# Patient Record
Sex: MALE | Race: WHITE | Employment: OTHER | URBAN - METROPOLITAN AREA
[De-identification: names, ages, dates, MRNs, and addresses within clinical notes are randomized per-mention and may not be internally consistent; named-entity substitution may affect disease eponyms.]

---

## 2017-01-01 ENCOUNTER — HOSPITAL ENCOUNTER (OUTPATIENT)
Dept: ULTRASOUND IMAGING | Facility: CLINIC | Age: 82
Discharge: HOME | End: 2017-12-29
Attending: INTERNAL MEDICINE | Admitting: INTERNAL MEDICINE
Payer: MEDICARE

## 2017-01-01 DIAGNOSIS — R58 BLEEDING: ICD-10-CM

## 2017-01-01 DIAGNOSIS — I77.0 AV FISTULA (H): ICD-10-CM

## 2017-01-01 DIAGNOSIS — I87.8 VENOUS PRESSURE INCREASED: ICD-10-CM

## 2017-01-01 DIAGNOSIS — N18.6 ESRD (END STAGE RENAL DISEASE) (H): ICD-10-CM

## 2017-01-01 DIAGNOSIS — M79.601 PAIN OF RIGHT ARM: ICD-10-CM

## 2017-01-01 PROCEDURE — 93990 DOPPLER FLOW TESTING: CPT

## 2017-04-19 ENCOUNTER — OFFICE VISIT (OUTPATIENT)
Dept: CARDIOLOGY | Facility: CLINIC | Age: 82
End: 2017-04-19
Attending: PHYSICIAN ASSISTANT
Payer: COMMERCIAL

## 2017-04-19 VITALS
BODY MASS INDEX: 26.37 KG/M2 | DIASTOLIC BLOOD PRESSURE: 54 MMHG | SYSTOLIC BLOOD PRESSURE: 132 MMHG | HEART RATE: 56 BPM | HEIGHT: 67 IN | WEIGHT: 168 LBS

## 2017-04-19 DIAGNOSIS — I21.4 NSTEMI (NON-ST ELEVATED MYOCARDIAL INFARCTION) (H): ICD-10-CM

## 2017-04-19 PROCEDURE — 99214 OFFICE O/P EST MOD 30 MIN: CPT | Performed by: PHYSICIAN ASSISTANT

## 2017-04-19 RX ORDER — NITROGLYCERIN 0.4 MG/1
0.4 TABLET SUBLINGUAL EVERY 5 MIN PRN
Qty: 25 TABLET | Refills: 3 | Status: SHIPPED | OUTPATIENT
Start: 2017-04-19

## 2017-04-19 NOTE — MR AVS SNAPSHOT
"              After Visit Summary   4/19/2017    Maurizio Ohara    MRN: [id number]           Patient Information     Date Of Birth          10/7/1929        Visit Information        Provider Department      4/19/2017 1:50 PM Julianne Dan PA-C [facility] [place]        Today's Diagnoses     NSTEMI (non-ST elevated myocardial infarction) (H)          Care Instructions    You look stable today and are feeling well.   No medication changes today.   Follow up with Dr. Keyes in 6 months with a repeat echocardiogram (ultrasound) to follow the valves and the pump function of your heart.     If you develop chest pain or shortness of breath, or if you're retaining fluid, we want you to call us and be seen sooner.         Follow-ups after your visit        Who to contact     If you have questions or need follow up information about today's clinic visit or your schedule please contact [facility] [place] directly at [phone].  Normal or non-critical lab and imaging results will be communicated to you by orderbird AGhart, letter or phone within 4 business days after the clinic has received the results. If you do not hear from us within 7 days, please contact the clinic through orderbird AGhart or phone. If you have a critical or abnormal lab result, we will notify you by phone as soon as possible.  Submit refill requests through [email or web address] or call your pharmacy and they will forward the refill request to us. Please allow 3 business days for your refill to be completed.          Additional Information About Your Visit        orderbird AGhart Information     [email or web address] lets you send messages to your doctor, view your test results, renew your prescriptions, schedule appointments and more. To sign up, go to [email or web address] . Click on \"Log in\" on the left side of the screen, which will take you to the Welcome page. Then click on \"Sign up Now\" on the right side of the page. " "    You will be asked to enter the access code listed below, as well as some personal information. Please follow the directions to create your username and password.     Your access code is: [email or web address]  Expires: 2017  2:39 PM     Your access code will  in 90 days. If you need help or a new code, please call your [facility] clinic or [phone].        Care EveryWhere ID     This is your Care EveryWhere ID. This could be used by other organizations to access your [facility] medical records  [id number]        Your Vitals Were     Pulse Height BMI (Body Mass Index)             56 1.702 m (5' 7\") 26.31 kg/m2          Blood Pressure from Last 3 Encounters:   17 132/54   10/24/16 126/64   10/21/16 118/74    Weight from Last 3 Encounters:   17 76.2 kg (168 lb)   10/21/16 75.8 kg (167 lb)   10/02/16 70.6 kg (155 lb 9.6 oz)              Today, you had the following     No orders found for display         Where to get your medicines      These medications [facility] - [place], [place] - [address] [place] [address]  [address] [place] [address], [facility] [address]     Phone:  [phone]     nitroglycerin 0.4 MG sublingual tablet          Primary Care Provider Office Phone # Fax #    [facility] [phone]       [address] [place] [id number]        Thank you!     Thank you for choosing [facility] [place]  for your care. Our goal is always to provide you with excellent care. Hearing back from our patients is one way we can continue to improve our services. Please take a few minutes to complete the written survey that you may receive in the mail after your visit with us. Thank you!             Your Updated Medication List - Protect others around you: Learn how to safely use, store and throw away your medicines at www.disposemymeds.org.          This list is accurate as of: 17  2:39 PM.  " Always use your most recent med list.                   Brand Name Dispense Instructions for use    aspirin 81 MG tablet      Take 81 mg by mouth daily       atorvastatin 40 MG tablet    LIPITOR    30 tablet    Take 1 tablet (40 mg) by mouth daily       calcium acetate 667 MG Caps capsule    PHOSLO     Take 2,001 mg by mouth 3 times daily (with meals)       DIALYVITE PO      Take 1 tablet by mouth daily       metoprolol 50 MG 24 hr tablet    TOPROL XL    135 tablet    Take 1.5 tablets (75 mg) by mouth daily       NIFEDIAC CC 90 MG Tb24   Generic drug:  NIFEdipine ER      Take 90 mg by mouth every morning       nitroglycerin 0.4 MG sublingual tablet    NITROSTAT    25 tablet    Place 1 tablet (0.4 mg) under the tongue every 5 minutes as needed for chest pain       nortriptyline 50 MG capsule    PAMELOR     Take 100 mg by mouth At Bedtime       PROMETHAZINE VC/CODEINE 5-6.25-10 MG/5ML Syrp   Generic drug:  Phenyleph-Promethazine-Cod      Take 1-2 tsp. by mouth every 6 hours as needed       VITAMIN D (CHOLECALCIFEROL) PO      Take 2,000 Units by mouth daily

## 2017-04-19 NOTE — PROGRESS NOTES
HPI and Plan:   See dictation  [id number]    Orders this Visit:  No orders of the defined types were placed in this encounter.    Orders Placed This Encounter   Medications     nitroglycerin (NITROSTAT) 0.4 MG sublingual tablet     Sig: Place 1 tablet (0.4 mg) under the tongue every 5 minutes as needed for chest pain     Dispense:  25 tablet     Refill:  3     Medications Discontinued During This Encounter   Medication Reason     order for DME Therapy completed     nitroglycerin (NITROSTAT) 0.4 MG SL tablet Reorder         Encounter Diagnosis   Name Primary?     NSTEMI (non-ST elevated myocardial infarction) (H)        CURRENT MEDICATIONS:  Current Outpatient Prescriptions   Medication Sig Dispense Refill     nitroglycerin (NITROSTAT) 0.4 MG sublingual tablet Place 1 tablet (0.4 mg) under the tongue every 5 minutes as needed for chest pain 25 tablet 3     atorvastatin (LIPITOR) 40 MG tablet Take 1 tablet (40 mg) by mouth daily 30 tablet 0     NIFEdipine ER (NIFEDIAC CC) 90 MG TB24 Take 90 mg by mouth every morning       metoprolol (TOPROL XL) 50 MG 24 hr tablet Take 1.5 tablets (75 mg) by mouth daily 135 tablet 3     aspirin 81 MG tablet Take 81 mg by mouth daily       nortriptyline (PAMELOR) 50 MG capsule Take 100 mg by mouth At Bedtime       B Complex-C-Folic Acid (DIALYVITE PO) Take 1 tablet by mouth daily       VITAMIN D, CHOLECALCIFEROL, PO Take 2,000 Units by mouth daily       calcium acetate (PHOSLO) 667 MG CAPS Take 2,001 mg by mouth 3 times daily (with meals)       Phenyleph-Promethazine-Cod (PROMETHAZINE VC/CODEINE) 5-6.25-10 MG/5ML SYRP Take 1-2 tsp. by mouth every 6 hours as needed       [DISCONTINUED] nitroglycerin (NITROSTAT) 0.4 MG SL tablet Place 1 tablet (0.4 mg) under the tongue every 5 minutes as needed for chest pain 25 tablet 0       ALLERGIES  Allergies   Allergen Reactions     Glyburide      Lisinopril      Hyperkalemia when hospitalized 4/5/2011     Metformin      Renal failure stage 4      "Glenn Gallagher RN clarified with pt, pt does not remember rxn, it was a long time ago, and \"nothing crazy\".     Verapamil        PAST MEDICAL HISTORY:  Past Medical History:   Diagnosis Date     Anemia      Anemia      CAD (coronary artery disease) 12/24/14    PCI and BMS to mid RCA (5.0 x 20mm) with residual mod diffuse mid LAD disease     Chronic kidney disease     on Dialysis     Diabetes (H)      Hypertension      Mitral regurgitation 12/24/2014    mild-mod (1-2+) per echo     Mitral valve disorders 12/24/2014    mild/mod (1-2+) MR     Myocardial infarction (H) 12/24/2014    NSTEMI     Pulmonary hypertension (H) 12/24/2014    RVSP elevated c/w mild-mod PH per echo     Renal disease due to diabetes mellitus (H)     End stage; on Dialysis       PAST SURGICAL HISTORY:  Past Surgical History:   Procedure Laterality Date     CORONARY ANGIOGRAPHY ADULT ORDER  12/24/2014     ENT SURGERY      Tonsillectomy     HEART CATH, ANGIOPLASTY  12/24/2014    PCI and BMS (5.0x20mm)to mid RCA     THORACIC SURGERY      Herniated disc       FAMILY HISTORY:  Family History   Problem Relation Age of Onset     DIABETES Mother        SOCIAL HISTORY:  Social History     Social History     Marital status: Single     Spouse name: N/A     Number of children: N/A     Years of education: N/A     Social History Main Topics     Smoking status: Former Smoker     Smokeless tobacco: None     Alcohol use Yes      Comment: occasionally/rare     Drug use: No     Sexual activity: Not Asked     Other Topics Concern     Caffeine Concern Yes     1-2 cups daily     Sleep Concern No     Special Diet Yes     kidney diet     Exercise Yes     walking     Seat Belt Yes     Social History Narrative       Review of Systems:  Skin:  Positive for bruising   Eyes:  Positive for glasses  ENT:  Negative    Respiratory:  Negative    Cardiovascular:  Negative    Gastroenterology: Negative    Genitourinary:  Positive for    Musculoskeletal:  Negative  " "  Neurologic:  Negative    Psychiatric:  Negative    Heme/Lymph/Imm:  Positive for easy bruising  Endocrine:  Negative        Physical Exam:  Vitals: /54 (BP Location: Right arm, Patient Position: Chair, Cuff Size: Adult Regular)  Pulse 56  Ht 1.702 m (5' 7\")  Wt 76.2 kg (168 lb)  BMI 26.31 kg/m2    Constitutional:  cooperative, alert and oriented, well developed, well nourished, in no acute distress        Skin:  warm and dry to the touch   scattered ecchymosis    Head:  normocephalic        Eyes:  pupils equal and round;conjunctivae and lids unremarkable   mild scleral icterus    ENT:  no pallor or cyanosis        Neck:      mild JVD    Chest:  clear to auscultation;normal symmetry        Cardiac: regular rhythm             II/VI systolic murmur at USB, II/VI blowing systolic murmur at apex    Abdomen:  abdomen soft;BS normoactive;non-tender obese      Vascular: not assessed this visit                                      Extremities and Back:  no edema   chronic venous stasis changes in extremities; dialysis vascular access in right arm    Neurological:  affect appropriate, oriented to time, person and place;no gross motor deficits          Recent Lab Results:  LIPID RESULTS:  Lab Results   Component Value Date    CHOL 135 12/25/2014    HDL 56 12/25/2014    LDL 63 12/25/2014    TRIG 80 12/25/2014    CHOLHDLRATIO 2.4 12/25/2014       LIVER ENZYME RESULTS:  Lab Results   Component Value Date    AST 12 09/24/2016    ALT 11 09/24/2016       CBC RESULTS:  Lab Results   Component Value Date    WBC 7.5 10/01/2016    RBC 2.54 (L) 10/01/2016    HGB 8.9 (L) 10/01/2016    HCT 27.4 (L) 10/01/2016     (H) 10/01/2016    MCH 35.0 (H) 10/01/2016    MCHC 32.5 10/01/2016    RDW 16.8 (H) 10/01/2016     10/01/2016       BMP RESULTS:  Lab Results   Component Value Date     10/21/2016    POTASSIUM 4.6 10/21/2016    CHLORIDE 102 10/21/2016    CO2 25 10/21/2016    ANIONGAP 10 10/21/2016     (H) " 10/21/2016    BUN 39 (H) 10/21/2016    CR 5.00 (H) 10/21/2016    GFRESTIMATED 11 (L) 10/21/2016    GFRESTBLACK 13 (L) 10/21/2016    KIMBER 8.5 10/21/2016        A1C RESULTS:  Lab Results   Component Value Date    A1C 5.5 12/25/2014       INR RESULTS:  Lab Results   Component Value Date    INR 1.17 (H) 09/28/2016    INR 0.98 12/24/2014           CC  Julianne Dan PA-C   [facility]  [address] [place] [address]

## 2017-04-19 NOTE — LETTER
4/19/2017    [address] [address]    RE: Maurizio NORWOOD Soogael       Dear Colleague,    I had the pleasure of seeing Maurizio Ohara in the [facility].    I had the pleasure of following up with Maurizio Ohara today regarding his coronary artery disease, aortic stenosis and mitral regurgitation.  Maurizio is a patient of Dr. Keyes.      Maurizio is a very pleasant, 87-year-old gentleman with a history of coronary artery disease with a non-ST-elevation MI in 2014.  At that time, he underwent stenting of the right coronary artery.  He also had a moderate to severe lesion in his LAD and mild disease in his circumflex.  A few weeks following the intervention, he underwent a nuclear stress test to evaluate the LAD lesion.  This showed no ischemia in the LAD territory and an EF of 59%.  He also has a history of hypertension; hyperlipidemia; end-stage renal disease on dialysis Tuesday, Thursday, Saturday; chronic anemia; mitral regurgitation; aortic stenosis.      Maurizio was admitted to the hospital 09/24-10/02/2016 for shortness of breath and acute respiratory failure.  He was treated for possible pneumonia.  Echocardiogram 09/26/2016 showed an EF of 55%-60%, moderate to severe MR, increased RV systolic pressures indicating moderate to severe pulmonary hypertension, moderate aortic stenosis.  He underwent a LAN on 09/29/2016 to further evaluate the valves.  This showed mild to moderate MR, severe PH, moderate AS.      I saw Maurizio in followup on 10/21/2016.  At that time, he was doing quite well without any breathing concerns or chest pain.  He had been restarted on a statin.      He presents today for followup.  Again, he tells me that his breathing is much improved.  He denies any chest pain.  He has no lightheadedness, dizziness or syncope.  He continues to dialyze Tuesday, Thursday, Saturday.  He has scattered ecchymosis that is  chronic.  He gets occasional additional bruising very easily, but this resolves within a few days to a week.        PHYSICAL EXAMINATION:   VITAL SIGNS:  Blood pressure 132/54, pulse 56, weight 168 pounds, BMI 26.37.   GENERAL:  In no apparent distress.   LUNGS:  Clear.   CARDIOVASCULAR:  Regular S1, S2.  He has a 2/6 systolic murmur at the upper sternal border, a 2/6 systolic murmur that is blowing at the apex.   ABDOMEN:  Soft, bowel sounds positive, nontender.   EXTREMITIES:  Warm without pitting edema.  There are chronic venous stasis changes in his extremities.   NEUROLOGIC:  Alert and oriented x3.  No focal deficits.     Outpatient Encounter Prescriptions as of 4/19/2017   Medication Sig Dispense Refill     nitroglycerin (NITROSTAT) 0.4 MG sublingual tablet Place 1 tablet (0.4 mg) under the tongue every 5 minutes as needed for chest pain 25 tablet 3     atorvastatin (LIPITOR) 40 MG tablet Take 1 tablet (40 mg) by mouth daily 30 tablet 0     NIFEdipine ER (NIFEDIAC CC) 90 MG TB24 Take 90 mg by mouth every morning       [DISCONTINUED] metoprolol (TOPROL XL) 50 MG 24 hr tablet Take 1.5 tablets (75 mg) by mouth daily 135 tablet 3     aspirin 81 MG tablet Take 81 mg by mouth daily       nortriptyline (PAMELOR) 50 MG capsule Take 100 mg by mouth At Bedtime       B Complex-C-Folic Acid (DIALYVITE PO) Take 1 tablet by mouth daily       VITAMIN D, CHOLECALCIFEROL, PO Take 2,000 Units by mouth daily       calcium acetate (PHOSLO) 667 MG CAPS Take 2,001 mg by mouth 3 times daily (with meals)       Phenyleph-Promethazine-Cod (PROMETHAZINE VC/CODEINE) 5-6.25-10 MG/5ML SYRP Take 1-2 tsp. by mouth every 6 hours as needed       [DISCONTINUED] order for DME Equipment being ordered: Walker ()  Treatment Diagnosis: Difficulty Walking 1 each 0     [DISCONTINUED] nitroglycerin (NITROSTAT) 0.4 MG SL tablet Place 1 tablet (0.4 mg) under the tongue every 5 minutes as needed for chest pain 25 tablet 0     No facility-administered  encounter medications on file as of 4/19/2017.       ASSESSMENT AND PLAN:     1.  Coronary artery disease with a non-ST elevation in 2014, status post stenting of the RCA.  He had an LAD lesion and some mild circumflex disease at that time as well.  He underwent a nuclear stress test 01/09/2015 that showed no ischemia in the LAD territory and a normal EF.  He is currently stable without anginal symptoms.  He is on aspirin 81 mg daily and atorvastatin 40 mg daily.   2.  Hypertension, controlled.  He is on nifedipine 90 mg daily as well as Toprol XL 75 mg daily.   3.  Hyperlipidemia, on atorvastatin 40 mg daily.   4.  End-stage renal disease on hemodialysis Tuesday, Thursday, Saturday.   5.  Mitral regurgitation.  LAN 09/29/2016 shows mild to moderate at most mitral regurgitation.  This needs to be followed.  An echo was ordered for 10/2017.   6.  Aortic stenosis.  LAN 09/29/2016 showed moderate aortic stenosis.  This also needs to be followed by echocardiography.   7.  Chronic anemia.  Followed through Nephrology and Primary Care.      Maurizio is doing quite well today.  He has stable vitals and denies any cardiorespiratory complaints.  He will follow up with Dr. Keyes in 6 months with a repeat echocardiogram to evaluate his valves as well as his ejection fraction.      Thank you very much for allowing me to participate in the care of Maurizio Ohara.     Sincerely,    Julianne Dan PA-C     [facility]

## 2017-04-19 NOTE — PATIENT INSTRUCTIONS
You look stable today and are feeling well.   No medication changes today.   Follow up with Dr. Keyes in 6 months with a repeat echocardiogram (ultrasound) to follow the valves and the pump function of your heart.     If you develop chest pain or shortness of breath, or if you're retaining fluid, we want you to call us and be seen sooner.

## 2017-04-20 NOTE — PROGRESS NOTES
HISTORY OF PRESENT ILLNESS:  I had the pleasure of following up with Maurizio Ohara today regarding his coronary artery disease, aortic stenosis and mitral regurgitation.  Maurizio is a patient of Dr. Keyes.      Maurizio is a very pleasant, 87-year-old gentleman with a history of coronary artery disease with a non-ST-elevation MI in 2014.  At that time, he underwent stenting of the right coronary artery.  He also had a moderate to severe lesion in his LAD and mild disease in his circumflex.  A few weeks following the intervention, he underwent a nuclear stress test to evaluate the LAD lesion.  This showed no ischemia in the LAD territory and an EF of 59%.  He also has a history of hypertension; hyperlipidemia; end-stage renal disease on dialysis Tuesday, Thursday, Saturday; chronic anemia; mitral regurgitation; aortic stenosis.      Maurizio was admitted to the hospital 09/24-10/02/2016 for shortness of breath and acute respiratory failure.  He was treated for possible pneumonia.  Echocardiogram 09/26/2016 showed an EF of 55%-60%, moderate to severe MR, increased RV systolic pressures indicating moderate to severe pulmonary hypertension, moderate aortic stenosis.  He underwent a LAN on 09/29/2016 to further evaluate the valves.  This showed mild to moderate MR, severe PH, moderate AS.      I saw Maurizio in followup on 10/21/2016.  At that time, he was doing quite well without any breathing concerns or chest pain.  He had been restarted on a statin.      He presents today for followup.  Again, he tells me that his breathing is much improved.  He denies any chest pain.  He has no lightheadedness, dizziness or syncope.  He continues to dialyze Tuesday, Thursday, Saturday.  He has scattered ecchymosis that is chronic.  He gets occasional additional bruising very easily, but this resolves within a few days to a week.      PHYSICAL EXAMINATION:   VITAL SIGNS:  Blood pressure 132/54, pulse 56, weight 168 pounds, BMI 26.37.    GENERAL:  In no apparent distress.   LUNGS:  Clear.   CARDIOVASCULAR:  Regular S1, S2.  He has a 2/6 systolic murmur at the upper sternal border, a 2/6 systolic murmur that is blowing at the apex.   ABDOMEN:  Soft, bowel sounds positive, nontender.   EXTREMITIES:  Warm without pitting edema.  There are chronic venous stasis changes in his extremities.   NEUROLOGIC:  Alert and oriented x3.  No focal deficits.      ASSESSMENT AND PLAN:     1.  Coronary artery disease with a non-ST elevation in , status post stenting of the RCA.  He had an LAD lesion and some mild circumflex disease at that time as well.  He underwent a nuclear stress test 2015 that showed no ischemia in the LAD territory and a normal EF.  He is currently stable without anginal symptoms.  He is on aspirin 81 mg daily and atorvastatin 40 mg daily.   2.  Hypertension, controlled.  He is on nifedipine 90 mg daily as well as Toprol XL 75 mg daily.   3.  Hyperlipidemia, on atorvastatin 40 mg daily.   4.  End-stage renal disease on hemodialysis Tuesday, Thursday, Saturday.   5.  Mitral regurgitation.  LAN 2016 shows mild to moderate at most mitral regurgitation.  This needs to be followed.  An echo was ordered for 10/2017.   6.  Aortic stenosis.  LAN 2016 showed moderate aortic stenosis.  This also needs to be followed by echocardiography.   7.  Chronic anemia.  Followed through Nephrology and Primary Care.      Maurizio is doing quite well today.  He has stable vitals and denies any cardiorespiratory complaints.  He will follow up with Dr. Keyes in 6 months with a repeat echocardiogram to evaluate his valves as well as his ejection fraction.      Thank you very much for allowing me to participate in the care of Maurizio Quiñones.         MANDY OCHOA PA-C             D: 2017 14:44   T: 2017 12:32   MT: elisa      Name:     MAURIZIO QUIÑONES   MRN:      [id number]        Account:      [id number]   :      10/07/1929            Service Date: 04/19/2017      Document: [id number]

## 2017-05-16 DIAGNOSIS — I25.10 CAD (CORONARY ARTERY DISEASE): ICD-10-CM

## 2017-05-16 RX ORDER — METOPROLOL SUCCINATE 50 MG/1
75 TABLET, EXTENDED RELEASE ORAL DAILY
Qty: 135 TABLET | Refills: 3 | Status: ON HOLD | OUTPATIENT
Start: 2017-05-16 | End: 2017-08-15

## 2017-06-21 ENCOUNTER — RECORDS - HEALTHEAST (OUTPATIENT)
Dept: LAB | Facility: CLINIC | Age: 82
End: 2017-06-21

## 2017-06-21 LAB
CHOLEST SERPL-MCNC: 128 MG/DL
FASTING STATUS PATIENT QL REPORTED: NORMAL
HDLC SERPL-MCNC: 43 MG/DL
LDLC SERPL CALC-MCNC: 71 MG/DL
TRIGL SERPL-MCNC: 69 MG/DL

## 2017-08-09 ENCOUNTER — APPOINTMENT (OUTPATIENT)
Dept: GENERAL RADIOLOGY | Facility: CLINIC | Age: 82
DRG: 193 | End: 2017-08-09
Attending: NURSE PRACTITIONER
Payer: MEDICARE

## 2017-08-09 ENCOUNTER — HOSPITAL ENCOUNTER (INPATIENT)
Facility: CLINIC | Age: 82
LOS: 6 days | Discharge: HOME | DRG: 193 | End: 2017-08-15
Attending: EMERGENCY MEDICINE | Admitting: INTERNAL MEDICINE
Payer: MEDICARE

## 2017-08-09 DIAGNOSIS — R09.02 HYPOXIA: ICD-10-CM

## 2017-08-09 DIAGNOSIS — I25.10 CAD (CORONARY ARTERY DISEASE): ICD-10-CM

## 2017-08-09 DIAGNOSIS — M62.81 GENERALIZED MUSCLE WEAKNESS: ICD-10-CM

## 2017-08-09 DIAGNOSIS — I10 ESSENTIAL HYPERTENSION: Primary | ICD-10-CM

## 2017-08-09 DIAGNOSIS — R42 DIZZINESS: ICD-10-CM

## 2017-08-09 DIAGNOSIS — I50.31 ACUTE DIASTOLIC CONGESTIVE HEART FAILURE (H): ICD-10-CM

## 2017-08-09 LAB
ALBUMIN SERPL-MCNC: 3.6 G/DL (ref 3.4–5)
ALP SERPL-CCNC: 53 U/L (ref 40–150)
ALT SERPL W P-5'-P-CCNC: 16 U/L (ref 0–70)
ANION GAP SERPL CALCULATED.3IONS-SCNC: 12 MMOL/L (ref 3–14)
AST SERPL W P-5'-P-CCNC: 19 U/L (ref 0–45)
BILIRUB SERPL-MCNC: 0.7 MG/DL (ref 0.2–1.3)
BUN SERPL-MCNC: 59 MG/DL (ref 7–30)
CALCIUM SERPL-MCNC: 8.3 MG/DL (ref 8.5–10.1)
CHLORIDE SERPL-SCNC: 102 MMOL/L (ref 94–109)
CO2 SERPL-SCNC: 24 MMOL/L (ref 20–32)
CREAT SERPL-MCNC: 7.78 MG/DL (ref 0.66–1.25)
ERYTHROCYTE [DISTWIDTH] IN BLOOD BY AUTOMATED COUNT: 14.2 % (ref 10–15)
GFR SERPL CREATININE-BSD FRML MDRD: 7 ML/MIN/1.7M2
GLUCOSE SERPL-MCNC: 102 MG/DL (ref 70–99)
HCT VFR BLD AUTO: 28.9 % (ref 40–53)
HGB BLD-MCNC: 9.2 G/DL (ref 13.3–17.7)
MCH RBC QN AUTO: 35.7 PG (ref 26.5–33)
MCHC RBC AUTO-ENTMCNC: 31.8 G/DL (ref 31.5–36.5)
MCV RBC AUTO: 112 FL (ref 78–100)
NT-PROBNP SERPL-MCNC: ABNORMAL PG/ML (ref 0–1800)
PLATELET # BLD AUTO: 134 10E9/L (ref 150–450)
POTASSIUM SERPL-SCNC: 4.6 MMOL/L (ref 3.4–5.3)
PROT SERPL-MCNC: 7.3 G/DL (ref 6.8–8.8)
RBC # BLD AUTO: 2.58 10E12/L (ref 4.4–5.9)
SODIUM SERPL-SCNC: 138 MMOL/L (ref 133–144)
TROPONIN I SERPL-MCNC: 0.02 UG/L (ref 0–0.04)
WBC # BLD AUTO: 5.7 10E9/L (ref 4–11)

## 2017-08-09 PROCEDURE — 80053 COMPREHEN METABOLIC PANEL: CPT | Performed by: NURSE PRACTITIONER

## 2017-08-09 PROCEDURE — 99223 1ST HOSP IP/OBS HIGH 75: CPT | Mod: AI | Performed by: INTERNAL MEDICINE

## 2017-08-09 PROCEDURE — 93005 ELECTROCARDIOGRAM TRACING: CPT

## 2017-08-09 PROCEDURE — 84484 ASSAY OF TROPONIN QUANT: CPT | Performed by: NURSE PRACTITIONER

## 2017-08-09 PROCEDURE — A9270 NON-COVERED ITEM OR SERVICE: HCPCS | Mod: GY | Performed by: NURSE PRACTITIONER

## 2017-08-09 PROCEDURE — 85027 COMPLETE CBC AUTOMATED: CPT | Performed by: NURSE PRACTITIONER

## 2017-08-09 PROCEDURE — 12000007 ZZH R&B INTERMEDIATE

## 2017-08-09 PROCEDURE — 99285 EMERGENCY DEPT VISIT HI MDM: CPT | Mod: 25

## 2017-08-09 PROCEDURE — 25000131 ZZH RX MED GY IP 250 OP 636 PS 637: Mod: GY | Performed by: NURSE PRACTITIONER

## 2017-08-09 PROCEDURE — A9270 NON-COVERED ITEM OR SERVICE: HCPCS | Mod: GY | Performed by: INTERNAL MEDICINE

## 2017-08-09 PROCEDURE — 83880 ASSAY OF NATRIURETIC PEPTIDE: CPT | Performed by: NURSE PRACTITIONER

## 2017-08-09 PROCEDURE — 71020 XR CHEST 2 VW: CPT

## 2017-08-09 PROCEDURE — 25000132 ZZH RX MED GY IP 250 OP 250 PS 637: Mod: GY | Performed by: INTERNAL MEDICINE

## 2017-08-09 RX ORDER — POTASSIUM CHLORIDE 1.5 G/1.58G
20-40 POWDER, FOR SOLUTION ORAL
Status: DISCONTINUED | OUTPATIENT
Start: 2017-08-09 | End: 2017-08-15

## 2017-08-09 RX ORDER — POTASSIUM CHLORIDE 1500 MG/1
20-40 TABLET, EXTENDED RELEASE ORAL
Status: DISCONTINUED | OUTPATIENT
Start: 2017-08-09 | End: 2017-08-15

## 2017-08-09 RX ORDER — POTASSIUM CHLORIDE 7.45 MG/ML
10 INJECTION INTRAVENOUS
Status: DISCONTINUED | OUTPATIENT
Start: 2017-08-09 | End: 2017-08-15

## 2017-08-09 RX ORDER — ATORVASTATIN CALCIUM 40 MG/1
40 TABLET, FILM COATED ORAL DAILY
Status: DISCONTINUED | OUTPATIENT
Start: 2017-08-10 | End: 2017-08-10

## 2017-08-09 RX ORDER — AMOXICILLIN 250 MG
1-2 CAPSULE ORAL 2 TIMES DAILY
Status: DISCONTINUED | OUTPATIENT
Start: 2017-08-09 | End: 2017-08-15 | Stop reason: HOSPADM

## 2017-08-09 RX ORDER — MECLIZINE HYDROCHLORIDE 25 MG/1
25 TABLET ORAL ONCE
Status: COMPLETED | OUTPATIENT
Start: 2017-08-09 | End: 2017-08-09

## 2017-08-09 RX ORDER — ASPIRIN 81 MG/1
81 TABLET ORAL DAILY
Status: DISCONTINUED | OUTPATIENT
Start: 2017-08-10 | End: 2017-08-15 | Stop reason: HOSPADM

## 2017-08-09 RX ORDER — NALOXONE HYDROCHLORIDE 0.4 MG/ML
.1-.4 INJECTION, SOLUTION INTRAMUSCULAR; INTRAVENOUS; SUBCUTANEOUS
Status: DISCONTINUED | OUTPATIENT
Start: 2017-08-09 | End: 2017-08-15 | Stop reason: HOSPADM

## 2017-08-09 RX ORDER — POTASSIUM CHLORIDE 29.8 MG/ML
20 INJECTION INTRAVENOUS
Status: DISCONTINUED | OUTPATIENT
Start: 2017-08-09 | End: 2017-08-15

## 2017-08-09 RX ORDER — ACETAMINOPHEN 325 MG/1
650 TABLET ORAL EVERY 4 HOURS PRN
Status: DISCONTINUED | OUTPATIENT
Start: 2017-08-09 | End: 2017-08-15 | Stop reason: HOSPADM

## 2017-08-09 RX ORDER — NORTRIPTYLINE HYDROCHLORIDE 50 MG/1
100 CAPSULE ORAL AT BEDTIME
Status: DISCONTINUED | OUTPATIENT
Start: 2017-08-09 | End: 2017-08-09 | Stop reason: CLARIF

## 2017-08-09 RX ORDER — ASCORBIC ACID, THIAMINE, RIBOFLAVIN, NIACINAMIDE, PYRIDOXINE, FOLIC ACID, COBALAMIN, BIOTIN, PANTOTHENIC ACID 100; 1.5; 1.7; 20; 10; 1; 6; 300; 1 MG/1; MG/1; MG/1; MG/1; MG/1; MG/1; UG/1; UG/1; MG/1
TABLET, COATED ORAL DAILY
Status: DISCONTINUED | OUTPATIENT
Start: 2017-08-10 | End: 2017-08-09 | Stop reason: CLARIF

## 2017-08-09 RX ORDER — POLYETHYLENE GLYCOL 3350 17 G/17G
17 POWDER, FOR SOLUTION ORAL DAILY PRN
Status: DISCONTINUED | OUTPATIENT
Start: 2017-08-09 | End: 2017-08-15 | Stop reason: HOSPADM

## 2017-08-09 RX ORDER — POTASSIUM CL/LIDO/0.9 % NACL 10MEQ/0.1L
10 INTRAVENOUS SOLUTION, PIGGYBACK (ML) INTRAVENOUS
Status: DISCONTINUED | OUTPATIENT
Start: 2017-08-09 | End: 2017-08-15

## 2017-08-09 RX ORDER — ONDANSETRON 4 MG/1
4 TABLET, ORALLY DISINTEGRATING ORAL EVERY 6 HOURS PRN
Status: DISCONTINUED | OUTPATIENT
Start: 2017-08-09 | End: 2017-08-15 | Stop reason: HOSPADM

## 2017-08-09 RX ORDER — ONDANSETRON 2 MG/ML
4 INJECTION INTRAMUSCULAR; INTRAVENOUS EVERY 6 HOURS PRN
Status: DISCONTINUED | OUTPATIENT
Start: 2017-08-09 | End: 2017-08-15 | Stop reason: HOSPADM

## 2017-08-09 RX ORDER — NIFEDIPINE 90 MG/1
90 TABLET, EXTENDED RELEASE ORAL EVERY MORNING
Status: DISCONTINUED | OUTPATIENT
Start: 2017-08-10 | End: 2017-08-15 | Stop reason: HOSPADM

## 2017-08-09 RX ADMIN — MECLIZINE HYDROCHLORIDE 25 MG: 25 TABLET ORAL at 20:23

## 2017-08-09 RX ADMIN — ACETAMINOPHEN 650 MG: 325 TABLET, FILM COATED ORAL at 23:22

## 2017-08-09 ASSESSMENT — ENCOUNTER SYMPTOMS
WEAKNESS: 1
FEVER: 0
COUGH: 1
DIZZINESS: 1

## 2017-08-09 ASSESSMENT — ACTIVITIES OF DAILY LIVING (ADL): AMBULATION: 0-->INDEPENDENT

## 2017-08-09 NOTE — ED PROVIDER NOTES
"  History     Chief Complaint:  Generalized Weakness     HPI   Maurizio Ohara is a 87 year old male who presents to the emergency department today for evaluation of generalized weakness. The patient is complaining of generalized weakness that began yesterday. The patient's neighbor, who is here in the ED, states that he watches the patient's dogs and was at the house yesterday when the patient complained of being \"off balance\" and falling into the couch. The patient states that he was dizzy. The patient has regular dialysis scheduled weekly for Tuesday, Thursday, and Saturdays. The neighbor also states that the patient hasn't been feeling well since his dialysis yesterday. The patient also has regularly scheduled therapy for his right hip, SI joint. The patient has had a cough for the past couple of days. No fevers.    Allergies:  Glyburide   Lisinopril   Metformin   Penicillins   Verapamil      Medications:    metoprolol (TOPROL XL) 50 MG 24 hr tablet  nitroglycerin (NITROSTAT) 0.4 MG sublingual tablet  atorvastatin (LIPITOR) 40 MG tablet  NIFEdipine ER (NIFEDIAC CC) 90 MG TB24  aspirin 81 MG tablet  nortriptyline (PAMELOR) 50 MG capsule  B Complex-C-Folic Acid (DIALYVITE PO)  VITAMIN D, CHOLECALCIFEROL, PO  calcium acetate (PHOSLO) 667 MG CAPS    Past Medical History:    Anemia    CAD (coronary artery disease)   Chronic kidney disease   Diabetes  Hypertension   Mitral regurgitation   Mitral valve disorders   Myocardial infarction   Pulmonary hypertension   Renal disease due to diabetes mellitus     Past Surgical History:    Coronary Angiography - PCI, BMS  Tonsillectomy  Herniated Thoracic Disc    Family History:    Diabetes    Social History:  The patient was accompanied to the ED by his neighbor.  Smoking Status: former  Alcohol Use: yes  Marital Status:  Single [1]     Review of Systems   Constitutional: Negative for fever.   Respiratory: Positive for cough.    Neurological: Positive for dizziness and " weakness (general).   All other systems reviewed and are negative.    Physical Exam     Patient Vitals for the past 24 hrs:   BP Temp Temp src Pulse Heart Rate Resp SpO2   08/09/17 1945 139/59 - - - 65 - 95 %   08/09/17 1937 - - - - - - 93 %   08/09/17 1934 130/62 - - - - - 94 %   08/09/17 1830 - - - - 70 - 95 %   08/09/17 1800 151/72 99.2  F (37.3  C) Oral 78 78 16 90 %        Physical Exam  General: Alert, No obvious discomfort, well kept  Eyes: PERRL, conjunctivae pink no scleral icterus or conjunctival injection  ENT:   Moist mucus membranes, posterior oropharynx clear without erythema or exudates, No lymphadenopathy, Normal voice  Resp:  Lungs clear to auscultation bilaterally, no crackles/rubs/wheezes. Good air movement  CV:  Normal rate and rhythm, Grade-III Whooshing/holosystolic   GI:  Abdomen soft and non-distended.  Normoactive BS.  No tenderness, guarding or rebound, No masses  Skin:  Warm, dry.  No rashes or petechiae  Musculoskeletal: No peripheral edema or calf tenderness, Normal gross ROM, Moderate tenderness to right SI joint  Neuro: Alert and oriented to person/place/time, normal sensation  Psychiatric: Normal affect, cooperative, good eye contact    Emergency Department Course     ECG:  ECG taken at 1800, ECG read at 1801  Sinus rhythm with premature atrial complexes  Nonspecific ST abnormality  Abnormal ECG  Rate 81 bpm. AZ interval 200. QRS duration 90. QT/QTc 376/436. P-R-T axes 87,65,7.     Imaging:  Radiology findings were communicated with the patient who voiced understanding of the findings.  XR Chest 2 Views  No acute abnormality.  Reading per radiology    Laboratory:  Laboratory findings were communicated with the patient who voiced understanding of the findings.  Troponin (Collected 1810): 0.020   BNP: 63136 (H)   CBC: WBC 5.7, HGB 9.2 (L),  (L)  CMP: Creatinine 7.78 (H), Ca 8.3 (L), BUN 59 (H), GFR 7 (L), Glucose 102 (H), o/w WNL    Interventions:  2023 Antivert 25 mg PO      Emergency Department Course:  Nursing notes and vitals reviewed.  1820 entered the room.  1823 I performed an exam of the patient as documented above.   IV was inserted and blood was drawn for laboratory testing, results above.  The patient was sent for an x-ray while in the emergency department, results above.    2013 the patient was rechecked and was updated on the results of his laboratory and imaging studies.    The patient received the above intervention(s).    2035 I spoke with Dr. Ramirez of the hospitalist service regarding patient's presentation, findings, and plan of care.   I discussed the treatment plan with the patient. They expressed understanding of this plan and consented to admission. I discussed the patient with Dr. Ramirez, who will admit the patient to a monitored bed for further evaluation and treatment.     Impression & Plan      Medical Decision Making:  Maurizio Ohara is a 87 year old male who presents to the emergency department today for evaluation of generalized weakness. He was standing in his kitchen with a neighbor when his right leg gave out on him and he fell onto the couch. Since yesterday, he has not felt well. His neighbor states that he did not appear to feel well since he completed his dialysis yesterday. The patient does complain of a cough, but not of chest pain, shortness of breath, or fevers. He does state azucena the feels slightly dizzy. His evaluation today shows no focal neurologic deficits, negative troponin, non-acute ECG, and the remainder of his labs appear to be within normal limits of a dialysis patient. His BNP was significantly elevated at over 69929, I do not have a baseline to compare this to. Plan will be to admit the patient for observation and further evaluation. The patient's leg discomfort may be related to physical therapy that he has been having lately and SI joint discomfort. I spoke to Dr. Ramirez who will admit the patient.     Diagnosis:    ICD-10-CM    1. Generalized muscle weakness M62.81   2. Hypoxia R09.02   3. Dizziness R42   4. Acute diastolic congestive heart failure (H) I50.31     Disposition:   The patient was admitted to the hospital for further evaluation and care.     Scribe Disclosure:  AIYANA, Anibal Sargent, am serving as a scribe at 6:16 PM on 8/9/2017 to document services personally performed by Edin Pfeiffer APRN, based on my observations and the provider's statements to me.        Edin Pfeiffer APRN CNP  08/14/17 1428

## 2017-08-09 NOTE — IP AVS SNAPSHOT
[place] [address] [facility]    [address]    [facility] [address]    Phone:  [phone]    Fax:  [phone]                                       After Visit Summary   8/9/2017    Maurizio Ohara    MRN: [id number]           After Visit Summary Signature Page     I have received my discharge instructions, and my questions have been answered. I have discussed any challenges I see with this plan with the nurse or doctor.    ..........................................................................................................................................  Patient/Patient Representative Signature      ..........................................................................................................................................  Patient Representative Print Name and Relationship to Patient    ..................................................               ................................................  Date                                            Time    ..........................................................................................................................................  Reviewed by Signature/Title    ...................................................              ..............................................  Date                                                            Time

## 2017-08-09 NOTE — ED NOTES
Pt presents with his neighbor c/o generalized weakness that began yesterday. Pt's neighbor states that he watches his dogs and yesterday was c/o of being off balance and falling into the couch. Pt was has dialysis Tuesday, Thursday, saturdays and neighbor states that he hasn't been feeling well since after dialysis yesterday. Pt alert, oriented x3. ABCs intact

## 2017-08-10 LAB
ALBUMIN UR-MCNC: 100 MG/DL
ANION GAP SERPL CALCULATED.3IONS-SCNC: 16 MMOL/L (ref 3–14)
APPEARANCE UR: CLEAR
BASE DEFICIT BLDV-SCNC: 1.2 MMOL/L
BILIRUB UR QL STRIP: NEGATIVE
BUN SERPL-MCNC: 68 MG/DL (ref 7–30)
CALCIUM SERPL-MCNC: 8 MG/DL (ref 8.5–10.1)
CHLORIDE SERPL-SCNC: 102 MMOL/L (ref 94–109)
CO2 SERPL-SCNC: 19 MMOL/L (ref 20–32)
COLOR UR AUTO: YELLOW
CREAT SERPL-MCNC: 8.67 MG/DL (ref 0.66–1.25)
GFR SERPL CREATININE-BSD FRML MDRD: 6 ML/MIN/1.7M2
GLUCOSE SERPL-MCNC: 119 MG/DL (ref 70–99)
GLUCOSE UR STRIP-MCNC: NEGATIVE MG/DL
HCO3 BLDV-SCNC: 23 MMOL/L (ref 21–28)
HGB UR QL STRIP: NEGATIVE
INTERPRETATION ECG - MUSE: NORMAL
KETONES UR STRIP-MCNC: NEGATIVE MG/DL
LACTATE BLD-SCNC: 0.6 MMOL/L (ref 0.7–2.1)
LEUKOCYTE ESTERASE UR QL STRIP: NEGATIVE
NITRATE UR QL: NEGATIVE
O2/TOTAL GAS SETTING VFR VENT: 3 %
OXYHGB MFR BLDV: 80 %
PCO2 BLDV: 36 MM HG (ref 40–50)
PH BLDV: 7.41 PH (ref 7.32–7.43)
PH UR STRIP: 6 PH (ref 5–7)
PO2 BLDV: 47 MM HG (ref 25–47)
POTASSIUM SERPL-SCNC: 4.5 MMOL/L (ref 3.4–5.3)
PROCALCITONIN SERPL-MCNC: 1.16 NG/ML
RBC #/AREA URNS AUTO: <1 /HPF (ref 0–2)
SODIUM SERPL-SCNC: 137 MMOL/L (ref 133–144)
SP GR UR STRIP: 1.01 (ref 1–1.03)
SQUAMOUS #/AREA URNS AUTO: 1 /HPF (ref 0–1)
URN SPEC COLLECT METH UR: ABNORMAL
UROBILINOGEN UR STRIP-MCNC: 0 MG/DL (ref 0–2)
VANCOMYCIN SERPL-MCNC: 22.3 MG/L
WBC #/AREA URNS AUTO: 1 /HPF (ref 0–2)

## 2017-08-10 PROCEDURE — A9270 NON-COVERED ITEM OR SERVICE: HCPCS | Mod: GY | Performed by: INTERNAL MEDICINE

## 2017-08-10 PROCEDURE — 87086 URINE CULTURE/COLONY COUNT: CPT | Performed by: INTERNAL MEDICINE

## 2017-08-10 PROCEDURE — 36415 COLL VENOUS BLD VENIPUNCTURE: CPT | Performed by: INTERNAL MEDICINE

## 2017-08-10 PROCEDURE — 81001 URINALYSIS AUTO W/SCOPE: CPT | Performed by: INTERNAL MEDICINE

## 2017-08-10 PROCEDURE — 12000007 ZZH R&B INTERMEDIATE

## 2017-08-10 PROCEDURE — 83605 ASSAY OF LACTIC ACID: CPT | Performed by: INTERNAL MEDICINE

## 2017-08-10 PROCEDURE — 25000128 H RX IP 250 OP 636: Performed by: INTERNAL MEDICINE

## 2017-08-10 PROCEDURE — 84145 PROCALCITONIN (PCT): CPT | Performed by: INTERNAL MEDICINE

## 2017-08-10 PROCEDURE — 25000132 ZZH RX MED GY IP 250 OP 250 PS 637: Mod: GY | Performed by: INTERNAL MEDICINE

## 2017-08-10 PROCEDURE — 87040 BLOOD CULTURE FOR BACTERIA: CPT | Performed by: INTERNAL MEDICINE

## 2017-08-10 PROCEDURE — 93010 ELECTROCARDIOGRAM REPORT: CPT | Performed by: INTERNAL MEDICINE

## 2017-08-10 PROCEDURE — 80048 BASIC METABOLIC PNL TOTAL CA: CPT | Performed by: INTERNAL MEDICINE

## 2017-08-10 PROCEDURE — 80202 ASSAY OF VANCOMYCIN: CPT | Performed by: INTERNAL MEDICINE

## 2017-08-10 PROCEDURE — 40000275 ZZH STATISTIC RCP TIME EA 10 MIN

## 2017-08-10 PROCEDURE — 93005 ELECTROCARDIOGRAM TRACING: CPT

## 2017-08-10 PROCEDURE — 82565 ASSAY OF CREATININE: CPT | Performed by: INTERNAL MEDICINE

## 2017-08-10 PROCEDURE — 82805 BLOOD GASES W/O2 SATURATION: CPT | Performed by: INTERNAL MEDICINE

## 2017-08-10 PROCEDURE — 63400005 ZZH RX 634: Performed by: INTERNAL MEDICINE

## 2017-08-10 PROCEDURE — 99233 SBSQ HOSP IP/OBS HIGH 50: CPT | Performed by: INTERNAL MEDICINE

## 2017-08-10 RX ORDER — ATORVASTATIN CALCIUM 40 MG/1
40 TABLET, FILM COATED ORAL EVERY EVENING
Status: DISCONTINUED | OUTPATIENT
Start: 2017-08-10 | End: 2017-08-15 | Stop reason: HOSPADM

## 2017-08-10 RX ORDER — CEFTRIAXONE 2 G/1
2 INJECTION, POWDER, FOR SOLUTION INTRAMUSCULAR; INTRAVENOUS EVERY 24 HOURS
Status: DISCONTINUED | OUTPATIENT
Start: 2017-08-10 | End: 2017-08-13

## 2017-08-10 RX ADMIN — Medication 1500 MG: at 02:00

## 2017-08-10 RX ADMIN — EPOETIN ALFA 5000 UNITS: 3000 SOLUTION INTRAVENOUS; SUBCUTANEOUS at 09:58

## 2017-08-10 RX ADMIN — CALCIUM ACETATE 2001 MG: 667 CAPSULE ORAL at 14:25

## 2017-08-10 RX ADMIN — SENNOSIDES AND DOCUSATE SODIUM 1 TABLET: 8.6; 5 TABLET ORAL at 20:25

## 2017-08-10 RX ADMIN — CALCIUM ACETATE 2001 MG: 667 CAPSULE ORAL at 17:51

## 2017-08-10 RX ADMIN — SODIUM CHLORIDE 250 ML: 9 INJECTION, SOLUTION INTRAVENOUS at 08:00

## 2017-08-10 RX ADMIN — ACETAMINOPHEN 650 MG: 325 TABLET, FILM COATED ORAL at 08:47

## 2017-08-10 RX ADMIN — ASPIRIN 81 MG: 81 TABLET, COATED ORAL at 12:02

## 2017-08-10 RX ADMIN — VITAMIN D, TAB 1000IU (100/BT) 2000 UNITS: 25 TAB at 12:05

## 2017-08-10 RX ADMIN — NIFEDIPINE 90 MG: 90 TABLET, FILM COATED, EXTENDED RELEASE ORAL at 12:09

## 2017-08-10 RX ADMIN — VANCOMYCIN HYDROCHLORIDE 750 MG: 1 INJECTION, POWDER, LYOPHILIZED, FOR SOLUTION INTRAVENOUS at 12:01

## 2017-08-10 RX ADMIN — METOPROLOL SUCCINATE 75 MG: 25 TABLET, EXTENDED RELEASE ORAL at 12:06

## 2017-08-10 RX ADMIN — CALCIUM ACETATE 2001 MG: 667 CAPSULE ORAL at 08:49

## 2017-08-10 RX ADMIN — CEFTRIAXONE 2 G: 2 INJECTION, POWDER, FOR SOLUTION INTRAMUSCULAR; INTRAVENOUS at 01:15

## 2017-08-10 RX ADMIN — ATORVASTATIN CALCIUM 40 MG: 40 TABLET, FILM COATED ORAL at 19:35

## 2017-08-10 RX ADMIN — SENNOSIDES AND DOCUSATE SODIUM 1 TABLET: 8.6; 5 TABLET ORAL at 12:03

## 2017-08-10 NOTE — PLAN OF CARE
Problem: Goal Outcome Summary  Goal: Goal Outcome Summary  Outcome: No Change     Temp elevated, max 102.3, Tylenol given, recheck  this shift, MD notified.. O2 needs 3L NC in AM, weaned to RA. All other VSS. A/Ox4, forgetful at times.  Makes urine, but small amount. UA sent-negative.  BM yesterday. Coughing thick, yellow sputum. Dialysis today, 2L off and Fistula infiltrated during run per RN, ice packs applied to RUE. Bandages c/d/i. Tele SR with 1st degree AVB, PACs.

## 2017-08-10 NOTE — PROGRESS NOTES
Renal Medicine Inpatient Dialysis Note                                Maurizio Ohara MRN# [id number]   Age: 87 year old YOB: 1929   Date of Admission: 8/9/2017 Hospital LOS: 1          Assessment/Plan:     Admitted with generalized weakness    1.  ESRD   -[facility]   -AVF   -dry weight 74.5 kg  2.  Anemia   -SY  3.  HTN  4.  ? Febrile illness    [facility] dialysis schedule      Interval History:     Seen while on run.  2.75 hour run via right AVF at 350 ml/min.  2K bath.  2 liter UF.   to 140 systolic range.  Stable run.  Febrile during run.  BC were sent.      Pleasant.  No patient complaints.  Follows.     ROS     GENERAL: NAD, No fever,chills  E/M: NEGATIVE for ear, mouth and throat problems  R: NEGATIVE for significant cough or SOB  CV: NEGATIVE for chest pain, palpitations  GI: NEGATIVE for abdominal pain, heartburn, nausea, vomiting or change in bowel pattern  EXT: no change edema  ROS otherwise negative    Dialysis Parameters:     Vitals were reviewed  Patient Vitals for the past 8 hrs:   BP Temp Temp src Pulse Heart Rate Resp SpO2 Weight   08/10/17 1206 117/41 - - - 62 - - -   08/10/17 1115 119/56 98.5  F (36.9  C) Oral 65 - 18 - -   08/10/17 1057 128/51 - - 66 - - - -   08/10/17 1045 121/46 - - 64 - - - -   08/10/17 1030 129/55 - - 67 - - - -   08/10/17 1015 125/55 - - 67 - - - -   08/10/17 1000 131/54 - - 66 - - - -   08/10/17 0930 133/54 - - 71 - - - -   08/10/17 0915 135/60 101.1  F (38.4  C) Oral 78 - - - -   08/10/17 0900 155/61 - - 92 - - - -   08/10/17 0830 (!) 163/38 102.1  F (38.9  C) Oral 82 - - - -   08/10/17 0815 140/63 - - 81 - - - -   08/10/17 0808 142/58 - - 79 - - - -   08/10/17 0800 150/64 99.1  F (37.3  C) Oral 75 - 20 93 % 73.8 kg (162 lb 11.2 oz)   08/10/17 0741 - - - - - - 91 % -   08/10/17 0740 139/48 102.3  F (39.1  C) Oral - 75 18 (!) 85 % -          Vitals:    08/09/17 2244 08/10/17 0800   Weight: 73.8 kg (162 lb 12.8 oz) 73.8 kg (162 lb 11.2 oz)  "      Current Weight: 73.8  Dry Weight: 74.5 outpatient  Dialysis Temp: 36.5  C  Access Device: AVF  Access Site: right  Dialyzer: Revaclear  Dialysis Bath: 2  Sodium Profile: n  UF Goal: 2  Blood Flow Rate (mL/min): 400  Total Treatment Time (hrs): 2.75  Heparin: Low dose as required      EPO dose: y  Zemplar: n  IV Fe: n      Medications and Allergies:     Reviewed      Physical Exam:     Seen and examined during course of dialysis run    /41  Pulse 65  Temp 98.5  F (36.9  C) (Oral)  Resp 18  Ht 1.676 m (5' 6\")  Wt 73.8 kg (162 lb 11.2 oz)  SpO2 93%  BMI 26.26 kg/m2    GENERAL: awake, alert, follows  HEENT: NC/AT, PERRLA, EOMI, non icteric, pharynx moist without lesion  NECK: supple, no masses or adenopathy  RESP: symmetric excursion, good effort, lungs clear - no rales, rhonchi or wheezes  CV: RRR, normal S1 S2  ABDOMEN: S/NT, BS present  MS: no edema  EXT: access canulated without issue  NEURO: speech normal and cranial nerves 2-12 intact  PSYCH: affect normal/bright    Data:         Recent Labs  Lab 08/10/17  0817      POTASSIUM 4.5   CHLORIDE 102   CO2 19*   ANIONGAP 16*   *   BUN 68*   CR 8.67*   GFRESTIMATED 6*   GFRESTBLACK 7*   KIMBER 8.0*       Recent Labs  Lab 08/09/17  1810   HGB 9.2*         G Obi Morley    [facility] - Nephrology  [phone]          "

## 2017-08-10 NOTE — CONSULTS
"CLINICAL NUTRITION SERVICES  -  ASSESSMENT NOTE      Malnutrition: Does not meet criteria at this time        REASON FOR ASSESSMENT  Maurizio Ohara is a 87 year old male seen by Registered Dietitian for Admission Nutrition Risk Screen - Unintentional weight loss of 10# or more in past 2 months.      NUTRITION HISTORY  - Information obtained from patient.   - Patient with a h/o DMII (not on insulin), ESRD, on HD (since 2012), CAD.    - Patient reports he follows a dialysis diet at home (reported to this writer following a regular diet at home 9/30/2016, refer to note).  Does all own grocery shopping and prepares own meals.  Does mention his neighbors sometimes \"help out\" with meal prep.    - Consumes meals BID, states this has been his baseline \"for years\" (previously reported meals TID though currently denies any changes to appetite/portions consumed).  Often skips lunch if he consumes breakfast.    - Does not consume oral supplements, is not offered supplements at dialysis per his report.  Meets with HD RD \"Kelsy\" monthly.    - NKFA.       CURRENT NUTRITION ORDERS  Diet Order:     Dialysis    Current Intake/Tolerance:  Good appetite at breakfast per his report.        PHYSICAL FINDINGS  Observed  No overt fat or muscle wasting appreciated  Obtained from Chart/Interdisciplinary Team  No nutritionally pertinent    ANTHROPOMETRICS  Height: 5' 6\"  Weight: 73.6 kg (162#)  Body mass index is 26.28 kg/(m^2).  Weight Status:  Overweight BMI 25-29.9  IBW: 64.5 kg (142#)  % IBW: 114%  Weight History:  Wt Readings from Last 10 Encounters:   08/09/17 73.8 kg (162 lb 12.8 oz)   04/19/17 76.2 kg (168 lb)   10/21/16 75.8 kg (167 lb)   10/02/16 70.6 kg (155 lb 9.6 oz)   02/10/16 81 kg (178 lb 8 oz)   01/17/16 75.3 kg (166 lb)   02/04/15 81 kg (178 lb 9.6 oz)   01/09/15 80.7 kg (178 lb)   12/26/14 78.7 kg (173 lb 8 oz)   12/24/14 87 kg (191 lb 12.8 oz)   - Slight wt loss of 6# (3.6%) x ~4 months.  Per Nephrology note dry wt is " 74.5 kg.  Suspect wt changes 2/2 fluid shifts associated with HD given patient again denies any changes to appetite/intake when discussing weight trending.     LABS  Labs reviewed:  - No recent HgbA1C    MEDICATIONS  Medications reviewed:  - Phoslo with meals TID    Dosing Weight 73.6 kg - admit wt    ASSESSED NUTRITION NEEDS PER APPROVED PRACTICE GUIDELINES:  Estimated Energy Needs: 5474-2360 kcals (25-30 Kcal/Kg)  Justification: maintenance  Estimated Protein Needs:  grams protein (1.2-1.8 g pro/Kg)  Justification: dialysis  Estimated Fluid Needs: 3338-9627 mL (1 mL/Kcal)  Justification: per provider pending fluid status    MALNUTRITION:  % Weight Loss:  None noted  % Intake:  No decreased intake noted  Subcutaneous Fat Loss:  None observed  Muscle Loss:  None observed  Fluid Retention:  None noted    Malnutrition Diagnosis: Patient does not meet two of the above criteria necessary for diagnosing malnutrition    NUTRITION DIAGNOSIS:  Increased nutrient needs (protein) related to HD demands as evidenced by protein needs of 1.2-1.8 g/kg daily.       NUTRITION INTERVENTIONS  Recommendations / Nutrition Prescription  Continue dialysis diet.     If decline in oral intakes noted will require oral supplements to meet increased protein needs.  Patient denies need currently, see education below.     Add renal MVI.        Implementation  Nutrition education: Provided education on the importance of protein, reviewed sources.     Multivitamin/Mineral: As above.     Collaboration and Referral of Nutrition care: Discussed POC with team during rounds and with RN.       Nutrition Goals  Patient to consistently consume at least 50-75% of meals TID.  Protein source with each meal.       MONITORING AND EVALUATION:  Progress towards goals will be monitored and evaluated per protocol and Practice Guidelines      Juliane Giron RD, LD  Clinical Dietitian  3rd floor/ICU: [phone]  All other floors:  [phone]  Weekend/holiday: [phone]

## 2017-08-10 NOTE — PHARMACY-ADMISSION MEDICATION HISTORY
Admission medication history interview status for this patient is complete. See [facility] admission navigator for allergy information, prior to admission medications and immunization status.     Medication history interview source(s):Patient  Medication history resources (including written lists, pill bottles, clinic record):[facility] med list  Primary pharmacy: mail order and [facility]    Changes made to [facility] medication list:  Added: none  Deleted: Dialyvite, nortriptyline  Changed: none    Actions taken by pharmacist (provider contacted, etc):None     Additional medication history information:None    Medication reconciliation/reorder completed by provider prior to medication history? No    Do you take OTC medications (eg tylenol, ibuprofen, fish oil, eye/ear drops, etc)? No     For patients on insulin therapy: NO (Y/N)  Lantus/levemir/NPH/Mix 70/30 dose:   (Y/N) (see Med list for doses)   Sliding scale Novolog Y/N  If Yes, do you have a baseline novolog pre-meal dose:  units with meals  Patients eat three meals a day:   Y/N    How many episodes of hypoglycemia do you have per week: _______  How many missed doses do you have per week: ______  How many times do you check your blood glucose per day: _______   Any Barriers to therapy - Be specific :  cost of medications, comfortable with giving injections (if applicable), comfortable and confident with current diabetes regimen: Y/N ______________      Prior to Admission medications    Medication Sig Last Dose Taking? Auth Provider   metoprolol (TOPROL XL) 50 MG 24 hr tablet Take 1.5 tablets (75 mg) by mouth daily 8/9/2017 at am Yes Michelle Keyes DO   atorvastatin (LIPITOR) 40 MG tablet Take 1 tablet (40 mg) by mouth daily 8/8/2017 at pm Yes Primitivo Yeboah MD   NIFEdipine ER (NIFEDIAC CC) 90 MG TB24 Take 90 mg by mouth every morning 8/9/2017 at am Yes Unknown, Entered By History   aspirin 81 MG tablet Take 81 mg by mouth daily 8/9/2017 at am Yes Unknown,  Entered By History   VITAMIN D, CHOLECALCIFEROL, PO Take 2,000 Units by mouth daily 8/9/2017 at am Yes Unknown, Entered By History   calcium acetate (PHOSLO) 667 MG CAPS Take 2,001 mg by mouth 3 times daily (with meals) 8/8/2017 at pm Yes Unknown, Entered By History   Phenyleph-Promethazine-Cod (PROMETHAZINE VC/CODEINE) 5-6.25-10 MG/5ML SYRP Take 1-2 tsp. by mouth every 6 hours as needed 8/9/2017 at Unknown time Yes Unknown, Entered By History   nitroglycerin (NITROSTAT) 0.4 MG sublingual tablet Place 1 tablet (0.4 mg) under the tongue every 5 minutes as needed for chest pain   Sy, Julianne BREWSTER PA-C

## 2017-08-10 NOTE — PLAN OF CARE
Problem: Goal Outcome Summary  Goal: Goal Outcome Summary  Outcome: No Change  VSS. Temp of 101.o at 2300, PO tylenol given. Patient admitted with generalized weakness, now presumable to be found with sepsis. BCX pending. IV Vanco and Rocephin started. Renal consulted, GFR-7, Creat-7.78, but is scheduled for dialysis T/TH/SA. R fistula (+) for bruit/thrill. Denies pain or any other issue. Will continue with POC.

## 2017-08-10 NOTE — H&P
Chief complaint  One day of increasing generalized weakness, two falls, episode of dizziness and poor appetite    Primary care   [facility]  nephrologist Dr. Yung    History of present illness  Maurizio Ohara is an 87-year-old male with end-stage renal disease, on dialysis since 2012, diabetes,  stable coronary artery disease, status post inferior MI with right coronary artery stenting 12/2014,hypertension, acute hypoxic respiratory failure caused by pulmonary edema one year ago and who presents with 1 day of generalized weakness, episode of dizziness, two falls and anorexia.Following the dialysis run yesterday he felt marked generalized weakness and unwell. Last evening he was standing by the couch in the living room and was going to sit down when he felt dizzy and fell to the floor. This morning while in the restroom, he felt as if his right leg collapsed like a pretzel and he fell to the floor  He was able to get up after 5 minutes and then spent the day in bed. His neighbor checked on this evening and brought him in for evaluation. He has been drinking fluids but has had no appetite. Initial O2 sat were 89%, blood pressure 150/72 with a normal heart rate. patient denies any shortness of breath even with exertion.  He has not had any shaking chills sweats or fevers. No vomiting or diarrhea and no dysuria.    Past medical history  Type 2 diabetes diagnosed age 63  End-stage renal disease starting dialysis 2012  Hypertension  NonST MI 12/24/2014 with bare-metal stent to a severe mid RCA lesion  Moderate diffuse left anterior descending disease  Moderate severe mitral regurgitation with a LAN showing no pulmonary vein systolic reversal  Hospitalization 9/2016 for acute hypoxic respiratory failure likely due to acute diastolic heart failure/ possible pneumonia  Anemia of chronic disease    Review of systems  Patient normally watches his neighbors dogs during the day and does not get exertional shortness  of breath even walking up a flight of stairs no chest pain  No nausea vomiting or diarrhea no fevers or night sweats  Some increased cough the last 24 hours no PND or orthopnea no increased leg swelling and no dysuria no rapid or irregular heart rate otherwise negative per 10 system again    Physical exam  Conversant and upbeat and often teases  Comfortable no shortness of breath  HEENT exam is normal mucous membranes moist  /60 66 respiration 16  Initial temperature 99      At 11  !!  JVP normal carotid normal   lungs no increased work of breathing normal respiratory rate occasional cough   O2 sats 95% on 2 L  Scattered rales are present in the left mid to lower lung  Heart sounds normal at the apex and lateral chest is loud  Midsystolic murmur  Abdomen is soft non tender no masses  Active bowel sounds   voiding without difficulty he still voids 3 times a day  Extremities  Good peripheral pulses no edema  Skin he has an superficial abrasion on his right kneeand some redness on the shin from his fall  Neurologic    no focal weakness or sensory deficit  He has no dizziness now speech is coherent and clear  Psychological coping well    Social history  He lives in his own home   he does not smoke no alcohol intake  He used to be a commercial     Family history  Mother had diabetes    Medications  Aspirin 81 mg daily  lipitor 40 daily  PhosLo 2000 mg 3 times a day with meals  Toprol-XL 75 mg daily  Nifedipine 90 mg daily  Vitamin D 2000 units daily    Results  Sodium 138 potassium 4.6 bicarbonate 24 BUN 59 creatinine 7.78 BNP 8,324  White count 5.7 hemoglobin 9.2 platelets 134  Urinalysis is not being done  Chest x-ray hyperinflation no infiltrate    Assessment  Maurizio Ohara is a 87-year-old male with non-insulin-dependent diabetes, end-stage renal disease on hemodialysis Tuesday and Thursday, hypertension, stable coronary artery disease, with bare metal stent of the right coronary artery,  December 2014 and who presents with 1 day of generalized weakness with two subsequent falls, episode of dizziness and feeling unwell. Initially,  with his dizziness and transient right lower leg weakness and two falls, it was not clear whether or not the cause may have been a TIA or stroke. He then developed a fever to 101 which changes the diagnosis to acute infection and sepsis. Source has not been identified but with his hemodialysis,he is at risk for bacteremia.    Plan  1. Acute sepsis  with marked generalized weakness, episode of dizziness, two falls and decreased appetite.  He does not have pneumonia and  no symptoms of a urinary tract infection  The most likely cause is bacteremia  His symptoms started just after dialysis   I have ordered 2 sets of blood cultures, lactic acid, venous blood gas  Will start empiric vancomycin and ceftriaxone    2. Markedly elevated BNP/ questionable hypoxia  He has no symptoms of shortness of breath  Chest x-ray shows no edema  His elevated BNP is likely from his significant pulmonary hypertension from his moderately severe MR  O2 sats  Will be checked  And he likely does not need oxygen    3.. End-stage renal disease on dialysis  His next dialysis day is tomorrow  Renal will be consulted so that he can get his dialysis run    4. Stable coronary artery disease  Without any new symptoms  Will continue his cardiac meds including  Lipitor,toprol and aspirin    5. Hypertension  He does not have any low blood pressures  In addition to the Toprol for his coronary artery disease he will be continued on his nifedipine

## 2017-08-10 NOTE — PROGRESS NOTES
"SPIRITUAL HEALTH SERVICES  SPIRITUAL ASSESSMENT Progress Note  FR Med Surg 345    PRIMARY FOCUS:     Goals of care    Emotional/spiritual/Yazidi distress    Support for coping    ILLNESS CIRCUMSTANCES:   Reviewed documentation. Reflective conversation shared with Pt. \"Art\" which integrated elements of illness and family narratives.     Context of Serious Illness/Symptom(s) - Art states he came to the hospital because he fell. All of a sudden it was like his leg twisted with no pain and down he went. And he couldn't get himself up. Luckily his neighbor came over and drove him right in to the ER. Art says the medical staff are not sure what that is but that they did find an infection in his lungs and they are treating him for that. HE is feeling much better.     Resources for Support - Art names his only daughter and his neighbors as resources for support.     DISTRESS:     Emotional/Existential/Relational Distress - Art mentions the fall as being scary especially because he couldn't get up. And not getting up himself is scary because he lives alone.     Spiritual/Presybeterian Distress - none expressed.     Social/Cultural/Economic Distress - Art mentions it is scary living alone with the new fear of falling.     SPIRITUAL/Bahai COPING:     Yarsanism/Rebecca - Art is Uatsdin.    Spiritual Practice(s) - Art welcomed prayer and is looking forward [facility]  coming tomorrow.Emotional/Existential/Relational Connections - Art reminisced his days being a , raising his daughter, and vacations to [place] with his wife.     GOALS OF CARE:    Goals of Care - Art notes he will be in the hospital until Saturday at least and from there he is looking to go home. \"It is nice to rest in the hospital and get everyone to do the things for me that I normally have to do at home like make the bed and make myself something to eat.\"     Meaning/Sense-Making - Petros has a beautiful theology where he pictures a sun with rays " "extending. Each of the rays is a different Confucianism but they all lead to the center that is God. \" I don't care what Confucianism you are as long as you are going to Sikhism.\"     PLAN: No further plans at this time; however, I and other chaplains remain available per pt/family/staff request.      Karon Cardona   Intern  [phone]    "

## 2017-08-10 NOTE — PHARMACY-VANCOMYCIN DOSING SERVICE
Pharmacy Vancomycin Note  Date of Service August 10, 2017  Patient's  10/7/1929   87 year old, male    Indication: Sepsis  Goal Trough Level: 15-20 mg/L  Day of Therapy: 1  Current Vancomycin regimen:  1500 mg IV x 1    Current estimated CrCl = Estimated Creatinine Clearance: 6.3 mL/min (based on Cr of 8.67).    Creatinine for last 3 days  2017:  6:10 PM Creatinine 7.78 mg/dL  8/10/2017:  8:17 AM Creatinine 8.67 mg/dL    Recent Vancomycin Levels (past 3 days)  8/10/2017:  8:17 AM Vancomycin Level 22.3 mg/L    Vancomycin IV Administrations (past 72 hours)                   vancomycin (VANCOCIN) 1500 mg in 0.9% NaCl 250 mL PREMIX (mg) 1,500 mg New Bag 08/10/17 0200                Nephrotoxins and other renal medications (Future)    Start     Dose/Rate Route Frequency Ordered Stop    08/10/17 1200  vancomycin (VANCOCIN) 750 mg in NaCl 0.9 % 250 mL intermittent infusion      750 mg Intravenous ONCE 08/10/17 1042      08/10/17 0054  vancomycin place fisher - receiving intermittent dosing      1 each Does not apply SEE ADMIN INSTRUCTIONS 08/10/17 0054               Contrast Orders - past 72 hours     None          Interpretation of levels and current regimen:  Trough level is  Supratherapeutic    Has serum creatinine changed > 50% in last 72 hours: No    Urine output:  anuric    Renal Function: ESRD on Dialysis    Plan:  1.  Administer 750mg x1 post-HD per dialysis dosing  2.  Pharmacy will check trough levels as appropriate in prior to next dialysis session .    3. Serum creatinine levels will be ordered a minimum of twice weekly.      Lavinia Joaquin        .

## 2017-08-10 NOTE — PROGRESS NOTES
"Potassium   Date Value Ref Range Status   08/10/2017 4.5 3.4 - 5.3 mmol/L Final     Lab Results   Component Value Date    HGB 9.2 08/09/2017     Weight: 73.8 kg (162 lb 11.2 oz)  POST WT 71.8 kg    DIALYSIS PROCEDURE NOTE    Patient dialyzed for 2.45 hrs.on a 3 K bath with a net fluid removal of 2 L.  A BFR of 400 ml/min was obtained via a RUAVF using 15 gauge needles.The patient was seen by Dr. Morley during the treatment. Total heparin received during the treatment: 0 units. Sites held x 15 min then  pressure drsgs applied.    Meds Given:Epogen 5,000 units IV.   Complications: Pt had fever on arrival to dialysis temp 102.2 F, pt chilled and visibly shaking. Tylenol given for fever by RN. 5 min into treatment patient moved access arm causing venous infiltration. Ice applied, needle pulled and new needle inserted without difficulty. With an hour left of treatment venous drip bulb started to clot, BFR lowered to 250 ml/min. Dr. Morley notified.  Temp ws 98.5 F at the end of treatment. Pt states. \"feeling better.\"    Procedure and ESRD teaching done and questions answered. See flowsheet in EPIC for further details and post assessment. Machine water alarm in place and functioning.  Consent for dialysis signed 8/10/17  Pt returned via w/c alert and oriented x4  Vascular Access: Aseptic prep done for both on/off. +T/B.   Report received from: JA Cote RN  Report given to: JA Cote RN  HEPATITIS B SURFACE ANTIGEN non-reactive DATE 3/10/16  HEPATITIS B SURFACE ANTIBODY immune DATE 4/6/17  Chlorine/Chloramine water system checked every 4 hours.  Outpatient Dialysis at [facility] T/TH/SAT    Clarisse King RN  "

## 2017-08-10 NOTE — ED NOTES
"[facility]  ED Nurse Handoff Report    Maurizio Ohara is a 87 year old male   ED Chief complaint: No chief complaint on file.  . ED Diagnosis:   Final diagnoses:   None     Allergies:   Allergies   Allergen Reactions     Glyburide      Lisinopril      Hyperkalemia when hospitalized 4/5/2011     Metformin      Renal failure stage 4     Penicillins      SHADY Gallagher clarified with pt, pt does not remember rxn, it was a long time ago, and \"nothing crazy\".     Verapamil    Code Status: Full Code      Activity level - Baseline/Home:  Independent. Activity Level - Current:   Stand with Assist. Lift room needed: No. Bariatric: No   Needed: No   Isolation: No. Infection: Not Applicable.     Vital Signs:   Vitals:    08/09/17 1830 08/09/17 1934 08/09/17 1937 08/09/17 1945   BP:  130/62  139/59   Pulse:       Resp:       Temp:       TempSrc:       SpO2: 95% 94% 93% 95%       Cardiac Rhythm:  ,      Pain level: 0-10 Pain Scale: 0  Patient confused: No. Patient Falls Risk: Yes.   Elimination Status: Has voided   Patient Report - Initial Complaint: Pt presents with generalized weakness with associated coughing and fatigue. Focused Assessment: Pt presents c/o generalized weakness, fatigue, and coughing that began yesterday. Pt has dialysis every tues, thurs, and Saturday and yesterday didn't feel well after dialysis and slept most of the day away yesterday and today. Pt's neighbor states that he usually lets his dogs out and yesterday was very off balance and almost fell into the couch which is unlike him. Pt also states he has been coughing and bring up phlegm. Pt oxygen sats were 88% on RA and increased to 92% on 3 liters. Pt denies any SOB  Tests Performed: labs, chest x-ray. Abnormal Results:   Labs Ordered and Resulted from Time of ED Arrival Up to the Time of Departure from the ED   CBC WITH PLATELETS - Abnormal; Notable for the following:        Result Value    RBC Count 2.58 (*)     Hemoglobin " 9.2 (*)     Hematocrit 28.9 (*)      (*)     MCH 35.7 (*)     Platelet Count 134 (*)     All other components within normal limits   NT PROBNP INPATIENT - Abnormal; Notable for the following:     N-Terminal Pro BNP Inpatient 75258 (*)     All other components within normal limits   COMPREHENSIVE METABOLIC PANEL - Abnormal; Notable for the following:     Glucose 102 (*)     Urea Nitrogen 59 (*)     Creatinine 7.78 (*)     GFR Estimate 7 (*)     GFR Estimate If Black 8 (*)     Calcium 8.3 (*)     All other components within normal limits   TROPONIN I   MAY SALINE LOCK IV   X-ray results:  The heart is at the upper limits of normal in size without  pulmonary edema. The thoracic aorta is calcified and tortuous. The  bakari are large but not significantly changed from the previous exam.  The lungs are hyperinflated but clear. No pneumothorax or pleural  effusion. Degenerative disease in the spine  Treatments provided:   Family Comments: Neighbor at bedside  OBS brochure/video discussed/provided to patient:  N/A  ED Medications: Medications - No data to display  Drips infusing:  No  For the majority of the shift this patient was Green. Interventions performed were .     Severe Sepsis OR Septic Shock Diagnosis Present: No      ED Nurse Name/Phone Number: Karey EVELYNE Thomas,   8:01 PM    RECEIVING UNIT ED HANDOFF REVIEW    Above ED Nurse Handoff Report was reviewed: Yes  Reviewed by: HUGO ROGER on August 9, 2017 at 10:11 PM

## 2017-08-10 NOTE — PHARMACY-VANCOMYCIN DOSING SERVICE
Pharmacy Vancomycin Initial Note  Date of Service August 10, 2017  Patient's  10/7/1929  87 year old, male    Indication: Sepsis    Current estimated CrCl = Estimated Creatinine Clearance: 7 mL/min (based on Cr of 7.78).    Creatinine for last 3 days  2017:  6:10 PM Creatinine 7.78 mg/dL    Recent Vancomycin Level(s) for last 3 days  No results found for requested labs within last 72 hours.      Vancomycin IV Administrations (past 72 hours)      No vancomycin orders with administrations in past 72 hours.                Nephrotoxins and other renal medications (Future)    Start     Dose/Rate Route Frequency Ordered Stop    08/10/17 0100  vancomycin (VANCOCIN) 1500 mg in 0.9% NaCl 250 mL PREMIX      1,500 mg Intravenous ONCE 08/10/17 0053      08/10/17 0054  vancomycin place fisher - receiving intermittent dosing      1 each Does not apply SEE ADMIN INSTRUCTIONS 08/10/17 0054            Contrast Orders - past 72 hours     None                Plan:  1.  Start vancomycin  1500 mg IV once. Will follow levels _ESRD on dialysis   2.  Goal Trough Level: 15-20  3.  Pharmacy will check trough levels as appropriate in 1-3 Days.    4. Serum creatinine levels will be ordered daily for the first week of therapy and at least twice weekly for subsequent weeks.    5. [place] method utilized to dose vancomycin therapy: Method 1    Aliza Rodriguez

## 2017-08-10 NOTE — PROGRESS NOTES
"[facility]  Hospitalist Progress Note  Molina Nash MD 08/10/2017    Reason for Stay (Diagnosis): Generalized weakness, fall. Now fever after admission         Assessment and Plan:      Summary of Stay: Maurizio Ohara is a 87 year old male with end-stage renal disease, on dialysis since 2012, diabetes, stable coronary artery disease, status post inferior MI with right coronary artery stenting 12/2014, hypertension, acute hypoxic respiratory failure due to pulmonary edema a year ago, presented with generalized weakness, episode of dizziness, two falls and anorexia and admitted on 8/9/2017  Problem List:     1. Possible sepsis - Patient has spiked fever after admission, he continues to have weakness, ill feeling.   - Ordered blood culture from the fistula during HD today.  - Continue IV antibiotics, Vancomycin and Rocephin.  - Follow up cultures.  - Monitor fever curve.  - Symptoms started after dialysis.    2. ESRD- On HD, has had one today.  - No sign of fluid overload.  - HD per Nephrology.  - BMP in AM.    3. HTN - Controlled, BP soft this afternoon.    4. CAD- stable.  - Continue Lipitor, Toprol and ASA.    DVT Prophylaxis: Pneumatic Compression Devices and Ambulate every shift  Code Status: Full Code  Discharge Dispo: Home  Estimated Disch Date / # of Days until Disch: 2-3 days, if blood culture is negative and no fever.    I discussed with patient and nursing staff at length the plan of care.        Interval History (Subjective):      Patient seen and examined, assumed care today. Episode of fever, no chills. Feels better today. No Nausea or vomiting.                  Physical Exam:      Last Vital Signs:  BP (!) 98/34  Pulse 65  Temp 99.4  F (37.4  C) (Oral)  Resp 16  Ht 1.676 m (5' 6\")  Wt 73.8 kg (162 lb 11.2 oz)  SpO2 95%  BMI 26.26 kg/m2    I/O last 3 completed shifts:  In: -   Out: 2000 [Other:2000]  Wt Readings from Last 1 Encounters:   08/10/17 73.8 kg (162 lb 11.2 " oz)       Constitutional: Awake, alert, cooperative, no apparent distress   Respiratory: Clear to auscultation bilaterally, no crackles or wheezing   Cardiovascular: Regular rate and rhythm, normal S1 and S2, and no murmur noted   Abdomen: Normal bowel sounds, soft, non-distended, non-tender   Skin: No rashes, no cyanosis, dry to touch   Neuro: Alert and oriented x3, no weakness, numbness, memory loss   Extremities: No edema, normal range of motion   Other(s):        All other systems: Negative          Medications:      All current medications were reviewed with changes reflected in problem list.         Data:      All new lab and imaging data was reviewed.   Labs:    Recent Labs  Lab 08/10/17  1038 08/10/17  0034 08/10/17  0025   CULT No growth after 2 hours No growth after 10 hours No growth after 10 hours       Recent Labs  Lab 08/10/17  0817 08/09/17  1810    138   POTASSIUM 4.5 4.6   CHLORIDE 102 102   CO2 19* 24   ANIONGAP 16* 12   * 102*   BUN 68* 59*   CR 8.67* 7.78*   GFRESTIMATED 6* 7*   GFRESTBLACK 7* 8*   KIMBER 8.0* 8.3*       Recent Labs  Lab 08/09/17  1810   WBC 5.7   HGB 9.2*   HCT 28.9*   *   *       Recent Labs  Lab 08/10/17  0817 08/09/17  1810   * 102*      Imaging:   Recent Results (from the past 48 hour(s))   XR Chest 2 Views    Narrative    CHEST TWO VIEWS  8/9/2017 6:52 PM      HISTORY: Chest pain, shortness of breath.    COMPARISON: 9/25/2016.    FINDINGS: The heart is at the upper limits of normal in size without  pulmonary edema. The thoracic aorta is calcified and tortuous. The  bakari are large but not significantly changed from the previous exam.  The lungs are hyperinflated but clear. No pneumothorax or pleural  effusion. Degenerative disease in the spine.      Impression    IMPRESSION: No acute abnormality.    VINNIE DIXON MD

## 2017-08-11 LAB
BACTERIA SPEC CULT: NO GROWTH
CREAT SERPL-MCNC: 7 MG/DL (ref 0.66–1.25)
FLUAV+FLUBV RNA SPEC QL NAA+PROBE: ABNORMAL
FLUAV+FLUBV RNA SPEC QL NAA+PROBE: ABNORMAL
GFR SERPL CREATININE-BSD FRML MDRD: 7 ML/MIN/1.7M2
GRAM STN SPEC: ABNORMAL
Lab: ABNORMAL
Lab: NORMAL
MICRO REPORT STATUS: ABNORMAL
MICRO REPORT STATUS: NORMAL
POTASSIUM SERPL-SCNC: 4 MMOL/L (ref 3.4–5.3)
RSV RNA SPEC NAA+PROBE: ABNORMAL
SPECIMEN SOURCE: ABNORMAL
SPECIMEN SOURCE: ABNORMAL
SPECIMEN SOURCE: NORMAL

## 2017-08-11 PROCEDURE — 87631 RESP VIRUS 3-5 TARGETS: CPT | Performed by: HOSPITALIST

## 2017-08-11 PROCEDURE — 82565 ASSAY OF CREATININE: CPT | Performed by: INTERNAL MEDICINE

## 2017-08-11 PROCEDURE — 87205 SMEAR GRAM STAIN: CPT | Performed by: HOSPITALIST

## 2017-08-11 PROCEDURE — 99233 SBSQ HOSP IP/OBS HIGH 50: CPT | Performed by: HOSPITALIST

## 2017-08-11 PROCEDURE — A9270 NON-COVERED ITEM OR SERVICE: HCPCS | Mod: GY | Performed by: INTERNAL MEDICINE

## 2017-08-11 PROCEDURE — 25000132 ZZH RX MED GY IP 250 OP 250 PS 637: Mod: GY | Performed by: HOSPITALIST

## 2017-08-11 PROCEDURE — 84132 ASSAY OF SERUM POTASSIUM: CPT | Performed by: INTERNAL MEDICINE

## 2017-08-11 PROCEDURE — A9270 NON-COVERED ITEM OR SERVICE: HCPCS | Mod: GY | Performed by: HOSPITALIST

## 2017-08-11 PROCEDURE — 25000132 ZZH RX MED GY IP 250 OP 250 PS 637: Mod: GY | Performed by: INTERNAL MEDICINE

## 2017-08-11 PROCEDURE — 25000128 H RX IP 250 OP 636: Performed by: INTERNAL MEDICINE

## 2017-08-11 PROCEDURE — 36415 COLL VENOUS BLD VENIPUNCTURE: CPT | Performed by: INTERNAL MEDICINE

## 2017-08-11 PROCEDURE — 12000007 ZZH R&B INTERMEDIATE

## 2017-08-11 PROCEDURE — 87070 CULTURE OTHR SPECIMN AEROBIC: CPT | Performed by: HOSPITALIST

## 2017-08-11 RX ORDER — OSELTAMIVIR PHOSPHATE 30 MG/1
30 CAPSULE ORAL DAILY
Status: DISCONTINUED | OUTPATIENT
Start: 2017-08-11 | End: 2017-08-14

## 2017-08-11 RX ADMIN — CEFTRIAXONE 2 G: 2 INJECTION, POWDER, FOR SOLUTION INTRAMUSCULAR; INTRAVENOUS at 00:52

## 2017-08-11 RX ADMIN — CALCIUM ACETATE 2001 MG: 667 CAPSULE ORAL at 18:13

## 2017-08-11 RX ADMIN — ASPIRIN 81 MG: 81 TABLET, COATED ORAL at 08:34

## 2017-08-11 RX ADMIN — OSELTAMIVIR PHOSPHATE 30 MG: 30 CAPSULE ORAL at 18:13

## 2017-08-11 RX ADMIN — Medication 1 CAPSULE: at 08:36

## 2017-08-11 RX ADMIN — SENNOSIDES AND DOCUSATE SODIUM 1 TABLET: 8.6; 5 TABLET ORAL at 20:41

## 2017-08-11 RX ADMIN — CALCIUM ACETATE 2001 MG: 667 CAPSULE ORAL at 12:02

## 2017-08-11 RX ADMIN — METOPROLOL SUCCINATE 75 MG: 25 TABLET, EXTENDED RELEASE ORAL at 08:34

## 2017-08-11 RX ADMIN — ATORVASTATIN CALCIUM 40 MG: 40 TABLET, FILM COATED ORAL at 20:41

## 2017-08-11 RX ADMIN — POLYETHYLENE GLYCOL 3350 17 G: 17 POWDER, FOR SOLUTION ORAL at 12:05

## 2017-08-11 RX ADMIN — CALCIUM ACETATE 2001 MG: 667 CAPSULE ORAL at 08:34

## 2017-08-11 RX ADMIN — NIFEDIPINE 90 MG: 90 TABLET, FILM COATED, EXTENDED RELEASE ORAL at 08:34

## 2017-08-11 RX ADMIN — SENNOSIDES AND DOCUSATE SODIUM 1 TABLET: 8.6; 5 TABLET ORAL at 08:34

## 2017-08-11 RX ADMIN — VITAMIN D, TAB 1000IU (100/BT) 2000 UNITS: 25 TAB at 08:34

## 2017-08-11 NOTE — PLAN OF CARE
Problem: Sepsis (Adult)  Goal: Signs and Symptoms of Listed Potential Problems Will be Absent or Manageable (Sepsis)  Signs and symptoms of listed potential problems will be absent or manageable by discharge/transition of care (reference Sepsis (Adult) CPG).   Outcome: No Change  VSS, BP low since return from dialysis. Pt denies dizziness. A&Ox4, steady gait. Dialysis T/Th/Sat, ESRD. AV fistula located right upper arm. Did infiltrate during dialysis today, dressings CDI, ice applied intermittently this shift. Elevated BNP an creatinine, GFR low. Tele: SR with prolonged QT. EKG complete. Bowel sounds decreased, pt reports BM today, denies GI sx.LS clear, RLL crackles. Mild edema noted in bilat ankles. Cough is productive with thick, yellow sputum. IV vanco and rocephin,  Blood cultures pending.

## 2017-08-11 NOTE — PROGRESS NOTES
[facility]  Hospitalist Progress Note  Alie Reyes MD 08/11/2017    Reason for Stay (Diagnosis): Generalized weakness, fall. 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 Fevers, weakness: Patient has spiked fever after admission upto 102.3 F, he continues to have weakness, ill feeling. He did not qualify for sepsis given lack of wbc, lactic acid or tachycardia. CXR and UA unremarkable. He was started on empiric vanco+ceftriaxone with improvement in fever curve and weakness however no source of infection identified yet. Blood cx from fistula site pending (sent yesterday)  given that his symptoms started after dialysis. He has productive cough but lungs sound clear and CXR was normal on admission.   - await blood culture obtained from the fistula during HD on 8/10.  - Continue IV antibiotics, Vancomycin and Rocephin.  - check for influenza A/B pcr  - Monitor fever curve.  --send sputum cx  --PT eval today since he lives alone at home    ADDENDUM: influenza A +ve, would start on tamiflu 30 mg daily (renally adjusted dose)    2. ESRD- On HD, TThSa  - No sign of fluid overload.  - HD per Nephrology.  - BMP in AM.    3. HTN - Controlled,      4. CAD- stable.  - Continue Lipitor, Toprol and ASA.    DVT Prophylaxis: Pneumatic Compression Devices and Ambulate every shift  Code Status: Full Code  Discharge Dispo: Home  Estimated Disch Date / # of Days until Disch: 1-2 more days, if blood culture is negative and no fever.    I discussed with patient and nursing staff at  "length the plan of care.        Interval History (Subjective):      Patient seen and examined, assumed care today.  Fever curve is improving  He still has significant productive cough with yellow sputum  No SOB                  Physical Exam:      Last Vital Signs:  BP 98/44  Pulse 81  Temp 99.5  F (37.5  C) (Oral)  Resp 16  Ht 1.676 m (5' 6\")  Wt 71.9 kg (158 lb 8 oz)  SpO2 90%  BMI 25.58 kg/m2    I/O last 3 completed shifts:  In: -   Out: 2000 [Other:2000]  Wt Readings from Last 1 Encounters:   08/11/17 71.9 kg (158 lb 8 oz)       Constitutional: Awake, alert, cooperative, no apparent distress   Respiratory: Clear to auscultation bilaterally, no crackles or wheezing   Cardiovascular: Regular rate and rhythm, normal S1 and S2, and no murmur noted   Abdomen: Normal bowel sounds, soft, non-distended, non-tender   Skin: No rashes, no cyanosis, dry to touch   Neuro: Alert and oriented x3, no weakness, numbness, memory loss   Extremities: No edema, normal range of motion   Other(s):        All other systems: Negative          Medications:      All current medications were reviewed with changes reflected in problem list.         Data:      All new lab and imaging data was reviewed.   Labs:    Recent Labs  Lab 08/10/17  1515 08/10/17  1038 08/10/17  0034 08/10/17  0025   CULT Pending No growth after 16 hours No growth after 1 day No growth after 1 day       Recent Labs  Lab 08/11/17  0625 08/10/17  0817 08/09/17  1810   NA  --  137 138   POTASSIUM 4.0 4.5 4.6   CHLORIDE  --  102 102   CO2  --  19* 24   ANIONGAP  --  16* 12   GLC  --  119* 102*   BUN  --  68* 59*   CR 7.00* 8.67* 7.78*   GFRESTIMATED 7* 6* 7*   GFRESTBLACK 9* 7* 8*   KIMBER  --  8.0* 8.3*       Recent Labs  Lab 08/09/17  1810   WBC 5.7   HGB 9.2*   HCT 28.9*   *   *       Recent Labs  Lab 08/10/17  0817 08/09/17  1810   * 102*      Imaging:   Recent Results (from the past 48 hour(s))   XR Chest 2 Views    Narrative    CHEST TWO " VIEWS  8/9/2017 6:52 PM      HISTORY: Chest pain, shortness of breath.    COMPARISON: 9/25/2016.    FINDINGS: The heart is at the upper limits of normal in size without  pulmonary edema. The thoracic aorta is calcified and tortuous. The  bakari are large but not significantly changed from the previous exam.  The lungs are hyperinflated but clear. No pneumothorax or pleural  effusion. Degenerative disease in the spine.      Impression    IMPRESSION: No acute abnormality.    VINNIE DIXON MD

## 2017-08-11 NOTE — PLAN OF CARE
"Problem: Goal Outcome Summary  Goal: Goal Outcome Summary  PT: Orders received. Approached pt for session this AM. Pt reporting that he had just moved around room with nursing, and is now trying to order meal. Agreeable to have PT check back in PM.     2nd attempt: Pt declines session reporting, \"I was just walking around the room\". Educated pt to utilize nursing assist when up, pt responds, \"I know what my body can do\". Nursing updated.   "

## 2017-08-11 NOTE — PLAN OF CARE
Highest temp this shift 99.5.  Productive cough/clear.  Sample to lab Flu swab also sent.   Continue antibiotics and wait for results.  Undistressed.  Tele SR

## 2017-08-11 NOTE — PLAN OF CARE
Problem: Goal Outcome Summary  Goal: Goal Outcome Summary  Outcome: No Change  Pt. A&O. VSS, Weight down 4 pounds. Tmax 99.8, denies pain, just fatigued from dialysis run. Persistent productive cough throughout night. Transfers with assist of x1. Continues with Abx Rocephin and Vanco for possible sepsis. BC pending. Plan for d/c 2-3 days.

## 2017-08-11 NOTE — CONSULTS
Care Transition Initial Assessment - RN    Reason For Consult: care coordination/care conference, discharge planning   Met with: Patient.    DATA   Active Problems:    Acute diastolic (congestive) heart failure (H)     Positive blood cultures.    Cognitive Status: awake, alert and oriented.  Primary Care Clinic Name: [facility]  Primary Care MD Name: Dr. Moise  Contact information and PCP information verified: Yes    Lives With: alone  Living Arrangements: house ([facility])     Description of Support System: Supportive, Involved   Who is your support system?: Neighbor   Support Assessment: Adequate family and caregiver support, Adequate social supports     Insurance concerns: No Insurance issues identified    ASSESSMENT  Patient currently receives the following services:  NONE        Identified issues/concerns regarding health management: Pt is high risk for readmission due to age/fragility/lives alone.    Met with patient at bedside.  He is alert and oriented.  Very pleasant.  He states that he is Independent with all ADLs.  He does not use DME.  He still drives.  Has HD at [facility] in [place] on Tuesday/Thursday/Saturday and runs are scheduled for 2 hrs and 45 min.  He has supportive family and friends.  A neighbor checks on him regularly.  A niece does his housekeeping for him.      He expressed no needs for him at DC.  He feels things are going well for him.    PLAN  Financial costs for the patient include NO .  Patient/family is agreeable to the plan?  Yes  Patient anticipates discharging to home .     Patient anticipates needs for home equipment: No  Discharge Planner   Discharge Plans in progress: Patient is independent at baseline-does not use DME. Expressed no needs at DC. Will f/u with his PCP and Neph.    Barriers to discharge plan: Awaiting f/u blood cultures.  Plan/Disposition: Home   Appointments: Patient will schedule his own appointments.      Care  (CTS)  will continue to follow as needed.  Sri Dietrich, RN,BSN, [facility]  Care Coordination  [phone]

## 2017-08-11 NOTE — PROGRESS NOTES
Renal Medicine       Plan am dialysis  Dialysis orders entered          Recent Labs  Lab 08/11/17  0625 08/10/17  0817   NA  --  137   POTASSIUM 4.0 4.5   CHLORIDE  --  102   CO2  --  19*   ANIONGAP  --  16*   GLC  --  119*   BUN  --  68*   CR 7.00* 8.67*   GFRESTIMATED 7* 6*   GFRESTBLACK 9* 7*   KIMBER  --  8.0*           ASHLEY Morley    [facility]  [phone]

## 2017-08-12 LAB
ANION GAP SERPL CALCULATED.3IONS-SCNC: 13 MMOL/L (ref 3–14)
BUN SERPL-MCNC: 71 MG/DL (ref 7–30)
CALCIUM SERPL-MCNC: 8 MG/DL (ref 8.5–10.1)
CHLORIDE SERPL-SCNC: 100 MMOL/L (ref 94–109)
CO2 SERPL-SCNC: 24 MMOL/L (ref 20–32)
CREAT SERPL-MCNC: 8.91 MG/DL (ref 0.66–1.25)
ERYTHROCYTE [DISTWIDTH] IN BLOOD BY AUTOMATED COUNT: 13.9 % (ref 10–15)
GFR SERPL CREATININE-BSD FRML MDRD: 6 ML/MIN/1.7M2
GLUCOSE SERPL-MCNC: 115 MG/DL (ref 70–99)
HCT VFR BLD AUTO: 26.1 % (ref 40–53)
HGB BLD-MCNC: 8.5 G/DL (ref 13.3–17.7)
INTERPRETATION ECG - MUSE: NORMAL
MCH RBC QN AUTO: 35.3 PG (ref 26.5–33)
MCHC RBC AUTO-ENTMCNC: 32.6 G/DL (ref 31.5–36.5)
MCV RBC AUTO: 108 FL (ref 78–100)
PLATELET # BLD AUTO: 135 10E9/L (ref 150–450)
POTASSIUM SERPL-SCNC: 4.1 MMOL/L (ref 3.4–5.3)
RBC # BLD AUTO: 2.41 10E12/L (ref 4.4–5.9)
SODIUM SERPL-SCNC: 137 MMOL/L (ref 133–144)
VANCOMYCIN SERPL-MCNC: 18.5 MG/L
WBC # BLD AUTO: 5.2 10E9/L (ref 4–11)

## 2017-08-12 PROCEDURE — A9270 NON-COVERED ITEM OR SERVICE: HCPCS | Mod: GY | Performed by: HOSPITALIST

## 2017-08-12 PROCEDURE — A9270 NON-COVERED ITEM OR SERVICE: HCPCS | Mod: GY | Performed by: INTERNAL MEDICINE

## 2017-08-12 PROCEDURE — 5A1D60Z PERFORMANCE OF URINARY FILTRATION, MULTIPLE: ICD-10-PCS | Performed by: INTERNAL MEDICINE

## 2017-08-12 PROCEDURE — 63400005 ZZH RX 634: Performed by: INTERNAL MEDICINE

## 2017-08-12 PROCEDURE — 85027 COMPLETE CBC AUTOMATED: CPT | Performed by: HOSPITALIST

## 2017-08-12 PROCEDURE — 80048 BASIC METABOLIC PNL TOTAL CA: CPT | Performed by: HOSPITALIST

## 2017-08-12 PROCEDURE — 25000132 ZZH RX MED GY IP 250 OP 250 PS 637: Mod: GY | Performed by: HOSPITALIST

## 2017-08-12 PROCEDURE — 25000128 H RX IP 250 OP 636: Performed by: INTERNAL MEDICINE

## 2017-08-12 PROCEDURE — 25000132 ZZH RX MED GY IP 250 OP 250 PS 637: Mod: GY | Performed by: INTERNAL MEDICINE

## 2017-08-12 PROCEDURE — 90937 HEMODIALYSIS REPEATED EVAL: CPT

## 2017-08-12 PROCEDURE — 80202 ASSAY OF VANCOMYCIN: CPT | Performed by: HOSPITALIST

## 2017-08-12 PROCEDURE — 99233 SBSQ HOSP IP/OBS HIGH 50: CPT | Performed by: INTERNAL MEDICINE

## 2017-08-12 PROCEDURE — 12000007 ZZH R&B INTERMEDIATE

## 2017-08-12 RX ORDER — VANCOMYCIN HYDROCHLORIDE 1 G/200ML
1000 INJECTION, SOLUTION INTRAVENOUS ONCE
Status: COMPLETED | OUTPATIENT
Start: 2017-08-12 | End: 2017-08-12

## 2017-08-12 RX ADMIN — CALCIUM ACETATE 2001 MG: 667 CAPSULE ORAL at 18:45

## 2017-08-12 RX ADMIN — EPOETIN ALFA 5000 UNITS: 3000 SOLUTION INTRAVENOUS; SUBCUTANEOUS at 09:50

## 2017-08-12 RX ADMIN — SODIUM CHLORIDE 250 ML: 9 INJECTION, SOLUTION INTRAVENOUS at 09:34

## 2017-08-12 RX ADMIN — VITAMIN D, TAB 1000IU (100/BT) 2000 UNITS: 25 TAB at 12:20

## 2017-08-12 RX ADMIN — CALCIUM ACETATE 2001 MG: 667 CAPSULE ORAL at 12:20

## 2017-08-12 RX ADMIN — NIFEDIPINE 90 MG: 90 TABLET, FILM COATED, EXTENDED RELEASE ORAL at 12:20

## 2017-08-12 RX ADMIN — ASPIRIN 81 MG: 81 TABLET, COATED ORAL at 12:21

## 2017-08-12 RX ADMIN — SENNOSIDES AND DOCUSATE SODIUM 1 TABLET: 8.6; 5 TABLET ORAL at 12:22

## 2017-08-12 RX ADMIN — VANCOMYCIN HYDROCHLORIDE 1000 MG: 1 INJECTION, SOLUTION INTRAVENOUS at 15:52

## 2017-08-12 RX ADMIN — OSELTAMIVIR PHOSPHATE 30 MG: 30 CAPSULE ORAL at 12:21

## 2017-08-12 RX ADMIN — Medication 1 CAPSULE: at 12:20

## 2017-08-12 RX ADMIN — SENNOSIDES AND DOCUSATE SODIUM 1 TABLET: 8.6; 5 TABLET ORAL at 20:37

## 2017-08-12 RX ADMIN — METOPROLOL SUCCINATE 75 MG: 25 TABLET, EXTENDED RELEASE ORAL at 12:20

## 2017-08-12 RX ADMIN — CEFTRIAXONE 2 G: 2 INJECTION, POWDER, FOR SOLUTION INTRAMUSCULAR; INTRAVENOUS at 00:56

## 2017-08-12 RX ADMIN — ATORVASTATIN CALCIUM 40 MG: 40 TABLET, FILM COATED ORAL at 20:37

## 2017-08-12 NOTE — PROGRESS NOTES
[facility]  Hospitalist Progress Note  Jayant Ricketts MD, MD 08/12/2017  (Text Page)  Reason for Stay (Diagnosis): fall, generalized weakness with fever during hospital stay         Assessment and Plan:      Summary of Stay: Maurizio Ohara is a 87 year old male with end-stage renal disease, on dialysis since 2012, diabetes, stable coronary artery disease, status post inferior MI with right coronary artery stenting 12/2014, hypertension, acute hypoxic respiratory failure due to pulmonary edema a year ago, presented with generalized weakness, episode of dizziness, two falls and anorexia and admitted on 8/9/2017    1. Fevers, weakness: Patient has spiked fever after admission up to 102.3 F, he continues to have weakness, ill feeling. He did not qualify for sepsis given lack of wbc, lactic acid or tachycardia. CXR and UA unremarkable. He was started on empiric vanco+ceftriaxone with improvement in fever curve and weakness but no definitive source of infection identified yet. Procalcitonin is 1.13.  Blood cx from fistula site still with no growth  given that his symptoms started after dialysis. He has productive cough (much improved)  but lungs are clear and CXR was normal on admission.   - await blood culture obtained from the fistula during HD on 8/10.  - Continue IV antibiotics, Vancomycin and Rocephin. May de-escalate this in the next 24-36 hours if remained fever free and has no growth in cultures  - influenza was check and presumably positive for A. Unsure what to make out of this as this is out of season and can be false positive? Started on Tamiflu earlier   - Monitor fever curve.  --send sputum cx  --PT eval today since he lives alone at home       2. ESRD- On HD, TThSa  - No sign of fluid overload.  - HD per Nephrology.  - BMP in AM.    3. HTN - Controlled,      4. CAD- stable.  - Continue Lipitor, Toprol and ASA.     DVT Prophylaxis: Pneumatic Compression Devices and Ambulate every  "shift  Code Status: Full Code  Discharge Dispo: Home  Estimated Disch Date / # of Days until Disch: 1-2 more days, if blood culture is negative and no fever.     I discussed with patient and nursing staff at length the plan of care.          Interval History (Subjective):      Assumed care today.  Seen and examined during dialysis session.  Art has no ongoing complaints. He slept well. Tolerating oral diet. No ongoing fever spikes nor chills.  No nausea/vomiting, abdominal pain, nor diarrhea.  Very cooperative and pleasant                  Physical Exam:      Last Vital Signs:  /52  Pulse 82  Temp 98.7  F (37.1  C) (Oral)  Resp 18  Ht 1.676 m (5' 6\")  Wt 73.7 kg (162 lb 7.7 oz)  SpO2 95%  BMI 26.22 kg/m2    I/O last 3 completed shifts:  In: 1040 [P.O.:1040]  Out: -   Wt Readings from Last 1 Encounters:   08/12/17 73.7 kg (162 lb 7.7 oz)     Vitals:    08/09/17 2244 08/10/17 0800 08/11/17 0636 08/12/17 0712   Weight: 73.8 kg (162 lb 12.8 oz) 73.8 kg (162 lb 11.2 oz) 71.9 kg (158 lb 8 oz) 73.7 kg (162 lb 6.4 oz)    08/12/17 0815   Weight: 73.7 kg (162 lb 7.7 oz)       Constitutional: awake, alert, cooperative, no apparent distress   Respiratory: Clear to auscultation bilaterally, no crackles or wheezing   Cardiovascular: Regular rate and rhythm, normal S1 and S2   Abdomen: Normal bowel sounds, soft, non-distended, non-tender   Skin: No rashes, no cyanosis, dry to touch   Neuro: Alert and oriented x3, no weakness, spontaneous and coherent speech   Extremities: No edema, normal range of motion   Other(s): Euthymic mood, not agitated       All other systems: Negative          Medications:      All current medications were reviewed with changes reflected in problem list.         Data:      All new lab and imaging data was reviewed.   Labs:    Recent Labs  Lab 08/10/17  1515 08/10/17  1038 08/10/17  0034 08/10/17  0025   CULT No growth No growth after 2 days No growth after 2 days No growth after 2 days "       Recent Labs  Lab 08/12/17  0820 08/09/17  1810   WBC 5.2 5.7   HGB 8.5* 9.2*   HCT 26.1* 28.9*   * 112*   * 134*       Recent Labs  Lab 08/12/17  0820 08/11/17  0625 08/10/17  0817 08/09/17  1810     --  137 138   POTASSIUM 4.1 4.0 4.5 4.6   CHLORIDE 100  --  102 102   CO2 24  --  19* 24   ANIONGAP 13  --  16* 12   *  --  119* 102*   BUN 71*  --  68* 59*   CR 8.91* 7.00* 8.67* 7.78*   GFRESTIMATED 6* 7* 6* 7*   GFRESTBLACK 7* 9* 7* 8*   KIMBER 8.0*  --  8.0* 8.3*   PROTTOTAL  --   --   --  7.3   ALBUMIN  --   --   --  3.6   BILITOTAL  --   --   --  0.7   ALKPHOS  --   --   --  53   AST  --   --   --  19   ALT  --   --   --  16       Recent Labs  Lab 08/12/17  0820 08/10/17  0817 08/09/17  1810   * 119* 102*       Recent Labs  Lab 08/10/17  1515   COLOR Yellow   APPEARANCE Clear   URINEGLC Negative   URINEBILI Negative   URINEKETONE Negative   SG 1.015   UBLD Negative   URINEPH 6.0   PROTEIN 100*   NITRITE Negative   LEUKEST Negative   RBCU <1   WBCU 1    Procalcitonin 1.16  Imaging:   Results for orders placed or performed during the hospital encounter of 08/09/17   XR Chest 2 Views    Narrative    CHEST TWO VIEWS  8/9/2017 6:52 PM      HISTORY: Chest pain, shortness of breath.    COMPARISON: 9/25/2016.    FINDINGS: The heart is at the upper limits of normal in size without  pulmonary edema. The thoracic aorta is calcified and tortuous. The  bakari are large but not significantly changed from the previous exam.  The lungs are hyperinflated but clear. No pneumothorax or pleural  effusion. Degenerative disease in the spine.      Impression    IMPRESSION: No acute abnormality.    VINNIE DIXON MD

## 2017-08-12 NOTE — PLAN OF CARE
Problem: Goal Outcome Summary  Goal: Goal Outcome Summary  Outcome: No Change  Pt. A&O. VSS, afebrile, denies pain. Persistent productive cough throughout night. Transfers with SBA. Continues with Abx Rocephin for sepsis, Tamiflu for influenza. BC pending. Dialysis run today. Plan for d/c 1-2 days.

## 2017-08-12 NOTE — PROGRESS NOTES
Potassium   Date Value Ref Range Status   08/12/2017 4.1 3.4 - 5.3 mmol/L Final   ]  Lab Results   Component Value Date    HGB 8.5 08/12/2017     Weight: 73.7 kg (162 lb 7.7 oz)    POST WT 71.8 kg  EDW  74.5    DIALYSIS PROCEDURE NOTE    Patient dialyzed for 2.5 hrs on a 3 K bath with a net fluid removal of 1.9 L.  A BFR of 400 ml/min was obtained via RAVF.  Patient was seen by  during treatment.  Total heparin received during treatment:  0 units.  Meds given: Epogen. Complications: BFR decreased to 250 with 45 minutes remaining of run d/t increased venous pressure.  D/C run 12 minutes early d/t elevated venous pressure with decreased BFR.      Procedure teaching done, questions answered.  Consent verified yes  See flowsheet in EPIC for further details and post assessment.    Machine water alarm in place and functioning.  Chlorine and chloramines checked on water system every 4 hours.  All safety checks done, air detector on, venous and arterial parameters set. Transducer protectors checked every 15 min.    Pt returned via wheelchair.    Outpatient Dialysis in [facility].    Sommer Jerry RN  [facility]

## 2017-08-12 NOTE — PHARMACY-VANCOMYCIN DOSING SERVICE
Pharmacy Vancomycin Note  Date of Service 2017  Patient's  10/7/1929   87 year old, male    Indication: Empiric for weakness, fevers  Goal Trough Level: 15-20 mg/L  Day of Therapy: 3  Current Vancomycin regimen: Intermittent dosing per levels. Last dose 750 mg x 1 after HD on 8/10    Current estimated CrCl = Estimated Creatinine Clearance: 6.1 mL/min (based on Cr of 8.91).    Creatinine for last 3 days  2017:  6:10 PM Creatinine 7.78 mg/dL  8/10/2017:  8:17 AM Creatinine 8.67 mg/dL  2017:  6:25 AM Creatinine 7.00 mg/dL  2017:  8:20 AM Creatinine 8.91 mg/dL    Recent Vancomycin Levels (past 3 days)  8/10/2017:  8:17 AM Vancomycin Level 22.3 mg/L  2017:  8:20 AM Vancomycin Level 18.5 mg/L    Vancomycin IV Administrations (past 72 hours)           Nephrotoxins and other renal medications (Future)    Start     Dose/Rate Route Frequency Ordered Stop    17 1500  vancomycin (VANCOCIN) 1000 mg in dextrose 5% 200 mL PREMIX      1,000 mg Intravenous ONCE 17 1437      08/10/17 0054  vancomycin place fisher - receiving intermittent dosing      1 each Does not apply SEE ADMIN INSTRUCTIONS 08/10/17 0054               Contrast Orders - past 72 hours     None          Interpretation of levels and current regimen:  Trough level is  Therapeutic  Has serum creatinine changed > 50% in last 72 hours: No  Urine output:  n/a  Renal Function: ESRD on Dialysis    Plan:  1.  Re-dose one-time vanco after HD today, 1000 mg iv (ordered)  2.  Pharmacy will check trough levels as appropriate in 1-3 Days.  (in the AM of the day of next HD)  3. Serum creatinine levels will be ordered daily for the first week of therapy and at least twice weekly for subsequent weeks.      Jomar Escobar        .

## 2017-08-12 NOTE — PLAN OF CARE
Problem: Goal Outcome Summary  Goal: Goal Outcome Summary  Outcome: No Change  No c/o pain.  Afebrile. Tele SB.  Dialysis today.  1.9L removed. Influenza A presumed positive. Droplet precautions. Continue rocephin , vanco, tamilfu per orders.  Blood cultures pending. PT and nephrology following.  D/C home 1-2 more days.

## 2017-08-12 NOTE — PROGRESS NOTES
[facility]     Renal Progress Note       SHORTHAND KEY FOR MY NOTES:  c = with, s = without, p = after, a = before, x = except, asx = asymptomatic, tx = transplant or treatment, sx = symptoms or symptomatic, cx = canceled or culture, rxn = reaction, yday = yesterday, nl = normal, abx = antibiotics, fxn = function, dx = diagnosis, dz = disease, m/h = melena/hematochezia, c/d/l/ha = cramping/dizziness/lightheadedness/headache, d/c = discharge or diarrhea/constipation, f/c/n/v = fevers/chills/nausea/vomiting, cp/sob = chest pain/shortness of breath.         Assessment/Plan:     1.  ESKD.  Dialysing s probs.  A.  Next HD on Tuesday.    2.  Influenza A.  He is on Tamiflu.  Also, on empiric vanco/ceftriaxone.  Afebrile and wbc is nl.  A.  Continue Tamiflu.  B.  Can prob stop the abx soon.    3.  Anemia.  His hb has dropped bc of the acute illness.  This is why he feels so wiped out and weak.  Same thing happened when he was sick in [place] earlier in the yr.    A.  Follow hb, clinically.  B.  Will increase EPO c HD.    4.  FEN.  Electrolytes are ok.    A.  Dialysis diet.        Interval History:     Pt is tolerating HD. No issues c the run today.    He is coughing, but doesn't have a sore throat.  He denies any f/c/n/v now.  His main issue is that he is still not feeling right and is weak.  No more falls and no significant d/l.  It says it is similar to when he developed severe anemia in the past.            Medications and Allergies:       vancomycin (VANCOCIN) IV  1,000 mg Intravenous Once     oseltamivir  30 mg Oral Daily     cefTRIAXone  2 g Intravenous Q24H     vancomycin place fisher - receiving intermittent dosing  1 each Does not apply See Admin Instructions     - MEDICATION INSTRUCTIONS for Dialysis Patients -   Does not apply See Admin Instructions     atorvastatin  40 mg Oral QPM     B complex-C-folic acid  1 capsule Oral Daily     aspirin EC  81 mg Oral Daily     calcium acetate  2,001 mg Oral TID  "w/meals     metoprolol  75 mg Oral Daily     NIFEdipine ER  90 mg Oral QAM     cholecalciferol  2,000 Units Oral Daily     senna-docusate  1-2 tablet Oral BID      Allergies   Allergen Reactions     Glyburide      Lisinopril      Hyperkalemia when hospitalized 4/5/2011     Metformin      Renal failure stage 4     Glenn Gallagher RN clarified with pt, pt does not remember rxn, it was a long time ago, and \"nothing crazy\".     Verapamil           Physical Exam:     Vitals were reviewed    Heart Rate: 60, Blood pressure 139/43, pulse 82, temperature 98.2  F (36.8  C), temperature source Oral, resp. rate 18, height 1.676 m (5' 6\"), weight 73.7 kg (162 lb 7.7 oz), SpO2 94 %.  Wt Readings from Last 3 Encounters:   08/12/17 73.7 kg (162 lb 7.7 oz)   04/19/17 76.2 kg (168 lb)   10/21/16 75.8 kg (167 lb)     Intake/Output Summary (Last 24 hours) at 08/12/17 1511  Last data filed at 08/12/17 1326   Gross per 24 hour   Intake              640 ml   Output             1900 ml   Net            -1260 ml     GENERAL APPEARANCE: pleasant, NAD, alert  HEENT:  Eyes/ears/nose/neck grossly normal  RESP: lungs cta b c good efforts, no crackles, rhonchi or wheezes  CV: RRR c 2/6 m, nl S1/S2  ABDOMEN: o/s/nt/nd  EXTREMITIES/SKIN: tr/1+ ble edema - mainly at the ankles  OTHER:  + DORIS c good thrill/bruit         Data:     CBC RESULTS:     Recent Labs  Lab 08/12/17  0820 08/09/17  1810   WBC 5.2 5.7   RBC 2.41* 2.58*   HGB 8.5* 9.2*   HCT 26.1* 28.9*   * 134*     Basic Metabolic Panel:    Recent Labs  Lab 08/12/17  0820 08/11/17  0625 08/10/17  0817 08/09/17  1810     --  137 138   POTASSIUM 4.1 4.0 4.5 4.6   CHLORIDE 100  --  102 102   CO2 24  --  19* 24   BUN 71*  --  68* 59*   CR 8.91* 7.00* 8.67* 7.78*   *  --  119* 102*   KIMBER 8.0*  --  8.0* 8.3*     INRNo lab results found in last 7 days.   Attestation:   I have reviewed today's relevant vital signs, notes, medications, labs and imaging.    Rich Yung, " MD [facility]  [phone]

## 2017-08-12 NOTE — PLAN OF CARE
Problem: Goal Outcome Summary  Goal: Goal Outcome Summary  Pt positive for Influenza A, started on Tamiflu, education provided, VSS, up independently, BCX pending, Nephro following, denies pain, will continue with POC.

## 2017-08-12 NOTE — PLAN OF CARE
Problem: Goal Outcome Summary  Goal: Goal Outcome Summary  PT: Pt approached for PT evaluation. Not in room at this time. Will check back as time/schedule allows. Nursing updated.

## 2017-08-13 ENCOUNTER — APPOINTMENT (OUTPATIENT)
Dept: PHYSICAL THERAPY | Facility: CLINIC | Age: 82
DRG: 193 | End: 2017-08-13
Attending: HOSPITALIST
Payer: MEDICARE

## 2017-08-13 LAB
ANION GAP SERPL CALCULATED.3IONS-SCNC: 10 MMOL/L (ref 3–14)
BACTERIA SPEC CULT: NORMAL
BUN SERPL-MCNC: 44 MG/DL (ref 7–30)
CALCIUM SERPL-MCNC: 7.9 MG/DL (ref 8.5–10.1)
CHLORIDE SERPL-SCNC: 102 MMOL/L (ref 94–109)
CO2 SERPL-SCNC: 28 MMOL/L (ref 20–32)
CREAT SERPL-MCNC: 6.52 MG/DL (ref 0.66–1.25)
ERYTHROCYTE [DISTWIDTH] IN BLOOD BY AUTOMATED COUNT: 14 % (ref 10–15)
GFR SERPL CREATININE-BSD FRML MDRD: 8 ML/MIN/1.7M2
GLUCOSE SERPL-MCNC: 107 MG/DL (ref 70–99)
HCT VFR BLD AUTO: 26.1 % (ref 40–53)
HGB BLD-MCNC: 8.4 G/DL (ref 13.3–17.7)
MCH RBC QN AUTO: 35.3 PG (ref 26.5–33)
MCHC RBC AUTO-ENTMCNC: 32.2 G/DL (ref 31.5–36.5)
MCV RBC AUTO: 110 FL (ref 78–100)
MICRO REPORT STATUS: NORMAL
PLATELET # BLD AUTO: 128 10E9/L (ref 150–450)
POTASSIUM SERPL-SCNC: 4.1 MMOL/L (ref 3.4–5.3)
RBC # BLD AUTO: 2.38 10E12/L (ref 4.4–5.9)
SODIUM SERPL-SCNC: 140 MMOL/L (ref 133–144)
SPECIMEN SOURCE: NORMAL
WBC # BLD AUTO: 3.7 10E9/L (ref 4–11)

## 2017-08-13 PROCEDURE — 85027 COMPLETE CBC AUTOMATED: CPT | Performed by: INTERNAL MEDICINE

## 2017-08-13 PROCEDURE — 99232 SBSQ HOSP IP/OBS MODERATE 35: CPT | Performed by: INTERNAL MEDICINE

## 2017-08-13 PROCEDURE — A9270 NON-COVERED ITEM OR SERVICE: HCPCS | Mod: GY | Performed by: INTERNAL MEDICINE

## 2017-08-13 PROCEDURE — 25000132 ZZH RX MED GY IP 250 OP 250 PS 637: Mod: GY | Performed by: INTERNAL MEDICINE

## 2017-08-13 PROCEDURE — 97116 GAIT TRAINING THERAPY: CPT | Mod: GP | Performed by: PHYSICAL THERAPIST

## 2017-08-13 PROCEDURE — 97161 PT EVAL LOW COMPLEX 20 MIN: CPT | Mod: GP | Performed by: PHYSICAL THERAPIST

## 2017-08-13 PROCEDURE — 97530 THERAPEUTIC ACTIVITIES: CPT | Mod: GP | Performed by: PHYSICAL THERAPIST

## 2017-08-13 PROCEDURE — A9270 NON-COVERED ITEM OR SERVICE: HCPCS | Mod: GY | Performed by: HOSPITALIST

## 2017-08-13 PROCEDURE — 25000132 ZZH RX MED GY IP 250 OP 250 PS 637: Mod: GY | Performed by: HOSPITALIST

## 2017-08-13 PROCEDURE — 80048 BASIC METABOLIC PNL TOTAL CA: CPT | Performed by: INTERNAL MEDICINE

## 2017-08-13 PROCEDURE — 40000193 ZZH STATISTIC PT WARD VISIT: Performed by: PHYSICAL THERAPIST

## 2017-08-13 PROCEDURE — 25000128 H RX IP 250 OP 636: Performed by: INTERNAL MEDICINE

## 2017-08-13 PROCEDURE — 12000007 ZZH R&B INTERMEDIATE

## 2017-08-13 PROCEDURE — 36415 COLL VENOUS BLD VENIPUNCTURE: CPT | Performed by: INTERNAL MEDICINE

## 2017-08-13 RX ADMIN — VITAMIN D, TAB 1000IU (100/BT) 2000 UNITS: 25 TAB at 08:33

## 2017-08-13 RX ADMIN — CALCIUM ACETATE 2001 MG: 667 CAPSULE ORAL at 17:59

## 2017-08-13 RX ADMIN — Medication 1 CAPSULE: at 08:34

## 2017-08-13 RX ADMIN — ATORVASTATIN CALCIUM 40 MG: 40 TABLET, FILM COATED ORAL at 20:05

## 2017-08-13 RX ADMIN — SENNOSIDES AND DOCUSATE SODIUM 2 TABLET: 8.6; 5 TABLET ORAL at 08:33

## 2017-08-13 RX ADMIN — NIFEDIPINE 90 MG: 90 TABLET, FILM COATED, EXTENDED RELEASE ORAL at 08:34

## 2017-08-13 RX ADMIN — ASPIRIN 81 MG: 81 TABLET, COATED ORAL at 08:43

## 2017-08-13 RX ADMIN — CALCIUM ACETATE 2001 MG: 667 CAPSULE ORAL at 13:52

## 2017-08-13 RX ADMIN — OSELTAMIVIR PHOSPHATE 30 MG: 30 CAPSULE ORAL at 08:34

## 2017-08-13 RX ADMIN — SENNOSIDES AND DOCUSATE SODIUM 1 TABLET: 8.6; 5 TABLET ORAL at 20:05

## 2017-08-13 RX ADMIN — CALCIUM ACETATE 2001 MG: 667 CAPSULE ORAL at 08:33

## 2017-08-13 RX ADMIN — CEFTRIAXONE 2 G: 2 INJECTION, POWDER, FOR SOLUTION INTRAMUSCULAR; INTRAVENOUS at 01:32

## 2017-08-13 RX ADMIN — METOPROLOL SUCCINATE 75 MG: 25 TABLET, EXTENDED RELEASE ORAL at 08:34

## 2017-08-13 NOTE — PLAN OF CARE
Problem: Goal Outcome Summary  Goal: Goal Outcome Summary  Outcome: No Change  Pt. A&O. VSS, afebrile, denies pain. Potassium 4.1. Persistent productive cough throughout night. Transfers with SBA. Continues with Abx Rocephin for sepsis, Tamiflu for influenza. BC pending. Plan for d/c 1-2 days.

## 2017-08-13 NOTE — PLAN OF CARE
Problem: Goal Outcome Summary  Goal: Goal Outcome Summary  Outcome: No Change  No c/o pain.  Afebrile. Tele SB.  Dialysis Tu, Th, Sa.  . Influenza A presumed positive. Droplet precautions. Continue  tamilfu per orders. Abx and tele d/c today  Blood cultures pending. PT and nephrology following.  D/C home 1-2 more days.

## 2017-08-13 NOTE — PLAN OF CARE
Problem: Goal Outcome Summary  Goal: Goal Outcome Summary  PT: Orders received, eval complete, treatment initiated. 87 year old male presented to ED on 8/9/17 with generalized weakness, episode of dizziness and 2 falls. Pt has influenza. Pt lives alone in a split level townhouse, has FWW and SEC available at home but does not use them. Niece comes to take care of household chores and laundry, pt has no need to enter lower level of home.     Discharge Planner PT   Patient plan for discharge: Return to home  Current status: Ambulated 250' with SBA, no AD and on RA with stable SaO2 91-94%. Ascended/descended 7 steps with handrail on R, stable sats 93% on RA, alternating step pattern. SBA with all transfers, and with standing exercise. Tolerated well, decreased LOS to the L, able to regain balance without external support. Pt encouraged to sit in chair as much as possible, work on breathing, and walk at least 3x/day with nursing. Pt was encouraged to use his AD at home if feeling unstable.   Barriers to return to prior living situation: none   Recommendations for discharge: Home   Rationale for recommendations: Pt is recovering from having influenza, he is independent in transfers, gait and stairs while maintaining stable vitals. He has assistance with household chores.           Entered by: Deandre Millard 08/13/2017 10:46 AM

## 2017-08-13 NOTE — PLAN OF CARE
Problem: Goal Outcome Summary  Goal: Goal Outcome Summary  A&O, continues on Tamiflu dx Influenza A, up independently, frequent productive cough, LS crackles noted on bases, denies pain, BC pending, will continue with POC.

## 2017-08-13 NOTE — PROGRESS NOTES
[facility]  Hospitalist Progress Note  Jayant Ricketts MD, MD 08/13/2017  (Text Page)  Reason for Stay (Diagnosis): fall, generalized weakness with fever during hospital stay         Assessment and Plan:      Summary of Stay: Maurizio Ohara is a 87 year old male with end-stage renal disease, on dialysis since 2012, diabetes, stable coronary artery disease, status post inferior MI with right coronary artery stenting 12/2014, hypertension, acute hypoxic respiratory failure due to pulmonary edema a year ago, presented with generalized weakness, episode of dizziness, two falls and anorexia and admitted on 8/9/2017    1. Fevers, weakness: Patient has spiked fever after admission up to 102.3 F, he continues to have weakness, ill feeling. He did not qualify for sepsis given lack of wbc, lactic acid or tachycardia. CXR and UA unremarkable. He was started on empiric vanco+ceftriaxone with improvement in fever curve and weakness but no definitive source of infection identified yet. Procalcitonin is 1.13.  Blood cx from fistula site still with no growth  given that his symptoms started after dialysis. He has productive cough (much improved)  but lungs are clear and CXR was normal on admission.   - await blood culture obtained from the fistula during HD on 8/10. Remained no growth  - stop IV antibiotics today and monitor fever curve while off from it. Cultures remained with no growth.  - influenza was check and presumably positive for A. Unsure what to make out of this as this is out of season and can be false positive? Started on Tamiflu earlier  -PT eval        2. ESRD- On HD, TThSa  - No sign of fluid overload.  - HD per Nephrology.  - tolerated the run yesterday with no fever spikes nor hypotension    3. HTN - Controlled,      4. CAD- stable.  - Continue Lipitor, Toprol and ASA.     DVT Prophylaxis: Pneumatic Compression Devices and Ambulate every shift  Code Status: Full Code  Discharge Dispo:  "Home  Estimated Disch Date / # of Days until Disch: 1-2 more days, if blood culture is negative and no fever.     I discussed with patient and nursing staff at length the plan of care.          Interval History (Subjective):      Continuing  care today.  Seen and examined.  Slept well and has good appetite. However Art is still feeling weak and has intermittent coughing spells.   No chest pain, SOB, nausea/vomiting nor diarrhea.  Afebrile now.  Very cooperative and pleasant                  Physical Exam:      Last Vital Signs:  /46  Pulse 82  Temp 98  F (36.7  C) (Oral)  Resp 16  Ht 1.676 m (5' 6\")  Wt 73.5 kg (162 lb)  SpO2 94%  BMI 26.15 kg/m2    I/O last 3 completed shifts:  In: 640 [P.O.:640]  Out: 1900 [Other:1900]  Wt Readings from Last 1 Encounters:   08/13/17 73.5 kg (162 lb)     Vitals:    08/10/17 0800 08/11/17 0636 08/12/17 0712 08/12/17 0815   Weight: 73.8 kg (162 lb 11.2 oz) 71.9 kg (158 lb 8 oz) 73.7 kg (162 lb 6.4 oz) 73.7 kg (162 lb 7.7 oz)    08/13/17 0629   Weight: 73.5 kg (162 lb)       Constitutional: awake, alert, cooperative, no apparent distress   Respiratory: Clear to auscultation bilaterally, no crackles or wheezing   Cardiovascular: Regular rate and rhythm, normal S1 and S2   Abdomen: Normal bowel sounds, soft, non-distended, non-tender   Skin: No rashes, no cyanosis, dry to touch   Neuro: Alert and oriented x3, no weakness, spontaneous and coherent speech   Extremities: No edema, normal range of motion   Other(s): Euthymic mood, not agitated       All other systems: Negative          Medications:      All current medications were reviewed with changes reflected in problem list.         Data:      All new lab and imaging data was reviewed.   Labs:    Recent Labs  Lab 08/11/17  1015 08/10/17  1515 08/10/17  1038 08/10/17  0034 08/10/17  0025   CULT Light growth Normal will No growth No growth after 3 days No growth after 3 days No growth after 3 days       Recent Labs  Lab " 08/13/17  0655 08/12/17  0820 08/09/17  1810   WBC 3.7* 5.2 5.7   HGB 8.4* 8.5* 9.2*   HCT 26.1* 26.1* 28.9*   * 108* 112*   * 135* 134*       Recent Labs  Lab 08/13/17  0655 08/12/17  0820 08/11/17  0625 08/10/17  0817 08/09/17  1810    137  --  137 138   POTASSIUM 4.1 4.1 4.0 4.5 4.6   CHLORIDE 102 100  --  102 102   CO2 28 24  --  19* 24   ANIONGAP 10 13  --  16* 12   * 115*  --  119* 102*   BUN 44* 71*  --  68* 59*   CR 6.52* 8.91* 7.00* 8.67* 7.78*   GFRESTIMATED 8* 6* 7* 6* 7*   GFRESTBLACK 10* 7* 9* 7* 8*   KIMBER 7.9* 8.0*  --  8.0* 8.3*   PROTTOTAL  --   --   --   --  7.3   ALBUMIN  --   --   --   --  3.6   BILITOTAL  --   --   --   --  0.7   ALKPHOS  --   --   --   --  53   AST  --   --   --   --  19   ALT  --   --   --   --  16       Recent Labs  Lab 08/13/17  0655 08/12/17  0820 08/10/17  0817 08/09/17  1810   * 115* 119* 102*       Recent Labs  Lab 08/10/17  1515   COLOR Yellow   APPEARANCE Clear   URINEGLC Negative   URINEBILI Negative   URINEKETONE Negative   SG 1.015   UBLD Negative   URINEPH 6.0   PROTEIN 100*   NITRITE Negative   LEUKEST Negative   RBCU <1   WBCU 1    Procalcitonin 1.16  Imaging:   Results for orders placed or performed during the hospital encounter of 08/09/17   XR Chest 2 Views    Narrative    CHEST TWO VIEWS  8/9/2017 6:52 PM      HISTORY: Chest pain, shortness of breath.    COMPARISON: 9/25/2016.    FINDINGS: The heart is at the upper limits of normal in size without  pulmonary edema. The thoracic aorta is calcified and tortuous. The  bakari are large but not significantly changed from the previous exam.  The lungs are hyperinflated but clear. No pneumothorax or pleural  effusion. Degenerative disease in the spine.      Impression    IMPRESSION: No acute abnormality.    VINNIE DIXON MD

## 2017-08-14 LAB
CREAT SERPL-MCNC: 8.01 MG/DL (ref 0.66–1.25)
GFR SERPL CREATININE-BSD FRML MDRD: 6 ML/MIN/1.7M2
POTASSIUM SERPL-SCNC: 4.3 MMOL/L (ref 3.4–5.3)

## 2017-08-14 PROCEDURE — A9270 NON-COVERED ITEM OR SERVICE: HCPCS | Mod: GY | Performed by: INTERNAL MEDICINE

## 2017-08-14 PROCEDURE — 12000000 ZZH R&B MED SURG/OB

## 2017-08-14 PROCEDURE — 25000132 ZZH RX MED GY IP 250 OP 250 PS 637: Mod: GY | Performed by: INTERNAL MEDICINE

## 2017-08-14 PROCEDURE — 82565 ASSAY OF CREATININE: CPT | Performed by: INTERNAL MEDICINE

## 2017-08-14 PROCEDURE — 25000132 ZZH RX MED GY IP 250 OP 250 PS 637: Mod: GY | Performed by: HOSPITALIST

## 2017-08-14 PROCEDURE — 84132 ASSAY OF SERUM POTASSIUM: CPT | Performed by: INTERNAL MEDICINE

## 2017-08-14 PROCEDURE — A9270 NON-COVERED ITEM OR SERVICE: HCPCS | Mod: GY | Performed by: HOSPITALIST

## 2017-08-14 PROCEDURE — 36415 COLL VENOUS BLD VENIPUNCTURE: CPT | Performed by: INTERNAL MEDICINE

## 2017-08-14 PROCEDURE — 99232 SBSQ HOSP IP/OBS MODERATE 35: CPT | Performed by: INTERNAL MEDICINE

## 2017-08-14 RX ORDER — OSELTAMIVIR PHOSPHATE 30 MG/1
30 CAPSULE ORAL
Status: DISCONTINUED | OUTPATIENT
Start: 2017-08-15 | End: 2017-08-14

## 2017-08-14 RX ADMIN — Medication 1 CAPSULE: at 08:15

## 2017-08-14 RX ADMIN — NIFEDIPINE 90 MG: 90 TABLET, FILM COATED, EXTENDED RELEASE ORAL at 08:15

## 2017-08-14 RX ADMIN — ATORVASTATIN CALCIUM 40 MG: 40 TABLET, FILM COATED ORAL at 20:45

## 2017-08-14 RX ADMIN — SENNOSIDES AND DOCUSATE SODIUM 1 TABLET: 8.6; 5 TABLET ORAL at 20:45

## 2017-08-14 RX ADMIN — METOPROLOL SUCCINATE 75 MG: 25 TABLET, EXTENDED RELEASE ORAL at 08:14

## 2017-08-14 RX ADMIN — CALCIUM ACETATE 2001 MG: 667 CAPSULE ORAL at 18:49

## 2017-08-14 RX ADMIN — VITAMIN D, TAB 1000IU (100/BT) 2000 UNITS: 25 TAB at 08:15

## 2017-08-14 RX ADMIN — CALCIUM ACETATE 2001 MG: 667 CAPSULE ORAL at 13:05

## 2017-08-14 RX ADMIN — ASPIRIN 81 MG: 81 TABLET, COATED ORAL at 08:15

## 2017-08-14 RX ADMIN — OSELTAMIVIR PHOSPHATE 30 MG: 30 CAPSULE ORAL at 08:14

## 2017-08-14 RX ADMIN — SENNOSIDES AND DOCUSATE SODIUM 1 TABLET: 8.6; 5 TABLET ORAL at 08:14

## 2017-08-14 RX ADMIN — CALCIUM ACETATE 2001 MG: 667 CAPSULE ORAL at 08:24

## 2017-08-14 NOTE — PROGRESS NOTES
Infection Prevention:    Patient requires Droplet precautions because of Influenza. Please contact Infection Prevention with any questions/concerns at [phone].    Svetlana Molina, ICP

## 2017-08-14 NOTE — PLAN OF CARE
Problem: Goal Outcome Summary  Goal: Goal Outcome Summary  A&O, VSS, denies pain, up independently, on Tamiflu dx Influenza A, BCX pending, Nephro following, will continue with POC.

## 2017-08-14 NOTE — PLAN OF CARE
Problem: Goal Outcome Summary  Goal: Goal Outcome Summary  Outcome: Improving     VSS, afebrile. O2 stable on RA. A/Ox4, up with SBA/Ind. Denies pain/CP/Dizziness. Dialysis tomorrow. Fistula in RUE bruit present. PIV SL. On Tamiflu. Likely d/c tomorrow after dialysis

## 2017-08-14 NOTE — PROGRESS NOTES
[facility]  Hospitalist Progress Note  Jayant Ricketts MD, MD 08/14/2017  (Text Page)  Reason for Stay (Diagnosis): fall, generalized weakness with fever during hospital stay         Assessment and Plan:      Summary of Stay: Maurizio Ohara is a 87 year old male with end-stage renal disease, on dialysis since 2012, diabetes, stable coronary artery disease, status post inferior MI with right coronary artery stenting 12/2014, hypertension, acute hypoxic respiratory failure due to pulmonary edema a year ago, presented with generalized weakness, episode of dizziness, two falls and anorexia and admitted on 8/9/2017    1. Fevers, weakness: Patient has spiked fever after admission up to 102.3 F, he continues to have weakness, ill feeling. He did not qualify for sepsis given lack of wbc, lactic acid or tachycardia. CXR and UA unremarkable. He was started on empiric vanco+ceftriaxone with improvement in fever curve and weakness but no definitive source of infection identified yet. Procalcitonin is 1.13.  Blood cx from fistula site still with no growth  given that his symptoms started after dialysis. He has productive cough (much improved)  but lungs are clear and CXR was normal on admission.   - await blood culture obtained from the fistula during HD on 8/10. Remained no growth  - stop IV antibiotics 8/12 and remained afebrile. Cultures remained with no growth.  - influenza was check and presumably positive for A. Unsure what to make out of this as this is out of season and can be false positive? Started on Tamiflu earlier. This needs to be given after dialysis session. He already got 4 doses but timing was placed on a daily basis.  -PT eval        2. ESRD- On HD, TThSa  - No sign of fluid overload.  - HD per Nephrology.  - tolerated the run saturday with no fever spikes nor hypotension    3. HTN - Controlled,      4. CAD- stable.  - Continue Lipitor, Toprol and ASA.     DVT Prophylaxis: Pneumatic  "Compression Devices and Ambulate every shift  Code Status: Full Code  Discharge Dispo: Home  Estimated Disch Date / # of Days until Disch: 1 more day, if blood culture is negative and no fever.     I discussed with patient and nursing staff at length the plan of care.          Interval History (Subjective):      Continuing  care today.  Seen and examined.  Art remained afebrile. Still with intermittent productive sputum and coughing. No nausea/vomiting nor diarrhea.  Still feeling weak and not yet back to his baseline.  Very cooperative and pleasant                  Physical Exam:      Last Vital Signs:  /45 (BP Location: Left arm)  Pulse 82  Temp 98.2  F (36.8  C) (Oral)  Resp 18  Ht 1.676 m (5' 6\")  Wt 73.5 kg (162 lb)  SpO2 94%  BMI 26.15 kg/m2       Wt Readings from Last 1 Encounters:   08/14/17 73.5 kg (162 lb)     Vitals:    08/11/17 0636 08/12/17 0712 08/12/17 0815 08/13/17 0629   Weight: 71.9 kg (158 lb 8 oz) 73.7 kg (162 lb 6.4 oz) 73.7 kg (162 lb 7.7 oz) 73.5 kg (162 lb)    08/14/17 0700   Weight: 73.5 kg (162 lb)       Constitutional: awake, alert, cooperative, no apparent distress   Respiratory: Clear to auscultation bilaterally, no crackles or wheezing   Cardiovascular: Regular rate and rhythm, normal S1 and S2   Abdomen: Normal bowel sounds, soft, non-distended, non-tender   Skin: No rashes, no cyanosis, dry to touch   Neuro: Alert and oriented x3, no weakness, spontaneous and coherent speech   Extremities: No edema, normal range of motion   Other(s): Euthymic mood, not agitated       All other systems: Negative          Medications:      All current medications were reviewed with changes reflected in problem list.         Data:      All new lab and imaging data was reviewed.   Labs:    Recent Labs  Lab 08/11/17  1015 08/10/17  1515 08/10/17  1038 08/10/17  0034 08/10/17  0025   CULT Light growth Normal will No growth No growth after 4 days No growth after 4 days No growth after 4 days "       Recent Labs  Lab 08/13/17  0655 08/12/17  0820 08/09/17  1810   WBC 3.7* 5.2 5.7   HGB 8.4* 8.5* 9.2*   HCT 26.1* 26.1* 28.9*   * 108* 112*   * 135* 134*       Recent Labs  Lab 08/14/17  0725 08/13/17  0655 08/12/17  0820  08/10/17  0817 08/09/17  1810   NA  --  140 137  --  137 138   POTASSIUM 4.3 4.1 4.1  < > 4.5 4.6   CHLORIDE  --  102 100  --  102 102   CO2  --  28 24  --  19* 24   ANIONGAP  --  10 13  --  16* 12   GLC  --  107* 115*  --  119* 102*   BUN  --  44* 71*  --  68* 59*   CR 8.01* 6.52* 8.91*  < > 8.67* 7.78*   GFRESTIMATED 6* 8* 6*  < > 6* 7*   GFRESTBLACK 8* 10* 7*  < > 7* 8*   KIMBER  --  7.9* 8.0*  --  8.0* 8.3*   PROTTOTAL  --   --   --   --   --  7.3   ALBUMIN  --   --   --   --   --  3.6   BILITOTAL  --   --   --   --   --  0.7   ALKPHOS  --   --   --   --   --  53   AST  --   --   --   --   --  19   ALT  --   --   --   --   --  16   < > = values in this interval not displayed.    Recent Labs  Lab 08/13/17  0655 08/12/17  0820 08/10/17  0817 08/09/17  1810   * 115* 119* 102*       Recent Labs  Lab 08/10/17  1515   COLOR Yellow   APPEARANCE Clear   URINEGLC Negative   URINEBILI Negative   URINEKETONE Negative   SG 1.015   UBLD Negative   URINEPH 6.0   PROTEIN 100*   NITRITE Negative   LEUKEST Negative   RBCU <1   WBCU 1    Procalcitonin 1.16  Imaging:   Results for orders placed or performed during the hospital encounter of 08/09/17   XR Chest 2 Views    Narrative    CHEST TWO VIEWS  8/9/2017 6:52 PM      HISTORY: Chest pain, shortness of breath.    COMPARISON: 9/25/2016.    FINDINGS: The heart is at the upper limits of normal in size without  pulmonary edema. The thoracic aorta is calcified and tortuous. The  bakari are large but not significantly changed from the previous exam.  The lungs are hyperinflated but clear. No pneumothorax or pleural  effusion. Degenerative disease in the spine.      Impression    IMPRESSION: No acute abnormality.    VINNIE DIXON MD

## 2017-08-14 NOTE — PLAN OF CARE
Problem: Goal Outcome Summary  Goal: Goal Outcome Summary  A&O. Afebrile. Up with SBA. Lung sounds: dim, infrequent cough. Diet: Renal diet. PIV: SL. Pain: denies. Blood cx pending. Possible d/c today.

## 2017-08-15 VITALS
RESPIRATION RATE: 18 BRPM | SYSTOLIC BLOOD PRESSURE: 140 MMHG | DIASTOLIC BLOOD PRESSURE: 58 MMHG | WEIGHT: 162.48 LBS | BODY MASS INDEX: 26.11 KG/M2 | HEIGHT: 66 IN | HEART RATE: 52 BPM | OXYGEN SATURATION: 94 % | TEMPERATURE: 98.6 F

## 2017-08-15 LAB
ALBUMIN SERPL-MCNC: 2.8 G/DL (ref 3.4–5)
ANION GAP SERPL CALCULATED.3IONS-SCNC: 10 MMOL/L (ref 3–14)
BUN SERPL-MCNC: 77 MG/DL (ref 7–30)
CALCIUM SERPL-MCNC: 8.4 MG/DL (ref 8.5–10.1)
CHLORIDE SERPL-SCNC: 99 MMOL/L (ref 94–109)
CO2 SERPL-SCNC: 26 MMOL/L (ref 20–32)
CREAT SERPL-MCNC: 9.07 MG/DL (ref 0.66–1.25)
GFR SERPL CREATININE-BSD FRML MDRD: 6 ML/MIN/1.7M2
GLUCOSE SERPL-MCNC: 103 MG/DL (ref 70–99)
HGB BLD-MCNC: 8.3 G/DL (ref 13.3–17.7)
PHOSPHATE SERPL-MCNC: 4.4 MG/DL (ref 2.5–4.5)
POTASSIUM SERPL-SCNC: 4.3 MMOL/L (ref 3.4–5.3)
SODIUM SERPL-SCNC: 135 MMOL/L (ref 133–144)

## 2017-08-15 PROCEDURE — 80069 RENAL FUNCTION PANEL: CPT | Performed by: INTERNAL MEDICINE

## 2017-08-15 PROCEDURE — 36415 COLL VENOUS BLD VENIPUNCTURE: CPT | Performed by: INTERNAL MEDICINE

## 2017-08-15 PROCEDURE — 85018 HEMOGLOBIN: CPT | Performed by: INTERNAL MEDICINE

## 2017-08-15 PROCEDURE — 25000132 ZZH RX MED GY IP 250 OP 250 PS 637: Mod: GY | Performed by: INTERNAL MEDICINE

## 2017-08-15 PROCEDURE — 63400005 ZZH RX 634: Performed by: INTERNAL MEDICINE

## 2017-08-15 PROCEDURE — 90937 HEMODIALYSIS REPEATED EVAL: CPT

## 2017-08-15 PROCEDURE — 99239 HOSP IP/OBS DSCHRG MGMT >30: CPT | Performed by: INTERNAL MEDICINE

## 2017-08-15 PROCEDURE — A9270 NON-COVERED ITEM OR SERVICE: HCPCS | Mod: GY | Performed by: INTERNAL MEDICINE

## 2017-08-15 PROCEDURE — 25000128 H RX IP 250 OP 636: Performed by: INTERNAL MEDICINE

## 2017-08-15 RX ORDER — METOPROLOL SUCCINATE 50 MG/1
50 TABLET, EXTENDED RELEASE ORAL DAILY
Qty: 30 TABLET | Refills: 0 | Status: SHIPPED | OUTPATIENT
Start: 2017-08-15 | End: 2018-01-01

## 2017-08-15 RX ORDER — METOPROLOL SUCCINATE 50 MG/1
50 TABLET, EXTENDED RELEASE ORAL DAILY
Status: DISCONTINUED | OUTPATIENT
Start: 2017-08-15 | End: 2017-08-15 | Stop reason: HOSPADM

## 2017-08-15 RX ADMIN — ASPIRIN 81 MG: 81 TABLET, COATED ORAL at 12:06

## 2017-08-15 RX ADMIN — EPOETIN ALFA 5000 UNITS: 3000 SOLUTION INTRAVENOUS; SUBCUTANEOUS at 09:15

## 2017-08-15 RX ADMIN — Medication 1 CAPSULE: at 12:04

## 2017-08-15 RX ADMIN — SENNOSIDES AND DOCUSATE SODIUM 1 TABLET: 8.6; 5 TABLET ORAL at 12:06

## 2017-08-15 RX ADMIN — NIFEDIPINE 90 MG: 90 TABLET, FILM COATED, EXTENDED RELEASE ORAL at 12:04

## 2017-08-15 RX ADMIN — VITAMIN D, TAB 1000IU (100/BT) 2000 UNITS: 25 TAB at 12:04

## 2017-08-15 RX ADMIN — CALCIUM ACETATE 2001 MG: 667 CAPSULE ORAL at 12:04

## 2017-08-15 RX ADMIN — SODIUM CHLORIDE 250 ML: 9 INJECTION, SOLUTION INTRAVENOUS at 08:20

## 2017-08-15 NOTE — DISCHARGE SUMMARY
Patient discharged from unit at 1438. All discharge instructions given, including information on medication changed. Patient verbalizes understanding. Agrees to follow up with PCP within one week. Patient awaiting ride from friend.

## 2017-08-15 NOTE — DISCHARGE SUMMARY
"[facility]  Discharge Summary  Name: Maurizio Ohara    MRN: [id number]  YOB: 1929    Age: 87 year old  Date of Discharge:  8/15/2017  Date of Admission: 8/9/2017  Primary Care Provider: Justina Moise  Discharge Physician:  Jayant Ricketts MD  Discharging Service:  Hospitalist      Discharge Diagnosis:  Intermittent fever- suspect viral illness, influenza?  Ruled out bacteremia  ESRD on HD TTHS  Hypertension  CAD     Other Diagnosis:  Past Medical History:   Diagnosis Date     Anemia      Anemia      CAD (coronary artery disease) 12/24/14    PCI and BMS to mid RCA (5.0 x 20mm) with residual mod diffuse mid LAD disease     Chronic kidney disease     on Dialysis     Diabetes (H)      Hypertension      Mitral regurgitation 12/24/2014    mild-mod (1-2+) per echo     Mitral valve disorders 12/24/2014    mild/mod (1-2+) MR     Myocardial infarction (H) 12/24/2014    NSTEMI     Pulmonary hypertension (H) 12/24/2014    RVSP elevated c/w mild-mod PH per echo     Renal disease due to diabetes mellitus (H)     End stage; on Dialysis          Discharge Disposition:  Discharged to home     Allergies:  Allergies   Allergen Reactions     Glyburide      Lisinopril      Hyperkalemia when hospitalized 4/5/2011     Metformin      Renal failure stage 4     Penicillins      SHADY Gallagher clarified with pt, pt does not remember rxn, it was a long time ago, and \"nothing crazy\".     Verapamil         Discharge Medications:   Current Discharge Medication List      CONTINUE these medications which have CHANGED    Details   metoprolol (TOPROL XL) 50 MG 24 hr tablet Take 1 tablet (50 mg) by mouth daily  Qty: 30 tablet, Refills: 0    Associated Diagnoses: Essential hypertension         CONTINUE these medications which have NOT CHANGED    Details   atorvastatin (LIPITOR) 40 MG tablet Take 1 tablet (40 mg) by mouth daily  Qty: 30 tablet, Refills: 0    Associated Diagnoses: NSTEMI (non-ST elevated myocardial " "infarction) (H)      NIFEdipine ER (NIFEDIAC CC) 90 MG TB24 Take 90 mg by mouth every morning      aspirin 81 MG tablet Take 81 mg by mouth daily      VITAMIN D, CHOLECALCIFEROL, PO Take 2,000 Units by mouth daily      calcium acetate (PHOSLO) 667 MG CAPS Take 2,001 mg by mouth 3 times daily (with meals)      Phenyleph-Promethazine-Cod (PROMETHAZINE VC/CODEINE) 5-6.25-10 MG/5ML SYRP Take 1-2 tsp. by mouth every 6 hours as needed      nitroglycerin (NITROSTAT) 0.4 MG sublingual tablet Place 1 tablet (0.4 mg) under the tongue every 5 minutes as needed for chest pain  Qty: 25 tablet, Refills: 3    Associated Diagnoses: NSTEMI (non-ST elevated myocardial infarction) (H)              Condition on Discharge:  Discharge condition: Stable   Discharge vitals: Blood pressure 136/59, pulse 54, temperature 97  F (36.1  C), temperature source Oral, resp. rate 18, height 1.676 m (5' 6\"), weight 73.7 kg (162 lb 7.7 oz), SpO2 94 %.   Code status on discharge: Full Code     History of Present Illness:  See detailed admission note for full details.        Significant Physical Exam Findings Day of Discharge:  HEENT; Atraumatic, normocephalic, pinkish conjuctiva, pupils bilateral reactive   Skin: warm and moist, no rashes  Lungs: equal chest expansion, clear to auscultation, no wheezes, no stridor, no crackles,   Heart: normal rate, normal rhythm, no rubs or gallops.   Abdomen: normal bowel sounds, no tenderness, no peritoneal signs, no guarding  Extremities: no deformities, no edema   Neuro; follow commands, alert and oriented x3, spontaneous speech, coherent, moves all extremities spontaneously        Procedures other than Imaging:  HD sessions     Imaging:  Results for orders placed or performed during the hospital encounter of 08/09/17   XR Chest 2 Views    Narrative    CHEST TWO VIEWS  8/9/2017 6:52 PM      HISTORY: Chest pain, shortness of breath.    COMPARISON: 9/25/2016.    FINDINGS: The heart is at the upper limits of normal " in size without  pulmonary edema. The thoracic aorta is calcified and tortuous. The  bakari are large but not significantly changed from the previous exam.  The lungs are hyperinflated but clear. No pneumothorax or pleural  effusion. Degenerative disease in the spine.      Impression    IMPRESSION: No acute abnormality.    VINNIE DIXON MD        Consultations:  Consultation during this admission received from nephrology.     Recent Lab Results:    Recent Labs  Lab 08/15/17  0650 08/13/17  0655 08/12/17  0820 08/09/17  1810   WBC  --  3.7* 5.2 5.7   HGB 8.3* 8.4* 8.5* 9.2*   HCT  --  26.1* 26.1* 28.9*   MCV  --  110* 108* 112*   PLT  --  128* 135* 134*       Recent Labs  Lab 08/11/17  1015 08/10/17  1515 08/10/17  1038 08/10/17  0034 08/10/17  0025   CULT Light growth Normal will No growth No growth after 5 days No growth after 5 days No growth after 5 days       Recent Labs  Lab 08/15/17  0650 08/14/17  0725 08/13/17  0655 08/12/17  0820  08/09/17  1810     --  140 137  < > 138   POTASSIUM 4.3 4.3 4.1 4.1  < > 4.6   CHLORIDE 99  --  102 100  < > 102   CO2 26  --  28 24  < > 24   ANIONGAP 10  --  10 13  < > 12   *  --  107* 115*  < > 102*   BUN 77*  --  44* 71*  < > 59*   CR 9.07* 8.01* 6.52* 8.91*  < > 7.78*   GFRESTIMATED 6* 6* 8* 6*  < > 7*   GFRESTBLACK 7* 8* 10* 7*  < > 8*   KIMBER 8.4*  --  7.9* 8.0*  < > 8.3*   PHOS 4.4  --   --   --   --   --    PROTTOTAL  --   --   --   --   --  7.3   ALBUMIN 2.8*  --   --   --   --  3.6   BILITOTAL  --   --   --   --   --  0.7   ALKPHOS  --   --   --   --   --  53   AST  --   --   --   --   --  19   ALT  --   --   --   --   --  16   < > = values in this interval not displayed.    Recent Labs  Lab 08/15/17  0650 08/13/17  0655 08/12/17  0820 08/10/17  0817 08/09/17  1810   * 107* 115* 119* 102*       Recent Labs  Lab 08/10/17  0025   LACT 0.6*       Recent Labs  Lab 08/09/17  1810   TROPI 0.020       Recent Labs  Lab 08/10/17  1515   COLOR Yellow    APPEARANCE Clear   URINEGLC Negative   URINEBILI Negative   URINEKETONE Negative   SG 1.015   UBLD Negative   URINEPH 6.0   PROTEIN 100*   NITRITE Negative   LEUKEST Negative   RBCU <1   WBCU 1          Pending Results:    Unresulted Labs Ordered in the Past 30 Days of this Admission     Date and Time Order Name Status Description    8/10/2017 1134 Blood culture Preliminary     8/9/2017 2350 Blood culture Preliminary     8/9/2017 2350 Blood culture Preliminary            Discharge Instructions and Follow-Up:   Discharge diet:   Active Diet Order      Combination Diet Regular Diet Adult; Dialysis Diet      Diet   Discharge activity: Activity as tolerated   Discharge follow-up: 1-2 weeks with PCP, with hemodialysis as scheduled   Outpatient therapy: None    Other instructions: None      Hospital Course:  Art is doing great with no ongoing complaints. He continues to tolerate oral diet and has no nausea/vomiting nor diarrhea.   Tolerated hemodialysis run with no issues of fever, hypotension.   All prior blood cultures remained negative for growth. No recurrence of fever.  He had prior fever spikes and was provided with empiric Rocephin and vancomycin that was stopped > 2 days ago and still has no recurrence of any fever. Part of the investigation was ? Influenza testing and showed Influenza A. He was provided with tamiflu and felt better with earlier nasal congestion and productive coughing.  Will proceed with home discharge today with no further tamiflu nor antibiotics.         Summary of Stay: Maurizio Ohara is a 87 year old male with end-stage renal disease, on dialysis since 2012, diabetes, stable coronary artery disease, status post inferior MI with right coronary artery stenting 12/2014, hypertension, acute hypoxic respiratory failure due to pulmonary edema a year ago, presented with generalized weakness, episode of dizziness, two falls and anorexia and admitted on 8/9/2017     1. Fevers, weakness: Patient  has spiked fever after admission up to 102.3 F, he continues to have weakness, ill feeling. He did not qualify for sepsis given lack of wbc, lactic acid or tachycardia. CXR and UA unremarkable. He was started on empiric vanco+ceftriaxone with improvement in fever curve and weakness but no definitive source of infection identified yet. Procalcitonin is 1.13.  Blood cx from fistula site still with no growth  given that his symptoms started after dialysis. He has productive cough (much improved)  but lungs are clear and CXR was normal on admission.   - await blood culture obtained from the fistula during HD on 8/10. Remained no growth  - stop IV antibiotics 8/12 and remained afebrile. Cultures remained with no growth.  - influenza was check and presumably positive for A. Unsure what to make out of this as this is out of season and can be false positive? Started on Tamiflu earlier. This needs to be given after dialysis session. He already got 4 doses but timing was placed on a daily basis.  -PT eval          2. ESRD- On HD, TThSa  - No sign of fluid overload.  - HD per Nephrology.  - tolerated the run saturday with no fever spikes nor hypotension     3. HTN - Controlled,       4. CAD- stable.  - Continue Lipitor, Toprol and ASA.    Total time spent in face to face contact with the patient and coordinating discharge was:  > 30 Minutes.

## 2017-08-15 NOTE — PLAN OF CARE
Physical Therapy Discharge Summary    Reason for therapy discharge:    Discharged to home.    Progress towards therapy goal(s). See goals on Care Plan in [facility] electronic health record for goal details.  Goals partially met.  Barriers to achieving goals:   limited tolerance for therapy and discharge from facility.    Therapy recommendation(s):    No further therapy is recommended.

## 2017-08-15 NOTE — PROGRESS NOTES
Inpatient Dialysis Progress Note            Assessment and Plan:   1.  ESKD.  Stable run today.  Good access flows.  UF 2 kg well tolerated so far.     2.  Anemia. HGB 8.3. Down due to acute illness.  On EPO.      3.  HTN. Controlled.     4.  FEN. K 4.3 on K 3 bath.          Interval History:     Feels better.  Coughing less.  Hopes to go home today.    Eating ok.  Denies n/v/f/c.  No dizziness/lightheadedness/cramping.  No abd pain/cp/sob.        Dialysis Parameters:     Wt Readings from Last 4 Encounters:   08/15/17 73.7 kg (162 lb 7.7 oz)   04/19/17 76.2 kg (168 lb)   10/21/16 75.8 kg (167 lb)   10/02/16 70.6 kg (155 lb 9.6 oz)     I/O last 3 completed shifts:  In: 840 [P.O.:840]  Out: -   BP Readings from Last 3 Encounters:   08/15/17 137/59   04/19/17 132/54   10/24/16 126/64       Routine, ONE TIME, Starting today For 1 Occurrences  Weight Loss (kg): 2  Dialysis Temp: 36.5  C  Access Device: AVF  Access Site: R arm  Dialyzer: Revaclear  Dialysis Bath: K3  Blood Flow Rate (mL/min): 435  Total Treatment Time (hrs): 2.75  Heparin: none         Medications and Allergies:   Reviewed in EPIC      sodium chloride 0.9%  250-1,000 mL Intravenous Once in dialysis     metoprolol  50 mg Oral Daily     - MEDICATION INSTRUCTIONS for Dialysis Patients -   Does not apply See Admin Instructions     atorvastatin  40 mg Oral QPM     B complex-C-folic acid  1 capsule Oral Daily     aspirin EC  81 mg Oral Daily     calcium acetate  2,001 mg Oral TID w/meals     NIFEdipine ER  90 mg Oral QAM     cholecalciferol  2,000 Units Oral Daily     senna-docusate  1-2 tablet Oral BID     sodium chloride 0.9%, - MEDICATION INSTRUCTIONS -, naloxone, acetaminophen, zolpidem, polyethylene glycol, ondansetron **OR** ondansetron     Allergies   Allergen Reactions     Glyburide      Lisinopril      Hyperkalemia when hospitalized 4/5/2011     Metformin      Renal failure stage 4     Glenn Gallagher RN clarified with pt, pt does not remember  "rxn, it was a long time ago, and \"nothing crazy\".     Verapamil               Labs:     BMP  Recent Labs  Lab 08/15/17  0650 08/14/17  0725 08/13/17  0655 08/12/17  0820  08/10/17  0817     --  140 137  --  137   POTASSIUM 4.3 4.3 4.1 4.1  < > 4.5   CHLORIDE 99  --  102 100  --  102   KIMBER 8.4*  --  7.9* 8.0*  --  8.0*   CO2 26  --  28 24  --  19*   BUN 77*  --  44* 71*  --  68*   CR 9.07* 8.01* 6.52* 8.91*  < > 8.67*   *  --  107* 115*  --  119*   < > = values in this interval not displayed.  CBC  Recent Labs  Lab 08/15/17  0650 08/13/17  0655 08/12/17  0820 08/09/17  1810   WBC  --  3.7* 5.2 5.7   HGB 8.3* 8.4* 8.5* 9.2*   HCT  --  26.1* 26.1* 28.9*   MCV  --  110* 108* 112*   PLT  --  128* 135* 134*     Lab Results   Component Value Date    AST 19 08/09/2017    ALT 16 08/09/2017    ALKPHOS 53 08/09/2017    BILITOTAL 0.7 08/09/2017            Physical Exam:   Vitals were reviewed in [facility]    Wt Readings from Last 3 Encounters:   08/15/17 73.7 kg (162 lb 7.7 oz)   04/19/17 76.2 kg (168 lb)   10/21/16 75.8 kg (167 lb)       Intake/Output Summary (Last 24 hours) at 08/15/17 1044  Last data filed at 08/14/17 2047   Gross per 24 hour   Intake              600 ml   Output                0 ml   Net              600 ml       GENERAL APPEARANCE: pleasant, no distress, a & o  HEENT:  Eyes/ears/nose grossly normal, neck supple  RESP: lungs clear to auscultation with good efforts, no crackles, rhonchi or wheezes  CV: regular rate and rhythm, normal S1 S2, 2/6 systole and 1/6 diastole murmur, no click or rub   ABDOMEN: soft, nontender, bowel sounds normal  EXTREMITIES/SKIN: tr BLE edema, no rashes or lesions     Pt seen on dialysis.  Stable run.  Good BFR.      Attestation:  I have reviewed today's vital signs, notes, medications, labs and imaging.     Oumar Hill MD  [facility] - Nephrology  [phone]            "

## 2017-08-15 NOTE — PLAN OF CARE
Problem: Goal Outcome Summary  Goal: Goal Outcome Summary  Outcome: Improving  No c/o pain.  Afebrile.   Dialysis Tu, Th, Sa.   Influenza A presumed positive. Droplet precautions. Continue  tamilfu per orders.   Blood cultures pending. PT and nephrology following.  D/C home likely tomorrow after dialysis.

## 2017-08-15 NOTE — PLAN OF CARE
Problem: Goal Outcome Summary  Goal: Goal Outcome Summary  Outcome: Improving  Pt. A&O. VSS, afebrile, denies pain. Potassium 4.3. Productive cough improving. Transfers with SBA. Plan for dialysis run and d/c home today if BC negative and remains afebrile.

## 2017-08-15 NOTE — PROGRESS NOTES
Dialysis Run:  Pt dialyzed bedside in his room   Time out taken  Pt ran for 2.75 hours on a K3 with a net fluid removal of 2L  A BF of 435 was maintained via RAVF  Meds :Epo  No Heparin given during the run   Complications:None   Hemostasis of needles sites achieved after 10 minutes Dressing dry and intact  Pt was seen by Dr Hill  during run  Aseptic prep both on and off procedure.  Procedure and ESRD discussed and all questions answered  VSS post run See EPIC for more details  Water detection monitor on floor during run  Pt dialyzes at [place]  TTHSAT   Pre Weight  73.7 kg     Post Weight 71.7 kg    EDW     Potassium   Date Value Ref Range Status   08/15/2017 4.3 3.4 - 5.3 mmol/L Final   ]  Hemoglobin   Date Value Ref Range Status   08/15/2017 8.3 (L) 13.3 - 17.7 g/dL Final   ]  Creatinine   Date Value Ref Range Status   08/15/2017 9.07 (H) 0.66 - 1.25 mg/dL Final   ]      Virginia Steele RN [facility]

## 2017-08-16 LAB
BACTERIA SPEC CULT: NO GROWTH
Lab: NORMAL
SPECIMEN SOURCE: NORMAL

## 2017-10-12 ENCOUNTER — PRE VISIT (OUTPATIENT)
Dept: CARDIOLOGY | Facility: CLINIC | Age: 82
End: 2017-10-12

## 2017-10-16 ENCOUNTER — HOSPITAL ENCOUNTER (OUTPATIENT)
Dept: CARDIOLOGY | Facility: CLINIC | Age: 82
Discharge: HOME | End: 2017-10-16
Attending: PHYSICIAN ASSISTANT | Admitting: PHYSICIAN ASSISTANT
Payer: MEDICARE

## 2017-10-16 DIAGNOSIS — I34.0 MITRAL VALVE INSUFFICIENCY, UNSPECIFIED ETIOLOGY: ICD-10-CM

## 2017-10-16 DIAGNOSIS — I25.10 ASHD (ARTERIOSCLEROTIC HEART DISEASE): ICD-10-CM

## 2017-10-16 PROCEDURE — 93306 TTE W/DOPPLER COMPLETE: CPT

## 2017-10-16 PROCEDURE — 93306 TTE W/DOPPLER COMPLETE: CPT | Mod: 26 | Performed by: INTERNAL MEDICINE

## 2017-10-18 ENCOUNTER — OFFICE VISIT (OUTPATIENT)
Dept: CARDIOLOGY | Facility: CLINIC | Age: 82
End: 2017-10-18
Attending: PHYSICIAN ASSISTANT
Payer: COMMERCIAL

## 2017-10-18 VITALS
WEIGHT: 164.1 LBS | HEART RATE: 72 BPM | SYSTOLIC BLOOD PRESSURE: 138 MMHG | BODY MASS INDEX: 25.75 KG/M2 | HEIGHT: 67 IN | DIASTOLIC BLOOD PRESSURE: 80 MMHG

## 2017-10-18 DIAGNOSIS — I35.0 NONRHEUMATIC AORTIC VALVE STENOSIS: Primary | ICD-10-CM

## 2017-10-18 DIAGNOSIS — I25.10 ASHD (ARTERIOSCLEROTIC HEART DISEASE): ICD-10-CM

## 2017-10-18 DIAGNOSIS — I34.0 MITRAL VALVE INSUFFICIENCY, UNSPECIFIED ETIOLOGY: ICD-10-CM

## 2017-10-18 PROCEDURE — 99214 OFFICE O/P EST MOD 30 MIN: CPT | Performed by: INTERNAL MEDICINE

## 2017-10-18 NOTE — MR AVS SNAPSHOT
"              After Visit Summary   10/18/2017    Maurizio Ohara    MRN: [id number]           Patient Information     Date Of Birth          10/7/1929        Visit Information        Provider Department      10/18/2017 1:15 PM Michelle Keyes DO [facility] [place]        Today's Diagnoses     Nonrheumatic aortic valve stenosis    -  1    ASHD (arteriosclerotic heart disease)        Mitral valve insufficiency, unspecified etiology           Follow-ups after your visit        Additional Services     Follow-Up with Cardiac Advanced Practice Provider                 Future tests that were ordered for you today     Open Future Orders        Priority Expected Expires Ordered    Echocardiogram Routine 10/18/2018 10/19/2018 10/18/2017    Follow-Up with Cardiac Advanced Practice Provider Routine 10/18/2018 10/19/2018 10/18/2017            Who to contact     If you have questions or need follow up information about today's clinic visit or your schedule please contact [facility] [place] directly at [phone].  Normal or non-critical lab and imaging results will be communicated to you by Sift Science[employer], letter or phone within 4 business days after the clinic has received the results. If you do not hear from us within 7 days, please contact the clinic through AmVact or phone. If you have a critical or abnormal lab result, we will notify you by phone as soon as possible.  Submit refill requests through FinanzCheck or call your pharmacy and they will forward the refill request to us. Please allow 3 business days for your refill to be completed.          Additional Information About Your Visit        MyChart Information     [employer]Check lets you send messages to your doctor, view your test results, renew your prescriptions, schedule appointments and more. To sign up, go to [email or web address] . Click on \"Log in\" on the left side of the screen, which will " "take you to the Welcome page. Then click on \"Sign up Now\" on the right side of the page.     You will be asked to enter the access code listed below, as well as some personal information. Please follow the directions to create your username and password.     Your access code is: [email or web address]  Expires: 2017 10:41 AM     Your access code will  in 90 days. If you need help or a new code, please call your [place] clinic or [phone].        Care EveryWhere ID     This is your Care EveryWhere ID. This could be used by other organizations to access your [place] medical records  [id number]        Your Vitals Were     Pulse Height BMI (Body Mass Index)             72 1.702 m (5' 7\") 25.7 kg/m2          Blood Pressure from Last 3 Encounters:   10/18/17 138/80   08/15/17 140/58   17 132/54    Weight from Last 3 Encounters:   10/18/17 74.4 kg (164 lb 1.6 oz)   08/15/17 73.7 kg (162 lb 7.7 oz)   17 76.2 kg (168 lb)              We Performed the Following     Follow-Up with Cardiologist        Primary Care Provider Office Phone # Fax #    Justina Moise [phone]       [facility] [address] [place] [address]        Equal Access to Services     [facility] Center[facility] AH: Hadii tressa salinaso Solora, waaxda luqadaha, qaybta kaalmada aderic, catherine verdugo . So [facility] [phone].    ATENCIÓN: Si habla español, tiene a mast disposición servicios gratuitos de asistencia lingüística. Llame al [phone].    We comply with applicable federal civil rights laws and [place] laws. We do not discriminate on the basis of race, color, national origin, age, disability, sex, sexual orientation, or gender identity.            Thank you!     Thank you for choosing [facility] [place]  for your care. Our goal is always to provide you with excellent care. Hearing back from our patients is one way we can continue to " improve our services. Please take a few minutes to complete the written survey that you may receive in the mail after your visit with us. Thank you!             Your Updated Medication List - Protect others around you: Learn how to safely use, store and throw away your medicines at www.disposemymeds.org.          This list is accurate as of: 10/18/17  1:51 PM.  Always use your most recent med list.                   Brand Name Dispense Instructions for use Diagnosis    aspirin 81 MG tablet      Take 81 mg by mouth daily        atorvastatin 40 MG tablet    LIPITOR    30 tablet    Take 1 tablet (40 mg) by mouth daily    NSTEMI (non-ST elevated myocardial infarction) (H)       calcium acetate 667 MG Caps capsule    PHOSLO     Take 2,001 mg by mouth 3 times daily (with meals)        metoprolol 50 MG 24 hr tablet    TOPROL XL    30 tablet    Take 1 tablet (50 mg) by mouth daily    Essential hypertension       NIFEDIAC CC 90 MG Tb24   Generic drug:  NIFEdipine ER      Take 90 mg by mouth every morning        nitroGLYcerin 0.4 MG sublingual tablet    NITROSTAT    25 tablet    Place 1 tablet (0.4 mg) under the tongue every 5 minutes as needed for chest pain    NSTEMI (non-ST elevated myocardial infarction) (H)       PROMETHAZINE VC/CODEINE 5-6.25-10 MG/5ML Syrp   Generic drug:  Phenyleph-Promethazine-Cod      Take 1-2 tsp. by mouth every 6 hours as needed        VITAMIN D (CHOLECALCIFEROL) PO      Take 2,000 Units by mouth daily

## 2017-10-18 NOTE — LETTER
10/18/2017    Justina Moise  [facility]   [address]  [place] [address]      RE: Maurizio Ohara       Dear Colleague,    I had the pleasure of seeing Maurizio Ohara in the [facility].    REFERRING PROVIDER:  Dr. Justina Moise      HISTORY OF PRESENT ILLNESS:  Mr. Ohara is a very pleasant 88-year-old male with a history of coronary artery disease and valvular heart disease.  He has had stenting to his right coronary artery in 2014.  He has preserved LV systolic function but he has mild-to-moderate mitral insufficiency and moderate aortic stenosis.  He had been hospitalized last year for respiratory failure.  He did have a fever during that time.  It was felt that he had pneumonia.  His echocardiogram showed more severe mitral insufficiency at that time and he did subsequently undergo a transesophageal echo that did not correlate with a transthoracic.  It showed mild-to-moderate mitral insufficiency.  He is here today for a followup visit and we did repeat an echocardiogram to reassess his valve function.  I reviewed this with him today.  His mitral valve continues to leak about 1+ to 2+ or mild to moderate.  He has seen a slight increase in the gradients across his aortic valve but it is still in the category of moderate aortic stenosis.  His LV function continues to remain normal.  His pulmonary pressures were estimated in the moderate range.  Mr. Ohara has not noted any difficulty breathing.  He denies dyspnea on exertion or symptoms suggestive of orthopnea or PND.  He has not experienced any chest pain symptoms.  He is getting around okay and is fairly happy and healthy.      PHYSICAL EXAMINATION:     VITALS SIGNS:  His blood pressure is 138/80, pulse is 72, weight 164 with a body mass index of 25.   NECK:  Carotid upstrokes are diminished but I do not appreciate a bruit.     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     HEART:  His cardiovascular tones are regular.  There is a systolic murmur but there is also a to-and-fro murmur throughout his precordium.  His lungs are clear posteriorly and again he has no peripheral edema.     Outpatient Encounter Prescriptions as of 10/18/2017   Medication Sig Dispense Refill     metoprolol (TOPROL XL) 50 MG 24 hr tablet Take 1 tablet (50 mg) by mouth daily 30 tablet 0     nitroglycerin (NITROSTAT) 0.4 MG sublingual tablet Place 1 tablet (0.4 mg) under the tongue every 5 minutes as needed for chest pain 25 tablet 3     atorvastatin (LIPITOR) 40 MG tablet Take 1 tablet (40 mg) by mouth daily 30 tablet 0     NIFEdipine ER (NIFEDIAC CC) 90 MG TB24 Take 90 mg by mouth every morning       aspirin 81 MG tablet Take 81 mg by mouth daily       VITAMIN D, CHOLECALCIFEROL, PO Take 2,000 Units by mouth daily       calcium acetate (PHOSLO) 667 MG CAPS Take 2,001 mg by mouth 3 times daily (with meals)       Phenyleph-Promethazine-Cod (PROMETHAZINE VC/CODEINE) 5-6.25-10 MG/5ML SYRP Take 1-2 tsp. by mouth every 6 hours as needed       No facility-administered encounter medications on file as of 10/18/2017.       SUMMARY:  Mr. Ohara is a very pleasant 88-year-old male with history of coronary disease, previous PCI to his right coronary artery.  He has moderate valvular heart disease with both moderate aortic stenosis and mild-to-moderate mitral insufficiency.  He is stable and compensated on exam and by history today.  I reviewed his recent blood work from August showing that he continues to have a moderate anemia with a hemoglobin around 8.3.  Liver function tests were checked at that time.  He is on Lipitor and they were within normal range.  He did not need any medication refills today.  I will be happy to continue to follow him on an annual basis.  I do feel we should continue to monitor for valvular progression with annual echocardiogram.  Please feel free to contact me with any  questions you have in regards to his care.     Again, thank you for allowing me to participate in the care of your patient.      Sincerely,    Michelle Keyes,      [facility] [place] [facility]

## 2017-10-18 NOTE — PROGRESS NOTES
REFERRING PROVIDER:  Dr. Justina Moise      HISTORY OF PRESENT ILLNESS:  Mr. Ohara is a very pleasant 88-year-old male with a history of coronary artery disease and valvular heart disease.  He has had stenting to his right coronary artery in 2014.  He has preserved LV systolic function but he has mild-to-moderate mitral insufficiency and moderate aortic stenosis.  He had been hospitalized last year for respiratory failure.  He did have a fever during that time.  It was felt that he had pneumonia.  His echocardiogram showed more severe mitral insufficiency at that time and he did subsequently undergo a transesophageal echo that did not correlate with a transthoracic.  It showed mild-to-moderate mitral insufficiency.  He is here today for a followup visit and we did repeat an echocardiogram to reassess his valve function.  I reviewed this with him today.  His mitral valve continues to leak about 1+ to 2+ or mild to moderate.  He has seen a slight increase in the gradients across his aortic valve but it is still in the category of moderate aortic stenosis.  His LV function continues to remain normal.  His pulmonary pressures were estimated in the moderate range.  Mr. Ohara has not noted any difficulty breathing.  He denies dyspnea on exertion or symptoms suggestive of orthopnea or PND.  He has not experienced any chest pain symptoms.  He is getting around okay and is fairly happy and healthy.      PHYSICAL EXAMINATION:     VITALS SIGNS:  His blood pressure is 138/80, pulse is 72, weight 164 with a body mass index of 25.   NECK:  Carotid upstrokes are diminished but I do not appreciate a bruit.     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     HEART:  His cardiovascular tones are regular.  There is a systolic murmur but there is also a to-and-fro murmur throughout his precordium.  His lungs are clear posteriorly and again he has no peripheral edema.      SUMMARY:    Lev is a very pleasant 88-year-old male with history of coronary disease, previous PCI to his right coronary artery.  He has moderate valvular heart disease with both moderate aortic stenosis and mild-to-moderate mitral insufficiency.  He is stable and compensated on exam and by history today.  I reviewed his recent blood work from August showing that he continues to have a moderate anemia with a hemoglobin around 8.3.  Liver function tests were checked at that time.  He is on Lipitor and they were within normal range.  He did not need any medication refills today.  I will be happy to continue to follow him on an annual basis.  I do feel we should continue to monitor for valvular progression with annual echocardiogram.  Please feel free to contact me with any questions you have in regards to his care.      cc:   Justina Moise MD    [place] [address] [place] [address]         SALTY HORAN DO             D: 10/18/2017 13:50   T: 10/18/2017 14:19   MT: ASIM      Name:     YOANA QUIÑONES   MRN:      [id number]        Account:      [id number]   :      10/07/1929           Service Date: 10/18/2017      Document: [id number]

## 2017-10-18 NOTE — PROGRESS NOTES
"HPI and Plan:   See dictation    No orders of the defined types were placed in this encounter.      No orders of the defined types were placed in this encounter.      There are no discontinued medications.      Encounter Diagnoses   Name Primary?     ASHD (arteriosclerotic heart disease)      Mitral valve insufficiency, unspecified etiology        CURRENT MEDICATIONS:  Current Outpatient Prescriptions   Medication Sig Dispense Refill     metoprolol (TOPROL XL) 50 MG 24 hr tablet Take 1 tablet (50 mg) by mouth daily 30 tablet 0     nitroglycerin (NITROSTAT) 0.4 MG sublingual tablet Place 1 tablet (0.4 mg) under the tongue every 5 minutes as needed for chest pain 25 tablet 3     atorvastatin (LIPITOR) 40 MG tablet Take 1 tablet (40 mg) by mouth daily 30 tablet 0     NIFEdipine ER (NIFEDIAC CC) 90 MG TB24 Take 90 mg by mouth every morning       aspirin 81 MG tablet Take 81 mg by mouth daily       VITAMIN D, CHOLECALCIFEROL, PO Take 2,000 Units by mouth daily       calcium acetate (PHOSLO) 667 MG CAPS Take 2,001 mg by mouth 3 times daily (with meals)       Phenyleph-Promethazine-Cod (PROMETHAZINE VC/CODEINE) 5-6.25-10 MG/5ML SYRP Take 1-2 tsp. by mouth every 6 hours as needed         ALLERGIES     Allergies   Allergen Reactions     Glyburide      Lisinopril      Hyperkalemia when hospitalized 4/5/2011     Metformin      Renal failure stage 4     Glenn Gallagher RN clarified with pt, pt does not remember rxn, it was a long time ago, and \"nothing crazy\".     Verapamil        PAST MEDICAL HISTORY:  Past Medical History:   Diagnosis Date     Anemia      Anemia      CAD (coronary artery disease) 12/24/14    PCI and BMS to mid RCA (5.0 x 20mm) with residual mod diffuse mid LAD disease     Chronic kidney disease     on Dialysis     Diabetes (H)      Hypertension      Mitral regurgitation 12/24/2014    mild-mod (1-2+) per echo     Mitral valve disorders(424.0) 12/24/2014    mild/mod (1-2+) MR     Myocardial infarction " "12/24/2014    NSTEMI     Pulmonary hypertension 12/24/2014    RVSP elevated c/w mild-mod PH per echo     Renal disease due to diabetes mellitus (H)     End stage; on Dialysis       PAST SURGICAL HISTORY:  Past Surgical History:   Procedure Laterality Date     CORONARY ANGIOGRAPHY ADULT ORDER  12/24/2014     ENT SURGERY      Tonsillectomy     HEART CATH, ANGIOPLASTY  12/24/2014    PCI and BMS (5.0x20mm)to mid RCA     THORACIC SURGERY      Herniated disc       FAMILY HISTORY:  Family History   Problem Relation Age of Onset     DIABETES Mother        SOCIAL HISTORY:  Social History     Social History     Marital status: Single     Spouse name: N/A     Number of children: N/A     Years of education: N/A     Social History Main Topics     Smoking status: Former Smoker     Smokeless tobacco: Never Used     Alcohol use Yes      Comment: occasionally/rare     Drug use: No     Sexual activity: Not Currently     Partners: Female     Other Topics Concern     Caffeine Concern Yes     1-2 cups daily     Sleep Concern No     Special Diet Yes     kidney diet     Exercise Yes     walking     Seat Belt Yes     Social History Narrative       Review of Systems:  Skin:  Positive for bruising     Eyes:  Positive for   reading glasses ,  hx of cataract surgery - both eyes  ENT:  not assessed      Respiratory:  Negative       Cardiovascular:  Negative      Gastroenterology: not assessed      Genitourinary:  not assessed      Musculoskeletal:  not assessed      Neurologic:  not assessed      Psychiatric:  not assessed      Heme/Lymph/Imm:  Positive for easy bruising    Endocrine:  Negative        Physical Exam:  Vitals: /80 (BP Location: Left arm, Patient Position: Sitting, Cuff Size: Adult Regular)  Pulse 72  Ht 1.702 m (5' 7\")  Wt 74.4 kg (164 lb 1.6 oz)  BMI 25.7 kg/m2    Constitutional:  cooperative, alert and oriented, well developed, well nourished, in no acute distress        Skin:  warm and dry to the touch   scattered " ecchymosis    Head:  normocephalic        Eyes:  pupils equal and round;conjunctivae and lids unremarkable   mild scleral icterus    ENT:  no pallor or cyanosis        Neck:      mild JVD    Chest:  clear to auscultation;normal symmetry          Cardiac: regular rhythm             II/VI systolic murmur at USB, II/VI blowing systolic murmur at apex    Abdomen:  abdomen soft;BS normoactive;non-tender obese      Vascular: not assessed this visit                                        Extremities and Back:  no edema         chronic venous stasis changes in extremities; dialysis vascular access in right arm    Neurological:  affect appropriate, oriented to time, person and place;no gross motor deficits              CC  Julianne Dan PA-C   [facility]  [address] [place] [address]

## 2017-11-13 ENCOUNTER — HOSPITAL ENCOUNTER (EMERGENCY)
Facility: CLINIC | Age: 82
Discharge: HOME | End: 2017-11-13
Attending: PHYSICIAN ASSISTANT | Admitting: PHYSICIAN ASSISTANT
Payer: MEDICARE

## 2017-11-13 VITALS
DIASTOLIC BLOOD PRESSURE: 65 MMHG | SYSTOLIC BLOOD PRESSURE: 130 MMHG | TEMPERATURE: 97.6 F | RESPIRATION RATE: 18 BRPM | WEIGHT: 165 LBS | HEIGHT: 66 IN | OXYGEN SATURATION: 93 % | BODY MASS INDEX: 26.52 KG/M2

## 2017-11-13 DIAGNOSIS — R04.0 EPISTAXIS: ICD-10-CM

## 2017-11-13 PROCEDURE — 25000125 ZZHC RX 250

## 2017-11-13 PROCEDURE — 99283 EMERGENCY DEPT VISIT LOW MDM: CPT

## 2017-11-13 PROCEDURE — A9270 NON-COVERED ITEM OR SERVICE: HCPCS

## 2017-11-13 PROCEDURE — 25000125 ZZHC RX 250: Performed by: NURSE PRACTITIONER

## 2017-11-13 PROCEDURE — A9270 NON-COVERED ITEM OR SERVICE: HCPCS | Performed by: NURSE PRACTITIONER

## 2017-11-13 PROCEDURE — 27210182 ZZH KIT NASAL PACKING

## 2017-11-13 PROCEDURE — 30901 CONTROL OF NOSEBLEED: CPT | Mod: 50

## 2017-11-13 RX ORDER — LIDOCAINE HYDROCHLORIDE AND EPINEPHRINE 10; 10 MG/ML; UG/ML
INJECTION, SOLUTION INFILTRATION; PERINEURAL
Status: COMPLETED
Start: 2017-11-13 | End: 2017-11-13

## 2017-11-13 RX ORDER — OXYMETAZOLINE HYDROCHLORIDE 0.05 G/100ML
SPRAY NASAL
Status: COMPLETED
Start: 2017-11-13 | End: 2017-11-13

## 2017-11-13 RX ADMIN — LIDOCAINE HYDROCHLORIDE,EPINEPHRINE BITARTRATE: 10; .01 INJECTION, SOLUTION INFILTRATION; PERINEURAL at 11:52

## 2017-11-13 RX ADMIN — OXYMETAZOLINE HYDROCHLORIDE 2 SPRAY: 5 SPRAY NASAL at 11:51

## 2017-11-13 RX ADMIN — SILVER NITRATE APPLICATORS 1 APPLICATOR: 25; 75 STICK TOPICAL at 11:50

## 2017-11-13 ASSESSMENT — ENCOUNTER SYMPTOMS
RESPIRATORY NEGATIVE: 1
CARDIOVASCULAR NEGATIVE: 1

## 2017-11-13 NOTE — ED PROVIDER NOTES
I assisted Melissa Carnes NP with cauterizing the patient's bleeding nose. Please see her note for full history. Procedure went well and there were no complications.      Angelica Abad PA-C  11/13/17 1492

## 2017-11-13 NOTE — ED NOTES
Bed: ED24  Expected date: 11/13/17  Expected time: 10:10 AM  Means of arrival: Ambulance  Comments:  BV 3

## 2017-11-13 NOTE — ED PROVIDER NOTES
History     Chief Complaint:  Epistaxis    HPI   Maurizio Ohara is a 88 year old male who presents with 30 minutes of epistaxis.  Patient states he was in the bathroom and was scratching the inside of his left nare when it began to bleed.  Patient called EMS after 10 minutes of bright red bleeding that was uncontrolled with plugging his nose.  Here he has a nasal clamp in place.  He is spitting out small amounts of blood mixed with spit.  Denies any nausea, lightheadedness, chest pain, shortness of breath.  States he last had nosebleed 30 years ago which also required ED visit and cauterization.  He takes a baby aspirin daily, but is not on any blood thinners.  No other concerns today.    Allergies:  Glyburide  Lisinopril  Metformin  Penicillins  Verapamil      Medications:    metoprolol (TOPROL XL) 50 MG 24 hr tablet   nitroglycerin (NITROSTAT) 0.4 MG sublingual tablet   atorvastatin (LIPITOR) 40 MG tablet   NIFEdipine ER (NIFEDIAC CC) 90 MG TB24   aspirin 81 MG tablet   VITAMIN D, CHOLECALCIFEROL, PO   calcium acetate (PHOSLO) 667 MG CAPS   Phenyleph-Promethazine-Cod (PROMETHAZINE VC/CODEINE) 5-6.25-10 MG/5ML SYRP     Past Medical History:    CHF  Pneumonia  Sepsis  Diabetes  HTN  NSTEMI  End stage renal disease  CAD  Mitral regurgitation  Pulmonary HTN  Anemia  CKD  Mitral value disorder  MI    Past Surgical History:    Herniated disc surgery  Tonsillectomy  Coronary angiography      Family History:    Diabetes     Social History:  Smoking status: Former smoker  Alcohol use: Occasional   Marital Status:  Single [1]     Review of Systems   HENT: Positive for nosebleeds and postnasal drip.    Respiratory: Negative.    Cardiovascular: Negative.    All other systems reviewed and are negative.     Physical Exam     Patient Vitals for the past 24 hrs:   BP Temp Temp src Heart Rate Resp SpO2 Height Weight   11/13/17 1036 - 97.6  F (36.4  C) Temporal - - - - -   11/13/17 1023 127/60 - Oral 58 18 98 % 1.676 m (5'  "6\") 74.8 kg (165 lb)     Physical Exam  Constitutional: Alert, attentive, GCS 15.    HENT: Ongoing venous bleeding present in the left > right naris.   Active venous bleeding from Kiesselbach's plexus on the left.   No mass or lesion noted.   Oropharynx is moist, without lesions or trismus.   Small blood in the posterior pharynx with clots.    Mucous membranes are moist.   Eyes: EOM are normal. Pupils are equal, round, and reactive to light. Conjunctiva pink, no scleral icterus or conjunctival injection  CV: regular rate and rhythm; no murmurs, rubs or gallups.  Radial, dorsalis pedis and posterior tibial pulses 2+ bilaterally.  Cap refill <3 seconds.  Respiratory: Effort normal. Lungs clear to auscultation bilaterally. No crackles/rubs/wheezes.  Good air movement.  MSK: Normal range of motion. No peripheral edema or calf tenderness.  Neurological: Alert, attentive.  Skin: Skin is warm and dry.  No rashes or petechiae.  Psychiatric: Normal affect.    Emergency Department Course     Procedures:  Procedure: Epistaxis Management  Performed by: Melissa Carnes NP and EBER Peoples    Technique: Afrin nasal was sprayed in the nares bilaterally followed by packing the nares with cotton balls soaked in viscous lidocaine with epi for 30 minutes.  On examination, small area of recent bleed isolated to septum in left nare. Silver nitrate cautery was then applied to the area of bleeding. On re-examination 20 minutes later there is no residual bleeding externally at the naris or in the posterior pharynx.    Complications: none    Interventions:  Medications   oxymetazoline (AFRIN) 0.05 % spray (not administered)   lidocaine 1% with EPINEPHrine 1:100,000 1 %-1:[id number] injection (not administered)     Emergency Department Course:  VS, nursing notes and previous visits reviewed.  I examined the patient.  Afrin and lidocaine placed at this time.    MD Og in to examine patient    Cautery completed as noted above.    Patient " monitored for 20 minutes with no signs of re-bleed.  Plan to discharge to home.      Impression & Plan      Medical Decision Making:  Maurizio Ohara is a 88 year old male who presents for evaluation of epistaxis.  Silver nitrate was used to cauterize the bleeding source, see above procedure note.  The bleeding stopped and did not restart here in ED, therefore no nasal packing is indicated.  Discussed with patient to use humidity and vaseline or bacitracin in nares bid for the next week. There are no signs of coagulopathy causing the bleeding or a general medical condition (thrombocytopenia, DIC, leukemia, etc) causing the bleeding today.  Patient will follow-up with primary care and return to ED with return of uncontrolled nasal bleed, lightheadedness, shortness of breath or any other concerns.      Diagnosis:    ICD-10-CM    1. Epistaxis R04.0        Disposition:  discharged to home  11/13/2017   [facility] EMERGENCY DEPARTMENT       Melissa Carnes, ABNER CNP  11/13/17 1215

## 2017-11-13 NOTE — ED AVS SNAPSHOT
[facility] Emergency Department    [address]    [facility] [address]    Phone:  [phone]    Fax:  [phone]                                       Maurizio Ohara   MRN: [id number]    Department:  [facility] Emergency Department   Date of Visit:  11/13/2017           Patient Information     Date Of Birth          10/7/1929        Your diagnoses for this visit were:     Epistaxis        You were seen by Angelica Abad PA-C and Aisha Og MD.      Follow-up Information     Follow up with [facility] Emergency Department.    Specialty:  EMERGENCY MEDICINE    Why:  As needed    Contact information:    [address]  [facility] [address] [phone]        Discharge Instructions       Avoid fingers in your nose.  Try not to blow your nose for the next 12 hours.  Avoid hot liquids and alcohol for the next 2 days.  Use nasal clamp if bleeding returns.  If you cannot control bleeding, or if you feel lightheaded or short of breath, return to ED.           Nosebleed (Adult)    Bleeding from the nose most commonly occurs because of injury or drying and cracking of the inner lining of the nose. Most nosebleeds are because of dry air or nose-picking. They can occur during a common cold or an allergy attack. They can also occur on a very hot day, or from dry air in the winter.  If the bleeding site is found, it may be cauterized. This means it is treated to cause a blood clot to form. This may be done with a chemical, heat, or electricity. If the bleeding continues after the site is cauterized, or if the site cannot be found, packing may be put in your nose. This is to apply pressure and stop the bleeding. The packing may be made of gauze or sponge. A small balloon catheter is sometimes used. These must be removed by your doctor. Some types of packing dissolve on their own.  Home care    If packing was put in your nose,  unless told otherwise, do not pull on it or try to remove it yourself. You will be given an appointment to have it removed. You may also have been given antibiotics to prevent a sinus infection. If so, finish all of the medicine.    Do not blow your nose for 12 hours after the bleeding stops. This will allow a strong blood clot to form. Do not pick your nose. This may restart bleeding.    Avoid drinking alcohol and hot liquids for the next 2 days. Alcohol or hot liquids in your mouth can dilate blood vessels in your nose. This can cause bleeding to start again.    Do not take ibuprofen, naproxen, or medicines that contain aspirin. These thin the blood and may cause your nose to bleed. You may take acetaminophen for pain, unless another pain medicine was prescribed.    If the bleeding starts again, sit up and lean forward to prevent swallowing blood. Pinch your nose tightly on both sides, as shown above, for 10 to 15 minutes. Time yourself. Don t release the pressure on your nose until 10 minutes is up. If bleeding does not stop, continue to pinch your nose and call your healthcare provider or return to this facility.    If you have a cold, allergies, or dry nasal membranes, lubricate the nasal passages. Apply a small amount of petroleum jelly inside the nose with a cotton swab twice a day (morning and night).    Avoid overheating your home. This can dry the air and make your condition worse.    Put a humidifier in the room where you sleep. This will add moisture to the air.    Use a saline nasal spray to keep nasal passages moist.    Do not pick your nose. Keep fingernails trimmed to decrease risk of bleeds.    Do not smoke.  Follow-up care  Follow up with your healthcare provider, or as advised. Nasal packing should be rechecked or removed within 2 to 3 days.  When to seek medical advice  Call your healthcare provider right away if any of these occur.    You have another nosebleed that you cannot  control    Dizziness, weakness, or fainting    You become tired or confused    Fever of 100.4 F (38 C) or higher, or as directed by your healthcare provider    Headache    Sinus or facial pain    Shortness of breath or trouble breathing  Date Last Reviewed: 3/22/2015    6342-1599 [employer]. [address] [place] [address]. All rights reserved. This information is not intended as a substitute for professional medical care. Always follow your healthcare professional's instructions.          24 Hour Appointment Hotline       To make an appointment at any [facility], call [phone] ([phone]). If you don't have a family doctor or clinic, we will help you find one. [facility] are conveniently located to serve the needs of you and your family.             Review of your medicines      Our records show that you are taking the medicines listed below. If these are incorrect, please call your family doctor or clinic.        Dose / Directions Last dose taken    aspirin 81 MG tablet   Dose:  81 mg        Take 81 mg by mouth daily   Refills:  0        atorvastatin 40 MG tablet   Commonly known as:  LIPITOR   Dose:  40 mg   Quantity:  30 tablet        Take 1 tablet (40 mg) by mouth daily   Refills:  0        calcium acetate 667 MG Caps capsule   Commonly known as:  PHOSLO   Dose:  2001 mg        Take 2,001 mg by mouth 3 times daily (with meals)   Refills:  0        metoprolol 50 MG 24 hr tablet   Commonly known as:  TOPROL XL   Dose:  50 mg   Quantity:  30 tablet        Take 1 tablet (50 mg) by mouth daily   Refills:  0        NIFEDIAC CC 90 MG Tb24   Dose:  90 mg   Generic drug:  NIFEdipine ER        Take 90 mg by mouth every morning   Refills:  0        nitroGLYcerin 0.4 MG sublingual tablet   Commonly known as:  NITROSTAT   Dose:  0.4 mg   Quantity:  25 tablet        Place 1 tablet (0.4 mg) under the tongue every 5 minutes as needed for chest pain   Refills:  3         PROMETHAZINE VC/CODEINE 5-6.25-10 MG/5ML Syrp   Dose:  1-2 tsp.   Generic drug:  Phenyleph-Promethazine-Cod        Take 1-2 tsp. by mouth every 6 hours as needed   Refills:  0        VITAMIN D (CHOLECALCIFEROL) PO   Dose:  2000 Units        Take 2,000 Units by mouth daily   Refills:  0                Orders Needing Specimen Collection     None      Pending Results     No orders found from 11/11/2017 to 11/14/2017.            Pending Culture Results     No orders found from 11/11/2017 to 11/14/2017.            Pending Results Instructions     If you had any lab results that were not finalized at the time of your Discharge, you can call the ED Lab Result RN at [phone]. You will be contacted by this team for any positive Lab results or changes in treatment. The nurses are available 7 days a week from 10A to 6:30P.  You can leave a message 24 hours per day and they will return your call.        Test Results From Your Hospital Stay               Clinical Quality Measure: Blood Pressure Screening     Your blood pressure was checked while you were in the emergency department today. The last reading we obtained was  BP: 112/90 . Please read the guidelines below about what these numbers mean and what you should do about them.  If your systolic blood pressure (the top number) is less than 120 and your diastolic blood pressure (the bottom number) is less than 80, then your blood pressure is normal. There is nothing more that you need to do about it.  If your systolic blood pressure (the top number) is 120-139 or your diastolic blood pressure (the bottom number) is 80-89, your blood pressure may be higher than it should be. You should have your blood pressure rechecked within a year by a primary care provider.  If your systolic blood pressure (the top number) is 140 or greater or your diastolic blood pressure (the bottom number) is 90 or greater, you may have high blood pressure. High blood pressure is treatable, but if  "left untreated over time it can put you at risk for heart attack, stroke, or kidney failure. You should have your blood pressure rechecked by a primary care provider within the next 4 weeks.  If your provider in the emergency department today gave you specific instructions to follow-up with your doctor or provider even sooner than that, you should follow that instruction and not wait for up to 4 weeks for your follow-up visit.        Thank you for choosing [facility]       Thank you for choosing [facility] for your care. Our goal is always to provide you with excellent care. Hearing back from our patients is one way we can continue to improve our services. Please take a few minutes to complete the written survey that you may receive in the mail after you visit with us. Thank you!        [email or web address] Information     [employer] lets you send messages to your doctor, view your test results, renew your prescriptions, schedule appointments and more. To sign up, go to [email or web address] . Click on \"Log in\" on the left side of the screen, which will take you to the Welcome page. Then click on \"Sign up Now\" on the right side of the page.     You will be asked to enter the access code listed below, as well as some personal information. Please follow the directions to create your username and password.     Your access code is: [email or web address]  Expires: 2018 12:11 PM     Your access code will  in 90 days. If you need help or a new code, please call your [facility] clinic or [phone].        Care EveryWhere ID     This is your Care EveryWhere ID. This could be used by other organizations to access your [facility] medical records  [id number]        Equal Access to Services     [facility] LUIS : Hadkp Murphy, wafreddy edwards, qacatherine granados. So [facility] [phone].    ATENCIÓN: Si habla español, tiene a mast disposición servicios gratuitos de asistencia " dexter Villelafrida al [phone].    We comply with applicable federal civil rights laws and [place] laws. We do not discriminate on the basis of race, color, national origin, age, disability, sex, sexual orientation, or gender identity.            After Visit Summary       This is your record. Keep this with you and show to your community pharmacist(s) and doctor(s) at your next visit.

## 2017-11-13 NOTE — ED NOTES
Arrives via EMS with nosebleed from home.  Insisted on coming into ED due to needing cauterization in the past.  Is not on any blood thinners.  Bleeding started 10 minutes prior to EMS arrival to home.  Nose packed and clip in place.  Dialysis patient with stent on right arm.  Spitting up 3 large blood clots thus far.  ABCD's intact; A/O x 4.

## 2017-11-13 NOTE — ED AVS SNAPSHOT
[facility] Emergency Department    [address]    [facility] [address]    Phone:  [phone]    Fax:  [phone]                                       Maurizio Ohara   MRN: [id number]    Department:  [facility] Emergency Department   Date of Visit:  11/13/2017           After Visit Summary Signature Page     I have received my discharge instructions, and my questions have been answered. I have discussed any challenges I see with this plan with the nurse or doctor.    ..........................................................................................................................................  Patient/Patient Representative Signature      ..........................................................................................................................................  Patient Representative Print Name and Relationship to Patient    ..................................................               ................................................  Date                                            Time    ..........................................................................................................................................  Reviewed by Signature/Title    ...................................................              ..............................................  Date                                                            Time

## 2017-11-13 NOTE — DISCHARGE INSTRUCTIONS
Avoid fingers in your nose.  Try not to blow your nose for the next 12 hours.  Avoid hot liquids and alcohol for the next 2 days.  Use humidifier and vaseline to nares for added moisture.  Use nasal clamp if bleeding returns.  If you cannot control bleeding, or if you feel lightheaded or short of breath, return to ED.           Nosebleed (Adult)    Bleeding from the nose most commonly occurs because of injury or drying and cracking of the inner lining of the nose. Most nosebleeds are because of dry air or nose-picking. They can occur during a common cold or an allergy attack. They can also occur on a very hot day, or from dry air in the winter.  If the bleeding site is found, it may be cauterized. This means it is treated to cause a blood clot to form. This may be done with a chemical, heat, or electricity. If the bleeding continues after the site is cauterized, or if the site cannot be found, packing may be put in your nose. This is to apply pressure and stop the bleeding. The packing may be made of gauze or sponge. A small balloon catheter is sometimes used. These must be removed by your doctor. Some types of packing dissolve on their own.  Home care    If packing was put in your nose, unless told otherwise, do not pull on it or try to remove it yourself. You will be given an appointment to have it removed. You may also have been given antibiotics to prevent a sinus infection. If so, finish all of the medicine.    Do not blow your nose for 12 hours after the bleeding stops. This will allow a strong blood clot to form. Do not pick your nose. This may restart bleeding.    Avoid drinking alcohol and hot liquids for the next 2 days. Alcohol or hot liquids in your mouth can dilate blood vessels in your nose. This can cause bleeding to start again.    Do not take ibuprofen, naproxen, or medicines that contain aspirin. These thin the blood and may cause your nose to bleed. You may take acetaminophen for pain, unless  another pain medicine was prescribed.    If the bleeding starts again, sit up and lean forward to prevent swallowing blood. Pinch your nose tightly on both sides, as shown above, for 10 to 15 minutes. Time yourself. Don t release the pressure on your nose until 10 minutes is up. If bleeding does not stop, continue to pinch your nose and call your healthcare provider or return to this facility.    If you have a cold, allergies, or dry nasal membranes, lubricate the nasal passages. Apply a small amount of petroleum jelly inside the nose with a cotton swab twice a day (morning and night).    Avoid overheating your home. This can dry the air and make your condition worse.    Put a humidifier in the room where you sleep. This will add moisture to the air.    Use a saline nasal spray to keep nasal passages moist.    Do not pick your nose. Keep fingernails trimmed to decrease risk of bleeds.    Do not smoke.  Follow-up care  Follow up with your healthcare provider, or as advised. Nasal packing should be rechecked or removed within 2 to 3 days.  When to seek medical advice  Call your healthcare provider right away if any of these occur.    You have another nosebleed that you cannot control    Dizziness, weakness, or fainting    You become tired or confused    Fever of 100.4 F (38 C) or higher, or as directed by your healthcare provider    Headache    Sinus or facial pain    Shortness of breath or trouble breathing  Date Last Reviewed: 3/22/2015    7128-6764 [email or web address]. [address] [place] [address], [place] [address]. All rights reserved. This information is not intended as a substitute for professional medical care. Always follow your healthcare professional's instructions.

## 2017-11-19 ENCOUNTER — HOSPITAL ENCOUNTER (EMERGENCY)
Facility: CLINIC | Age: 82
Discharge: HOME | End: 2017-11-19
Attending: EMERGENCY MEDICINE | Admitting: EMERGENCY MEDICINE
Payer: MEDICARE

## 2017-11-19 VITALS
HEART RATE: 68 BPM | DIASTOLIC BLOOD PRESSURE: 60 MMHG | OXYGEN SATURATION: 90 % | SYSTOLIC BLOOD PRESSURE: 134 MMHG | TEMPERATURE: 98 F | RESPIRATION RATE: 18 BRPM

## 2017-11-19 DIAGNOSIS — R04.0 EPISTAXIS: ICD-10-CM

## 2017-11-19 PROCEDURE — 99283 EMERGENCY DEPT VISIT LOW MDM: CPT | Mod: 25

## 2017-11-19 PROCEDURE — 30901 CONTROL OF NOSEBLEED: CPT

## 2017-11-19 RX ORDER — OXYMETAZOLINE HYDROCHLORIDE 0.05 G/100ML
SPRAY NASAL
Status: DISCONTINUED
Start: 2017-11-19 | End: 2017-11-19 | Stop reason: HOSPADM

## 2017-11-19 ASSESSMENT — ENCOUNTER SYMPTOMS
VOMITING: 0
NAUSEA: 0

## 2017-11-19 NOTE — ED NOTES
ABCs intact. Pt was seen on 11/13 for epistaxis. Pt states his nose started bleeding again today. Denies blood thinners.

## 2017-11-19 NOTE — ED AVS SNAPSHOT
[facility] Emergency Department    [address]    [facility] [address]    Phone:  [phone]    Fax:  [phone]                                       Maurizio Ohara   MRN: [id number]    Department:  [facility] Emergency Department   Date of Visit:  11/19/2017           Patient Information     Date Of Birth          10/7/1929        Your diagnoses for this visit were:     Epistaxis        You were seen by Chris Meza MD.      Follow-up Information     [facility]. Schedule an appointment as soon as possible for a visit in 2 days.    Specialty:  Otolaryngology    Contact information:    [address], [place] [address]  [phone]          Follow up with [facility] Emergency Department.    Specialty:  EMERGENCY MEDICINE    Why:  If symptoms worsen    Contact information:    [address] [facility] [address] [phone]        Discharge Instructions       Follow-up:  Please follow-up with Otolaryngology (ENT) in 2 days for re-evaluation and discussion of your visit to the emergency department today.  Please see below for the telephone number for ENT.    Home treatments:  Recommended home therapies include:    1.  Avoid nose blowing for 12 hours after the bleeding stops.    2.  Avoid nose picking or putting anything into the nose    3.  If bleeding starts again, sit up, lean forward and pinch the soft part of the nose tightly for 10 minutes without letting go    4.  Avoid lifting heavy objects or doing much work right away    5.  Huse a humidifier or vaporizer at home    6.  If there is no packing in the nose, put a small amount of petroleum jelly inside the nostril 2 times a day for 4-5 days.    Return precautions:  Warning signs which should prompt you to return to the ER include recurrent bleeding which does not stop after the above measures, nasal pain, fevers, chills,  "dizziness/lightheadedness, fainting episode, shortness of breath, or any other new or troubling symptoms.  We are always happy to see you again.      Discharge Instructions  Epistaxis    Today you were seen for a nosebleed.   Nosebleeds (the medical term is \"epistaxis\") are very common. Almost every person has had at least one in their lifetime.  Although the amount of blood loss can appear dramatic, nosebleeds rarely cause serious problems.  The most common causes are dry air or nose picking, but they also are common in people who have allergies, high blood pressure or are on blood thinners, such as Coumadin, Aspirin or Plavix. If you or your child gets a nosebleed, the important thing is to know how to take care of it. With the right self-care, most nosebleeds will stop on their own.    Return to the Emergency Department if:    Your nose is bleeding a very large amount of blood.    You get very pale, faint, or tired.    You cannot get the bleeding to stop after following these instructions.    A nosebleed happens after recent nasal surgery or if you have a known nasal tumor.    You have new, serious symptoms, such as chest pain.    You get a nosebleed after an injury, such as being hit in the face, and you are concerned that you could have other injuries (like a broken bone).    Treatment:    Your doctor may tell you to use a decongestant nose spray, like Afrin  (oxymetazoline), in both nostrils in the morning and at night for the next three days. Do not use this medicine for more than three days at a time.  If you do it will cause nasal congestion.     You should apply a small amount of Vaseline  to the inside of your nostrils for moisture before bed.  Using a humidifier in your bedroom will help as well.    For the next three days, do not blow your nose or put anything in your nose. You may sniffle, or dab the outside of your nose.    Do not bend with your head below your waist for the next three days. Do not " lift anything so heavy that you have to strain.     If you received nasal packing, please do not remove the packing until seen by an Ear Nose and Throat specialist.  If antibiotics have been given with the packing please take as directed.    If your nose starts to bleed again:    Blow your nose to get rid of some of the clots that have formed inside your nostrils. This may increase the bleeding temporarily, but that's OK.    Spray decongestant nose spray (like Afrin ) into both nostrils to constrict the blood vessels.    Sit or stand while bending forward slightly at the waist. Do not lie down or tilt your head back. This may cause you to swallow blood and can lead to vomiting.     the soft part of BOTH nostrils at the bottom of your nose and squeeze your nose closed for at least 5 minutes (for children) or 10 to 15 minutes (for adults). You can use your fingers, or the clip we gave you here. Use a clock to time yourself. Do not release the pressure every so often to check whether the bleeding has stopped. Many people hurt their chances of stopping the bleeding by releasing the pressure too soon or too often.    If you follow the steps outlined above, and your nose continues to bleed, repeat all the steps once more. Apply pressure for a total of at least 30 minutes. If you continue to bleed even then, return to the Emergency Department or go to an urgent care clinic.    If you keep having nose bleeds, schedule an appointment with your regular doctor, or with an Ear, Nose, and Throat Specialist.  If you were given a prescription for medicine here today, be sure to read all of the information (including the package insert) that comes with your prescription.  This will include important information about the medicine, its side effects, and any warnings that you need to know about.  The pharmacist who fills the prescription can provide more information and answer questions you may have about the medicine.  If you  have questions or concerns that the pharmacist cannot address, please call or return to the Emergency Department.   Opioid Medication Information    Pain medications are among the most commonly prescribed medicines, so we are including this information for all our patients. If you did not receive pain medication or get a prescription for pain medicine, you can ignore it.     You may have been given a prescription for an opioid (narcotic) pain medicine and/or have received a pain medicine while here in the Emergency Department. These medicines can make you drowsy or impaired. You must not drive, operate dangerous equipment, or engage in any other dangerous activities while taking these medications. If you drive while taking these medications, you could be arrested for DUI, or driving under the influence. Do not drink any alcohol while you are taking these medications.     Opioid pain medications can cause addiction. If you have a history of chemical dependency of any type, you are at a higher risk of becoming addicted to pain medications.  Only take these prescribed medications to treat your pain when all other options have been tried. Take it for as short a time and as few doses as possible. Store your pain pills in a secure place, as they are frequently stolen and provide a dangerous opportunity for children or visitors in your house to start abusing these powerful medications. We will not replace any lost or stolen medicine.  As soon as your pain is better, you should flush all your remaining medication.     Many prescription pain medications contain Tylenol  (acetaminophen), including Vicodin , Tylenol #3 , Norco , Lortab , and Percocet .  You should not take any extra pills of Tylenol  if you are using these prescription medications or you can get very sick.  Do not ever take more than 3000 mg of acetaminophen in any 24 hour period.    All opioids tend to cause constipation. Drink plenty of water and eat foods  that have a lot of fiber, such as fruits, vegetables, prune juice, apple juice and high fiber cereal.  Take a laxative if you don t move your bowels at least every other day. Miralax , Milk of Magnesia, Colace , or Senna  can be used to keep you regular.      Remember that you can always come back to the Emergency Department if you are not able to see your regular doctor in the amount of time listed above, if you get any new symptoms, or if there is anything that worries you.          24 Hour Appointment Hotline       To make an appointment at any [facility], call [phone] ([phone]). If you don't have a family doctor or clinic, we will help you find one. [facility] clinics are conveniently located to serve the needs of you and your family.             Review of your medicines      Our records show that you are taking the medicines listed below. If these are incorrect, please call your family doctor or clinic.        Dose / Directions Last dose taken    aspirin 81 MG tablet   Dose:  81 mg        Take 81 mg by mouth daily   Refills:  0        atorvastatin 40 MG tablet   Commonly known as:  LIPITOR   Dose:  40 mg   Quantity:  30 tablet        Take 1 tablet (40 mg) by mouth daily   Refills:  0        calcium acetate 667 MG Caps capsule   Commonly known as:  PHOSLO   Dose:  2001 mg        Take 2,001 mg by mouth 3 times daily (with meals)   Refills:  0        metoprolol 50 MG 24 hr tablet   Commonly known as:  TOPROL XL   Dose:  50 mg   Quantity:  30 tablet        Take 1 tablet (50 mg) by mouth daily   Refills:  0        NIFEDIAC CC 90 MG Tb24   Dose:  90 mg   Generic drug:  NIFEdipine ER        Take 90 mg by mouth every morning   Refills:  0        nitroGLYcerin 0.4 MG sublingual tablet   Commonly known as:  NITROSTAT   Dose:  0.4 mg   Quantity:  25 tablet        Place 1 tablet (0.4 mg) under the tongue every 5 minutes as needed for chest pain   Refills:  3        PROMETHAZINE VC/CODEINE 5-6.25-10 MG/5ML  Syrp   Dose:  1-2 tsp.   Generic drug:  Phenyleph-Promethazine-Cod        Take 1-2 tsp. by mouth every 6 hours as needed   Refills:  0        VITAMIN D (CHOLECALCIFEROL) PO   Dose:  2000 Units        Take 2,000 Units by mouth daily   Refills:  0                Orders Needing Specimen Collection     None      Pending Results     No orders found from 11/17/2017 to 11/20/2017.            Pending Culture Results     No orders found from 11/17/2017 to 11/20/2017.            Pending Results Instructions     If you had any lab results that were not finalized at the time of your Discharge, you can call the ED Lab Result RN at [phone]. You will be contacted by this team for any positive Lab results or changes in treatment. The nurses are available 7 days a week from 10A to 6:30P.  You can leave a message 24 hours per day and they will return your call.        Test Results From Your Hospital Stay               Clinical Quality Measure: Blood Pressure Screening     Your blood pressure was checked while you were in the emergency department today. The last reading we obtained was  BP: 139/63 . Please read the guidelines below about what these numbers mean and what you should do about them.  If your systolic blood pressure (the top number) is less than 120 and your diastolic blood pressure (the bottom number) is less than 80, then your blood pressure is normal. There is nothing more that you need to do about it.  If your systolic blood pressure (the top number) is 120-139 or your diastolic blood pressure (the bottom number) is 80-89, your blood pressure may be higher than it should be. You should have your blood pressure rechecked within a year by a primary care provider.  If your systolic blood pressure (the top number) is 140 or greater or your diastolic blood pressure (the bottom number) is 90 or greater, you may have high blood pressure. High blood pressure is treatable, but if left untreated over time it can put you at  "risk for heart attack, stroke, or kidney failure. You should have your blood pressure rechecked by a primary care provider within the next 4 weeks.  If your provider in the emergency department today gave you specific instructions to follow-up with your doctor or provider even sooner than that, you should follow that instruction and not wait for up to 4 weeks for your follow-up visit.        Thank you for choosing [facility]       Thank you for choosing [facility] for your care. Our goal is always to provide you with excellent care. Hearing back from our patients is one way we can continue to improve our services. Please take a few minutes to complete the written survey that you may receive in the mail after you visit with us. Thank you!        [employer] Information     [employer] lets you send messages to your doctor, view your test results, renew your prescriptions, schedule appointments and more. To sign up, go to [email or web address] . Click on \"Log in\" on the left side of the screen, which will take you to the Welcome page. Then click on \"Sign up Now\" on the right side of the page.     You will be asked to enter the access code listed below, as well as some personal information. Please follow the directions to create your username and password.     Your access code is: [email or web address]  Expires: 2018 12:11 PM     Your access code will  in 90 days. If you need help or a new code, please call your [facility] clinic or [phone].        Care EveryWhere ID     This is your Care EveryWhere ID. This could be used by other organizations to access your [facility] medical records  [id number]        Equal Access to Services     ROSALINA SMITH : Hadii tressa Murphy, waaxda luqadaha, qaybta kaalcatherine miller. So [facility] [phone].    ATENCIÓN: Si habla español, tiene a mast disposición servicios gratuitos de asistencia lingüística. Llame al [phone].    We comply with " applicable federal civil rights laws and [place] laws. We do not discriminate on the basis of race, color, national origin, age, disability, sex, sexual orientation, or gender identity.            After Visit Summary       This is your record. Keep this with you and show to your community pharmacist(s) and doctor(s) at your next visit.

## 2017-11-19 NOTE — DISCHARGE INSTRUCTIONS
"Follow-up:  Please follow-up with Otolaryngology (ENT) in 2 days for re-evaluation and discussion of your visit to the emergency department today.  Please see below for the telephone number for ENT.    Home treatments:  Recommended home therapies include:    1.  Avoid nose blowing for 12 hours after the bleeding stops.    2.  Avoid nose picking or putting anything into the nose    3.  If bleeding starts again, sit up, lean forward and pinch the soft part of the nose tightly for 10 minutes without letting go    4.  Avoid lifting heavy objects or doing much work right away    5.  Huse a humidifier or vaporizer at home    6.  If there is no packing in the nose, put a small amount of petroleum jelly inside the nostril 2 times a day for 4-5 days.    Return precautions:  Warning signs which should prompt you to return to the ER include recurrent bleeding which does not stop after the above measures, nasal pain, fevers, chills, dizziness/lightheadedness, fainting episode, shortness of breath, or any other new or troubling symptoms.  We are always happy to see you again.      Discharge Instructions  Epistaxis    Today you were seen for a nosebleed.   Nosebleeds (the medical term is \"epistaxis\") are very common. Almost every person has had at least one in their lifetime.  Although the amount of blood loss can appear dramatic, nosebleeds rarely cause serious problems.  The most common causes are dry air or nose picking, but they also are common in people who have allergies, high blood pressure or are on blood thinners, such as Coumadin, Aspirin or Plavix. If you or your child gets a nosebleed, the important thing is to know how to take care of it. With the right self-care, most nosebleeds will stop on their own.    Return to the Emergency Department if:    Your nose is bleeding a very large amount of blood.    You get very pale, faint, or tired.    You cannot get the bleeding to stop after following these instructions.    A " nosebleed happens after recent nasal surgery or if you have a known nasal tumor.    You have new, serious symptoms, such as chest pain.    You get a nosebleed after an injury, such as being hit in the face, and you are concerned that you could have other injuries (like a broken bone).    Treatment:    Your doctor may tell you to use a decongestant nose spray, like Afrin  (oxymetazoline), in both nostrils in the morning and at night for the next three days. Do not use this medicine for more than three days at a time.  If you do it will cause nasal congestion.     You should apply a small amount of Vaseline  to the inside of your nostrils for moisture before bed.  Using a humidifier in your bedroom will help as well.    For the next three days, do not blow your nose or put anything in your nose. You may sniffle, or dab the outside of your nose.    Do not bend with your head below your waist for the next three days. Do not lift anything so heavy that you have to strain.     If you received nasal packing, please do not remove the packing until seen by an Ear Nose and Throat specialist.  If antibiotics have been given with the packing please take as directed.    If your nose starts to bleed again:    Blow your nose to get rid of some of the clots that have formed inside your nostrils. This may increase the bleeding temporarily, but that's OK.    Spray decongestant nose spray (like Afrin ) into both nostrils to constrict the blood vessels.    Sit or stand while bending forward slightly at the waist. Do not lie down or tilt your head back. This may cause you to swallow blood and can lead to vomiting.     the soft part of BOTH nostrils at the bottom of your nose and squeeze your nose closed for at least 5 minutes (for children) or 10 to 15 minutes (for adults). You can use your fingers, or the clip we gave you here. Use a clock to time yourself. Do not release the pressure every so often to check whether the bleeding  has stopped. Many people hurt their chances of stopping the bleeding by releasing the pressure too soon or too often.    If you follow the steps outlined above, and your nose continues to bleed, repeat all the steps once more. Apply pressure for a total of at least 30 minutes. If you continue to bleed even then, return to the Emergency Department or go to an urgent care clinic.    If you keep having nose bleeds, schedule an appointment with your regular doctor, or with an Ear, Nose, and Throat Specialist.  If you were given a prescription for medicine here today, be sure to read all of the information (including the package insert) that comes with your prescription.  This will include important information about the medicine, its side effects, and any warnings that you need to know about.  The pharmacist who fills the prescription can provide more information and answer questions you may have about the medicine.  If you have questions or concerns that the pharmacist cannot address, please call or return to the Emergency Department.   Opioid Medication Information    Pain medications are among the most commonly prescribed medicines, so we are including this information for all our patients. If you did not receive pain medication or get a prescription for pain medicine, you can ignore it.     You may have been given a prescription for an opioid (narcotic) pain medicine and/or have received a pain medicine while here in the Emergency Department. These medicines can make you drowsy or impaired. You must not drive, operate dangerous equipment, or engage in any other dangerous activities while taking these medications. If you drive while taking these medications, you could be arrested for DUI, or driving under the influence. Do not drink any alcohol while you are taking these medications.     Opioid pain medications can cause addiction. If you have a history of chemical dependency of any type, you are at a higher risk of  becoming addicted to pain medications.  Only take these prescribed medications to treat your pain when all other options have been tried. Take it for as short a time and as few doses as possible. Store your pain pills in a secure place, as they are frequently stolen and provide a dangerous opportunity for children or visitors in your house to start abusing these powerful medications. We will not replace any lost or stolen medicine.  As soon as your pain is better, you should flush all your remaining medication.     Many prescription pain medications contain Tylenol  (acetaminophen), including Vicodin , Tylenol #3 , Norco , Lortab , and Percocet .  You should not take any extra pills of Tylenol  if you are using these prescription medications or you can get very sick.  Do not ever take more than 3000 mg of acetaminophen in any 24 hour period.    All opioids tend to cause constipation. Drink plenty of water and eat foods that have a lot of fiber, such as fruits, vegetables, prune juice, apple juice and high fiber cereal.  Take a laxative if you don t move your bowels at least every other day. Miralax , Milk of Magnesia, Colace , or Senna  can be used to keep you regular.      Remember that you can always come back to the Emergency Department if you are not able to see your regular doctor in the amount of time listed above, if you get any new symptoms, or if there is anything that worries you.

## 2017-11-19 NOTE — ED AVS SNAPSHOT
[facility] Emergency Department    [address]    [facility] [address]    Phone:  [phone]    Fax:  [phone]                                       Maurizio Ohara   MRN: [id number]    Department:  [facility] Emergency Department   Date of Visit:  11/19/2017           After Visit Summary Signature Page     I have received my discharge instructions, and my questions have been answered. I have discussed any challenges I see with this plan with the nurse or doctor.    ..........................................................................................................................................  Patient/Patient Representative Signature      ..........................................................................................................................................  Patient Representative Print Name and Relationship to Patient    ..................................................               ................................................  Date                                            Time    ..........................................................................................................................................  Reviewed by Signature/Title    ...................................................              ..............................................  Date                                                            Time

## 2018-01-01 ENCOUNTER — APPOINTMENT (OUTPATIENT)
Dept: GENERAL RADIOLOGY | Facility: CLINIC | Age: 83
End: 2018-01-01
Attending: EMERGENCY MEDICINE
Payer: MEDICARE

## 2018-01-01 ENCOUNTER — APPOINTMENT (OUTPATIENT)
Dept: ULTRASOUND IMAGING | Facility: CLINIC | Age: 83
End: 2018-01-01
Attending: HOSPITALIST
Payer: MEDICARE

## 2018-01-01 ENCOUNTER — APPOINTMENT (OUTPATIENT)
Dept: CT IMAGING | Facility: CLINIC | Age: 83
End: 2018-01-01
Attending: HOSPITALIST
Payer: MEDICARE

## 2018-01-01 ENCOUNTER — HOME CARE/HOSPICE - HEALTHEAST (OUTPATIENT)
Dept: HOSPICE | Facility: HOSPICE | Age: 83
End: 2018-01-01

## 2018-01-01 ENCOUNTER — NURSING HOME VISIT (OUTPATIENT)
Dept: GERIATRICS | Facility: CLINIC | Age: 83
End: 2018-01-01
Payer: COMMERCIAL

## 2018-01-01 ENCOUNTER — TRANSFERRED RECORDS (OUTPATIENT)
Dept: HEALTH INFORMATION MANAGEMENT | Facility: CLINIC | Age: 83
End: 2018-01-01

## 2018-01-01 ENCOUNTER — OFFICE VISIT (OUTPATIENT)
Dept: SURGERY | Facility: CLINIC | Age: 83
End: 2018-01-01
Attending: SURGERY
Payer: COMMERCIAL

## 2018-01-01 ENCOUNTER — AMBULATORY - HEALTHEAST (OUTPATIENT)
Dept: ADMINISTRATIVE | Facility: CLINIC | Age: 83
End: 2018-01-01

## 2018-01-01 ENCOUNTER — OFFICE VISIT (OUTPATIENT)
Dept: CARDIOLOGY | Facility: CLINIC | Age: 83
End: 2018-01-01
Payer: COMMERCIAL

## 2018-01-01 ENCOUNTER — APPOINTMENT (OUTPATIENT)
Dept: PHYSICAL THERAPY | Facility: CLINIC | Age: 83
DRG: 602 | End: 2018-01-01
Attending: PHYSICIAN ASSISTANT
Payer: MEDICARE

## 2018-01-01 ENCOUNTER — APPOINTMENT (OUTPATIENT)
Dept: CARDIOLOGY | Facility: CLINIC | Age: 83
End: 2018-01-01
Attending: PHYSICIAN ASSISTANT
Payer: MEDICARE

## 2018-01-01 ENCOUNTER — HOSPITAL ENCOUNTER (INPATIENT)
Facility: CLINIC | Age: 83
LOS: 7 days | Discharge: NURSING, REHAB OR LONG-TERM CARE | DRG: 602 | End: 2018-09-25
Attending: EMERGENCY MEDICINE | Admitting: INTERNAL MEDICINE
Payer: MEDICARE

## 2018-01-01 ENCOUNTER — APPOINTMENT (OUTPATIENT)
Dept: GENERAL RADIOLOGY | Facility: CLINIC | Age: 83
End: 2018-01-01
Attending: NURSE PRACTITIONER
Payer: MEDICARE

## 2018-01-01 ENCOUNTER — HOSPITAL ENCOUNTER (OUTPATIENT)
Facility: CLINIC | Age: 83
Discharge: HOME | End: 2018-01-26
Attending: RADIOLOGY | Admitting: RADIOLOGY
Payer: MEDICARE

## 2018-01-01 ENCOUNTER — TELEPHONE (OUTPATIENT)
Dept: CARDIOLOGY | Facility: CLINIC | Age: 83
End: 2018-01-01

## 2018-01-01 ENCOUNTER — APPOINTMENT (OUTPATIENT)
Dept: PHYSICAL THERAPY | Facility: CLINIC | Age: 83
DRG: 602 | End: 2018-01-01
Payer: MEDICARE

## 2018-01-01 ENCOUNTER — APPOINTMENT (OUTPATIENT)
Dept: GENERAL RADIOLOGY | Facility: CLINIC | Age: 83
End: 2018-01-01
Attending: HOSPITALIST
Payer: MEDICARE

## 2018-01-01 ENCOUNTER — APPOINTMENT (OUTPATIENT)
Dept: INTERVENTIONAL RADIOLOGY/VASCULAR | Facility: CLINIC | Age: 83
End: 2018-01-01
Attending: INTERNAL MEDICINE
Payer: MEDICARE

## 2018-01-01 ENCOUNTER — HOSPITAL ENCOUNTER (OUTPATIENT)
Dept: CARDIOLOGY | Facility: CLINIC | Age: 83
Discharge: HOME | End: 2018-10-15
Attending: INTERNAL MEDICINE | Admitting: INTERNAL MEDICINE
Payer: MEDICARE

## 2018-01-01 ENCOUNTER — HOSPITAL ENCOUNTER (EMERGENCY)
Facility: CLINIC | Age: 83
Discharge: HOME | End: 2018-11-09
Attending: EMERGENCY MEDICINE | Admitting: EMERGENCY MEDICINE
Payer: MEDICARE

## 2018-01-01 ENCOUNTER — HOSPITAL ENCOUNTER (OUTPATIENT)
Dept: ULTRASOUND IMAGING | Facility: CLINIC | Age: 83
Discharge: HOME | End: 2018-08-22
Attending: SURGERY | Admitting: SURGERY
Payer: MEDICARE

## 2018-01-01 ENCOUNTER — TELEPHONE (OUTPATIENT)
Dept: GERIATRICS | Facility: CLINIC | Age: 83
End: 2018-01-01

## 2018-01-01 ENCOUNTER — HOSPITAL LABORATORY (OUTPATIENT)
Dept: OTHER | Facility: CLINIC | Age: 83
End: 2018-01-01

## 2018-01-01 ENCOUNTER — APPOINTMENT (OUTPATIENT)
Dept: CT IMAGING | Facility: CLINIC | Age: 83
End: 2018-01-01
Attending: EMERGENCY MEDICINE
Payer: MEDICARE

## 2018-01-01 ENCOUNTER — HOSPITAL ENCOUNTER (OUTPATIENT)
Dept: LAB | Facility: CLINIC | Age: 83
Discharge: HOME | End: 2018-10-19
Attending: INTERNAL MEDICINE | Admitting: INTERNAL MEDICINE
Payer: MEDICARE

## 2018-01-01 ENCOUNTER — TELEPHONE (OUTPATIENT)
Dept: OTHER | Facility: CLINIC | Age: 83
End: 2018-01-01

## 2018-01-01 ENCOUNTER — HOSPITAL ENCOUNTER (OUTPATIENT)
Dept: ULTRASOUND IMAGING | Facility: CLINIC | Age: 83
Discharge: HOME | End: 2018-05-31
Attending: INTERNAL MEDICINE | Admitting: INTERNAL MEDICINE
Payer: MEDICARE

## 2018-01-01 ENCOUNTER — APPOINTMENT (OUTPATIENT)
Dept: INTERVENTIONAL RADIOLOGY/VASCULAR | Facility: CLINIC | Age: 83
End: 2018-01-01
Attending: PHYSICIAN ASSISTANT
Payer: MEDICARE

## 2018-01-01 ENCOUNTER — HOSPITAL ENCOUNTER (OUTPATIENT)
Facility: CLINIC | Age: 83
Setting detail: OBSERVATION
Discharge: NURSING, REHAB OR LONG-TERM CARE | End: 2018-12-02
Attending: EMERGENCY MEDICINE | Admitting: INTERNAL MEDICINE
Payer: MEDICARE

## 2018-01-01 ENCOUNTER — HOSPITAL ENCOUNTER (OUTPATIENT)
Facility: CLINIC | Age: 83
Setting detail: OBSERVATION
Discharge: NURSING, REHAB OR LONG-TERM CARE | End: 2018-11-16
Attending: EMERGENCY MEDICINE | Admitting: HOSPITALIST
Payer: MEDICARE

## 2018-01-01 ENCOUNTER — HOSPITAL ENCOUNTER (OUTPATIENT)
Facility: CLINIC | Age: 83
Discharge: HOME | End: 2018-06-15
Attending: RADIOLOGY | Admitting: RADIOLOGY
Payer: MEDICARE

## 2018-01-01 ENCOUNTER — APPOINTMENT (OUTPATIENT)
Dept: GENERAL RADIOLOGY | Facility: CLINIC | Age: 83
DRG: 602 | End: 2018-01-01
Attending: EMERGENCY MEDICINE
Payer: MEDICARE

## 2018-01-01 ENCOUNTER — APPOINTMENT (OUTPATIENT)
Dept: PHYSICAL THERAPY | Facility: CLINIC | Age: 83
End: 2018-01-01
Attending: INTERNAL MEDICINE
Payer: MEDICARE

## 2018-01-01 ENCOUNTER — RECORDS - HEALTHEAST (OUTPATIENT)
Dept: ADMINISTRATIVE | Facility: OTHER | Age: 83
End: 2018-01-01

## 2018-01-01 ENCOUNTER — HOSPITAL ENCOUNTER (OUTPATIENT)
Facility: CLINIC | Age: 83
Setting detail: OBSERVATION
Discharge: NURSING, REHAB OR LONG-TERM CARE | End: 2018-12-06
Attending: EMERGENCY MEDICINE | Admitting: INTERNAL MEDICINE
Payer: MEDICARE

## 2018-01-01 ENCOUNTER — HOSPITAL ENCOUNTER (OUTPATIENT)
Facility: CLINIC | Age: 83
Setting detail: OBSERVATION
Discharge: NURSING, REHAB OR LONG-TERM CARE | End: 2018-11-08
Attending: NURSE PRACTITIONER | Admitting: INTERNAL MEDICINE
Payer: MEDICARE

## 2018-01-01 ENCOUNTER — OFFICE VISIT (OUTPATIENT)
Dept: CARDIOLOGY | Facility: CLINIC | Age: 83
End: 2018-01-01
Attending: INTERNAL MEDICINE
Payer: COMMERCIAL

## 2018-01-01 ENCOUNTER — APPOINTMENT (OUTPATIENT)
Dept: ULTRASOUND IMAGING | Facility: CLINIC | Age: 83
DRG: 602 | End: 2018-01-01
Attending: PHYSICIAN ASSISTANT
Payer: MEDICARE

## 2018-01-01 VITALS
WEIGHT: 164.7 LBS | BODY MASS INDEX: 25.85 KG/M2 | HEIGHT: 67 IN | DIASTOLIC BLOOD PRESSURE: 57 MMHG | SYSTOLIC BLOOD PRESSURE: 116 MMHG | OXYGEN SATURATION: 97 % | HEART RATE: 72 BPM

## 2018-01-01 VITALS
BODY MASS INDEX: 28.28 KG/M2 | HEIGHT: 67 IN | WEIGHT: 180.2 LBS | OXYGEN SATURATION: 96 % | RESPIRATION RATE: 17 BRPM | TEMPERATURE: 97.4 F | HEART RATE: 76 BPM | SYSTOLIC BLOOD PRESSURE: 114 MMHG | DIASTOLIC BLOOD PRESSURE: 57 MMHG

## 2018-01-01 VITALS
WEIGHT: 191.2 LBS | HEART RATE: 65 BPM | HEIGHT: 67 IN | OXYGEN SATURATION: 92 % | SYSTOLIC BLOOD PRESSURE: 112 MMHG | BODY MASS INDEX: 30.01 KG/M2 | RESPIRATION RATE: 14 BRPM | DIASTOLIC BLOOD PRESSURE: 71 MMHG | TEMPERATURE: 98.1 F

## 2018-01-01 VITALS
DIASTOLIC BLOOD PRESSURE: 57 MMHG | OXYGEN SATURATION: 98 % | HEIGHT: 67 IN | RESPIRATION RATE: 17 BRPM | TEMPERATURE: 98.4 F | WEIGHT: 192.2 LBS | BODY MASS INDEX: 30.17 KG/M2 | HEART RATE: 81 BPM | SYSTOLIC BLOOD PRESSURE: 99 MMHG

## 2018-01-01 VITALS
HEART RATE: 68 BPM | WEIGHT: 150 LBS | BODY MASS INDEX: 23.54 KG/M2 | SYSTOLIC BLOOD PRESSURE: 110 MMHG | RESPIRATION RATE: 16 BRPM | OXYGEN SATURATION: 94 % | DIASTOLIC BLOOD PRESSURE: 60 MMHG | HEIGHT: 67 IN

## 2018-01-01 VITALS
HEIGHT: 67 IN | DIASTOLIC BLOOD PRESSURE: 61 MMHG | WEIGHT: 173 LBS | RESPIRATION RATE: 14 BRPM | BODY MASS INDEX: 27.15 KG/M2 | OXYGEN SATURATION: 94 % | TEMPERATURE: 97.7 F | SYSTOLIC BLOOD PRESSURE: 100 MMHG | HEART RATE: 61 BPM

## 2018-01-01 VITALS
BODY MASS INDEX: 28.46 KG/M2 | HEART RATE: 66 BPM | TEMPERATURE: 97.3 F | HEIGHT: 67 IN | RESPIRATION RATE: 18 BRPM | SYSTOLIC BLOOD PRESSURE: 109 MMHG | OXYGEN SATURATION: 93 % | WEIGHT: 181.3 LBS | DIASTOLIC BLOOD PRESSURE: 70 MMHG

## 2018-01-01 VITALS
SYSTOLIC BLOOD PRESSURE: 100 MMHG | BODY MASS INDEX: 28.53 KG/M2 | RESPIRATION RATE: 18 BRPM | TEMPERATURE: 96.4 F | DIASTOLIC BLOOD PRESSURE: 28 MMHG | HEIGHT: 67 IN | OXYGEN SATURATION: 99 % | WEIGHT: 181.8 LBS

## 2018-01-01 VITALS
HEART RATE: 59 BPM | SYSTOLIC BLOOD PRESSURE: 105 MMHG | DIASTOLIC BLOOD PRESSURE: 61 MMHG | BODY MASS INDEX: 29.6 KG/M2 | TEMPERATURE: 98 F | RESPIRATION RATE: 18 BRPM | OXYGEN SATURATION: 94 % | HEIGHT: 67 IN | RESPIRATION RATE: 18 BRPM | SYSTOLIC BLOOD PRESSURE: 154 MMHG | OXYGEN SATURATION: 91 % | DIASTOLIC BLOOD PRESSURE: 71 MMHG | WEIGHT: 156 LBS | TEMPERATURE: 97.2 F | HEIGHT: 67 IN | WEIGHT: 188.6 LBS | BODY MASS INDEX: 24.48 KG/M2

## 2018-01-01 VITALS
SYSTOLIC BLOOD PRESSURE: 109 MMHG | HEART RATE: 69 BPM | DIASTOLIC BLOOD PRESSURE: 68 MMHG | BODY MASS INDEX: 29.54 KG/M2 | HEIGHT: 67 IN

## 2018-01-01 VITALS
OXYGEN SATURATION: 96 % | HEIGHT: 67 IN | BODY MASS INDEX: 28.41 KG/M2 | HEART RATE: 70 BPM | SYSTOLIC BLOOD PRESSURE: 111 MMHG | RESPIRATION RATE: 17 BRPM | WEIGHT: 181 LBS | DIASTOLIC BLOOD PRESSURE: 66 MMHG | TEMPERATURE: 97.5 F

## 2018-01-01 VITALS
HEART RATE: 64 BPM | OXYGEN SATURATION: 94 % | SYSTOLIC BLOOD PRESSURE: 109 MMHG | TEMPERATURE: 96.9 F | WEIGHT: 180.12 LBS | DIASTOLIC BLOOD PRESSURE: 50 MMHG | RESPIRATION RATE: 18 BRPM | BODY MASS INDEX: 28.21 KG/M2

## 2018-01-01 VITALS
RESPIRATION RATE: 20 BRPM | WEIGHT: 182.98 LBS | HEART RATE: 67 BPM | SYSTOLIC BLOOD PRESSURE: 89 MMHG | TEMPERATURE: 95.4 F | BODY MASS INDEX: 28.66 KG/M2 | OXYGEN SATURATION: 93 % | DIASTOLIC BLOOD PRESSURE: 49 MMHG

## 2018-01-01 VITALS
TEMPERATURE: 97.6 F | HEIGHT: 67 IN | HEART RATE: 64 BPM | DIASTOLIC BLOOD PRESSURE: 80 MMHG | OXYGEN SATURATION: 95 % | BODY MASS INDEX: 29.37 KG/M2 | SYSTOLIC BLOOD PRESSURE: 122 MMHG | RESPIRATION RATE: 16 BRPM | WEIGHT: 187.1 LBS

## 2018-01-01 VITALS
SYSTOLIC BLOOD PRESSURE: 85 MMHG | HEART RATE: 68 BPM | RESPIRATION RATE: 18 BRPM | OXYGEN SATURATION: 96 % | BODY MASS INDEX: 28.58 KG/M2 | TEMPERATURE: 98 F | DIASTOLIC BLOOD PRESSURE: 43 MMHG | WEIGHT: 182.5 LBS

## 2018-01-01 VITALS
OXYGEN SATURATION: 94 % | DIASTOLIC BLOOD PRESSURE: 40 MMHG | WEIGHT: 161.38 LBS | TEMPERATURE: 98 F | BODY MASS INDEX: 25.94 KG/M2 | RESPIRATION RATE: 16 BRPM | HEART RATE: 61 BPM | HEIGHT: 66 IN | SYSTOLIC BLOOD PRESSURE: 101 MMHG

## 2018-01-01 VITALS
WEIGHT: 150 LBS | SYSTOLIC BLOOD PRESSURE: 116 MMHG | DIASTOLIC BLOOD PRESSURE: 50 MMHG | HEIGHT: 67 IN | OXYGEN SATURATION: 94 % | TEMPERATURE: 97.2 F | RESPIRATION RATE: 18 BRPM | BODY MASS INDEX: 23.54 KG/M2

## 2018-01-01 VITALS
HEART RATE: 79 BPM | DIASTOLIC BLOOD PRESSURE: 56 MMHG | SYSTOLIC BLOOD PRESSURE: 97 MMHG | WEIGHT: 180 LBS | TEMPERATURE: 97.9 F | RESPIRATION RATE: 16 BRPM | BODY MASS INDEX: 28.19 KG/M2 | OXYGEN SATURATION: 96 %

## 2018-01-01 DIAGNOSIS — Z99.2 ESRD (END STAGE RENAL DISEASE) ON DIALYSIS (H): ICD-10-CM

## 2018-01-01 DIAGNOSIS — R53.81 PHYSICAL DECONDITIONING: ICD-10-CM

## 2018-01-01 DIAGNOSIS — E87.5 HYPERKALEMIA: ICD-10-CM

## 2018-01-01 DIAGNOSIS — I50.31 ACUTE DIASTOLIC (CONGESTIVE) HEART FAILURE (H): ICD-10-CM

## 2018-01-01 DIAGNOSIS — I21.4 NSTEMI (NON-ST ELEVATED MYOCARDIAL INFARCTION) (H): ICD-10-CM

## 2018-01-01 DIAGNOSIS — R40.0 SOMNOLENCE: ICD-10-CM

## 2018-01-01 DIAGNOSIS — I77.0 AVF (ARTERIOVENOUS FISTULA) (H): ICD-10-CM

## 2018-01-01 DIAGNOSIS — I50.32 CHRONIC DIASTOLIC CONGESTIVE HEART FAILURE (H): ICD-10-CM

## 2018-01-01 DIAGNOSIS — N18.6 ESRD (END STAGE RENAL DISEASE) ON DIALYSIS (H): ICD-10-CM

## 2018-01-01 DIAGNOSIS — Z86.39 HISTORY OF DIABETES MELLITUS: ICD-10-CM

## 2018-01-01 DIAGNOSIS — E11.22 TYPE 2 DIABETES MELLITUS WITH STAGE 5 CHRONIC KIDNEY DISEASE NOT ON CHRONIC DIALYSIS, WITHOUT LONG-TERM CURRENT USE OF INSULIN (H): ICD-10-CM

## 2018-01-01 DIAGNOSIS — Z53.9 ERRONEOUS ENCOUNTER--DISREGARD: Primary | ICD-10-CM

## 2018-01-01 DIAGNOSIS — I27.20 PULMONARY HYPERTENSION (H): ICD-10-CM
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
## 2018-01-01 DIAGNOSIS — I25.10 CORONARY ARTERY DISEASE INVOLVING NATIVE CORONARY ARTERY OF NATIVE HEART WITHOUT ANGINA PECTORIS: ICD-10-CM

## 2018-01-01 DIAGNOSIS — R45.89 DEPRESSED MOOD: ICD-10-CM

## 2018-01-01 DIAGNOSIS — I35.0 NONRHEUMATIC AORTIC VALVE STENOSIS: ICD-10-CM

## 2018-01-01 DIAGNOSIS — D63.1 ANEMIA OF CHRONIC RENAL FAILURE, UNSPECIFIED CKD STAGE: ICD-10-CM

## 2018-01-01 DIAGNOSIS — S51.819A SKIN TEAR OF FOREARM WITHOUT COMPLICATION, INITIAL ENCOUNTER: ICD-10-CM

## 2018-01-01 DIAGNOSIS — M79.604 PAIN IN BOTH LOWER EXTREMITIES: ICD-10-CM

## 2018-01-01 DIAGNOSIS — N18.5 TYPE 2 DIABETES MELLITUS WITH STAGE 5 CHRONIC KIDNEY DISEASE NOT ON CHRONIC DIALYSIS, WITHOUT LONG-TERM CURRENT USE OF INSULIN (H): ICD-10-CM

## 2018-01-01 DIAGNOSIS — I34.0 MITRAL VALVE INSUFFICIENCY, UNSPECIFIED ETIOLOGY: ICD-10-CM

## 2018-01-01 DIAGNOSIS — T82.898A PROBLEM WITH DIALYSIS ACCESS (H): ICD-10-CM

## 2018-01-01 DIAGNOSIS — I35.8 AORTIC VALVE MASS: Primary | ICD-10-CM

## 2018-01-01 DIAGNOSIS — I77.0 AVF (ARTERIOVENOUS FISTULA) (H): Primary | ICD-10-CM

## 2018-01-01 DIAGNOSIS — G60.9 IDIOPATHIC PERIPHERAL NEUROPATHY: ICD-10-CM

## 2018-01-01 DIAGNOSIS — R60.9 EDEMA, UNSPECIFIED TYPE: ICD-10-CM

## 2018-01-01 DIAGNOSIS — N18.6 ESRD (END STAGE RENAL DISEASE) (H): ICD-10-CM

## 2018-01-01 DIAGNOSIS — L03.115 CELLULITIS OF RIGHT LOWER EXTREMITY: ICD-10-CM

## 2018-01-01 DIAGNOSIS — I25.10 ASHD (ARTERIOSCLEROTIC HEART DISEASE): ICD-10-CM

## 2018-01-01 DIAGNOSIS — I35.8 AORTIC VALVE MASS: ICD-10-CM

## 2018-01-01 DIAGNOSIS — R41.82 ALTERED MENTAL STATUS, UNSPECIFIED ALTERED MENTAL STATUS TYPE: ICD-10-CM

## 2018-01-01 DIAGNOSIS — M79.89 LEG SWELLING: Primary | ICD-10-CM

## 2018-01-01 DIAGNOSIS — M79.604 PAIN OF RIGHT LOWER EXTREMITY: ICD-10-CM

## 2018-01-01 DIAGNOSIS — T07.XXXA MULTIPLE ABRASIONS: ICD-10-CM

## 2018-01-01 DIAGNOSIS — M79.605 PAIN IN BOTH LOWER EXTREMITIES: ICD-10-CM

## 2018-01-01 DIAGNOSIS — I35.0 AORTIC VALVE STENOSIS, ETIOLOGY OF CARDIAC VALVE DISEASE UNSPECIFIED: Primary | ICD-10-CM

## 2018-01-01 DIAGNOSIS — I89.0 LYMPHEDEMA OF BOTH LOWER EXTREMITIES: ICD-10-CM

## 2018-01-01 DIAGNOSIS — I25.10 CORONARY ARTERY DISEASE INVOLVING NATIVE HEART WITHOUT ANGINA PECTORIS, UNSPECIFIED VESSEL OR LESION TYPE: ICD-10-CM

## 2018-01-01 DIAGNOSIS — L03.114 CELLULITIS OF LEFT HAND EXCLUDING FINGERS AND THUMB: Primary | ICD-10-CM

## 2018-01-01 DIAGNOSIS — N18.9 ANEMIA OF CHRONIC RENAL FAILURE, UNSPECIFIED CKD STAGE: ICD-10-CM

## 2018-01-01 DIAGNOSIS — M79.605 PAIN OF LEFT LOWER EXTREMITY: ICD-10-CM

## 2018-01-01 DIAGNOSIS — T82.9XXA COMPLICATION OF AV DIALYSIS FISTULA, INITIAL ENCOUNTER: Primary | ICD-10-CM

## 2018-01-01 DIAGNOSIS — R79.1 PROLONGED BLEEDING TIME: ICD-10-CM

## 2018-01-01 DIAGNOSIS — T82.898S OTHER SPECIFIED COMPLICATION OF VASCULAR PROSTHETIC DEVICES, IMPLANTS AND GRAFTS, SEQUELA: ICD-10-CM

## 2018-01-01 DIAGNOSIS — I50.32 CHRONIC DIASTOLIC HEART FAILURE (H): ICD-10-CM

## 2018-01-01 DIAGNOSIS — M62.81 GENERALIZED MUSCLE WEAKNESS: ICD-10-CM

## 2018-01-01 DIAGNOSIS — I35.0 SEVERE AORTIC STENOSIS: ICD-10-CM

## 2018-01-01 DIAGNOSIS — Z51.5 HOSPICE CARE PATIENT: Primary | ICD-10-CM

## 2018-01-01 DIAGNOSIS — R53.83 FATIGUE, UNSPECIFIED TYPE: ICD-10-CM

## 2018-01-01 DIAGNOSIS — T82.9XXA COMPLICATION OF ARTERIOVENOUS DIALYSIS FISTULA, INITIAL ENCOUNTER: ICD-10-CM

## 2018-01-01 LAB
ALBUMIN SERPL-MCNC: 2.6 G/DL (ref 3.4–5)
ALBUMIN SERPL-MCNC: 2.8 G/DL (ref 3.4–5)
ALBUMIN SERPL-MCNC: 3.1 G/DL (ref 3.4–5)
ALBUMIN SERPL-MCNC: 3.4 G/DL (ref 3.4–5)
ALP SERPL-CCNC: 144 U/L (ref 40–150)
ALP SERPL-CCNC: 160 U/L (ref 40–150)
ALP SERPL-CCNC: 173 U/L (ref 40–150)
ALP SERPL-CCNC: 176 U/L (ref 40–150)
ALT SERPL W P-5'-P-CCNC: 23 U/L (ref 0–70)
ALT SERPL W P-5'-P-CCNC: 32 U/L (ref 0–70)
ALT SERPL W P-5'-P-CCNC: 33 U/L (ref 0–70)
ALT SERPL W P-5'-P-CCNC: 9 U/L (ref 0–70)
AMMONIA PLAS-SCNC: 21 UMOL/L (ref 10–50)
ANION GAP SERPL CALCULATED.3IONS-SCNC: 10 MMOL/L (ref 3–14)
ANION GAP SERPL CALCULATED.3IONS-SCNC: 10 MMOL/L (ref 3–14)
ANION GAP SERPL CALCULATED.3IONS-SCNC: 12 MMOL/L (ref 3–14)
ANION GAP SERPL CALCULATED.3IONS-SCNC: 6 MMOL/L (ref 3–14)
ANION GAP SERPL CALCULATED.3IONS-SCNC: 6 MMOL/L (ref 3–14)
ANION GAP SERPL CALCULATED.3IONS-SCNC: 7 MMOL/L (ref 3–14)
ANION GAP SERPL CALCULATED.3IONS-SCNC: 8 MMOL/L (ref 3–14)
ANION GAP SERPL CALCULATED.3IONS-SCNC: 8 MMOL/L (ref 6–17)
ANION GAP SERPL CALCULATED.3IONS-SCNC: 9 MMOL/L (ref 3–14)
ANION GAP SERPL CALCULATED.3IONS-SCNC: 9 MMOL/L (ref 3–14)
AST SERPL W P-5'-P-CCNC: 17 U/L (ref 0–45)
AST SERPL W P-5'-P-CCNC: 25 U/L (ref 0–45)
AST SERPL W P-5'-P-CCNC: 37 U/L (ref 0–45)
AST SERPL W P-5'-P-CCNC: 40 U/L (ref 0–45)
BACTERIA SPEC CULT: NO GROWTH
BASOPHILS # BLD AUTO: 0 10E9/L (ref 0–0.2)
BASOPHILS NFR BLD AUTO: 0 %
BASOPHILS NFR BLD AUTO: 0.1 %
BASOPHILS NFR BLD AUTO: 0.1 %
BASOPHILS NFR BLD AUTO: 0.2 %
BASOPHILS NFR BLD AUTO: 0.3 %
BILIRUB SERPL-MCNC: 0.8 MG/DL (ref 0.2–1.3)
BILIRUB SERPL-MCNC: 1 MG/DL (ref 0.2–1.3)
BUN SERPL-MCNC: 102 MG/DL (ref 7–30)
BUN SERPL-MCNC: 107 MG/DL (ref 7–30)
BUN SERPL-MCNC: 27 MG/DL (ref 7–30)
BUN SERPL-MCNC: 33 MG/DL (ref 7–30)
BUN SERPL-MCNC: 41 MG/DL (ref 7–30)
BUN SERPL-MCNC: 43 MG/DL (ref 7–30)
BUN SERPL-MCNC: 44 MG/DL (ref 7–30)
BUN SERPL-MCNC: 49 MG/DL (ref 7–30)
BUN SERPL-MCNC: 51 MG/DL (ref 7–30)
BUN SERPL-MCNC: 51 MG/DL (ref 7–30)
BUN SERPL-MCNC: 56 MG/DL (ref 7–30)
BUN SERPL-MCNC: 75 MG/DL (ref 7–30)
BUN SERPL-MCNC: 77 MG/DL (ref 7–30)
BUN SERPL-MCNC: 92 MG/DL (ref 7–30)
CA-I BLD-SCNC: 5.3 MG/DL (ref 4.4–5.2)
CALCIUM SERPL-MCNC: 10.2 MG/DL (ref 8.5–10.1)
CALCIUM SERPL-MCNC: 10.5 MG/DL (ref 8.5–10.1)
CALCIUM SERPL-MCNC: 8.3 MG/DL (ref 8.5–10.1)
CALCIUM SERPL-MCNC: 8.4 MG/DL (ref 8.5–10.1)
CALCIUM SERPL-MCNC: 8.9 MG/DL (ref 8.5–10.1)
CALCIUM SERPL-MCNC: 8.9 MG/DL (ref 8.5–10.1)
CALCIUM SERPL-MCNC: 9 MG/DL (ref 8.5–10.1)
CALCIUM SERPL-MCNC: 9.1 MG/DL (ref 8.5–10.1)
CALCIUM SERPL-MCNC: 9.1 MG/DL (ref 8.5–10.1)
CALCIUM SERPL-MCNC: 9.2 MG/DL (ref 8.5–10.1)
CALCIUM SERPL-MCNC: 9.4 MG/DL (ref 8.5–10.1)
CALCIUM SERPL-MCNC: 9.5 MG/DL (ref 8.5–10.1)
CALCIUM SERPL-MCNC: 9.7 MG/DL (ref 8.5–10.1)
CHLORIDE BLD-SCNC: 96 MMOL/L (ref 94–109)
CHLORIDE SERPL-SCNC: 100 MMOL/L (ref 94–109)
CHLORIDE SERPL-SCNC: 101 MMOL/L (ref 94–109)
CHLORIDE SERPL-SCNC: 102 MMOL/L (ref 94–109)
CHLORIDE SERPL-SCNC: 108 MMOL/L (ref 94–109)
CHLORIDE SERPL-SCNC: 95 MMOL/L (ref 94–109)
CHLORIDE SERPL-SCNC: 95 MMOL/L (ref 94–109)
CHLORIDE SERPL-SCNC: 96 MMOL/L (ref 94–109)
CHLORIDE SERPL-SCNC: 97 MMOL/L (ref 94–109)
CHLORIDE SERPL-SCNC: 98 MMOL/L (ref 94–109)
CHLORIDE SERPL-SCNC: 99 MMOL/L (ref 94–109)
CHLORIDE SERPL-SCNC: 99 MMOL/L (ref 94–109)
CO2 BLD-SCNC: 27 MMOL/L (ref 20–32)
CO2 SERPL-SCNC: 24 MMOL/L (ref 20–32)
CO2 SERPL-SCNC: 25 MMOL/L (ref 20–32)
CO2 SERPL-SCNC: 26 MMOL/L (ref 20–32)
CO2 SERPL-SCNC: 27 MMOL/L (ref 20–32)
CO2 SERPL-SCNC: 27 MMOL/L (ref 20–32)
CO2 SERPL-SCNC: 28 MMOL/L (ref 20–32)
CO2 SERPL-SCNC: 29 MMOL/L (ref 20–32)
CREAT BLD-MCNC: 7.5 MG/DL (ref 0.66–1.25)
CREAT SERPL-MCNC: 4.05 MG/DL (ref 0.66–1.25)
CREAT SERPL-MCNC: 4.32 MG/DL (ref 0.66–1.25)
CREAT SERPL-MCNC: 4.51 MG/DL (ref 0.66–1.25)
CREAT SERPL-MCNC: 4.6 MG/DL (ref 0.66–1.25)
CREAT SERPL-MCNC: 4.73 MG/DL (ref 0.66–1.25)
CREAT SERPL-MCNC: 4.87 MG/DL (ref 0.66–1.25)
CREAT SERPL-MCNC: 5.41 MG/DL (ref 0.66–1.25)
CREAT SERPL-MCNC: 5.42 MG/DL (ref 0.66–1.25)
CREAT SERPL-MCNC: 5.9 MG/DL (ref 0.66–1.25)
CREAT SERPL-MCNC: 5.94 MG/DL (ref 0.66–1.25)
CREAT SERPL-MCNC: 6.18 MG/DL (ref 0.66–1.25)
CREAT SERPL-MCNC: 7.19 MG/DL (ref 0.66–1.25)
CREAT SERPL-MCNC: 7.75 MG/DL (ref 0.66–1.25)
CRP SERPL-MCNC: 18.5 MG/L (ref 0–8)
DIFFERENTIAL METHOD BLD: ABNORMAL
EOSINOPHIL # BLD AUTO: 0 10E9/L (ref 0–0.7)
EOSINOPHIL # BLD AUTO: 0.1 10E9/L (ref 0–0.7)
EOSINOPHIL # BLD AUTO: 0.2 10E9/L (ref 0–0.7)
EOSINOPHIL NFR BLD AUTO: 0.1 %
EOSINOPHIL NFR BLD AUTO: 0.1 %
EOSINOPHIL NFR BLD AUTO: 0.3 %
EOSINOPHIL NFR BLD AUTO: 2.3 %
EOSINOPHIL NFR BLD AUTO: 3 %
ERYTHROCYTE [DISTWIDTH] IN BLOOD BY AUTOMATED COUNT: 16.1 % (ref 10–15)
ERYTHROCYTE [DISTWIDTH] IN BLOOD BY AUTOMATED COUNT: 16.1 % (ref 10–15)
ERYTHROCYTE [DISTWIDTH] IN BLOOD BY AUTOMATED COUNT: 16.2 % (ref 10–15)
ERYTHROCYTE [DISTWIDTH] IN BLOOD BY AUTOMATED COUNT: 17.5 % (ref 10–15)
ERYTHROCYTE [DISTWIDTH] IN BLOOD BY AUTOMATED COUNT: 17.8 % (ref 10–15)
ERYTHROCYTE [DISTWIDTH] IN BLOOD BY AUTOMATED COUNT: 17.9 % (ref 10–15)
ERYTHROCYTE [DISTWIDTH] IN BLOOD BY AUTOMATED COUNT: 17.9 % (ref 10–15)
ERYTHROCYTE [DISTWIDTH] IN BLOOD BY AUTOMATED COUNT: 18.4 % (ref 10–15)
ERYTHROCYTE [DISTWIDTH] IN BLOOD BY AUTOMATED COUNT: 19 % (ref 10–15)
ERYTHROCYTE [DISTWIDTH] IN BLOOD BY AUTOMATED COUNT: 19.1 % (ref 10–15)
ERYTHROCYTE [DISTWIDTH] IN BLOOD BY AUTOMATED COUNT: 19.6 % (ref 10–15)
GFR SERPL CREATININE-BSD FRML MDRD: 10 ML/MIN/1.7M2
GFR SERPL CREATININE-BSD FRML MDRD: 10 ML/MIN/1.7M2
GFR SERPL CREATININE-BSD FRML MDRD: 11 ML/MIN/1.7M2
GFR SERPL CREATININE-BSD FRML MDRD: 12 ML/MIN/1.7M2
GFR SERPL CREATININE-BSD FRML MDRD: 13 ML/MIN/1.7M2
GFR SERPL CREATININE-BSD FRML MDRD: 14 ML/MIN/1.7M2
GFR SERPL CREATININE-BSD FRML MDRD: 7 ML/MIN/1.7M2
GFR SERPL CREATININE-BSD FRML MDRD: 9 ML/MIN/1.7M2
GLUCOSE BLD-MCNC: 158 MG/DL (ref 70–99)
GLUCOSE BLDC GLUCOMTR-MCNC: 115 MG/DL (ref 70–99)
GLUCOSE BLDC GLUCOMTR-MCNC: 116 MG/DL (ref 70–99)
GLUCOSE BLDC GLUCOMTR-MCNC: 117 MG/DL (ref 70–99)
GLUCOSE BLDC GLUCOMTR-MCNC: 123 MG/DL (ref 70–99)
GLUCOSE BLDC GLUCOMTR-MCNC: 139 MG/DL (ref 70–99)
GLUCOSE BLDC GLUCOMTR-MCNC: 148 MG/DL (ref 70–99)
GLUCOSE BLDC GLUCOMTR-MCNC: 152 MG/DL (ref 70–99)
GLUCOSE BLDC GLUCOMTR-MCNC: 154 MG/DL (ref 70–99)
GLUCOSE BLDC GLUCOMTR-MCNC: 164 MG/DL (ref 70–99)
GLUCOSE BLDC GLUCOMTR-MCNC: 165 MG/DL (ref 70–99)
GLUCOSE BLDC GLUCOMTR-MCNC: 201 MG/DL (ref 70–99)
GLUCOSE BLDC GLUCOMTR-MCNC: 201 MG/DL (ref 70–99)
GLUCOSE BLDC GLUCOMTR-MCNC: 81 MG/DL (ref 70–99)
GLUCOSE BLDC GLUCOMTR-MCNC: 83 MG/DL (ref 70–99)
GLUCOSE BLDC GLUCOMTR-MCNC: 93 MG/DL (ref 70–99)
GLUCOSE BLDC GLUCOMTR-MCNC: 99 MG/DL (ref 70–99)
GLUCOSE SERPL-MCNC: 102 MG/DL (ref 70–99)
GLUCOSE SERPL-MCNC: 103 MG/DL (ref 70–99)
GLUCOSE SERPL-MCNC: 108 MG/DL (ref 70–99)
GLUCOSE SERPL-MCNC: 115 MG/DL (ref 70–99)
GLUCOSE SERPL-MCNC: 129 MG/DL (ref 70–99)
GLUCOSE SERPL-MCNC: 131 MG/DL (ref 70–99)
GLUCOSE SERPL-MCNC: 134 MG/DL (ref 70–99)
GLUCOSE SERPL-MCNC: 140 MG/DL (ref 70–99)
GLUCOSE SERPL-MCNC: 153 MG/DL (ref 70–99)
GLUCOSE SERPL-MCNC: 156 MG/DL (ref 70–99)
GLUCOSE SERPL-MCNC: 225 MG/DL (ref 70–99)
GLUCOSE SERPL-MCNC: 227 MG/DL (ref 70–99)
GLUCOSE SERPL-MCNC: 92 MG/DL (ref 70–99)
HBA1C MFR BLD: 5.1 % (ref 0–5.6)
HCT VFR BLD AUTO: 30 % (ref 40–53)
HCT VFR BLD AUTO: 31.3 % (ref 40–53)
HCT VFR BLD AUTO: 33.9 % (ref 40–53)
HCT VFR BLD AUTO: 34.2 % (ref 40–53)
HCT VFR BLD AUTO: 34.3 % (ref 40–53)
HCT VFR BLD AUTO: 36.5 % (ref 40–53)
HCT VFR BLD AUTO: 36.5 % (ref 40–53)
HCT VFR BLD AUTO: 36.7 % (ref 40–53)
HCT VFR BLD AUTO: 38.7 % (ref 40–53)
HCT VFR BLD AUTO: 39.5 % (ref 40–53)
HCT VFR BLD AUTO: 39.9 % (ref 40–53)
HCT VFR BLD CALC: 41 %PCV (ref 40–53)
HEP C HIM: NORMAL
HGB BLD CALC-MCNC: 13.9 G/DL (ref 13.3–17.7)
HGB BLD-MCNC: 10.5 G/DL (ref 13.3–17.7)
HGB BLD-MCNC: 10.8 G/DL (ref 13.3–17.7)
HGB BLD-MCNC: 10.9 G/DL (ref 13.3–17.7)
HGB BLD-MCNC: 11.3 G/DL (ref 13.3–17.7)
HGB BLD-MCNC: 11.4 G/DL (ref 13.3–17.7)
HGB BLD-MCNC: 11.9 G/DL (ref 13.3–17.7)
HGB BLD-MCNC: 12 G/DL (ref 13.3–17.7)
HGB BLD-MCNC: 12.1 G/DL (ref 13.3–17.7)
HGB BLD-MCNC: 12.5 G/DL (ref 13.3–17.7)
HGB BLD-MCNC: 9.4 G/DL (ref 13.3–17.7)
HGB BLD-MCNC: 9.8 G/DL (ref 13.3–17.7)
IMM GRANULOCYTES # BLD: 0 10E9/L (ref 0–0.4)
IMM GRANULOCYTES # BLD: 0 10E9/L (ref 0–0.4)
IMM GRANULOCYTES # BLD: 0.1 10E9/L (ref 0–0.4)
IMM GRANULOCYTES # BLD: 0.1 10E9/L (ref 0–0.4)
IMM GRANULOCYTES # BLD: 0.4 10E9/L (ref 0–0.4)
IMM GRANULOCYTES NFR BLD: 0.4 %
IMM GRANULOCYTES NFR BLD: 0.4 %
IMM GRANULOCYTES NFR BLD: 0.8 %
IMM GRANULOCYTES NFR BLD: 0.9 %
IMM GRANULOCYTES NFR BLD: 3.8 %
INTERPRETATION ECG - MUSE: NORMAL
LAB AP CHARGES (HE HISTORICAL CONVERSION): ABNORMAL
LACTATE BLD-SCNC: 2 MMOL/L (ref 0.7–2)
LACTATE SERPL-SCNC: 2 MMOL/L (ref 0.4–2)
LIPASE SERPL-CCNC: 50 U/L (ref 73–393)
LIPASE SERPL-CCNC: NORMAL U/L (ref 73–393)
LYMPHOCYTES # BLD AUTO: 0.8 10E9/L (ref 0.8–5.3)
LYMPHOCYTES # BLD AUTO: 0.8 10E9/L (ref 0.8–5.3)
LYMPHOCYTES # BLD AUTO: 1.1 10E9/L (ref 0.8–5.3)
LYMPHOCYTES # BLD AUTO: 1.1 10E9/L (ref 0.8–5.3)
LYMPHOCYTES # BLD AUTO: 1.2 10E9/L (ref 0.8–5.3)
LYMPHOCYTES NFR BLD AUTO: 11.7 %
LYMPHOCYTES NFR BLD AUTO: 13 %
LYMPHOCYTES NFR BLD AUTO: 17.5 %
LYMPHOCYTES NFR BLD AUTO: 19.4 %
LYMPHOCYTES NFR BLD AUTO: 7.8 %
Lab: NORMAL
MAGNESIUM SERPL-MCNC: 2.2 MG/DL (ref 1.6–2.3)
MAGNESIUM SERPL-MCNC: 2.5 MG/DL (ref 1.6–2.3)
MCH RBC QN AUTO: 35 PG (ref 26.5–33)
MCH RBC QN AUTO: 35 PG (ref 26.5–33)
MCH RBC QN AUTO: 35.2 PG (ref 26.5–33)
MCH RBC QN AUTO: 35.5 PG (ref 26.5–33)
MCH RBC QN AUTO: 35.6 PG (ref 26.5–33)
MCH RBC QN AUTO: 35.7 PG (ref 26.5–33)
MCH RBC QN AUTO: 35.8 PG (ref 26.5–33)
MCH RBC QN AUTO: 35.9 PG (ref 26.5–33)
MCHC RBC AUTO-ENTMCNC: 30.6 G/DL (ref 31.5–36.5)
MCHC RBC AUTO-ENTMCNC: 31 G/DL (ref 31.5–36.5)
MCHC RBC AUTO-ENTMCNC: 31.2 G/DL (ref 31.5–36.5)
MCHC RBC AUTO-ENTMCNC: 31.3 G/DL (ref 31.5–36.5)
MCHC RBC AUTO-ENTMCNC: 31.5 G/DL (ref 31.5–36.5)
MCHC RBC AUTO-ENTMCNC: 31.9 G/DL (ref 31.5–36.5)
MCHC RBC AUTO-ENTMCNC: 32.4 G/DL (ref 31.5–36.5)
MCV RBC AUTO: 108 FL (ref 78–100)
MCV RBC AUTO: 111 FL (ref 78–100)
MCV RBC AUTO: 111 FL (ref 78–100)
MCV RBC AUTO: 113 FL (ref 78–100)
MCV RBC AUTO: 113 FL (ref 78–100)
MCV RBC AUTO: 114 FL (ref 78–100)
MCV RBC AUTO: 115 FL (ref 78–100)
MCV RBC AUTO: 116 FL (ref 78–100)
MCV RBC AUTO: 116 FL (ref 78–100)
MONOCYTES # BLD AUTO: 0.6 10E9/L (ref 0–1.3)
MONOCYTES # BLD AUTO: 0.8 10E9/L (ref 0–1.3)
MONOCYTES # BLD AUTO: 0.8 10E9/L (ref 0–1.3)
MONOCYTES # BLD AUTO: 0.9 10E9/L (ref 0–1.3)
MONOCYTES # BLD AUTO: 1.1 10E9/L (ref 0–1.3)
MONOCYTES NFR BLD AUTO: 10.9 %
MONOCYTES NFR BLD AUTO: 10.9 %
MONOCYTES NFR BLD AUTO: 12.2 %
MONOCYTES NFR BLD AUTO: 12.8 %
MONOCYTES NFR BLD AUTO: 8.8 %
NEUTROPHILS # BLD AUTO: 3.7 10E9/L (ref 1.6–8.3)
NEUTROPHILS # BLD AUTO: 4.2 10E9/L (ref 1.6–8.3)
NEUTROPHILS # BLD AUTO: 5.3 10E9/L (ref 1.6–8.3)
NEUTROPHILS # BLD AUTO: 6.5 10E9/L (ref 1.6–8.3)
NEUTROPHILS # BLD AUTO: 8.9 10E9/L (ref 1.6–8.3)
NEUTROPHILS NFR BLD AUTO: 65.6 %
NEUTROPHILS NFR BLD AUTO: 66.8 %
NEUTROPHILS NFR BLD AUTO: 68.8 %
NEUTROPHILS NFR BLD AUTO: 73.2 %
NEUTROPHILS NFR BLD AUTO: 82.8 %
NRBC # BLD AUTO: 0 10*3/UL
NRBC # BLD AUTO: 0 10*3/UL
NRBC # BLD AUTO: 0.2 10*3/UL
NRBC # BLD AUTO: 0.2 10*3/UL
NRBC BLD AUTO-RTO: 0 /100
NRBC BLD AUTO-RTO: 0 /100
NRBC BLD AUTO-RTO: 2 /100
NRBC BLD AUTO-RTO: 3 /100
NT-PROBNP SERPL-MCNC: ABNORMAL PG/ML (ref 0–1800)
PATH REPORT.COMMENTS IMP SPEC: ABNORMAL
PATH REPORT.FINAL DX SPEC: ABNORMAL
PATH REPORT.GROSS SPEC: ABNORMAL
PATH REPORT.MICROSCOPIC SPEC OTHER STN: ABNORMAL
PATH REPORT.RELEVANT HX SPEC: ABNORMAL
PHOSPHATE SERPL-MCNC: 3.4 MG/DL (ref 2.5–4.5)
PLATELET # BLD AUTO: 104 10E9/L (ref 150–450)
PLATELET # BLD AUTO: 106 10E9/L (ref 150–450)
PLATELET # BLD AUTO: 115 10E9/L (ref 150–450)
PLATELET # BLD AUTO: 127 10E9/L (ref 150–450)
PLATELET # BLD AUTO: 128 10E9/L (ref 150–450)
PLATELET # BLD AUTO: 128 10E9/L (ref 150–450)
PLATELET # BLD AUTO: 140 10E9/L (ref 150–450)
PLATELET # BLD AUTO: 147 10E9/L (ref 150–450)
PLATELET # BLD AUTO: 148 10E9/L (ref 150–450)
PLATELET # BLD AUTO: 149 10E9/L (ref 150–450)
PLATELET # BLD AUTO: 162 10E9/L (ref 150–450)
PLATELET # BLD AUTO: 168 10E9/L (ref 150–450)
PLATELET # BLD AUTO: 192 10E9/L (ref 150–450)
POTASSIUM BLD-SCNC: 5.9 MMOL/L (ref 3.4–5.3)
POTASSIUM SERPL-SCNC: 4.2 MMOL/L (ref 3.4–5.3)
POTASSIUM SERPL-SCNC: 4.5 MMOL/L (ref 3.4–5.3)
POTASSIUM SERPL-SCNC: 4.5 MMOL/L (ref 3.4–5.3)
POTASSIUM SERPL-SCNC: 4.6 MMOL/L (ref 3.4–5.3)
POTASSIUM SERPL-SCNC: 5 MMOL/L (ref 3.4–5.3)
POTASSIUM SERPL-SCNC: 5.1 MMOL/L (ref 3.4–5.3)
POTASSIUM SERPL-SCNC: 5.2 MMOL/L (ref 3.4–5.3)
POTASSIUM SERPL-SCNC: 5.4 MMOL/L (ref 3.4–5.3)
POTASSIUM SERPL-SCNC: 5.5 MMOL/L (ref 3.4–5.3)
POTASSIUM SERPL-SCNC: 5.6 MMOL/L (ref 3.4–5.3)
POTASSIUM SERPL-SCNC: 5.6 MMOL/L (ref 3.4–5.3)
POTASSIUM SERPL-SCNC: 5.7 MMOL/L (ref 3.4–5.3)
POTASSIUM SERPL-SCNC: 5.8 MMOL/L (ref 3.4–5.3)
POTASSIUM SERPL-SCNC: 5.8 MMOL/L (ref 3.4–5.3)
POTASSIUM SERPL-SCNC: 5.9 MMOL/L (ref 3.4–5.3)
POTASSIUM SERPL-SCNC: 6.1 MMOL/L (ref 3.4–5.3)
PROT SERPL-MCNC: 6 G/DL (ref 6.8–8.8)
PROT SERPL-MCNC: 6.3 G/DL (ref 6.8–8.8)
PROT SERPL-MCNC: 7.1 G/DL (ref 6.8–8.8)
PROT SERPL-MCNC: 7.5 G/DL (ref 6.8–8.8)
RBC # BLD AUTO: 2.63 10E12/L (ref 4.4–5.9)
RBC # BLD AUTO: 2.76 10E12/L (ref 4.4–5.9)
RBC # BLD AUTO: 2.96 10E12/L (ref 4.4–5.9)
RBC # BLD AUTO: 3.07 10E12/L (ref 4.4–5.9)
RBC # BLD AUTO: 3.09 10E12/L (ref 4.4–5.9)
RBC # BLD AUTO: 3.16 10E12/L (ref 4.4–5.9)
RBC # BLD AUTO: 3.24 10E12/L (ref 4.4–5.9)
RBC # BLD AUTO: 3.37 10E12/L (ref 4.4–5.9)
RBC # BLD AUTO: 3.4 10E12/L (ref 4.4–5.9)
RBC # BLD AUTO: 3.41 10E12/L (ref 4.4–5.9)
RBC # BLD AUTO: 3.48 10E12/L (ref 4.4–5.9)
RESULT FLAG (HE HISTORICAL CONVERSION): ABNORMAL
SODIUM BLD-SCNC: 131 MMOL/L (ref 133–144)
SODIUM SERPL-SCNC: 131 MMOL/L (ref 133–144)
SODIUM SERPL-SCNC: 131 MMOL/L (ref 133–144)
SODIUM SERPL-SCNC: 132 MMOL/L (ref 133–144)
SODIUM SERPL-SCNC: 133 MMOL/L (ref 133–144)
SODIUM SERPL-SCNC: 134 MMOL/L (ref 133–144)
SODIUM SERPL-SCNC: 135 MMOL/L (ref 133–144)
SODIUM SERPL-SCNC: 136 MMOL/L (ref 133–144)
SODIUM SERPL-SCNC: 136 MMOL/L (ref 133–144)
SODIUM SERPL-SCNC: 138 MMOL/L (ref 133–144)
SODIUM SERPL-SCNC: 142 MMOL/L (ref 133–144)
SPECIMEN SOURCE: NORMAL
TROPONIN I SERPL-MCNC: <0.015 UG/L (ref 0–0.04)
WBC # BLD AUTO: 10.7 10E9/L (ref 4–11)
WBC # BLD AUTO: 5.1 10E9/L (ref 4–11)
WBC # BLD AUTO: 5.4 10E9/L (ref 4–11)
WBC # BLD AUTO: 5.4 10E9/L (ref 4–11)
WBC # BLD AUTO: 5.6 10E9/L (ref 4–11)
WBC # BLD AUTO: 6.4 10E9/L (ref 4–11)
WBC # BLD AUTO: 7 10E9/L (ref 4–11)
WBC # BLD AUTO: 7 10E9/L (ref 4–11)
WBC # BLD AUTO: 7.8 10E9/L (ref 4–11)
WBC # BLD AUTO: 8.4 10E9/L (ref 4–11)
WBC # BLD AUTO: 9.1 10E9/L (ref 4–11)

## 2018-01-01 PROCEDURE — 99232 SBSQ HOSP IP/OBS MODERATE 35: CPT | Performed by: INTERNAL MEDICINE

## 2018-01-01 PROCEDURE — 36415 COLL VENOUS BLD VENIPUNCTURE: CPT | Performed by: HOSPITALIST

## 2018-01-01 PROCEDURE — 93922 UPR/L XTREMITY ART 2 LEVELS: CPT

## 2018-01-01 PROCEDURE — 25000125 ZZHC RX 250: Performed by: RADIOLOGY

## 2018-01-01 PROCEDURE — 99207 ZZC CDG-CHARGE REQUIRED MANUAL ENTRY: CPT | Performed by: INTERNAL MEDICINE

## 2018-01-01 PROCEDURE — 25000132 ZZH RX MED GY IP 250 OP 250 PS 637: Mod: GY | Performed by: INTERNAL MEDICINE

## 2018-01-01 PROCEDURE — A9270 NON-COVERED ITEM OR SERVICE: HCPCS | Mod: GY | Performed by: EMERGENCY MEDICINE

## 2018-01-01 PROCEDURE — 80048 BASIC METABOLIC PNL TOTAL CA: CPT | Performed by: PHYSICIAN ASSISTANT

## 2018-01-01 PROCEDURE — 25000128 H RX IP 250 OP 636: Performed by: INTERNAL MEDICINE

## 2018-01-01 PROCEDURE — 83690 ASSAY OF LIPASE: CPT | Performed by: HOSPITALIST

## 2018-01-01 PROCEDURE — G0378 HOSPITAL OBSERVATION PER HR: HCPCS

## 2018-01-01 PROCEDURE — G0257 UNSCHED DIALYSIS ESRD PT HOS: HCPCS

## 2018-01-01 PROCEDURE — 25000132 ZZH RX MED GY IP 250 OP 250 PS 637: Mod: GY | Performed by: HOSPITALIST

## 2018-01-01 PROCEDURE — 90714 TD VACC NO PRESV 7 YRS+ IM: CPT | Performed by: EMERGENCY MEDICINE

## 2018-01-01 PROCEDURE — 82140 ASSAY OF AMMONIA: CPT | Performed by: HOSPITALIST

## 2018-01-01 PROCEDURE — 96372 THER/PROPH/DIAG INJ SC/IM: CPT

## 2018-01-01 PROCEDURE — 90937 HEMODIALYSIS REPEATED EVAL: CPT

## 2018-01-01 PROCEDURE — A9270 NON-COVERED ITEM OR SERVICE: HCPCS | Mod: GY | Performed by: PHYSICIAN ASSISTANT

## 2018-01-01 PROCEDURE — 25000128 H RX IP 250 OP 636: Performed by: HOSPITALIST

## 2018-01-01 PROCEDURE — 93990 DOPPLER FLOW TESTING: CPT

## 2018-01-01 PROCEDURE — 40000193 ZZH STATISTIC PT WARD VISIT

## 2018-01-01 PROCEDURE — 80048 BASIC METABOLIC PNL TOTAL CA: CPT | Performed by: INTERNAL MEDICINE

## 2018-01-01 PROCEDURE — 71046 X-RAY EXAM CHEST 2 VIEWS: CPT

## 2018-01-01 PROCEDURE — C1757 CATH, THROMBECTOMY/EMBOLECT: HCPCS

## 2018-01-01 PROCEDURE — 97161 PT EVAL LOW COMPLEX 20 MIN: CPT | Mod: GP | Performed by: PHYSICAL THERAPIST

## 2018-01-01 PROCEDURE — 25000128 H RX IP 250 OP 636: Performed by: RADIOLOGY

## 2018-01-01 PROCEDURE — 99309 SBSQ NF CARE MODERATE MDM 30: CPT | Performed by: NURSE PRACTITIONER

## 2018-01-01 PROCEDURE — 85025 COMPLETE CBC W/AUTO DIFF WBC: CPT | Performed by: EMERGENCY MEDICINE

## 2018-01-01 PROCEDURE — 99220 ZZC INITIAL OBSERVATION CARE,LEVL III: CPT | Performed by: INTERNAL MEDICINE

## 2018-01-01 PROCEDURE — 99225 ZZC SUBSEQUENT OBSERVATION CARE,LEVEL II: CPT | Performed by: PHYSICIAN ASSISTANT

## 2018-01-01 PROCEDURE — 93005 ELECTROCARDIOGRAM TRACING: CPT

## 2018-01-01 PROCEDURE — 94640 AIRWAY INHALATION TREATMENT: CPT

## 2018-01-01 PROCEDURE — 80048 BASIC METABOLIC PNL TOTAL CA: CPT | Performed by: NURSE PRACTITIONER

## 2018-01-01 PROCEDURE — 85049 AUTOMATED PLATELET COUNT: CPT | Performed by: HOSPITALIST

## 2018-01-01 PROCEDURE — A9270 NON-COVERED ITEM OR SERVICE: HCPCS | Mod: GY | Performed by: INTERNAL MEDICINE

## 2018-01-01 PROCEDURE — 99217 ZZC OBSERVATION CARE DISCHARGE: CPT | Mod: GW | Performed by: INTERNAL MEDICINE

## 2018-01-01 PROCEDURE — 99285 EMERGENCY DEPT VISIT HI MDM: CPT | Mod: 25

## 2018-01-01 PROCEDURE — 86140 C-REACTIVE PROTEIN: CPT | Performed by: INTERNAL MEDICINE

## 2018-01-01 PROCEDURE — 12000000 ZZH R&B MED SURG/OB

## 2018-01-01 PROCEDURE — 90471 IMMUNIZATION ADMIN: CPT

## 2018-01-01 PROCEDURE — C1769 GUIDE WIRE: HCPCS

## 2018-01-01 PROCEDURE — 25000132 ZZH RX MED GY IP 250 OP 250 PS 637: Mod: GY | Performed by: PHYSICIAN ASSISTANT

## 2018-01-01 PROCEDURE — 84100 ASSAY OF PHOSPHORUS: CPT | Performed by: EMERGENCY MEDICINE

## 2018-01-01 PROCEDURE — 36908 STENT PLMT CTR DIALYSIS SEG: CPT

## 2018-01-01 PROCEDURE — 99207 ZZC CDG-CHARGE REQUIRED MANUAL ENTRY: CPT | Performed by: HOSPITALIST

## 2018-01-01 PROCEDURE — 25000125 ZZHC RX 250: Performed by: INTERNAL MEDICINE

## 2018-01-01 PROCEDURE — 99207 ZZC CDG-CODE CATEGORY CHANGED: CPT | Performed by: HOSPITALIST

## 2018-01-01 PROCEDURE — 96372 THER/PROPH/DIAG INJ SC/IM: CPT | Mod: 59

## 2018-01-01 PROCEDURE — 99203 OFFICE O/P NEW LOW 30 MIN: CPT | Performed by: SURGERY

## 2018-01-01 PROCEDURE — 99223 1ST HOSP IP/OBS HIGH 75: CPT | Performed by: NURSE PRACTITIONER

## 2018-01-01 PROCEDURE — 99214 OFFICE O/P EST MOD 30 MIN: CPT | Performed by: PHYSICIAN ASSISTANT

## 2018-01-01 PROCEDURE — 99214 OFFICE O/P EST MOD 30 MIN: CPT | Performed by: INTERNAL MEDICINE

## 2018-01-01 PROCEDURE — 70450 CT HEAD/BRAIN W/O DYE: CPT | Mod: 77

## 2018-01-01 PROCEDURE — 99305 1ST NF CARE MODERATE MDM 35: CPT | Performed by: INTERNAL MEDICINE

## 2018-01-01 PROCEDURE — 25800025 ZZH RX 258: Performed by: EMERGENCY MEDICINE

## 2018-01-01 PROCEDURE — 99236 HOSP IP/OBS SAME DATE HI 85: CPT | Performed by: HOSPITALIST

## 2018-01-01 PROCEDURE — 99225 ZZC SUBSEQUENT OBSERVATION CARE,LEVEL II: CPT | Performed by: HOSPITALIST

## 2018-01-01 PROCEDURE — 36902 INTRO CATH DIALYSIS CIRCUIT: CPT

## 2018-01-01 PROCEDURE — 80048 BASIC METABOLIC PNL TOTAL CA: CPT | Performed by: EMERGENCY MEDICINE

## 2018-01-01 PROCEDURE — 99217 ZZC OBSERVATION CARE DISCHARGE: CPT | Performed by: PHYSICIAN ASSISTANT

## 2018-01-01 PROCEDURE — 63400005 ZZH RX 634: Performed by: INTERNAL MEDICINE

## 2018-01-01 PROCEDURE — A9270 NON-COVERED ITEM OR SERVICE: HCPCS | Mod: GY | Performed by: NURSE PRACTITIONER

## 2018-01-01 PROCEDURE — 25000125 ZZHC RX 250

## 2018-01-01 PROCEDURE — 87040 BLOOD CULTURE FOR BACTERIA: CPT | Mod: AY | Performed by: INTERNAL MEDICINE

## 2018-01-01 PROCEDURE — 99207 ZZC CDG-MDM COMPONENT: MEETS MODERATE - UP CODED: CPT | Performed by: PHYSICIAN ASSISTANT

## 2018-01-01 PROCEDURE — 36907 BALO ANGIOP CTR DIALYSIS SEG: CPT

## 2018-01-01 PROCEDURE — A9270 NON-COVERED ITEM OR SERVICE: HCPCS | Mod: GY | Performed by: CLINICAL NURSE SPECIALIST

## 2018-01-01 PROCEDURE — 99308 SBSQ NF CARE LOW MDM 20: CPT | Performed by: NURSE PRACTITIONER

## 2018-01-01 PROCEDURE — G0463 HOSPITAL OUTPT CLINIC VISIT: HCPCS | Performed by: NURSE PRACTITIONER

## 2018-01-01 PROCEDURE — A9270 NON-COVERED ITEM OR SERVICE: HCPCS | Mod: GY | Performed by: HOSPITALIST

## 2018-01-01 PROCEDURE — 36415 COLL VENOUS BLD VENIPUNCTURE: CPT | Performed by: EMERGENCY MEDICINE

## 2018-01-01 PROCEDURE — 87040 BLOOD CULTURE FOR BACTERIA: CPT | Performed by: EMERGENCY MEDICINE

## 2018-01-01 PROCEDURE — 97530 THERAPEUTIC ACTIVITIES: CPT | Mod: GP | Performed by: PHYSICAL THERAPIST

## 2018-01-01 PROCEDURE — 83735 ASSAY OF MAGNESIUM: CPT | Performed by: EMERGENCY MEDICINE

## 2018-01-01 PROCEDURE — 70450 CT HEAD/BRAIN W/O DYE: CPT

## 2018-01-01 PROCEDURE — 99238 HOSP IP/OBS DSCHRG MGMT 30/<: CPT | Performed by: INTERNAL MEDICINE

## 2018-01-01 PROCEDURE — 25000125 ZZHC RX 250: Performed by: EMERGENCY MEDICINE

## 2018-01-01 PROCEDURE — 25000128 H RX IP 250 OP 636: Performed by: EMERGENCY MEDICINE

## 2018-01-01 PROCEDURE — 96365 THER/PROPH/DIAG IV INF INIT: CPT

## 2018-01-01 PROCEDURE — 93306 TTE W/DOPPLER COMPLETE: CPT | Mod: 26 | Performed by: INTERNAL MEDICINE

## 2018-01-01 PROCEDURE — 97116 GAIT TRAINING THERAPY: CPT | Mod: GP | Performed by: PHYSICAL THERAPIST

## 2018-01-01 PROCEDURE — 40000193 ZZH STATISTIC PT WARD VISIT: Performed by: PHYSICAL THERAPIST

## 2018-01-01 PROCEDURE — 80053 COMPREHEN METABOLIC PANEL: CPT | Performed by: HOSPITALIST

## 2018-01-01 PROCEDURE — 83605 ASSAY OF LACTIC ACID: CPT | Performed by: EMERGENCY MEDICINE

## 2018-01-01 PROCEDURE — 99310 SBSQ NF CARE HIGH MDM 45: CPT | Performed by: NURSE PRACTITIONER

## 2018-01-01 PROCEDURE — 99207 ZZC CDG-MDM COMPONENT: MEETS MODERATE - UP CODED: CPT | Performed by: INTERNAL MEDICINE

## 2018-01-01 PROCEDURE — 25000128 H RX IP 250 OP 636

## 2018-01-01 PROCEDURE — 27210886 ZZH ACCESSORY CR5

## 2018-01-01 PROCEDURE — 73560 X-RAY EXAM OF KNEE 1 OR 2: CPT | Mod: RT

## 2018-01-01 PROCEDURE — 36415 COLL VENOUS BLD VENIPUNCTURE: CPT | Performed by: INTERNAL MEDICINE

## 2018-01-01 PROCEDURE — 85027 COMPLETE CBC AUTOMATED: CPT | Performed by: INTERNAL MEDICINE

## 2018-01-01 PROCEDURE — 00000146 ZZHCL STATISTIC GLUCOSE BY METER IP

## 2018-01-01 PROCEDURE — 96375 TX/PRO/DX INJ NEW DRUG ADDON: CPT

## 2018-01-01 PROCEDURE — C1725 CATH, TRANSLUMIN NON-LASER: HCPCS

## 2018-01-01 PROCEDURE — 85049 AUTOMATED PLATELET COUNT: CPT | Performed by: INTERNAL MEDICINE

## 2018-01-01 PROCEDURE — 25000131 ZZH RX MED GY IP 250 OP 636 PS 637: Mod: GY | Performed by: EMERGENCY MEDICINE

## 2018-01-01 PROCEDURE — 93306 TTE W/DOPPLER COMPLETE: CPT

## 2018-01-01 PROCEDURE — 5A1D70Z PERFORMANCE OF URINARY FILTRATION, INTERMITTENT, LESS THAN 6 HOURS PER DAY: ICD-10-PCS | Performed by: INTERNAL MEDICINE

## 2018-01-01 PROCEDURE — 84132 ASSAY OF SERUM POTASSIUM: CPT | Performed by: EMERGENCY MEDICINE

## 2018-01-01 PROCEDURE — 99207 ZZC CDG-CODE CATEGORY CHANGED: CPT | Performed by: INTERNAL MEDICINE

## 2018-01-01 PROCEDURE — 99207 ZZC CDG-MDM COMPONENT: MEETS LOW - DOWN CODED: CPT | Performed by: HOSPITALIST

## 2018-01-01 PROCEDURE — 80053 COMPREHEN METABOLIC PANEL: CPT | Performed by: EMERGENCY MEDICINE

## 2018-01-01 PROCEDURE — 83880 ASSAY OF NATRIURETIC PEPTIDE: CPT | Performed by: NURSE PRACTITIONER

## 2018-01-01 PROCEDURE — 83605 ASSAY OF LACTIC ACID: CPT | Performed by: NURSE PRACTITIONER

## 2018-01-01 PROCEDURE — 99152 MOD SED SAME PHYS/QHP 5/>YRS: CPT

## 2018-01-01 PROCEDURE — 25000131 ZZH RX MED GY IP 250 OP 636 PS 637: Mod: GY | Performed by: INTERNAL MEDICINE

## 2018-01-01 PROCEDURE — 25000132 ZZH RX MED GY IP 250 OP 250 PS 637: Mod: GY | Performed by: NURSE PRACTITIONER

## 2018-01-01 PROCEDURE — 97530 THERAPEUTIC ACTIVITIES: CPT | Mod: GP

## 2018-01-01 PROCEDURE — 99283 EMERGENCY DEPT VISIT LOW MDM: CPT

## 2018-01-01 PROCEDURE — 73610 X-RAY EXAM OF ANKLE: CPT | Mod: RT

## 2018-01-01 PROCEDURE — 96360 HYDRATION IV INFUSION INIT: CPT

## 2018-01-01 PROCEDURE — 83036 HEMOGLOBIN GLYCOSYLATED A1C: CPT | Performed by: EMERGENCY MEDICINE

## 2018-01-01 PROCEDURE — 25000132 ZZH RX MED GY IP 250 OP 250 PS 637: Mod: GY | Performed by: EMERGENCY MEDICINE

## 2018-01-01 PROCEDURE — 99207 ZZC APP CREDIT; MD BILLING SHARED VISIT: CPT | Performed by: PHYSICIAN ASSISTANT

## 2018-01-01 PROCEDURE — 36901 INTRO CATH DIALYSIS CIRCUIT: CPT

## 2018-01-01 PROCEDURE — 85014 HEMATOCRIT: CPT | Mod: 91

## 2018-01-01 PROCEDURE — 27210908 ZZH NEEDLE CR4

## 2018-01-01 PROCEDURE — 84132 ASSAY OF SERUM POTASSIUM: CPT | Performed by: INTERNAL MEDICINE

## 2018-01-01 PROCEDURE — A9270 NON-COVERED ITEM OR SERVICE: HCPCS | Performed by: PHYSICIAN ASSISTANT

## 2018-01-01 PROCEDURE — 25000132 ZZH RX MED GY IP 250 OP 250 PS 637: Performed by: PHYSICIAN ASSISTANT

## 2018-01-01 PROCEDURE — 99217 ZZC OBSERVATION CARE DISCHARGE: CPT | Performed by: INTERNAL MEDICINE

## 2018-01-01 PROCEDURE — 27210906 ZZH KIT CR8

## 2018-01-01 PROCEDURE — 99207 ZZC APP CREDIT; MD BILLING SHARED VISIT: CPT | Performed by: HOSPITALIST

## 2018-01-01 PROCEDURE — 99222 1ST HOSP IP/OBS MODERATE 55: CPT | Performed by: CLINICAL NURSE SPECIALIST

## 2018-01-01 PROCEDURE — 96361 HYDRATE IV INFUSION ADD-ON: CPT

## 2018-01-01 PROCEDURE — 84484 ASSAY OF TROPONIN QUANT: CPT | Performed by: EMERGENCY MEDICINE

## 2018-01-01 PROCEDURE — 93971 EXTREMITY STUDY: CPT | Mod: RT

## 2018-01-01 PROCEDURE — 99223 1ST HOSP IP/OBS HIGH 75: CPT | Mod: AI | Performed by: INTERNAL MEDICINE

## 2018-01-01 PROCEDURE — 36415 COLL VENOUS BLD VENIPUNCTURE: CPT | Performed by: PHYSICIAN ASSISTANT

## 2018-01-01 PROCEDURE — 90662 IIV NO PRSV INCREASED AG IM: CPT | Performed by: INTERNAL MEDICINE

## 2018-01-01 PROCEDURE — 40000275 ZZH STATISTIC RCP TIME EA 10 MIN

## 2018-01-01 PROCEDURE — 96374 THER/PROPH/DIAG INJ IV PUSH: CPT

## 2018-01-01 PROCEDURE — 85027 COMPLETE CBC AUTOMATED: CPT | Performed by: PHYSICIAN ASSISTANT

## 2018-01-01 PROCEDURE — 97161 PT EVAL LOW COMPLEX 20 MIN: CPT | Mod: GP

## 2018-01-01 PROCEDURE — 99207 ZZC CDG-MDM COMPONENT: MEETS LOW - DOWN CODED: CPT | Performed by: INTERNAL MEDICINE

## 2018-01-01 PROCEDURE — 92960 CARDIOVERSION ELECTRIC EXT: CPT

## 2018-01-01 PROCEDURE — 85027 COMPLETE CBC AUTOMATED: CPT | Performed by: NURSE PRACTITIONER

## 2018-01-01 PROCEDURE — 76937 US GUIDE VASCULAR ACCESS: CPT

## 2018-01-01 PROCEDURE — 40000901 ZZH STATISTIC WOC PT EDUCATION, 0-15 MIN

## 2018-01-01 PROCEDURE — 97116 GAIT TRAINING THERAPY: CPT | Mod: GP

## 2018-01-01 PROCEDURE — 80047 BASIC METABLC PNL IONIZED CA: CPT

## 2018-01-01 PROCEDURE — 76700 US EXAM ABDOM COMPLETE: CPT

## 2018-01-01 PROCEDURE — 99207 ZZC MOONLIGHTING INDICATOR: CPT | Performed by: PHYSICIAN ASSISTANT

## 2018-01-01 PROCEDURE — 25000132 ZZH RX MED GY IP 250 OP 250 PS 637: Mod: GY | Performed by: CLINICAL NURSE SPECIALIST

## 2018-01-01 RX ORDER — GABAPENTIN 100 MG/1
100 CAPSULE ORAL 3 TIMES DAILY
Status: DISCONTINUED | OUTPATIENT
Start: 2018-01-01 | End: 2018-01-01 | Stop reason: HOSPADM

## 2018-01-01 RX ORDER — GABAPENTIN 300 MG/1
300 CAPSULE ORAL DAILY
Status: DISCONTINUED | OUTPATIENT
Start: 2018-01-01 | End: 2018-01-01 | Stop reason: CLARIF

## 2018-01-01 RX ORDER — NALOXONE HYDROCHLORIDE 0.4 MG/ML
.1-.4 INJECTION, SOLUTION INTRAMUSCULAR; INTRAVENOUS; SUBCUTANEOUS
Status: DISCONTINUED | OUTPATIENT
Start: 2018-01-01 | End: 2018-01-01 | Stop reason: HOSPADM

## 2018-01-01 RX ORDER — DOXERCALCIFEROL 4 UG/2ML
4 INJECTION INTRAVENOUS
Status: CANCELLED | OUTPATIENT
Start: 2018-01-01 | End: 2018-01-01

## 2018-01-01 RX ORDER — HEPARIN SODIUM 1000 [USP'U]/ML
500 INJECTION, SOLUTION INTRAVENOUS; SUBCUTANEOUS CONTINUOUS
Status: DISCONTINUED | OUTPATIENT
Start: 2018-01-01 | End: 2018-01-01

## 2018-01-01 RX ORDER — FLUMAZENIL 0.1 MG/ML
0.2 INJECTION, SOLUTION INTRAVENOUS
Status: DISCONTINUED | OUTPATIENT
Start: 2018-01-01 | End: 2018-01-01 | Stop reason: HOSPADM

## 2018-01-01 RX ORDER — POLYETHYLENE GLYCOL 3350 17 G/17G
17 POWDER, FOR SOLUTION ORAL DAILY
Status: DISCONTINUED | OUTPATIENT
Start: 2018-01-01 | End: 2018-01-01 | Stop reason: HOSPADM

## 2018-01-01 RX ORDER — ONDANSETRON 2 MG/ML
4 INJECTION INTRAMUSCULAR; INTRAVENOUS EVERY 6 HOURS PRN
Status: DISCONTINUED | OUTPATIENT
Start: 2018-01-01 | End: 2018-01-01 | Stop reason: HOSPADM

## 2018-01-01 RX ORDER — NITROGLYCERIN 0.4 MG/1
0.4 TABLET SUBLINGUAL EVERY 5 MIN PRN
Status: DISCONTINUED | OUTPATIENT
Start: 2018-01-01 | End: 2018-01-01 | Stop reason: HOSPADM

## 2018-01-01 RX ORDER — LIDOCAINE 40 MG/G
CREAM TOPICAL
Status: DISCONTINUED | OUTPATIENT
Start: 2018-01-01 | End: 2018-01-01 | Stop reason: HOSPADM

## 2018-01-01 RX ORDER — ATORVASTATIN CALCIUM 40 MG/1
40 TABLET, FILM COATED ORAL DAILY
Status: ON HOLD | COMMUNITY
End: 2018-01-01

## 2018-01-01 RX ORDER — DEXTROSE MONOHYDRATE 25 G/50ML
25-50 INJECTION, SOLUTION INTRAVENOUS
Status: DISCONTINUED | OUTPATIENT
Start: 2018-01-01 | End: 2018-01-01 | Stop reason: HOSPADM

## 2018-01-01 RX ORDER — ALBUTEROL SULFATE 5 MG/ML
10 SOLUTION RESPIRATORY (INHALATION) ONCE
Status: COMPLETED | OUTPATIENT
Start: 2018-01-01 | End: 2018-01-01

## 2018-01-01 RX ORDER — POLYETHYLENE GLYCOL 3350 17 G/17G
17 POWDER, FOR SOLUTION ORAL DAILY
Qty: 7 PACKET | Status: ON HOLD | DISCHARGE
Start: 2018-01-01 | End: 2018-01-01

## 2018-01-01 RX ORDER — ATROPINE SULFATE 10 MG/ML
1-2 SOLUTION/ DROPS OPHTHALMIC
Qty: 1 BOTTLE | Refills: 0 | DISCHARGE
Start: 2018-01-01

## 2018-01-01 RX ORDER — HYDROMORPHONE HYDROCHLORIDE 1 MG/ML
0.2 INJECTION, SOLUTION INTRAMUSCULAR; INTRAVENOUS; SUBCUTANEOUS
Status: DISCONTINUED | OUTPATIENT
Start: 2018-01-01 | End: 2018-01-01 | Stop reason: HOSPADM

## 2018-01-01 RX ORDER — AMOXICILLIN 250 MG
1 CAPSULE ORAL 2 TIMES DAILY
Status: DISCONTINUED | OUTPATIENT
Start: 2018-01-01 | End: 2018-01-01 | Stop reason: HOSPADM

## 2018-01-01 RX ORDER — LIDOCAINE 40 MG/G
CREAM TOPICAL
Status: CANCELLED | OUTPATIENT
Start: 2018-01-01

## 2018-01-01 RX ORDER — BISACODYL 10 MG
10 SUPPOSITORY, RECTAL RECTAL DAILY PRN
Status: DISCONTINUED | OUTPATIENT
Start: 2018-01-01 | End: 2018-01-01 | Stop reason: HOSPADM

## 2018-01-01 RX ORDER — LIDOCAINE 50 MG/G
OINTMENT TOPICAL EVERY 4 HOURS PRN
COMMUNITY
End: 2018-01-01

## 2018-01-01 RX ORDER — ONDANSETRON 4 MG/1
4 TABLET, ORALLY DISINTEGRATING ORAL EVERY 6 HOURS PRN
Status: DISCONTINUED | OUTPATIENT
Start: 2018-01-01 | End: 2018-01-01 | Stop reason: HOSPADM

## 2018-01-01 RX ORDER — DEXTROSE MONOHYDRATE 25 G/50ML
25-50 INJECTION, SOLUTION INTRAVENOUS
Status: DISCONTINUED | OUTPATIENT
Start: 2018-01-01 | End: 2018-01-01

## 2018-01-01 RX ORDER — HEPARIN SODIUM 1000 [USP'U]/ML
500 INJECTION, SOLUTION INTRAVENOUS; SUBCUTANEOUS CONTINUOUS
Status: DISCONTINUED | OUTPATIENT
Start: 2018-01-01 | End: 2018-01-01 | Stop reason: HOSPADM

## 2018-01-01 RX ORDER — HEPARIN SODIUM 1000 [USP'U]/ML
500 INJECTION, SOLUTION INTRAVENOUS; SUBCUTANEOUS
Status: COMPLETED | OUTPATIENT
Start: 2018-01-01 | End: 2018-01-01

## 2018-01-01 RX ORDER — POLYETHYLENE GLYCOL 3350 17 G/17G
17 POWDER, FOR SOLUTION ORAL DAILY PRN
Status: DISCONTINUED | OUTPATIENT
Start: 2018-01-01 | End: 2018-01-01 | Stop reason: HOSPADM

## 2018-01-01 RX ORDER — DEXTROSE MONOHYDRATE 25 G/50ML
25 INJECTION, SOLUTION INTRAVENOUS ONCE
Status: COMPLETED | OUTPATIENT
Start: 2018-01-01 | End: 2018-01-01

## 2018-01-01 RX ORDER — ACETAMINOPHEN 500 MG
1000 TABLET ORAL 3 TIMES DAILY PRN
Status: DISCONTINUED | OUTPATIENT
Start: 2018-01-01 | End: 2018-01-01 | Stop reason: HOSPADM

## 2018-01-01 RX ORDER — ATORVASTATIN CALCIUM 40 MG/1
40 TABLET, FILM COATED ORAL AT BEDTIME
Status: DISCONTINUED | OUTPATIENT
Start: 2018-01-01 | End: 2018-01-01 | Stop reason: HOSPADM

## 2018-01-01 RX ORDER — SODIUM CHLORIDE 9 MG/ML
INJECTION, SOLUTION INTRAVENOUS CONTINUOUS
Status: CANCELLED | OUTPATIENT
Start: 2018-01-01

## 2018-01-01 RX ORDER — LIDOCAINE HYDROCHLORIDE 10 MG/ML
1-30 INJECTION, SOLUTION EPIDURAL; INFILTRATION; INTRACAUDAL; PERINEURAL
Status: COMPLETED | OUTPATIENT
Start: 2018-01-01 | End: 2018-01-01

## 2018-01-01 RX ORDER — TRAMADOL HYDROCHLORIDE 50 MG/1
50 TABLET ORAL EVERY 6 HOURS PRN
Status: DISCONTINUED | OUTPATIENT
Start: 2018-01-01 | End: 2018-01-01 | Stop reason: CLARIF

## 2018-01-01 RX ORDER — HEPARIN SODIUM 5000 [USP'U]/.5ML
5000 INJECTION, SOLUTION INTRAVENOUS; SUBCUTANEOUS EVERY 8 HOURS
Status: DISCONTINUED | OUTPATIENT
Start: 2018-01-01 | End: 2018-01-01 | Stop reason: HOSPADM

## 2018-01-01 RX ORDER — NICOTINE POLACRILEX 4 MG
15-30 LOZENGE BUCCAL
Status: DISCONTINUED | OUTPATIENT
Start: 2018-01-01 | End: 2018-01-01

## 2018-01-01 RX ORDER — ALBUMIN (HUMAN) 12.5 G/50ML
50 SOLUTION INTRAVENOUS
Status: DISCONTINUED | OUTPATIENT
Start: 2018-01-01 | End: 2018-01-01

## 2018-01-01 RX ORDER — LIDOCAINE HYDROCHLORIDE 10 MG/ML
INJECTION, SOLUTION EPIDURAL; INFILTRATION; INTRACAUDAL; PERINEURAL
Status: DISCONTINUED
Start: 2018-01-01 | End: 2018-01-01 | Stop reason: HOSPADM

## 2018-01-01 RX ORDER — CEFAZOLIN SODIUM 1 G/50ML
1 INJECTION, SOLUTION INTRAVENOUS EVERY 8 HOURS
Status: DISCONTINUED | OUTPATIENT
Start: 2018-01-01 | End: 2018-01-01

## 2018-01-01 RX ORDER — METOPROLOL SUCCINATE 25 MG/1
25 TABLET, EXTENDED RELEASE ORAL DAILY
COMMUNITY
End: 2018-01-01

## 2018-01-01 RX ORDER — HEPARIN SODIUM 1000 [USP'U]/ML
500 INJECTION, SOLUTION INTRAVENOUS; SUBCUTANEOUS
Status: CANCELLED | OUTPATIENT
Start: 2018-01-01 | End: 2018-01-01

## 2018-01-01 RX ORDER — AMOXICILLIN 250 MG
1 CAPSULE ORAL 2 TIMES DAILY PRN
Status: DISCONTINUED | OUTPATIENT
Start: 2018-01-01 | End: 2018-01-01 | Stop reason: HOSPADM

## 2018-01-01 RX ORDER — ASPIRIN 81 MG/1
81 TABLET ORAL DAILY
Status: DISCONTINUED | OUTPATIENT
Start: 2018-01-01 | End: 2018-01-01 | Stop reason: HOSPADM

## 2018-01-01 RX ORDER — ALBUMIN, HUMAN INJ 5% 5 %
250 SOLUTION INTRAVENOUS
Status: CANCELLED | OUTPATIENT
Start: 2018-01-01

## 2018-01-01 RX ORDER — HEPARIN SODIUM 5000 [USP'U]/.5ML
5000 INJECTION, SOLUTION INTRAVENOUS; SUBCUTANEOUS EVERY 12 HOURS
Status: DISCONTINUED | OUTPATIENT
Start: 2018-01-01 | End: 2018-01-01 | Stop reason: HOSPADM

## 2018-01-01 RX ORDER — GABAPENTIN 100 MG/1
100 CAPSULE ORAL 3 TIMES DAILY
Qty: 30 CAPSULE | Refills: 0 | Status: SHIPPED | OUTPATIENT
Start: 2018-01-01 | End: 2018-01-01

## 2018-01-01 RX ORDER — TRAMADOL HYDROCHLORIDE 50 MG/1
50 TABLET ORAL EVERY 6 HOURS PRN
COMMUNITY
End: 2018-01-01

## 2018-01-01 RX ORDER — FUROSEMIDE 10 MG/ML
40 INJECTION INTRAMUSCULAR; INTRAVENOUS ONCE
Status: COMPLETED | OUTPATIENT
Start: 2018-01-01 | End: 2018-01-01

## 2018-01-01 RX ORDER — ATORVASTATIN CALCIUM 40 MG/1
40 TABLET, FILM COATED ORAL AT BEDTIME
Status: DISCONTINUED | OUTPATIENT
Start: 2018-01-01 | End: 2018-01-01

## 2018-01-01 RX ORDER — ACETAMINOPHEN 325 MG/1
650 TABLET ORAL EVERY 4 HOURS PRN
Status: DISCONTINUED | OUTPATIENT
Start: 2018-01-01 | End: 2018-01-01 | Stop reason: HOSPADM

## 2018-01-01 RX ORDER — ATORVASTATIN CALCIUM 20 MG/1
40 TABLET, FILM COATED ORAL DAILY
Status: DISCONTINUED | OUTPATIENT
Start: 2018-01-01 | End: 2018-01-01 | Stop reason: HOSPADM

## 2018-01-01 RX ORDER — FENTANYL CITRATE 50 UG/ML
25-50 INJECTION, SOLUTION INTRAMUSCULAR; INTRAVENOUS EVERY 5 MIN PRN
Status: DISCONTINUED | OUTPATIENT
Start: 2018-01-01 | End: 2018-01-01 | Stop reason: HOSPADM

## 2018-01-01 RX ORDER — CEPHALEXIN 500 MG/1
500 CAPSULE ORAL 2 TIMES DAILY
COMMUNITY
End: 2018-01-01

## 2018-01-01 RX ORDER — SULFAMETHOXAZOLE/TRIMETHOPRIM 800-160 MG
1 TABLET ORAL 2 TIMES DAILY
Status: ON HOLD | COMMUNITY
End: 2018-01-01

## 2018-01-01 RX ORDER — ACETAMINOPHEN 325 MG/1
975 TABLET ORAL EVERY 8 HOURS
Status: DISCONTINUED | OUTPATIENT
Start: 2018-01-01 | End: 2018-01-01 | Stop reason: HOSPADM

## 2018-01-01 RX ORDER — AMOXICILLIN 250 MG
2 CAPSULE ORAL 2 TIMES DAILY
Status: DISCONTINUED | OUTPATIENT
Start: 2018-01-01 | End: 2018-01-01 | Stop reason: HOSPADM

## 2018-01-01 RX ORDER — SODIUM CHLORIDE 9 MG/ML
INJECTION, SOLUTION INTRAVENOUS CONTINUOUS
Status: DISCONTINUED | OUTPATIENT
Start: 2018-01-01 | End: 2018-01-01

## 2018-01-01 RX ORDER — SODIUM POLYSTYRENE SULFONATE 15 G/60ML
30 SUSPENSION ORAL; RECTAL ONCE
Status: COMPLETED | OUTPATIENT
Start: 2018-01-01 | End: 2018-01-01

## 2018-01-01 RX ORDER — ATORVASTATIN CALCIUM 40 MG/1
40 TABLET, FILM COATED ORAL AT BEDTIME
Status: ON HOLD | COMMUNITY
End: 2018-01-01

## 2018-01-01 RX ORDER — GABAPENTIN 100 MG/1
100 CAPSULE ORAL ONCE
Status: COMPLETED | OUTPATIENT
Start: 2018-01-01 | End: 2018-01-01

## 2018-01-01 RX ORDER — HYDROMORPHONE HYDROCHLORIDE 1 MG/ML
1-2 SOLUTION ORAL
Qty: 20 ML | Refills: 0 | Status: SHIPPED | DISCHARGE
Start: 2018-01-01

## 2018-01-01 RX ORDER — HYDROMORPHONE HYDROCHLORIDE 1 MG/ML
1-2 SOLUTION ORAL
Status: DISCONTINUED | OUTPATIENT
Start: 2018-01-01 | End: 2018-01-01 | Stop reason: HOSPADM

## 2018-01-01 RX ORDER — NICOTINE POLACRILEX 4 MG
15-30 LOZENGE BUCCAL
Status: DISCONTINUED | OUTPATIENT
Start: 2018-01-01 | End: 2018-01-01 | Stop reason: HOSPADM

## 2018-01-01 RX ORDER — HEPARIN SODIUM 1000 [USP'U]/ML
500 INJECTION, SOLUTION INTRAVENOUS; SUBCUTANEOUS CONTINUOUS
Status: CANCELLED | OUTPATIENT
Start: 2018-01-01

## 2018-01-01 RX ORDER — DOXERCALCIFEROL 4 UG/2ML
4 INJECTION INTRAVENOUS
Status: COMPLETED | OUTPATIENT
Start: 2018-01-01 | End: 2018-01-01

## 2018-01-01 RX ORDER — ACETAMINOPHEN 325 MG/1
650 TABLET ORAL EVERY 4 HOURS PRN
Status: DISCONTINUED | OUTPATIENT
Start: 2018-01-01 | End: 2018-01-01

## 2018-01-01 RX ORDER — PROMETHAZINE HYDROCHLORIDE, PHENYLEPHRINE HYDROCHLORIDE AND CODEINE PHOSPHATE 6.25; 5; 1 MG/5ML; MG/5ML; MG/5ML
5 SOLUTION ORAL
COMMUNITY
End: 2018-01-01

## 2018-01-01 RX ORDER — CEPHALEXIN 500 MG/1
500 CAPSULE ORAL DAILY
Qty: 7 CAPSULE | Refills: 0 | DISCHARGE
Start: 2018-01-01 | End: 2018-01-01

## 2018-01-01 RX ORDER — BISACODYL 10 MG
10 SUPPOSITORY, RECTAL RECTAL DAILY PRN
Qty: 30 SUPPOSITORY | Refills: 0 | DISCHARGE
Start: 2018-01-01

## 2018-01-01 RX ORDER — LIDOCAINE 40 MG/G
CREAM TOPICAL
Status: DISCONTINUED | OUTPATIENT
Start: 2018-01-01 | End: 2018-01-01

## 2018-01-01 RX ORDER — ACETAMINOPHEN 500 MG
1000 TABLET ORAL 3 TIMES DAILY PRN
Status: DISCONTINUED | OUTPATIENT
Start: 2018-01-01 | End: 2018-01-01 | Stop reason: DRUGHIGH

## 2018-01-01 RX ORDER — ATROPINE SULFATE 10 MG/ML
1-2 SOLUTION/ DROPS OPHTHALMIC
Status: DISCONTINUED | OUTPATIENT
Start: 2018-01-01 | End: 2018-01-01 | Stop reason: HOSPADM

## 2018-01-01 RX ORDER — AMOXICILLIN 250 MG
2 CAPSULE ORAL 2 TIMES DAILY PRN
Status: DISCONTINUED | OUTPATIENT
Start: 2018-01-01 | End: 2018-01-01 | Stop reason: HOSPADM

## 2018-01-01 RX ORDER — HYDROXYZINE HYDROCHLORIDE 25 MG/1
25 TABLET, FILM COATED ORAL EVERY 6 HOURS PRN
Status: DISCONTINUED | OUTPATIENT
Start: 2018-01-01 | End: 2018-01-01 | Stop reason: CLARIF

## 2018-01-01 RX ORDER — CEFAZOLIN SODIUM 1 G/50ML
1 INJECTION, SOLUTION INTRAVENOUS EVERY 12 HOURS
Status: DISCONTINUED | OUTPATIENT
Start: 2018-01-01 | End: 2018-01-01

## 2018-01-01 RX ORDER — CEFAZOLIN SODIUM 1 G/50ML
1 INJECTION, SOLUTION INTRAVENOUS EVERY 24 HOURS
Status: DISCONTINUED | OUTPATIENT
Start: 2018-01-01 | End: 2018-01-01 | Stop reason: HOSPADM

## 2018-01-01 RX ORDER — MIDODRINE HYDROCHLORIDE 5 MG/1
10 TABLET ORAL ONCE
Status: COMPLETED | OUTPATIENT
Start: 2018-01-01 | End: 2018-01-01

## 2018-01-01 RX ORDER — ACETAMINOPHEN 500 MG
1000 TABLET ORAL 3 TIMES DAILY PRN
COMMUNITY

## 2018-01-01 RX ORDER — PROMETHAZINE HYDROCHLORIDE AND CODEINE PHOSPHATE 6.25; 1 MG/5ML; MG/5ML
5-10 SYRUP ORAL EVERY 8 HOURS PRN
Status: ON HOLD | COMMUNITY
End: 2018-01-01

## 2018-01-01 RX ORDER — ONDANSETRON 4 MG/1
4 TABLET, ORALLY DISINTEGRATING ORAL EVERY 6 HOURS PRN
Qty: 30 TABLET | Refills: 0 | DISCHARGE
Start: 2018-01-01

## 2018-01-01 RX ORDER — ATORVASTATIN CALCIUM 40 MG/1
40 TABLET, FILM COATED ORAL EVERY EVENING
Status: DISCONTINUED | OUTPATIENT
Start: 2018-01-01 | End: 2018-01-01 | Stop reason: HOSPADM

## 2018-01-01 RX ORDER — MIDODRINE HYDROCHLORIDE 5 MG/1
10 TABLET ORAL
Status: DISCONTINUED | OUTPATIENT
Start: 2018-01-01 | End: 2018-01-01 | Stop reason: HOSPADM

## 2018-01-01 RX ORDER — FENTANYL CITRATE 50 UG/ML
INJECTION, SOLUTION INTRAMUSCULAR; INTRAVENOUS
Status: DISCONTINUED
Start: 2018-01-01 | End: 2018-01-01 | Stop reason: HOSPADM

## 2018-01-01 RX ORDER — PROCHLORPERAZINE MALEATE 5 MG
5 TABLET ORAL EVERY 6 HOURS PRN
Status: DISCONTINUED | OUTPATIENT
Start: 2018-01-01 | End: 2018-01-01 | Stop reason: HOSPADM

## 2018-01-01 RX ORDER — ASPIRIN 81 MG/1
81 TABLET ORAL DAILY
Status: DISCONTINUED | OUTPATIENT
Start: 2018-01-01 | End: 2018-01-01

## 2018-01-01 RX ORDER — LIDOCAINE 50 MG/G
OINTMENT TOPICAL
Status: DISCONTINUED | OUTPATIENT
Start: 2018-01-01 | End: 2018-01-01 | Stop reason: HOSPADM

## 2018-01-01 RX ORDER — ACETAMINOPHEN 650 MG/1
650 SUPPOSITORY RECTAL EVERY 4 HOURS PRN
Status: DISCONTINUED | OUTPATIENT
Start: 2018-01-01 | End: 2018-01-01 | Stop reason: HOSPADM

## 2018-01-01 RX ORDER — NALOXONE HYDROCHLORIDE 0.4 MG/ML
INJECTION, SOLUTION INTRAMUSCULAR; INTRAVENOUS; SUBCUTANEOUS
Status: DISCONTINUED
Start: 2018-01-01 | End: 2018-01-01 | Stop reason: WASHOUT

## 2018-01-01 RX ORDER — PROCHLORPERAZINE 25 MG
12.5 SUPPOSITORY, RECTAL RECTAL EVERY 12 HOURS PRN
Status: DISCONTINUED | OUTPATIENT
Start: 2018-01-01 | End: 2018-01-01 | Stop reason: HOSPADM

## 2018-01-01 RX ORDER — ALBUMIN, HUMAN INJ 5% 5 %
250 SOLUTION INTRAVENOUS
Status: DISCONTINUED | OUTPATIENT
Start: 2018-01-01 | End: 2018-01-01

## 2018-01-01 RX ORDER — TRAMADOL HYDROCHLORIDE 50 MG/1
50 TABLET ORAL EVERY 6 HOURS PRN
Status: DISCONTINUED | OUTPATIENT
Start: 2018-01-01 | End: 2018-01-01 | Stop reason: HOSPADM

## 2018-01-01 RX ORDER — ALBUMIN (HUMAN) 12.5 G/50ML
50 SOLUTION INTRAVENOUS
Status: CANCELLED | OUTPATIENT
Start: 2018-01-01

## 2018-01-01 RX ORDER — MIDODRINE HYDROCHLORIDE 5 MG/1
10 TABLET ORAL
Status: DISCONTINUED | OUTPATIENT
Start: 2018-01-01 | End: 2018-01-01

## 2018-01-01 RX ORDER — POLYETHYLENE GLYCOL 3350 17 G/17G
1 POWDER, FOR SOLUTION ORAL DAILY PRN
COMMUNITY

## 2018-01-01 RX ORDER — LIDOCAINE 50 MG/G
OINTMENT TOPICAL
COMMUNITY
End: 2018-01-01

## 2018-01-01 RX ORDER — POLYETHYLENE GLYCOL 3350 17 G/17G
17 POWDER, FOR SOLUTION ORAL DAILY PRN
Status: DISCONTINUED | OUTPATIENT
Start: 2018-01-01 | End: 2018-01-01

## 2018-01-01 RX ORDER — LIDOCAINE HYDROCHLORIDE 10 MG/ML
1-30 INJECTION, SOLUTION EPIDURAL; INFILTRATION; INTRACAUDAL; PERINEURAL
Status: DISCONTINUED | OUTPATIENT
Start: 2018-01-01 | End: 2018-01-01 | Stop reason: HOSPADM

## 2018-01-01 RX ORDER — FENTANYL CITRATE 50 UG/ML
INJECTION, SOLUTION INTRAMUSCULAR; INTRAVENOUS
Status: COMPLETED
Start: 2018-01-01 | End: 2018-01-01

## 2018-01-01 RX ORDER — ACETAMINOPHEN 325 MG/1
975 TABLET ORAL EVERY 8 HOURS
Qty: 100 TABLET | Status: ON HOLD | DISCHARGE
Start: 2018-01-01 | End: 2018-01-01

## 2018-01-01 RX ORDER — HEPARIN SODIUM 1000 [USP'U]/ML
INJECTION, SOLUTION INTRAVENOUS; SUBCUTANEOUS
Status: DISCONTINUED
Start: 2018-01-01 | End: 2018-01-01 | Stop reason: HOSPADM

## 2018-01-01 RX ORDER — SODIUM POLYSTYRENE SULFONATE 15 G/60ML
15 SUSPENSION ORAL; RECTAL ONCE
Status: DISCONTINUED | OUTPATIENT
Start: 2018-01-01 | End: 2018-01-01 | Stop reason: HOSPADM

## 2018-01-01 RX ORDER — GABAPENTIN 300 MG/1
300 CAPSULE ORAL DAILY
Status: ON HOLD | COMMUNITY
End: 2018-01-01

## 2018-01-01 RX ORDER — ACETAMINOPHEN 650 MG/1
650 SUPPOSITORY RECTAL ONCE
Status: DISCONTINUED | OUTPATIENT
Start: 2018-01-01 | End: 2018-01-01

## 2018-01-01 RX ORDER — HALOPERIDOL 5 MG/ML
.5-1 INJECTION INTRAMUSCULAR
Status: DISCONTINUED | OUTPATIENT
Start: 2018-01-01 | End: 2018-01-01 | Stop reason: HOSPADM

## 2018-01-01 RX ORDER — FLUMAZENIL 0.1 MG/ML
INJECTION, SOLUTION INTRAVENOUS
Status: DISCONTINUED
Start: 2018-01-01 | End: 2018-01-01 | Stop reason: WASHOUT

## 2018-01-01 RX ORDER — NALOXONE HYDROCHLORIDE 0.4 MG/ML
.1-.4 INJECTION, SOLUTION INTRAMUSCULAR; INTRAVENOUS; SUBCUTANEOUS
Status: DISCONTINUED | OUTPATIENT
Start: 2018-01-01 | End: 2018-01-01

## 2018-01-01 RX ORDER — ACETAMINOPHEN 325 MG/1
650 TABLET ORAL ONCE
Status: COMPLETED | OUTPATIENT
Start: 2018-01-01 | End: 2018-01-01

## 2018-01-01 RX ORDER — LIDOCAINE 50 MG/G
OINTMENT TOPICAL
Status: ON HOLD | DISCHARGE
Start: 2018-01-01 | End: 2018-01-01

## 2018-01-01 RX ORDER — DEXTROSE MONOHYDRATE 100 MG/ML
INJECTION, SOLUTION INTRAVENOUS CONTINUOUS
Status: DISCONTINUED | OUTPATIENT
Start: 2018-01-01 | End: 2018-01-01

## 2018-01-01 RX ORDER — METOPROLOL SUCCINATE 25 MG/1
25 TABLET, EXTENDED RELEASE ORAL DAILY
Status: DISCONTINUED | OUTPATIENT
Start: 2018-01-01 | End: 2018-01-01 | Stop reason: HOSPADM

## 2018-01-01 RX ORDER — HYDROXYZINE HYDROCHLORIDE 25 MG/1
25 TABLET, FILM COATED ORAL EVERY 6 HOURS PRN
Qty: 120 TABLET | Status: ON HOLD | DISCHARGE
Start: 2018-01-01 | End: 2018-01-01

## 2018-01-01 RX ORDER — GLYCOPYRROLATE 0.2 MG/ML
INJECTION INTRAMUSCULAR; INTRAVENOUS
Status: COMPLETED
Start: 2018-01-01 | End: 2018-01-01

## 2018-01-01 RX ORDER — TRAMADOL HYDROCHLORIDE 50 MG/1
50 TABLET ORAL EVERY 6 HOURS PRN
Qty: 15 TABLET | Refills: 0 | Status: ON HOLD | OUTPATIENT
Start: 2018-01-01 | End: 2018-01-01

## 2018-01-01 RX ORDER — GLYCOPYRROLATE 0.2 MG/ML
INJECTION INTRAMUSCULAR; INTRAVENOUS
Status: DISPENSED
Start: 2018-01-01 | End: 2018-01-01

## 2018-01-01 RX ORDER — HYDROXYZINE HYDROCHLORIDE 25 MG/1
25 TABLET, FILM COATED ORAL EVERY 6 HOURS PRN
Status: DISCONTINUED | OUTPATIENT
Start: 2018-01-01 | End: 2018-01-01 | Stop reason: HOSPADM

## 2018-01-01 RX ADMIN — MIDAZOLAM HYDROCHLORIDE 2 MG: 1 INJECTION, SOLUTION INTRAMUSCULAR; INTRAVENOUS at 10:45

## 2018-01-01 RX ADMIN — ACETAMINOPHEN 650 MG: 325 TABLET, FILM COATED ORAL at 04:19

## 2018-01-01 RX ADMIN — ASPIRIN 81 MG: 81 TABLET, COATED ORAL at 11:04

## 2018-01-01 RX ADMIN — CALCIUM ACETATE 2001 MG: 667 CAPSULE ORAL at 19:14

## 2018-01-01 RX ADMIN — ASPIRIN 81 MG: 81 TABLET, COATED ORAL at 10:03

## 2018-01-01 RX ADMIN — HEPARIN SODIUM 5000 UNITS: 5000 INJECTION, SOLUTION INTRAVENOUS; SUBCUTANEOUS at 21:04

## 2018-01-01 RX ADMIN — TOPICAL ANESTHETIC 1.5 ML: 200 SPRAY DENTAL; PERIODONTAL at 11:33

## 2018-01-01 RX ADMIN — CALCIUM ACETATE 2001 MG: 667 CAPSULE ORAL at 18:29

## 2018-01-01 RX ADMIN — SENNOSIDES AND DOCUSATE SODIUM 2 TABLET: 8.6; 5 TABLET ORAL at 20:50

## 2018-01-01 RX ADMIN — SENNOSIDES AND DOCUSATE SODIUM 1 TABLET: 8.6; 5 TABLET ORAL at 20:41

## 2018-01-01 RX ADMIN — FENTANYL CITRATE 50 MCG: 50 INJECTION INTRAMUSCULAR; INTRAVENOUS at 09:52

## 2018-01-01 RX ADMIN — OXYCODONE HYDROCHLORIDE 2.5 MG: 5 TABLET ORAL at 22:40

## 2018-01-01 RX ADMIN — ALBUTEROL SULFATE 10 MG: 2.5 SOLUTION RESPIRATORY (INHALATION) at 20:26

## 2018-01-01 RX ADMIN — HEPARIN SODIUM 5000 UNITS: 5000 INJECTION, SOLUTION INTRAVENOUS; SUBCUTANEOUS at 12:39

## 2018-01-01 RX ADMIN — Medication: at 08:01

## 2018-01-01 RX ADMIN — HEPARIN SODIUM 500 UNITS: 1000 INJECTION, SOLUTION INTRAVENOUS; SUBCUTANEOUS at 08:38

## 2018-01-01 RX ADMIN — EPOETIN ALFA 2000 UNITS: 2000 SOLUTION INTRAVENOUS; SUBCUTANEOUS at 10:09

## 2018-01-01 RX ADMIN — HEPARIN SODIUM 5000 UNITS: 5000 INJECTION, SOLUTION INTRAVENOUS; SUBCUTANEOUS at 17:22

## 2018-01-01 RX ADMIN — MIDAZOLAM HYDROCHLORIDE 1 MG: 1 INJECTION, SOLUTION INTRAMUSCULAR; INTRAVENOUS at 12:02

## 2018-01-01 RX ADMIN — HYDROMORPHONE HYDROCHLORIDE 1 MG: 2 TABLET ORAL at 21:07

## 2018-01-01 RX ADMIN — CEFAZOLIN SODIUM 1 G: 1 INJECTION, SOLUTION INTRAVENOUS at 00:34

## 2018-01-01 RX ADMIN — CALCIUM ACETATE 2001 MG: 667 CAPSULE ORAL at 08:23

## 2018-01-01 RX ADMIN — POLYETHYLENE GLYCOL 3350 17 G: 17 POWDER, FOR SOLUTION ORAL at 14:09

## 2018-01-01 RX ADMIN — HEPARIN SODIUM 5000 UNITS: 5000 INJECTION, SOLUTION INTRAVENOUS; SUBCUTANEOUS at 11:04

## 2018-01-01 RX ADMIN — ASPIRIN 81 MG: 81 TABLET, COATED ORAL at 08:27

## 2018-01-01 RX ADMIN — ASPIRIN 81 MG: 81 TABLET, COATED ORAL at 08:14

## 2018-01-01 RX ADMIN — CEFAZOLIN SODIUM 1 G: 1 INJECTION, SOLUTION INTRAVENOUS at 12:54

## 2018-01-01 RX ADMIN — GABAPENTIN 100 MG: 100 CAPSULE ORAL at 13:16

## 2018-01-01 RX ADMIN — SODIUM CHLORIDE 250 ML: 9 INJECTION, SOLUTION INTRAVENOUS at 09:40

## 2018-01-01 RX ADMIN — SODIUM CHLORIDE 300 ML: 9 INJECTION, SOLUTION INTRAVENOUS at 08:20

## 2018-01-01 RX ADMIN — SODIUM CHLORIDE 300 ML: 9 INJECTION, SOLUTION INTRAVENOUS at 09:40

## 2018-01-01 RX ADMIN — HEPARIN SODIUM 500 UNITS: 1000 INJECTION, SOLUTION INTRAVENOUS; SUBCUTANEOUS at 08:46

## 2018-01-01 RX ADMIN — METOPROLOL SUCCINATE 25 MG: 25 TABLET, EXTENDED RELEASE ORAL at 08:14

## 2018-01-01 RX ADMIN — HEPARIN SODIUM 5000 UNITS: 5000 INJECTION, SOLUTION INTRAVENOUS; SUBCUTANEOUS at 09:13

## 2018-01-01 RX ADMIN — POLYETHYLENE GLYCOL 3350 17 G: 17 POWDER, FOR SOLUTION ORAL at 08:26

## 2018-01-01 RX ADMIN — SENNOSIDES AND DOCUSATE SODIUM 2 TABLET: 8.6; 5 TABLET ORAL at 21:07

## 2018-01-01 RX ADMIN — ACETAMINOPHEN 975 MG: 325 TABLET, FILM COATED ORAL at 13:11

## 2018-01-01 RX ADMIN — POLYETHYLENE GLYCOL 3350 17 G: 17 POWDER, FOR SOLUTION ORAL at 13:16

## 2018-01-01 RX ADMIN — SODIUM CHLORIDE 250 ML: 9 INJECTION, SOLUTION INTRAVENOUS at 08:37

## 2018-01-01 RX ADMIN — DEXTROSE MONOHYDRATE 25 G: 500 INJECTION PARENTERAL at 20:55

## 2018-01-01 RX ADMIN — GABAPENTIN 100 MG: 100 CAPSULE ORAL at 04:19

## 2018-01-01 RX ADMIN — HEPARIN SODIUM 5000 UNITS: 5000 INJECTION, SOLUTION INTRAVENOUS; SUBCUTANEOUS at 13:15

## 2018-01-01 RX ADMIN — SENNOSIDES AND DOCUSATE SODIUM 2 TABLET: 8.6; 5 TABLET ORAL at 10:05

## 2018-01-01 RX ADMIN — CEFAZOLIN SODIUM 1 G: 1 INJECTION, SOLUTION INTRAVENOUS at 12:48

## 2018-01-01 RX ADMIN — CALCIUM ACETATE 2001 MG: 667 CAPSULE ORAL at 18:14

## 2018-01-01 RX ADMIN — EPOETIN ALFA 8000 UNITS: 4000 SOLUTION INTRAVENOUS; SUBCUTANEOUS at 08:39

## 2018-01-01 RX ADMIN — SENNOSIDES AND DOCUSATE SODIUM 2 TABLET: 8.6; 5 TABLET ORAL at 08:14

## 2018-01-01 RX ADMIN — CALCIUM ACETATE 2001 MG: 667 CAPSULE ORAL at 08:14

## 2018-01-01 RX ADMIN — SENNOSIDES AND DOCUSATE SODIUM 2 TABLET: 8.6; 5 TABLET ORAL at 08:23

## 2018-01-01 RX ADMIN — MIDAZOLAM 1 MG: 1 INJECTION INTRAMUSCULAR; INTRAVENOUS at 09:52

## 2018-01-01 RX ADMIN — HEPARIN SODIUM 5000 UNITS: 5000 INJECTION, SOLUTION INTRAVENOUS; SUBCUTANEOUS at 00:54

## 2018-01-01 RX ADMIN — OXYCODONE HYDROCHLORIDE 2.5 MG: 5 TABLET ORAL at 17:06

## 2018-01-01 RX ADMIN — CALCIUM ACETATE 2001 MG: 667 CAPSULE ORAL at 07:51

## 2018-01-01 RX ADMIN — HEPARIN SODIUM 500 UNITS: 1000 INJECTION INTRAVENOUS; SUBCUTANEOUS at 09:11

## 2018-01-01 RX ADMIN — ACETAMINOPHEN 975 MG: 325 TABLET, FILM COATED ORAL at 18:06

## 2018-01-01 RX ADMIN — HEPARIN SODIUM 5000 UNITS: 5000 INJECTION, SOLUTION INTRAVENOUS; SUBCUTANEOUS at 21:07

## 2018-01-01 RX ADMIN — DOXERCALCIFEROL 4 MCG: 4 INJECTION, SOLUTION INTRAVENOUS at 10:34

## 2018-01-01 RX ADMIN — SENNOSIDES AND DOCUSATE SODIUM 2 TABLET: 8.6; 5 TABLET ORAL at 12:39

## 2018-01-01 RX ADMIN — SODIUM POLYSTYRENE SULFONATE 30 G: 15 SUSPENSION ORAL; RECTAL at 01:52

## 2018-01-01 RX ADMIN — ACETAMINOPHEN 975 MG: 325 TABLET, FILM COATED ORAL at 04:59

## 2018-01-01 RX ADMIN — FENTANYL CITRATE 50 MCG: 50 INJECTION, SOLUTION INTRAMUSCULAR; INTRAVENOUS at 10:56

## 2018-01-01 RX ADMIN — CALCIUM ACETATE 2001 MG: 667 CAPSULE ORAL at 17:05

## 2018-01-01 RX ADMIN — ACETAMINOPHEN 975 MG: 325 TABLET, FILM COATED ORAL at 19:36

## 2018-01-01 RX ADMIN — ASPIRIN 81 MG: 81 TABLET, COATED ORAL at 08:26

## 2018-01-01 RX ADMIN — ASPIRIN 81 MG: 81 TABLET, COATED ORAL at 17:18

## 2018-01-01 RX ADMIN — DEXTRAN 70 AND HYPROMELLOSE 2910 2 DROP: 1; 3 SOLUTION/ DROPS OPHTHALMIC at 13:51

## 2018-01-01 RX ADMIN — ATORVASTATIN CALCIUM 40 MG: 40 TABLET, FILM COATED ORAL at 21:36

## 2018-01-01 RX ADMIN — LIDOCAINE HYDROCHLORIDE 15 ML: 20 SOLUTION ORAL; TOPICAL at 11:25

## 2018-01-01 RX ADMIN — CLOSTRIDIUM TETANI TOXOID ANTIGEN (FORMALDEHYDE INACTIVATED) AND CORYNEBACTERIUM DIPHTHERIAE TOXOID ANTIGEN (FORMALDEHYDE INACTIVATED) 0.5 ML: 5; 2 INJECTION, SUSPENSION INTRAMUSCULAR at 13:47

## 2018-01-01 RX ADMIN — MIDODRINE HYDROCHLORIDE 10 MG: 5 TABLET ORAL at 11:22

## 2018-01-01 RX ADMIN — CEFAZOLIN SODIUM 1 G: 1 INJECTION, SOLUTION INTRAVENOUS at 14:01

## 2018-01-01 RX ADMIN — CALCIUM ACETATE 2001 MG: 667 CAPSULE ORAL at 12:08

## 2018-01-01 RX ADMIN — HEPARIN SODIUM 5000 UNITS: 5000 INJECTION, SOLUTION INTRAVENOUS; SUBCUTANEOUS at 08:29

## 2018-01-01 RX ADMIN — ASPIRIN 81 MG: 81 TABLET, COATED ORAL at 08:23

## 2018-01-01 RX ADMIN — CALCIUM ACETATE 2001 MG: 667 CAPSULE ORAL at 11:25

## 2018-01-01 RX ADMIN — GABAPENTIN 100 MG: 100 CAPSULE ORAL at 13:06

## 2018-01-01 RX ADMIN — HEPARIN SODIUM 5000 UNITS: 5000 INJECTION, SOLUTION INTRAVENOUS; SUBCUTANEOUS at 13:02

## 2018-01-01 RX ADMIN — HEPARIN SODIUM 500 UNITS/HR: 1000 INJECTION, SOLUTION INTRAVENOUS; SUBCUTANEOUS at 09:43

## 2018-01-01 RX ADMIN — HEPARIN SODIUM 5000 UNITS: 5000 INJECTION, SOLUTION INTRAVENOUS; SUBCUTANEOUS at 10:02

## 2018-01-01 RX ADMIN — CALCIUM ACETATE 2001 MG: 667 CAPSULE ORAL at 12:16

## 2018-01-01 RX ADMIN — ACETAMINOPHEN 975 MG: 325 TABLET, FILM COATED ORAL at 02:55

## 2018-01-01 RX ADMIN — CALCIUM ACETATE 2001 MG: 667 CAPSULE ORAL at 10:27

## 2018-01-01 RX ADMIN — LIDOCAINE HYDROCHLORIDE 15 ML: 10 INJECTION, SOLUTION EPIDURAL; INFILTRATION; INTRACAUDAL; PERINEURAL at 09:59

## 2018-01-01 RX ADMIN — SENNOSIDES AND DOCUSATE SODIUM 2 TABLET: 8.6; 5 TABLET ORAL at 11:04

## 2018-01-01 RX ADMIN — DOXERCALCIFEROL 4 MCG: 4 INJECTION, SOLUTION INTRAVENOUS at 10:09

## 2018-01-01 RX ADMIN — SODIUM CHLORIDE 300 ML: 9 INJECTION, SOLUTION INTRAVENOUS at 09:11

## 2018-01-01 RX ADMIN — MIDODRINE HYDROCHLORIDE 10 MG: 5 TABLET ORAL at 13:17

## 2018-01-01 RX ADMIN — GABAPENTIN 100 MG: 100 CAPSULE ORAL at 18:47

## 2018-01-01 RX ADMIN — HEPARIN SODIUM 5000 UNITS: 5000 INJECTION, SOLUTION INTRAVENOUS; SUBCUTANEOUS at 04:59

## 2018-01-01 RX ADMIN — ASPIRIN 81 MG: 81 TABLET, COATED ORAL at 08:06

## 2018-01-01 RX ADMIN — CALCIUM ACETATE 2001 MG: 667 CAPSULE ORAL at 08:06

## 2018-01-01 RX ADMIN — ACETAMINOPHEN 975 MG: 325 TABLET, FILM COATED ORAL at 12:52

## 2018-01-01 RX ADMIN — ASPIRIN 81 MG: 81 TABLET, COATED ORAL at 12:39

## 2018-01-01 RX ADMIN — POLYETHYLENE GLYCOL 3350 17 G: 17 POWDER, FOR SOLUTION ORAL at 12:16

## 2018-01-01 RX ADMIN — EPOETIN ALFA 4000 UNITS: 4000 SOLUTION INTRAVENOUS; SUBCUTANEOUS at 10:34

## 2018-01-01 RX ADMIN — ATORVASTATIN CALCIUM 40 MG: 20 TABLET, FILM COATED ORAL at 12:39

## 2018-01-01 RX ADMIN — CALCIUM ACETATE 2001 MG: 667 CAPSULE ORAL at 08:26

## 2018-01-01 RX ADMIN — SODIUM CHLORIDE 8 UNITS: 9 INJECTION, SOLUTION INTRAVENOUS at 21:03

## 2018-01-01 RX ADMIN — HEPARIN SODIUM 500 UNITS: 1000 INJECTION, SOLUTION INTRAVENOUS; SUBCUTANEOUS at 10:36

## 2018-01-01 RX ADMIN — CALCIUM ACETATE 2001 MG: 667 CAPSULE ORAL at 11:04

## 2018-01-01 RX ADMIN — ATORVASTATIN CALCIUM 40 MG: 40 TABLET, FILM COATED ORAL at 21:45

## 2018-01-01 RX ADMIN — HEPARIN SODIUM 5000 UNITS: 5000 INJECTION, SOLUTION INTRAVENOUS; SUBCUTANEOUS at 05:04

## 2018-01-01 RX ADMIN — SODIUM CHLORIDE: 9 INJECTION, SOLUTION INTRAVENOUS at 00:38

## 2018-01-01 RX ADMIN — METOPROLOL SUCCINATE 25 MG: 25 TABLET, EXTENDED RELEASE ORAL at 11:04

## 2018-01-01 RX ADMIN — ATORVASTATIN CALCIUM 40 MG: 20 TABLET, FILM COATED ORAL at 08:23

## 2018-01-01 RX ADMIN — CALCIUM ACETATE 2001 MG: 667 CAPSULE ORAL at 12:38

## 2018-01-01 RX ADMIN — ATORVASTATIN CALCIUM 40 MG: 20 TABLET, FILM COATED ORAL at 08:14

## 2018-01-01 RX ADMIN — SODIUM CHLORIDE 250 ML: 9 INJECTION, SOLUTION INTRAVENOUS at 08:46

## 2018-01-01 RX ADMIN — CEFAZOLIN SODIUM 1 G: 1 INJECTION, SOLUTION INTRAVENOUS at 13:48

## 2018-01-01 RX ADMIN — ACETAMINOPHEN 975 MG: 325 TABLET, FILM COATED ORAL at 05:04

## 2018-01-01 RX ADMIN — HEPARIN SODIUM 5000 UNITS: 5000 INJECTION, SOLUTION INTRAVENOUS; SUBCUTANEOUS at 22:11

## 2018-01-01 RX ADMIN — CALCIUM ACETATE 2001 MG: 667 CAPSULE ORAL at 10:03

## 2018-01-01 RX ADMIN — ATORVASTATIN CALCIUM 40 MG: 20 TABLET, FILM COATED ORAL at 20:41

## 2018-01-01 RX ADMIN — CEFAZOLIN SODIUM 1 G: 1 INJECTION, SOLUTION INTRAVENOUS at 14:11

## 2018-01-01 RX ADMIN — ATORVASTATIN CALCIUM 40 MG: 20 TABLET, FILM COATED ORAL at 22:11

## 2018-01-01 RX ADMIN — SENNOSIDES AND DOCUSATE SODIUM 2 TABLET: 8.6; 5 TABLET ORAL at 14:06

## 2018-01-01 RX ADMIN — CALCIUM ACETATE 2001 MG: 667 CAPSULE ORAL at 17:22

## 2018-01-01 RX ADMIN — CALCIUM ACETATE 2001 MG: 667 CAPSULE ORAL at 17:29

## 2018-01-01 RX ADMIN — ASPIRIN 81 MG: 81 TABLET, COATED ORAL at 12:57

## 2018-01-01 RX ADMIN — CALCIUM ACETATE 2001 MG: 667 CAPSULE ORAL at 20:34

## 2018-01-01 RX ADMIN — CALCIUM ACETATE 2001 MG: 667 CAPSULE ORAL at 08:27

## 2018-01-01 RX ADMIN — ACETAMINOPHEN 975 MG: 325 TABLET, FILM COATED ORAL at 00:10

## 2018-01-01 RX ADMIN — HEPARIN SODIUM 5000 UNITS: 5000 INJECTION, SOLUTION INTRAVENOUS; SUBCUTANEOUS at 12:57

## 2018-01-01 RX ADMIN — SENNOSIDES AND DOCUSATE SODIUM 2 TABLET: 8.6; 5 TABLET ORAL at 20:17

## 2018-01-01 RX ADMIN — EPOETIN ALFA 2000 UNITS: 2000 SOLUTION INTRAVENOUS; SUBCUTANEOUS at 10:11

## 2018-01-01 RX ADMIN — ACETAMINOPHEN 650 MG: 325 TABLET, FILM COATED ORAL at 08:23

## 2018-01-01 RX ADMIN — ATORVASTATIN CALCIUM 40 MG: 40 TABLET, FILM COATED ORAL at 21:08

## 2018-01-01 RX ADMIN — CALCIUM ACETATE 2001 MG: 667 CAPSULE ORAL at 18:53

## 2018-01-01 RX ADMIN — CALCIUM ACETATE 2001 MG: 667 CAPSULE ORAL at 17:46

## 2018-01-01 RX ADMIN — HEPARIN SODIUM 500 UNITS/HR: 1000 INJECTION, SOLUTION INTRAVENOUS; SUBCUTANEOUS at 08:38

## 2018-01-01 RX ADMIN — SENNOSIDES AND DOCUSATE SODIUM 1 TABLET: 8.6; 5 TABLET ORAL at 13:02

## 2018-01-01 RX ADMIN — CALCIUM ACETATE 2001 MG: 667 CAPSULE ORAL at 17:51

## 2018-01-01 RX ADMIN — ACETAMINOPHEN 975 MG: 325 TABLET, FILM COATED ORAL at 20:27

## 2018-01-01 RX ADMIN — ATORVASTATIN CALCIUM 40 MG: 20 TABLET, FILM COATED ORAL at 17:18

## 2018-01-01 RX ADMIN — SODIUM CHLORIDE 250 ML: 9 INJECTION, SOLUTION INTRAVENOUS at 12:25

## 2018-01-01 RX ADMIN — FUROSEMIDE 40 MG: 10 INJECTION, SOLUTION INTRAMUSCULAR; INTRAVENOUS at 21:15

## 2018-01-01 RX ADMIN — CALCIUM ACETATE 2001 MG: 667 CAPSULE ORAL at 10:49

## 2018-01-01 RX ADMIN — CALCIUM ACETATE 2001 MG: 667 CAPSULE ORAL at 14:01

## 2018-01-01 RX ADMIN — HEPARIN SODIUM 5000 UNITS: 5000 INJECTION, SOLUTION INTRAVENOUS; SUBCUTANEOUS at 23:46

## 2018-01-01 RX ADMIN — EPOETIN ALFA 8000 UNITS: 4000 SOLUTION INTRAVENOUS; SUBCUTANEOUS at 09:55

## 2018-01-01 RX ADMIN — HEPARIN SODIUM 5000 UNITS: 5000 INJECTION, SOLUTION INTRAVENOUS; SUBCUTANEOUS at 18:30

## 2018-01-01 RX ADMIN — ACETAMINOPHEN 975 MG: 325 TABLET, FILM COATED ORAL at 10:45

## 2018-01-01 RX ADMIN — OXYCODONE HYDROCHLORIDE 2.5 MG: 5 TABLET ORAL at 09:13

## 2018-01-01 RX ADMIN — GABAPENTIN 100 MG: 100 CAPSULE ORAL at 08:41

## 2018-01-01 RX ADMIN — HEPARIN SODIUM 5000 UNITS: 5000 INJECTION, SOLUTION INTRAVENOUS; SUBCUTANEOUS at 21:23

## 2018-01-01 RX ADMIN — POLYETHYLENE GLYCOL 3350 17 G: 17 POWDER, FOR SOLUTION ORAL at 07:55

## 2018-01-01 RX ADMIN — HYDROMORPHONE HYDROCHLORIDE 1 MG: 2 TABLET ORAL at 18:47

## 2018-01-01 RX ADMIN — HEPARIN SODIUM 5000 UNITS: 5000 INJECTION, SOLUTION INTRAVENOUS; SUBCUTANEOUS at 22:01

## 2018-01-01 RX ADMIN — CALCIUM ACETATE 2001 MG: 667 CAPSULE ORAL at 15:56

## 2018-01-01 RX ADMIN — DICLOFENAC 2 G: 10 GEL TOPICAL at 14:24

## 2018-01-01 RX ADMIN — CEFAZOLIN SODIUM 1 G: 1 INJECTION, SOLUTION INTRAVENOUS at 13:12

## 2018-01-01 RX ADMIN — HEPARIN SODIUM 5000 UNITS: 5000 INJECTION, SOLUTION INTRAVENOUS; SUBCUTANEOUS at 20:50

## 2018-01-01 RX ADMIN — ASPIRIN 81 MG: 81 TABLET, COATED ORAL at 13:16

## 2018-01-01 RX ADMIN — HEPARIN SODIUM 500 UNITS/HR: 1000 INJECTION, SOLUTION INTRAVENOUS; SUBCUTANEOUS at 08:47

## 2018-01-01 RX ADMIN — HEPARIN SODIUM 5000 UNITS: 5000 INJECTION, SOLUTION INTRAVENOUS; SUBCUTANEOUS at 14:45

## 2018-01-01 RX ADMIN — TRAMADOL HYDROCHLORIDE 50 MG: 50 TABLET, COATED ORAL at 14:11

## 2018-01-01 RX ADMIN — METOPROLOL SUCCINATE 25 MG: 25 TABLET, EXTENDED RELEASE ORAL at 12:39

## 2018-01-01 RX ADMIN — OXYCODONE HYDROCHLORIDE 2.5 MG: 5 TABLET ORAL at 13:44

## 2018-01-01 RX ADMIN — HEPARIN SODIUM 5000 UNITS: 5000 INJECTION, SOLUTION INTRAVENOUS; SUBCUTANEOUS at 21:08

## 2018-01-01 RX ADMIN — SENNOSIDES AND DOCUSATE SODIUM 2 TABLET: 8.6; 5 TABLET ORAL at 22:11

## 2018-01-01 RX ADMIN — ASPIRIN 81 MG: 81 TABLET, COATED ORAL at 14:21

## 2018-01-01 RX ADMIN — HEPARIN SODIUM 5000 UNITS: 5000 INJECTION, SOLUTION INTRAVENOUS; SUBCUTANEOUS at 20:27

## 2018-01-01 RX ADMIN — CALCIUM ACETATE 2001 MG: 667 CAPSULE ORAL at 14:21

## 2018-01-01 RX ADMIN — CALCIUM ACETATE 2001 MG: 667 CAPSULE ORAL at 13:02

## 2018-01-01 RX ADMIN — METOPROLOL SUCCINATE 12.5 MG: 25 TABLET, EXTENDED RELEASE ORAL at 08:06

## 2018-01-01 RX ADMIN — HEPARIN SODIUM 5000 UNITS: 5000 INJECTION, SOLUTION INTRAVENOUS; SUBCUTANEOUS at 10:53

## 2018-01-01 RX ADMIN — OXYCODONE HYDROCHLORIDE 2.5 MG: 5 TABLET ORAL at 16:36

## 2018-01-01 RX ADMIN — DOXERCALCIFEROL 4 MCG: 4 INJECTION, SOLUTION INTRAVENOUS at 10:10

## 2018-01-01 RX ADMIN — SODIUM CHLORIDE 300 ML: 9 INJECTION, SOLUTION INTRAVENOUS at 10:34

## 2018-01-01 RX ADMIN — CALCIUM ACETATE 2001 MG: 667 CAPSULE ORAL at 13:50

## 2018-01-01 RX ADMIN — ACETAMINOPHEN 975 MG: 325 TABLET, FILM COATED ORAL at 10:29

## 2018-01-01 RX ADMIN — CALCIUM ACETATE 2001 MG: 667 CAPSULE ORAL at 18:09

## 2018-01-01 RX ADMIN — ATORVASTATIN CALCIUM 40 MG: 20 TABLET, FILM COATED ORAL at 21:07

## 2018-01-01 RX ADMIN — HEPARIN SODIUM 10000 UNITS: 10000 INJECTION, SOLUTION INTRAVENOUS; SUBCUTANEOUS at 09:46

## 2018-01-01 RX ADMIN — ATORVASTATIN CALCIUM 40 MG: 40 TABLET, FILM COATED ORAL at 21:17

## 2018-01-01 RX ADMIN — SENNOSIDES AND DOCUSATE SODIUM 1 TABLET: 8.6; 5 TABLET ORAL at 21:04

## 2018-01-01 RX ADMIN — CALCIUM ACETATE 2001 MG: 667 CAPSULE ORAL at 13:16

## 2018-01-01 RX ADMIN — INSULIN ASPART 1 UNITS: 100 INJECTION, SOLUTION INTRAVENOUS; SUBCUTANEOUS at 06:44

## 2018-01-01 RX ADMIN — ASPIRIN 81 MG: 81 TABLET, COATED ORAL at 07:52

## 2018-01-01 RX ADMIN — HYDROMORPHONE HYDROCHLORIDE 1 MG: 2 TABLET ORAL at 08:42

## 2018-01-01 RX ADMIN — SODIUM CHLORIDE 250 ML: 9 INJECTION, SOLUTION INTRAVENOUS at 10:34

## 2018-01-01 RX ADMIN — GABAPENTIN 100 MG: 100 CAPSULE ORAL at 08:26

## 2018-01-01 RX ADMIN — ACETAMINOPHEN 975 MG: 325 TABLET, FILM COATED ORAL at 21:08

## 2018-01-01 RX ADMIN — CALCIUM ACETATE 2001 MG: 667 CAPSULE ORAL at 13:11

## 2018-01-01 RX ADMIN — HEPARIN SODIUM 500 UNITS: 1000 INJECTION, SOLUTION INTRAVENOUS; SUBCUTANEOUS at 09:43

## 2018-01-01 RX ADMIN — HEPARIN SODIUM 500 UNITS/HR: 1000 INJECTION INTRAVENOUS; SUBCUTANEOUS at 09:12

## 2018-01-01 RX ADMIN — GABAPENTIN 100 MG: 100 CAPSULE ORAL at 17:30

## 2018-01-01 RX ADMIN — SODIUM CHLORIDE 300 ML: 9 INJECTION, SOLUTION INTRAVENOUS at 08:37

## 2018-01-01 RX ADMIN — METOPROLOL SUCCINATE 25 MG: 25 TABLET, EXTENDED RELEASE ORAL at 17:18

## 2018-01-01 RX ADMIN — HYDROMORPHONE HYDROCHLORIDE 1 MG: 2 TABLET ORAL at 12:02

## 2018-01-01 RX ADMIN — DICLOFENAC 2 G: 10 GEL TOPICAL at 13:04

## 2018-01-01 RX ADMIN — CALCIUM ACETATE 2001 MG: 667 CAPSULE ORAL at 12:52

## 2018-01-01 RX ADMIN — METOPROLOL SUCCINATE 12.5 MG: 25 TABLET, EXTENDED RELEASE ORAL at 08:26

## 2018-01-01 RX ADMIN — SODIUM CHLORIDE 300 ML: 9 INJECTION, SOLUTION INTRAVENOUS at 08:46

## 2018-01-01 RX ADMIN — INFLUENZA A VIRUS A/MICHIGAN/45/2015 X-275 (H1N1) ANTIGEN (FORMALDEHYDE INACTIVATED), INFLUENZA A VIRUS A/SINGAPORE/INFIMH-16-0019/2016 IVR-186 (H3N2) ANTIGEN (FORMALDEHYDE INACTIVATED), AND INFLUENZA B VIRUS B/MARYLAND/15/2016 BX-69A (A B/COLORADO/6/2017-LIKE VIRUS) ANTIGEN (FORMALDEHYDE INACTIVATED) 0.5 ML: 60; 60; 60 INJECTION, SUSPENSION INTRAMUSCULAR at 09:13

## 2018-01-01 RX ADMIN — ACETAMINOPHEN 975 MG: 325 TABLET, FILM COATED ORAL at 04:54

## 2018-01-01 RX ADMIN — SODIUM CHLORIDE 250 ML: 9 INJECTION, SOLUTION INTRAVENOUS at 09:11

## 2018-01-01 RX ADMIN — CEFAZOLIN SODIUM 1 G: 1 INJECTION, SOLUTION INTRAVENOUS at 13:53

## 2018-01-01 RX ADMIN — MIDAZOLAM HYDROCHLORIDE 1 MG: 1 INJECTION, SOLUTION INTRAMUSCULAR; INTRAVENOUS at 11:53

## 2018-01-01 RX ADMIN — FENTANYL CITRATE 50 MCG: 50 INJECTION INTRAMUSCULAR; INTRAVENOUS at 11:53

## 2018-01-01 RX ADMIN — ASPIRIN 81 MG: 81 TABLET, COATED ORAL at 10:27

## 2018-01-01 RX ADMIN — ASPIRIN 81 MG: 81 TABLET, COATED ORAL at 13:02

## 2018-01-01 RX ADMIN — GLYCOPYRROLATE 0.4 MG: 0.2 INJECTION, SOLUTION INTRAMUSCULAR; INTRAVENOUS at 11:32

## 2018-01-01 RX ADMIN — ACETAMINOPHEN 975 MG: 325 TABLET, FILM COATED ORAL at 18:29

## 2018-01-01 RX ADMIN — ACETAMINOPHEN 975 MG: 325 TABLET, FILM COATED ORAL at 01:57

## 2018-01-01 ASSESSMENT — ACTIVITIES OF DAILY LIVING (ADL)
ADLS_ACUITY_SCORE: 11
ADLS_ACUITY_SCORE: 10
ADLS_ACUITY_SCORE: 11
ADLS_ACUITY_SCORE: 12
ADLS_ACUITY_SCORE: 11
ADLS_ACUITY_SCORE: 12
ADLS_ACUITY_SCORE: 11
ADLS_ACUITY_SCORE: 13
ADLS_ACUITY_SCORE: 11
ADLS_ACUITY_SCORE: 12
ADLS_ACUITY_SCORE: 10
ADLS_ACUITY_SCORE: 11
ADLS_ACUITY_SCORE: 10
ADLS_ACUITY_SCORE: 11
ADLS_ACUITY_SCORE: 12
ADLS_ACUITY_SCORE: 12
ADLS_ACUITY_SCORE: 10
ADLS_ACUITY_SCORE: 11
ADLS_ACUITY_SCORE: 13
ADLS_ACUITY_SCORE: 11
ADLS_ACUITY_SCORE: 11

## 2018-01-01 ASSESSMENT — ENCOUNTER SYMPTOMS
SHORTNESS OF BREATH: 0
CONSTITUTIONAL NEGATIVE: 1
WEAKNESS: 1
BRUISES/BLEEDS EASILY: 1
GASTROINTESTINAL NEGATIVE: 1
MYALGIAS: 1
CHILLS: 0
SPEECH DIFFICULTY: 1
CHILLS: 0
FEVER: 0
VOMITING: 0
BLOOD IN STOOL: 0
FEVER: 0
COLOR CHANGE: 1
DIARRHEA: 0
FATIGUE: 1
WOUND: 1
ARTHRALGIAS: 1
COUGH: 0
NEUROLOGICAL NEGATIVE: 1
WEAKNESS: 0
NUMBNESS: 1
NAUSEA: 0
WEAKNESS: 0
SHORTNESS OF BREATH: 0
WOUND: 1
EYES NEGATIVE: 1
NUMBNESS: 0
FEVER: 0
CHILLS: 0
RESPIRATORY NEGATIVE: 1
FEVER: 0
CARDIOVASCULAR NEGATIVE: 1
MUSCULOSKELETAL NEGATIVE: 1
PSYCHIATRIC NEGATIVE: 1
ABDOMINAL PAIN: 0
MYALGIAS: 1

## 2018-01-01 ASSESSMENT — PAIN DESCRIPTION - DESCRIPTORS
DESCRIPTORS: ACHING
DESCRIPTORS: ACHING
DESCRIPTORS: SORE
DESCRIPTORS: ACHING;CONSTANT
DESCRIPTORS: ACHING

## 2018-01-26 NOTE — PROGRESS NOTES
RADIOLOGY PROCEDURE NOTE  Patient name: Maurizio Ohara  MRN: [id number]  : 10/7/1929    Pre-procedure diagnosis: venous stenosis  Post-procedure diagnosis: Same    Procedure Date/Time: 2018  10:59 AM  Procedure: fistulogram with PTA  Estimated blood loss: None  Specimen(s) collected with description: none  The patient tolerated the procedure well with no immediate complications.  Significant findings:central cephalic vein stenosis    See imaging dictation for procedural details.    Provider name: Tip Cameron  Assistant(s):None

## 2018-01-26 NOTE — DISCHARGE INSTRUCTIONS
After a Fistulagram -    If you go home with sheaths in place, keep the arm straight. Don t bend or use your arm until after dialysis.     For the first 24 hours, keep the arm raised on pillows as much as you can. This reduces swelling.     If you have bleeding or swelling at the puncture site, use your fingertip to put gentle pressure on the area. Do this until the bleeding stops or the swelling goes down.     If you lose the pulse in your fistula, or you no longer fill a thrill (buzzing feeling), call your doctor or dialysis clinic

## 2018-01-26 NOTE — IP AVS SNAPSHOT
MRN:[id number]                      After Visit Summary   1/26/2018    Maurizio Ohara    MRN: [id number]           Visit Information        Department      1/26/2018  8:39 AM [facility] Cath Lab          Review of your medicines      UNREVIEWED medicines. Ask your doctor about these medicines        Dose / Directions    aspirin 81 MG tablet        Dose:  81 mg   Take 81 mg by mouth daily   Refills:  0       atorvastatin 40 MG tablet   Commonly known as:  LIPITOR   Used for:  NSTEMI (non-ST elevated myocardial infarction) (H)        Dose:  40 mg   Take 1 tablet (40 mg) by mouth daily   Quantity:  30 tablet   Refills:  0       calcium acetate 667 MG Caps capsule   Commonly known as:  PHOSLO        Dose:  2001 mg   Take 2,001 mg by mouth 3 times daily (with meals)   Refills:  0       metoprolol succinate 50 MG 24 hr tablet   Commonly known as:  TOPROL XL   Used for:  Essential hypertension        Dose:  50 mg   Take 1 tablet (50 mg) by mouth daily   Quantity:  30 tablet   Refills:  0       NIFEDIAC CC 90 MG Tb24   Generic drug:  NIFEdipine ER        Dose:  90 mg   Take 90 mg by mouth every morning   Refills:  0       nitroGLYcerin 0.4 MG sublingual tablet   Commonly known as:  NITROSTAT   Used for:  NSTEMI (non-ST elevated myocardial infarction) (H)        Dose:  0.4 mg   Place 1 tablet (0.4 mg) under the tongue every 5 minutes as needed for chest pain   Quantity:  25 tablet   Refills:  3       PROMETHAZINE VC/CODEINE 5-6.25-10 MG/5ML Syrp   Generic drug:  Phenyleph-Promethazine-Cod        Dose:  1-2 tsp.   Take 1-2 tsp. by mouth every 6 hours as needed   Refills:  0       VITAMIN D (CHOLECALCIFEROL) PO        Dose:  2000 Units   Take 2,000 Units by mouth daily   Refills:  0                Protect others around you: Learn how to safely use, store and throw away your medicines at www.disposemymeds.org.         Follow-ups after your visit         Care Instructions        Further instructions  "from your care team       After a Fistulagram -    If you go home with sheaths in place, keep the arm straight. Don t bend or use your arm until after dialysis.     For the first 24 hours, keep the arm raised on pillows as much as you can. This reduces swelling.     If you have bleeding or swelling at the puncture site, use your fingertip to put gentle pressure on the area. Do this until the bleeding stops or the swelling goes down.     If you lose the pulse in your fistula, or you no longer fill a thrill (buzzing feeling), call your doctor or dialysis clinic       Additional Information About Your Visit        [facility] Information     [facility] lets you send messages to your doctor, view your test results, renew your prescriptions, schedule appointments and more. To sign up, go to [email or web address] . Click on \"Log in\" on the left side of the screen, which will take you to the Welcome page. Then click on \"Sign up Now\" on the right side of the page.     You will be asked to enter the access code listed below, as well as some personal information. Please follow the directions to create your username and password.     Your access code is: [email or web address]  Expires: 2018 12:11 PM     Your access code will  in 90 days. If you need help or a new code, please call your [facility] clinic or [phone].        Care EveryWhere ID     This is your Care EveryWhere ID. This could be used by other organizations to access your [facility] medical records  [id number]        Your Vitals Were     Blood Pressure Temperature Respirations Height Weight Pulse Oximetry    143/63 98  F (36.7  C) (Temporal) 18 1.702 m (5' 7\") 70.8 kg (156 lb) 94%    BMI (Body Mass Index)                   24.43 kg/m2            Primary Care Provider Office Phone # Fax #    Justina Annekasie [phone]      Equal Access to Services     ROSALINA SMITH AH: Gwen Murphy, denice edwards, catherine edge " clyde karlieyuliet robinsonsang lajosevaldez ah. So [facility] [phone].    ATENCIÓN: Si annikala savannah, tiene a mast disposición servicios gratuitos de asistencia lingüística. Warren urena [phone].    We comply with applicable federal civil rights laws and [place] laws. We do not discriminate on the basis of race, color, national origin, age, disability, sex, sexual orientation, or gender identity.            Thank you!     Thank you for choosing [facility] for your care. Our goal is always to provide you with excellent care. Hearing back from our patients is one way we can continue to improve our services. Please take a few minutes to complete the written survey that you may receive in the mail after you visit. If you would like to speak to someone directly about your visit please contact Patient Relations at [phone]. Thank you!               Medication List: This is a list of all your medications and when to take them. Check marks below indicate your daily home schedule. Keep this list as a reference.      Medications           Morning Afternoon Evening Bedtime As Needed    aspirin 81 MG tablet   Take 81 mg by mouth daily                                atorvastatin 40 MG tablet   Commonly known as:  LIPITOR   Take 1 tablet (40 mg) by mouth daily                                calcium acetate 667 MG Caps capsule   Commonly known as:  PHOSLO   Take 2,001 mg by mouth 3 times daily (with meals)                                metoprolol succinate 50 MG 24 hr tablet   Commonly known as:  TOPROL XL   Take 1 tablet (50 mg) by mouth daily                                NIFEDIAC CC 90 MG Tb24   Take 90 mg by mouth every morning   Generic drug:  NIFEdipine ER                                nitroGLYcerin 0.4 MG sublingual tablet   Commonly known as:  NITROSTAT   Place 1 tablet (0.4 mg) under the tongue every 5 minutes as needed for chest pain                                PROMETHAZINE VC/CODEINE 5-6.25-10 MG/5ML Syrp   Take  1-2 tsp. by mouth every 6 hours as needed   Generic drug:  Phenyleph-Promethazine-Cod                                VITAMIN D (CHOLECALCIFEROL) PO   Take 2,000 Units by mouth daily

## 2018-01-26 NOTE — IP AVS SNAPSHOT
[facility]    [address]    [facility] [address]    Phone:  [phone]                                       After Visit Summary   1/26/2018    Maurizio Ohara    MRN: [id number]           After Visit Summary Signature Page     I have received my discharge instructions, and my questions have been answered. I have discussed any challenges I see with this plan with the nurse or doctor.    ..........................................................................................................................................  Patient/Patient Representative Signature      ..........................................................................................................................................  Patient Representative Print Name and Relationship to Patient    ..................................................               ................................................  Date                                            Time    ..........................................................................................................................................  Reviewed by Signature/Title    ...................................................              ..............................................  Date                                                            Time

## 2018-01-26 NOTE — PROGRESS NOTES
Pt tolerated procedure.  Alert and oriented, tolerates po juice and crackers, vitals stable.  DC instructions reviewed with pt and family.  All questions answered. Iv dc'd with cath intact.

## 2018-06-15 NOTE — PROGRESS NOTES
Pt A&O, VSS. Tolerated po intake. Able to ambulate. RUE fistula site cd&i. AVS reviewed. IV dc'd.

## 2018-06-15 NOTE — IP AVS SNAPSHOT
[facility]    [address]    [facility] [address]    Phone:  [phone]                                       After Visit Summary   6/15/2018    Maurizio Ohara    MRN: [id number]           After Visit Summary Signature Page     I have received my discharge instructions, and my questions have been answered. I have discussed any challenges I see with this plan with the nurse or doctor.    ..........................................................................................................................................  Patient/Patient Representative Signature      ..........................................................................................................................................  Patient Representative Print Name and Relationship to Patient    ..................................................               ................................................  Date                                            Time    ..........................................................................................................................................  Reviewed by Signature/Title    ...................................................              ..............................................  Date                                                            Time

## 2018-06-15 NOTE — PROCEDURES
RADIOLOGY POST PROCEDURE NOTE w/ SEDATION  Patient name: Maurizio Ohara  MRN: [id number]  : 10/7/1929    Pre-procedure diagnosis: fistula stenosis  Post-procedure diagnosis: Same    Procedure Date/Time: July 15, 2018  10:03 AM  Procedure: f-gram with PTA  Estimated blood loss: None  Specimen(s) collected with description: none    I determined this patient to be an appropriate candidate for the planned sedation and procedure and reassessed the patient IMMEDIATELY PRIOR to sedation and procedure.     The patient tolerated the procedure well with no immediate complications.  Significant findings:cephalic arch stenosis    See imaging dictation for procedural details.    Provider name: Tip Cameron  Assistant(s):None

## 2018-06-15 NOTE — IP AVS SNAPSHOT
MRN:[id number]                      After Visit Summary   6/15/2018    Maurizio Ohara    MRN: [id number]           Visit Information        Department      6/15/2018  8:33 AM [facility] Cath Lab          Review of your medicines      UNREVIEWED medicines. Ask your doctor about these medicines        Dose / Directions    aspirin 81 MG tablet        Dose:  81 mg   Take 81 mg by mouth daily   Refills:  0       atorvastatin 40 MG tablet   Commonly known as:  LIPITOR   Used for:  NSTEMI (non-ST elevated myocardial infarction) (H)        Dose:  40 mg   Take 1 tablet (40 mg) by mouth daily   Quantity:  30 tablet   Refills:  0       calcium acetate 667 MG Caps capsule   Commonly known as:  PHOSLO        Dose:  2001 mg   Take 2,001 mg by mouth 3 times daily (with meals)   Refills:  0       metoprolol succinate 50 MG 24 hr tablet   Commonly known as:  TOPROL XL   Used for:  Essential hypertension        Dose:  50 mg   Take 1 tablet (50 mg) by mouth daily   Quantity:  30 tablet   Refills:  0       NIFEDIAC CC 90 MG Tb24   Generic drug:  NIFEdipine ER        Dose:  90 mg   Take 90 mg by mouth every morning   Refills:  0       nitroGLYcerin 0.4 MG sublingual tablet   Commonly known as:  NITROSTAT   Used for:  NSTEMI (non-ST elevated myocardial infarction) (H)        Dose:  0.4 mg   Place 1 tablet (0.4 mg) under the tongue every 5 minutes as needed for chest pain   Quantity:  25 tablet   Refills:  3       PROMETHAZINE VC/CODEINE 5-6.25-10 MG/5ML Syrp   Generic drug:  Phenyleph-Promethazine-Cod        Dose:  1-2 tsp.   Take 1-2 tsp. by mouth every 6 hours as needed   Refills:  0                Protect others around you: Learn how to safely use, store and throw away your medicines at www.disposemymeds.org.         Follow-ups after your visit         Care Instructions        Further instructions from your care team         Invasive Radiology Procedures Discharge Instructions          _____________________________________________________________________    Patient Name:  Maurizio Ohara  Today's Date:  July 15, 2018    The doctor who did your procedure today was Dr. Cameron.    Procedure:  Fistulagram:  After a Fistulagram -    If you go home with sheaths in place, keep the arm straight. Don t bend or use your arm until after dialysis.     For the first 24 hours, keep the arm raised on pillows as much as you can. This reduces swelling.     If you have bleeding or swelling at the puncture site, use your fingertip to put gentle pressure on the area. Do this until the bleeding stops or the swelling goes down.     If you lose the pulse in your fistula, or you no longer fill a thrill (buzzing feeling), call your doctor or dialysis clinic          If you have not received your results after 5 days, please call the doctor who ordered your test.  Diet and medicines    Go back to your normal diet.    You may start taking your normal medicines again (including Coumadin, or warfarin), as shown on your medicine sheet.    If you take aspirin, Plavix or other anti-platelet drugs: Start taking it tomorrow.    If take Coumadin (warfarin): Ask your doctor when to have your INR checked.    For minor pain, you may take Tylenol (acetaminophen) or Advil (ibuprofen).    Activity and puncture site     You may go back to normal activity in 24 hours. Wait 48 hours before lifting,   straining, exercise or other strenuous activity.    For the next day or two, check your puncture site often while you are awake.    Change the Band-Aid or bandage tomorrow.    You may shower tomorrow morning. No bathing or swimming until your   puncture site has fully healed.    If you received IV medicine to sedate you:  You were given: Versed  and Fentanyl  You may feel drowsy, forgetful or unsteady. For the next 24 hours, do not drive, drink alcohol or make any important decisions.    Know when to call for help  Call your doctor if you  "have:    A fever greater over 101 F (38.3 C), taken under the tongue.    A lot of bleeding or swelling at your puncture site.    Pain that is getting worse.     Shortness of breath.  Call 911 or go the emergency room if you have:    Severe chest pain or trouble breathing.    A tube that falls out.     Increased blood in your sputum (phlegm).    Bleeding that you cannot control.   Important phone numbers:  [facility] at [phone]     Additional Information About Your Visit        Care EveryWhere ID     This is your Care EveryWhere ID. This could be used by other organizations to access your [facility] medical records  [id number]        Your Vitals Were     Blood Pressure Temperature Respirations Height Weight Pulse Oximetry    117/50 (BP Location: Left arm) 97.2  F (36.2  C) (Temporal) 18 1.702 m (5' 7\") 68 kg (150 lb) 91%    BMI (Body Mass Index)                   23.49 kg/m2            Primary Care Provider Office Phone # Fax #    Justina Moise [phone]      Equal Access to Services     [facility] : Hadii tressa rivas hadasho Soomaali, waaxda luqadaha, qaybta kaalmada adeegyada, waxadonay verdugo . So [facility] [phone].    ATENCIÓN: Si habla español, tiene a mast disposición servicios gratuitos de asistencia lingüística. Llame al [phone].    We comply with applicable federal civil rights laws and [place] laws. We do not discriminate on the basis of race, color, national origin, age, disability, sex, sexual orientation, or gender identity.            Thank you!     Thank you for choosing [facility] for your care. Our goal is always to provide you with excellent care. Hearing back from our patients is one way we can continue to improve our services. Please take a few minutes to complete the written survey that you may receive in the mail after you visit. If you would like to speak to someone directly about your visit please contact Patient " Relations at [phone]. Thank you!               Medication List: This is a list of all your medications and when to take them. Check marks below indicate your daily home schedule. Keep this list as a reference.      Medications           Morning Afternoon Evening Bedtime As Needed    aspirin 81 MG tablet   Take 81 mg by mouth daily                                atorvastatin 40 MG tablet   Commonly known as:  LIPITOR   Take 1 tablet (40 mg) by mouth daily                                calcium acetate 667 MG Caps capsule   Commonly known as:  PHOSLO   Take 2,001 mg by mouth 3 times daily (with meals)                                metoprolol succinate 50 MG 24 hr tablet   Commonly known as:  TOPROL XL   Take 1 tablet (50 mg) by mouth daily                                NIFEDIAC CC 90 MG Tb24   Take 90 mg by mouth every morning   Generic drug:  NIFEdipine ER                                nitroGLYcerin 0.4 MG sublingual tablet   Commonly known as:  NITROSTAT   Place 1 tablet (0.4 mg) under the tongue every 5 minutes as needed for chest pain                                PROMETHAZINE VC/CODEINE 5-6.25-10 MG/5ML Syrp   Take 1-2 tsp. by mouth every 6 hours as needed   Generic drug:  Phenyleph-Promethazine-Cod

## 2018-06-15 NOTE — DISCHARGE INSTRUCTIONS
Invasive Radiology Procedures Discharge Instructions         _____________________________________________________________________    Patient Name:  Maurizio Ohara  Today's Date:  July 15, 2018    The doctor who did your procedure today was Dr. Cameron.    Procedure:  Fistulagram:  After a Fistulagram -    If you go home with sheaths in place, keep the arm straight. Don t bend or use your arm until after dialysis.     For the first 24 hours, keep the arm raised on pillows as much as you can. This reduces swelling.     If you have bleeding or swelling at the puncture site, use your fingertip to put gentle pressure on the area. Do this until the bleeding stops or the swelling goes down.     If you lose the pulse in your fistula, or you no longer fill a thrill (buzzing feeling), call your doctor or dialysis clinic          If you have not received your results after 5 days, please call the doctor who ordered your test.  Diet and medicines    Go back to your normal diet.    You may start taking your normal medicines again (including Coumadin, or warfarin), as shown on your medicine sheet.    If you take aspirin, Plavix or other anti-platelet drugs: Start taking it tomorrow.    If take Coumadin (warfarin): Ask your doctor when to have your INR checked.    For minor pain, you may take Tylenol (acetaminophen) or Advil (ibuprofen).    Activity and puncture site     You may go back to normal activity in 24 hours. Wait 48 hours before lifting,   straining, exercise or other strenuous activity.    For the next day or two, check your puncture site often while you are awake.    Change the Band-Aid or bandage tomorrow.    You may shower tomorrow morning. No bathing or swimming until your   puncture site has fully healed.    If you received IV medicine to sedate you:  You were given: Versed  and Fentanyl  You may feel drowsy, forgetful or unsteady. For the next 24 hours, do not drive, drink alcohol or make any important  decisions.    Know when to call for help  Call your doctor if you have:    A fever greater over 101 F (38.3 C), taken under the tongue.    A lot of bleeding or swelling at your puncture site.    Pain that is getting worse.     Shortness of breath.  Call 911 or go the emergency room if you have:    Severe chest pain or trouble breathing.    A tube that falls out.     Increased blood in your sputum (phlegm).    Bleeding that you cannot control.   Important phone numbers:  [facility] at [phone]

## 2018-06-18 NOTE — PROGRESS NOTES
LATE ENTRY:  Post Procedure Summary:  Prior to the start of the procedure and with procedural staff participation, I verbally confirmed the patient s identity using two indicators, relevant allergies, that the procedure was appropriate and matched the consent or emergent situation, and that the correct equipment/implants were available. Immediately prior to starting the procedure I conducted the Time Out with the procedural staff and re-confirmed the patient s name, procedure, and site/side. (The Joint Commission universal protocol was followed.)  Yes       Sedatives: Fentanyl and Midazolam (Versed)    Vital signs, airway and pulse oximetry were monitored and remained stable throughout the procedure and sedation was maintained until the procedure was complete.  The patient was monitored by staff until sedation discharge criteria were met.    Patient tolerance: Patient tolerated the procedure well with no immediate complications.    Time of sedation in minutes: 30 Minutes minutes from beginning to end of physician one to one monitoring.

## 2018-06-18 NOTE — ADDENDUM NOTE
Encounter addended by: Kathe Medina RN on: 6/18/2018  3:57 PM<BR>     Actions taken: Sign clinical note

## 2018-07-10 NOTE — TELEPHONE ENCOUNTER
Yolis from [facility] called in regards to Maurizio.  He has had multiple fistulograms (01/26/18 and 06/15/18).  He still has some post treatment bleeding and she would like to have him see a surgeon first instead of just sending the patient for another fistulogram.  I told Yolis that I would route this to the nurses to discuss if patient should have just an office visit since fistulogram was just done 06/15/18 or if patient should have an ultrasound and office visit.  Yolis did state that the post treatment bleeding is better than it was prior to fistulogram but that it has not resolved. I will route to nurses to discuss with one of the surgeons.  Patient would prefer [place] location.  He does dialysis on Tuesdays, Thursdays and Saturdays. Kathy Roy,

## 2018-07-11 NOTE — TELEPHONE ENCOUNTER
Reviewed with Dr. Vegas.  Pt needs AVF US and OV.  Order entered.  Routing to scheduling to coordinate appointments.  ABDELRAHMAN OsbornN, RN

## 2018-07-11 NOTE — TELEPHONE ENCOUNTER
Left message on home number for patient to call back to schedule AVF US and appointment with Dr. Vegas.

## 2018-07-17 NOTE — TELEPHONE ENCOUNTER
Left message on home number for patient to call back to schedule AVF US and follow up appointment with Dr. Vegas.  \

## 2018-08-22 NOTE — PROGRESS NOTES
HPI :  Maurizio Ohara is a pleasant 88-year-old gentleman who is status post placement of a right upper [place] AV [place]  I have no records from that procedure.  He required revision of the central portion of the fistula in 2016.  There was an area of ulcerated skin which was resected along with a portion of the fistula.  A primary end-to-end anastomosis was performed.  That procedure was performed by Dr. Ton Daly.    The patient reports that for the most part the fistula is working well.  His dialysis runs average 2 hours and 45 minutes.  He notes occasional bleeding with removal of the needles.  As far as he can tell, the fistula seems to be working reasonably well.        Review of Systems   Constitutional: Negative.    HENT: Negative.    Eyes: Negative.    Respiratory: Negative.    Cardiovascular: Negative.    Gastrointestinal: Negative.    Genitourinary: Negative.    Musculoskeletal: Negative.    Skin: Negative.    Neurological: Negative.    Endo/Heme/Allergies: Bruises/bleeds easily.   Psychiatric/Behavioral: Negative.          Physical Exam   Constitutional: He appears well-developed and well-nourished.   Eyes: EOM are normal. Pupils are equal, round, and reactive to light.   Neck: No JVD present.   Cardiovascular:   Pulses:       Radial pulses are 1+ on the right side, and 2+ on the left side.   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  The overlying skin is a bit thinned.  No open wounds.  Palpable thrill.   Neurological: He is alert and oriented to person, place, and time.   Skin: Skin is warm and dry.   Psychiatric/Behavioral: mood and affect normal, normal behavior, normal thought content and normal judgment     Imaging:  ULTRASOUND EXTREMITY ARTERIOVENOUS DIALYSIS ACCESS GRAFT 8/22/2018  1:22 PM     HISTORY:  88-year-old patient with prolonged bleeding after  hemodialysis, request made for  evaluation.     COMPARISON: May 31, 2018. Patient had intervention on July 15, 2018.     TECHNIQUE: Color Doppler and spectral waveform analysis obtained  throughout the inflow brachial artery as well as outflow cephalic vein  in the right upper arm.     FINDINGS: Total blood flow volume ranges from 520 to 623 mL/min.  Inflow brachial artery is patent ranging from 5.7 to 6.7 mm. AV  anastomosis is patent. Diameters range from 7.4 to 22 mm throughout  majority of the fistula. The central most cephalic vein is 5.6 mm with  velocity of 272/29 cm/sec, previously velocity of 4.3 mm with velocity  of 566/264 cm/sec.         IMPRESSION:  1. Patent AV fistula in the right upper extremity.  2. Minimal stenosis in the central most cephalic vein, site of  previous stenoses, though improved since previous exam.  2. Diminished blood flow volume on today's exam relative to previous,  averaging approximately 600 mL/min, previously 1043 mL/min.         ASSESSMENT:  1.  Right brachiocephalic AV fistula placed at an outside institution in 2012 and status post revision in 2016.  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  No clinically significant stenosis in the cephalic vein.  Diminished flow volume on today's exam which could be secondary to the increased volume of the aneurysmal fistula.    RECOMMENDATION:  I had a nice discussion with Maurizio reviewing all the above.  I offered him attempted revision of the right arm fistula likely involving resection of the aneurysmal segment with replacement utilizing a ProCol interposition graft.  He would require placement of a temporary tunneled dialysis catheter.  He believes that the fistula is still working well.  For now, he prefers to avoid the above-noted fistula revision.  He understands that he may contact me at any time should he change his mind or should the dialysis center have increased difficulty using the fistula.    Total  face-to-face time was 30 minutes, greater than 50% spent providing counseling and education.    Rasta Vegas MD

## 2018-08-22 NOTE — LETTER
[facility] [address]  [address]  [place] [address]  Phone: [phone]  Fax: [phone]      2018    RE: Maurizio Ohara, : 10/07/1929      HPI :  Maurizio Ohara is a pleasant 88-year-old gentleman who is status post placement of a right upper arm AV fistula [place]  I have no records from that procedure.  He required revision of the central portion of the fistula in 2016.  There was an area of ulcerated skin which was resected along with a portion of the fistula.  A primary end-to-end anastomosis was performed.  That procedure was performed by Dr. Ton Daly.     The patient reports that for the most part the fistula is working well.  His dialysis runs average 2 hours and 45 minutes.  He notes occasional bleeding with removal of the needles.  As far as he can tell, the fistula seems to be working reasonably well.      Review of Systems   Constitutional: Negative.    HENT: Negative.    Eyes: Negative.    Respiratory: Negative.    Cardiovascular: Negative.    Gastrointestinal: Negative.    Genitourinary: Negative.    Musculoskeletal: Negative.    Skin: Negative.    Neurological: Negative.    Endo/Heme/Allergies: Bruises/bleeds easily.   Psychiatric/Behavioral: Negative.        Physical Exam   Constitutional: He appears well-developed and well-nourished.   Eyes: EOM are normal. Pupils are equal, round, and reactive to light.   Neck: No JVD present.   Cardiovascular:   Pulses:       Radial pulses are 1+ on the right side, and 2+ on the left side.   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  The overlying skin is a bit thinned.  No open wounds.  Palpable thrill.   Neurological: He is alert and oriented to person, place, and time.   Skin: Skin is warm and dry.   Psychiatric/Behavioral: mood and affect normal, normal behavior, normal thought content and normal  judgment      Imaging:  ULTRASOUND EXTREMITY ARTERIOVENOUS DIALYSIS ACCESS GRAFT 8/22/2018  1:22 PM      HISTORY:  88-year-old patient with prolonged bleeding after  hemodialysis, request made for evaluation.      COMPARISON: May 31, 2018. Patient had intervention on July 15, 2018.      TECHNIQUE: Color Doppler and spectral waveform analysis obtained  throughout the inflow brachial artery as well as outflow cephalic vein  in the right upper arm.      FINDINGS: Total blood flow volume ranges from 520 to 623 mL/min.  Inflow brachial artery is patent ranging from 5.7 to 6.7 mm. AV  anastomosis is patent. Diameters range from 7.4 to 22 mm throughout  majority of the fistula. The central most cephalic vein is 5.6 mm with  velocity of 272/29 cm/sec, previously velocity of 4.3 mm with velocity  of 566/264 cm/sec.          IMPRESSION:  1. Patent AV fistula in the right upper extremity.  2. Minimal stenosis in the central most cephalic vein, site of  previous stenoses, though improved since previous exam.  2. Diminished blood flow volume on today's exam relative to previous,  averaging approximately 600 mL/min, previously 1043 mL/min.         ASSESSMENT:  1.  Right brachiocephalic AV fistula placed at an outside institution in 2012 and status post revision in 2016.  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  No clinically significant stenosis in the cephalic vein.  Diminished flow volume on today's exam which could be secondary to the increased volume of the aneurysmal fistula.     RECOMMENDATION:  I had a nice discussion with Maurizio reviewing all the above.  I offered him attempted revision of the right arm fistula likely involving resection of the aneurysmal segment with replacement utilizing a ProCol interposition graft.  He would require placement of a temporary tunneled dialysis catheter.  He believes that the fistula is still working well.  For now, he prefers to  avoid the above-noted fistula revision.  He understands that he may contact me at any time should he change his mind or should the dialysis center have increased difficulty using the fistula.       Sincerely,      Rasta Vegas MD

## 2018-08-22 NOTE — MR AVS SNAPSHOT
After Visit Summary   8/22/2018    Maurizio Ohara    MRN: [id number]           Patient Information     Date Of Birth          10/7/1929        Visit Information        Provider Department      8/22/2018 2:15 PM Magdiel Vegas MD [facility]      Today's Diagnoses     Complication of AV dialysis fistula, initial encounter    -  1       Follow-ups after your visit        Follow-up notes from your care team     Return if symptoms worsen or fail to improve, for Revision of right upper arm AV fistula.      Your next 10 appointments already scheduled     Oct 15, 2018  1:30 PM CDT   Ech Complete with [id number] ([facility])    [address]  [facility] [address] [phone]           1.  Please bring or wear a comfortable two-piece outfit. 2.  You may eat, drink and take your normal medicines. 3.  For any questions that cannot be answered, please contact the ordering physician 4.  Please do not wear perfumes or scented lotions on the day of your exam. ***Please check-in at the [facility] located in [address] in the [facility] building. When you are finished registering, please go to [address] and have a seat. The technician will call your name for the test.            Oct 19, 2018  1:10 PM CDT   Return Visit with Julianne Dan PA-C   [facility] ([facility])    [address]  [facility] [place] [id number] [phone]              Who to contact     If you have questions or need follow up information about today's clinic visit or your schedule please contact [facility] directly at [phone].  Normal or non-critical lab and imaging results will be communicated to you by MyChart, letter or phone within 4 business days after the clinic has received  "the results. If you do not hear from us within 7 days, please contact the clinic through [employer] or phone. If you have a critical or abnormal lab result, we will notify you by phone as soon as possible.  Submit refill requests through [employer] or call your pharmacy and they will forward the refill request to us. Please allow 3 business days for your refill to be completed.          Additional Information About Your Visit        Care EveryWhere ID     This is your Care EveryWhere ID. This could be used by other organizations to access your [facility] medical records  [id number]        Your Vitals Were     Pulse Respirations Height Pulse Oximetry BMI (Body Mass Index)       68 16 5' 7\" (1.702 m) 94% 23.49 kg/m2        Blood Pressure from Last 3 Encounters:   08/22/18 110/60   06/15/18 116/50   01/26/18 154/71    Weight from Last 3 Encounters:   08/22/18 150 lb (68 kg)   06/15/18 150 lb (68 kg)   01/26/18 156 lb (70.8 kg)              Today, you had the following     No orders found for display       Primary Care Provider Office Phone # Fax #    Justina Moise [phone]       [facility] [address] [place] [address]        Equal Access to Services     ROSALINA SMITH : Hadii tressa salinaso Sokumarali, waaxda luqadaha, qaybta kaalmada adeegyada, waxay rosangela benites. So [facility] [phone].    ATENCIÓN: Si habla español, tiene a mast disposición servicios gratuitos de asistencia lingüística. Llfrida al [phone].    We comply with applicable federal civil rights laws and [place] laws. We do not discriminate on the basis of race, color, national origin, age, disability, sex, sexual orientation, or gender identity.            Thank you!     Thank you for choosing [facility]  for your care. Our goal is always to provide you with excellent care. Hearing back from our patients is one way we can continue to improve our services. Please take a few " minutes to complete the written survey that you may receive in the mail after your visit with us. Thank you!             Your Updated Medication List - Protect others around you: Learn how to safely use, store and throw away your medicines at www.disposemymeds.org.          This list is accurate as of 8/22/18  2:42 PM.  Always use your most recent med list.                   Brand Name Dispense Instructions for use Diagnosis    aspirin 81 MG tablet      Take 81 mg by mouth daily        atorvastatin 40 MG tablet    LIPITOR    30 tablet    Take 1 tablet (40 mg) by mouth daily    NSTEMI (non-ST elevated myocardial infarction) (H)       calcium acetate 667 MG Caps capsule    PHOSLO     Take 2,001 mg by mouth 3 times daily (with meals)        metoprolol succinate 50 MG 24 hr tablet    TOPROL XL    30 tablet    Take 1 tablet (50 mg) by mouth daily    Essential hypertension       NIFEDIAC CC 90 MG Tb24   Generic drug:  NIFEdipine ER      Take 90 mg by mouth every morning        nitroGLYcerin 0.4 MG sublingual tablet    NITROSTAT    25 tablet    Place 1 tablet (0.4 mg) under the tongue every 5 minutes as needed for chest pain    NSTEMI (non-ST elevated myocardial infarction) (H)       PROMETHAZINE VC/CODEINE 5-6.25-10 MG/5ML Syrp   Generic drug:  Phenyleph-Promethazine-Cod      Take 1-2 tsp. by mouth every 6 hours as needed

## 2018-09-18 PROBLEM — L03.90 CELLULITIS: Status: ACTIVE | Noted: 2018-01-01

## 2018-09-18 NOTE — IP AVS SNAPSHOT
"    [place] [address] MEDICAL SURGICAL: [phone]                                              INTERAGENCY TRANSFER FORM - LAB / IMAGING / EKG / EMG RESULTS   2018                    Hospital Admission Date: 2018  YOANA QUIÑONES   : 10/7/1929  Sex: Male        Attending Provider: Serg Anderson DO     Allergies:  Glyburide, Lisinopril, Metformin, Penicillins, Verapamil    Infection:  None   Service:  GENERAL MEDI    Ht:  1.676 m (5' 6\")   Wt:  73.2 kg (161 lb 6 oz)   Admission Wt:  84.4 kg (186 lb)    BMI:  26.05 kg/m 2   BSA:  1.85 m 2            Patient PCP Information     Provider PCP Type    Justina Moise General         Lab Results - 3 Days      Platelet count [[id number]]  Resulted: 18 0753, Result status: Final result    Ordering provider: Serg Anderson DO  18 0000 Resulting lab: [facility]    Specimen Information    Type Source Collected On   Blood  18 0727          Components       Value Reference Range Flag Lab   Platelet Count 192 150 - 450 10e9/L  [facility]            Blood culture [[id number]]  Resulted: 18 0259, Result status: Final result    Ordering provider: Obi Doll MD  18 1229 Resulting lab: [facility]    Specimen Information    Type Source Collected On   Blood  18 1241   Comment:  Left Hand          Components       Value Reference Range Flag Lab   Specimen Description Blood Left Hand      Special Requests Received in aerobic bottle only   75   Culture Micro No growth   226            Blood culture [[id number]]  Resulted: 18 0259, Result status: Final result    Ordering provider: Obi Doll MD  18 1148 Resulting lab: [facility]    Specimen Information    Type Source Collected On   Blood  18 1232   Comment:  Left Arm          Components       Value Reference Range Flag Lab   Specimen Description Blood Left Arm      Special Requests Received in aerobic bottle only   75 "   Culture Micro No growth   226            Basic metabolic panel [[id number]] (Abnormal)  Resulted: 09/22/18 0704, Result status: Final result    Ordering provider: Bola Garcia, DO  09/22/18 0000 Resulting lab: [facility]    Specimen Information    Type Source Collected On   Blood  09/22/18 0632          Components       Value Reference Range Flag Lab   Sodium 135 133 - 144 mmol/L  [facility]   Potassium 4.6 3.4 - 5.3 mmol/L  [facility]   Chloride 99 94 - 109 mmol/L  [facility]   Carbon Dioxide 28 20 - 32 mmol/L  [facility]   Anion Gap 8 3 - 14 mmol/L  [facility]   Glucose 102 70 - 99 mg/dL H Fr[facility]   Urea Nitrogen 43 7 - 30 mg/dL H Fr[facility]   Creatinine 6.18 0.66 - 1.25 mg/dL H Fr[facility]   GFR Estimate 9 >60 mL/min/1.7m2 L [facility]   Comment:  Non  GFR Calc   GFR Estimate If Black 10 >60 mL/min/1.7m2 L Fr[facility]   Comment:  African American GFR Calc   Calcium 8.3 8.5 - 10.1 mg/dL L Fr[facility]dHs            CRP inflammation [[id number]] (Abnormal)  Resulted: 09/22/18 0704, Result status: Final result    Ordering provider: Bola Garcia, DO  09/22/18 0000 Resulting lab: [facility]    Specimen Information    Type Source Collected On   Blood  09/22/18 0632          Components       Value Reference Range Flag Lab   CRP Inflammation 18.5 0.0 - 8.0 mg/L H FrRdHs            CBC with platelets [[id number]] (Abnormal)  Resulted: 09/22/18 0654, Result status: Final result    Ordering provider: Bola Garcia, DO  09/22/18 0000 Resulting lab: [facility]    Specimen Information    Type Source Collected On   Blood  09/22/18 0632          Components       Value Reference Range Flag Lab   WBC 5.1 4.0 - 11.0 10e9/L  [facility]   RBC Count 2.76 4.4 - 5.9 10e12/L L Fr[facility]   Hemoglobin 9.8 13.3 - 17.7 g/dL L Fr[facility]   Hematocrit 31.3 40.0 - 53.0 % L FrRdHs    78 - 100 fl H FrRdHs   MCH 35.5 26.5 - 33.0 pg H FrRdHs   MCHC 31.3 31.5 - 36.5 g/dL L FrRdHs   RDW 16.1 10.0 - 15.0 % H FrRdHs   Platelet  Count 168 150 - 450 10e9/L  [facility]            Testing Performed By     Lab - Abbreviation Name Director Address Valid Date Range    [facility] [address]  [facility] [address] 05/08/15 1057 - Present    75 - Unknown [facility] Unknown [address] [facility] [address] 01/15/15 1019 - Present    226 - Unknown [facility] Unknown [address] [facility] [address] 12/19/14 0955 - Present            Unresulted Labs (24h ago through future)    Start       Ordered    09/22/18 0600  Platelet count  (Pharmacological Prophylaxis- heparin (If CrCl less than 30 mL/min))  EVERY THREE DAYS,   Routine     Comments:  Repeat every 3 days while on VTE prophylaxis. If no result is listed, this lab has not been done the past 365 days. LATEST LAB RESULT: Platelet Count (10e9/L)       Date                     Value                 09/19/2018               104 (L)          ----------      09/19/18 0938      Encounter-Level Documents:     There are no encounter-level documents.      Order-Level Documents:     There are no order-level documents.

## 2018-09-18 NOTE — IP AVS SNAPSHOT
` [place] [address] [facility]: [phone]            Medication Administration Report for Maurizio Ohara as of 09/25/18 1450   Legend:    Given Hold Not Given Due Canceled Entry Other Actions    Time Time (Time) Time  Time-Action       Inactive    Active    Linked        Medications 09/19/18 09/20/18 09/21/18 09/22/18 09/23/18 09/24/18 09/25/18    - MEDICATION INSTRUCTIONS for Dialysis Patients -  Freq: SEE ADMIN INSTRUCTIONS Route: XX  Start: 09/19/18 1348   Admin Instructions: Do not give any medication that may affect blood pressure or volume before dialysis: METOPROLOL   The following medications may be removed by dialysis and should be given after dialysis: Cefazolin, asa, tylenol, metoprolol, melatonin, oxycodone, tramadol.    Dispense Loc: Non Rx dispense               acetaminophen (TYLENOL) tablet 975 mg  Dose: 975 mg  Freq: EVERY 8 HOURS Route: PO  Start: 09/24/18 1700   Admin Instructions: Maximum acetaminophen dose from all sources = 75 mg/kg/day not to exceed 4 grams/day.    Admin. Amount: 3 tablet (3 × 325 mg tablet)  Last Admin: 09/25/18 0010  Dispense Loc: [facility] ADS MS5E          1806 (975 mg)-Given        0010 (975 mg)-Given       (1415)-Not Given [C]       [ ] 1700           aspirin EC tablet 81 mg  Dose: 81 mg  Freq: DAILY Route: PO  Start: 09/18/18 1630   Admin. Amount: 1 tablet (1 × 81 mg tablet)  Last Admin: 09/25/18 1421  Dispense Loc: [facility] ADS MS5E     0814 (81 mg)-Given        1239 (81 mg)-Given        0823 (81 mg)-Given        1302 (81 mg)-Given        1003 (81 mg)-Given        1104 (81 mg)-Given        1421 (81 mg)-Given [C]           atorvastatin (LIPITOR) tablet 40 mg  Dose: 40 mg  Freq: DAILY Route: PO  Start: 09/18/18 1630   Admin. Amount: 2 tablet (2 × 20 mg tablet)  Last Admin: 09/24/18 2107  Dispense Loc: [facility] ADS MS5E     0814 (40 mg)-Given        1239 (40 mg)-Given        0823 (40 mg)-Given        (1301)-Not Given       2041 (40 mg)-Given        2211 (40  mg)-Given        2107 (40 mg)-Given        [ ] 2100           bisacodyl (DULCOLAX) Suppository 10 mg  Dose: 10 mg  Freq: DAILY PRN Route: RE  PRN Reason: constipation  Start: 09/25/18 1152   Admin. Amount: 1 suppository (1 × 10 mg suppository)  Dispense Loc:  ADS MS5E               calcium acetate (PHOSLO) capsule 2,001 mg  Dose: 2,001 mg  Freq: 3 TIMES DAILY WITH MEALS Route: PO  Start: 09/18/18 1800   Admin Instructions: Best if given with meals. Take with meals.    Admin. Amount: 3 capsule (3 × 667 mg capsule)  Last Admin: 09/25/18 1125  Dispense Loc:  ADS MS5E     0814 (2,001 mg)-Given       (1320)-Not Given [C]       1751 (2,001 mg)-Given        1049 (2,001 mg)-Given [C]       (1527)-Not Given [C]       1705 (2,001 mg)-Given        0823 (2,001 mg)-Given       1401 (2,001 mg)-Given       1853 (2,001 mg)-Given        (1300)-Not Given       1302 (2,001 mg)-Given       1556 (2,001 mg)-Given       1914 (2,001 mg)-Given        1003 (2,001 mg)-Given       1350 (2,001 mg)-Given       1914 (2,001 mg)-Given        1104 (2,001 mg)-Given       1421 (2,001 mg)-Given       1809 (2,001 mg)-Given        1125 (2,001 mg)-Given       (1415)-Not Given [C]       [ ] 1800           ceFAZolin (ANCEF) intermittent infusion 1 g  Dose: 1 g  Freq: EVERY 24 HOURS Route: IV  Indications of Use: SKIN AND SOFT TISSUE INFECTION  Last Dose: 1 g (09/23/18 1353)  Start: 09/20/18 1400   Admin Instructions: GIVE AFTER DIALYSIS  Pharmacy to please adjust for renal function/dialysis status    Admin. Amount: 1 g = 50 mL Conc: 1 g/50 mL  Last Admin: 09/24/18 1411  Dispense Loc:  Main Pharmacy  Infused Over: 30 Minutes  Volume: 50 mL   Current Line: Peripheral IV 09/18/18 Left Upper forearm     1348 (1 g)-New Bag        1401 (1 g)-New Bag        1312 (1 g)-New Bag        1353 (1 g)-New Bag        1411 (1 g)-New Bag        [ ] 1400           diclofenac (VOLTAREN) 1 % topical gel 2 g  Dose: 2 g  Freq: 4 TIMES DAILY Route: Top  Start: 09/25/18 1300    Admin Instructions: Apply to [facility].  Send dosing card with product.    Admin. Amount: 2 g  Dispense Loc:  Main Pharmacy           [ ] 1300       [ ] 1800       [ ] 2200           heparin 10,000 units/10 mL infusion (DIALYSIS USE)  Rate: 0.5 mL/hr Dose: 500 Units/hr  Freq: CONTINUOUS Route: HM Machine  Start: 09/25/18 0830   Admin Instructions: DURING DIALYSIS TREATMENT    Dispense Loc:  Main Pharmacy  Volume: 10 mL  POC: Dialysis           [ ] 0830           heparin sodium PF injection 5,000 Units  Dose: 5,000 Units  Freq: EVERY 12 HOURS Route: SC  Start: 09/19/18 0945   Admin Instructions: HOLD heparin IF platelet count falls below 50% baseline or less than 100,000 /  L and notify provider. Use this product If CrCl less than 30 mL/min.  High concentration heparin. Not for line flush or cath care.    Admin. Amount: 5,000 Units = 0.5 mL Conc: 5,000 Units/0.5 mL  Last Admin: 09/24/18 2107  Dispense Loc:  ADS MS5E  Volume: 0.5 mL     1053 (5,000 Units)-Given       2050 (5,000 Units)-Given        1239 (5,000 Units)-Given       2123 (5,000 Units)-Given        0913 (5,000 Units)-Given       2104 (5,000 Units)-Given        1302 (5,000 Units)-Given       2201 (5,000 Units)-Given        1002 (5,000 Units)-Given       2211 (5,000 Units)-Given        1104 (5,000 Units)-Given       2107 (5,000 Units)-Given        [ ] 0945       [ ] 2145           HYDROmorphone (PF) (DILAUDID) injection 0.2 mg  Dose: 0.2 mg  Freq: EVERY 2 HOURS PRN Route: IV  PRN Reason: other  PRN Comment: pain control or improvement in physical function. Hold dose for analgesic side effects.  Start: 09/18/18 1617   Admin Instructions: Notify provider to assess for uncontrolled pain or analgesic side effects. Hold while on PCA or with regular IV opioid dosing  For ordered IV doses 0.1-4 mg give IV Push undiluted. Administer each 2mg over 2-5 minutes.    Admin. Amount: 0.2 mg  Dispense Loc:  ADS MS5E               hydrOXYzine (ATARAX) tablet 25  "mg  Dose: 25 mg  Freq: EVERY 6 HOURS PRN Route: PO  PRN Reason: itching  PRN Comment: pain  Start: 09/24/18 1115   Admin. Amount: 1 tablet (1 × 25 mg tablet)  Dispense Loc:  CESAR MS5E               hypromellose-dextran (ARTIFICAL TEARS) 0.1-0.3 % ophthalmic solution 2-3 drop  Dose: 2-3 drop  Freq: EVERY 1 HOUR PRN Route: OP  PRN Reason: dry eyes  Start: 09/23/18 0924   Admin Instructions: To affected eye(s).  Start with lower dose.  If symptoms unresolved after 1 dose at lower dose, increase to higher dose for subsequent administrations.    Admin. Amount: 2-3 drop  Last Admin: 09/23/18 1351  Dispense Loc:  Main Pharmacy  Volume: 15 mL         1351 (2 drop)-Given             lidocaine (LMX4) cream  Freq: EVERY 1 HOUR PRN Route: Top  PRN Reason: pain  PRN Comment: with VAD insertion or accessing implanted port.  Start: 09/22/18 1304   Admin Instructions: Do NOT give if patient has a history of allergy to any local anesthetic or any \"damien\" product.  Apply 30 minutes prior to VAD insertion or port access. MAX Dose: 2.5 g (  of 5 g tube).    Dispense Loc:  ADS MS5E           (1400)-Not Given           lidocaine (XYLOCAINE) 5 % ointment  Freq: EVERY 4 HOURS WHILE AWAKE Route: Top  Start: 09/25/18 1115   Admin Instructions: Apply to RLE. No heat to area while lidocaine is in use.    Dispense Loc:  Main Pharmacy           [ ] 1400       (1412)-Not Given [C]       [ ] 1800       [ ] 2200           lidocaine 1 % 1 mL  Dose: 1 mL  Freq: EVERY 1 HOUR PRN Route: OTHER  PRN Comment: mild pain with VAD insertion or accessing implanted port  Start: 09/22/18 1304   Admin Instructions: Do NOT give if patient has a history of allergy to any local anesthetic or any \"damien\" product. MAX dose 1 mL subcutaneous OR intradermal in divided doses.    Admin. Amount: 1 mL  Dispense Loc: [facility] Floor Stock  Volume: 2 mL               melatonin tablet 1 mg  Dose: 1 mg  Freq: AT BEDTIME PRN Route: PO  PRN Reason: sleep  Start: 09/18/18 1617 "   Admin Instructions: Do not give unless at least 6 hours of uninterrupted sleep is expected.    Admin. Amount: 1 tablet (1 × 1 mg tablet)  Dispense Loc: RH ADS MS5E               metoprolol succinate (TOPROL-XL) 24 hr tablet 25 mg  Dose: 25 mg  Freq: DAILY Route: PO  Start: 09/18/18 1630   Admin Instructions: DO NOT CRUSH. Tablet may be split in half along score line.  Hold if SBP < 105 or HR < 55.    Admin. Amount: 1 tablet (1 × 25 mg tablet)  Last Admin: 09/24/18 1104  Dispense Loc: RH ADS MS5E     0814 (25 mg)-Given        1239 (25 mg)-Given        (0826)-Not Given        (0842)-Not Given        (0959)-Not Given        1104 (25 mg)-Given        (1407)-Not Given [C]           naloxone (NARCAN) injection 0.1-0.4 mg  Dose: 0.1-0.4 mg  Freq: EVERY 2 MIN PRN Route: IV  PRN Reason: opioid reversal  Start: 09/18/18 1617   Admin Instructions: For respiratory rate LESS than or EQUAL to 8.  Partial reversal dose:  0.1 mg titrated q 2 minutes for Analgesia Side Effects Monitoring Sedation Level of 3 (frequently drowsy, arousable, drifts to sleep during conversation).Full reversal dose:  0.4 mg bolus for Analgesia Side Effects Monitoring Sedation Level of 4 (somnolent, minimal or no response to stimulation).  For ordered IV doses 0.1-2mg give IVP. Give each 0.4mg over 15 seconds in emergency situations. For non-emergent situations further dilute in 9mL of NS to facilitate titration of response.    Admin. Amount: 0.1-0.4 mg = 0.25-1 mL Conc: 0.4 mg/mL  Dispense Loc: RH ADS MS5E  Volume: 1 mL               ondansetron (ZOFRAN-ODT) ODT tab 4 mg  Dose: 4 mg  Freq: EVERY 6 HOURS PRN Route: PO  PRN Reasons: nausea,vomiting  Start: 09/18/18 1617   Admin Instructions: This is Step 1 of nausea and vomiting management.  If nausea not resolved in 15 minutes, go to Step 2 prochlorperazine (COMPAZINE). Do not push through foil backing. Peel back foil and gently remove. Place on tongue immediately. Administration with liquid  unnecessary  With dry hands, peel back foil backing and gently remove tablet; do not push oral disintegrating tablet through foil backing; administer immediately on tongue and oral disintegrating tablet dissolves in seconds; then swallow with saliva; liquid not required.    Admin. Amount: 1 tablet (1 × 4 mg tablet)  Dispense Loc: RH ADS MS5E              Or  ondansetron (ZOFRAN) injection 4 mg  Dose: 4 mg  Freq: EVERY 6 HOURS PRN Route: IV  PRN Reasons: nausea,vomiting  Start: 09/18/18 1617   Admin Instructions: This is Step 1 of nausea and vomiting management.  If nausea not resolved in 15 minutes, go to Step 2 prochlorperazine (COMPAZINE).  Irritant. For ordered IV doses 0.1-4 mg, give IV Push undiluted over 2-5 minutes.    Admin. Amount: 4 mg = 2 mL Conc: 4 mg/2 mL  Dispense Loc: RH ADS MS5E  Infused Over: 2-5 Minutes  Volume: 2 mL               polyethylene glycol (MIRALAX/GLYCOLAX) Packet 17 g  Dose: 17 g  Freq: DAILY Route: PO  Start: 09/25/18 1200   Admin Instructions: 1 Packet = 17 grams. Mixed prescribed dose in 8 ounces of water. Follow with 8 oz. of water.    Admin. Amount: 17 g  Last Admin: 09/25/18 1409  Dispense Loc: RH ADS MS5E           1409 (17 g)-Given [C]           prochlorperazine (COMPAZINE) injection 5 mg  Dose: 5 mg  Freq: EVERY 6 HOURS PRN Route: IV  PRN Reasons: nausea,vomiting  Start: 09/18/18 1617   Admin Instructions: This is Step 2 of nausea and vomiting management. Give if nausea not resolved 15 minutes after giving ondansetron (ZOFRAN). If nausea not resolved in 15 minutes, go to Step 3 metoclopramide (REGLAN), if ordered.  For ordered IV doses 0.1-10 mg, give IV Push undiluted. Each 5mg over 1 minute.    Admin. Amount: 5 mg = 1 mL Conc: 5 mg/mL  Dispense Loc: RH ADS MS5E  Infused Over: 1-2 Minutes  Volume: 1 mL              Or  prochlorperazine (COMPAZINE) tablet 5 mg  Dose: 5 mg  Freq: EVERY 6 HOURS PRN Route: PO  PRN Reason: vomiting  Start: 09/18/18 1617   Admin Instructions: This  is Step 2 of nausea and vomiting management. Give if nausea not resolved 15 minutes after giving ondansetron (ZOFRAN). If nausea not resolved in 15 minutes, go to Step 3 metoclopramide (REGLAN), if ordered.    Admin. Amount: 1 tablet (1 × 5 mg tablet)  Dispense Loc: RH ADS MS5E              Or  prochlorperazine (COMPAZINE) Suppository 12.5 mg  Dose: 12.5 mg  Freq: EVERY 12 HOURS PRN Route: RE  PRN Reasons: nausea,vomiting  Start: 09/18/18 1617   Admin Instructions: This is Step 2 of nausea and vomiting management. Give if nausea not resolved 15 minutes after giving ondansetron (ZOFRAN). If nausea not resolved in 15 minutes, go to Step 3 metoclopramide (REGLAN), if ordered.    Admin. Amount: 0.5 suppository (0.5 × 25 mg suppository)  Dispense Loc: RH ADS MS5E               senna-docusate (SENOKOT-S;PERICOLACE) 8.6-50 MG per tablet 1 tablet  Dose: 1 tablet  Freq: 2 TIMES DAILY Route: PO  Start: 09/18/18 2100   Admin Instructions: If no bowel movement in 24 hours, increase to 2 tablets PO.  Hold for loose stools.    Admin. Amount: 1 tablet  Last Admin: 09/22/18 2041  Dispense Loc: RH ADS MS5E                           (2124)-Not Given               2104 (1 tablet)-Given        1302 (1 tablet)-Given       2041 (1 tablet)-Given                                             [ ] 2100          Or  senna-docusate (SENOKOT-S;PERICOLACE) 8.6-50 MG per tablet 2 tablet  Dose: 2 tablet  Freq: 2 TIMES DAILY Route: PO  Start: 09/18/18 2100   Admin Instructions: Hold for loose stools.    Admin. Amount: 2 tablet  Last Admin: 09/25/18 1406  Dispense Loc: RH ADS MS5E     0814 (2 tablet)-Given       2050 (2 tablet)-Given        1239 (2 tablet)-Given               0823 (2 tablet)-Given                              1005 (2 tablet)-Given       2211 (2 tablet)-Given        1104 (2 tablet)-Given       2107 (2 tablet)-Given        1406 (2 tablet)-Given [C]       [ ] 2100           sodium chloride (PF) 0.9% PF flush 3 mL  Dose: 3 mL  Freq: EVERY  8 HOURS Route: IK  Start: 18 1315   Admin Instructions: And Q1H PRN, to lock peripheral IV dormant line.    Admin. Amount: 3 mL  Last Admin: 18 1409  Dispense Loc: [facility] Stock  Volume: 4 mL   Current Line: Peripheral IV 18 Left Upper forearm       1313 (3 mL)-Given       2043 (3 mL)-Given        0629 (3 mL)-Given       1353 (3 mL)-Given       2211 (3 mL)-Given        0221 (3 mL)-Given       1105 (3 mL)-Given       1806 (3 mL)-Given        0020 (3 mL)-Given       1409 (3 mL)-Given [C]       [ ] 1700           sodium chloride (PF) 0.9% PF flush 3 mL  Dose: 3 mL  Freq: EVERY 1 HOUR PRN Route: IK  PRN Reason: line flush  Start: 18 1304   Admin Instructions: for peripheral IV flush post IV meds    Admin. Amount: 3 mL  Dispense Loc: [facility] hospital Floor Stock  Volume: 4 mL               traMADol (ULTRAM) tablet 50 mg  Dose: 50 mg  Freq: EVERY 6 HOURS PRN Route: PO  PRN Reason: moderate pain  Start: 18 1114   Admin. Amount: 1 tablet (1 × 50 mg tablet)  Last Admin: 18 141  Dispense Loc:  ADS MS5E          1411 (50 mg)-Given           Completed Medications  Medications 18         Dose: 300 mL  Freq: ONCE Route: HM Machine  Start: 18   End: 18 103   Admin Instructions: For Dialyzer Prime. (In Dialyzer)    Admin. Amount: 300 mL  Last Admin: 18 103  Dispense Loc: [facility] hospital Floor Stock  Administrations Remainin  Volume: 300 mL  POC: Dialysis           1034 (300 mL)-New Bag             Dose: 250 mL  Freq: ONCE IN DIALYSIS Route: IV  Start: 18   End: 18 103   Admin Instructions: For patient prime during dialysis    Admin. Amount: 250 mL  Last Admin: 18 103  Dispense Loc: Critical access hospital Floor Stock  Administrations Remainin  Volume: 250 mL  POC: Dialysis           1034 (250 mL)-New Bag             Dose: 4 mcg  Freq: ONCE IN DIALYSIS Route: IV  Start: 18   End: 18 1034   Admin  Instructions: GIVE DURING DIALYSIS    Admin. Amount: 4 mcg = 2 mL Conc: 2 mcg/mL  Last Admin: 18  Dispense Loc:  Main Pharmacy  Administrations Remainin  Volume: 2 mL  POC: Dialysis           1034 (4 mcg)-Given             Dose: 4,000 Units  Freq: ONCE IN DIALYSIS Route: IV  Start: 18   End: 18   Admin Instructions: Give during dialysis.  If giving IV, For ordered IV doses 1-500 units/kg, give IV Push undiluted over 1 minute.    Admin. Amount: 4,000 Units = 1 mL Conc: 4,000 Units/mL  Last Admin: 18  Dispense Loc:  Main Pharmacy  Administrations Remainin  Volume: 1 mL  POC: Dialysis           1034 (4,000 Units)-Given             Dose: 500 Units  Freq: ONCE IN DIALYSIS Route: HM Machine  Start: 18   End: 18   Admin Instructions: LOADING DOSE  Administer loading dose prior to heparin infusion.   PRE DIALYSIS RUN   Dialysis    Admin. Amount: 500 Units = 0.5 mL Conc: 1,000 Units/mL  Last Admin: 18  Dispense Loc:  Main Pharmacy  Administrations Remainin  Volume: 0.5 mL  POC: Dialysis           1036 (500 Units)-Given          Discontinued Medications  Medications 18         Dose: 100-150 mL  Freq: EVERY 15 MIN PRN Route: IV  PRN Comment: Until hypotensive symptoms resolve  Start: 18   End: 18   Admin Instructions: Up to 1000 mL maximum total dose.  Give if needed to manage Systolic Blood Pressure as indicated in Hemodialysis Single Treatment set up.  Notify provider if patient blood pressure is not responsive with the bolus.    Admin. Amount: 100-150 mL  Dispense Loc: [facility] Floor Stock  Administrations Remaining: 10  Volume: 150 mL  POC: Dialysis           1345-Med Discontinued         Dose: 650 mg  Freq: EVERY 4 HOURS PRN Route: PO  PRN Reason: mild pain  Start: 18 1617   End: 18 1652   Admin Instructions: Alternate ibuprofen (if ordered)  with acetaminophen.  Maximum acetaminophen dose from all sources = 75 mg/kg/day not to exceed 4 grams/day.    Admin. Amount: 2 tablet (2 × 325 mg tablet)  Dispense Loc:  ADS MS5E          1652-Med Discontinued          Dose: 50 mL  Freq: EVERY 1 HOUR PRN Route: IV  PRN Reason: other  PRN Comment: see administration instructions  Start: 18   End: 18   Admin Instructions: FOR Systolic Blood Pressure less than 90 mmHg or for volume replacement DURING DIALYSIS RUN    Admin. Amount: 50 mL  Dispense Loc:  Main Pharmacy  Volume: 50 mL  POC: Dialysis           1345-Med Discontinued         Dose: 0.5 mL  Freq: ONCE PRN Route: ID  PRN Comment: For local anesthesia at ARTERIAL fistula/graft site.  Start: 18   End: 18   Admin Instructions: Give once, if needed, at the dialysis treatment.    Admin. Amount: 0.5 mL  Dispense Loc: [facility] Floor Stock  Administrations Remainin  Volume: 2 mL  POC: Dialysis           1345-Med Discontinued         Dose: 0.5 mL  Freq: ONCE PRN Route: ID  PRN Comment: WITHIN 24 HOURS For local anesthesia at fistula/graft site  Start: 18   End: 18   Admin Instructions: For local anesthesia at VENOUS fistula/graft site. Give once, if needed, at the dialysis treatment.    Admin. Amount: 0.5 mL  Dispense Loc: [facility] Floor Stock  Administrations Remainin  Volume: 2 mL  POC: Dialysis           1345-Med Discontinued         Dose: 2.5 mg  Freq: EVERY 4 HOURS PRN Route: PO  PRN Reason: other  PRN Comment: pain control or improvement in physical function. Hold dose for analgesic side effects.  Start: 18   End: 18 1114   Admin Instructions: Start with the lowest dose.  May adjust dose by 5 mg every 3 hours as needed. Notify provider to assess for uncontrolled pain or analgesic side effects. Hold while on PCA or with regular IV opioid dosing.    Admin. Amount: 1 half-tab (1 × 2.5 mg half-tab)  Last Admin: 18 4730  Dispense  Loc: RH ADS MS5E     1344 (2.5 mg)-Given         0913 (2.5 mg)-Given       1706 (2.5 mg)-Given        1636 (2.5 mg)-Given       2240 (2.5 mg)-Given         1114-Med Discontinued          Freq: CONTINUOUS PRN Route: XX  Start: 09/25/18 0822   End: 09/25/18 1345   Admin Instructions: Stop Heparin 60 minutes before end of treatment    Dispense Loc: Non Rx dispense  POC: Dialysis           1345-Med Discontinued    Medications 09/19/18 09/20/18 09/21/18 09/22/18 09/23/18 09/24/18 09/25/18

## 2018-09-18 NOTE — IP AVS SNAPSHOT
[place] [address] [facility]    [address]    [facility] [address]    Phone:  [phone]    Fax:  [phone]                                       After Visit Summary   9/18/2018    Maurizio Ohara    MRN: [id number]           After Visit Summary Signature Page     I have received my discharge instructions, and my questions have been answered. I have discussed any challenges I see with this plan with the nurse or doctor.    ..........................................................................................................................................  Patient/Patient Representative Signature      ..........................................................................................................................................  Patient Representative Print Name and Relationship to Patient    ..................................................               ................................................  Date                                   Time    ..........................................................................................................................................  Reviewed by Signature/Title    ...................................................              ..............................................  Date                                               Time          22EPIC Rev 08/18

## 2018-09-18 NOTE — ED NOTES
Bed: ED38  Expected date: 9/18/18  Expected time: 11:33 AM  Means of arrival: Ambulance  Comments:  BV4  89yo M knee pain

## 2018-09-18 NOTE — PHARMACY-ADMISSION MEDICATION HISTORY
Admission medication history interview status for this patient is complete. See [facility] admission navigator for allergy information, prior to admission medications and immunization status.     Medication history interview source(s):Patient  Medication history resources (including written lists, pill bottles, clinic record):called [facility]  Primary pharmacy:[facility] order [facility]    Changes made to PTA medication list:  Added:   Deleted:   Changed: lipitor,metoprolol    Actions taken by pharmacist (provider contacted, etc):None     Additional medication history information:None    Medication reconciliation/reorder completed by provider prior to medication history? No    Do you take OTC medications (eg tylenol, ibuprofen, fish oil, eye/ear drops, etc)? Y(Y/N)    For patients on insulin therapy: NA (Y/N)      Prior to Admission medications    Medication Sig Last Dose Taking? Auth Provider   aspirin 81 MG tablet Take 81 mg by mouth daily 9/17/2018 at Unknown time Yes Unknown, Entered By History   calcium acetate (PHOSLO) 667 MG CAPS Take 2,001 mg by mouth 3 times daily (with meals) 9/18/2018 at Unknown time Yes Unknown, Entered By History   metoprolol succinate (TOPROL-XL) 25 MG 24 hr tablet Take 25 mg by mouth daily 9/17/2018 at Unknown time Yes Unknown, Entered By History   NIFEdipine ER (NIFEDIAC CC) 90 MG TB24 Take 90 mg by mouth every morning 9/17/2018 at Unknown time Yes Unknown, Entered By History   Phenyleph-Promethazine-Cod (PROMETHAZINE VC/CODEINE) 5-6.25-10 MG/5ML SYRP Take 1-2 tsp. by mouth every 6 hours as needed Past Month at Unknown time Yes Unknown, Entered By History   atorvastatin (LIPITOR) 40 MG tablet Take 40 mg by mouth daily as needed More than a month at Unknown time  Unknown, Entered By History   nitroglycerin (NITROSTAT) 0.4 MG sublingual tablet Place 1 tablet (0.4 mg) under the tongue every 5 minutes as needed for chest pain   Julianne Dan PA-C

## 2018-09-18 NOTE — ED PROVIDER NOTES
History     Chief Complaint:  Right Leg Wound    The history is provided by the patient and the EMS personnel.      Maurizio Ohara is a 88 year old male with a history of CKD, CAD and hypertension who presents to the emergency department via EMS for evaluation of a right leg wound. Of note, the patient is on Tuesday, Thursday, Saturday dialysis and was there today, completing his treatment. While at dialysis the nurse noted increased swelling and redness on his right leg secondary to a fall where the patient tripped and hit his leg on his kitchen table opening up a laceration to his right knee. Since the fall, the patient reports intermittent numbness and minor pain with palpation to the knee, but those symptoms were increased today at dialysis and the nurse called EMS with concern for a spreading infection. En route, EMS reports that the patient had stable vitals and note that he complained of pain with palpation to the right knee. Here, the patient complains of pain in his right knee, consistent with his laceration as well as spreading redness of his knee, but denies any known fevers recently. Additionally, he reports some mild numbness in his knee and lower leg. Of note, the patient's last tetanus update was 12/18/12.     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   Skin: Positive for color  change, rash and wound.   Neurological: Positive for numbness (Right Leg).   All other systems reviewed and are negative.      Physical Exam     Patient Vitals for the past 24 hrs:   BP Temp Heart Rate Resp SpO2 Weight   09/18/18 1230 108/59 - 60 12 99 % -   09/18/18 1141 - 97.8  F (36.6  C) - - - 84.4 kg (186 lb)       Physical Exam  General: Lying in bed. Arrived via EMS, appears comfortable.   HEENT:   The scalp and head appear normal    The pupils are equal, round, and reactive to light    Extraocular muscles are intact.    The nose is normal.    The oropharynx is normal.      Uvula is in the midline.    Neck:  Normal range of motion.    Lungs:  Clear.      No rales, no wheezing.      There is no tachypnea.  Non-labored.  Cardiac: Regular rate.      Normal S1 and S2.      No pathological murmur/rub    Abdomen: Soft. No distension, no localized tenderness or rebound.  MS:  Normal tone. Normal movement of all extremities.   Neurologic:     Normal mentation.  No cranial nerve deficits.  No focal motor or sensory                            changes.      Speech normal.  Psych:  Awake.     Alert.      Normal affect.      Appropriate interactions.  Skin:  Over the right patella there is an abrasion with some yellowish pus.  There is soft tissue swelling noted around the abrasion. Swollen, very erythematous and tender to touch and the erythema spreads  inferiorly down just above the right ankle, mainly above the extensor surface of the right lower leg. Ecchymosis on the lateral aspect of the right ankle and in the second and third toes with mild swelling and tenderness. Mild skin changes  associated with venous stasis bilaterally. Strong, symmetrical DP pulses left and right.        Emergency Department Course     Imaging:  XR Right Knee 1/2 views:   Mild joint effusion. Otherwise unremarkable. As per radiology.     XR Right Ankle G/E 3 views:   No fracture identified. As per radiology.     Laboratory:  CBC: WBC: 6.4,  HGB: 10.5 (L), PLT: 106 (L)  CMP: Glucose 103 (H), Creatinine: 4.05 (H), GFR: 14 (L), o/w WNL  Lactic Acid: 2.0  Blood Culture x2: Pending    Interventions:  1248 Ancef 1 g, IV  1347 Tdap, 0.5 mL, IM    Emergency Department Course:  1141 Nursing notes and vitals reviewed. I performed an exam of the patient as documented above.     IV inserted. Medicine administered as documented above. Blood drawn. This was sent to the lab for further testing, results above.    The patient was sent for a right knee xray and a right ankle xray while in the emergency department, findings above.     1345 I consulted with EBER Moody, of the hospitalist services. They are in agreement to accept the patient for admission.    Findings and plan explained to the patient who consents to admission. Discussed the patient with EBER Moody, accepting for Dr. Mena, who will admit the patient to an inpatient bed for further monitoring, evaluation, and treatment.      Impression & Plan      Medical Decision Making:  Maurizio Ohara is a 88 year old end stage renal disease patient who was sent over after his dialysis run today that he completed successfully, with swelling of the right leg and pain. The patient tyler shave acute cellulitis due to an injury and tear to the skin last week. There is no obvious abscess, but definite cellulitis. The patient will need IV antibiotics and blood cultures have been sent. He is not systemically ill at this time and his white count is normal. He was started on Ancef. I spoke with EBER Moody, accepting for Dr. Mena, who will admit the patient to an inpatient bed for further evaluation and treatment.     Diagnosis:    ICD-10-CM    1. Cellulitis of right lower extremity L03.115 CBC with platelets differential     Comprehensive metabolic panel     Lactic acid whole blood     Blood culture     Blood culture       Disposition:  Admitted to EBER Moody, to an inpatient bed    Scribe  Disclosure:  I, Pawel Gilmore, am serving as a scribe on 9/18/2018 at 11:41 AM to personally document services performed by Obi Doll MD based on my observations and the provider's statements to me.     Pawel Gilmore  9/18/2018   [facility] EMERGENCY DEPARTMENT       Obi Doll MD  09/19/18 0918       Obi Doll MD  09/19/18 0923

## 2018-09-18 NOTE — IP AVS SNAPSHOT
"` `           [place] [address] MEDICAL SURGICAL: [phone]                                              INTERAGENCY TRANSFER FORM - NURSING   2018                    Hospital Admission Date: 2018  YOANA QUIÑONES   : 10/7/1929  Sex: Male        Attending Provider: Serg Anderson DO     Allergies:  Glyburide, Lisinopril, Metformin, Penicillins, Verapamil    Infection:  None   Service:  GENERAL MEDI    Ht:  1.676 m (5' 6\")   Wt:  73.2 kg (161 lb 6 oz)   Admission Wt:  84.4 kg (186 lb)    BMI:  26.05 kg/m 2   BSA:  1.85 m 2            Patient PCP Information     Provider PCP Type    Justina Moise [facility]      Current Code Status     Date Active Code Status Order ID Comments User Context       2018  4:17 PM DNI [id number]  Aisha Franco PA-C Inpatient       Code Status History     Date Active Date Inactive Code Status Order ID Comments User Context    2018  2:58 PM 2018  4:17 PM DNI [id number]  Aisha Franco PA-C ED    8/15/2017  8:27 AM 2018  2:58 PM Full Code [id number]  Jayant Ricketts MD Outpatient    2017 10:43 PM 8/15/2017  8:27 AM Full Code [id number]  Marilee Ramirez MD Inpatient    10/2/2016  2:30 PM 2017 10:43 PM Full Code [id number]  Primitivo Yeboah MD Outpatient    2016  2:49 PM 10/2/2016  2:30 PM Full Code [id number]  Bola Garcia,  Inpatient    2016 10:07 AM 2016  2:49 PM Full Code [id number]  Bola Garcia,  Outpatient    2016  3:10 PM 2016 10:07 AM Full Code [id number]  Diaz Hale,  Inpatient    2014 10:57 AM 2016  3:10 PM Full Code [id number]  Edin Elkins MD Outpatient    2014  3:28 PM 2014 11:14 PM Full Code [id number]  Phyllis Barrios MD Inpatient    2014 11:20 AM 2014  3:28 PM Full Code [id number]  Cholo Nogueira MD Outpatient    2014  5:42 AM 2014 11:20 AM Full Code [id number]  Tip Langston MD Inpatient "      Advance Directives        Scanned docmt in ACP Activity?           No scanned doc        Hospital Problems as of 9/25/2018              Priority Class Noted POA    Cellulitis Medium  9/18/2018 Yes      Non-Hospital Problems as of 9/25/2018              Priority Class Noted    NSTEMI (non-ST elevated myocardial infarction) (H) Medium  12/24/2014    ESRD (end stage renal disease) on dialysis (H) Medium  12/24/2014    CAD (coronary artery disease) Medium  12/24/2014    Mitral regurgitation Medium  12/24/2014    Pulmonary hypertension Medium  12/24/2014    Diabetes (H) Medium  Unknown    Hypertension Medium  Unknown    Sepsis (H) Medium  1/12/2016    Pneumonia Medium  9/24/2016    Acute diastolic (congestive) heart failure Medium  8/9/2017      Immunizations     Name Date      Influenza (High Dose) 3 valent vaccine 09/21/18     Influenza (High Dose) 3 valent vaccine 10/02/16     TD (ADULT, 7+) 09/18/18          END      ASSESSMENT     Discharge Profile Flowsheet     EXPECTED DISCHARGE     FINAL RESOURCES      Expected Discharge Date  09/25/18 ( alone TCU?) 09/24/18 1120   Resources List  Skilled Nursing Facility 09/25/18 1008    DISCHARGE NEEDS ASSESSMENT     Skilled Nursing Facility  [facility] [phone], Fax: [phone] 09/25/18 1418    Concerns To Be Addressed  no discharge needs identified 08/11/17 1132   SKIN      Equipment Currently Used at Home  none 09/19/18 1347   Inspection of bony prominences  Full 09/25/18 0921    # of Referrals Placed by CTS  Communication hand-offs to next level of Care Providers 10/03/16 1426   Inspection under devices  Full 09/25/18 0921    Primary Care Clinic Name  [facility] 08/11/17 1132   Skin WDL  ex 09/25/18 0921    Primary Care MD Name  Dr. Moise 08/11/17 1132   Skin Color/Characteristics  bruised (ecchymotic);kary 09/25/18 0921    Equipment Used at Home  bath bench;grab bar;walker, rolling 01/13/16 1532   Skin Temperature  warm  "09/25/18 0921    GASTROINTESTINAL (ADULT,PEDIATRIC,OB)     Skin Moisture  dry;flaky 09/25/18 0921    GI WDL  WDL 09/25/18 1033   Skin Elasticity  quick return to original state 09/25/18 0921    All Quadrants Bowel Sounds  audible and active in all quadrants 09/25/18 1033   Skin Integrity  bruise(s);drain/device(s);scar(s);scab(s);abrasion(s);skin tear(s) 09/25/18 0921    Last Bowel Movement  09/22/18 09/25/18 0921   SAFETY      Passing flatus  yes 09/25/18 0921   Safety WDL  WDL 09/25/18 0918    COMMUNICATION ASSESSMENT     All Alarms  alarm(s) activated and audible 09/25/18 0918    Patient's communication style  spoken language (English or Bilingual) 08/09/17 2256                      Assessment WDL (Within Defined Limits) Definitions           Safety WDL     Effective: 09/28/15    Row Information: <b>WDL Definition:</b> Bed in low position, wheels locked; call light in reach; upper side rails up x 2; ID band on<br> <font color=\"gray\"><i>Item=AS safety wdl>>List=AS safety wdl>>Version=F14</i></font>      Skin WDL     Effective: 09/28/15    Row Information: <b>WDL Definition:</b> Warm; dry; intact; elastic; without discoloration; pressure points without redness<br> <font color=\"gray\"><i>Item=AS skin wdl>>List=AS skin wdl>>Version=F14</i></font>      Vitals     Vital Signs Flowsheet     VITAL SIGNS     ISABELLE COMA SCALE      Temp  98  F (36.7  C) 09/25/18 1316   Best Eye Response  4-->(E4) spontaneous 09/25/18 0116    Temp src  Oral 09/25/18 1316   Best Motor Response  6-->(M6) obeys commands 09/25/18 0116    Resp  16 09/25/18 1316   Best Verbal Response  5-->(V5) oriented 09/25/18 0116    Pulse  61 09/23/18 0123   [place] Coma Scale Score  15 09/25/18 0116    Heart Rate  56 09/25/18 1315   HEIGHT AND WEIGHT      Pulse/Heart Rate Source  Monitor 09/25/18 0728   Height  1.676 m (5' 6\") 09/18/18 1614    BP  101/40 09/25/18 1411   Height Method  Stated 09/18/18 1614    BP Location  Left arm 09/25/18 0728   Weight  73.2 " kg (161 lb 6 oz) 09/22/18 0858    OXYGEN THERAPY     Weight Method  Bed scale 09/18/18 1614    SpO2  94 % 09/25/18 1316   Bed Scale  Fitted sheet;Cover/flat sheet (add comment for number);[place] (add comment for number);Pillow (add comment for number) (2 pillows, 1 blanket ) 09/18/18 1614    O2 Device  None (Room air) 09/25/18 1316   BSA (Calculated - sq m)  1.87 09/18/18 1614    PAIN/COMFORT     BMI (Calculated)  26.83 09/18/18 1614    Patient Currently in Pain  yes 09/25/18 1051   POSITIONING      Preferred Pain Scale  number (Numeric Rating Pain Scale) 09/25/18 0904   Body Position  independently positioning 09/25/18 1033    Patient's Stated Pain Goal  No pain 09/25/18 0904   Head of Bed (HOB)  HOB at 15 degrees 09/25/18 0921    0-10 Pain Scale  4 09/25/18 1051   Positioning/Transfer Devices  pillows 09/25/18 0921    Pain Location  Leg 09/25/18 1051   Chair  Recline and up in chair 09/24/18 1349    Pain Orientation  Right 09/25/18 1051   DAILY CARE      Pain Descriptors  Aching 09/25/18 1051   Activity Management  ambulated to bathroom 09/25/18 1033    Pain Intervention(s)  Declines 09/25/18 0904   Activity Assistance Provided  assistance, 1 person 09/25/18 1033    Response to Interventions  No change in pain 09/24/18 1539   Assistive Device Utilized  walker;gait belt 09/25/18 1033    ANALGESIA SIDE EFFECTS MONITORING     Additional Documentation  Activity Device Assistance (Row) 09/20/18 1555    Side Effects Monitoring: Respiratory Quality  R 09/24/18 1539   ECG      Side Effects Monitoring: Respiratory Depth  N 09/24/18 1539   ECG Rhythm  Atrial fibrillation 09/25/18 1316    Side Effects Monitoring: Sedation Level  1 09/24/18 1539                 Patient Lines/Drains/Airways Status    Active LINES/DRAINS/AIRWAYS     Name: Placement date: Placement time: Site: Days: Last dressing change:    Hemodialysis Vascular Access Subclavian vein catheter Right Subclavian       Subclavian        Hemodialysis Vascular  Access Arteriovenous fistula Right Arm       Arm        Peripheral IV 09/18/18 Left Upper forearm 09/18/18      Upper forearm   7     Wound 09/19/18 Right Knee Abrasion(s) 09/19/18   0730   Knee   6     Wound 09/19/18 Right Arm Skin tear moist wound bed near elbow 09/19/18   1500   Arm   5             Patient Lines/Drains/Airways Status    Active PICC/CVC     None            Intake/Output Detail Report     Date Intake       Output Net    Shift P.O. I.V. IV Piggyback Colloid Total Other Total       Noc 09/23/18 2300 - 09/24/18 0659 -- -- -- -- -- -- -- 0    Day 09/24/18 0700 - 09/24/18 1459 350 50 -- -- 400 -- -- 400    Berenice 09/24/18 1500 - 09/24/18 2259 660 -- -- -- 660 -- -- 660    Noc 09/24/18 2300 - 09/25/18 0659 -- -- -- -- -- -- -- 0    Day 09/25/18 0700 - 09/25/18 1459 -- -- -- -- -- 2000 2000 -2000      Last Void/BM       Most Recent Value    Urine Occurrence 1 at 09/25/2018 1000    Stool Occurrence 1 at 09/21/2018 0714      Case Management/Discharge Planning     Case Management/Discharge Planning Flowsheet     REFERRAL INFORMATION     Who is your support system?  Children;Sibling(s) 09/19/18 1944    Did the Initial Social Work Assessment result in a Social Work Case?  Yes 09/19/18 1941   Description of Support System  Supportive;Involved 09/19/18 1944    Admission Type  inpatient 09/19/18 1941   Support Assessment  Adequate family and caregiver support;Adequate social supports 09/19/18 1944    Arrived From  home or self-care 09/19/18 1941   COPING/STRESS      Referral Source  physician 09/19/18 1941   Major Change/Loss/Stressor  denies 09/18/18 1621    # of Referrals Placed by CTS  Communication hand-offs to next level of Care Providers 10/03/16 1426   EXPECTED DISCHARGE      Reason For Consult  discharge planning 09/19/18 1941   Expected Discharge Date  09/25/18 ( alone TCU?) 09/24/18 1120    Record Reviewed  history and physical;medical record 09/19/18 1941   ASSESSMENT/CONCERNS TO BE ADDRESSED      CTS  Assigned to Ryan Gallagher RN CTS 09/25/18 1322   Concerns To Be Addressed  no discharge needs identified 08/11/17 1132    [facility] 08/11/17 1132   DISCHARGE PLANNING      Primary Care MD Name  Dr. Moise 08/11/17 1132   Equipment Used at Home  bath bench;grab bar;walker, rolling 01/13/16 1532    LIVING ENVIRONMENT     FINAL RESOURCES      Lives With  alone 09/19/18 1347   Equipment Currently Used at Home  none 09/19/18 1347    Living Arrangements  other (see comments) ([facility]) 09/19/18 1347   Resources List  Skilled Nursing Facility 09/25/18 1008    Provides Primary Care For  no one 09/19/18 1944   Skilled Nursing [facility] [phone], Fax: [phone] 09/25/18 1418    Quality Of Family Relationships  supportive 09/19/18 1944   ABUSE RISK SCREEN      Able to Return to Prior Living Arrangements  yes 09/19/18 1944   QUESTION TO PATIENT:  Has a member of your family or a partner(now or in the past) intimidated, hurt, manipulated, or controlled you in any way?  no 09/18/18 1621    HOME SAFETY     QUESTION TO PATIENT: Do you feel safe going back to the place where you are living?  yes 09/18/18 1621    Patient Feels Safe Living in Home?  yes 09/19/18 1944   OBSERVATION: Is there reason to believe there has been maltreatment of a vulnerable adult (ie. Physical/Sexual/Emotional abuse, self neglect, lack of adequate food, shelter, medical care, or financial exploitation)?  no 09/18/18 1621    ASSESSMENT OF FAMILY/SOCIAL SUPPORT     OTHER      Marital Status  Single 09/19/18 1944   Are you depressed or being treated for depression?  No 09/18/18 1621

## 2018-09-18 NOTE — IP AVS SNAPSHOT
` ` Patient Information     Patient Name Sex     Maurizio Ohara ([id number]) Male 10/7/1929       Room Bed    [facility] 0504-00      Patient Demographics     Address Phone    [address] [facility] [address] [phone] (Home)  NONE (Work)  [phone] (Mobile) *Preferred*      Patient Ethnicity & Race     Ethnic Group Patient Race    American White      Emergency Contact(s)     Name Relation Home Work Mobile    Karyn Orozco [phone] none none      Documents on File        Status Date Received Description       Documents for the Patient    Privacy Notice - [place] Received 13     Insurance Card Received 17 Medicare/HP    External Medication Information Consent Accepted 01/08/15     Patient ID Received 13     Consent for Services - Hospital/Clinic Received () 13     Consent for EHR Access Received 13     [facility] Specified Other       Consent for Services - Hospital/Clinic Received () 14     HIM MECCA Authorization  14     Insurance Card Received 17 HP -    HIM MECCA Authorization  01/12/15     Consent for Services - Hospital/Clinic Received () 16     HIM MECCA Authorization  16 [facility]    Consent for Services/Privacy Notice - Hospital/Clinic Received () 02/10/16     HIM MECCA Authorization  10/03/16 [facility]    Patient ID Received 17 EXP 10/7/2018    HIM MECCA Authorization  10/27/16 [facility]    Consent for Services/Privacy Notice - Hospital/Clinic Received () 17     HIM MECCA Authorization  17     Care Everywhere Prospective Auth Received 10/18/17     HIM MECCA Authorization  18     Consent for Services - Hospital and Clinic Received 18     HIE Auth Received 18     HIM MECCA Authorization  18     Insurance Card Received 06/15/18 [employer]     Consent to Communicate Received 18 Auth to Share PHI    Consent for Services - Hospital/Clinic  (Deleted)          Documents for the Encounter    CMS IM for Patient Signature Received 09/18/18     CMS IM for Patient Signature Received 09/24/18 2nd IMM      Admission Information     Attending Provider Admitting Provider Admission Type Admission Date/Time    Serg Anderson, Serg Garcia,  Emergency 09/18/18  1138    Discharge Date Hospital Service Auth/Cert Status Service Area     General Medicine [facility] [facility]    Unit Room/Bed Admission Status        5 MEDICAL SURGICAL 0504/0504-01 Admission (Confirmed)       Admission     Complaint    Cellulitis      Hospital Account     Name Acct ID Class Status Primary Coverage    Maurizio Ohara [id number] Inpatient Open MEDICARE - MEDICARE FOR HB SUPPLEMENT            Guarantor Account (for Hospital Account #[id number])     Name Relation to Pt Service Area Active? Acct Type    Maurizio Ohara Self FCS Yes Personal/Family    Address Phone          [address] [place] [address] [phone](H)  NONE(O)              Coverage Information (for Hospital Account #[id number])     1. MEDICARE/MEDICARE FOR HB SUPPLEMENT     F/O Payor/Plan Precert #    MEDICARE/MEDICARE FOR HB SUPPLEMENT     Subscriber Subscriber #    Maurizio Ohara [id number]    Address Phone    ATT[employer] CLAIMS  [address]  [facility] [place] [address] [phone]          2. HEALTHPART[employer]ERS/HEALTH[employer]ERS FREEDOM PLAN     F/O Payor/Plan Precert #    HE[employer]ERS/HEALTH[employer]ERS FREEDOM PLAN     Subscriber Subscriber #    Maurizio Ohara [id number]    Address Phone    [address]  [place] [address] [phone]

## 2018-09-18 NOTE — ED NOTES
Pt was at Dialysis when dialysis staff noticed pt's right lower leg swollen and red.  Pt sent to ED.  Pt's run completed.

## 2018-09-18 NOTE — ED NOTES
"[facility]  ED Nurse Handoff Report    Maurizio Ohara is a 88 year old male   ED Chief complaint: Leg Swelling  . ED Diagnosis:   Final diagnoses:   Cellulitis of right lower extremity     Allergies:   Allergies   Allergen Reactions     Glyburide      Lisinopril      Hyperkalemia when hospitalized 4/5/2011     Metformin      Renal failure stage 4     Penicillins      SHADY Gallagher clarified with pt, pt does not remember rxn, it was a long time ago, and \"nothing crazy\".     Verapamil        Code Status: Full Code  Activity level - Baseline/Home:  Independent. Activity Level - Current:   Stand with Assist. Lift room needed: No. Bariatric: No   Needed: No   Isolation: No. Infection: Not Applicable.     Vital Signs:   Vitals:    09/18/18 1141 09/18/18 1230   BP:  108/59   Resp:  12   Temp: 97.8  F (36.6  C)    SpO2:  99%   Weight: 84.4 kg (186 lb)        Cardiac Rhythm:  ,      Pain level:    Patient confused: No. Patient Falls Risk: Yes.   Elimination Status: Unable to void   Patient Report - Initial Complaint: Leg iscomfort. Focused Assessment: A&O c/o right lower leg discomfort. Pt has swelling, redness right lower leg. Also, has wound ant patella.Distal motion and sensation intact.  Tests Performed: Labs and imaging. Abnormal Results:  Ankle XR, G/E 3 views, right   Final Result   IMPRESSION: No fracture identified.      YOLANDE WAYNE MD      XR Knee Right 1/2 Views   Final Result   IMPRESSION: Mild joint effusion. Otherwise unremarkable.      YOLANDE WAYNE MD        Labs Ordered and Resulted from Time of ED Arrival Up to the Time of Departure from the ED   CBC WITH PLATELETS DIFFERENTIAL - Abnormal; Notable for the following:        Result Value    RBC Count 2.96 (*)     Hemoglobin 10.5 (*)     Hematocrit 33.9 (*)      (*)     MCH 35.5 (*)     MCHC 31.0 (*)     RDW 16.1 (*)     Platelet Count 106 (*)     All other components within normal limits   COMPREHENSIVE METABOLIC PANEL - " Abnormal; Notable for the following:     Glucose 103 (*)     Creatinine 4.05 (*)     GFR Estimate 14 (*)     GFR Estimate If Black 17 (*)     All other components within normal limits   LACTIC ACID WHOLE BLOOD   PULSE OXIMETRY NURSING   PERIPHERAL IV CATHETER   PREP FOR PROCEDURE   DRESSING   PATIENT CARE ORDER   BLOOD CULTURE   BLOOD CULTURE      Treatments provided: Antibiotics  Family Comments: not present  OBS brochure/video discussed/provided to patient:  Yes  ED Medications:   Medications   lidocaine 1 % 1 mL (not administered)   lidocaine (LMX4) cream (not administered)   sodium chloride (PF) 0.9% PF flush 3 mL (not administered)   sodium chloride (PF) 0.9% PF flush 3 mL (0 mLs Intracatheter Hold 9/18/18 1347)   ceFAZolin (ANCEF) intermittent infusion 1 g (0 g Intravenous Stopped 9/18/18 1330)   Td (tetanus & diphtheria toxoids) -  adult formulation - for ages 7 years and older (0.5 mLs Intramuscular Given 9/18/18 1347)     Drips infusing:  No  For the majority of the shift, the patient's behavior Green. Interventions performed were N/A.     Severe Sepsis OR Septic Shock Diagnosis Present: No      ED Nurse Name/Phone Number: Gerber Mary,   2:15 PM      RECEIVING UNIT ED HANDOFF REVIEW    Above ED Nurse Handoff Report was reviewed: Yes  Reviewed by: Brittany Love on September 18, 2018 at 3:36 PM

## 2018-09-18 NOTE — ED NOTES
Pt resting Comfortably on bed.  Alert & Oriented  Pt C/O right lower leg swelling and wound.  See follow assessment.

## 2018-09-18 NOTE — IP AVS SNAPSHOT
` `           [place] [address] MEDICAL SURGICAL: [phone]                 INTERAGENCY TRANSFER FORM - NOTES (H&P, Discharge Summary, Consults, Procedures, Therapies)   2018                    Hospital Admission Date: 2018  MAURIZIO OHARA   : 10/7/1929  Sex: Male        Patient PCP Information     Provider PCP Type    Justina Moise [facility]         History & Physicals      H&P by Aisha Franco PA-C at 2018  3:38 PM     Author:  Aisha Franco PA-C Service:  Hospitalist Author Type:  Physician Assistant    Filed:  2018  3:39 PM Date of Service:  2018  3:38 PM Creation Time:  2018  3:19 PM    Status:  Attested :  Aisha Franco PA-C (Physician Assistant)    Cosigner:  Serg Anderson DO at 2018  8:40 PM        Attestation signed by Serg Anderson DO at 2018  8:40 PM        Physician Attestation   ISerg, saw and evaluated Maurizio Ohara as part of a shared visit.  I have reviewed and discussed with the advanced practice provider their history, physical and plan.    I personally reviewed the vital signs, medications and labs.    My key history or physical exam findings:  Mr. Ohara has had progressive RLE erythema.  The leg has become more painful.  Exam demonstrated erythema from just below knee to ankle region on the right.  Of concern the knee has a modest effusion and is quite tender on the joint line though the knee itself is not erythemic.    Key management decisions made by me:   1.  Ancef IV  2.  Consult orthopedics given effusion with joint pain, nearby infection  3.  Elevation RLE  4.  Neph consult for continued dialysis, next run Thursday      Serg Anderson DO, [facility]  Date of Service (when I saw the patient): 18                               History and Physical     Maurizio Ohara MRN# [id number]   YOB: 1929 Age: 88 year old      Date of Admission:  2018    Primary care  provider: Justina Moise          Assessment and Plan:   Maurizio Ohara is a 88 year old male with a PMH significant for end-stage renal disease on hemodialysis Tuesday Thursday Saturday, coronary disease, hypertension, chronic anemia, diabetes (not on any medication) who presents with right worsening lower extremity cellulitis after a fall 1 week ago.  He denies any constitutional symptoms such as fevers or chills but has had progressive erythema warmth and swelling of the right leg and now has become tender to touch and with limited mobility.  He will be admitted to the hospital under inpatient status for treatment of right lower extremity cellulitis.     1.  Right leg cellulitis-he has an abrasion around the right knee that is likely the entry point for infection.  His leg is beefy red and hot to touch and is moderately swollen.  Appearance is consistent with a Streptococcus-like infection.  We will continue IV Ancef and he will need to elevate his right leg to help decrease swelling.  Given his multiple medical comorbidities and underlying end-stage renal disease and diabetes, he will need at least 2 days of IV antibiotics to ensure improvement.  We will also check right lower extremity Doppler ultrasound to  to rule out DVT as he has been less active since his fall 1 week ago.    2. End-stage renal disease-he was able to finish his run of dialysis today.  Continue dialysis diet.   We will request nephrology assistant with management of his dialysis.    3.  Chronic anemia-hemoglobin seems to be stable.   4.  Diabetes mellitus-patient states that he is no longer consider a diabetic, he is not on any diabetic medication.  Will check hemoglobin A1c as he has not had one since 2016  5.  Coronary disease: Stable no complaints of recent anginal symptoms.  Continue Toprol XL, aspirin daily  6.  Disposition-patient lives independently in his own home and still drives.  I suspect his mobility is somewhat limited  due to his new right lower extremity cellulitis.  I will have PT evaluate him as well as social work to assess for discharge needs.    Admit to inpatient status  CODE STATUS-DNR I, this was discussed with the patient  Disposition-hopefully home, potentially with home services.              Chief Complaint:   Right leg cellulitis         History of Present Illness:   Maurizio Ohara is a 88 year old male who presents with complaints of worsening right leg cellulitis over the last week.  Art is a very pleasant spry 88-year-old who has a complex medical history listed below.  He has end-stage renal disease maintained on hemodialysis Tuesdays Thursdays and Saturday.  He lives independently in his own home and is still driving.  A week ago he states that he was getting up from his self but fell hitting his right leg on a coffee table.  He sustained an abrasion to the right knee and since then has been putting antibiotic ointment on it.  However over the last several days he has had increasing erythema swelling and warmth over that area.  This was seen last by Dr. Yung during his hemodialysis session on Saturday,  and was told to keep an eye on it.  However when he went to dialysis today the nurse had noticed increasing warmth, erythema extension as well as possibly yellow discharge from the abrasion.  He was instructed to come into the emergency room.    Patient denies any fevers or chills but states that it is somewhat difficult and painful to bear weight on the right leg he is complaining of increasing swelling there he denies any previous history of cellulitis or MRSA in the past.              Past Medical History:     Past Medical History:   Diagnosis Date     Anemia      Anemia      CAD (coronary artery disease) 12/24/14    PCI and BMS to mid RCA (5.0 x 20mm) with residual mod diffuse mid LAD disease     Chronic kidney disease     on Dialysis     Diabetes (H)      Hypertension      Mitral regurgitation  "12/24/2014    mild-mod (1-2+) per echo     Mitral valve disorders(424.0) 12/24/2014    mild/mod (1-2+) MR     Myocardial infarction 12/24/2014    NSTEMI     Pulmonary hypertension 12/24/2014    RVSP elevated c/w mild-mod PH per echo     Renal disease due to diabetes mellitus (H)     End stage; on Dialysis           Past Surgical History:     Past Surgical History:   Procedure Laterality Date     CORONARY ANGIOGRAPHY ADULT ORDER  12/24/2014     ENT SURGERY      Tonsillectomy     HEART CATH, ANGIOPLASTY  12/24/2014    PCI and BMS (5.0x20mm)to mid RCA     THORACIC SURGERY      Herniated disc           Social History:     Social History     Social History     Marital status: Single     Spouse name: N/A     Number of children: N/A     Years of education: N/A     Occupational History     Not on file.     Social History Main Topics     Smoking status: Former Smoker     Smokeless tobacco: Never Used     Alcohol use Yes      Comment: \"about as much as you can pour in a thimble in a year\"     Drug use: No     Sexual activity: Not Currently     Partners: Female     Other Topics Concern     Caffeine Concern Yes     1-2 cups daily     Sleep Concern No     Special Diet Yes     kidney diet     Exercise Yes     walking     Seat Belt Yes     Social History Narrative           Family History:     Family History   Problem Relation Age of Onset     Diabetes Mother           Allergies:      Allergies   Allergen Reactions     Glyburide      Lisinopril      Hyperkalemia when hospitalized 4/5/2011     Metformin      Renal failure stage 4     Glenn Gallagher RN clarified with pt, pt does not remember rxn, it was a long time ago, and \"nothing crazy\".     Verapamil            Medications:     Prior to Admission medications    Medication Sig Last Dose Taking? Auth Provider   aspirin 81 MG tablet Take 81 mg by mouth daily 9/17/2018 at Unknown time Yes Unknown, Entered By History   calcium acetate (PHOSLO) 667 MG CAPS Take 2,001 mg by " mouth 3 times daily (with meals) 9/18/2018 at Unknown time Yes Unknown, Entered By History   metoprolol succinate (TOPROL-XL) 25 MG 24 hr tablet Take 25 mg by mouth daily 9/17/2018 at Unknown time Yes Unknown, Entered By History   NIFEdipine ER (NIFEDIAC CC) 90 MG TB24 Take 90 mg by mouth every morning 9/17/2018 at Unknown time Yes Unknown, Entered By History   Phenyleph-Promethazine-Cod (PROMETHAZINE VC/CODEINE) 5-6.25-10 MG/5ML SYRP Take 1-2 tsp. by mouth every 6 hours as needed Past Month at Unknown time Yes Unknown, Entered By History   atorvastatin (LIPITOR) 40 MG tablet Take 40 mg by mouth daily as needed More than a month at Unknown time  Unknown, Entered By History   nitroglycerin (NITROSTAT) 0.4 MG sublingual tablet Place 1 tablet (0.4 mg) under the tongue every 5 minutes as needed for chest pain   Julianne Dan PA-C            Review of Systems:   A Comprehensive greater than 10 system review of systems was carried out.  Pertinent positives and negatives are noted above.  Otherwise negative for contributory information.          Physical Exam:   Blood pressure 108/59, temperature 97.8  F (36.6  C), resp. rate 12, weight 84.4 kg (186 lb), SpO2 99 %.  Exam:  GENERAL:  Comfortable. Non toxic  PSYCH: pleasant, oriented, No acute distress.  HEENT:  PERRLA. Normal conjunctiva, normal hearing, nasal mucosa and Oropharynx are normal.  NECK:  Supple, no neck vein distention, adenopathy or bruits, normal thyroid.  HEART:  Normal S1, S2 with 2/6 ANI, no pericardial rub, gallops or S3 or S4.  LUNGS:  Clear to auscultation, normal Respiratory effort. No wheezing, rales or ronchi.  ABDOMEN:  Soft, no hepatosplenomegaly, normal bowel sounds. Non-tender, non distended.   EXTREMITIES:  Right knee with a 2 in abrasion over the lower patella, no area of fluctuance or crepitus, surrounding leg with bright beefy red erythema, +swelling and warmth and tender to touch.   SKIN:  Dry to touch, scattered areas of ecchymosis or  various healing stages.   NEUROLOGIC:  CN 2-12 intact, BL 5/5 symmetric upper and lower extremity strength, sensation is intact with no focal deficits.           Data:[CH1.1]       Recent Labs  Lab 09/18/18  1228   WBC 6.4   HGB 10.5*   HCT 33.9*   *   *       Recent Labs  Lab 09/18/18  1228      POTASSIUM 4.5   CHLORIDE 101   CO2 26   ANIONGAP 8   *   BUN 27   CR 4.05*   GFRESTIMATED 14*   GFRESTBLACK 17*   KIMBER 9.0       Recent Labs  Lab 09/18/18  1241 09/18/18  1232   CULT No growth after 2 hours No growth after 2 hours       Recent Labs  Lab 09/18/18  1228   *       Recent Labs  Lab 09/18/18  1228   AST 17   ALT 9   ALKPHOS 144   BILITOTAL 1.0       Recent Labs  Lab 09/18/18  1228   LACT 2.0       Results for orders placed or performed during the hospital encounter of 09/18/18   XR Knee Right 1/2 Views    Narrative    XR KNEE RT 1 /2 VW   9/18/2018 1:21 PM     HISTORY:  trauma;       Impression    IMPRESSION: Mild joint effusion. Otherwise unremarkable.    YOLANDE WAYNE MD   Ankle XR, G/E 3 views, right    Narrative    RIGHT ANKLE THREE OR MORE VIEWS   9/18/2018 1:22 PM     HISTORY:  Trauma.     FINDINGS: Soft tissue swelling. Calcaneal spurring.      Impression    IMPRESSION: No fracture identified.    YOLANDE WAYNE MD   US Lower Extremity Venous Duplex Right    Narrative    ULTRASOUND RIGHT LOWER EXTREMITY VENOUS DUPLEX September 18, 2018 3:13  PM    HISTORY: Right lower extremity swelling.    TECHNIQUE: Venous Doppler US.?Color flow and spectral Doppler with  waveform analysis performed.    FINDINGS: Soft tissue edema. No evidence of lower extremity deep  venous thrombosis.       Impression    IMPRESSION: Negative for deep vein thrombosis.[CH1.2]         Aisha Franco PA-C[CH1.1]       Revision History        User Key Date/Time User Provider Type Action    > CH1.2 9/18/2018  3:39 PM Aisha Franco PA-C Physician Assistant Sign     CH1.1 9/18/2018  3:19 PM Aisha Franco  ZEFERINO Staley Physician Assistant                   Discharge Summaries     No notes of this type exist for this encounter.         Consult Notes      Consults by Susu Silva APRN CNS at 9/25/2018  9:08 AM     Author:  Susu Silva APRN CNS Service:  Pain Service Author Type:  Clinical Nurse Specialist    Filed:  9/25/2018 11:55 AM Date of Service:  9/25/2018  9:08 AM Creation Time:  9/25/2018  8:26 AM    Status:  Addendum :  Susu Silva APRN CNS (Clinical Nurse Specialist)         [facility]  Pain Service Consultation   Text Page    Date of Admission:  9/18/2018    Assessment & Plan   Maurizio Ohara is a 88 year old male who was admitted on 9/18/2018. I was asked by Dr. Bola Garcia to see the patient for R leg pain.    1)[JW1.1] Acute RLE pain attributed to RLE cellulitis.[JW1.2]    2)  Patient without chronic pain, NOT on chronic opioid.  Baseline[JW1.1] 0[JW1.2] mg Daily Morphine Equivalent[JW1.1] as dispensed and as reported daily use[JW1.2].  Patient has[JW1.1] a possible opioid tolerance due to promethazine/vc/codeine 5-6.25-10 mg syrup he takes q 6 hours prn as pta.[JW1.2]    Patient's opioid use in past 24 hours: 50 mg tramadol = 5 mg Daily Morphine Equivalent    3)  Risk factors for opioid related harms[JW1.1]  -Renal insufficiency  - Age > 65 years old  -Opioid has recently been changed[JW1.2]    4)  Opioid induced side-effects:[JW1.1]  -Constipation, denies  -Nausea/Vomit, denies  -Sedation, no  -Urinary Retention, no    5[JW1.2])  Other/Related:      - ESRD, on hemodialysis  - CAD, on ASA, statin, betablocker  - HTN  - Anemia    PLAN:[JW1.1]   1[JW1.2])  Antibiotics for RLL cellulitis as per Hospitalist.[JW1.1]  2[JW1.2])Non-opioid multimodal medication therapy  -Topical:[JW1.1]Voltaren 1% Topical Gel 4 times per day and  Lidocaine Ointment 5% q 4 hours WA to RLE - if pt feels benefit, please order for pt at TCU.[JW1.2]  -N-SAIDS:[JW1.1]Avoid due to  Renal failure.[JW1.2]  -Muscle Relaxants:[JW1.1]None indicated[JW1.2]  -Adjuvants:[JW1.1]Acetaminophen 975 mg PO q 8 hours, continue until pain iimproves then change to PRN.[JW1.2]  -Antidepresants/anxiolytics:[JW1.1]none  3[JW1.2])  Non-medication interventions[JW1.1]  Positioning with RLE elevated when not ambulating, ICE, Distraction with TV or other activities he enjoys, Physical therapy  4[JW1.2])  Opioids:[JW1.1] Tramadol 50 mg PO q 6 hours prn severe pain, max 200 mg per day per renal dosing.[JW1.2]   Opioids Treatment Goal:[JW1.1] -Improvement in function  -Participate in PT  -Management of acute pain during hospitalization, expected 3-7 days needed at DC  6[JW1.2])  Constipation Prophylaxis[JW1.1]  Senna colace 1-2 tabs po BI[JW1.2]D. Miralax daily prn. Dulcolax suppository daily prn.[JW1.3]  7)  Pain Education  -Opioid safe use, storage and disposal information included in DC AVS  8)  DC Planning   Discussed goal of Opioid therapy as above with[JW1.1] bedside nurse, pt  Length of therapy is less than 10 days, opioids may be stopped without taper. and Recommend short supply of opioids only (3 days) per CDC guidelines for acute pain management.[JW1.2]  Continued outpatient management of pain per[JW1.1] TCU, PCP.[JW1.2]  Disposition:[JW1.1]TCU[JW1.2]  Support systems:   Outpatient Referrals:[JW1.1] none.[JW1.2]  The following risk factors have been identified for unintentional overdose:[JW1.1] patient is > 65 years old and patient hascompromised kidney or liver fuction[JW1.2] .[JW1.1] Will not discharge with nasal naloxone as pt expected to have limited, short term use and low dose.[JW1.2]    Time Spent on this Encounter   I spent[JW1.1] 50[JW1.2] minutes[JW1.1] <50%[JW1.2]  in assessment of the patient, counseling and discussion with the patient as documented in sections below.[JW1.1] Rest of time[JW1.2] in review of chart, documentation, coordination of care and discussion with the health care team.    Susu  "KIAH LEVINE, CNS  Pain Management and Palliative Care  [facility]  Pgr: [phone]      Reason for Consult   Reason for consult: I was asked by Dr. Bola Garcia to evaluate this patient for R leg pain.    Primary Care Physician   Primary Care Physician:Justina Moise  Pain Specialist:[JW1.1] No[JW1.2]    Chief Complaint   R leg cellulitis    History is obtained from the patient and electronic health record    History of Present Illness   Per H and P by Aisha Franco PA-C Maurizio Ohara is a 88 year old male who presents with increasing erythema, swelling and warmth of his R leg. A week ago he was getting up, but fell and hit his right leg on a coffee table, sustaining an abrasion to his right knee. He had been putting antibiotic ointment on it. The abrasion was assessed by Dr. Yung during his Saturday dialysis, and pt was told to monitor it and continue with topical antibiotic. When he went to dialysis on Tuesday, the dialysis nurse assessed the wound and noted increasing warmth, erythema extension and yellow discharge. Pt was instructed to be assessed in the ED. Pt reports increased pain with weight bearing on his R leg due to swelling.[JW1.1]    When I see pt, he is alert, oriented, in dialysis and expects to discharge to TCU today. He will continue with antibiotics and therapies with expectation that he will return home to independent living.[JW1.2]  CURRENT PAIN:  His pain is located in the[JW1.1] R lower leg, from ankle to knee[JW1.2]  It is described as[JW1.1] Sharp and Tender[JW1.2]  He rates it as ranging between[JW1.1] 3[JW1.2]/10 and[JW1.1] 4[JW1.2]/10  The average is[JW1.1] 3[JW1.2]/10 on a scale of 0-10  Currently it is rated as[JW1.1] 4[JW1.2]/10  It improves by[JW1.1] \"not much\", \" I think this will take awhile to get better\" acknowledges that the redness and warmth are improving..[JW1.2]  It worsens by[JW1.1] touch, walking on his leg.[JW1.2]  He[JW1.1] has[JW1.2] been " compliant with the recommendations while in the hospital.      PAIN HISTORY:[JW1.1]  None.[JW1.2]    PAST PAIN TREATMENT:   Medications:[JW1.1] oxycodone, tramadol, tylenol, antibiotic therapy[JW1.2]  Non-phamacologic modalities:[JW1.1]elevating RLE, ice prn.[JW1.2]  Previous interventions/surgeries:[JW1.1] None.[JW1.2]    [place]:   2 scripts for promethazine codeine syrup, last 9/8/2018.  No other scripts for controlled substances in the past 12 months.      Past Medical History   I have reviewed this patient's medical history and updated it with pertinent information if needed.   Past Medical History:   Diagnosis Date     Anemia      Anemia      CAD (coronary artery disease) 12/24/14    PCI and BMS to mid RCA (5.0 x 20mm) with residual mod diffuse mid LAD disease     Chronic kidney disease     on Dialysis     Diabetes (H)      Hypertension      Mitral regurgitation 12/24/2014    mild-mod (1-2+) per echo     Mitral valve disorders(424.0) 12/24/2014    mild/mod (1-2+) MR     Myocardial infarction 12/24/2014    NSTEMI     Pulmonary hypertension 12/24/2014    RVSP elevated c/w mild-mod PH per echo     Renal disease due to diabetes mellitus (H)     End stage; on Dialysis       Past Surgical History   I have reviewed this patient's surgical history and updated it with pertinent information if needed.[JW1.1]  Past Surgical History:   Procedure Laterality Date     CORONARY ANGIOGRAPHY ADULT ORDER  12/24/2014     ENT SURGERY      Tonsillectomy     HEART CATH, ANGIOPLASTY  12/24/2014    PCI and BMS (5.0x20mm)to mid RCA     THORACIC SURGERY      Herniated disc[JW1.4]         Prior to Admission Medications   Prior to Admission Medications   Prescriptions Last Dose Informant Patient Reported? Taking?   NIFEdipine ER (NIFEDIAC CC) 90 MG TB24 9/17/2018 at Unknown time  Yes Yes   Sig: Take 90 mg by mouth every morning   Phenyleph-Promethazine-Cod (PROMETHAZINE VC/CODEINE) 5-6.25-10 MG/5ML SYRP Past Month at  "Unknown time Self Yes Yes   Sig: Take 1-2 tsp. by mouth every 6 hours as needed   aspirin 81 MG tablet 9/17/2018 at Unknown time  Yes Yes   Sig: Take 81 mg by mouth daily   atorvastatin (LIPITOR) 40 MG tablet More than a month at Unknown time  Yes No   Sig: Take 40 mg by mouth daily as needed   calcium acetate (PHOSLO) 667 MG CAPS 9/18/2018 at Unknown time Self Yes Yes   Sig: Take 2,001 mg by mouth 3 times daily (with meals)   metoprolol succinate (TOPROL-XL) 25 MG 24 hr tablet 9/17/2018 at Unknown time  Yes Yes   Sig: Take 25 mg by mouth daily   nitroglycerin (NITROSTAT) 0.4 MG sublingual tablet   No No   Sig: Place 1 tablet (0.4 mg) under the tongue every 5 minutes as needed for chest pain      Facility-Administered Medications: None     Allergies   Allergies   Allergen Reactions     Glyburide      Lisinopril      Hyperkalemia when hospitalized 4/5/2011     Metformin      Renal failure stage 4     Glenn Gallagher RN clarified with pt, pt does not remember rxn, it was a long time ago, and \"nothing crazy\".     Verapamil        Social History   I have reviewed this patient's social history and updated it with pertinent information if needed. Maurizio Ohara[JW1.1]  reports that he has quit smoking. He has never used smokeless tobacco. He reports that he drinks alcohol. He reports that he does not use illicit drugs.[JW1.4]  \"If you could pour whisky in a thimble, that's how much I drink in a year\". Denies other substances.[JW1.2]    Family History   I have reviewed this patient's family history and updated it with pertinent information if needed.[JW1.1]   Family History   Problem Relation Age of Onset     Diabetes Mother[JW1.4]      Family history of addiction[JW1.1] -0 not assessed.[JW1.2]    Review of Systems[JW1.1]   CONSTITUTIONAL: NEGATIVE for fever, chills, change in weight  ENT/MOUTH: NEGATIVE for ear, mouth and throat problems  RESP: NEGATIVE for significant cough or SOB  CV: NEGATIVE for chest " pain, palpitations or peripheral edema   D[JW1.2]enies Bowel or bladder dysfunction    Physical Exam   Temp:  [97.6  F (36.4  C)-98.1  F (36.7  C)] 97.7  F (36.5  C)  Heart Rate:  [62-71] 62  Resp:  [18-24] 18  BP: ()/(33-61) 92/49  SpO2:  [93 %-98 %] 95 %  161 lbs 6.03 oz  GEN:  Alert, oriented x 3, appears comfortable, No apparent distress.  HEENT:  Normocephalic/atraumatic, no scleral icterus, no nasal discharge, mouth moist.  CV:  RRR, S1, S2; no murmurs or other irregularities noted.  +3 DP/PT pulses bilaterally;[JW1.1] +2[JW1.2] edema bilateral lower extrem[JW1.1]i[JW1.2]ties.  RESP:  Clear to auscultation bilaterally without rales/rhonchi/wheezing/retractions.  Symmetric chest rise on inhalation noted.  Normal respiratory effort.  ABD:  Rounded, soft, non-tender/non-distended.  +BS[JW1.1]. No BM since admission.[JW1.3]  EXT:  Edema & pulses as noted above.  Color, moisture and sensation intact x 4.     M/S:[JW1.1]   Hypersensitive to touch RLE. Discolored, bruised  2 and 3rd toes but warm to touch[JW1.2].    SKIN:  Dry to touch, no exanthems noted in the visualized areas.    NEURO: Symmetric strength +5/5.  Sensation to touch intact all extremities.    PAIN BEHAVIOR: Cooperative  Psych:  Normal affect.  Calm, cooperative, conversant appropriately.       Data[JW1.1]   Results for orders placed or performed during the hospital encounter of 09/18/18 (from the past 24 hour(s))   Platelet count   Result Value Ref Range    Platelet Count 192 150 - 450 10e9/L[JW1.5]              Revision History        User Key Date/Time User Provider Type Action    > JW1.3 9/25/2018 11:55 AM Susu Silva APRN CNS Clinical Nurse Specialist Addend     JW1.2 9/25/2018 11:44 AM Susu Silva APRN CNS Clinical Nurse Specialist Sign     JW1.4 9/25/2018  8:59 AM Susu Silva APRN CNS Clinical Nurse Specialist      JW1.5 9/25/2018  8:27 AM Susu Silva APRN CNS Clinical Nurse Specialist      JW1.1  9/25/2018  8:26 AM Susu Silva APRN CNS Clinical Nurse Specialist             Consults by White, Corinne C, LSW at 9/19/2018  7:53 PM     Author:  White, Corinne C, LSW Service:  (none) Author Type:      Filed:  9/19/2018  7:53 PM Date of Service:  9/19/2018  7:53 PM Creation Time:  9/19/2018  7:45 PM    Status:  Signed :  White, Corinne C, LSW ()     Consult Orders:    1. Social Work IP Consult [[id number]] ordered by Aisha Franco PA-C at 09/18/18 1510                Care Transition Initial Assessment -   Reason For Consult: discharge planning  Met with: Patient    Active Problems:    Cellulitis       DATA  Lives With: alone  Living Arrangements: other (see comments) ([facility])  Description of Support System: Supportive, Involved  Who is your support system?: Children, Sibling(s)  Support Assessment: Adequate family and caregiver support, Adequate social supports. Pt has a niece who does his cleaning and laundry  Identified issues/concerns regarding health management: pt on Dialysis tue/th/sat in [facility]  Pt being treated for cellulitis       Quality Of Family Relationships: supportive     ASSESSMENT  Cognitive Status:  awake, alert and oriented  Concerns to be addressed: Met with pt..  PT is very independent at home. He still drives, is a  and likes to fly [place]  Pt has has home care in the past but unsure what agency. Back in the 90's was in TCU in [facility] where is sister lives. Per family report family would like him to move back.. Pt is not planning to move at this time  When sw spoke with him about TCU options pt minimized the need to do this.  He plans to discharge home and unsure if he will accept home care support  Pt has access to two different kinds of walker at his use but has not  sw suggested life line. Pt declined.  PT plans to try and walk with staff during the day  Pt was not wiling to consider TCU as this time but  open to sw stopping in tomorrow to assess how pt is doing  .     PLAN  SW will need to follow up with pt about DCplanning support[CW1.1]      Revision History        User Key Date/Time User Provider Type Action    > CW1.1 9/19/2018  7:53 PM White, Corinne C, LSW  Sign            Consults by Harvinder Anthony MD at 9/19/2018 12:51 PM     Author:  Harvinder Anthony MD Service:  Nephrology Author Type:  Physician    Filed:  9/19/2018  1:14 PM Date of Service:  9/19/2018 12:51 PM Creation Time:  9/19/2018 12:51 PM    Status:  Signed :  Harvinder Anthony MD (Physician)     Consult Orders:    1. Nephrology IP Consult: Patient to be seen: Routine - within 24 hours; will need HD Tu/Thurs/Sat; Consultant may enter orders: Yes [[id number]] ordered by Aisha Franco PA-C at 09/18/18 1510                RENAL CONSULTATION NOTE    REFERRING MD:  Aisha Franco PA-C    REASON FOR CONSULTATION:  ESRD    HPI:[OP1.1]  88 y.o pleasant gentleman with ESRD, CAD, DM and hypertension, who was admitted on 9/18 for RLE cellulitis. Patient sustained a cut below the right knee when he bumped into the coffee stable. He developed redness that spread down down his leg. Dr. Yung advised him to monitor it for infection when he saw him on Saturday. He came to dialysis on Tuesday, and RN was concerned that it was infected. He was advised to come in for further evaluation. Currently, he is being treated for RLE cellulitis with Ancef. Today, he says he feel well. He denies fever. No cardiopulmonary complaints. The remaining 12-points ROS are unremarkable.[OP1.2]    ROS:  A complete review of systems was performed and is negative except as noted above.    PMH:    Past Medical History:   Diagnosis Date     Anemia      Anemia      CAD (coronary artery disease) 12/24/14    PCI and BMS to mid RCA (5.0 x 20mm) with residual mod diffuse mid LAD disease     Chronic kidney disease     on Dialysis     Diabetes (H)      Hypertension   "    Mitral regurgitation 12/24/2014    mild-mod (1-2+) per echo     Mitral valve disorders(424.0) 12/24/2014    mild/mod (1-2+) MR     Myocardial infarction 12/24/2014    NSTEMI     Pulmonary hypertension 12/24/2014    RVSP elevated c/w mild-mod PH per echo     Renal disease due to diabetes mellitus (H)     End stage; on Dialysis       PSH:    Past Surgical History:   Procedure Laterality Date     CORONARY ANGIOGRAPHY ADULT ORDER  12/24/2014     ENT SURGERY      Tonsillectomy     HEART CATH, ANGIOPLASTY  12/24/2014    PCI and BMS (5.0x20mm)to mid RCA     THORACIC SURGERY      Herniated disc       MEDICATIONS:      aspirin  81 mg Oral Daily     atorvastatin  40 mg Oral Daily     calcium acetate  2,001 mg Oral TID w/meals     ceFAZolin  1 g Intravenous Q12H     heparin  5,000 Units Subcutaneous Q12H     metoprolol succinate  25 mg Oral Daily     senna-docusate  1 tablet Oral BID    Or     senna-docusate  2 tablet Oral BID       ALLERGIES:    Allergies as of 09/18/2018 - Review Complete 09/18/2018   Allergen Reaction Noted     Glyburide  12/24/2014     Lisinopril  01/08/2015     Metformin  01/08/2015     Penicillins  12/24/2014     Verapamil  12/24/2014       FH:    Family History   Problem Relation Age of Onset     Diabetes Mother        SH:    Social History     Social History     Marital status: Single     Spouse name: N/A     Number of children: N/A     Years of education: N/A     Occupational History     Not on file.     Social History Main Topics     Smoking status: Former Smoker     Smokeless tobacco: Never Used     Alcohol use Yes      Comment: \"about as much as you can pour in a thimble in a year\"     Drug use: No     Sexual activity: Not Currently     Partners: Female     Other Topics Concern     Caffeine Concern Yes     1-2 cups daily     Sleep Concern No     Special Diet Yes     kidney diet     Exercise Yes     walking     Seat Belt Yes     Social History Narrative       PHYSICAL EXAM:    /51 (BP " "Location: Left arm)  Pulse 63  Temp 97.8  F (36.6  C) (Oral)  Resp 18  Ht 1.676 m (5' 6\")  Wt 75.3 kg (165 lb 14.4 oz)  SpO2 94%  BMI 26.78 kg/m2  GENERAL: pleasant, alert, NAD  HEENT:  Normocephalic. No gross abnormalities.  Pupils equal.  MMM.    CV: RRR,[OP1.1] +[OP1.2]murmur, no clicks, gallops, or rubs,[OP1.1] trace[OP1.2] edema, no carotid bruits  RESP: Clear bilaterally with good efforts[OP1.1]. No wheezes or crackles.[OP1.2]  GI: Abdomen o[OP1.1]bese, soft, NT.[OP1.2]  MUSCULOSKELETAL:[OP1.1] RLE is pretty red but seems better. Trace edema RLE>LLE. RAVF + pulse.[OP1.2]  SKIN:[OP1.1] RLE from knee down is red and warm with some edema.[OP1.2]   NEURO:[OP1.1]  A/O x 3, nonfocal.   Psych: appropriate and  Pleasant.[OP1.2]   LYMPH: No palpable ant/post cervical     LABS:      CBC RESULTS:     Recent Labs  Lab 09/19/18  0650 09/18/18  1228   WBC 5.4 6.4   RBC 2.63* 2.96*   HGB 9.4* 10.5*   HCT 30.0* 33.9*   * 106*       BMP RESULTS:    Recent Labs  Lab 09/19/18  0650 09/18/18  1228    135   POTASSIUM 4.2 4.5   CHLORIDE 102 101   CO2 26 26   BUN 33* 27   CR 5.42* 4.05*   GLC 92 103*   KIMBER 8.4* 9.0       INRNo lab results found in last 7 days.     DIAGNOSTICS:  Reviewed    A/P:[OP1.1]  88 y.o gentleman with ESRD, admitted for RLE cellulitis.     # ESRD   -2.45 hrs, eDW 73.5 kg, RAVF, 1.5K, 8000 units Epogen   -TTS scheduled with Dr. Yung at the [place] unit.    -Been on HD since 2012    # RLE cellulitis. Improving with Ancef.     # Anemia. 8000 units of Epogen with HD.    # CKD-MBD. PHoslo with meals     # CAD. He is on ASA/statin/BB    # HTN. Controlled with metoprolol.[OP1.2]     Harvinder Anthony MD  [facility] - Nephrology  Office Phone: [phone]  Pager: [phone][OP1.1]     Revision History        User Key Date/Time User Provider Type Action    > OP1.2 9/19/2018  1:14 PM Harvinder Anthony MD Physician Sign     OP1.1 9/19/2018 12:51 PM Harvinder Anthony MD Physician        "      Consults by Brittany Kahn PA-C at 9/19/2018  8:37 AM     Author:  Brittany Kahn PA-C Service:  Orthopedics Author Type:  Physician Assistant - YAIR    Filed:  9/19/2018  9:22 AM Date of Service:  9/19/2018  8:37 AM Creation Time:  9/19/2018  8:37 AM    Status:  Signed :  Brittany Kahn PA-C (Physician Assistant - C)     Consult Orders:    1. Orthopedic Surgery IP Consult: Patient to be seen: Routine - within 24 hours; RLE cellutlitis below knee, right knee with effusion and pain; Consultant may enter orders: Yes [[id number]] ordered by Serg Anderson DO at 09/18/18 2041                [facility] Orthopedic Consultation    Maurizio Ohara MRN# [id number]   Age: 88 year old YOB: 1929     Date of Admission:  9/18/2018    Reason for consult: RLE cellulitis       Requesting physician: Dr. Anderson       Level of consult: One-time consult to assist in determining a diagnosis and to recommend an appropriate treatment plan           Assessment and Plan:   Assessment:[AB1.1]   R prepatellar bursitis and surrounding cellulitis[AB1.2]      Plan:[AB1.1]   Continue IV antibiotics, monitor erythema, swelling and pain.  No surgical indication at this time, as long as erythema and tenderness improve on abx  Leg elevation for swelling control  Medical management of comorbidities  Likely discharge home once IV abx complete, with ?PO abx at home[AB1.2]             Chief Complaint:[AB1.1]   RLE swelling, pain and prepatellar abrasion s/p hitting knee on table while standing up.[AB1.2]         History of Present Illness:   This patient is a 88 year old male who presents with the following condition requiring a hospital admission:[AB1.1]    Patient is a pleasant 89yo who has a complex medical history, including ESRD and CAD.  He lives independently at home.  He presented to the ED yesterday after worsening erythema and swelling of his RLE following an abrasion to his knee about 1 week ago.  He  "states he was getting up from the coffee table and hit his knee on the able, resulting in an abrasion.  He has been attending his regularly scheduled dialysis, who then instructed him to be seen in the ED due to worsening of his leg appearance.  He denies fever, chills, or any pain without touching his right lower leg.  He denies any history of cellulitis or MRSA, or treatment for a similar condition.[AB1.2]           Past Medical History:[AB1.1]     Past Medical History:   Diagnosis Date     Anemia      Anemia      CAD (coronary artery disease) 12/24/14    PCI and BMS to mid RCA (5.0 x 20mm) with residual mod diffuse mid LAD disease     Chronic kidney disease     on Dialysis     Diabetes (H)      Hypertension      Mitral regurgitation 12/24/2014    mild-mod (1-2+) per echo     Mitral valve disorders(424.0) 12/24/2014    mild/mod (1-2+) MR     Myocardial infarction 12/24/2014    NSTEMI     Pulmonary hypertension 12/24/2014    RVSP elevated c/w mild-mod PH per echo     Renal disease due to diabetes mellitus (H)     End stage; on Dialysis[AB1.3]             Past Surgical History:[AB1.1]     Past Surgical History:   Procedure Laterality Date     CORONARY ANGIOGRAPHY ADULT ORDER  12/24/2014     ENT SURGERY      Tonsillectomy     HEART CATH, ANGIOPLASTY  12/24/2014    PCI and BMS (5.0x20mm)to mid RCA     THORACIC SURGERY      Herniated disc[AB1.3]             Social History:[AB1.1]     Social History   Substance Use Topics     Smoking status: Former Smoker     Smokeless tobacco: Never Used     Alcohol use Yes      Comment: \"about as much as you can pour in a thimble in a year\"[AB1.3]             Family History:[AB1.1]     Family History   Problem Relation Age of Onset     Diabetes Mother[AB1.3]              Immunizations:     VACCINE/DOSE   Diptheria   DPT   DTAP   HBIG   Hepatitis A   Hepatitis B   HIB   Influenza   Measles   Meningococcal   MMR   Mumps   Pneumococcal   Polio   Rubella   Small Pox   TDAP   Varicella " "  Zoster             Allergies:[AB1.1]     Allergies   Allergen Reactions     Glyburide      Lisinopril      Hyperkalemia when hospitalized 4/5/2011     Metformin      Renal failure stage 4     Penicillins      SHADY Gallagher clarified with pt, pt does not remember rxn, it was a long time ago, and \"nothing crazy\".     Verapamil[AB1.3]              Medications:[AB1.1]     Current Facility-Administered Medications   Medication     acetaminophen (TYLENOL) tablet 650 mg     aspirin EC tablet 81 mg     atorvastatin (LIPITOR) tablet 40 mg     calcium acetate (PHOSLO) capsule 2,001 mg     ceFAZolin (ANCEF) intermittent infusion 1 g     HYDROmorphone (PF) (DILAUDID) injection 0.2 mg     melatonin tablet 1 mg     metoprolol succinate (TOPROL-XL) 24 hr tablet 25 mg     naloxone (NARCAN) injection 0.1-0.4 mg     ondansetron (ZOFRAN-ODT) ODT tab 4 mg    Or     ondansetron (ZOFRAN) injection 4 mg     oxyCODONE IR (ROXICODONE) half-tab 2.5-5 mg     prochlorperazine (COMPAZINE) injection 5 mg    Or     prochlorperazine (COMPAZINE) tablet 5 mg    Or     prochlorperazine (COMPAZINE) Suppository 12.5 mg     senna-docusate (SENOKOT-S;PERICOLACE) 8.6-50 MG per tablet 1 tablet    Or     senna-docusate (SENOKOT-S;PERICOLACE) 8.6-50 MG per tablet 2 tablet[AB1.3]             Review of Systems:   CV: NEGATIVE for chest pain, palpitations or peripheral edema  C: NEGATIVE for fever, chills, change in weight  E/M: NEGATIVE for ear, mouth and throat problems  R: NEGATIVE for significant cough or SOB          Physical Exam:   All vitals have been reviewed[AB1.1]  Patient Vitals for the past 24 hrs:   BP Temp Temp src Pulse Heart Rate Resp SpO2 Height Weight   09/19/18 0807 108/51 97.8  F (36.6  C) Oral - 62 18 94 % - -   09/19/18 0036 101/49 98.9  F (37.2  C) Oral - 78 18 92 % - -   09/18/18 1718 115/48 - - 63 - - - - -   09/18/18 1612 110/48 97.7  F (36.5  C) Oral - 64 20 91 % 1.676 m (5' 6\") 75.3 kg (165 lb 14.4 oz)   09/18/18 1545 102/48 - - - - - " - - -   09/18/18 1530 103/51 - - - - - - - -   09/18/18 1515 102/80 - - - - - - - -   09/18/18 1509 103/57 - - - - - 95 % - -   09/18/18 1430 98/45 - - - - - 93 % - -   09/18/18 1415 104/46 - - - - - 98 % - -   09/18/18 1400 106/64 - - - - - 95 % - -   09/18/18 1345 107/56 - - - - - 98 % - -   09/18/18 1330 (!) 78/50 - - - - - - - -   09/18/18 1300 (!) 114/93 - - - - - 97 % - -   09/18/18 1230 108/59 - - - 60 12 99 % - -   09/18/18 1141 - 97.8  F (36.6  C) - - - - - - 84.4 kg (186 lb)[AB1.3]       Intake/Output Summary (Last 24 hours) at 09/19/18 0838  Last data filed at 09/18/18 1820   Gross per 24 hour   Intake              240 ml   Output                0 ml   Net              240 ml     Musculoskeletal:[AB1.1]   Knee joint itself is not erythematous or swollen.  Prepatellar bursa is swollen with an overlying abrasion.  This is non-fluctuant, and erythema seems to be improving from yesterday's demarcated lines. There is no redness, warmth, or swelling of the ankle.  Full range of motion  Of ankle with somewhat limited knee flexion noted.  Motor strength is 5 out of 5 all extremities bilaterally.  Tone is normal.  no lower extremity pitting edema present  General: comfortable and non-toxic appearing  Skin: dry to touch, ecchymosis of lateral lower leg, presumably due to injury[AB1.2]             Data:   All laboratory data reviewed[AB1.1]  Results for orders placed or performed during the hospital encounter of 09/18/18   XR Knee Right 1/2 Views    Narrative    XR KNEE RT 1 /2 VW   9/18/2018 1:21 PM     HISTORY:  trauma;       Impression    IMPRESSION: Mild joint effusion. Otherwise unremarkable.    YOLANDE WAYNE MD   Ankle XR, G/E 3 views, right    Narrative    RIGHT ANKLE THREE OR MORE VIEWS   9/18/2018 1:22 PM     HISTORY:  Trauma.     FINDINGS: Soft tissue swelling. Calcaneal spurring.      Impression    IMPRESSION: No fracture identified.    YOLANDE WAYNE MD   US Lower Extremity Venous Duplex Right    Narrative     ULTRASOUND RIGHT LOWER EXTREMITY VENOUS DUPLEX September 18, 2018 3:13  PM    HISTORY: Right lower extremity swelling.    TECHNIQUE: Venous Doppler US.?Color flow and spectral Doppler with  waveform analysis performed.    FINDINGS: Soft tissue edema. No evidence of lower extremity deep  venous thrombosis.       Impression    IMPRESSION: Negative for deep vein thrombosis.    YOLANDE WAYNE MD   CBC with platelets differential   Result Value Ref Range    WBC 6.4 4.0 - 11.0 10e9/L    RBC Count 2.96 (L) 4.4 - 5.9 10e12/L    Hemoglobin 10.5 (L) 13.3 - 17.7 g/dL    Hematocrit 33.9 (L) 40.0 - 53.0 %     (H) 78 - 100 fl    MCH 35.5 (H) 26.5 - 33.0 pg    MCHC 31.0 (L) 31.5 - 36.5 g/dL    RDW 16.1 (H) 10.0 - 15.0 %    Platelet Count 106 (L) 150 - 450 10e9/L    Diff Method Automated Method     % Neutrophils 65.6 %    % Lymphocytes 17.5 %    % Monocytes 12.8 %    % Eosinophils 3.0 %    % Basophils 0.3 %    % Immature Granulocytes 0.8 %    Nucleated RBCs 0 0 /100    Absolute Neutrophil 4.2 1.6 - 8.3 10e9/L    Absolute Lymphocytes 1.1 0.8 - 5.3 10e9/L    Absolute Monocytes 0.8 0.0 - 1.3 10e9/L    Absolute Eosinophils 0.2 0.0 - 0.7 10e9/L    Absolute Basophils 0.0 0.0 - 0.2 10e9/L    Abs Immature Granulocytes 0.1 0 - 0.4 10e9/L    Absolute Nucleated RBC 0.0    Comprehensive metabolic panel   Result Value Ref Range    Sodium 135 133 - 144 mmol/L    Potassium 4.5 3.4 - 5.3 mmol/L    Chloride 101 94 - 109 mmol/L    Carbon Dioxide 26 20 - 32 mmol/L    Anion Gap 8 3 - 14 mmol/L    Glucose 103 (H) 70 - 99 mg/dL    Urea Nitrogen 27 7 - 30 mg/dL    Creatinine 4.05 (H) 0.66 - 1.25 mg/dL    GFR Estimate 14 (L) >60 mL/min/1.7m2    GFR Estimate If Black 17 (L) >60 mL/min/1.7m2    Calcium 9.0 8.5 - 10.1 mg/dL    Bilirubin Total 1.0 0.2 - 1.3 mg/dL    Albumin 3.4 3.4 - 5.0 g/dL    Protein Total 7.5 6.8 - 8.8 g/dL    Alkaline Phosphatase 144 40 - 150 U/L    ALT 9 0 - 70 U/L    AST 17 0 - 45 U/L   Lactic acid whole blood   Result Value Ref  Range    Lactic Acid 2.0 0.7 - 2.0 mmol/L   Hemoglobin A1c   Result Value Ref Range    Hemoglobin A1C 5.1 0 - 5.6 %   Basic metabolic panel   Result Value Ref Range    Sodium 136 133 - 144 mmol/L    Potassium 4.2 3.4 - 5.3 mmol/L    Chloride 102 94 - 109 mmol/L    Carbon Dioxide 26 20 - 32 mmol/L    Anion Gap 8 3 - 14 mmol/L    Glucose 92 70 - 99 mg/dL    Urea Nitrogen 33 (H) 7 - 30 mg/dL    Creatinine 5.42 (H) 0.66 - 1.25 mg/dL    GFR Estimate 10 (L) >60 mL/min/1.7m2    GFR Estimate If Black 12 (L) >60 mL/min/1.7m2    Calcium 8.4 (L) 8.5 - 10.1 mg/dL   CBC with platelets   Result Value Ref Range    WBC 5.4 4.0 - 11.0 10e9/L    RBC Count 2.63 (L) 4.4 - 5.9 10e12/L    Hemoglobin 9.4 (L) 13.3 - 17.7 g/dL    Hematocrit 30.0 (L) 40.0 - 53.0 %     (H) 78 - 100 fl    MCH 35.7 (H) 26.5 - 33.0 pg    MCHC 31.3 (L) 31.5 - 36.5 g/dL    RDW 16.2 (H) 10.0 - 15.0 %    Platelet Count 104 (L) 150 - 450 10e9/L   Blood culture   Result Value Ref Range    Specimen Description Blood Left Arm     Special Requests Received in aerobic bottle only     Culture Micro No growth after 14 hours    Blood culture   Result Value Ref Range    Specimen Description Blood Left Hand     Special Requests Received in aerobic bottle only     Culture Micro No growth after 14 hours[AB1.3]           Attestation:  I have reviewed today's vital signs, notes, medications, labs and imaging with Dr. Stevens.  Amount of time performed on this consult:[AB1.1] 20[AB1.2] minutes.[AB1.1]    Brittany Kahn PA-C[AB1.3]          Revision History        User Key Date/Time User Provider Type Action    > AB1.2 9/19/2018  9:22 AM Brittany Kahn PA-C Physician Assistant - C Sign     AB1.3 9/19/2018  8:38 AM Brittany Kahn PA-C Physician Assistant - C      AB1.1 9/19/2018  8:37 AM Brittany Kahn PA-C Physician Assistant - C                      Progress Notes - Physician (Notes from 09/22/18 through 09/25/18)      Progress Notes by White, Corinne C, LSW at 9/25/2018  10:14 AM     Author:  White, Corinne C, LSW Service:  (none) Author Type:      Filed:  9/25/2018  2:18 PM Date of Service:  9/25/2018 10:14 AM Creation Time:  9/25/2018 10:14 AM    Status:  Addendum :  White, Corinne C, LSW ()         CM: PT has been offered TCU be for today in [facility]. Called [facility] [phone] to request location transfer while in TCU. AT this time they will schedule pt through 10/20/18.. IF pt is not needing to be there this long pt can call [place] to update       Reference number [id number] Barbie Coordinator [phone] ext [phone]  Will call [place] to confirm transfer and dates and time for family and TCU[CW1.1]      09/25/18 1000   [facility]) [phone], Fax: [phone][CW1.2]   Called N[CW1.1]e[CW1.3]manisha Lee @ [phone] and left Vm message to call[CW1.1]       CM: Family prefer location in [place] for TCU. Will call [facility] and change locations to [place] for pt.[CW1.3]   Nephew here and able to transport to TCU[CW1.4]      Revision History        User Key Date/Time User Provider Type Action    > CW1.4 9/25/2018  2:18 PM White, Corinne C, LSW  Addend     CW1.3 9/25/2018  1:51 PM White, Corinne C, LSW  Addend     CW1.2 9/25/2018 10:32 AM White, Corinne C, LSW  Sign     CW1.1 9/25/2018 10:14 AM White, Corinne C, LSW              Progress Notes by Darin Maher MD at 9/25/2018  1:57 PM     Author:  Darin Maher MD Service:  Hospitalist Author Type:  Physician    Filed:  9/25/2018  1:58 PM Date of Service:  9/25/2018  1:57 PM Creation Time:  9/25/2018  1:57 PM    Status:  Signed :  Darin Maher MD (Physician)         Pt seen and examined.  Feels well; still having some pain of his RLE but is improving.  Redness decreasing from outlines.  Looks medically stable  for discharge to TCU[SL1.1]         Revision History        User Key Date/Time User Provider Type Action    > SL1.1 9/25/2018  1:58 PM Darin Maher MD Physician Sign            Progress Notes by Tootie Mishra RN at 9/25/2018  1:20 PM     Author:  Tootie Mishra RN Service:  (none) Author Type:      Filed:  9/25/2018  1:32 PM Date of Service:  9/25/2018  1:20 PM Creation Time:  9/25/2018  1:20 PM    Status:  Addendum :  Tootie Mishra RN ()         Rosa Harley pt's nephew called and said he prefers [facility] because it's only 12 min away from HD unit.  I called admissions and left a vm to check on bed availability.     Lizy in admissions [phone] at [facility] would like an update on HD plan, will call her back after we here from [facility].[RB1.1]      1331: [facility] has a bed for pt when he is medically stable.  I paged MD to see what day he will be ready to discharge.[RB1.2]      Revision History        User Key Date/Time User Provider Type Action    > RB1.2 9/25/2018  1:32 PM Tootie Mishra RN Case Manager Addend     RB1.1 9/25/2018  1:22 PM Tootie Mishra RN Case Manager Sign            Progress Notes by Dyllan Kiran RN at 9/25/2018  1:25 PM     Author:  Dyllan Kiran RN Service:  Hemodialysis Nurse Author Type:  Registered Nurse    Filed:  9/25/2018  1:26 PM Date of Service:  9/25/2018  1:25 PM Creation Time:  9/25/2018 12:29 PM    Status:  Signed :  Dyllan Kiran RN (Registered Nurse)         Potassium   Date Value Ref Range Status   09/22/2018 4.6 3.4 - 5.3 mmol/L Final     Lab Results   Component Value Date    HGB 9.8 09/22/2018     Weight: 73.2 kg (161 lb 6 oz)  POST WT  DIALYSIS PROCEDURE NOTE  Hepatitis status of previous patient on machine log was checked and verified ok to use with this patients hepatitis status.  Patient dialyzed for 3 hrs. on a 2 K bath with a net fluid removal of  2L.  A  BFR of 400 ml/min was obtained via a LUE AVF using 15gauge needles.    The patient was seen by Dr. Bertrand during the treatment.  Total heparin received during the treatment: 1000 units. Sites held x 10 min then  pressure drsgs applied.  Meds  Given: Hectorol, Epogen Complications: None.  Procedure and ESRD teaching done and questions answered. See flowsheet in EPIC for further details and post assessment.  Machine water alarm in place and functioning.  Pt returned via WC  Vascular Access: Aseptic prep done for both on/off.  Report received from: MICHAELA Arredondo RN  Report given to: MICHAELA Arredondo RN  HEPATITIS B SURFACE ANTIGEN Non-Reactive DATE 3/10/16   HEPATITIS B SURFACE ANTIBODY Immune DATE 4/5/18  Chlorine/Chloramine water system checked every 4 hours.  Outpatient Dialysis at [facility][DA1.1]     Revision History        User Key Date/Time User Provider Type Action    > DA1.1 9/25/2018  1:26 PM Dyllan Kiran RN Registered Nurse Sign            Progress Notes by Gennaro Bertrand MD at 9/25/2018 11:57 AM     Author:  Gennaro Bertrand MD Service:  Nephrology Author Type:  Physician    Filed:  9/25/2018 12:03 PM Date of Service:  9/25/2018 11:57 AM Creation Time:  9/25/2018 11:57 AM    Status:  Signed :  Gennaro Bertrand MD (Physician)                  Assessment and Plan:   ESRD: Patient typically runs at the [place] unit Monday Wednesday and Friday.  Today he will run for 3 hours with 400 blood flow rate from the right arm fistula.  We will aim for 2 L ultrafiltration.  He will be on a 2K and 33 bicarb bath.  He will get E Po and Hectorol on the run.  He is on PhosLo with meals.            Interval History:   Right lower extremity cellulitis  ASCVD: On metoprolol  Hypertension  Anemia                   Review of Systems:   Denies cramps or nausea on dialysis.  Tolerating the procedure well.  No chest or abdominal pain.  Taking p.o. well without nausea, vomiting or anorexia.   Complains of tenderness and pain in the right lower extremity.  Review of systems is otherwise negative.          Medications:[JT1.1]       - MEDICATION INSTRUCTIONS for Dialysis Patients -   Does not apply See Admin Instructions     acetaminophen  975 mg Oral Q8H     aspirin  81 mg Oral Daily     atorvastatin  40 mg Oral Daily     calcium acetate  2,001 mg Oral TID w/meals     ceFAZolin  1 g Intravenous Q24H     diclofenac  2 g Topical 4x Daily     heparin  5,000 Units Subcutaneous Q12H     lidocaine   Topical Q4H While awake     metoprolol succinate  25 mg Oral Daily     polyethylene glycol  17 g Oral Daily     senna-docusate  1 tablet Oral BID    Or     senna-docusate  2 tablet Oral BID     sodium chloride (PF)  3 mL Intracatheter Q8H       heparin (porcine)       - MEDICATION INSTRUCTIONS -[JT1.2]       Current active medications and PTA medications reviewed, see medication list for details.            Physical Exam:   Vitals were reviewed  Patient Vitals for the past 24 hrs:   BP Temp Temp src Heart Rate Resp SpO2   18 1145 108/47 - - 64 - -   18 1130 99/46 - - 70 - -   18 1115 103/44 - - 69 - -   18 1100 107/45 - - 66 - -   18 1045 109/51 - - 61 - -   18 1030 102/47 - - 61 - -   18 1015 107/49 97.9  F (36.6  C) Oral 64 18 95 %   18 0728 92/49 97.7  F (36.5  C) Oral 62 18 95 %   18 0020 104/51 - - - - -   18 2329 (!) 102/33 97.8  F (36.6  C) Oral 65 18 93 %   18 1539 104/49 97.6  F (36.4  C) Axillary 65 - 98 %       Temp:  [97.6  F (36.4  C)-97.9  F (36.6  C)] 97.9  F (36.6  C)  Heart Rate:  [61-70] 64  Resp:  [18] 18  BP: ()/(33-51) 108/47  SpO2:  [93 %-98 %] 95 %    Temperatures:  Current - Temp: 97.9  F (36.6  C); Max - Temp  Av.8  F (36.6  C)  Min: 97.6  F (36.4  C)  Max: 97.9  F (36.6  C)  Respiration range: Resp  Av  Min: 18  Max: 18  Pulse range: No Data Recorded  Blood pressure range: Systolic (24hrs), Av , Min:92 ,  Max:109   ; Diastolic (24hrs), Av, Min:33, Max:51    Pulse oximetry range: SpO2  Av.3 %  Min: 93 %  Max: 98 %    I/O last 3 completed shifts:  In: 1060 [P.O.:1010; I.V.:50]  Out: -       Intake/Output Summary (Last 24 hours) at 18 1157  Last data filed at 18 2114   Gross per 24 hour   Intake             1060 ml   Output                0 ml   Net             1060 ml       Alert and responsive  Right arm fistula with needles in place in good bruit  Cardiac exam regular rhythm normal S1-S2, / holosystolic murmur  Lungs with clear breath sounds  Right lower extremity with erythema and tenderness below the knee, left lower extremity normal without edema     Wt Readings from Last 4 Encounters:   18 73.2 kg (161 lb 6 oz)   18 68 kg (150 lb)   06/15/18 68 kg (150 lb)   18 70.8 kg (156 lb)          Data:          Lab Results   Component Value Date     2018     2018     2018    Lab Results   Component Value Date    CHLORIDE 99 2018    CHLORIDE 102 2018    CHLORIDE 101 2018    Lab Results   Component Value Date    BUN 43 2018    BUN 33 2018    BUN 27 2018      Lab Results   Component Value Date    POTASSIUM 4.6 2018    POTASSIUM 4.2 2018    POTASSIUM 4.5 2018    Lab Results   Component Value Date    CO2 28 2018    CO2 26 2018    CO2 26 2018    Lab Results   Component Value Date    CR 6.18 2018    CR 5.42 2018    CR 4.05 2018        Recent Labs   Lab Test  18   0727  18   0632  18   0650  18   1228   WBC   --   5.1  5.4  6.4   HGB   --   9.8*  9.4*  10.5*   HCT   --   31.3*  30.0*  33.9*   MCV   --   113*  114*  115*   PLT  192  168  104*  106*     Recent Labs   Lab Test  18   1228  17   1810  16   1100   AST  17  19  12   ALT  9  16  11   ALKPHOS  144  53  54   BILITOTAL  1.0  0.7  0.8       Recent Labs   Lab Test   12/24/14   0820   MAG  2.2     Recent Labs   Lab Test  08/15/17   0650  09/27/16   0630  09/26/16   0623   PHOS  4.4  3.3  3.8     Recent Labs   Lab Test  09/22/18   0632  09/19/18   0650  09/18/18   1228   KIMBER  8.3*  8.4*  9.0       Lab Results   Component Value Date    KIMBER 8.3 (L) 09/22/2018     Lab Results   Component Value Date    WBC 5.1 09/22/2018    HGB 9.8 (L) 09/22/2018    HCT 31.3 (L) 09/22/2018     (H) 09/22/2018     09/25/2018     Lab Results   Component Value Date     09/22/2018    POTASSIUM 4.6 09/22/2018    CHLORIDE 99 09/22/2018    CO2 28 09/22/2018     (H) 09/22/2018     Lab Results   Component Value Date    BUN 43 (H) 09/22/2018    CR 6.18 (H) 09/22/2018     Lab Results   Component Value Date    MAG 2.2 12/24/2014     Lab Results   Component Value Date    PHOS 4.4 08/15/2017       Creatinine   Date Value Ref Range Status   09/22/2018 6.18 (H) 0.66 - 1.25 mg/dL Final   09/19/2018 5.42 (H) 0.66 - 1.25 mg/dL Final   09/18/2018 4.05 (H) 0.66 - 1.25 mg/dL Final   08/15/2017 9.07 (H) 0.66 - 1.25 mg/dL Final   08/14/2017 8.01 (H) 0.66 - 1.25 mg/dL Final   08/13/2017 6.52 (H) 0.66 - 1.25 mg/dL Final       Attestation:  I have reviewed today's vital signs, notes, medications, labs and imaging.  Seen on dialysis.     Gennaro Bertrand MD[JT1.1]               Revision History        User Key Date/Time User Provider Type Action    > JT1.2 9/25/2018 12:03 PM Gennaro Bertrand MD Physician Sign     JT1.1 9/25/2018 11:57 AM Gennaro Bertrand MD Physician             Progress Notes by Phyllis Everett PA-C at 9/25/2018  9:31 AM     Author:  Phyllis Everett PA-C Service:  Orthopedics Author Type:  Physician Assistant - C    Filed:  9/25/2018  9:31 AM Date of Service:  9/25/2018  9:31 AM Creation Time:  9/25/2018  9:31 AM    Status:  Signed :  Phyllis Everett PA-C (Physician Assistant - C)         Patient's cellulitis slowly clearing.  No signs of  pre-patellar bursitis or anything requiring operative intervention.    Please re-consult with any new concerns - ortho signing off  Phyllis Everett PAC[EC1.1]     Revision History        User Key Date/Time User Provider Type Action    > EC1.1 9/25/2018  9:31 AM Phyllis Everett PA-C Physician Assistant - YAIR Sign            Progress Notes by Bola Garcia DO at 9/24/2018  4:50 PM     Author:  Bola Garcia DO Service:  Hospitalist Author Type:  Physician    Filed:  9/24/2018  4:52 PM Date of Service:  9/24/2018  4:50 PM Creation Time:  9/24/2018  4:50 PM    Status:  Signed :  Bola Garcia DO (Physician)         [facility]  Hospitalist Progress Note  Bola Garcia DO 09/24/2018    Reason for Stay (Diagnosis): Right leg cellulitis.         Assessment and Plan:      Summary of Stay: Maurizio Ohara is a 88 year old male admitted on 9/18/2018 with right lower extremity cellulitis.  Problem List:   1. Right lower extremity cellulitis.Continues to slowly improve.  Continue IV cefazolin.  Still having quite a bit of pain in the right leg.  Will attempt adjusting pain medications.  Change oxycodone to tramadol 50 mg every 6 hours as needed.  Add hydroxyzine 25 mg every 6 hours as needed.  Schedule Tylenol every 8 hours 975 mg.  2. End-stage kidney disease.  Nephrology following.  Dialysis per nephrology.  Continue calcium acetate.  3. Coronary artery disease.  Continue aspirin, atorvastatin, and metoprolol.  4. Hypertension.  Continue metoprolol.  5. Hyperlipidemia.  Continue atorvastatin.  6. Deconditioning.  Continue to work with therapy.  Plan for TCU at discharge.  7. Anemia.  Likely due to chronic disease.  Has been stable.  DVT Prophylaxis: Pneumatic Compression Devices  Code Status: DNI  Discharge Dispo: Likely TCU.  Estimated Disch Date / # of Days until Disch: 1-2        Interval History (Subjective):      Having some right leg pain.  Denies chest pain,  "shortness of breath, fevers, chills, nausea, vomiting, or diarrhea.                  Physical Exam:      Last Vital Signs:  /49 (BP Location: Left arm)  Pulse 61  Temp 97.6  F (36.4  C) (Axillary)  Resp 24  Ht 1.676 m (5' 6\")  Wt 73.2 kg (161 lb 6 oz)  SpO2 98%  BMI 26.05 kg/m2    Gen:  NAD, A&Ox3.  Eyes:  PERRL, sclera anicteric.  OP:  MMM, no lesions.  Neck:  Supple.  CV:  Regular, no murmurs.  Lung:  CTA b/l, normal effort.  Ab:  +BS, soft.  Skin:  Warm, dry to touch.  Right leg erythema improving.  Ext:  No pitting edema LE b/l.           Medications:      All current medications were reviewed with changes reflected in problem list.         Data:      All new lab and imaging data was reviewed.   Labs:[KR1.1]    Recent Labs  Lab 09/22/18  0632      POTASSIUM 4.6   CHLORIDE 99   CO2 28   ANIONGAP 8   *   BUN 43*   CR 6.18*   GFRESTIMATED 9*   GFRESTBLACK 10*   KIMBER 8.3*       Recent Labs  Lab 09/22/18  0632   WBC 5.1   HGB 9.8*   HCT 31.3*   *   [KR1.2]      Imaging:[KR1.1]   No results found for this or any previous visit (from the past 24 hour(s)).[KR1.2]       Revision History        User Key Date/Time User Provider Type Action    > KR1.2 9/24/2018  4:52 PM Bola Garcia, DO Physician Sign     KR1.1 9/24/2018  4:50 PM Bola Garcia, DO Physician             Progress Notes by White, Corinne C, LSW at 9/24/2018  2:08 PM     Author:  White, Corinne C, LSW Service:  (none) Author Type:      Filed:  9/24/2018  3:08 PM Date of Service:  9/24/2018  2:08 PM Creation Time:  9/24/2018  2:08 PM    Status:  Addendum :  White, Corinne C, LSW ()         Discharge Planner   Discharge Plans in progress: Met with pt and updated that [facility] will not have bed till Th  Barriers to discharge plan: obtained additional choices for discharge planning   Follow up plan possible discharge tomorrow???   Referrals sent to [facility] " [facility].        Entered by: Corinne C. White 2018 2:08 PM[CW1.1]     Cm: PT has been offered TCU bed for tomorrow at [facility]  Pt is open to this option,. Stated he nephew would help with transport to and from Dialysis.  Pt has is with [facility] [place] in the past[CW1.2]      Revision History        User Key Date/Time User Provider Type Action    > CW1.2 2018  3:08 PM White, Corinne C, LSW  Addend     CW1.1 2018  2:10 PM White, Corinne C, LSW  Sign            Progress Notes by White, Corinne C, LSW at 2018 10:26 AM     Author:  White, Corinne C, LSW Service:  (none) Author Type:      Filed:  2018 10:26 AM Date of Service:  2018 10:26 AM Creation Time:  2018 10:26 AM    Status:  Signed :  White, Corinne C, LSW ()         CM: Called [facility] to see what they would have for TCU beds for tomorrow.   I/P: Left VM message.[CW1.1]      Revision History        User Key Date/Time User Provider Type Action    > CW1.1 2018 10:26 AM White, Corinne C, LSW  Sign            Progress Notes by Phyllis Everett PA-C at 2018  8:08 AM     Author:  Phyllis Everett PA-C Service:  Orthopedics Author Type:  Physician Assistant - C    Filed:  2018  9:07 AM Date of Service:  2018  8:08 AM Creation Time:  2018  8:08 AM    Status:  Signed :  Phyllis Everett PA-C (Physician Assistant - C)         Orthopedic Surgery  Maurizio Ohara  2018  Admit Date:  2018[EC1.1]  Right LE cellulitis    Patient resting comfortably in bed.    Pain controlled. Right LE still very sensitive.  Tolerating oral intake.    Denies nausea or vomiting  Denies chest pain or shortness of breath  No events overnight.      Alert and orient to person, place, and time.[EC1.2]  Vital Sign Ranges  Temperature Temp  Av.2  F (36.8  C)  Min: 97.2  F (36.2  C)  Max:  99.2  F (37.3  C)   Blood pressure Systolic (24hrs), Av , Min:91 , Max:94        Diastolic (24hrs), Av, Min:39, Max:41      Pulse No Data Recorded   Respirations Resp  Av  Min: 16  Max: 16   Pulse oximetry SpO2  Av.5 %  Min: 95 %  Max: 96 %[EC1.1]       Erythema receeding within drawn boarders  Very sensitive to touch  No fluctuant area palpated  Moderate area of scabbing over patellar tendon   Bilateral calves are soft, non-tender.  Bilateral lower extremity is NVI.  Sensation intact bilateral lower extremities  Active dorsi and plantar flexion bilaterally  +Dp pulse[EC1.2]    Labs:  Recent Labs   Lab Test  18   0632  18   0650  18   1228   POTASSIUM  4.6  4.2  4.5     Recent Labs   Lab Test  18   0632  18   0650  18   1228   HGB  9.8*  9.4*  10.5*     Recent Labs   Lab Test  16   0843  14   0030   INR  1.17*  0.98     Recent Labs   Lab Test  18   0632  18   0650  18   1228   PLT  168  104*  106*[EC1.1]       A/P  1. Plan                        Non-operative treatment                        Continue IV abx per hospitalist.                          Mobilize with PT/OT                         WBAT                        Continue current pain regiment.[EC1.2]    2. Disposition   Anticipate[EC1.1] discharge TCU based on medical clearance.[EC1.2]    Phyllis Everett PA-C[EC1.1]     Revision History        User Key Date/Time User Provider Type Action    > EC1.2 2018  9:07 AM Phyllis Everett PA-C Physician Assistant - YAIR Sign     EC1.1 2018  8:08 AM Phyllis Everett PA-C Physician Assistant - YAIR             Progress Notes by Bola Garcia DO at 2018 11:34 AM     Author:  Bola Garcia DO Service:  Hospitalist Author Type:  Physician    Filed:  2018 11:36 AM Date of Service:  2018 11:34 AM Creation Time:  2018 11:34 AM    Status:  Signed :  Bola Garcia DO (Physician)      "    [facility]  Hospitalist Progress Note  Bola Garcia, DO 09/23/2018    Reason for Stay (Diagnosis): Right leg cellulitis         Assessment and Plan:      Summary of Stay: Maurizio Ohara is a 88 year old male admitted on 9/18/2018 with right lower extremity cellulitis.  Problem List:   1. Right lower extremity cellulitis.Continues to slowly improve.  Continue IV cefazolin.    2. End-stage kidney disease.  Nephrology following.  Dialysis per nephrology.  Continue calcium acetate.  3. Coronary artery disease.  Continue aspirin, atorvastatin, and metoprolol.  4. Hypertension.  Continue metoprolol.  5. Hyperlipidemia.  Continue atorvastatin.  6. Deconditioning.  Continue to work with therapy.  Plan for TCU at discharge.  7. Anemia.  Likely due to chronic disease.  Has been stable.  DVT Prophylaxis: Pneumatic Compression Devices  Code Status: DNI  Discharge Dispo: Likely TCU.  Estimated Disch Date / # of Days until Disch: 1-2        Interval History (Subjective):      Occasional right leg pain.  Improved from previous.  Denies chest pain, shortness of breath, fevers, chills, nausea, vomiting, or diarrhea.                  Physical Exam:      Last Vital Signs:  /42 (BP Location: Left arm)  Pulse 61  Temp 98.2  F (36.8  C) (Oral)  Resp 16  Ht 1.676 m (5' 6\")  Wt 73.2 kg (161 lb 6 oz)  SpO2 92%  BMI 26.05 kg/m2    Gen:  NAD, A&Ox3.  Eyes:  PERRL, sclera anicteric.  OP:  MMM, no lesions.  Neck:  Supple.  CV:  Regular, no murmurs.  Lung:  CTA b/l, normal effort.  Ab:  +BS, soft.  Skin:  Warm, dry to touch.  Erythema right lower leg.  Ext:  No pitting edema LE b/l.           Medications:      All current medications were reviewed with changes reflected in problem list.         Data:      All new lab and imaging data was reviewed.   Labs:[KR1.1]    Recent Labs  Lab 09/22/18  0632      POTASSIUM 4.6   CHLORIDE 99   CO2 28   ANIONGAP 8   *   BUN 43*   CR 6.18*   GFRESTIMATED " "9*   GFRESTBLACK 10*   KIMBER 8.3*       Recent Labs  Lab 09/22/18  0632   WBC 5.1   HGB 9.8*   HCT 31.3*   *   [KR1.2]      Imaging:[KR1.1]   No results found for this or any previous visit (from the past 24 hour(s)).[KR1.2]       Revision History        User Key Date/Time User Provider Type Action    > KR1.2 9/23/2018 11:36 AM Bola Garcia, DO Physician Sign     KR1.1 9/23/2018 11:34 AM Bola Garcia, DO Physician             Progress Notes by Jake Ortiz MD at 9/22/2018  5:40 PM     Author:  Jake Ortiz MD Service:  Orthopedics Author Type:  Physician    Filed:  9/22/2018  5:41 PM Date of Service:  9/22/2018  5:40 PM Creation Time:  9/22/2018  5:40 PM    Status:  Signed :  Jake Ortiz MD (Physician)         Orthopedic Surgery  Maurizio J Keiryyingael  9/22/2018  Admit Date:  9/18/2018  Right LE cellulitis    Patient resting comfortably in bed.    Pain controlled. Right LE still very sensitive but states it is slowly improving  Tolerating oral intake.    Denies nausea or vomiting  Denies chest pain or shortness of breath  No events overnight.     Alert and orient to person, place, and time.  Vital Sign Ranges    BP 91/42 (BP Location: Left arm)  Pulse 62  Temp 98.5  F (36.9  C) (Oral)  Resp 16  Ht 1.676 m (5' 6\")  Wt 73.2 kg (161 lb 6 oz)  SpO2 94%  BMI 26.05 kg/m2    Erythema receeding within drawn boarders  Very sensitive to touch but improving  No fluctuant area palpated  Moderate area of scabbing over patellar tendon   Bilateral calves are soft, non-tender.  Bilateral lower extremity is NVI.  Sensation intact bilateral lower extremities  Active dorsi and plantar flexion bilaterally  +Dp pulse    Labs:  Recent Labs   Lab Test  09/19/18   0650  09/18/18   1228  08/15/17   0650   POTASSIUM  4.2  4.5  4.3     Recent Labs   Lab Test  09/19/18   0650  09/18/18   1228  08/15/17   0650   HGB  9.4*  10.5*  8.3*     Recent Labs   Lab Test  09/28/16   0843  12/24/14   0030   INR  " 1.17*  0.98     Recent Labs   Lab Test  09/19/18   0650  09/18/18   1228  08/13/17   0655   PLT  104*  106*  128*       A/P  1. Plan   Non-operative treatment   Continue IV abx per hospitalist.     Mobilize with PT/OT    WBAT   Continue current pain regiment.[JM1.1]         Revision History        User Key Date/Time User Provider Type Action    > JM1.1 9/22/2018  5:41 PM Jake Ortiz MD Physician Sign            Progress Notes by EDGARDO Morley MD at 9/22/2018  1:33 PM     Author:  EDGARDO Morley MD Service:  Nephrology Author Type:  Physician    Filed:  9/22/2018  1:38 PM Date of Service:  9/22/2018  1:33 PM Creation Time:  9/22/2018  1:33 PM    Status:  Signed :  EDGARDO Morley MD (Physician)         Renal Medicine Progress Note                                Maurizio Ohara MRN# [id number]   Age: 88 year old YOB: 1929   Date of Admission: 9/18/2018 Hospital LOS:[GO1.1] 4[GO1.2]                  Assessment/Plan:     Admitted with RLE cellulitis    Seen for ESRD management      1.  ESRD   -TTHS [facility]   -eDW 73.5  2.  Anemia   -SY  3.  Mineral Bone Disease   -PO4 binder  4.  HTN      Continue TTHS schedule      Interval History:     Dialysis earlier today without issue.  Seen post run. Comfortable  2 liter UF    No bleeding post run    ROS:     GENERAL: NAD, No fever,chills  R: NEGATIVE for significant cough or SOB  CV: NEGATIVE for chest pain, palpitations  EXT: no change edema  ROS otherwise negative    Medications and Allergies:     Reviewed    Physical Exam:     Vitals were reviewed[GO1.1]  Patient Vitals for the past 8 hrs:   BP Temp Temp src Heart Rate Resp SpO2 Weight   09/22/18 1300 96/40 97.5  F (36.4  C) Oral 59 16 96 % -   09/22/18 1130 113/49 - - 67 - - -   09/22/18 1115 104/54 98.4  F (36.9  C) - 65 16 - -   09/22/18 1100 101/49 - - 59 - - -   09/22/18 1045 95/49 - - 57 - - -   09/22/18 1030 98/51 - - 63 - - -   09/22/18 1015 103/55 - - 66 - - -    09/22/18 1000 99/49 - - 63 - - -   09/22/18 0945 112/49 - - 59 - - -   09/22/18 0930 103/41 - - 56 - - -   09/22/18 0915 104/50 - - 64 - - -   09/22/18 0900 101/45 - - 53 - - -   09/22/18 0835 104/52 - - 58 - - -   09/22/18 0815 121/55 - - 64 22 96 % 73.2 kg (161 lb 6 oz)   09/22/18 0747 101/56 97.9  F (36.6  C) Oral 55 16 94 % -[GO1.3]          Vitals:    09/18/18 1141 09/18/18 1612 09/22/18 0815   Weight: 84.4 kg (186 lb) 75.3 kg (165 lb 14.4 oz) 73.2 kg (161 lb 6 oz)[GO1.2]         GENERAL: awake, alert, follows  HEENT: NC/AT, PERRLA, EOMI, non icteric, pharynx moist without lesion  RESP:  clear anteriorly  CV: RRR, normal S1 S2  ABDOMEN: soft, nontender, no HSM or masses and bowel sounds normal  MS: no clubbing, cyanosis   SKIN: clear without significant rashes or lesions  NEURO: speech normal and cranial nerves 2-12 intact  PSYCH: affect normal/bright    Data:[GO1.1]       Recent Labs  Lab 09/22/18  0632 09/19/18  0650 09/18/18  1228    136 135   POTASSIUM 4.6 4.2 4.5   CHLORIDE 99 102 101   CO2 28 26 26   ANIONGAP 8 8 8   * 92 103*   BUN 43* 33* 27   CR 6.18* 5.42* 4.05*   GFRESTIMATED 9* 10* 14*   GFRESTBLACK 10* 12* 17*   KIMBER 8.3* 8.4* 9.0[GO1.2]           Recent Labs  Lab 09/22/18  0632 09/19/18  0650 09/18/18  1228   HGB 9.8* 9.4* 10.5*[GO1.3]         EDGARDO Morley    [facility] - Nephrology  [phone][GO1.1]     Revision History        User Key Date/Time User Provider Type Action    > GO1.3 9/22/2018  1:38 PM EDGARDO Morley MD Physician Sign     GO1.2 9/22/2018  1:34 PM EDGARDO Morley MD Physician      GO1.1 9/22/2018  1:33 PM EDGARDO Morley MD Physician             Progress Notes by Bola Garcia DO at 9/22/2018 12:56 PM     Author:  Bola Garcia DO Service:  Hospitalist Author Type:  Physician    Filed:  9/22/2018 12:57 PM Date of Service:  9/22/2018 12:56 PM Creation Time:  9/22/2018 12:56 PM    Status:  Signed :  Bola Garcia DO  "(Physician)         [facility]  Hospitalist Progress Note  Bola Garcia, DO 09/22/2018    Reason for Stay (Diagnosis): Right leg cellulitis.         Assessment and Plan:      Summary of Stay: Maurizio Ohara is a 88 year old male admitted on 9/18/2018 with right lower extremity cellulitis.  Problem List:   1. Right lower extremity cellulitis.  Slowly improving.  Continue IV cefazolin.    2. End-stage kidney disease.  Nephrology following.  Dialysis per nephrology.  Continue calcium acetate.  3. Coronary artery disease.  Continue aspirin, atorvastatin, and metoprolol.  4. Hypertension.  Continue metoprolol.  5. Hyperlipidemia.  Continue atorvastatin.  6. Deconditioning.  Continue to work with therapy.  Will likely need TCU at discharge.  7. Anemia.  Likely due to chronic disease.  DVT Prophylaxis: Pneumatic Compression Devices  Code Status: DNI  Discharge Dispo: Likely TCU.  Estimated Disch Date / # of Days until Disch: 1-3        Interval History (Subjective):      Occasional right leg pain.  Denies chest pain, shortness of breath, fevers, chills, nausea, vomiting, or diarrhea.                  Physical Exam:      Last Vital Signs:  /49  Pulse 64  Temp 98.4  F (36.9  C)  Resp 16  Ht 1.676 m (5' 6\")  Wt 73.2 kg (161 lb 6 oz)  SpO2 96%  BMI 26.05 kg/m2    Gen:  NAD, A&Ox3.  Eyes:  PERRL, sclera anicteric.  OP:  MMM, no lesions.  Neck:  Supple.  CV:  Regular, +1/6 murmur.  Lung:  CTA b/l, normal effort.  Ab:  +BS, soft.  Skin:  Warm, dry to touch.  Right leg erythema mildly improved from previous.  Ext:  No pitting edema LE b/l.           Medications:      All current medications were reviewed with changes reflected in problem list.         Data:      All new lab and imaging data was reviewed.   Labs:[KR1.1]    Recent Labs  Lab 09/22/18  0632      POTASSIUM 4.6   CHLORIDE 99   CO2 28   ANIONGAP 8   *   BUN 43*   CR 6.18*   GFRESTIMATED 9*   GFRESTBLACK 10*   KIMBER 8.3* "       Recent Labs  Lab 09/22/18  0632   WBC 5.1   HGB 9.8*   HCT 31.3*   *   [KR1.2]      Imaging:[KR1.1]   No results found for this or any previous visit (from the past 24 hour(s)).[KR1.2]       Revision History        User Key Date/Time User Provider Type Action    > KR1.2 9/22/2018 12:57 PM Bola Garcia, DO Physician Sign     KR1.1 9/22/2018 12:56 PM Bola Garcia, DO Physician             Progress Notes by Carla Ashraf, RN, RN at 9/22/2018  9:13 AM     Author:  Carla Ashraf, RN, RN Service:  (none) Author Type:  Registered Nurse    Filed:  9/22/2018 11:20 AM Date of Service:  9/22/2018  9:13 AM Creation Time:  9/22/2018  9:13 AM    Status:  Signed :  Carla Ashraf, RN, RN (Registered Nurse)         Potassium   Date Value Ref Range Status   09/22/2018 4.6 3.4 - 5.3 mmol/L Final[DM1.1]   ]  Lab Results   Component Value Date    HGB 9.8 09/22/2018     Weight: 73.2 kg (161 lb 6 oz)    DIALYSIS PROCEDURE NOTE    Patient dialyzed for 2.45 hrs on a 2 K bath with a  net fluid removal of 2L.  A BFR of 450ml/min was obtained via RUAF and all connections secured.   Patient was seen by  during treatment.  Total heparin received during treatment:: 1500units.   Meds given:EPO, Hectoral. Complications:[DM1.2]none[DM1.3]   Procedure and ESRD teaching done and questions answered.  See flowsheet in EPIC for further details.  Hepatitis status confirmed on previous patient.    RO log completed  Prime given: NS  Saline double clamped and Arterial/Venous parameters set.   Patient transported via cart to the dialysis unit   Aseptic prep done for both on/off.  Transducer connectors checked q15 minutes with vital sign check  :  Report received from: SERA Lee RN  Report given to: SERA Lee RN  Outpatient Dialysis at  [place]    Hepatitis Antigen neg  Hepatitis Antibody immune 4/5/18[DM1.2]     Revision History        User Key Date/Time User Provider Type Action    >  DM1.3 9/22/2018 11:20 AM Carla Ashraf, RN, RN Registered Nurse Sign     DM1.1 9/22/2018  9:14 AM Carla Ashraf, RN, RN Registered Nurse      DM1.2 9/22/2018  9:13 AM Carla Ashraf, RN, RN Registered Nurse                   Procedure Notes     No notes of this type exist for this encounter.      Progress Notes - Therapies (Notes from 09/22/18 through 09/25/18)     No notes of this type exist for this encounter.

## 2018-09-18 NOTE — IP AVS SNAPSHOT
MRN:[id number]                      After Visit Summary   9/18/2018    Maurizio Ohara    MRN: [id number]           Thank you!     Thank you for choosing [facility] for your care. Our goal is always to provide you with excellent care. Hearing back from our patients is one way we can continue to improve our services. Please take a few minutes to complete the written survey that you may receive in the mail after you visit. If you would like to speak to someone directly about your visit please contact Patient Relations at [phone]. Thank you!          Patient Information     Date Of Birth          10/7/1929        Designated Caregiver       Most Recent Value    Caregiver    Will someone help with your care after discharge? yes    Name of designated caregiver Ruben    Phone number of caregiver [phone]    Caregiver address see chart      About your hospital stay     You were admitted on:  September 18, 2018 You last received care in the:  [place] [address] [facility]    You were discharged on:  September 25, 2018       Who to Call     For medical emergencies, please call 911.  For non-urgent questions about your medical care, please call your primary care provider or clinic, [phone]          Attending Provider     Provider Specialty    Obi Doll MD Emergency Medicine    Serg Anderson DO Internal Medicine       Primary Care Provider Office Phone # Fax #    Justina Moise [phone]      After Care Instructions     Activity - Up with nursing assistance           Advance Diet as Tolerated       Follow this diet upon discharge: dialysis diet            General info for SNF       Length of Stay Estimate: Short Term Care: Estimated # of Days <30  Condition at Discharge: Improving  Level of care:skilled   Rehabilitation Potential: Good  Admission H&P remains valid and up-to-date: Yes  Recent Chemotherapy: N/A  Use Nursing Home Standing Orders: Yes             Mantoux instructions       Give two-step Mantoux (PPD) Per Facility Policy Yes                  Follow-up Appointments     Follow Up and recommended labs and tests       Resume usual dialysis schedule on discharge.  Last dialysis performed 9/25/18 (on day of discharge from hospital)                  Your next 10 appointments already scheduled     Oct 15, 2018  1:30 PM CDT   Ech Complete with [id number] ([facility])    [address]  [facility] [id number] [phone]           1.  Please bring or wear a comfortable two-piece outfit. 2.  You may eat, drink and take your normal medicines. 3.  For any questions that cannot be answered, please contact the ordering physician 4.  Please do not wear perfumes or scented lotions on the day of your exam. ***Please check-in at the [place] Registration Office located in [address] in the [facility] building. When you are finished registering, please go to [address] and have a seat. The technician will call your name for the test.            Oct 19, 2018  1:10 PM CDT   Return Visit with Julianne Dan PA-C   [facility] ([facility])    [address]  [facility] [id number] [phone]              Additional Services     Occupational Therapy Adult Consult       Evaluate and treat as clinically indicated.    Reason:  weakness            Physical Therapy Adult Consult       Evaluate and treat as clinically indicated.    Reason:  weakness                  Pending Results     No orders found from 9/16/2018 to 9/19/2018.            Statement of Approval     Ordered          09/25/18 1412  I have reviewed and agree with all the recommendations and orders detailed in this document.  EFFECTIVE NOW     Approved and electronically signed by:  Darin Maher MD             Admission Information     Date & Time  "Provider Department Dept. Phone    [address]erg Anderson,  [place] [address] [facility] [phone]      Your Vitals Were     Blood Pressure Pulse Temperature Respirations Height Weight    101/40 61 98  F (36.7  C) (Oral) 16 1.676 m (5' 6\") 73.2 kg (161 lb 6 oz)    Pulse Oximetry BMI (Body Mass Index)                94% 26.05 kg/m2          [employer] Information     [employer] lets you send messages to your doctor, view your test results, renew your prescriptions, schedule appointments and more. To sign up, go to [email or web address] . Click on \"Log in\" on the left side of the screen, which will take you to the Welcome page. Then click on \"Sign up Now\" on the right side of the page.     You will be asked to enter the access code listed below, as well as some personal information. Please follow the directions to create your username and password.     Your access code is: [email or web address]  Expires: 2018  2:21 PM     Your access code will  in 90 days. If you need help or a new code, please call your [place] clinic or [phone].        Care EveryWhere ID     This is your Care EveryWhere ID. This could be used by other organizations to access your [place] medical records  [id number]        Equal Access to Services     ROSALINA SMITH AH: Hadkp Murphy, waaxda lumagyadaha, qaybta kaalcatherine miller. So [facility] [place] [phone].    ATENCIÓN: Si habla español, tiene a mast disposición servicios gratuitos de asistencia lingüística. Warren al [phone].    We comply with applicable federal civil rights laws and [place] laws. We do not discriminate on the basis of race, color, national origin, age, disability, sex, sexual orientation, or gender identity.               Review of your medicines      START taking        Dose / Directions    acetaminophen 325 MG tablet   Commonly known as:  TYLENOL   Used for:  Cellulitis of right lower extremity        Dose:  " 975 mg   Take 3 tablets (975 mg) by mouth every 8 hours   Quantity:  100 tablet   Refills:  0       cephALEXin 500 MG capsule   Commonly known as:  KEFLEX   Used for:  Cellulitis of right lower extremity        Dose:  500 mg   Take 1 capsule (500 mg) by mouth daily for 7 days When administered on dialysis days, give after dialysis is completed   Quantity:  7 capsule   Refills:  0       diclofenac 1 % Gel topical gel   Commonly known as:  VOLTAREN   Used for:  Cellulitis of right lower extremity        Dose:  2 g   Apply 2 g topically 4 times daily Apply to RLE   Refills:  0       hydrOXYzine 25 MG tablet   Commonly known as:  ATARAX   Used for:  Cellulitis of right lower extremity        Dose:  25 mg   Take 1 tablet (25 mg) by mouth every 6 hours as needed for itching (pain)   Quantity:  120 tablet   Refills:  0       lidocaine 5 % ointment   Commonly known as:  XYLOCAINE   Used for:  Cellulitis of right lower extremity        Apply topically every 4 hours (while awake) Do not put onto any open skin areas   Refills:  0       polyethylene glycol Packet   Commonly known as:  MIRALAX/GLYCOLAX   Used for:  Cellulitis of right lower extremity        Dose:  17 g   Take 17 g by mouth daily   Quantity:  7 packet   Refills:  0       traMADol 50 MG tablet   Commonly known as:  ULTRAM   Used for:  Cellulitis of right lower extremity        Dose:  50 mg   Take 1 tablet (50 mg) by mouth every 6 hours as needed for moderate pain   Quantity:  15 tablet   Refills:  0         CONTINUE these medicines which have NOT CHANGED        Dose / Directions    aspirin 81 MG tablet        Dose:  81 mg   Take 81 mg by mouth daily   Refills:  0       atorvastatin 40 MG tablet   Commonly known as:  LIPITOR        Dose:  40 mg   Take 40 mg by mouth daily as needed   Refills:  0       calcium acetate 667 MG Caps capsule   Commonly known as:  PHOSLO        Dose:  2001 mg   Take 2,001 mg by mouth 3 times daily (with meals)   Refills:  0        metoprolol succinate 25 MG 24 hr tablet   Commonly known as:  TOPROL-XL        Dose:  25 mg   Take 25 mg by mouth daily   Refills:  0       nitroGLYcerin 0.4 MG sublingual tablet   Commonly known as:  NITROSTAT   Used for:  NSTEMI (non-ST elevated myocardial infarction) (H)        Dose:  0.4 mg   Place 1 tablet (0.4 mg) under the tongue every 5 minutes as needed for chest pain   Quantity:  25 tablet   Refills:  3       PROMETHAZINE VC/CODEINE 5-6.25-10 MG/5ML Syrp   Generic drug:  Phenyleph-Promethazine-Cod        Dose:  1-2 tsp.   Take 1-2 tsp. by mouth every 6 hours as needed   Refills:  0         STOP taking     NIFEDIAC CC 90 MG Tb24   Generic drug:  NIFEdipine ER                Where to get your medicines      Some of these will need a paper prescription and others can be bought over the counter. Ask your nurse if you have questions.     Bring a paper prescription for each of these medications     traMADol 50 MG tablet       You don't need a prescription for these medications     acetaminophen 325 MG tablet    cephALEXin 500 MG capsule    diclofenac 1 % Gel topical gel    hydrOXYzine 25 MG tablet    lidocaine 5 % ointment    polyethylene glycol Packet                Protect others around you: Learn how to safely use, store and throw away your medicines at www.disposemymeds.org.        ANTIBIOTIC INSTRUCTION     You've Been Prescribed an Antibiotic - Now What?  Your healthcare team thinks that you or your loved one might have an infection. Some infections can be treated with antibiotics, which are powerful, life-saving drugs. Like all medications, antibiotics have side effects and should only be used when necessary. There are some important things you should know about your antibiotic treatment.      Your healthcare team may run tests before you start taking an antibiotic.    Your team may take samples (e.g., from your blood, urine or other areas) to run tests to look for bacteria. These test can be important to  determine if you need an antibiotic at all and, if you do, which antibiotic will work best.      Within a few days, your healthcare team might change or even stop your antibiotic.    Your team may start you on an antibiotic while they are working to find out what is making you sick.    Your team might change your antibiotic because test results show that a different antibiotic would be better to treat your infection.    In some cases, once your team has more information, they learn that you do not need an antibiotic at all. They may find out that you don't have an infection, or that the antibiotic you're taking won't work against your infection. For example, an infection caused by a virus can't be treated with antibiotics. Staying on an antibiotic when you don't need it is more likely to be harmful than helpful.      You may experience side effects from your antibiotic.    Like all medications, antibiotics have side effects. Some of these can be serious.    Let you healthcare team know if you have any known allergies when you are admitted to the hospital.    One significant side effect of nearly all antibiotics is the risk of severe and sometimes deadly diarrhea caused by Clostridium difficile (C. Difficile). This occurs when a person takes antibiotics because some good germs are destroyed. Antibiotic use allows C. diificile to take over, putting patients at high risk for this serious infection.    As a patient or caregiver, it is important to understand your or your loved one's antibiotic treatment. It is especially important for caregivers to speak up when patients can't speak for themselves. Here are some important questions to ask your healthcare team.    What infection is this antibiotic treating and how do you know I have that infection?    What side effects might occur from this antibiotic?    How long will I need to take this antibiotic?    Is it safe to take this antibiotic with other medications or  supplements (e.g., vitamins) that I am taking?     Are there any special directions I need to know about taking this antibiotic? For example, should I take it with food?    How will I be monitored to know whether my infection is responding to the antibiotic?    What tests may help to make sure the right antibiotic is prescribed for me?      Information provided by:  www.cdc.gov/getsmart  U.S. Department of Health and Human Services  Centers for disease Control and Prevention  National Center for Emerging and Zoonotic Infectious Diseases  Division of Healthcare Quality Promotion        Information about OPIOIDS     PRESCRIPTION OPIOIDS: WHAT YOU NEED TO KNOW   We gave you an opioid (narcotic) pain medicine. It is important to manage your pain, but opioids are not always the best choice. You should first try all the other options your care team gave you. Take this medicine for as short a time (and as few doses) as possible.    Some activities can increase your pain, such as bandage changes or therapy sessions. It may help to take your pain medicine 30 to 60 minutes before these activities. Reduce your stress by getting enough sleep, working on hobbies you enjoy and practicing relaxation or meditation. Talk to your care team about ways to manage your pain beyond prescription opioids.    These medicines have risks:    DO NOT drive when on new or higher doses of pain medicine. These medicines can affect your alertness and reaction times, and you could be arrested for driving under the influence (DUI). If you need to use opioids long-term, talk to your care team about driving.    DO NOT operate heavy machinery    DO NOT do any other dangerous activities while taking these medicines.    DO NOT drink any alcohol while taking these medicines.     If the opioid prescribed includes acetaminophen, DO NOT take with any other medicines that contain acetaminophen. Read all labels carefully. Look for the word  acetaminophen  or   Tylenol.  Ask your pharmacist if you have questions or are unsure.    You can get addicted to pain medicines, especially if you have a history of addiction (chemical, alcohol or substance dependence). Talk to your care team about ways to reduce this risk.    All opioids tend to cause constipation. Drink plenty of water and eat foods that have a lot of fiber, such as fruits, vegetables, prune juice, apple juice and high-fiber cereal. Take a laxative (Miralax, milk of magnesia, Colace, Senna) if you don t move your bowels at least every other day. Other side effects include upset stomach, sleepiness, dizziness, throwing up, tolerance (needing more of the medicine to have the same effect), physical dependence and slowed breathing.    Store your pills in a secure place, locked if possible. We will not replace any lost or stolen medicine. If you don t finish your medicine, please throw away (dispose) as directed by your pharmacist. The [place] Pollution Control Agency has more information about safe disposal: https://www.pca.[facility].mn.us/living-green/managing-unwanted-medications             Medication List: This is a list of all your medications and when to take them. Check marks below indicate your daily home schedule. Keep this list as a reference.      Medications           Morning Afternoon Evening Bedtime As Needed    acetaminophen 325 MG tablet   Commonly known as:  TYLENOL   Take 3 tablets (975 mg) by mouth every 8 hours   Last time this was given:  975 mg on 9/25/2018 12:10 AM   Next Dose Due:  This afternoon 9/25                                         aspirin 81 MG tablet   Take 81 mg by mouth daily   Last time this was given:  Today at 2 pm   Next Dose Due:  Tomorrow morning 9/26                                   atorvastatin 40 MG tablet   Commonly known as:  LIPITOR   Take 40 mg by mouth daily as needed   Last time this was given:  40 mg on 9/24/2018  9:07 PM                                   calcium  acetate 667 MG Caps capsule   Commonly known as:  PHOSLO   Take 2,001 mg by mouth 3 times daily (with meals)   Last time this was given:  2,001 mg on 9/25/2018 11:25 AM   Next Dose Due:  This evening with supper                                           cephALEXin 500 MG capsule   Commonly known as:  KEFLEX   Take 1 capsule (500 mg) by mouth daily for 7 days When administered on dialysis days, give after dialysis is completed   Next Dose Due:  This afternoon                                      diclofenac 1 % Gel topical gel   Commonly known as:  VOLTAREN   Apply 2 g topically 4 times daily Apply to RLE   Next Dose Due:  This afternoon                                            hydrOXYzine 25 MG tablet   Commonly known as:  ATARAX   Take 1 tablet (25 mg) by mouth every 6 hours as needed for itching (pain)   Last time this was given:  Not given this hospital stay                                   lidocaine 5 % ointment   Commonly known as:  XYLOCAINE   Apply topically every 4 hours (while awake) Do not put onto any open skin areas   Last time this was given:  Not given this hospital stay                                            metoprolol succinate 25 MG 24 hr tablet   Commonly known as:  TOPROL-XL   Take 25 mg by mouth daily   Last time this was given:  25 mg on 9/24/2018 11:04 AM   Next Dose Due:  Tomorrow morning 9/26                                   nitroGLYcerin 0.4 MG sublingual tablet   Commonly known as:  NITROSTAT   Place 1 tablet (0.4 mg) under the tongue every 5 minutes as needed for chest pain   Last time this was given:  Not given this hospital stay                                   polyethylene glycol Packet   Commonly known as:  MIRALAX/GLYCOLAX   Take 17 g by mouth daily   Last time this was given:  17 g on 9/25/2018  2:09 PM   Next Dose Due:  Tomorrow morning 9/26                                   PROMETHAZINE VC/CODEINE 5-6.25-10 MG/5ML Syrp   Take 1-2 tsp. by mouth every 6 hours as needed    Generic drug:  Phenyleph-Promethazine-Cod   Last time this was given:  Not given this hospital stay                                   traMADol 50 MG tablet   Commonly known as:  ULTRAM   Take 1 tablet (50 mg) by mouth every 6 hours as needed for moderate pain   Last time this was given:  50 mg on 9/24/2018  2:11 PM   Next Dose Due:  Anytime                                             More Information        Discharge Instructions for Cellulitis  You have been diagnosed with cellulitis. This is an infection in the deepest layer of the skin. In some cases, the infection also affects the muscle. Cellulitis is caused by bacteria. The bacteria can enter the body through broken skin. This can happen with a cut, scratch, animal bite, or an insect bite that has been scratched. You may have been treated in the hospital with antibiotics and fluids. You will likely be given a prescription for antibiotics to take at home. This sheet will help you take care of yourself at home.  Home care  When you are home:    Take the prescribed antibiotic medicine you are given as directed until it is gone. Take it even if you feel better. It treats the infection and stops it from returning. Not taking all the medicine can make future infections hard to treat.    Keep the infected area clean.    When possible, raise the infected area above the level of your heart. This helps keep swelling down.    Talk with your healthcare provider if you are in pain. Ask what kind of over-the-counter medicine you can take for pain.    Apply clean bandages as advised.    Take your temperature once a day for a week.    Wash your hands often to prevent spreading the infection.  In the future, wash your hands before and after you touch cuts, scratches, or bandages. This will help prevent infection.   When to call your healthcare provider  Call your healthcare provider immediately if you have any of the following:    Difficulty or pain when moving the joints  above or below the infected area    Discharge or pus draining from the area    Fever of 100.4 F (38 C) or higher, or as directed by your healthcare provider    Pain that gets worse in or around the infected     Redness that gets worse in or around the infected area, particularly if the area of redness expands to a wider area    Shaking chills    Swelling of the infected area    Vomiting   Date Last Reviewed: 8/1/2016 2000-2017 [employer]. [address] [place] [address], [place] [address]. All rights reserved. This information is not intended as a substitute for professional medical care. Always follow your healthcare professional's instructions.                Cellulitis  Cellulitis is an infection of the deep layers of skin. A break in the skin, such as a cut or scratch, can let bacteria under the skin. If the bacteria get to deep layers of the skin, it can be serious. If not treated, cellulitis can get into the bloodstream and lymph nodes. The infection can then spread throughout the body. This causes serious illness.  Cellulitis causes the affected skin to become red, swollen, warm, and sore. The reddened areas have a visible border. An open sore may leak fluid (pus). You may have a fever, chills, and pain.  Cellulitis is treated with antibiotics taken for 7 to 10 days. An open sore may be cleaned and covered with cool wet gauze. Symptoms should get better 1 to 2 days after treatment is started. Make sure to take all the antibiotics for the full number of days until they are gone. Keep taking the medicine even if your symptoms go away.  Home care  Follow these tips:    Limit the use of the part of your body with cellulitis.     If the infection is on your leg, keep your leg raised while sitting. This will help to reduce swelling.    Take all of the antibiotic medicine exactly as directed until it is gone. Do not miss any doses, especially during the first 7 days. Don t stop taking the medicine when your  symptoms get better.    Keep the affected area clean and dry.    Wash your hands with soap and warm water before and after touching your skin. Anyone else who touches your skin should also wash his or her hands. Don't share towels.  Follow-up care  Follow up with your healthcare provider, or as advised. If your infection does not go away on the first antibiotic, your healthcare provider will prescribe a different one.  When to seek medical advice  Call your healthcare provider right away if any of these occur:    Red areas that spread    Swelling or pain that gets worse    Fluid leaking from the skin (pus)    Fever higher of 100.4  F (38.0  C) or higher after 2 days on antibiotics  Date Last Reviewed: 9/1/2016 2000-2017 [employer]. [address] [place] [address], [place] [address]. All rights reserved. This information is not intended as a substitute for professional medical care. Always follow your healthcare professional's instructions.

## 2018-09-18 NOTE — IP AVS SNAPSHOT
"    [place] [address] MEDICAL SURGICAL: [phone]                                              INTERAGENCY TRANSFER FORM - PHYSICIAN ORDERS   2018                    Hospital Admission Date: 2018  YOANA QUIÑONES   : 10/7/1929  Sex: Male        Attending Provider: Serg Anderson DO     Allergies:  Glyburide, Lisinopril, Metformin, Penicillins, Verapamil    Infection:  None   Service:  GENERAL MEDI    Ht:  1.676 m (5' 6\")   Wt:  73.2 kg (161 lb 6 oz)   Admission Wt:  84.4 kg (186 lb)    BMI:  26.05 kg/m 2   BSA:  1.85 m 2            Patient PCP Information     Provider PCP Type    [facility]      ED Clinical Impression     Diagnosis Description Comment Added By Time Added    Cellulitis of right lower extremity [L03.115] Cellulitis of right lower extremity [L03.115]  Obi Doll MD 2018 11:50 AM      Hospital Problems as of 2018              Priority Class Noted POA    Cellulitis Medium  2018 Yes      Non-Hospital Problems as of 2018              Priority Class Noted    NSTEMI (non-ST elevated myocardial infarction) (H) Medium  2014    ESRD (end stage renal disease) on dialysis (H) Medium  2014    CAD (coronary artery disease) Medium  2014    Mitral regurgitation Medium  2014    Pulmonary hypertension Medium  2014    Diabetes (H) Medium  Unknown    Hypertension Medium  Unknown    Sepsis (H) Medium  2016    Pneumonia Medium  2016    Acute diastolic (congestive) heart failure Medium  2017      Code Status History     Date Active Date Inactive Code Status Order ID Comments User Context    2018  2:58 PM 2018  4:17 PM DNI [id number]  Aisha Franco PA-C ED    8/15/2017  8:27 AM 2018  2:58 PM Full Code [id number]  Jayant Ricketts MD Outpatient    2017 10:43 PM 8/15/2017  8:27 AM Full Code [id number]  Marilee Ramirez MD Inpatient    10/2/2016  2:30 PM 2017 10:43 PM Full Code [id number]  " Primitivo Yeboah MD Outpatient    9/24/2016  2:49 PM 10/2/2016  2:30 PM Full Code [id number]  Bola Garcia, DO Inpatient    1/17/2016 10:07 AM 9/24/2016  2:49 PM Full Code [id number]  Bola Garcia, DO Outpatient    1/12/2016  3:10 PM 1/17/2016 10:07 AM Full Code [id number]  Geoffrey Diaz Yovany, DO Inpatient    12/26/2014 10:57 AM 1/12/2016  3:10 PM Full Code [id number]  Edin Elkins MD Outpatient    12/24/2014  3:28 PM 12/24/2014 11:14 PM Full Code [id number]  Phyllis Barrios MD Inpatient    12/24/2014 11:20 AM 12/24/2014  3:28 PM Full Code [id number]  Cholo Nogueira MD Outpatient    12/24/2014  5:42 AM 12/24/2014 11:20 AM Full Code [id number]  Tip Langston MD Inpatient         Medication Review      START taking        Dose / Directions Comments    acetaminophen 325 MG tablet   Commonly known as:  TYLENOL   Used for:  Cellulitis of right lower extremity        Dose:  975 mg   Take 3 tablets (975 mg) by mouth every 8 hours   Quantity:  100 tablet   Refills:  0        cephALEXin 500 MG capsule   Commonly known as:  KEFLEX   Used for:  Cellulitis of right lower extremity        Dose:  500 mg   Take 1 capsule (500 mg) by mouth daily for 7 days When administered on dialysis days, give after dialysis is completed   Quantity:  7 capsule   Refills:  0        diclofenac 1 % Gel topical gel   Commonly known as:  VOLTAREN   Used for:  Cellulitis of right lower extremity        Dose:  2 g   Apply 2 g topically 4 times daily Apply to RLE   Refills:  0        hydrOXYzine 25 MG tablet   Commonly known as:  ATARAX   Used for:  Cellulitis of right lower extremity        Dose:  25 mg   Take 1 tablet (25 mg) by mouth every 6 hours as needed for itching (pain)   Quantity:  120 tablet   Refills:  0        lidocaine 5 % ointment   Commonly known as:  XYLOCAINE   Used for:  Cellulitis of right lower extremity        Apply topically every 4 hours (while awake) Do not put onto any open skin areas    Refills:  0        polyethylene glycol Packet   Commonly known as:  MIRALAX/GLYCOLAX   Used for:  Cellulitis of right lower extremity        Dose:  17 g   Take 17 g by mouth daily   Quantity:  7 packet   Refills:  0        traMADol 50 MG tablet   Commonly known as:  ULTRAM   Used for:  Cellulitis of right lower extremity        Dose:  50 mg   Take 1 tablet (50 mg) by mouth every 6 hours as needed for moderate pain   Quantity:  15 tablet   Refills:  0          CONTINUE these medications which have NOT CHANGED        Dose / Directions Comments    aspirin 81 MG tablet        Dose:  81 mg   Take 81 mg by mouth daily   Refills:  0        atorvastatin 40 MG tablet   Commonly known as:  LIPITOR        Dose:  40 mg   Take 40 mg by mouth daily as needed   Refills:  0        calcium acetate 667 MG Caps capsule   Commonly known as:  PHOSLO        Dose:  2001 mg   Take 2,001 mg by mouth 3 times daily (with meals)   Refills:  0        metoprolol succinate 25 MG 24 hr tablet   Commonly known as:  TOPROL-XL        Dose:  25 mg   Take 25 mg by mouth daily   Refills:  0        nitroGLYcerin 0.4 MG sublingual tablet   Commonly known as:  NITROSTAT   Used for:  NSTEMI (non-ST elevated myocardial infarction) (H)        Dose:  0.4 mg   Place 1 tablet (0.4 mg) under the tongue every 5 minutes as needed for chest pain   Quantity:  25 tablet   Refills:  3        PROMETHAZINE VC/CODEINE 5-6.25-10 MG/5ML Syrp   Generic drug:  Phenyleph-Promethazine-Cod        Dose:  1-2 tsp.   Take 1-2 tsp. by mouth every 6 hours as needed   Refills:  0          STOP taking     NIFEDIAC CC 90 MG Tb24   Generic drug:  NIFEdipine ER                   After Care     Activity - Up with nursing assistance           Advance Diet as Tolerated       Follow this diet upon discharge: dialysis diet       General info for SNF       Length of Stay Estimate: Short Term Care: Estimated # of Days <30  Condition at Discharge: Improving  Level of care:skilled    Rehabilitation Potential: Good  Admission H&P remains valid and up-to-date: Yes  Recent Chemotherapy: N/A  Use Nursing Home Standing Orders: Yes       Mantoux instructions       Give two-step Mantoux (PPD) Per Facility Policy Yes             Referrals     Occupational Therapy Adult Consult       Evaluate and treat as clinically indicated.    Reason:  weakness       Physical Therapy Adult Consult       Evaluate and treat as clinically indicated.    Reason:  weakness             Your next 10 appointments already scheduled     Oct 15, 2018  1:30 PM CDT   Ech Complete with [id number] ([facility])    [address]  [facility] [id number] [phone]           1.  Please bring or wear a comfortable two-piece outfit. 2.  You may eat, drink and take your normal medicines. 3.  For any questions that cannot be answered, please contact the ordering physician 4.  Please do not wear perfumes or scented lotions on the day of your exam. ***Please check-in at the [place] Registration Office located in [address] in the [facility] building. When you are finished registering, please go to [address] and have a seat. The technician will call your name for the test.            Oct 19, 2018  1:10 PM CDT   Return Visit with Julianne Dan PA-C   [facility] ([facility])    [address]  [facility] [phone]              Follow-Up Appointment Instructions     Future Labs/Procedures    Follow Up and recommended labs and tests     Comments:    Resume usual dialysis schedule on discharge.  Last dialysis performed 9/25/18 (on day of discharge from hospital)      Follow-Up Appointment Instructions     Follow Up and recommended labs and tests       Resume usual dialysis schedule on discharge.  Last dialysis performed 9/25/18 (on day of discharge from hospital)              Statement of Approval     Ordered          09/25/18 1412  I have reviewed and agree with all the recommendations and orders detailed in this document.  EFFECTIVE NOW     Approved and electronically signed by:  Darin Maher MD

## 2018-09-18 NOTE — H&P
History and Physical     Maurizio Ohara MRN# [id number]   YOB: 1929 Age: 88 year old      Date of Admission:  9/18/2018    Primary care provider: Justina Moise          Assessment and Plan:   Maurizio Ohara is a 88 year old male with a PMH significant for end-stage renal disease on hemodialysis Tuesday Thursday Saturday, coronary disease, hypertension, chronic anemia, diabetes (not on any medication) who presents with right worsening lower extremity cellulitis after a fall 1 week ago.  He denies any constitutional symptoms such as fevers or chills but has had progressive erythema warmth and swelling of the right leg and now has become tender to touch and with limited mobility.  He will be admitted to the hospital under inpatient status for treatment of right lower extremity cellulitis.     1.  Right leg cellulitis-he has an abrasion around the right knee that is likely the entry point for infection.  His leg is beefy red and hot to touch and is moderately swollen.  Appearance is consistent with a Streptococcus-like infection.  We will continue IV Ancef and he will need to elevate his right leg to help decrease swelling.  Given his multiple medical comorbidities and underlying end-stage renal disease and diabetes, he will need at least 2 days of IV antibiotics to ensure improvement.  We will also check right lower extremity Doppler ultrasound to  to rule out DVT as he has been less active since his fall 1 week ago.    2. End-stage renal disease-he was able to finish his run of dialysis today.  Continue dialysis diet.   We will request nephrology assistant with management of his dialysis.    3.  Chronic anemia-hemoglobin seems to be stable.   4.  Diabetes mellitus-patient states that he is no longer consider a diabetic, he is not on any diabetic medication.  Will check hemoglobin A1c as he has not had one since 2016  5.  Coronary disease: Stable no complaints of recent anginal symptoms.   Continue Toprol XL, aspirin daily  6.  Disposition-patient lives independently in his own home and still drives.  I suspect his mobility is somewhat limited due to his new right lower extremity cellulitis.  I will have PT evaluate him as well as social work to assess for discharge needs.    Admit to inpatient status  CODE STATUS-DNR I, this was discussed with the patient  Disposition-hopefully home, potentially with home services.              Chief Complaint:   Right leg cellulitis         History of Present Illness:   Maurizio Ohara is a 88 year old male who presents with complaints of worsening right leg cellulitis over the last week.  Petros is a very pleasant spry 88-year-old who has a complex medical history listed below.  He has end-stage renal disease maintained on hemodialysis Tuesdays Thursdays and Saturday.  He lives independently in his own home and is still driving.  A week ago he states that he was getting up from his self but fell hitting his right leg on a coffee table.  He sustained an abrasion to the right knee and since then has been putting antibiotic ointment on it.  However over the last several days he has had increasing erythema swelling and warmth over that area.  This was seen last by Dr. Yung during his hemodialysis session on Saturday,  and was told to keep an eye on it.  However when he went to dialysis today the nurse had noticed increasing warmth, erythema extension as well as possibly yellow discharge from the abrasion.  He was instructed to come into the emergency room.    Patient denies any fevers or chills but states that it is somewhat difficult and painful to bear weight on the right leg he is complaining of increasing swelling there he denies any previous history of cellulitis or MRSA in the past.              Past Medical History:     Past Medical History:   Diagnosis Date     Anemia      Anemia      CAD (coronary artery disease) 12/24/14    PCI and BMS to mid RCA (5.0 x  "20mm) with residual mod diffuse mid LAD disease     Chronic kidney disease     on Dialysis     Diabetes (H)      Hypertension      Mitral regurgitation 12/24/2014    mild-mod (1-2+) per echo     Mitral valve disorders(424.0) 12/24/2014    mild/mod (1-2+) MR     Myocardial infarction 12/24/2014    NSTEMI     Pulmonary hypertension 12/24/2014    RVSP elevated c/w mild-mod PH per echo     Renal disease due to diabetes mellitus (H)     End stage; on Dialysis           Past Surgical History:     Past Surgical History:   Procedure Laterality Date     CORONARY ANGIOGRAPHY ADULT ORDER  12/24/2014     ENT SURGERY      Tonsillectomy     HEART CATH, ANGIOPLASTY  12/24/2014    PCI and BMS (5.0x20mm)to mid RCA     THORACIC SURGERY      Herniated disc           Social History:     Social History     Social History     Marital status: Single     Spouse name: N/A     Number of children: N/A     Years of education: N/A     Occupational History     Not on file.     Social History Main Topics     Smoking status: Former Smoker     Smokeless tobacco: Never Used     Alcohol use Yes      Comment: \"about as much as you can pour in a thimble in a year\"     Drug use: No     Sexual activity: Not Currently     Partners: Female     Other Topics Concern     Caffeine Concern Yes     1-2 cups daily     Sleep Concern No     Special Diet Yes     kidney diet     Exercise Yes     walking     Seat Belt Yes     Social History Narrative           Family History:     Family History   Problem Relation Age of Onset     Diabetes Mother           Allergies:      Allergies   Allergen Reactions     Glyburide      Lisinopril      Hyperkalemia when hospitalized 4/5/2011     Metformin      Renal failure stage 4     Glenn Gallagher RN clarified with pt, pt does not remember rxn, it was a long time ago, and \"nothing crazy\".     Verapamil            Medications:     Prior to Admission medications    Medication Sig Last Dose Taking? Auth Provider   aspirin 81 " MG tablet Take 81 mg by mouth daily 9/17/2018 at Unknown time Yes Unknown, Entered By History   calcium acetate (PHOSLO) 667 MG CAPS Take 2,001 mg by mouth 3 times daily (with meals) 9/18/2018 at Unknown time Yes Unknown, Entered By History   metoprolol succinate (TOPROL-XL) 25 MG 24 hr tablet Take 25 mg by mouth daily 9/17/2018 at Unknown time Yes Unknown, Entered By History   NIFEdipine ER (NIFEDIAC CC) 90 MG TB24 Take 90 mg by mouth every morning 9/17/2018 at Unknown time Yes Unknown, Entered By History   Phenyleph-Promethazine-Cod (PROMETHAZINE VC/CODEINE) 5-6.25-10 MG/5ML SYRP Take 1-2 tsp. by mouth every 6 hours as needed Past Month at Unknown time Yes Unknown, Entered By History   atorvastatin (LIPITOR) 40 MG tablet Take 40 mg by mouth daily as needed More than a month at Unknown time  Unknown, Entered By History   nitroglycerin (NITROSTAT) 0.4 MG sublingual tablet Place 1 tablet (0.4 mg) under the tongue every 5 minutes as needed for chest pain   Julianne Dan PA-C            Review of Systems:   A Comprehensive greater than 10 system review of systems was carried out.  Pertinent positives and negatives are noted above.  Otherwise negative for contributory information.          Physical Exam:   Blood pressure 108/59, temperature 97.8  F (36.6  C), resp. rate 12, weight 84.4 kg (186 lb), SpO2 99 %.  Exam:  GENERAL:  Comfortable. Non toxic  PSYCH: pleasant, oriented, No acute distress.  HEENT:  PERRLA. Normal conjunctiva, normal hearing, nasal mucosa and Oropharynx are normal.  NECK:  Supple, no neck vein distention, adenopathy or bruits, normal thyroid.  HEART:  Normal S1, S2 with 2/6 ANI, no pericardial rub, gallops or S3 or S4.  LUNGS:  Clear to auscultation, normal Respiratory effort. No wheezing, rales or ronchi.  ABDOMEN:  Soft, no hepatosplenomegaly, normal bowel sounds. Non-tender, non distended.   EXTREMITIES:  Right knee with a 2 in abrasion over the lower patella, no area of fluctuance or  crepitus, surrounding leg with bright beefy red erythema, +swelling and warmth and tender to touch.   SKIN:  Dry to touch, scattered areas of ecchymosis or various healing stages.   NEUROLOGIC:  CN 2-12 intact, BL 5/5 symmetric upper and lower extremity strength, sensation is intact with no focal deficits.           Data:       Recent Labs  Lab 09/18/18  1228   WBC 6.4   HGB 10.5*   HCT 33.9*   *   *       Recent Labs  Lab 09/18/18  1228      POTASSIUM 4.5   CHLORIDE 101   CO2 26   ANIONGAP 8   *   BUN 27   CR 4.05*   GFRESTIMATED 14*   GFRESTBLACK 17*   KIMBER 9.0       Recent Labs  Lab 09/18/18  1241 09/18/18  1232   CULT No growth after 2 hours No growth after 2 hours       Recent Labs  Lab 09/18/18  1228   *       Recent Labs  Lab 09/18/18  1228   AST 17   ALT 9   ALKPHOS 144   BILITOTAL 1.0       Recent Labs  Lab 09/18/18  1228   LACT 2.0       Results for orders placed or performed during the hospital encounter of 09/18/18   XR Knee Right 1/2 Views    Narrative    XR KNEE RT 1 /2 VW   9/18/2018 1:21 PM     HISTORY:  trauma;       Impression    IMPRESSION: Mild joint effusion. Otherwise unremarkable.    YOLANDE WAYNE MD   Ankle XR, G/E 3 views, right    Narrative    RIGHT ANKLE THREE OR MORE VIEWS   9/18/2018 1:22 PM     HISTORY:  Trauma.     FINDINGS: Soft tissue swelling. Calcaneal spurring.      Impression    IMPRESSION: No fracture identified.    YOLANDE WAYNE MD   US Lower Extremity Venous Duplex Right    Narrative    ULTRASOUND RIGHT LOWER EXTREMITY VENOUS DUPLEX September 18, 2018 3:13  PM    HISTORY: Right lower extremity swelling.    TECHNIQUE: Venous Doppler US.?Color flow and spectral Doppler with  waveform analysis performed.    FINDINGS: Soft tissue edema. No evidence of lower extremity deep  venous thrombosis.       Impression    IMPRESSION: Negative for deep vein thrombosis.         Aisha Franco PA-C

## 2018-09-19 NOTE — CONSULTS
RENAL CONSULTATION NOTE    REFERRING MD:  Aisha Franco PA-C    REASON FOR CONSULTATION:  ESRD    HPI:  88 y.o pleasant gentleman with ESRD, CAD, DM and hypertension, who was admitted on 9/18 for RLE cellulitis. Patient sustained a cut below the right knee when he bumped into the coffee stable. He developed redness that spread down down his leg. Dr. Yung advised him to monitor it for infection when he saw him on Saturday. He came to dialysis on Tuesday, and RN was concerned that it was infected. He was advised to come in for further evaluation. Currently, he is being treated for RLE cellulitis with Ancef. Today, he says he feel well. He denies fever. No cardiopulmonary complaints. The remaining 12-points ROS are unremarkable.    ROS:  A complete review of systems was performed and is negative except as noted above.    PMH:    Past Medical History:   Diagnosis Date     Anemia      Anemia      CAD (coronary artery disease) 12/24/14    PCI and BMS to mid RCA (5.0 x 20mm) with residual mod diffuse mid LAD disease     Chronic kidney disease     on Dialysis     Diabetes (H)      Hypertension      Mitral regurgitation 12/24/2014    mild-mod (1-2+) per echo     Mitral valve disorders(424.0) 12/24/2014    mild/mod (1-2+) MR     Myocardial infarction 12/24/2014    NSTEMI     Pulmonary hypertension 12/24/2014    RVSP elevated c/w mild-mod PH per echo     Renal disease due to diabetes mellitus (H)     End stage; on Dialysis       PSH:    Past Surgical History:   Procedure Laterality Date     CORONARY ANGIOGRAPHY ADULT ORDER  12/24/2014     ENT SURGERY      Tonsillectomy     HEART CATH, ANGIOPLASTY  12/24/2014    PCI and BMS (5.0x20mm)to mid RCA     THORACIC SURGERY      Herniated disc       MEDICATIONS:      aspirin  81 mg Oral Daily     atorvastatin  40 mg Oral Daily     calcium acetate  2,001 mg Oral TID w/meals     ceFAZolin  1 g Intravenous Q12H     heparin  5,000 Units Subcutaneous Q12H     metoprolol succinate  25  "mg Oral Daily     senna-docusate  1 tablet Oral BID    Or     senna-docusate  2 tablet Oral BID       ALLERGIES:    Allergies as of 09/18/2018 - Review Complete 09/18/2018   Allergen Reaction Noted     Glyburide  12/24/2014     Lisinopril  01/08/2015     Metformin  01/08/2015     Penicillins  12/24/2014     Verapamil  12/24/2014       FH:    Family History   Problem Relation Age of Onset     Diabetes Mother        SH:    Social History     Social History     Marital status: Single     Spouse name: N/A     Number of children: N/A     Years of education: N/A     Occupational History     Not on file.     Social History Main Topics     Smoking status: Former Smoker     Smokeless tobacco: Never Used     Alcohol use Yes      Comment: \"about as much as you can pour in a thimble in a year\"     Drug use: No     Sexual activity: Not Currently     Partners: Female     Other Topics Concern     Caffeine Concern Yes     1-2 cups daily     Sleep Concern No     Special Diet Yes     kidney diet     Exercise Yes     walking     Seat Belt Yes     Social History Narrative       PHYSICAL EXAM:    /51 (BP Location: Left arm)  Pulse 63  Temp 97.8  F (36.6  C) (Oral)  Resp 18  Ht 1.676 m (5' 6\")  Wt 75.3 kg (165 lb 14.4 oz)  SpO2 94%  BMI 26.78 kg/m2  GENERAL: pleasant, alert, NAD  HEENT:  Normocephalic. No gross abnormalities.  Pupils equal.  MMM.    CV: RRR, +murmur, no clicks, gallops, or rubs, trace edema, no carotid bruits  RESP: Clear bilaterally with good efforts. No wheezes or crackles.  GI: Abdomen obese, soft, NT.  MUSCULOSKELETAL: RLE is pretty red but seems better. Trace edema RLE>LLE. RAVF + pulse.  SKIN: RLE from knee down is red and warm with some edema.   NEURO:  A/O x 3, nonfocal.   Psych: appropriate and  Pleasant.   LYMPH: No palpable ant/post cervical     LABS:      CBC RESULTS:     Recent Labs  Lab 09/19/18  0650 09/18/18  1228   WBC 5.4 6.4   RBC 2.63* 2.96*   HGB 9.4* 10.5*   HCT 30.0* 33.9*   * " 106*       BMP RESULTS:    Recent Labs  Lab 09/19/18  0650 09/18/18  1228    135   POTASSIUM 4.2 4.5   CHLORIDE 102 101   CO2 26 26   BUN 33* 27   CR 5.42* 4.05*   GLC 92 103*   KIMBER 8.4* 9.0       INRNo lab results found in last 7 days.     DIAGNOSTICS:  Reviewed    A/P:  88 y.o gentleman with ESRD, admitted for RLE cellulitis.     # ESRD   -2.45 hrs, eDW 73.5 kg, RAVF, 1.5K, 8000 units Epogen   -TTS scheduled with Dr. Yung at the [place] unit.    -Been on HD since 2012    # RLE cellulitis. Improving with Ancef.     # Anemia. 8000 units of Epogen with HD.    # CKD-MBD. PHoslo with meals     # CAD. He is on ASA/statin/BB    # HTN. Controlled with metoprolol.     Harvinder Anthony MD  [facility] - Nephrology  Office Phone: [phone]  Pager: [phone]

## 2018-09-19 NOTE — PROGRESS NOTES
SPIRITUAL HEALTH SERVICES Progress Note  [facility] Med Surg 5     Pt alert, in good spirits and sitting in bedside chair. Visited by niece and nephew. Shared about events leading to current admission and about hopes for plan of care. Pt  expressed interested in [facility] as possible placement if rehab is indicated after discharge due to proximity to family home. Introduced to [facility].    [facility] remains available for any further needs or requests.    Merlin Doan MA  Staff   Pager: [phone]

## 2018-09-19 NOTE — CONSULTS
[facility] Orthopedic Consultation    Maurizio Ohara MRN# [id number]   Age: 88 year old YOB: 1929     Date of Admission:  9/18/2018    Reason for consult: RLE cellulitis       Requesting physician: Dr. Anderson       Level of consult: One-time consult to assist in determining a diagnosis and to recommend an appropriate treatment plan           Assessment and Plan:   Assessment:   R prepatellar bursitis and surrounding cellulitis      Plan:   Continue IV antibiotics, monitor erythema, swelling and pain.  No surgical indication at this time, as long as erythema and tenderness improve on abx  Leg elevation for swelling control  Medical management of comorbidities  Likely discharge home once IV abx complete, with ?PO abx at home             Chief Complaint:   RLE swelling, pain and prepatellar abrasion s/p hitting knee on table while standing up.         History of Present Illness:   This patient is a 88 year old male who presents with the following condition requiring a hospital admission:    Patient is a pleasant 89yo who has a complex medical history, including ESRD and CAD.  He lives independently at home.  He presented to the ED yesterday after worsening erythema and swelling of his RLE following an abrasion to his knee about 1 week ago.  He states he was getting up from the coffee table and hit his knee on the able, resulting in an abrasion.  He has been attending his regularly scheduled dialysis, who then instructed him to be seen in the ED due to worsening of his leg appearance.  He denies fever, chills, or any pain without touching his right lower leg.  He denies any history of cellulitis or MRSA, or treatment for a similar condition.           Past Medical History:     Past Medical History:   Diagnosis Date     Anemia      Anemia      CAD (coronary artery disease) 12/24/14    PCI and BMS to mid RCA (5.0 x 20mm) with residual mod diffuse mid LAD disease     Chronic kidney disease      "on Dialysis     Diabetes (H)      Hypertension      Mitral regurgitation 12/24/2014    mild-mod (1-2+) per echo     Mitral valve disorders(424.0) 12/24/2014    mild/mod (1-2+) MR     Myocardial infarction 12/24/2014    NSTEMI     Pulmonary hypertension 12/24/2014    RVSP elevated c/w mild-mod PH per echo     Renal disease due to diabetes mellitus (H)     End stage; on Dialysis             Past Surgical History:     Past Surgical History:   Procedure Laterality Date     CORONARY ANGIOGRAPHY ADULT ORDER  12/24/2014     ENT SURGERY      Tonsillectomy     HEART CATH, ANGIOPLASTY  12/24/2014    PCI and BMS (5.0x20mm)to mid RCA     THORACIC SURGERY      Herniated disc             Social History:     Social History   Substance Use Topics     Smoking status: Former Smoker     Smokeless tobacco: Never Used     Alcohol use Yes      Comment: \"about as much as you can pour in a thimble in a year\"             Family History:     Family History   Problem Relation Age of Onset     Diabetes Mother              Immunizations:     VACCINE/DOSE   Diptheria   DPT   DTAP   HBIG   Hepatitis A   Hepatitis B   HIB   Influenza   Measles   Meningococcal   MMR   Mumps   Pneumococcal   Polio   Rubella   Small Pox   TDAP   Varicella   Zoster             Allergies:     Allergies   Allergen Reactions     Glyburide      Lisinopril      Hyperkalemia when hospitalized 4/5/2011     Metformin      Renal failure stage 4     Penicillins      SHADY Gallagher clarified with pt, pt does not remember rxn, it was a long time ago, and \"nothing crazy\".     Verapamil              Medications:     Current Facility-Administered Medications   Medication     acetaminophen (TYLENOL) tablet 650 mg     aspirin EC tablet 81 mg     atorvastatin (LIPITOR) tablet 40 mg     calcium acetate (PHOSLO) capsule 2,001 mg     ceFAZolin (ANCEF) intermittent infusion 1 g     HYDROmorphone (PF) (DILAUDID) injection 0.2 mg     melatonin tablet 1 mg     metoprolol succinate (TOPROL-XL) 24 " "hr tablet 25 mg     naloxone (NARCAN) injection 0.1-0.4 mg     ondansetron (ZOFRAN-ODT) ODT tab 4 mg    Or     ondansetron (ZOFRAN) injection 4 mg     oxyCODONE IR (ROXICODONE) half-tab 2.5-5 mg     prochlorperazine (COMPAZINE) injection 5 mg    Or     prochlorperazine (COMPAZINE) tablet 5 mg    Or     prochlorperazine (COMPAZINE) Suppository 12.5 mg     senna-docusate (SENOKOT-S;PERICOLACE) 8.6-50 MG per tablet 1 tablet    Or     senna-docusate (SENOKOT-S;PERICOLACE) 8.6-50 MG per tablet 2 tablet             Review of Systems:   CV: NEGATIVE for chest pain, palpitations or peripheral edema  C: NEGATIVE for fever, chills, change in weight  E/M: NEGATIVE for ear, mouth and throat problems  R: NEGATIVE for significant cough or SOB          Physical Exam:   All vitals have been reviewed  Patient Vitals for the past 24 hrs:   BP Temp Temp src Pulse Heart Rate Resp SpO2 Height Weight   09/19/18 0807 108/51 97.8  F (36.6  C) Oral - 62 18 94 % - -   09/19/18 0036 101/49 98.9  F (37.2  C) Oral - 78 18 92 % - -   09/18/18 1718 115/48 - - 63 - - - - -   09/18/18 1612 110/48 97.7  F (36.5  C) Oral - 64 20 91 % 1.676 m (5' 6\") 75.3 kg (165 lb 14.4 oz)   09/18/18 1545 102/48 - - - - - - - -   09/18/18 1530 103/51 - - - - - - - -   09/18/18 1515 102/80 - - - - - - - -   09/18/18 1509 103/57 - - - - - 95 % - -   09/18/18 1430 98/45 - - - - - 93 % - -   09/18/18 1415 104/46 - - - - - 98 % - -   09/18/18 1400 106/64 - - - - - 95 % - -   09/18/18 1345 107/56 - - - - - 98 % - -   09/18/18 1330 (!) 78/50 - - - - - - - -   09/18/18 1300 (!) 114/93 - - - - - 97 % - -   09/18/18 1230 108/59 - - - 60 12 99 % - -   09/18/18 1141 - 97.8  F (36.6  C) - - - - - - 84.4 kg (186 lb)       Intake/Output Summary (Last 24 hours) at 09/19/18 0838  Last data filed at 09/18/18 1820   Gross per 24 hour   Intake              240 ml   Output                0 ml   Net              240 ml     Musculoskeletal:   Knee joint itself is not erythematous or " swollen.  Prepatellar bursa is swollen with an overlying abrasion.  This is non-fluctuant, and erythema seems to be improving from yesterday's demarcated lines. There is no redness, warmth, or swelling of the ankle.  Full range of motion  Of ankle with somewhat limited knee flexion noted.  Motor strength is 5 out of 5 all extremities bilaterally.  Tone is normal.  no lower extremity pitting edema present  General: comfortable and non-toxic appearing  Skin: dry to touch, ecchymosis of lateral lower leg, presumably due to injury             Data:   All laboratory data reviewed  Results for orders placed or performed during the hospital encounter of 09/18/18   XR Knee Right 1/2 Views    Narrative    XR KNEE RT 1 /2 VW   9/18/2018 1:21 PM     HISTORY:  trauma;       Impression    IMPRESSION: Mild joint effusion. Otherwise unremarkable.    YOLANDE WAYNE MD   Ankle XR, G/E 3 views, right    Narrative    RIGHT ANKLE THREE OR MORE VIEWS   9/18/2018 1:22 PM     HISTORY:  Trauma.     FINDINGS: Soft tissue swelling. Calcaneal spurring.      Impression    IMPRESSION: No fracture identified.    YOLANDE WAYNE MD   US Lower Extremity Venous Duplex Right    Narrative    ULTRASOUND RIGHT LOWER EXTREMITY VENOUS DUPLEX September 18, 2018 3:13  PM    HISTORY: Right lower extremity swelling.    TECHNIQUE: Venous Doppler US.?Color flow and spectral Doppler with  waveform analysis performed.    FINDINGS: Soft tissue edema. No evidence of lower extremity deep  venous thrombosis.       Impression    IMPRESSION: Negative for deep vein thrombosis.    YOLANDE WAYNE MD   CBC with platelets differential   Result Value Ref Range    WBC 6.4 4.0 - 11.0 10e9/L    RBC Count 2.96 (L) 4.4 - 5.9 10e12/L    Hemoglobin 10.5 (L) 13.3 - 17.7 g/dL    Hematocrit 33.9 (L) 40.0 - 53.0 %     (H) 78 - 100 fl    MCH 35.5 (H) 26.5 - 33.0 pg    MCHC 31.0 (L) 31.5 - 36.5 g/dL    RDW 16.1 (H) 10.0 - 15.0 %    Platelet Count 106 (L) 150 - 450 10e9/L    Diff Method  Automated Method     % Neutrophils 65.6 %    % Lymphocytes 17.5 %    % Monocytes 12.8 %    % Eosinophils 3.0 %    % Basophils 0.3 %    % Immature Granulocytes 0.8 %    Nucleated RBCs 0 0 /100    Absolute Neutrophil 4.2 1.6 - 8.3 10e9/L    Absolute Lymphocytes 1.1 0.8 - 5.3 10e9/L    Absolute Monocytes 0.8 0.0 - 1.3 10e9/L    Absolute Eosinophils 0.2 0.0 - 0.7 10e9/L    Absolute Basophils 0.0 0.0 - 0.2 10e9/L    Abs Immature Granulocytes 0.1 0 - 0.4 10e9/L    Absolute Nucleated RBC 0.0    Comprehensive metabolic panel   Result Value Ref Range    Sodium 135 133 - 144 mmol/L    Potassium 4.5 3.4 - 5.3 mmol/L    Chloride 101 94 - 109 mmol/L    Carbon Dioxide 26 20 - 32 mmol/L    Anion Gap 8 3 - 14 mmol/L    Glucose 103 (H) 70 - 99 mg/dL    Urea Nitrogen 27 7 - 30 mg/dL    Creatinine 4.05 (H) 0.66 - 1.25 mg/dL    GFR Estimate 14 (L) >60 mL/min/1.7m2    GFR Estimate If Black 17 (L) >60 mL/min/1.7m2    Calcium 9.0 8.5 - 10.1 mg/dL    Bilirubin Total 1.0 0.2 - 1.3 mg/dL    Albumin 3.4 3.4 - 5.0 g/dL    Protein Total 7.5 6.8 - 8.8 g/dL    Alkaline Phosphatase 144 40 - 150 U/L    ALT 9 0 - 70 U/L    AST 17 0 - 45 U/L   Lactic acid whole blood   Result Value Ref Range    Lactic Acid 2.0 0.7 - 2.0 mmol/L   Hemoglobin A1c   Result Value Ref Range    Hemoglobin A1C 5.1 0 - 5.6 %   Basic metabolic panel   Result Value Ref Range    Sodium 136 133 - 144 mmol/L    Potassium 4.2 3.4 - 5.3 mmol/L    Chloride 102 94 - 109 mmol/L    Carbon Dioxide 26 20 - 32 mmol/L    Anion Gap 8 3 - 14 mmol/L    Glucose 92 70 - 99 mg/dL    Urea Nitrogen 33 (H) 7 - 30 mg/dL    Creatinine 5.42 (H) 0.66 - 1.25 mg/dL    GFR Estimate 10 (L) >60 mL/min/1.7m2    GFR Estimate If Black 12 (L) >60 mL/min/1.7m2    Calcium 8.4 (L) 8.5 - 10.1 mg/dL   CBC with platelets   Result Value Ref Range    WBC 5.4 4.0 - 11.0 10e9/L    RBC Count 2.63 (L) 4.4 - 5.9 10e12/L    Hemoglobin 9.4 (L) 13.3 - 17.7 g/dL    Hematocrit 30.0 (L) 40.0 - 53.0 %     (H) 78 - 100 fl     MCH 35.7 (H) 26.5 - 33.0 pg    MCHC 31.3 (L) 31.5 - 36.5 g/dL    RDW 16.2 (H) 10.0 - 15.0 %    Platelet Count 104 (L) 150 - 450 10e9/L   Blood culture   Result Value Ref Range    Specimen Description Blood Left Arm     Special Requests Received in aerobic bottle only     Culture Micro No growth after 14 hours    Blood culture   Result Value Ref Range    Specimen Description Blood Left Hand     Special Requests Received in aerobic bottle only     Culture Micro No growth after 14 hours           Attestation:  I have reviewed today's vital signs, notes, medications, labs and imaging with Dr. Stevens.  Amount of time performed on this consult: 20 minutes.    Brittany Kahn PA-C

## 2018-09-19 NOTE — PROGRESS NOTES
[facility]  Hospitalist Progress Note  Name: Maurizio Ohara    MRN: [id number]  Physician:  Serg Anderson DO, FHM (Text Page)    Assessment & Plan   Summary of Stay: Maurizio Ohara is a 88 year old male with a PMH significant for end-stage renal disease on hemodialysis Tuesday Thursday Saturday, coronary disease, hypertension, chronic anemia, diabetes (not on any medication) who presents with right worsening lower extremity cellulitis after a fall 1 week ago.  He denies any constitutional symptoms such as fevers or chills but has had progressive erythema warmth and swelling of the right leg and now has become tender to touch and with limited mobility.  He will be admitted to the hospital under inpatient status for treatment of right lower extremity cellulitis.      Right leg cellulitis\prepatellar bursistis:  -  Had tetanus vaccine, venous US negative for DVT  -  Appreciate orthopedics consult  -  Continue IV ancef, RLE looks modestly improved today   -  Elevate RLE  -  Oxycodone 2.5 mg PRN for pain    End-stage renal disease on HD:  -  Dialysis due tomorrow. Neph consult.     Chronic anemia-hemoglobin seems to be stable.     Diabetes mellitus-patient states that he is no longer consider a diabetic, he is not on any diabetic medication.  A1C was 5.1.  No indication for sliding scale insulin currently.    Coronary disease: Stable no complaints of recent anginal symptoms.  Continue Toprol XL, aspirin daily.  Patient also has nifedipine listed on home meds but BP low normal range so will hold for now.    Disposition-patient lives independently in his own home and still drives.  I suspect his mobility is somewhat limited due to his new right lower extremity cellulitis.  I will have PT evaluate him as well as social work to assess for discharge needs.     DVT Prophylaxis: Heparin SQ  Code Status: DNI    Disposition Plan   Expected discharge in 2-3 days to prior living arrangement once cellulitis  further improved.     Entered: Serg Anderson 09/19/2018, 9:30 AM       Interval History   Mr. Ohara continues having pain RLE.  No new complaints.  He thinks erythema is a little better.    -Data reviewed today: I reviewed all new labs and imaging reports over the last 24 hours. I personally reviewed no images or EKG's today.    Physical Exam   Temp: 97.8  F (36.6  C) Temp src: Oral BP: 108/51 Pulse: 63 Heart Rate: 62 Resp: 18 SpO2: 94 % O2 Device: None (Room air)    Vitals:    09/18/18 1141 09/18/18 1612   Weight: 84.4 kg (186 lb) 75.3 kg (165 lb 14.4 oz)     Vital Signs with Ranges  Temp:  [97.7  F (36.5  C)-98.9  F (37.2  C)] 97.8  F (36.6  C)  Pulse:  [63] 63  Heart Rate:  [60-78] 62  Resp:  [12-20] 18  BP: ()/(45-93) 108/51  SpO2:  [91 %-99 %] 94 %  I/O last 3 completed shifts:  In: 240 [P.O.:240]  Out: -     GEN:  Alert, oriented x 3, appears comfortable unless RLE erythema area palpated, NAD.  HEENT:  Normocephalic/atraumatic, no scleral icterus, no nasal discharge, mouth moist.  CV:  Regular rate and rhythm, soft murmur.  No rub.  LUNGS:  Clear to auscultation bilaterally without rales/rhonchi/wheezing/retractions.  Symmetric chest rise on inhalation noted.  ABD:  Active bowel sounds, soft, non-tender/non-distended.  No rebound/guarding/rigidity.  EXT:  Trace LE edema more on the right.  Right knee abrasion with erythema knee to right ankle.  Still in lines from yesterday and less red/warm.  Knee pre-patellar region and erythema pre-tibial region very tender to the touch.  SKIN:  Dry to touch, no other exanthems noted in the visualized areas.    Medications       aspirin  81 mg Oral Daily     atorvastatin  40 mg Oral Daily     calcium acetate  2,001 mg Oral TID w/meals     ceFAZolin  1 g Intravenous Q12H     metoprolol succinate  25 mg Oral Daily     senna-docusate  1 tablet Oral BID    Or     senna-docusate  2 tablet Oral BID     Data       Recent Labs  Lab 09/19/18  0650 09/18/18  1228   WBC  5.4 6.4   HGB 9.4* 10.5*   HCT 30.0* 33.9*   * 115*   * 106*       Recent Labs  Lab 09/18/18  1241 09/18/18  1232   CULT No growth after 14 hours No growth after 14 hours       Recent Labs  Lab 09/19/18  0650 09/18/18  1228    135   POTASSIUM 4.2 4.5   CHLORIDE 102 101   CO2 26 26   ANIONGAP 8 8   GLC 92 103*   BUN 33* 27   CR 5.42* 4.05*   GFRESTIMATED 10* 14*   GFRESTBLACK 12* 17*   KIMBER 8.4* 9.0   PROTTOTAL  --  7.5   ALBUMIN  --  3.4   BILITOTAL  --  1.0   ALKPHOS  --  144   AST  --  17   ALT  --  9       Recent Results (from the past 24 hour(s))   XR Knee Right 1/2 Views    Narrative    XR KNEE RT 1 /2 VW   9/18/2018 1:21 PM     HISTORY:  trauma;       Impression    IMPRESSION: Mild joint effusion. Otherwise unremarkable.    YOLANDE WAYNE MD   Ankle XR, G/E 3 views, right    Narrative    RIGHT ANKLE THREE OR MORE VIEWS   9/18/2018 1:22 PM     HISTORY:  Trauma.     FINDINGS: Soft tissue swelling. Calcaneal spurring.      Impression    IMPRESSION: No fracture identified.    YOLANDE WAYNE MD   US Lower Extremity Venous Duplex Right    Narrative    ULTRASOUND RIGHT LOWER EXTREMITY VENOUS DUPLEX September 18, 2018 3:13  PM    HISTORY: Right lower extremity swelling.    TECHNIQUE: Venous Doppler US.?Color flow and spectral Doppler with  waveform analysis performed.    FINDINGS: Soft tissue edema. No evidence of lower extremity deep  venous thrombosis.       Impression    IMPRESSION: Negative for deep vein thrombosis.    YOLANDE WAYNE MD

## 2018-09-19 NOTE — PROGRESS NOTES
09/19/18 1345   Quick Adds   Type of Visit Initial PT Evaluation   Living Environment   Lives With alone   Living Arrangements other (see comments)  ([facility])   Home Accessibility bed and bath on same level   Number of Stairs to Enter Home 6   Number of Stairs Within Home 8   Stair Railings at Home inside, present on right side   Living Environment Comment House is a split level with 8 steps upstairs and 6 down stairs. He reports he does not have to go onto lower level at all, since nothing is down there but laundry. Niece does all the cleaning and laundry for pt.    Self-Care   Dominant Hand right   Usual Activity Tolerance good   Current Activity Tolerance fair   Equipment Currently Used at Home none   Activity/Exercise/Self-Care Comment Indep PLOF with mobility and ADLs.  Does own SEC, FWW, standard walker, and 4WW, but does not regularly use them.   Functional Level Prior   Ambulation 0-->independent   Transferring 0-->independent   Toileting 0-->independent   Bathing 0-->independent   Dressing 0-->independent   Eating 0-->independent   Communication 0-->understands/communicates without difficulty   Swallowing 0-->swallows foods/liquids without difficulty   Cognition 0 - no cognition issues reported   Fall history within last six months yes   Number of times patient has fallen within last six months 1   Which of the above functional risks had a recent onset or change? ambulation;transferring;fall history   Prior Functional Level Comment Family reporting pt has some difficulty with mobility and stairs at baseline, considering putting in a stair lift.   General Information   Onset of Illness/Injury or Date of Surgery - Date 09/18/18   Referring Physician Franco, Aisha Staley PA-C   Patient/Family Goals Statement pt wants to return home, but is open to TCU if needed at time of dc   Pertinent History of Current Problem (include personal factors and/or comorbidities that impact the POC) Maurizio Ohara is a 88  year old male with a PMH significant for end-stage renal disease on hemodialysis Tuesday Thursday Saturday, coronary disease, hypertension, chronic anemia, diabetes (not on any medication) who presents with right worsening lower extremity cellulitis after a fall 1 week ago.    Precautions/Limitations fall precautions   Weight-Bearing Status - RLE weight-bearing as tolerated   General Observations pt resting in chair, family at bedside   Cognitive Status Examination   Orientation orientation to person, place and time   Pain Assessment   Patient Currently in Pain Yes, see Vital Sign flowsheet  (RLE: minimal pain at rest, increases w/ mvmt and WB)   Integumentary/Edema   Integumentary/Edema Comments redness/swelling R lower leg, very TTP   Posture    Posture Forward head position;Protracted shoulders   Range of Motion (ROM)   ROM Comment WFL LLE and BUEs, R knee limited to <90 deg flexion due to pain, limited R ankle DF/PF due to pain/swelling   Strength   Strength Comments WFL LLE at least 4 to 4+/5 throughout, 5/5 B ankle DF, limited strength R knee and hip secondary to pain/swelling, functional strength available for standing skills. WFL BUEs.   Transfer Skills   Transfer Comments CGA sit <> stand transfers, needing UE support for stability once standing given limited tolerance for WB RLE due to pain.   Gait   Gait Comments Amb with slow gait speed, needing UE support due to RLE pain with WB, limited amb tolerance to short distances <30ft currently.   Balance   Balance Comments Impaired gait stability due to pain with RLE WB, needing UE support and Ax1 for safety.   Sensory Examination   Sensory Perception no deficits were identified   Coordination   Coordination no deficits were identified   Muscle Tone   Muscle Tone no deficits were identified   General Therapy Interventions   Planned Therapy Interventions balance training;bed mobility training;gait training;neuromuscular  "re-education;ROM;strengthening;stretching;transfer training;risk factor education;home program guidelines;progressive activity/exercise   Clinical Impression   Criteria for Skilled Therapeutic Intervention yes, treatment indicated   PT Diagnosis Impaired functional mobility and gait tolerance.   Influenced by the following impairments RLE pain, swelling, deconditioning/weakness, decreased RLE ROM tolerance   Functional limitations due to impairments Impaired tolerance with bed mobility skills, functional transfers, impaired gait, stairs, fall risk   Clinical Presentation Stable/Uncomplicated   Clinical Presentation Rationale stable medical status, indep PLOF, pain, PMH   Clinical Decision Making (Complexity) Low complexity   Therapy Frequency` daily   Predicted Duration of Therapy Intervention (days/wks) 3-5 days   Anticipated Equipment Needs at Discharge (owns FWW and 4WW)   Anticipated Discharge Disposition Transitional Care Facility  (consider return home if improving status and extended LOS)   Risk & Benefits of therapy have been explained Yes   Patient, Family & other staff in agreement with plan of care Yes   [facility] TM \"6 Clicks\"   2016, Trustees of [facility], under license to [employer].  All rights reserved.   6 Clicks Short Forms Basic Mobility Inpatient Short Form   [facility]  \"6 Clicks\" V.2 Basic Mobility Inpatient Short Form   1. Turning from your back to your side while in a flat bed without using bedrails? 3 - A Little   2. Moving from lying on your back to sitting on the side of a flat bed without using bedrails? 3 - A Little   3. Moving to and from a bed to a chair (including a wheelchair)? 3 - A Little   4. Standing up from a chair using your arms (e.g., wheelchair, or bedside chair)? 3 - A Little   5. To walk in hospital room? 3 - A Little   6. Climbing 3-5 steps with a railing? 2 - A Lot   Basic Mobility Raw Score (Score out of 24.Lower scores equate to " lower levels of function) 17   Total Evaluation Time   Total Evaluation Time (Minutes) 15

## 2018-09-20 NOTE — CONSULTS
Care Transition Initial Assessment - SW  Reason For Consult: discharge planning  Met with: Patient    Active Problems:    Cellulitis       DATA  Lives With: alone  Living Arrangements: other (see comments) ([facility])  Description of Support System: Supportive, Involved  Who is your support system?: Children, Sibling(s)  Support Assessment: Adequate family and caregiver support, Adequate social supports. Pt has a niece who does his cleaning and laundry  Identified issues/concerns regarding health management: pt on Dialysis tue/th/sat in [facility]  Pt being treated for cellulitis       Quality Of Family Relationships: supportive     ASSESSMENT  Cognitive Status:  awake, alert and oriented  Concerns to be addressed: Met with pt..  PT is very independent at home. He still drives, is a  and likes to fly [place]  Pt has has home care in the past but unsure what agency. Back in the 90's was in TCU in [facility] where is sister lives. Per family report family would like him to move back.. Pt is not planning to move at this time  When sw spoke with him about TCU options pt minimized the need to do this.  He plans to discharge home and unsure if he will accept home care support  Pt has access to two different kinds of walker at his use but has not  sw suggested life line. Pt declined.  PT plans to try and walk with staff during the day  Pt was not wiling to consider TCU as this time but open to sw stopping in tomorrow to assess how pt is doing  .     PLAN  SW will need to follow up with pt about DCplanning support

## 2018-09-20 NOTE — PROGRESS NOTES
"[facility]  Hospitalist Progress Note  Bola Garcia, DO 09/20/2018    Reason for Stay (Diagnosis): Right lower extremity cellulitis.         Assessment and Plan:      Summary of Stay: Maurizio Ohara is a 88 year old male admitted on 9/18/2018 with right lower extremity cellulitis.  Problem List:   1. Right lower extremity cellulitis.  Continue IV cefazolin.    2. End-stage kidney disease.  Nephrology following.  Dialysis per nephrology.  Continue calcium acetate.  3. Coronary artery disease.  Continue aspirin, atorvastatin, and metoprolol.  4. Hypertension.  Continue metoprolol.  5. Hyperlipidemia.  Continue atorvastatin.  6. Deconditioning.  Continue to work with therapy.  Will likely need TCU at discharge.  7. Anemia.  Likely due to chronic disease.  DVT Prophylaxis: Pneumatic Compression Devices  Code Status: DNI  Discharge Dispo: Likely TCU.  Estimated Disch Date / # of Days until Disch: 2-4        Interval History (Subjective):      Has some right leg pain.  Denies chest pain, shortness of breath, fevers, chills, nausea, vomiting, or diarrhea.                  Physical Exam:      Last Vital Signs:  BP 98/45 (BP Location: Left arm)  Pulse 60  Temp 98.5  F (36.9  C) (Oral)  Resp 18  Ht 1.676 m (5' 6\")  Wt 75.3 kg (165 lb 14.4 oz)  SpO2 95%  BMI 26.78 kg/m2    Gen:  NAD, A&Ox3.  Eyes:  PERRL, sclera anicteric.  OP:  MMM, no lesions.  Neck:  Supple.  CV:  Regular, +1/6 murmur.  Lung:  CTA b/l, normal effort.  Ab:  +BS, soft.  Skin:  Warm, dry to touch.  No rash.  Ext:  No pitting edema LE b/l.           Medications:      All current medications were reviewed with changes reflected in problem list.         Data:      All new lab and imaging data was reviewed.   Labs:    Recent Labs  Lab 09/19/18  0650      POTASSIUM 4.2   CHLORIDE 102   CO2 26   ANIONGAP 8   GLC 92   BUN 33*   CR 5.42*   GFRESTIMATED 10*   GFRESTBLACK 12*   KIMBER 8.4*       Recent Labs  Lab 09/19/18  0650   WBC " 5.4   HGB 9.4*   HCT 30.0*   *   *      Imaging:   No results found for this or any previous visit (from the past 24 hour(s)).

## 2018-09-20 NOTE — PROGRESS NOTES
Potassium   Date Value Ref Range Status   09/19/2018 4.2 3.4 - 5.3 mmol/L Final   ]  Lab Results   Component Value Date    HGB 9.4 09/19/2018     Weight: 75.3 kg (165 lb 14.4 oz)  POST WT  DIALYSIS PROCEDURE NOTE2  Hepatitis status of previous patient on machine log was checked and ok to use with this patient hepatitis status.  Patient dialyzed for 2.75 hrs on a 3 K bath with a  net fluid removal of 2.5L.  A BFR of 450ml/min was obtained via RUAVF.  Patient was seen by Dr. Anthony during treatment.  Total heparin received during treatment:: 1300units. Meds given: Epogen 8000u & Phoslo 667mg caps - 3 caps. Complications:NA   Procedure and ESRD teaching done and questions answered.  See flowsheet in EPIC for further details and post assessment.  Machine water alarm in place and functioning.  HEPATITIS B SURFACE ANTIGEN Negative Date 3/10/16 HEPATITIS B SURFACE ANTIBODY Immune DATE 4/5/18  CHLORINE AND CHLORAMINES CHECK on WATER SYSTEM EVERY 4 hours.   PT returned via   Catheter Access: Aseptic prep done for both on/off.  Report received from: MALIK Schmitz RN  Report given to: MALIK Schmitz RN  Outpatient Dialysis at

## 2018-09-20 NOTE — PROGRESS NOTES
Inpatient Dialysis Progress Note        Assessment and Plan:     88 y.o gentleman with ESRD, admitted for RLE cellulitis.      # ESRD                         -2.45 hrs, eDW 73.5 kg, RAVF, 1.5K, 8000 units Epogen                         -TTS scheduled with Dr. Yung at the [facility] unit.                          -Been on HD since 2012     # RLE cellulitis. Improving with Ancef.      # Anemia. 8000 units of Epogen with HD.     # CKD-MBD. PHoslo with meals      # CAD. He is on ASA/statin/BB     # HTN. Controlled with metoprolol.     Plan.   # UF ~ 2 liters net with HD  # Epogen 8000 units         Interval History:     He feels better. No cardiopulmonary complaints. AVSS.          Dialysis Parameters:     Vitals:    09/18/18 1141 09/18/18 1612   Weight: 84.4 kg (186 lb) 75.3 kg (165 lb 14.4 oz)     I/O last 3 completed shifts:  In: 250 [P.O.:250]  Out: -   Temp:  [97.6  F (36.4  C)-98.2  F (36.8  C)] 97.6  F (36.4  C)  Pulse:  [60] 60  Heart Rate:  [56-68] 64  Resp:  [16-17] 17  BP: ()/(32-57) 103/53  SpO2:  [94 %-100 %] 99 %    Current Weight: 75.3 kg  Dry Weight: 73.5 kg  Dialysis Temp: 36.5  C  Access Device: AVF  Access Site: R arm  Dialyzer: revaclear  Dialysis Bath: 3K  Sodium Profile: none  UF Goal: 2 liters net  Blood Flow Rate (mL/min): 450  Total Treatment Time (hrs): 2.45  Heparin: low dose    Review of Systems:        Medications:     EPO dose: 8000 units       Physical Exam:     Vitals were reviewed  Patient Vitals for the past 24 hrs:   BP Temp Temp src Pulse Heart Rate Resp SpO2   09/20/18 0930 103/53 - - - 64 - 99 %   09/20/18 0915 97/46 - - - 57 - 99 %   09/20/18 0900 108/49 - - - 59 - 100 %   09/20/18 0845 113/55 - - - 60 17 99 %   09/20/18 0828 114/48 - - - 68 16 99 %   09/20/18 0700 118/57 97.6  F (36.4  C) Oral 60 60 16 97 %   09/19/18 2358 107/50 97.6  F (36.4  C) Oral - 56 16 94 %   09/19/18 2057 (!) 104/32 - - - - - -   09/19/18 1737 (!) 90/34 - - - - - -   09/19/18 1725 (!) 85/48 98.2  F  (36.8  C) Oral - 56 16 95 %       Temperatures:  Current - Temp: 97.6  F (36.4  C); Max - Temp  Av.8  F (36.6  C)  Min: 97.6  F (36.4  C)  Max: 98.2  F (36.8  C)  Respiration range: Resp  Av.2  Min: 16  Max: 17  Pulse range: Pulse  Av  Min: 60  Max: 60  Blood pressure range: Systolic (24hrs), Av , Min:85 , Max:118   ; Diastolic (24hrs), Av, Min:32, Max:57    Pulse oximetry range: SpO2  Av.8 %  Min: 94 %  Max: 100 %  I/O last 3 completed shifts:  In: 250 [P.O.:250]  Out: -     Intake/Output Summary (Last 24 hours) at 18 0945  Last data filed at 18 0546   Gross per 24 hour   Intake              250 ml   Output                0 ml   Net              250 ml       Gen: NAD  CV: RRR, + murmur  Pulm: Ctab. No wheezes or crackles  Abd: soft, NT  Ext: RLE edema. Redness is improving  Neuro: a/o x3        Data:     Recent Labs   Lab Test  18   0650  18   1228   NA  136  135   POTASSIUM  4.2  4.5   CHLORIDE  102  101   CO2  26  26   ANIONGAP  8  8   GLC  92  103*   BUN  33*  27   CR  5.42*  4.05*   KIMBER  8.4*  9.0       Hemoglobin   Date Value Ref Range Status   2018 9.4 (L) 13.3 - 17.7 g/dL Final              Harvinder Anthony MD

## 2018-09-20 NOTE — PROGRESS NOTES
Patient at dialysis at time of visit  Will return as schedule allows    Continue current cares  Please call with any ortho concerns    Phyllis Everett PAC  [phone]

## 2018-09-21 NOTE — PROGRESS NOTES
CM: Met with pt again today to discuss discharge planning support. Pt is still a having a lot of pain in his leg.  Having difficult time with immobility and lives alone    Discharge Planner   Discharge Plans in progress: Have talked twice with pt about TCU   Barriers to discharge plan: pt continues to believe he will be able to return to home. Left TCU packet with pt   Follow up plan: will need weekend follow up        Entered by: Corinne C. White 09/21/2018 9:32 AM

## 2018-09-21 NOTE — PROGRESS NOTES
" Renal Medicine Progress Note            Assessment/Plan:     88 y.o gentleman with ESRD, admitted for RLE cellulitis.       # ESRD                         -2.45 hrs, eDW 73.5 kg, RAVF, 1.5K, 8000 units Epogen                         -TTS scheduled with Dr. Yung at the [place] unit.                          -Been on HD since 2012  # RLE cellulitis. Improving with Ancef.   # Anemia. 8000 units of Epogen with HD.  # CKD-MBD. PHoslo with meals   # CAD. He is on ASA/statin/BB  # HTN. Controlled with metoprolol.     Plan.  # Hemodialysis tomorrow.        Interval History:     Patient complaints of some pain in the right foot when he puts pressure on it. No SOB, N/V. AVSS.          Medications and Allergies:       - MEDICATION INSTRUCTIONS for Dialysis Patients -   Does not apply See Admin Instructions     aspirin  81 mg Oral Daily     atorvastatin  40 mg Oral Daily     calcium acetate  2,001 mg Oral TID w/meals     ceFAZolin  1 g Intravenous Q24H     heparin  5,000 Units Subcutaneous Q12H     metoprolol succinate  25 mg Oral Daily     senna-docusate  1 tablet Oral BID    Or     senna-docusate  2 tablet Oral BID        Allergies   Allergen Reactions     Glyburide      Lisinopril      Hyperkalemia when hospitalized 4/5/2011     Metformin      Renal failure stage 4     [place]      SHADY Gallagher clarified with pt, pt does not remember rxn, it was a long time ago, and \"nothing crazy\".     Verapamil             Physical Exam:   Vitals were reviewed  Heart Rate: 53, Blood pressure 112/45, pulse 64, temperature 98.4  F (36.9  C), temperature source Oral, resp. rate 16, height 1.676 m (5' 6\"), weight 75.3 kg (165 lb 14.4 oz), SpO2 96 %.    Wt Readings from Last 3 Encounters:   09/18/18 75.3 kg (165 lb 14.4 oz)   08/22/18 68 kg (150 lb)   06/15/18 68 kg (150 lb)       Intake/Output Summary (Last 24 hours) at 09/21/18 2988  Last data filed at 09/21/18 0603   Gross per 24 hour   Intake              300 ml   Output                " 0 ml   Net              300 ml        Gen: NAD  CV: RRR, + murmur  Pulm: Ctab. No wheezes or crackles  Abd: soft, NT  Ext: RLE edema. Redness is improving  Neuro: a/o x3         Data:     CBC RESULTS:     Recent Labs  Lab 09/19/18  0650 09/18/18  1228   WBC 5.4 6.4   RBC 2.63* 2.96*   HGB 9.4* 10.5*   HCT 30.0* 33.9*   * 106*       Basic Metabolic Panel:    Recent Labs  Lab 09/19/18  0650 09/18/18  1228    135   POTASSIUM 4.2 4.5   CHLORIDE 102 101   CO2 26 26   BUN 33* 27   CR 5.42* 4.05*   GLC 92 103*   KIMBER 8.4* 9.0       INRNo lab results found in last 7 days.   Attestation:   I have reviewed today's relevant vital signs, notes, medications, labs and imaging.    Harvinder Anthony MD  [facility] - Nephrology  Office phone :[phone]  Pager: [phone]

## 2018-09-21 NOTE — PROGRESS NOTES
"[facility]  Hospitalist Progress Note  Bola Garcia, DO 09/21/2018    Reason for Stay (Diagnosis): Right leg cellulitis         Assessment and Plan:      Summary of Stay: Maurizio Ohara is a 88 year old male admitted on 9/18/2018 with right lower extremity cellulitis.  Problem List:   1. Right lower extremity cellulitis.  Continue IV cefazolin.    2. End-stage kidney disease.  Nephrology following.  Dialysis per nephrology.  Continue calcium acetate.  3. Coronary artery disease.  Continue aspirin, atorvastatin, and metoprolol.  4. Hypertension.  Continue metoprolol.  5. Hyperlipidemia.  Continue atorvastatin.  6. Deconditioning.  Continue to work with therapy.  Will likely need TCU at discharge.  7. Anemia.  Likely due to chronic disease.  DVT Prophylaxis: Pneumatic Compression Devices  Code Status: DNI  Discharge Dispo: Likely TCU.  Estimated Disch Date / # of Days until Disch: 2-3        Interval History (Subjective):      Some right leg pain.  Denies chest pain, shortness of breath, fevers, chills, nausea, vomiting, or diarrhea.                  Physical Exam:      Last Vital Signs:  BP 98/46 (BP Location: Left arm)  Pulse 64  Temp 98  F (36.7  C) (Oral)  Resp 16  Ht 1.676 m (5' 6\")  Wt 75.3 kg (165 lb 14.4 oz)  SpO2 94%  BMI 26.78 kg/m2    Gen:  NAD, A&Ox3.  Eyes:  PERRL, sclera anicteric.  OP:  MMM, no lesions.  Neck:  Supple.  CV:  Regular, +1/6 murmur.  Lung:  CTA b/l, normal effort.  Ab:  +BS, soft.  Skin:  Warm, dry to touch.  Right lower leg erythema.  Ext:  No pitting edema LE b/l.           Medications:      All current medications were reviewed with changes reflected in problem list.         Data:      All new lab and imaging data was reviewed.   Labs:    Recent Labs  Lab 09/19/18  0650      POTASSIUM 4.2   CHLORIDE 102   CO2 26   ANIONGAP 8   GLC 92   BUN 33*   CR 5.42*   GFRESTIMATED 10*   GFRESTBLACK 12*   KIMBER 8.4*       Recent Labs  Lab 09/19/18  0650   WBC 5.4 "   HGB 9.4*   HCT 30.0*   *   *      Imaging:   No results found for this or any previous visit (from the past 24 hour(s)).

## 2018-09-21 NOTE — PROGRESS NOTES
Pt is agreeable to [facility], which is near his family.  Referral sent.  Updated SW.  Aileen CARUSO CTS [id number]

## 2018-09-22 NOTE — PROGRESS NOTES
"[facility]  Hospitalist Progress Note  Bola Garcia, DO 09/22/2018    Reason for Stay (Diagnosis): Right leg cellulitis.         Assessment and Plan:      Summary of Stay: Maurizio Ohara is a 88 year old male admitted on 9/18/2018 with right lower extremity cellulitis.  Problem List:   1. Right lower extremity cellulitis.  Slowly improving.  Continue IV cefazolin.    2. End-stage kidney disease.  Nephrology following.  Dialysis per nephrology.  Continue calcium acetate.  3. Coronary artery disease.  Continue aspirin, atorvastatin, and metoprolol.  4. Hypertension.  Continue metoprolol.  5. Hyperlipidemia.  Continue atorvastatin.  6. Deconditioning.  Continue to work with therapy.  Will likely need TCU at discharge.  7. Anemia.  Likely due to chronic disease.  DVT Prophylaxis: Pneumatic Compression Devices  Code Status: DNI  Discharge Dispo: Likely TCU.  Estimated Disch Date / # of Days until Disch: 1-3        Interval History (Subjective):      Occasional right leg pain.  Denies chest pain, shortness of breath, fevers, chills, nausea, vomiting, or diarrhea.                  Physical Exam:      Last Vital Signs:  /49  Pulse 64  Temp 98.4  F (36.9  C)  Resp 16  Ht 1.676 m (5' 6\")  Wt 73.2 kg (161 lb 6 oz)  SpO2 96%  BMI 26.05 kg/m2    Gen:  NAD, A&Ox3.  Eyes:  PERRL, sclera anicteric.  OP:  MMM, no lesions.  Neck:  Supple.  CV:  Regular, +1/6 murmur.  Lung:  CTA b/l, normal effort.  Ab:  +BS, soft.  Skin:  Warm, dry to touch.  Right leg erythema mildly improved from previous.  Ext:  No pitting edema LE b/l.           Medications:      All current medications were reviewed with changes reflected in problem list.         Data:      All new lab and imaging data was reviewed.   Labs:    Recent Labs  Lab 09/22/18  0632      POTASSIUM 4.6   CHLORIDE 99   CO2 28   ANIONGAP 8   *   BUN 43*   CR 6.18*   GFRESTIMATED 9*   GFRESTBLACK 10*   KIMBER 8.3*       Recent Labs  Lab " 09/22/18  0632   WBC 5.1   HGB 9.8*   HCT 31.3*   *         Imaging:   No results found for this or any previous visit (from the past 24 hour(s)).

## 2018-09-22 NOTE — PROGRESS NOTES
Renal Medicine Progress Note                                Maurizio Ohara MRN# [id number]   Age: 88 year old YOB: 1929   Date of Admission: 9/18/2018 Hospital LOS: 4                  Assessment/Plan:     Admitted with RLE cellulitis    Seen for ESRD management      [facility]  [facility].5  2.  Anemia   -SY  3.  Mineral Bone Disease   -PO4 binder  4.  HTN      Continue [facility] schedule      Interval History:     Dialysis earlier today without issue.  Seen post run. Comfortable  2 liter UF    No bleeding post run    ROS:     GENERAL: NAD, No fever,chills  R: NEGATIVE for significant cough or SOB  CV: NEGATIVE for chest pain, palpitations  EXT: no change edema  ROS otherwise negative    Medications and Allergies:     Reviewed    Physical Exam:     Vitals were reviewed  Patient Vitals for the past 8 hrs:   BP Temp Temp src Heart Rate Resp SpO2 Weight   09/22/18 1300 96/40 97.5  F (36.4  C) Oral 59 16 96 % -   09/22/18 1130 113/49 - - 67 - - -   09/22/18 1115 104/54 98.4  F (36.9  C) - 65 16 - -   09/22/18 1100 101/49 - - 59 - - -   09/22/18 1045 95/49 - - 57 - - -   09/22/18 1030 98/51 - - 63 - - -   09/22/18 1015 103/55 - - 66 - - -   09/22/18 1000 99/49 - - 63 - - -   09/22/18 0945 112/49 - - 59 - - -   09/22/18 0930 103/41 - - 56 - - -   09/22/18 0915 104/50 - - 64 - - -   09/22/18 0900 101/45 - - 53 - - -   09/22/18 0835 104/52 - - 58 - - -   09/22/18 0815 121/55 - - 64 22 96 % 73.2 kg (161 lb 6 oz)   09/22/18 0747 101/56 97.9  F (36.6  C) Oral 55 16 94 % -          Vitals:    09/18/18 1141 09/18/18 1612 09/22/18 0815   Weight: 84.4 kg (186 lb) 75.3 kg (165 lb 14.4 oz) 73.2 kg (161 lb 6 oz)         GENERAL: awake, alert, follows  HEENT: NC/AT, PERRLA, EOMI, non icteric, pharynx moist without lesion  RESP:  clear anteriorly  CV: RRR, normal S1 S2  ABDOMEN: soft, nontender, no HSM or masses and bowel sounds normal  MS: no clubbing, cyanosis   SKIN: clear without significant  rashes or lesions  NEURO: speech normal and cranial nerves 2-12 intact  PSYCH: affect normal/bright    Data:       Recent Labs  Lab 09/22/18  0632 09/19/18  0650 09/18/18  1228    136 135   POTASSIUM 4.6 4.2 4.5   CHLORIDE 99 102 101   CO2 28 26 26   ANIONGAP 8 8 8   * 92 103*   BUN 43* 33* 27   CR 6.18* 5.42* 4.05*   GFRESTIMATED 9* 10* 14*   GFRESTBLACK 10* 12* 17*   KIMBER 8.3* 8.4* 9.0           Recent Labs  Lab 09/22/18  0632 09/19/18  0650 09/18/18  1228   HGB 9.8* 9.4* 10.5*         G Obi Morley    [facility]ph[facility]  [phone]

## 2018-09-22 NOTE — PROGRESS NOTES
Potassium   Date Value Ref Range Status   09/22/2018 4.6 3.4 - 5.3 mmol/L Final   ]  Lab Results   Component Value Date    HGB 9.8 09/22/2018     Weight: 73.2 kg (161 lb 6 oz)    DIALYSIS PROCEDURE NOTE    Patient dialyzed for 2.45 hrs on a 2 K bath with a  net fluid removal of 2L.  A BFR of 450ml/min was obtained via RUAF and all connections secured.   Patient was seen by  during treatment.  Total heparin received during treatment:: 1500units.   Meds given:EPO, Hectoral. Complications:none   Procedure and ESRD teaching done and questions answered.  See flowsheet in EPIC for further details.  Hepatitis status confirmed on previous patient.    RO log completed  Prime given: NS  Saline double clamped and Arterial/Venous parameters set.   Patient transported via cart to the dialysis unit   Aseptic prep done for both on/off.  Transducer connectors checked q15 minutes with vital sign check  :  Report received from: SERA Lee RN  Report given to: SERA Lee RN  Outpatient Dialysis at  [place]    Hepatitis Antigen neg  Hepatitis Antibody immune 4/5/18

## 2018-09-22 NOTE — PROGRESS NOTES
"Orthopedic Surgery  Maurizio Ohara  9/22/2018  Admit Date:  9/18/2018  Right LE cellulitis    Patient resting comfortably in bed.    Pain controlled. Right LE still very sensitive but states it is slowly improving  Tolerating oral intake.    Denies nausea or vomiting  Denies chest pain or shortness of breath  No events overnight.     Alert and orient to person, place, and time.  Vital Sign Ranges    BP 91/42 (BP Location: Left arm)  Pulse 62  Temp 98.5  F (36.9  C) (Oral)  Resp 16  Ht 1.676 m (5' 6\")  Wt 73.2 kg (161 lb 6 oz)  SpO2 94%  BMI 26.05 kg/m2    Erythema receeding within drawn boarders  Very sensitive to touch but improving  No fluctuant area palpated  Moderate area of scabbing over patellar tendon   Bilateral calves are soft, non-tender.  Bilateral lower extremity is NVI.  Sensation intact bilateral lower extremities  Active dorsi and plantar flexion bilaterally  +Dp pulse    Labs:  Recent Labs   Lab Test  09/19/18   0650  09/18/18   1228  08/15/17   0650   POTASSIUM  4.2  4.5  4.3     Recent Labs   Lab Test  09/19/18   0650  09/18/18   1228  08/15/17   0650   HGB  9.4*  10.5*  8.3*     Recent Labs   Lab Test  09/28/16   0843  12/24/14   0030   INR  1.17*  0.98     Recent Labs   Lab Test  09/19/18   0650  09/18/18   1228  08/13/17   0655   PLT  104*  106*  128*       A/P  1. Plan   Non-operative treatment   Continue IV abx per hospitalist.     Mobilize with PT/OT    WBAT   Continue current pain regiment.      "

## 2018-09-23 NOTE — PROGRESS NOTES
"[facility]  Hospitalist Progress Note  Bola Garcia, DO 09/23/2018    Reason for Stay (Diagnosis): Right leg cellulitis         Assessment and Plan:      Summary of Stay: Maurizio Ohara is a 88 year old male admitted on 9/18/2018 with right lower extremity cellulitis.  Problem List:   1. Right lower extremity cellulitis.Continues to slowly improve.  Continue IV cefazolin.    2. End-stage kidney disease.  Nephrology following.  Dialysis per nephrology.  Continue calcium acetate.  3. Coronary artery disease.  Continue aspirin, atorvastatin, and metoprolol.  4. Hypertension.  Continue metoprolol.  5. Hyperlipidemia.  Continue atorvastatin.  6. Deconditioning.  Continue to work with therapy.  Plan for TCU at discharge.  7. Anemia.  Likely due to chronic disease.  Has been stable.  DVT Prophylaxis: Pneumatic Compression Devices  Code Status: DNI  Discharge Dispo: Likely TCU.  Estimated Disch Date / # of Days until Disch: 1-2        Interval History (Subjective):      Occasional right leg pain.  Improved from previous.  Denies chest pain, shortness of breath, fevers, chills, nausea, vomiting, or diarrhea.                  Physical Exam:      Last Vital Signs:  /42 (BP Location: Left arm)  Pulse 61  Temp 98.2  F (36.8  C) (Oral)  Resp 16  Ht 1.676 m (5' 6\")  Wt 73.2 kg (161 lb 6 oz)  SpO2 92%  BMI 26.05 kg/m2    Gen:  NAD, A&Ox3.  Eyes:  PERRL, sclera anicteric.  OP:  MMM, no lesions.  Neck:  Supple.  CV:  Regular, no murmurs.  Lung:  CTA b/l, normal effort.  Ab:  +BS, soft.  Skin:  Warm, dry to touch.  Erythema right lower leg.  Ext:  No pitting edema LE b/l.           Medications:      All current medications were reviewed with changes reflected in problem list.         Data:      All new lab and imaging data was reviewed.   Labs:    Recent Labs  Lab 09/22/18  0632      POTASSIUM 4.6   CHLORIDE 99   CO2 28   ANIONGAP 8   *   BUN 43*   CR 6.18*   GFRESTIMATED 9* "   GFRESTBLACK 10*   KIMBER 8.3*       Recent Labs  Lab 09/22/18  0632   WBC 5.1   HGB 9.8*   HCT 31.3*   *         Imaging:   No results found for this or any previous visit (from the past 24 hour(s)).

## 2018-09-24 NOTE — PROGRESS NOTES
CM: Called [facility] to see what they would have for TCU beds for tomorrow.   I/P: Left VM message.

## 2018-09-24 NOTE — PROGRESS NOTES
"[facility]  Hospitalist Progress Note  Bola Garcia, DO 09/24/2018    Reason for Stay (Diagnosis): Right leg cellulitis.         Assessment and Plan:      Summary of Stay: Maurizio Ohara is a 88 year old male admitted on 9/18/2018 with right lower extremity cellulitis.  Problem List:   1. Right lower extremity cellulitis.Continues to slowly improve.  Continue IV cefazolin.  Still having quite a bit of pain in the right leg.  Will attempt adjusting pain medications.  Change oxycodone to tramadol 50 mg every 6 hours as needed.  Add hydroxyzine 25 mg every 6 hours as needed.  Schedule Tylenol every 8 hours 975 mg.  2. End-stage kidney disease.  Nephrology following.  Dialysis per nephrology.  Continue calcium acetate.  3. Coronary artery disease.  Continue aspirin, atorvastatin, and metoprolol.  4. Hypertension.  Continue metoprolol.  5. Hyperlipidemia.  Continue atorvastatin.  6. Deconditioning.  Continue to work with therapy.  Plan for TCU at discharge.  7. Anemia.  Likely due to chronic disease.  Has been stable.  DVT Prophylaxis: Pneumatic Compression Devices  Code Status: DNI  Discharge Dispo: Likely TCU.  Estimated Disch Date / # of Days until Disch: 1-2        Interval History (Subjective):      Having some right leg pain.  Denies chest pain, shortness of breath, fevers, chills, nausea, vomiting, or diarrhea.                  Physical Exam:      Last Vital Signs:  /49 (BP Location: Left arm)  Pulse 61  Temp 97.6  F (36.4  C) (Axillary)  Resp 24  Ht 1.676 m (5' 6\")  Wt 73.2 kg (161 lb 6 oz)  SpO2 98%  BMI 26.05 kg/m2    Gen:  NAD, A&Ox3.  Eyes:  PERRL, sclera anicteric.  OP:  MMM, no lesions.  Neck:  Supple.  CV:  Regular, no murmurs.  Lung:  CTA b/l, normal effort.  Ab:  +BS, soft.  Skin:  Warm, dry to touch.  Right leg erythema improving.  Ext:  No pitting edema LE b/l.           Medications:      All current medications were reviewed with changes reflected in problem " list.         Data:      All new lab and imaging data was reviewed.   Labs:    Recent Labs  Lab 09/22/18  0632      POTASSIUM 4.6   CHLORIDE 99   CO2 28   ANIONGAP 8   *   BUN 43*   CR 6.18*   GFRESTIMATED 9*   GFRESTBLACK 10*   KIMBER 8.3*       Recent Labs  Lab 09/22/18  0632   WBC 5.1   HGB 9.8*   HCT 31.3*   *         Imaging:   No results found for this or any previous visit (from the past 24 hour(s)).

## 2018-09-24 NOTE — PROGRESS NOTES
Discharge Planner   Discharge Plans in progress: Met with pt and updated that [facility] will not have bed till Thur  Barriers to discharge plan: obtained additional choices for discharge planning   Follow up plan possible discharge tomorrow???   [facility].        Entered by: Corinne C. White 09/24/2018 2:08 PM     Cm: PT has been offered TCU bed for tomorrow at [facility]  Pt is open to this option,. Stated he nephew would help with transport to and from Dialysis.  Pt has is with [facility] at [place] in the past

## 2018-09-24 NOTE — PROGRESS NOTES
Orthopedic Surgery  Maurizio Ohara  2018  Admit Date:  2018  Right LE cellulitis    Patient resting comfortably in bed.    Pain controlled. Right LE still very sensitive.  Tolerating oral intake.    Denies nausea or vomiting  Denies chest pain or shortness of breath  No events overnight.      Alert and orient to person, place, and time.  Vital Sign Ranges  Temperature Temp  Av.2  F (36.8  C)  Min: 97.2  F (36.2  C)  Max: 99.2  F (37.3  C)   Blood pressure Systolic (24hrs), Av , Min:91 , Max:94        Diastolic (24hrs), Av, Min:39, Max:41      Pulse No Data Recorded   Respirations Resp  Av  Min: 16  Max: 16   Pulse oximetry SpO2  Av.5 %  Min: 95 %  Max: 96 %       Erythema receeding within drawn boarders  Very sensitive to touch  No fluctuant area palpated  Moderate area of scabbing over patellar tendon   Bilateral calves are soft, non-tender.  Bilateral lower extremity is NVI.  Sensation intact bilateral lower extremities  Active dorsi and plantar flexion bilaterally  +Dp pulse    Labs:  Recent Labs   Lab Test  18   0632  18   0650  18   1228   POTASSIUM  4.6  4.2  4.5     Recent Labs   Lab Test  18   0632  18   0650  18   1228   HGB  9.8*  9.4*  10.5*     Recent Labs   Lab Test  16   0843  14   0030   INR  1.17*  0.98     Recent Labs   Lab Test  18   0632  18   0650  18   1228   PLT  168  104*  106*       A/P  1. Plan                        Non-operative treatment                        Continue IV abx per hospitalist.                          Mobilize with PT/OT                         WBAT                        Continue current pain regiment.    2. Disposition   Anticipate discharge TCU based on medical clearance.    Phyllis Everett PA-C

## 2018-09-25 NOTE — PROGRESS NOTES
Potassium   Date Value Ref Range Status   09/22/2018 4.6 3.4 - 5.3 mmol/L Final     Lab Results   Component Value Date    HGB 9.8 09/22/2018     Weight: 73.2 kg (161 lb 6 oz)  POST WT  DIALYSIS PROCEDURE NOTE  Hepatitis status of previous patient on machine log was checked and verified ok to use with this patients hepatitis status.  Patient dialyzed for 3 hrs. on a 2 K bath with a net fluid removal of  2L.  A BFR of 400 ml/min was obtained via a LUE AVF using 15gauge needles.    The patient was seen by Dr. Bertrand during the treatment.  Total heparin received during the treatment: 1000 units. Sites held x 10 min then  pressure drsgs applied.  Meds  Given: Hectorol, Epogen Complications: None.  Procedure and ESRD teaching done and questions answered. See flowsheet in EPIC for further details and post assessment.  Machine water alarm in place and functioning.  Pt returned via WC  Vascular Access: Aseptic prep done for both on/off.  Report received from: MICHAELA Arredondo RN  Report given to: MICHAELA Arredondo RN  HEPATITIS B SURFACE ANTIGEN Non-Reactive DATE 3/10/16   HEPATITIS B SURFACE ANTIBODY Immune DATE 4/5/18  Chlorine/Chloramine water system checked every 4 hours.  Outpatient Dialysis at [facility]

## 2018-09-25 NOTE — PROGRESS NOTES
Pt seen and examined.  Feels well; still having some pain of his RLE but is improving.  Redness decreasing from outlines.  Looks medically stable for discharge to TCU

## 2018-09-25 NOTE — PROGRESS NOTES
Assessment and Plan:   ESRD: Patient typically runs at the [place] unit Monday Wednesday and Friday.  Today he will run for 3 hours with 400 blood flow rate from the right arm fistula.  We will aim for 2 L ultrafiltration.  He will be on a 2K and 33 bicarb bath.  He will get E Po and Hectorol on the run.  He is on PhosLo with meals.            Interval History:   Right lower extremity cellulitis  ASCVD: On metoprolol  Hypertension  Anemia                   Review of Systems:   Denies cramps or nausea on dialysis.  Tolerating the procedure well.  No chest or abdominal pain.  Taking p.o. well without nausea, vomiting or anorexia.  Complains of tenderness and pain in the right lower extremity.  Review of systems is otherwise negative.          Medications:       - MEDICATION INSTRUCTIONS for Dialysis Patients -   Does not apply See Admin Instructions     acetaminophen  975 mg Oral Q8H     aspirin  81 mg Oral Daily     atorvastatin  40 mg Oral Daily     calcium acetate  2,001 mg Oral TID w/meals     ceFAZolin  1 g Intravenous Q24H     diclofenac  2 g Topical 4x Daily     heparin  5,000 Units Subcutaneous Q12H     lidocaine   Topical Q4H While awake     metoprolol succinate  25 mg Oral Daily     polyethylene glycol  17 g Oral Daily     senna-docusate  1 tablet Oral BID    Or     senna-docusate  2 tablet Oral BID     sodium chloride (PF)  3 mL Intracatheter Q8H       heparin (porcine)       - MEDICATION INSTRUCTIONS -       Current active medications and PTA medications reviewed, see medication list for details.            Physical Exam:   Vitals were reviewed  Patient Vitals for the past 24 hrs:   BP Temp Temp src Heart Rate Resp SpO2   09/25/18 1145 108/47 - - 64 - -   09/25/18 1130 99/46 - - 70 - -   09/25/18 1115 103/44 - - 69 - -   09/25/18 1100 107/45 - - 66 - -   09/25/18 1045 109/51 - - 61 - -   09/25/18 1030 102/47 - - 61 - -   09/25/18 1015 107/49 97.9  F (36.6  C) Oral 64 18 95 %   09/25/18 0728  92/49 97.7  F (36.5  C) Oral 62 18 95 %   18 0020 104/51 - - - - -   18 2329 (!) 102/33 97.8  F (36.6  C) Oral 65 18 93 %   18 1539 104/49 97.6  F (36.4  C) Axillary 65 - 98 %       Temp:  [97.6  F (36.4  C)-97.9  F (36.6  C)] 97.9  F (36.6  C)  Heart Rate:  [61-70] 64  Resp:  [18] 18  BP: ()/(33-51) 108/47  SpO2:  [93 %-98 %] 95 %    Temperatures:  Current - Temp: 97.9  F (36.6  C); Max - Temp  Av.8  F (36.6  C)  Min: 97.6  F (36.4  C)  Max: 97.9  F (36.6  C)  Respiration range: Resp  Av  Min: 18  Max: 18  Pulse range: No Data Recorded  Blood pressure range: Systolic (24hrs), Av , Min:92 , Max:109   ; Diastolic (24hrs), Av, Min:33, Max:51    Pulse oximetry range: SpO2  Av.3 %  Min: 93 %  Max: 98 %    I/O last 3 completed shifts:  In: 1060 [P.O.:1010; I.V.:50]  Out: -       Intake/Output Summary (Last 24 hours) at 18 1157  Last data filed at 18 2114   Gross per 24 hour   Intake             1060 ml   Output                0 ml   Net             1060 ml       Alert and responsive  Right arm fistula with needles in place in good bruit  Cardiac exam regular rhythm normal S1-S2, 1/6 holosystolic murmur  Lungs with clear breath sounds  Right lower extremity with erythema and tenderness below the knee, left lower extremity normal without edema     Wt Readings from Last 4 Encounters:   18 73.2 kg (161 lb 6 oz)   18 68 kg (150 lb)   06/15/18 68 kg (150 lb)   18 70.8 kg (156 lb)          Data:          Lab Results   Component Value Date     2018     2018     2018    Lab Results   Component Value Date    CHLORIDE 99 2018    CHLORIDE 102 2018    CHLORIDE 101 2018    Lab Results   Component Value Date    BUN 43 2018    BUN 33 2018    BUN 27 2018      Lab Results   Component Value Date    POTASSIUM 4.6 2018    POTASSIUM 4.2 2018    POTASSIUM 4.5 2018    Lab Results    Component Value Date    CO2 28 09/22/2018    CO2 26 09/19/2018    CO2 26 09/18/2018    Lab Results   Component Value Date    CR 6.18 09/22/2018    CR 5.42 09/19/2018    CR 4.05 09/18/2018        Recent Labs   Lab Test  09/25/18   0727  09/22/18   0632  09/19/18   0650  09/18/18   1228   WBC   --   5.1  5.4  6.4   HGB   --   9.8*  9.4*  10.5*   HCT   --   31.3*  30.0*  33.9*   MCV   --   113*  114*  115*   PLT  192  168  104*  106*     Recent Labs   Lab Test  09/18/18   1228  08/09/17   1810  09/24/16   1100   AST  17  19  12   ALT  9  16  11   ALKPHOS  144  53  54   BILITOTAL  1.0  0.7  0.8       Recent Labs   Lab Test  12/24/14   0820   MAG  2.2     Recent Labs   Lab Test  08/15/17   0650  09/27/16   0630  09/26/16   0623   PHOS  4.4  3.3  3.8     Recent Labs   Lab Test  09/22/18   0632  09/19/18   0650  09/18/18   1228   KIMBER  8.3*  8.4*  9.0       Lab Results   Component Value Date    KIMBER 8.3 (L) 09/22/2018     Lab Results   Component Value Date    WBC 5.1 09/22/2018    HGB 9.8 (L) 09/22/2018    HCT 31.3 (L) 09/22/2018     (H) 09/22/2018     09/25/2018     Lab Results   Component Value Date     09/22/2018    POTASSIUM 4.6 09/22/2018    CHLORIDE 99 09/22/2018    CO2 28 09/22/2018     (H) 09/22/2018     Lab Results   Component Value Date    BUN 43 (H) 09/22/2018    CR 6.18 (H) 09/22/2018     Lab Results   Component Value Date    MAG 2.2 12/24/2014     Lab Results   Component Value Date    PHOS 4.4 08/15/2017       Creatinine   Date Value Ref Range Status   09/22/2018 6.18 (H) 0.66 - 1.25 mg/dL Final   09/19/2018 5.42 (H) 0.66 - 1.25 mg/dL Final   09/18/2018 4.05 (H) 0.66 - 1.25 mg/dL Final   08/15/2017 9.07 (H) 0.66 - 1.25 mg/dL Final   08/14/2017 8.01 (H) 0.66 - 1.25 mg/dL Final   08/13/2017 6.52 (H) 0.66 - 1.25 mg/dL Final       Attestation:  I have reviewed today's vital signs, notes, medications, labs and imaging.  Seen on dialysis.     Gennaro Bertrand MD

## 2018-09-25 NOTE — CONSULTS
[facility]  Pain Service Consultation   Text Page    Date of Admission:  9/18/2018    Assessment & Plan   Maurizio Ohara is a 88 year old male who was admitted on 9/18/2018. I was asked by Dr. Bola Garcia to see the patient for R leg pain.    1) Acute RLE pain attributed to RLE cellulitis.    2)  Patient without chronic pain, NOT on chronic opioid.  Baseline 0 mg Daily Morphine Equivalent as dispensed and as reported daily use.  Patient has a possible opioid tolerance due to promethazine/vc/codeine 5-6.25-10 mg syrup he takes q 6 hours prn as pta.    Patient's opioid use in past 24 hours: 50 mg tramadol = 5 mg Daily Morphine Equivalent    3)  Risk factors for opioid related harms  -Renal insufficiency  - Age > 65 years old  -Opioid has recently been changed    4)  Opioid induced side-effects:  -Constipation, denies  -Nausea/Vomit, denies  -Sedation, no  -Urinary Retention, no    5)  Other/Related:      - ESRD, on hemodialysis  - CAD, on ASA, statin, betablocker  - HTN  - Anemia    PLAN:   1)  Antibiotics for RLL cellulitis as per Hospitalist.  2)Non-opioid multimodal medication therapy  -Topical:Voltaren 1% Topical Gel 4 times per day and  Lidocaine Ointment 5% q 4 hours WA to RLE - if pt feels benefit, please order for pt at TCU.  -N-SAIDS:Avoid due to Renal failure.  -Muscle Relaxants:None indicated  -Adjuvants:Acetaminophen 975 mg PO q 8 hours, continue until pain iimproves then change to PRN.  -Antidepresants/anxiolytics:none  3)  Non-medication interventions  Positioning with RLE elevated when not ambulating, ICE, Distraction with TV or other activities he enjoys, Physical therapy  4)  Opioids: Tramadol 50 mg PO q 6 hours prn severe pain, max 200 mg per day per renal dosing.   Opioids Treatment Goal: -Improvement in function  -Participate in PT  -Management of acute pain during hospitalization, expected 3-7 days needed at [facility]  6)  Constipation Prophylaxis  Senna colace 1-2 tabs po BID.  Miralax daily prn. Dulcolax suppository daily prn.  7)  Pain Education  -Opioid safe use, storage and disposal information included in DC AVS  8)  DC Planning   Discussed goal of Opioid therapy as above with bedside nurse, pt  Length of therapy is less than 10 days, opioids may be stopped without taper. and Recommend short supply of opioids only (3 days) per CDC guidelines for acute pain management.  Continued outpatient management of pain per TCU, PCP.  Disposition:TCU  Support systems:   Outpatient Referrals: none.  The following risk factors have been identified for unintentional overdose: patient is > 65 years old and patient hascompromised kidney or liver fuction . Will not discharge with nasal naloxone as pt expected to have limited, short term use and low dose.    Time Spent on this Encounter   I spent 50 minutes <50%  in assessment of the patient, counseling and discussion with the patient as documented in sections below. Rest of time in review of chart, documentation, coordination of care and discussion with the health care team.    Susu LEVINE, CNS  Pain Management and Palliative Care  [facility]  Pgr: [phone]      Reason for Consult   Reason for consult: I was asked by Dr. Bola Garcia to evaluate this patient for R leg pain.    Primary Care Physician   Primary Care Physician:Justina Moise  Pain Specialist: No    Chief Complaint   R leg cellulitis    History is obtained from the patient and electronic health record    History of Present Illness   Per H and P by Aisha Franco PA-C Maurizio Ohara is a 88 year old male who presents with increasing erythema, swelling and warmth of his R leg. A week ago he was getting up, but fell and hit his right leg on a coffee table, sustaining an abrasion to his right knee. He had been putting antibiotic ointment on it. The abrasion was assessed by Dr. Yung during his Saturday dialysis, and pt was told to monitor it and continue  "with topical antibiotic. When he went to dialysis on Tuesday, the dialysis nurse assessed the wound and noted increasing warmth, erythema extension and yellow discharge. Pt was instructed to be assessed in the ED. Pt reports increased pain with weight bearing on his R leg due to swelling.    When I see pt, he is alert, oriented, in dialysis and expects to discharge to TCU today. He will continue with antibiotics and therapies with expectation that he will return home to independent living.  CURRENT PAIN:  His pain is located in the R lower leg, from ankle to knee  It is described as Sharp and Tender  He rates it as ranging between 3/10 and 4/10  The average is 3/10 on a scale of 0-10  Currently it is rated as 4/10  It improves by \"not much\", \" I think this will take awhile to get better\" acknowledges that the redness and warmth are improving..  It worsens by touch, walking on his leg.  He has been compliant with the recommendations while in the hospital.      PAIN HISTORY:  None.    PAST PAIN TREATMENT:   Medications: oxycodone, tramadol, tylenol, antibiotic therapy  Non-phamacologic modalities:elevating RLE, ice prn.  Previous interventions/surgeries: None.    [facility] database review:   2 scripts for promethazine codeine syrup, last 9/8/2018.  No other scripts for controlled substances in the past 12 months.      Past Medical History   I have reviewed this patient's medical history and updated it with pertinent information if needed.   Past Medical History:   Diagnosis Date     Anemia      Anemia      CAD (coronary artery disease) 12/24/14    PCI and BMS to mid RCA (5.0 x 20mm) with residual mod diffuse mid LAD disease     Chronic kidney disease     on Dialysis     Diabetes (H)      Hypertension      Mitral regurgitation 12/24/2014    mild-mod (1-2+) per echo     Mitral valve disorders(424.0) 12/24/2014    mild/mod (1-2+) MR     Myocardial infarction 12/24/2014    NSTEMI     Pulmonary hypertension 12/24/2014    RVSP " "elevated c/w mild-mod PH per echo     Renal disease due to diabetes mellitus (H)     End stage; on Dialysis       Past Surgical History   I have reviewed this patient's surgical history and updated it with pertinent information if needed.  Past Surgical History:   Procedure Laterality Date     CORONARY ANGIOGRAPHY ADULT ORDER  12/24/2014     ENT SURGERY      Tonsillectomy     HEART CATH, ANGIOPLASTY  12/24/2014    PCI and BMS (5.0x20mm)to mid RCA     THORACIC SURGERY      Herniated disc         Prior to Admission Medications   Prior to Admission Medications   Prescriptions Last Dose Informant Patient Reported? Taking?   NIFEdipine ER (NIFEDIAC CC) 90 MG TB24 9/17/2018 at Unknown time  Yes Yes   Sig: Take 90 mg by mouth every morning   Phenyleph-Promethazine-Cod (PROMETHAZINE VC/CODEINE) 5-6.25-10 MG/5ML SYRP Past Month at Unknown time Self Yes Yes   Sig: Take 1-2 tsp. by mouth every 6 hours as needed   aspirin 81 MG tablet 9/17/2018 at Unknown time  Yes Yes   Sig: Take 81 mg by mouth daily   atorvastatin (LIPITOR) 40 MG tablet More than a month at Unknown time  Yes No   Sig: Take 40 mg by mouth daily as needed   calcium acetate (PHOSLO) 667 MG CAPS 9/18/2018 at Unknown time Self Yes Yes   Sig: Take 2,001 mg by mouth 3 times daily (with meals)   metoprolol succinate (TOPROL-XL) 25 MG 24 hr tablet 9/17/2018 at Unknown time  Yes Yes   Sig: Take 25 mg by mouth daily   nitroglycerin (NITROSTAT) 0.4 MG sublingual tablet   No No   Sig: Place 1 tablet (0.4 mg) under the tongue every 5 minutes as needed for chest pain      Facility-Administered Medications: None     Allergies   Allergies   Allergen Reactions     Glyburide      Lisinopril      Hyperkalemia when hospitalized 4/5/2011     Metformin      Renal failure stage 4     Glenn Gallagher RN clarified with pt, pt does not remember rxn, it was a long time ago, and \"nothing crazy\".     Verapamil        Social History   I have reviewed this patient's social history " "and updated it with pertinent information if needed. Maurizio Ohara  reports that he has quit smoking. He has never used smokeless tobacco. He reports that he drinks alcohol. He reports that he does not use illicit drugs.  \"If you could pour whisky in a thimble, that's how much I drink in a year\". Denies other substances.    Family History   I have reviewed this patient's family history and updated it with pertinent information if needed.   Family History   Problem Relation Age of Onset     Diabetes Mother      Family history of addiction -0 not assessed.    Review of Systems   CONSTITUTIONAL: NEGATIVE for fever, chills, change in weight  ENT/MOUTH: NEGATIVE for ear, mouth and throat problems  RESP: NEGATIVE for significant cough or SOB  CV: NEGATIVE for chest pain, palpitations or peripheral edema   Denies Bowel or bladder dysfunction    Physical Exam   Temp:  [97.6  F (36.4  C)-98.1  F (36.7  C)] 97.7  F (36.5  C)  Heart Rate:  [62-71] 62  Resp:  [18-24] 18  BP: ()/(33-61) 92/49  SpO2:  [93 %-98 %] 95 %  161 lbs 6.03 oz  GEN:  Alert, oriented x 3, appears comfortable, No apparent distress.  HEENT:  Normocephalic/atraumatic, no scleral icterus, no nasal discharge, mouth moist.  CV:  RRR, S1, S2; no murmurs or other irregularities noted.  +3 DP/PT pulses bilaterally; +2 edema bilateral lower extremities.  RESP:  Clear to auscultation bilaterally without rales/rhonchi/wheezing/retractions.  Symmetric chest rise on inhalation noted.  Normal respiratory effort.  ABD:  Rounded, soft, non-tender/non-distended.  +BS. No BM since admission.  EXT:  Edema & pulses as noted above.  Color, moisture and sensation intact x 4.     M/S:   Hypersensitive to touch RLE. Discolored, bruised  2 and 3rd toes but warm to touch.    SKIN:  Dry to touch, no exanthems noted in the visualized areas.    NEURO: Symmetric strength +5/5.  Sensation to touch intact all extremities.    PAIN BEHAVIOR: Cooperative  Psych:  Normal affect.  " Calm, cooperative, conversant appropriately.       Data   Results for orders placed or performed during the hospital encounter of 09/18/18 (from the past 24 hour(s))   Platelet count   Result Value Ref Range    Platelet Count 192 150 - 450 10e9/L

## 2018-09-25 NOTE — DISCHARGE SUMMARY
Admit Date:     09/18/2018   Discharge Date:           PRINCIPAL FINAL DIAGNOSES:     1.  Right lower extremity cellulitis, treated with IV Ancef this admission with improvement.   2.  Right lower extremity pain secondary to above.   3.  End-stage renal disease on chronic dialysis.  Nephrology assisted with dialysis this admission.   4.  Generalized weakness and deconditioning with plans for discharge to TCU today.      PAST MEDICAL HISTORY:   1.  History of end-stage renal disease on dialysis.   2.  Hypertension.   3.  Hyperlipidemia.   4.  History of anemia related to chronic kidney disease.   5.  Coronary artery disease history.      PRINCIPAL PROCEDURES THIS ADMISSION:   1.  IV antibiotics with Ancef.   2.  Nephrology consultation to assist with dialysis while hospitalized.   3.  Blood cultures that were without growth.   4.  Lower extremity ultrasound showing no evidence of DVT.   5.  Social work consult to assist with disposition.      REASON FOR ADMISSION:  Please see dictated history and physical.  In brief, Mr. Ohara is an 88-year-old male who presented to the hospital with right leg redness and pain.  The patient was diagnosed with cellulitis on presentation.      HOSPITAL COURSE:  Right lower extremity cellulitis:  The patient's course was one of slow but gradual improvement while hospitalized.  He was treated with IV Ancef.  Blood cultures remained without growth.  He was seen by Orthopedic Surgery and they felt that no operative intervention was needed here.  Apparently, he did have knee effusion on presentation but Orthopedics did not feel that he had a joint infection or infected effusion.  By day of discharge, symptoms had improved.  He is still having some right leg pain, but it is better.  He is being discharged from the hospital to TCU today.      DISCHARGE MEDICATIONS:   1.  Acetaminophen 975 mg 3 times a day.   2.  Keflex 500 mg by mouth daily for 7 days.  When administered at dialysis  days, given after dialysis is completed.   3.  Voltaren gel, apply 4 times a day to right lower extremity.   4.  Hydroxyzine 25 mg every 6 hours as needed for pain.   5.  Lidocaine ointment, apply every 4 hours while awake for pain.   6.  MiraLax 17 grams daily.   7.  Tramadol 50 mg every 6 hours as needed for pain.   8.  Aspirin 81 mg daily.   9.  Lipitor 40 mg daily.   10.  Calcium acetate 2001 mg 3 times a day with meals.   11.  Metoprolol XL 25 mg daily.   12.  Nitroglycerin as needed.   13.  Promethazine as needed.      MEDICATIONS STOPPED THIS ADMISSION:  Include nifedipine given some soft blood pressures off of this medication.  This medication was held during his hospital stay and blood pressures remained in the low normal range.      FOLLOWUP INSTRUCTIONS:  Resume usual dialysis schedule on discharge.  Last dialysis was performed on day of discharge, 2018.      I saw and examined the patient on day of discharge.         CHIDI ROGERS MD             D: 2018   T: 2018   MT: ANDREINA      Name:     YOANA QUIÑONES   MRN:      [id number]        Account:        [id number]   :      10/07/1929           Admit Date:     2018                                  Discharge Date:       Document: [id number]       cc: Justina Moise MD

## 2018-09-25 NOTE — PROGRESS NOTES
Rosa Harley pt's nephew called and said he prefers [facility] because it's only 12 min away from HD unit.  I called admissions and left a vm to check on bed availability.     Lizy in admissions [phone] at [facility] would like an update on HD plan, will call her back after we here from [facility].      1331: [facility] has a bed for pt when he is medically stable.  I paged MD to see what day he will be ready to discharge.

## 2018-09-25 NOTE — PROGRESS NOTES
CM: PT has been offered TCU be for today in [facility]. Called [facility] [phone] to request location transfer while in TCU. AT this time they will schedule pt through 10/20/18.. IF pt is not needing to be there this long pt can call [facility] to update       Reference number [id number] Barbie Coordinator [phone] ext [phone]  Will call [facility] to confirm transfer and dates and time for family and TCU      09/25/18 1000   [facility] [phone], Fax: [phone]   Called Adele Lee @ [phone] and left Vm message to call       CM: Family prefer location in [place] for TCU. Will call [facility] and change locations to [place] for pt.   [facility] here and able to transport to TCU

## 2018-09-25 NOTE — PROGRESS NOTES
Your information has been submitted on September 25th, 2018 at 02:57:26 PM CDT. The confirmation number is [id number]

## 2018-09-25 NOTE — PROGRESS NOTES
Patient's cellulitis slowly clearing.  No signs of pre-patellar bursitis or anything requiring operative intervention.    Please re-consult with any new concerns - ortho signing off  Phyllis Everett PAC

## 2018-10-15 NOTE — TELEPHONE ENCOUNTER
Echocardiogram results noted, please see interpretation summary below. Pt is scheduled to follow up in clinic with Cynthia on 10/19/18. Reviewed results with Dr. Kerns, who stated to order blood cultures x 2 sets. Orders entered. Called pt, no answer. LVM requesting call back to Team 1 to review results.        Interpretation Summary     Left ventricular systolic function is normal.  The visual ejection fraction is estimated at 60-65%.  There is moderate (2+) mitral regurgitation.  The aortic valve is not well visualized.  It is severely calcified. There is an echogenic mobile mass attached to aortic  aspect of the aortic valve.  There is mild (1+) aortic regurgitation.  Severe valvular aortic stenosis.  There is low flow state with stroke volume 33ml/m2.  There is moderately severe (3+) tricuspid regurgitation.  Right ventricular systolic pressure is elevated, consistent with moderate to  severe pulmonary hypertension.  Compared to 10/17, AS is now severe. TR is worse. There is now an echogenic  mobile mass attached to the aortic aspect of the aortic valve. Differential  includes vegetation, mobile calcification. Findings discussed with Dr kerns  at 2.50pm today.     The study was technically difficult.

## 2018-10-18 NOTE — TELEPHONE ENCOUNTER
Pt still has not returned call. Attempted to reach pt again, no answer, LVM. Called to pt's sister Karyn, who it's pt's emergency contact and on consent to communicate. Per Karyn, pt is at [facility].  Called to [facility] and was transferred to nurse line. Left voicemail requesting call back to Team 1.

## 2018-10-19 NOTE — PROGRESS NOTES
CARDIOLOGY CLINIC PROGRESS NOTE    DOS: 10/19/2018      Maurizio Ohara  : 10/7/1929, 89 year old  MRN: [id number]      History:   I had the pleasure of following up with Maurizio Ohara today.  Maurizio is a patient of Dr. Keyes.       Maurizio is a very pleasant, 89-year-old gentleman with a history of coronary artery disease with a non-ST-elevation MI in .  At that time, he underwent stenting of the right coronary artery.  He also had a moderate to severe lesion in his LAD and mild disease in his circumflex.  A few weeks following the intervention, he underwent a nuclear stress test to evaluate the LAD lesion.  This showed no ischemia in the LAD territory and an EF of 59%.  He also has a history of hypertension, hyperlipidemia, end-stage renal disease on dialysis, chronic anemia, mitral regurgitation, aortic stenosis.       Maurizio was admitted to the hospital -10/02/2016 for shortness of breath and acute respiratory failure.  He was treated for possible pneumonia.  Echocardiogram 2016 showed an EF of 55%-60%, moderate to severe MR, increased RV systolic pressures indicating moderate to severe pulmonary hypertension, moderate aortic stenosis.  He underwent a LAN on 2016 to further evaluate the valves.  This showed mild to moderate MR, severe PH, moderate AS.     He was last seen by Dr. Keyes 10/18/17.  At that time, he was doing well.  Plan was to follow up in 1 year with a repeat echocardiogram.     Maurizio was recently admitted -18 for RLE cellulitis. He was on IV antibiotics. Blood cultures were negative. Lactate was WNL.  Lower extremity ultrasound showing no evidence of DVT.   Due to some lower BPs, nifedipine was discontinued.      Scheduled echo 10/15/18 showed severe aortic stenosis and worsened TR, now moderately severe.  There was an echogenic mobile mass attached to the aortic aspect of the aortic valve.       Interval History:   He presents today for his annual  follow up.   The echo results were reviewed with Dr. Keyes, and she recommends blood cultures and a LAN.   He denies chest pain, SOB, syncope, fever, CVA sxs.  Occasionally after dialysis he has some difficulty with his speech but it resolves quickly.   He makes minimal urine.   He tells me that he is volume up and they are trying to pull more fluid - he is on a fluid restriction the past 2 days.   He is at a TCU now.     ROS:  Skin:  Positive for bruising   Eyes:  not assessed    ENT:  not assessed    Respiratory:  Negative    Cardiovascular:    edema;Positive for;dizziness  Gastroenterology: not assessed    Genitourinary:  not assessed    Musculoskeletal:  not assessed    Neurologic:  not assessed    Psychiatric:  not assessed    Heme/Lymph/Imm:  not assessed    Endocrine:  not assessed      PAST MEDICAL HISTORY:  Past Medical History:   Diagnosis Date     Anemia      Anemia      CAD (coronary artery disease) 12/24/14    PCI and BMS to mid RCA (5.0 x 20mm) with residual mod diffuse mid LAD disease     Chronic kidney disease     on Dialysis     Diabetes (H)      Hypertension      Mitral regurgitation 12/24/2014    mild-mod (1-2+) per echo     Mitral valve disorders(424.0) 12/24/2014    mild/mod (1-2+) MR     Myocardial infarction (H) 12/24/2014    NSTEMI     Pulmonary hypertension (H) 12/24/2014    RVSP elevated c/w mild-mod PH per echo     Renal disease due to diabetes mellitus (H)     End stage; on Dialysis       PAST SURGICAL HISTORY:  Past Surgical History:   Procedure Laterality Date     CORONARY ANGIOGRAPHY ADULT ORDER  12/24/2014     ENT SURGERY      Tonsillectomy     HEART CATH, ANGIOPLASTY  12/24/2014    PCI and BMS (5.0x20mm)to mid RCA     THORACIC SURGERY      Herniated disc       SOCIAL HISTORY:  Social History     Social History     Marital status: Single     Spouse name: N/A     Number of children: N/A     Years of education: N/A     Social History Main Topics     Smoking status: Former Smoker      "Smokeless tobacco: Never Used     Alcohol use Yes      Comment: \"about as much as you can pour in a thimble in a year\"     Drug use: No     Sexual activity: Not Currently     Partners: Female     Other Topics Concern     Caffeine Concern Yes     1-2 cups daily     Sleep Concern No     Special Diet Yes     kidney diet     Exercise Yes     walking     Seat Belt Yes     Social History Narrative       FAMILY HISTORY:  Family History   Problem Relation Age of Onset     Diabetes Mother        MEDS:   Current Outpatient Prescriptions on File Prior to Visit:  acetaminophen (TYLENOL) 325 MG tablet Take 3 tablets (975 mg) by mouth every 8 hours   aspirin 81 MG tablet Take 81 mg by mouth daily   atorvastatin (LIPITOR) 40 MG tablet Take 40 mg by mouth daily as needed   calcium acetate (PHOSLO) 667 MG CAPS Take 2,001 mg by mouth 3 times daily (with meals)   diclofenac (VOLTAREN) 1 % GEL topical gel Apply 2 g topically 4 times daily Apply to RLE   lidocaine (XYLOCAINE) 5 % ointment Apply topically every 4 hours (while awake) Do not put onto any open skin areas   metoprolol succinate (TOPROL-XL) 25 MG 24 hr tablet Take 12.5 mg by mouth daily    nitroglycerin (NITROSTAT) 0.4 MG sublingual tablet Place 1 tablet (0.4 mg) under the tongue every 5 minutes as needed for chest pain   Phenyleph-Promethazine-Cod (PROMETHAZINE VC/CODEINE) 5-6.25-10 MG/5ML SYRP Take 1-2 tsp. by mouth every 6 hours as needed   polyethylene glycol (MIRALAX/GLYCOLAX) Packet Take 17 g by mouth daily   hydrOXYzine (ATARAX) 25 MG tablet Take 1 tablet (25 mg) by mouth every 6 hours as needed for itching (pain) (Patient not taking: Reported on 10/19/2018)   traMADol (ULTRAM) 50 MG tablet Take 1 tablet (50 mg) by mouth every 6 hours as needed for moderate pain (Patient not taking: Reported on 10/19/2018)     No current facility-administered medications on file prior to visit.     ALLERGIES:   Allergies   Allergen Reactions     Glyburide      Lisinopril      " "Hyperkalemia when hospitalized 4/5/2011     Metformin      Renal failure stage 4     Glenn Gallagher RN clarified with pt, pt does not remember rxn, it was a long time ago, and \"nothing crazy\".     Verapamil        PHYSICAL EXAM:  Vitals: /57 (BP Location: Left arm, Patient Position: Sitting, Cuff Size: Adult Regular)  Pulse 72  Ht 1.702 m (5' 7\")  Wt 74.7 kg (164 lb 11.2 oz)  SpO2 97%  BMI 25.8 kg/m2  Constitutional:  cooperative, alert and oriented, well developed, well nourished, in no acute distress        Skin:  warm and dry to the touch   scattered ecchymosis    Head:  normocephalic        Eyes:  pupils equal and round;conjunctivae and lids unremarkable   mild scleral icterus    ENT:  no pallor or cyanosis        Neck:    JVP elevated;JVP >12      Respiratory:  clear to auscultation;normal symmetry        Cardiac: regular rhythm             II/VI systolic murmur at USB, II/VI blowing systolic murmur at apex, I-II/IV soft diastolic murmur    GI:  abdomen soft;BS normoactive;non-tender obese      Vascular: not assessed this visit                                      Extremities and Musculoskeletal:      dialysis vascular access in right arm, RLE: 1-2+ pitting edema, mild erythema, not hot.  LLE trace edema.  BLEs with chronic venous stasis changes, fingernails are unremarkable as are the fingertips     Neurological:  affect appropriate   in wheel chair      LABS/DATA:  I reviewed the following:  Echo 10/15/18:  Interpretation Summary     Left ventricular systolic function is normal.  The visual ejection fraction is estimated at 60-65%.  There is moderate (2+) mitral regurgitation.  The aortic valve is not well visualized.  It is severely calcified. There is an echogenic mobile mass attached to aortic  aspect of the aortic valve.  There is mild (1+) aortic regurgitation.  Severe valvular aortic stenosis.  There is low flow state with stroke volume 33ml/m2.  There is moderately severe (3+) " tricuspid regurgitation.  Right ventricular systolic pressure is elevated, consistent with moderate to  severe pulmonary hypertension.  Compared to 10/17, AS is now severe. TR is worse. There is now an echogenic  mobile mass attached to the aortic aspect of the aortic valve. Differential  includes vegetation, mobile calcification. Findings discussed with Dr kerns  at 2.50pm today.     The study was technically difficult.        Echo 10/16/17:  Interpretation Summary     Moderate to severe valvular aortic stenosis.  There is mild to moderate (1-2+) mitral regurgitation.  Left ventricular systolic function is normal.  The visual ejection fraction is estimated at 60-65%.  The left ventricle is normal in size.  There is moderate concentric left ventricular hypertrophy.  The right ventricle is normal in structure, function and size.  There is mild biatrial enlargement.  Right ventricular systolic pressure is elevated, consistent with moderate  pulmonary hypertension.  Dilated inferior vena cava     The mean systolic gradient across the aortic valve = 28 mmHg with a calculated  MARYANN by continuity equation = .95cm2. In the setting of normal LV systolic  perfromance mean systolic gradients in this range are usually associated with  moderate AS. There has been a slight increase in mean systolic gradient since  the last study 9/29/2016 ( was 24 mmhg, now 28 mmHg). There has been no change  in LV/RV systolic performance/cahmber size and estimated PA systolic  Pressures.        ASSESSMENT/PLAN:  1.  Coronary artery disease with a non-ST elevation in 2014, status post stenting of the RCA.  He had an LAD lesion and some mild circumflex disease at that time as well.  He underwent a nuclear stress test 01/09/2015 that showed no ischemia in the LAD territory and a normal EF.  He is currently stable without anginal symptoms.  He is on aspirin 81 mg daily and atorvastatin 40 mg daily.     2.  Hypertension, controlled.  He is on Toprol  XL 12.5 mg daily.     3.  Hyperlipidemia, on atorvastatin 40 mg daily.     4.  End-stage renal disease on hemodialysis Tuesday, Thursday, Saturday.   Appears volume up.  Needs more volume removed.     5.  Mitral regurgitation.   Echo 10/15/18: moderate MR.  Follow.     6.  Aortic stenosis.    LAN 09/29/2016 showed moderate aortic stenosis.    Echo 10/15/18: severe aortic stenosis, low flow state, mild AI, echogenic mobile mass attached to aortic aspect of the aortic valve.  This may be vegetation, mobile calcification.     He has no sxs of severe aortic stenosis, specifically no chest pain, no SOB, no sycnope.   He has no sxs concerning for endocarditis, specifically no nail or finger concerns, no stroke sxs, no fevers.     With his recent admission for cellulitis (negative blood cultures then) needing IV abx, will repeat blood cultures and get LAN.     Could be mobile calcification given his known significant aortic calcification seen on LAN 9/29/16.     He will follow up with myself and/or Dr. Keyes pending timing of LAN.      7.  Chronic anemia.  Followed through Nephrology and Primary Care.         Julianne Dan PA-C

## 2018-10-19 NOTE — LETTER
10/19/2018    [facility] [address]  [place] [address]    RE: Maurizio Ohara       Dear Colleague,    I had the pleasure of seeing Maurizio Ohara in the [facility].    CARDIOLOGY CLINIC PROGRESS NOTE    DOS: 10/19/2018      Maurizio Ohara  : 10/7/1929, 89 year old  MRN: [id number]      History:   I had the pleasure of following up with Maurizio Ohara today.  Maurizio is a patient of Dr. Keyes.       Maurizio is a very pleasant, 89-year-old gentleman with a history of coronary artery disease with a non-ST-elevation MI in .  At that time, he underwent stenting of the right coronary artery.  He also had a moderate to severe lesion in his LAD and mild disease in his circumflex.  A few weeks following the intervention, he underwent a nuclear stress test to evaluate the LAD lesion.  This showed no ischemia in the LAD territory and an EF of 59%.  He also has a history of hypertension, hyperlipidemia, end-stage renal disease on dialysis, chronic anemia, mitral regurgitation, aortic stenosis.       Maurizio was admitted to the hospital -10/02/2016 for shortness of breath and acute respiratory failure.  He was treated for possible pneumonia.  Echocardiogram 2016 showed an EF of 55%-60%, moderate to severe MR, increased RV systolic pressures indicating moderate to severe pulmonary hypertension, moderate aortic stenosis.  He underwent a LAN on 2016 to further evaluate the valves.  This showed mild to moderate MR, severe PH, moderate AS.     He was last seen by Dr. Keyes 10/18/17.  At that time, he was doing well.  Plan was to follow up in 1 year with a repeat echocardiogram.     Maurizio was recently admitted -18 for RLE cellulitis. He was on IV antibiotics. Blood cultures were negative. Lactate was WNL.  Lower extremity ultrasound showing no evidence of DVT.   Due to some lower BPs, nifedipine was  discontinued.      Scheduled echo 10/15/18 showed severe aortic stenosis and worsened TR, now moderately severe.  There was an echogenic mobile mass attached to the aortic aspect of the aortic valve.       Interval History:   He presents today for his annual follow up.   The echo results were reviewed with Dr. Keyes, and she recommends blood cultures and a LAN.   He denies chest pain, SOB, syncope, fever, CVA sxs.  Occasionally after dialysis he has some difficulty with his speech but it resolves quickly.   He makes minimal urine.   He tells me that he is volume up and they are trying to pull more fluid - he is on a fluid restriction the past 2 days.   He is at a TCU now.     ROS:  Skin:  Positive for bruising   Eyes:  not assessed    ENT:  not assessed    Respiratory:  Negative    Cardiovascular:    edema;Positive for;dizziness  Gastroenterology: not assessed    Genitourinary:  not assessed    Musculoskeletal:  not assessed    Neurologic:  not assessed    Psychiatric:  not assessed    Heme/Lymph/Imm:  not assessed    Endocrine:  not assessed      PAST MEDICAL HISTORY:  Past Medical History:   Diagnosis Date     Anemia      Anemia      CAD (coronary artery disease) 12/24/14    PCI and BMS to mid RCA (5.0 x 20mm) with residual mod diffuse mid LAD disease     Chronic kidney disease     on Dialysis     Diabetes (H)      Hypertension      Mitral regurgitation 12/24/2014    mild-mod (1-2+) per echo     Mitral valve disorders(424.0) 12/24/2014    mild/mod (1-2+) MR     Myocardial infarction (H) 12/24/2014    NSTEMI     Pulmonary hypertension (H) 12/24/2014    RVSP elevated c/w mild-mod PH per echo     Renal disease due to diabetes mellitus (H)     End stage; on Dialysis       PAST SURGICAL HISTORY:  Past Surgical History:   Procedure Laterality Date     CORONARY ANGIOGRAPHY ADULT ORDER  12/24/2014     ENT SURGERY      Tonsillectomy     HEART CATH, ANGIOPLASTY  12/24/2014    PCI and BMS (5.0x20mm)to mid RCA     THORACIC  "SURGERY      Herniated disc       SOCIAL HISTORY:  Social History     Social History     Marital status: Single     Spouse name: N/A     Number of children: N/A     Years of education: N/A     Social History Main Topics     Smoking status: Former Smoker     Smokeless tobacco: Never Used     Alcohol use Yes      Comment: \"about as much as you can pour in a thimble in a year\"     Drug use: No     Sexual activity: Not Currently     Partners: Female     Other Topics Concern     Caffeine Concern Yes     1-2 cups daily     Sleep Concern No     Special Diet Yes     kidney diet     Exercise Yes     walking     Seat Belt Yes     Social History Narrative       FAMILY HISTORY:  Family History   Problem Relation Age of Onset     Diabetes Mother        MEDS:   Current Outpatient Prescriptions on File Prior to Visit:  acetaminophen (TYLENOL) 325 MG tablet Take 3 tablets (975 mg) by mouth every 8 hours   aspirin 81 MG tablet Take 81 mg by mouth daily   atorvastatin (LIPITOR) 40 MG tablet Take 40 mg by mouth daily as needed   calcium acetate (PHOSLO) 667 MG CAPS Take 2,001 mg by mouth 3 times daily (with meals)   diclofenac (VOLTAREN) 1 % GEL topical gel Apply 2 g topically 4 times daily Apply to RLE   lidocaine (XYLOCAINE) 5 % ointment Apply topically every 4 hours (while awake) Do not put onto any open skin areas   metoprolol succinate (TOPROL-XL) 25 MG 24 hr tablet Take 12.5 mg by mouth daily    nitroglycerin (NITROSTAT) 0.4 MG sublingual tablet Place 1 tablet (0.4 mg) under the tongue every 5 minutes as needed for chest pain   Phenyleph-Promethazine-Cod (PROMETHAZINE VC/CODEINE) 5-6.25-10 MG/5ML SYRP Take 1-2 tsp. by mouth every 6 hours as needed   polyethylene glycol (MIRALAX/GLYCOLAX) Packet Take 17 g by mouth daily   hydrOXYzine (ATARAX) 25 MG tablet Take 1 tablet (25 mg) by mouth every 6 hours as needed for itching (pain) (Patient not taking: Reported on 10/19/2018)   traMADol (ULTRAM) 50 MG tablet Take 1 tablet (50 mg) by " "mouth every 6 hours as needed for moderate pain (Patient not taking: Reported on 10/19/2018)     No current facility-administered medications on file prior to visit.     ALLERGIES:   Allergies   Allergen Reactions     Glyburide      Lisinopril      Hyperkalemia when hospitalized 4/5/2011     Metformin      Renal failure stage 4     Penicillins      SHADY Gallagher clarified with pt, pt does not remember rxn, it was a long time ago, and \"nothing crazy\".     Verapamil        PHYSICAL EXAM:  Vitals: /57 (BP Location: Left arm, Patient Position: Sitting, Cuff Size: Adult Regular)  Pulse 72  Ht 1.702 m (5' 7\")  Wt 74.7 kg (164 lb 11.2 oz)  SpO2 97%  BMI 25.8 kg/m2  Constitutional:  cooperative, alert and oriented, well developed, well nourished, in no acute distress        Skin:  warm and dry to the touch   scattered ecchymosis    Head:  normocephalic        Eyes:  pupils equal and round;conjunctivae and lids unremarkable   mild scleral icterus    ENT:  no pallor or cyanosis        Neck:    JVP elevated;JVP >12      Respiratory:  clear to auscultation;normal symmetry        Cardiac: regular rhythm             II/VI systolic murmur at USB, II/VI blowing systolic murmur at apex, I-II/IV soft diastolic murmur    GI:  abdomen soft;BS normoactive;non-tender obese      Vascular: not assessed this visit                                      Extremities and Musculoskeletal:      dialysis vascular access in right arm, RLE: 1-2+ pitting edema, mild erythema, not hot.  LLE trace edema.  BLEs with chronic venous stasis changes, fingernails are unremarkable as are the fingertips     Neurological:  affect appropriate   in wheel chair      LABS/DATA:  I reviewed the following:  Echo 10/15/18:  Interpretation Summary     Left ventricular systolic function is normal.  The visual ejection fraction is estimated at 60-65%.  There is moderate (2+) mitral regurgitation.  The aortic valve is not well visualized.  It is severely calcified. " There is an echogenic mobile mass attached to aortic  aspect of the aortic valve.  There is mild (1+) aortic regurgitation.  Severe valvular aortic stenosis.  There is low flow state with stroke volume 33ml/m2.  There is moderately severe (3+) tricuspid regurgitation.  Right ventricular systolic pressure is elevated, consistent with moderate to  severe pulmonary hypertension.  Compared to 10/17, AS is now severe. TR is worse. There is now an echogenic  mobile mass attached to the aortic aspect of the aortic valve. Differential  includes vegetation, mobile calcification. Findings discussed with Dr kerns  at 2.50pm today.     The study was technically difficult.        Echo 10/16/17:  Interpretation Summary     Moderate to severe valvular aortic stenosis.  There is mild to moderate (1-2+) mitral regurgitation.  Left ventricular systolic function is normal.  The visual ejection fraction is estimated at 60-65%.  The left ventricle is normal in size.  There is moderate concentric left ventricular hypertrophy.  The right ventricle is normal in structure, function and size.  There is mild biatrial enlargement.  Right ventricular systolic pressure is elevated, consistent with moderate  pulmonary hypertension.  Dilated inferior vena cava     The mean systolic gradient across the aortic valve = 28 mmHg with a calculated  MARYANN by continuity equation = .95cm2. In the setting of normal LV systolic  perfromance mean systolic gradients in this range are usually associated with  moderate AS. There has been a slight increase in mean systolic gradient since  the last study 9/29/2016 ( was 24 mmhg, now 28 mmHg). There has been no change  in LV/RV systolic performance/cahmber size and estimated PA systolic  Pressures.        ASSESSMENT/PLAN:  1.  Coronary artery disease with a non-ST elevation in 2014, status post stenting of the RCA.  He had an LAD lesion and some mild circumflex disease at that time as well.  He underwent a nuclear  stress test 01/09/2015 that showed no ischemia in the LAD territory and a normal EF.  He is currently stable without anginal symptoms.  He is on aspirin 81 mg daily and atorvastatin 40 mg daily.     2.  Hypertension, controlled.  He is on Toprol XL 12.5 mg daily.     3.  Hyperlipidemia, on atorvastatin 40 mg daily.     4.  End-stage renal disease on hemodialysis Tuesday, Thursday, Saturday.   Appears volume up.  Needs more volume removed.     5.  Mitral regurgitation.   Echo 10/15/18: moderate MR.  Follow.     6.  Aortic stenosis.    LAN 09/29/2016 showed moderate aortic stenosis.    Echo 10/15/18: severe aortic stenosis, low flow state, mild AI, echogenic mobile mass attached to aortic aspect of the aortic valve.  This may be vegetation, mobile calcification.     He has no sxs of severe aortic stenosis, specifically no chest pain, no SOB, no sycnope.   He has no sxs concerning for endocarditis, specifically no nail or finger concerns, no stroke sxs, no fevers.     With his recent admission for cellulitis (negative blood cultures then) needing IV abx, will repeat blood cultures and get LAN.     Could be mobile calcification given his known significant aortic calcification seen on LAN 9/29/16.     He will follow up with myself and/or Dr. Keyes pending timing of LAN.      7.  Chronic  anemia.  Followed through Nephrology and Primary Care.         Julianne Dan PA-C    Thank you for allowing me to participate in the care of your patient.      Sincerely,     Julianne Dan PA-C     [facility]    cc:   Michelle Keyes,   [address] [place] [id number]

## 2018-10-19 NOTE — PATIENT INSTRUCTIONS
The ultrasound of your heart - echocardiogram - shows the aortic valve is tighter now, and there is a mass attached to the aortic valve.     With your recent infection, Dr. Keyes wants you to get more blood cultures drawn.  These should be done today at the Hospital lab.    She also wants you to get a special ultrasound of your heart called a LAN or transesophageal echocardiogram.  This is what we talked about today - where they put the camera down your esophagus and take pictures of your heart.     I will see you in 2 weeks to follow up on the results.     I also am getting an appointment scheduled with Dr. Keyes in about 1 month.  We will cancel this if it is not needed.

## 2018-10-19 NOTE — MR AVS SNAPSHOT
After Visit Summary   10/19/2018    Maurizio Ohara    MRN: [id number]           Patient Information     Date Of Birth          10/7/1929        Visit Information        Provider Department      10/19/2018 1:10 PM Julianne Dan PA-C [facility]        Today's Diagnoses     Aortic valve stenosis, etiology of cardiac valve disease unspecified    -  1    ASHD (arteriosclerotic heart disease)        Mitral valve insufficiency, unspecified etiology        Nonrheumatic aortic valve stenosis          Care Instructions    The ultrasound of your heart - echocardiogram - shows the aortic valve is tighter now, and there is a mass attached to the aortic valve.     With your recent infection, Dr. Keyes wants you to get more blood cultures drawn.  These should be done today at the Hospital lab.    She also wants you to get a special ultrasound of your heart called a PIPO or transesophageal echocardiogram.  This is what we talked about today - where they put the camera down your esophagus and take pictures of your heart.     I will see you in 2 weeks to follow up on the results.     I also am getting an appointment scheduled with Dr. Keyes in about 1 month.  We will cancel this if it is not needed.           Follow-ups after your visit        Additional Services     Follow-Up with Cardiac Advanced Practice Provider           Follow-Up with Cardiologist                 Your next 10 appointments already scheduled     Nov 07, 2018 11:30 AM CST   Ech Pipo with RH PROCEDURE ROOM   [facility] ([facility])    [address] [place] [address]   [phone]           1.  Please bring or wear a comfortable two-piece outfit. 2.  Arrival time: -   [facility]:  arrive 75 minutes prior to examination time. -   [facility]:  arrive 90 minutes prior to examination time. -   [facility]:   arrive 15 minutes prior to examination  time. 3.  Plan to have a responsible adult take you home after the test. After the exam you may not drive, take a bus or taxi by yourself. -   Someone should stay with you for 6 hours after your test. 4.  No food or drink: -   6 hours before the test 5.  If you take antacids or water pills (diuretics): Do not take them until after your test. You may take blood pressure medicine with a few sips of water. 6.  If you have diabetes: -   Morning slots preferred -   If you take insulin, call your diabetes care team. Ask if you should take a   dose the morning of your test. -   If you take diabetes medicine by mouth, don't take it on the morning of your test. Bring it with you to take after the test. (If you have questions, call your diabetes care team.) 7.  Bring a list of any medicines you are taking. 8.  Do not drive for 24 hours after the test. 9.  For any questions that cannot be answered, please contact the ordering physician 10. Please do not wear perfumes or scented lotions on the day of your exam.            Nov 16, 2018  1:15 PM CST   Return Visit with Michelle Keyes DO   [facility] ([facility])    [address] [address] [address] [phone]              Future tests that were ordered for you today     Open Future Orders        Priority Expected Expires Ordered    Follow-Up with Cardiologist Routine 11/16/2018 10/19/2019 10/19/2018    Follow-Up with Cardiac Advanced Practice Provider Routine 11/2/2018 10/19/2019 10/19/2018    Transesophageal Echocardiogram Routine 10/22/2018 10/19/2019 10/19/2018            Who to contact     If you have questions or need follow up information about today's clinic visit or your schedule please contact [facility] directly at [phone].  Normal or non-critical lab and imaging results will be communicated to you by MyChart, letter or phone within 4  "business days after the clinic has received the results. If you do not hear from us within 7 days, please contact the clinic through [facility] or phone. If you have a critical or abnormal lab result, we will notify you by phone as soon as possible.  Submit refill requests through [facility] or call your pharmacy and they will forward the refill request to us. Please allow 3 business days for your refill to be completed.          Additional Information About Your Visit        [facility] Information     [facility] lets you send messages to your doctor, view your test results, renew your prescriptions, schedule appointments and more. To sign up, go to [email or web address] . Click on \"Log in\" on the left side of the screen, which will take you to the Welcome page. Then click on \"Sign up Now\" on the right side of the page.     You will be asked to enter the access code listed below, as well as some personal information. Please follow the directions to create your username and password.     Your access code is: [email or web address]  Expires: 2018  2:21 PM     Your access code will  in 90 days. If you need help or a new code, please call your [place] clinic or [phone].        Care EveryWhere ID     This is your Care EveryWhere ID. This could be used by other organizations to access your [place] medical records  [id number]        Your Vitals Were     Pulse Height Pulse Oximetry BMI (Body Mass Index)          72 1.702 m (5' 7\") 97% 25.8 kg/m2         Blood Pressure from Last 3 Encounters:   10/19/18 116/57   18 101/40   18 110/60    Weight from Last 3 Encounters:   10/19/18 74.7 kg (164 lb 11.2 oz)   18 73.2 kg (161 lb 6 oz)   18 68 kg (150 lb)              We Performed the Following     Follow-Up with Cardiac Advanced Practice Provider        Primary Care Provider Office Phone # Fax #    Justina Suma [phone]       [facility] [address] [place] " [address]        Equal Access to Services     [facility]: Hadii tressa rivas bradhiram Lindaali, waamaliada luqadaha, qaybta reynaldonancyramesh fink, catherine benites. So [facility] [phone].    ATENCIÓN: Si habla español, tiene a mast disposición servicios gratuitos de asistencia lingüística. Tikaame al [phone].    We comply with applicable federal civil rights laws and [place] laws. We do not discriminate on the basis of race, color, national origin, age, disability, sex, sexual orientation, or gender identity.            Thank you!     Thank you for choosing [facility]  for your care. Our goal is always to provide you with excellent care. Hearing back from our patients is one way we can continue to improve our services. Please take a few minutes to complete the written survey that you may receive in the mail after your visit with us. Thank you!             Your Updated Medication List - Protect others around you: Learn how to safely use, store and throw away your medicines at www.disposemymeds.org.          This list is accurate as of 10/19/18  2:12 PM.  Always use your most recent med list.                   Brand Name Dispense Instructions for use Diagnosis    acetaminophen 325 MG tablet    TYLENOL    100 tablet    Take 3 tablets (975 mg) by mouth every 8 hours    Cellulitis of right lower extremity       aspirin 81 MG tablet      Take 81 mg by mouth daily        atorvastatin 40 MG tablet    LIPITOR     Take 40 mg by mouth daily as needed        calcium acetate 667 MG Caps capsule    PHOSLO     Take 2,001 mg by mouth 3 times daily (with meals)        diclofenac 1 % Gel topical gel    VOLTAREN     Apply 2 g topically 4 times daily Apply to RLE    Cellulitis of right lower extremity       gabapentin 300 MG capsule    NEURONTIN     Take 300 mg by mouth daily        hydrOXYzine 25 MG tablet    ATARAX    120 tablet    Take 1 tablet (25 mg) by mouth every 6 hours as  needed for itching (pain)    Cellulitis of right lower extremity       lidocaine 5 % ointment    XYLOCAINE     Apply topically every 4 hours (while awake) Do not put onto any open skin areas    Cellulitis of right lower extremity       metoprolol succinate 25 MG 24 hr tablet    TOPROL-XL     Take 12.5 mg by mouth daily        nitroGLYcerin 0.4 MG sublingual tablet    NITROSTAT    25 tablet    Place 1 tablet (0.4 mg) under the tongue every 5 minutes as needed for chest pain    NSTEMI (non-ST elevated myocardial infarction) (H)       polyethylene glycol Packet    MIRALAX/GLYCOLAX    7 packet    Take 17 g by mouth daily    Cellulitis of right lower extremity       PROMETHAZINE VC/CODEINE 5-6.25-10 MG/5ML Syrp   Generic drug:  Phenyleph-Promethazine-Cod      Take 1-2 tsp. by mouth every 6 hours as needed        sulfamethoxazole-trimethoprim 800-160 MG per tablet    BACTRIM DS/SEPTRA DS     Take 1 tablet by mouth 2 times daily        traMADol 50 MG tablet    ULTRAM    15 tablet    Take 1 tablet (50 mg) by mouth every 6 hours as needed for moderate pain    Cellulitis of right lower extremity

## 2018-10-19 NOTE — LETTER
10/19/2018    Justina Moise MD  [facility] [address]  [address] [facility] [address]    RE: Maurizio Ohara       Dear Colleague,    I had the pleasure of seeing Maurizio Ohara in the [facility].    CARDIOLOGY CLINIC PROGRESS NOTE    DOS: 10/19/2018      Maurizio Ohara  : 10/7/1929, 89 year old  MRN: [id number]      History:   I had the pleasure of following up with Maurizio Ohara today.  Maurizio is a patient of Dr. Keyes.       Maurizio is a very pleasant, 89-year-old gentleman with a history of coronary artery disease with a non-ST-elevation MI in .  At that time, he underwent stenting of the right coronary artery.  He also had a moderate to severe lesion in his LAD and mild disease in his circumflex.  A few weeks following the intervention, he underwent a nuclear stress test to evaluate the LAD lesion.  This showed no ischemia in the LAD territory and an EF of 59%.  He also has a history of hypertension, hyperlipidemia, end-stage renal disease on dialysis, chronic anemia, mitral regurgitation, aortic stenosis.       Maurizio was admitted to the hospital -10/02/2016 for shortness of breath and acute respiratory failure.  He was treated for possible pneumonia.  Echocardiogram 2016 showed an EF of 55%-60%, moderate to severe MR, increased RV systolic pressures indicating moderate to severe pulmonary hypertension, moderate aortic stenosis.  He underwent a LAN on 2016 to further evaluate the valves.  This showed mild to moderate MR, severe PH, moderate AS.     He was last seen by Dr. Keyes 10/18/17.  At that time, he was doing well.  Plan was to follow up in 1 year with a repeat echocardiogram.     Maurizio was recently admitted -18 for RLE cellulitis. He was on IV antibiotics. Blood cultures were negative. Lactate was WNL.  Lower extremity ultrasound showing no evidence of DVT.   Due to some lower BPs, nifedipine was discontinued.       Scheduled echo 10/15/18 showed severe aortic stenosis and worsened TR, now moderately severe.  There was an echogenic mobile mass attached to the aortic aspect of the aortic valve.       Interval History:   He presents today for his annual follow up.   The echo results were reviewed with Dr. Keyes, and she recommends blood cultures and a LAN.   He denies chest pain, SOB, syncope, fever, CVA sxs.  Occasionally after dialysis he has some difficulty with his speech but it resolves quickly.   He makes minimal urine.   He tells me that he is volume up and they are trying to pull more fluid - he is on a fluid restriction the past 2 days.   He is at a TCU now.     ROS:  Skin:  Positive for bruising   Eyes:  not assessed    ENT:  not assessed    Respiratory:  Negative    Cardiovascular:    edema;Positive for;dizziness  Gastroenterology: not assessed    Genitourinary:  not assessed    Musculoskeletal:  not assessed    Neurologic:  not assessed    Psychiatric:  not assessed    Heme/Lymph/Imm:  not assessed    Endocrine:  not assessed      PAST MEDICAL HISTORY:  Past Medical History:   Diagnosis Date     Anemia      Anemia      CAD (coronary artery disease) 12/24/14    PCI and BMS to mid RCA (5.0 x 20mm) with residual mod diffuse mid LAD disease     Chronic kidney disease     on Dialysis     Diabetes (H)      Hypertension      Mitral regurgitation 12/24/2014    mild-mod (1-2+) per echo     Mitral valve disorders(424.0) 12/24/2014    mild/mod (1-2+) MR     Myocardial infarction (H) 12/24/2014    NSTEMI     Pulmonary hypertension (H) 12/24/2014    RVSP elevated c/w mild-mod PH per echo     Renal disease due to diabetes mellitus (H)     End stage; on Dialysis       PAST SURGICAL HISTORY:  Past Surgical History:   Procedure Laterality Date     CORONARY ANGIOGRAPHY ADULT ORDER  12/24/2014     ENT SURGERY      Tonsillectomy     HEART CATH, ANGIOPLASTY  12/24/2014    PCI and BMS (5.0x20mm)to mid RCA     THORACIC SURGERY       "Herniated disc       SOCIAL HISTORY:  Social History     Social History     Marital status: Single     Spouse name: N/A     Number of children: N/A     Years of education: N/A     Social History Main Topics     Smoking status: Former Smoker     Smokeless tobacco: Never Used     Alcohol use Yes      Comment: \"about as much as you can pour in a thimble in a year\"     Drug use: No     Sexual activity: Not Currently     Partners: Female     Other Topics Concern     Caffeine Concern Yes     1-2 cups daily     Sleep Concern No     Special Diet Yes     kidney diet     Exercise Yes     walking     Seat Belt Yes     Social History Narrative       FAMILY HISTORY:  Family History   Problem Relation Age of Onset     Diabetes Mother        MEDS:   Current Outpatient Prescriptions on File Prior to Visit:  acetaminophen (TYLENOL) 325 MG tablet Take 3 tablets (975 mg) by mouth every 8 hours   aspirin 81 MG tablet Take 81 mg by mouth daily   atorvastatin (LIPITOR) 40 MG tablet Take 40 mg by mouth daily as needed   calcium acetate (PHOSLO) 667 MG CAPS Take 2,001 mg by mouth 3 times daily (with meals)   diclofenac (VOLTAREN) 1 % GEL topical gel Apply 2 g topically 4 times daily Apply to RLE   lidocaine (XYLOCAINE) 5 % ointment Apply topically every 4 hours (while awake) Do not put onto any open skin areas   metoprolol succinate (TOPROL-XL) 25 MG 24 hr tablet Take 12.5 mg by mouth daily    nitroglycerin (NITROSTAT) 0.4 MG sublingual tablet Place 1 tablet (0.4 mg) under the tongue every 5 minutes as needed for chest pain   Phenyleph-Promethazine-Cod (PROMETHAZINE VC/CODEINE) 5-6.25-10 MG/5ML SYRP Take 1-2 tsp. by mouth every 6 hours as needed   polyethylene glycol (MIRALAX/GLYCOLAX) Packet Take 17 g by mouth daily   hydrOXYzine (ATARAX) 25 MG tablet Take 1 tablet (25 mg) by mouth every 6 hours as needed for itching (pain) (Patient not taking: Reported on 10/19/2018)   traMADol (ULTRAM) 50 MG tablet Take 1 tablet (50 mg) by mouth every 6 " "hours as needed for moderate pain (Patient not taking: Reported on 10/19/2018)     No current facility-administered medications on file prior to visit.     ALLERGIES:   Allergies   Allergen Reactions     Glyburide      Lisinopril      Hyperkalemia when hospitalized 4/5/2011     Metformin      Renal failure stage 4     Penicillins      SHADY Gallagher clarified with pt, pt does not remember rxn, it was a long time ago, and \"nothing crazy\".     Verapamil        PHYSICAL EXAM:  Vitals: /57 (BP Location: Left arm, Patient Position: Sitting, Cuff Size: Adult Regular)  Pulse 72  Ht 1.702 m (5' 7\")  Wt 74.7 kg (164 lb 11.2 oz)  SpO2 97%  BMI 25.8 kg/m2  Constitutional:  cooperative, alert and oriented, well developed, well nourished, in no acute distress        Skin:  warm and dry to the touch   scattered ecchymosis    Head:  normocephalic        Eyes:  pupils equal and round;conjunctivae and lids unremarkable   mild scleral icterus    ENT:  no pallor or cyanosis        Neck:    JVP elevated;JVP >12      Respiratory:  clear to auscultation;normal symmetry        Cardiac: regular rhythm             II/VI systolic murmur at USB, II/VI blowing systolic murmur at apex, I-II/IV soft diastolic murmur    GI:  abdomen soft;BS normoactive;non-tender obese      Vascular: not assessed this visit                                      Extremities and Musculoskeletal:      dialysis vascular access in right arm, RLE: 1-2+ pitting edema, mild erythema, not hot.  LLE trace edema.  BLEs with chronic venous stasis changes, fingernails are unremarkable as are the fingertips     Neurological:  affect appropriate   in wheel chair      LABS/DATA:  I reviewed the following:  Echo 10/15/18:  Interpretation Summary     Left ventricular systolic function is normal.  The visual ejection fraction is estimated at 60-65%.  There is moderate (2+) mitral regurgitation.  The aortic valve is not well visualized.  It is severely calcified. There is an " echogenic mobile mass attached to aortic  aspect of the aortic valve.  There is mild (1+) aortic regurgitation.  Severe valvular aortic stenosis.  There is low flow state with stroke volume 33ml/m2.  There is moderately severe (3+) tricuspid regurgitation.  Right ventricular systolic pressure is elevated, consistent with moderate to  severe pulmonary hypertension.  Compared to 10/17, AS is now severe. TR is worse. There is now an echogenic  mobile mass attached to the aortic aspect of the aortic valve. Differential  includes vegetation, mobile calcification. Findings discussed with Dr kerns  at 2.50pm today.     The study was technically difficult.        Echo 10/16/17:  Interpretation Summary     Moderate to severe valvular aortic stenosis.  There is mild to moderate (1-2+) mitral regurgitation.  Left ventricular systolic function is normal.  The visual ejection fraction is estimated at 60-65%.  The left ventricle is normal in size.  There is moderate concentric left ventricular hypertrophy.  The right ventricle is normal in structure, function and size.  There is mild biatrial enlargement.  Right ventricular systolic pressure is elevated, consistent with moderate  pulmonary hypertension.  Dilated inferior vena cava     The mean systolic gradient across the aortic valve = 28 mmHg with a calculated  MARYANN by continuity equation = .95cm2. In the setting of normal LV systolic  perfromance mean systolic gradients in this range are usually associated with  moderate AS. There has been a slight increase in mean systolic gradient since  the last study 9/29/2016 ( was 24 mmhg, now 28 mmHg). There has been no change  in LV/RV systolic performance/cahmber size and estimated PA systolic  Pressures.        ASSESSMENT/PLAN:  1.  Coronary artery disease with a non-ST elevation in 2014, status post stenting of the RCA.  He had an LAD lesion and some mild circumflex disease at that time as well.  He underwent a nuclear stress test  01/09/2015 that showed no ischemia in the LAD territory and a normal EF.  He is currently stable without anginal symptoms.  He is on aspirin 81 mg daily and atorvastatin 40 mg daily.     2.  Hypertension, controlled.  He is on Toprol XL 12.5 mg daily.     3.  Hyperlipidemia, on atorvastatin 40 mg daily.     4.  End-stage renal disease on hemodialysis Tuesday, Thursday, Saturday.   Appears volume up.  Needs more volume removed.     5.  Mitral regurgitation.   Echo 10/15/18: moderate MR.  Follow.     6.  Aortic stenosis.    LAN 09/29/2016 showed moderate aortic stenosis.    Echo 10/15/18: severe aortic stenosis, low flow state, mild AI, echogenic mobile mass attached to aortic aspect of the aortic valve.  This may be vegetation, mobile calcification.     He has no sxs of severe aortic stenosis, specifically no chest pain, no SOB, no sycnope.   He has no sxs concerning for endocarditis, specifically no nail or finger concerns, no stroke sxs, no fevers.     With his recent admission for cellulitis (negative blood cultures then) needing IV abx, will repeat blood cultures and get LAN.     Could be mobile calcification given his known significant aortic calcification seen on LAN 9/29/16.     He will follow up with myself and/or Dr. Keyes pending timing of LAN.      7.  Chronic  anemia.  Followed through Nephrology and Primary Care.         Thank you for allowing me to participate in the care of your patient.    Sincerely,     Julianne Dan PA-C     [facility]

## 2018-10-23 NOTE — TELEPHONE ENCOUNTER
Blood culture results noted, no growth after 4 days. Pt was seen in clinic by Julianne on 10/19/18 and is scheduled for a LAN on 11/7/18 per Dr. Keyes due to echogenic mobile mass attached to the aortic aspect of the aortic valve, severe aortic stenosis on echo. Called pt, no answer. LVM requesting call back to Team 1 to review results. Will route to Dr. Keyes as update.     Component      Latest Ref Rng & Units 10/19/2018           2:48 PM   Specimen Description       Blood Left Hand   Special Requests       Aerobic and anaerobic bottles received   Culture Micro       No growth after 4 days     Component      Latest Ref Rng & Units 10/19/2018           2:56 PM   Specimen Description       Blood   Special Requests       Aerobic and anaerobic bottles received   Culture Micro       No growth after 4 days

## 2018-10-26 NOTE — TELEPHONE ENCOUNTER
Dr. Keyes notified of negative blood cultures, no further recommendations at this time. Attempted to call pt again, no answer, LVM. Called to pt's sister Karyn (consent to communicate on file). Per Karyn, the best number to reach pt currently is [phone], which is his direct line into his room at his TCU. Called and spoke with pt, communicated that blood cultures were negative. Reviewed upcoming appointment for LAN and advised will call prior to review instructions. Pt verbalized understanding, no further questions at this time.

## 2018-11-03 NOTE — IP AVS SNAPSHOT
Maurizio Ohara #[id number] (CSN:[id number])  (89 year old M)  (Adm: 18)     [id number]               [facility] OBSERVATION DEPARTMENT: [phone]            Patient Demographics     Patient Name Sex          Age SSN Address Phone    Maurizoi Ohara Male 10/7/1929 (89 year old) [id number] [facility] [address] [phone] (Home)  NONE (Work)  [phone] (Mobile) *Preferred*      Emergency Contact(s)     Name Relation Home Work Mobile    Karyn Orozco Sister [phone] none none    Rosa Relative [phone]        Admission Information     Attending Provider Admitting Provider Admission Type Admission Date/Time    Dionte Mena MD Galindez, Al Gilbert, MD Emergency 18  2259    Discharge Date Hospital Service Auth/Cert Status Service Area     Observation Incomplete [facility]    Unit Room/Bed Admission Status        OBSERVATION DEPT  Admission (Confirmed)       Admission     Complaint    Leg swelling      Hospital Account     Name Acct ID Class Status Primary Coverage    Maurizio Ohara [id number] Observation Open MEDICARE - MEDICARE FOR HB SUPPLEMENT            Guarantor Account (for Hospital Account #[id number])     Name Relation to Pt Service Area Active? Acct Type    Maurizio Ohara Self FCS Yes Personal/Family    Address Phone          [address] [place] [address] [phone](H)  NONE(O)              Coverage Information (for Hospital Account #[id number])     1. MEDICARE/MEDICARE FOR HB SUPPLEMENT     F/O Payor/Plan Precert #    MEDICARE/MEDICARE FOR HB SUPPLEMENT     Subscriber Subscriber #    Maurizio Ohara [id number]    Address Phone    ATT[employer] CLAIMS  [address]  [place] [address] [phone]          2. HE[employer]ERS/HE[employer] FREEDOM PLAN     F/O Payor/Plan Precert #    HE[employer] FREEDOM PLAN     Subscriber Subscriber #     "Maurizio Ohara [id number]    Address Phone    [address]  [place] [address] [phone]                                                      INTERAGENCY TRANSFER FORM - PHYSICIAN ORDERS   11/3/2018                       [facility] OBSERVATION DEPARTMENT: [phone]            Attending Provider: Dionte Mena MD     Allergies:  Glyburide, Lisinopril, Metformin, Penicillins, Verapamil    Infection:  None   Service:  Observation    Ht:  1.702 m (5' 7\")   Wt:  81.7 kg (180 lb 1.9 oz)   Admission Wt:  78 kg (172 lb)    BMI:  28.21 kg/m 2   BSA:  1.97 m 2            ED Clinical Impression     Diagnosis Description Comment Added By Time Added    Edema, unspecified type [R60.9] Edema, unspecified type [R60.9]  Edin Pfeiffer APRN CNP 11/4/2018 12:32 AM    Pain in both lower extremities [M79.604, M79.605] Pain in both lower extremities [M79.604, M79.605]  Edin Pfeiffer APRN CNP 11/4/2018  1:18 AM      Hospital Problems as of 11/8/2018              Priority Class Noted POA    Leg swelling Medium  11/4/2018 Yes      Non-Hospital Problems as of 11/8/2018              Priority Class Noted    NSTEMI (non-ST elevated myocardial infarction) (H) Medium  12/24/2014    ESRD (end stage renal disease) on dialysis (H) Medium  12/24/2014    CAD (coronary artery disease) Medium  12/24/2014    Mitral regurgitation Medium  12/24/2014    Pulmonary hypertension (H) Medium  12/24/2014    Diabetes (H) Medium  Unknown    Hypertension Medium  Unknown    Sepsis (H) Medium  1/12/2016    Pneumonia Medium  9/24/2016    Acute diastolic (congestive) heart failure (H) Medium  8/9/2017    Cellulitis Medium  9/18/2018      Code Status History     Date Active Date Inactive Code Status Order ID Comments User Context    9/18/2018  4:17 PM 9/25/2018  6:18 PM DNI [id number]  Aisha Franco PA-C Inpatient    9/18/2018  2:58 PM 9/18/2018  4:17 PM IDANIAI [id number]  Aisha Franco PA-C ED    8/15/2017  8:27 AM " 9/18/2018  2:58 PM Full Code [id number]  Jayant Ricketts MD Outpatient    8/9/2017 10:43 PM 8/15/2017  8:27 AM Full Code [id number]  Marilee Ramirez MD Inpatient    10/2/2016  2:30 PM 8/9/2017 10:43 PM Full Code [id number]  Primitivo Yeboah MD Outpatient    9/24/2016  2:49 PM 10/2/2016  2:30 PM Full Code [id number]  Bola Garcia, DO Inpatient    1/17/2016 10:07 AM 9/24/2016  2:49 PM Full Code [id number]  Bola Garcia, DO Outpatient    1/12/2016  3:10 PM 1/17/2016 10:07 AM Full Code [id number]  Diaz Hale, DO Inpatient    12/26/2014 10:57 AM 1/12/2016  3:10 PM Full Code [id number]  Edin Elkins MD Outpatient    12/24/2014  3:28 PM 12/24/2014 11:14 PM Full Code [id number]  Phyllis Barrios MD Inpatient    12/24/2014 11:20 AM 12/24/2014  3:28 PM Full Code [id number]  Cholo Nogueira MD Outpatient    12/24/2014  5:42 AM 12/24/2014 11:20 AM Full Code [id number]  Tip Langston MD Inpatient      Current Code Status     Date Active Code Status Order ID Comments User Context       11/4/2018  2:40 AM Full Code [id number]  Jayant Ricketts MD Inpatient       Questions for Current Code Status     Question Answer Comment    Code status determined by: Discussion with patient/legal decision maker          Medication Review      UNREVIEWED medications. Ask your doctor about these medications        Dose / Directions Comments    acetaminophen 500 MG tablet   Commonly known as:  TYLENOL        Dose:  1000 mg   Take 1,000 mg by mouth 3 times daily as needed for mild pain   Refills:  0        aspirin 81 MG tablet        Dose:  81 mg   Take 81 mg by mouth daily   Refills:  0        atorvastatin 40 MG tablet   Commonly known as:  LIPITOR        Dose:  40 mg   Take 40 mg by mouth At Bedtime   Refills:  0        calcium acetate 667 MG Caps capsule   Commonly known as:  PHOSLO        Dose:  2001 mg   Take 2,001 mg by mouth 3 times daily (with meals)   Refills:  0        diclofenac 1 %  Gel topical gel   Commonly known as:  VOLTAREN   Used for:  Cellulitis of right lower extremity        Dose:  2 g   Apply 2 g topically 4 times daily Apply to RLE   Refills:  0        lidocaine 5 % ointment   Commonly known as:  XYLOCAINE        Apply topically every 4 hours as needed for moderate pain   Refills:  0        metoprolol succinate 25 MG 24 hr tablet   Commonly known as:  TOPROL-XL        Dose:  12.5 mg   Take 12.5 mg by mouth daily   Refills:  0        nitroGLYcerin 0.4 MG sublingual tablet   Commonly known as:  NITROSTAT   Used for:  NSTEMI (non-ST elevated myocardial infarction) (H)        Dose:  0.4 mg   Place 1 tablet (0.4 mg) under the tongue every 5 minutes as needed for chest pain   Quantity:  25 tablet   Refills:  3        polyethylene glycol Packet   Commonly known as:  MIRALAX/GLYCOLAX   Used for:  Cellulitis of right lower extremity        Dose:  17 g   Take 17 g by mouth daily   Quantity:  7 packet   Refills:  0        PROMETHAZINE VC/CODEINE 5-6.25-10 MG/5ML Syrp   Generic drug:  Phenyleph-Promethazine-Cod        Dose:  1-2 tsp.   Take 1-2 tsp. by mouth every 6 hours as needed   Refills:  0                Follow-Up Appointment Instructions     Follow-up and recommended labs and tests        Follow up with primary care provider, Justina Moise, within 7 days for hospital follow- up.  The following labs/tests are recommended: Discuss with your Primary Care Provider about a referral to a Vascular Surgeon in regards to your leg swelling/redness/pain.             Your next 10 appointments already scheduled     Nov 16, 2018  1:15 PM CST   Return Visit with Michelle Keyes DO   [facility] ([facility])    [address] [address] [id number] [phone]                                                  INTERAGENCY TRANSFER FORM - NURSING   11/3/2018                       [facility] OBSERVATION  "DEPARTMENT: [phone]            Attending Provider: Dionte Mena MD     Allergies:  Glyburide, Lisinopril, Metformin, Penicillins, Verapamil    Infection:  None   Service:  Observation    Ht:  1.702 m (5' 7\")   Wt:  81.7 kg (180 lb 1.9 oz)   Admission Wt:  78 kg (172 lb)    BMI:  28.21 kg/m 2   BSA:  1.97 m 2            Advance Directives        Scanned docmt in ACP Activity?           No scanned doc        Immunizations     Name Date      Influenza (High Dose) 3 valent vaccine 09/21/18     Influenza (High Dose) 3 valent vaccine 10/02/16     TD (ADULT, 7+) 09/18/18       ASSESSMENT     Discharge Profile Flowsheet     EXPECTED DISCHARGE     PAS Number  [id number] 09/25/18 1457    Expected Discharge Date  11/07/18 (Home, single) 11/07/18 0845   SKIN      DISCHARGE NEEDS ASSESSMENT     Inspection of bony prominences  Full 11/07/18 2145    Concerns To Be Addressed  no discharge needs identified 08/11/17 1132   Full except areas not inspected   Buttock, left;Buttock, right;Sacrum;Coccyx 11/07/18 2145    Equipment Currently Used at Home  none 11/04/18 1751   Skin WDL  ex 11/08/18 0948    # of Referrals Placed by CTS  Communication hand-offs to next level of Care Providers 10/03/16 1426   Skin Color/Characteristics  bruised (ecchymotic);redness blanchable;kary 11/08/18 0948    [facility] 08/11/17 1132   Skin Temperature  warm 11/08/18 0948    Primary Care MD Name  Dr. Moise 08/11/17 1132   Skin Moisture  dry 11/08/18 0948    Equipment Used at Home  bath bench;grab bar;walker, rolling 01/13/16 1532   Skin Elasticity  slow return to original state 11/07/18 2145    GASTROINTESTINAL (ADULT,PEDIATRIC,OB)     Skin Integrity  abrasion(s);bruise(s);scab(s);scar(s);drain/device(s) 11/08/18 0948    GI WDL  WDL 11/08/18 0948   Inspection under devices  Full except (identify device(s) not inspected) 11/07/18 2145    Last Bowel Movement  11/06/18 11/07/18 6511   Not Inspected " "under devices  Other (Right arm fistula, dressing C/D/I) 11/07/18 2145    Passing flatus  yes 11/07/18 2145   SAFETY      COMMUNICATION ASSESSMENT     Safety WDL  WDL 11/08/18 0948    Patient's communication style  spoken language (English or Bilingual) 08/09/17 2256   All Alarms  alarm(s) activated and audible 11/08/18 0948    FINAL RESOURCES     Safety Factors  bed in low position;patient up in chair;wheels locked;call light in reach;upper side rails raised x 2;ID band on 11/08/18 0948    Resources List  Skilled Nursing Facility 11/05/18 1348                      Assessment WDL (Within Defined Limits) Definitions           Safety WDL     Effective: 09/28/15    Row Information: <b>WDL Definition:</b> Bed in low position, wheels locked; call light in reach; upper side rails up x 2; ID band on<br> <font color=\"gray\"><i>Item=AS safety wdl>>List=AS safety wdl>>Version=F14</i></font>      Skin WDL     Effective: 09/28/15    Row Information: <b>WDL Definition:</b> Warm; dry; intact; elastic; without discoloration; pressure points without redness<br> <font color=\"gray\"><i>Item=AS skin wdl>>List=AS skin wdl>>Version=F14</i></font>      Vitals     Vital Signs Flowsheet     VITAL SIGNS     Side Effects Monitoring: Respiratory Depth  N 11/08/18 0013    Temp  97.7  F (36.5  C) 11/08/18 0853   Side Effects Monitoring: Sedation Level  1 11/08/18 0013    Temp src  Oral 11/08/18 0853   ISABELLE COMA SCALE      Resp  17 11/08/18 1105   Best Eye Response  3-->(E3) to speech 11/07/18 1310    Pulse  63 11/08/18 1105   Best Motor Response  6-->(M6) obeys commands 11/07/18 1310    Heart Rate  62 11/08/18 0745   Best Verbal Response  5-->(V5) oriented 11/07/18 1310    Pulse/Heart Rate Source  Monitor 11/08/18 0745   Isabelle Coma Scale Score  14 11/07/18 1310    BP  91/44 11/08/18 1105   HEIGHT AND WEIGHT      BP Location  Left arm 11/08/18 0745   Weight  81.7 kg (180 lb 1.9 oz) 11/08/18 0814    OXYGEN THERAPY     POSITIONING      SpO2  97 " % 11/08/18 1105   Body Position  supine, head elevated;turned;log-rolled;supine, legs elevated 11/08/18 0013    O2 Device  None (Room air) 11/08/18 0853   Head of Bed (HOB)  HOB at 20 degrees 11/08/18 0013    PAIN/COMFORT     Chair  Upright in chair 11/08/18 0948    Patient Currently in Pain  denies 11/08/18 0503   Positioning/Transfer Devices  pillows;in use 11/08/18 0013    Preferred Pain Scale  number (Numeric Rating Pain Scale) 11/08/18 0013   DAILY CARE      Patient's Stated Pain Goal  No pain 11/08/18 0013   Activity Management  up in chair 11/08/18 0948    0-10 Pain Scale  0 11/08/18 0013   Activity Assistance Provided  independent 11/08/18 0948    Pain Location  Abdomen 11/04/18 0555   Assistive Device Utilized  gait belt;walker 11/07/18 2145    Pain Intervention(s)  Repositioned 11/08/18 0013   Additional Documentation  Activity Device Assistance (Row) 11/06/18 1729    ANALGESIA SIDE EFFECTS MONITORING     RESPIRATORY MONITORING      Side Effects Monitoring: Respiratory Quality  R 11/08/18 0013   Respiratory Monitoring (EtCO2)  33 mmHg 11/07/18 1310            Patient Lines/Drains/Airways Status    Active LINES/DRAINS/AIRWAYS     Name: Placement date: Placement time: Site: Days: Last dressing change:    Hemodialysis Vascular Access Subclavian vein catheter Right Subclavian       Subclavian        Hemodialysis Vascular Access Arteriovenous fistula Right Arm       Arm        Peripheral IV 09/18/18 Left Upper forearm 09/18/18      Upper forearm   51     Peripheral IV 11/03/18 Left 11/03/18   2342      4     Wound 09/19/18 Right Knee Abrasion(s) 09/19/18   0730   Knee   50     Wound 09/19/18 Right Arm Skin tear moist wound bed near elbow 09/19/18   1500   Arm   49             Patient Lines/Drains/Airways Status    Active PICC/CVC     None            Intake/Output Detail Report     None      Last Void/BM       Most Recent Value    Urine Occurrence 1 at 11/06/2018 1901    Stool Occurrence 1 at 11/06/2018 1901       Case Management/Discharge Planning     Case Management/Discharge Planning Flowsheet     REFERRAL INFORMATION     Support Assessment  Adequate family and caregiver support;Adequate social supports 11/05/18 1348    Did the Initial Social Work Assessment result in a Social Work Case?  Yes 11/05/18 1348   COPING/STRESS      Admission Type  observation 11/05/18 1348   Major Change/Loss/Stressor  denies 09/18/18 1621    Arrived From  home or self-care 11/05/18 1348   EXPECTED DISCHARGE      Referral Source  physician 11/05/18 1348   Expected Discharge Date  11/07/18 (Home, single) 11/07/18 0845    # of Referrals Placed by CTS  Communication hand-offs to next level of Care Providers 10/03/16 1426   ASSESSMENT/CONCERNS TO BE ADDRESSED      Reason For Consult  discharge planning 11/05/18 1348   Concerns To Be Addressed  no discharge needs identified 08/11/17 1132    Record Reviewed  clinical discipline documentation;history and physical;medical record;plan of care 11/05/18 1348   DISCHARGE PLANNING      CTS Assigned to Case  Richa 11/05/18 1103   Equipment Used at Home  bath bench;grab bar;walker, rolling 01/13/16 1532    Primary Care Clinic Name  [facility] 08/11/17 1132   FINAL RESOURCES      Primary Care MD Name  Dr. Moise 08/11/17 1132   Equipment Currently Used at Home  none 11/04/18 1751    LIVING ENVIRONMENT     Resources List  Skilled Nursing Facility 11/05/18 1348    Lives With  alone 11/05/18 1348   PAS Number  [id number] 09/25/18 1457    Living Arrangements  other (see comments) ([facility]) 11/04/18 1751   ABUSE RISK SCREEN      Provides Primary Care For  no one, unable/limited ability to care for self 11/05/18 1348   QUESTION TO PATIENT:  Has a member of your family or a partner(now or in the past) intimidated, hurt, manipulated, or controlled you in any way?  no 11/04/18 0459    Quality Of Family Relationships  supportive;involved 11/05/18 1348   QUESTION TO PATIENT: Do you feel safe  going back to the place where you are living?  yes 11/04/18 0459    Able to Return to Prior Living Arrangements  yes 11/05/18 1348   OBSERVATION: Is there reason to believe there has been maltreatment of a vulnerable adult (ie. Physical/Sexual/Emotional abuse, self neglect, lack of adequate food, shelter, medical care, or financial exploitation)?  no 11/04/18 0459    ASSESSMENT OF FAMILY/SOCIAL SUPPORT     OTHER      Who is your support system?  Other (specify) (Nephew) 11/05/18 1348   Are you depressed or being treated for depression?  No 11/04/18 0459    Description of Support System  Supportive;Involved 11/05/18 1348                       [facility] OBSERVATION DEPARTMENT: [phone]            Medication Administration Report for Maurizio Ohara as of 11/08/18 1115   Legend:    Given Hold Not Given Due Canceled Entry Other Actions    Time Time (Time) Time  Time-Action       Inactive    Active    Linked        Medications 11/02/18 11/03/18 11/04/18 11/05/18 11/06/18 11/07/18 11/08/18    - MEDICATION INSTRUCTIONS for Dialysis Patients -  Freq: SEE ADMIN INSTRUCTIONS Route: XX  Start: 11/05/18 1317   Admin Instructions: Do not give any medication that may affect blood pressure or volume before dialysis.   The following medications may be removed by dialysis and should be given after dialysis: Tylenol, ASA, Melatonin, Metoprolol    Dispense Loc: Non Rx dispense               0.9% sodium chloride BOLUS  Dose: 100-150 mL  Freq: EVERY 15 MIN PRN Route: IV  PRN Comment: Until hypotensive symptoms resolve  Start: 11/08/18 0710   Admin Instructions: Up to 1000 mL maximum total dose.  Give if needed to manage Systolic Blood Pressure as indicated in Hemodialysis Single Treatment set up.  Notify provider if patient blood pressure is not responsive with the bolus.    Admin. Amount: 100-150 mL  Dispense Loc: [facility] Floor Stock  Administrations Remaining: 10  Volume: 150 mL  POC: Dialysis                acetaminophen (TYLENOL) tablet 1,000 mg  Dose: 1,000 mg  Freq: 3 TIMES DAILY PRN Route: PO  PRN Reason: mild pain  Start: 11/04/18 1637   Admin Instructions: Maximum acetaminophen dose from all sources = 75 mg/kg/day not to exceed 4 gram    Admin. Amount: 2 tablet (2 × 500 mg tablet)  Dispense Loc:  ADS MS2B OBS               acetaminophen (TYLENOL) tablet 975 mg  Dose: 975 mg  Freq: EVERY 8 HOURS Route: PO  Start: 11/04/18 0241   Admin Instructions: Maximum acetaminophen dose from all sources = 75 mg/kg/day not to exceed 4 grams/day.    Admin. Amount: 3 tablet (3 × 325 mg tablet)  Last Admin: 11/08/18 0504  Dispense Loc:  ADS MS2B OBS       0454 (975 mg)-Given       1029 (975 mg)-Given       1829 (975 mg)-Given        0157 (975 mg)-Given       1045 (975 mg)-Given       1936 (975 mg)-Given        0255 (975 mg)-Given       1311 (975 mg)-Given       2108 (975 mg)-Given        0459 (975 mg)-Given       (1445)-Not Given       2027 (975 mg)-Given        0504 (975 mg)-Given       [ ] 1300       [ ] 2100           albumin human 25 % injection 50 mL  Dose: 50 mL  Freq: EVERY 1 HOUR PRN Route: IV  PRN Reason: other  PRN Comment: see administration instructions  Start: 11/08/18 0710   Admin Instructions: FOR Systolic Blood Pressure less than 90 mmHg or for volume replacement DURING DIALYSIS RUN    Admin. Amount: 50 mL  Dispense Loc:  Main Pharmacy  Volume: 50 mL  POC: Dialysis               aspirin EC tablet 81 mg  Dose: 81 mg  Freq: DAILY Route: PO  Start: 11/04/18 0946   Admin. Amount: 1 tablet (1 × 81 mg tablet)  Last Admin: 11/07/18 0806  Dispense Loc:  ADS MS2B OBS       1027 (81 mg)-Given        0827 (81 mg)-Given        0752 (81 mg)-Given        0806 (81 mg)-Given        [ ] 0800           atorvastatin (LIPITOR) tablet 40 mg  Dose: 40 mg  Freq: AT BEDTIME Route: PO  Start: 11/04/18 2200   Admin. Amount: 1 tablet (1 × 40 mg tablet)  Last Admin: 11/07/18 2117  Dispense Loc:  ADS MS2B OBS       2145 (40  mg)-Given        2136 (40 mg)-Given        2108 (40 mg)-Given        2117 (40 mg)-Given        [ ] 2200           calcium acetate (PHOSLO) capsule 2,001 mg  Dose: 2,001 mg  Freq: 3 TIMES DAILY WITH MEALS Route: PO  Start: 11/04/18 0946   Admin Instructions: Best if given with meals. Take with meals.    Admin. Amount: 3 capsule (3 × 667 mg capsule)  Last Admin: 11/07/18 2034  Dispense Loc:  ADS MS2B OBS       1027 (2,001 mg)-Given       1216 (2,001 mg)-Given       1829 (2,001 mg)-Given        0827 (2,001 mg)-Given       1208 (2,001 mg)-Given       1722 (2,001 mg)-Given        0751 (2,001 mg)-Given       1311 (2,001 mg)-Given       1814 (2,001 mg)-Given        0806 (2,001 mg)-Given       (1405)-Not Given [C]       2034 (2,001 mg)-Given        [ ] 0800       [ ] 1200       [ ] 1800           Give   of usual dose of LONG ACTING insulin AM of procedure IF diabetic  Freq: CONTINUOUS PRN Route: XX  Start: 11/07/18 1542   Admin Instructions: IF diabetic, Give   of usual dose of LONG ACTING insulin AM of LAN procedure.      Dispense Loc: Non Rx dispense  POC: Cardiac Pre-procedure               glucose gel 15-30 g  Dose: 15-30 g  Freq: EVERY 15 MIN PRN Route: PO  PRN Reason: low blood sugar  Start: 11/04/18 0240   Admin Instructions: Give 15 g for BG 51 to 69 mg/dL IF patient is conscious and able to swallow. Give 30 g for BG less than or equal to 50 mg/dL IF patient is conscious and able to swallow. Do NOT give glucose gel via enteral tube.  IF patient has enteral tube: give apple juice 120 mL (4 oz or 15 g of CHO) via enteral tube for BG 51 to 69 mg/dL.  Give apple juice 240 mL (8 oz or 30 g of CHO) via enteral tube for BG less than or equal to 50 mg/dL.    ~Oral gel is preferable for conscious and able to swallow patient.   ~IF gel unavailable or patient refuses may provide apple juice 120 mL (4 oz or 15 g of CHO). Document juice on I and O flowsheet.    Admin. Amount: 15-30 g  Dispense Loc:  ADS MS2B OBS  Volume:  93.75 mL              Or  dextrose 50 % injection 25-50 mL  Dose: 25-50 mL  Freq: EVERY 15 MIN PRN Route: IV  PRN Reason: low blood sugar  Start: 18   Admin Instructions: Use if have IV access, BG less than 70 mg/dL and meet dose criteria below:  Dose if conscious and alert (or disorientated) and NPO = 25 mL  Dose if unconscious / not alert = 50 mL  Vesicant. For ordered doses up to 25 g, give IV Push undiluted. Give each 5g over 1 minute.    Admin. Amount: 25-50 mL  Dispense Loc:  ADS MS2B OBS  Infused Over: 1-5 Minutes  Volume: 50 mL              Or  glucagon injection 1 mg  Dose: 1 mg  Freq: EVERY 15 MIN PRN Route: SC  PRN Reason: low blood sugar  PRN Comment: May repeat x 1 only  Start: 18   Admin Instructions: May give SQ or IM. ONLY use glucagon IF patient has NO IV access AND is UNABLE to swallow AND blood glucose is LESS than or EQUAL to 50 mg/dL.  If ordered IV, give IV Push over 1 minute. Reconstitute with 1mL sterile water.    Admin. Amount: 1 mg  Dispense Loc:  Main Pharmacy                 Start: 18   End: 18   Admin Instructions: Ashley El : cabinet override    Administrations Remainin          [ ] [id number] Discontinued        heparin 10,000 units/10 mL infusion (DIALYSIS USE)  Rate: 0.5 mL/hr Dose: 500 Units/hr  Freq: CONTINUOUS Route: HM Machine  Last Dose: 500 Units/hr (18)  Start: 18   Admin Instructions: DURING DIALYSIS TREATMENT    Last Admin: 18  Dispense Loc:  Main Pharmacy  Volume: 10 mL  POC: Dialysis           943 (500 Units/hr)-New Bag           heparin sodium PF injection 5,000 Units  Dose: 5,000 Units  Freq: EVERY 8 HOURS Route: SC  Start: 18 1640   Admin Instructions: High concentration HEParin. Not for line flush or cath care.  High concentration heparin. Not for line flush or cath care.    Admin. Amount: 5,000 Units = 0.5 mL Conc: 5,000 Units/0.5 mL  Last Admin: 18  0504  Dispense Loc:  ADS MS2B OBS  Volume: 0.5 mL       1830 (5,000 Units)-Given        0054 (5,000 Units)-Given       0829 (5,000 Units)-Given       1722 (5,000 Units)-Given       2346 (5,000 Units)-Given        1315 (5,000 Units)-Given       2108 (5,000 Units)-Given        0459 (5,000 Units)-Given       1445 (5,000 Units)-Given       2027 (5,000 Units)-Given        0504 (5,000 Units)-Given       [ ] 1300       [ ] 2100           HOLD: Insulin - RAPID/SHORT acting AM of procedure IF diabetic  Freq: HOLD Route: XX  Start: 11/07/18 1542   Admin Instructions: IF diabetic, Hold prior to LAN procedure.    Order specific questions:  Medication(s) to hold: RAPID/SHORT acting Insulin  Parameter for hold (doses,days,conditions) : Hold  before Procedure or Test  Hours or days to hold med before/after procedure/surgery AM of procedure     Dispense Loc:  Main Pharmacy  POC: Cardiac Pre-procedure               HOLD: Insulin - REGULAR AM of procedure IF diabetic  Freq: HOLD Route: XX  Start: 11/07/18 1542   Admin Instructions: IF diabetic, Hold prior to LAN procedure.    Order specific questions:  Medication(s) to hold: REGULAR Insulin  Parameter for hold (doses,days,conditions) : Hold  before Procedure or Test  Hours or days to hold med before/after procedure/surgery AM of procedure     Dispense Loc:  Main Pharmacy  POC: Cardiac Pre-procedure               HOLD: Oral hypoglycemics AM of procedure IF diabetic  Freq: HOLD Route: XX  Start: 11/07/18 1542   Admin Instructions: IF diabetic, Hold prior to LAN procedure.    Order specific questions:  Medication(s) to hold: Oral hypoglycemics  Parameter for hold (doses,days,conditions) : Hold  before Procedure or Test  Hours or days to hold med before/after procedure/surgery AM of procedure     Dispense Loc:  Main Pharmacy  POC: Cardiac Pre-procedure               lidocaine 1 % 0.5 mL  Dose: 0.5 mL  Freq: ONCE PRN Route: ID  PRN Comment: For local anesthesia at ARTERIAL  fistula/graft site.  Start: 18   Admin Instructions: Give once, if needed, at the dialysis treatment.    Admin. Amount: 0.5 mL  Dispense Loc: [facility] Floor Stock  Administrations Remainin  Volume: 2 mL  POC: Dialysis               lidocaine 1 % 0.5 mL  Dose: 0.5 mL  Freq: ONCE PRN Route: ID  PRN Comment: WITHIN 24 HOURS For local anesthesia at fistula/graft site  Start: 18   Admin Instructions: For local anesthesia at VENOUS fistula/graft site. Give once, if needed, at the dialysis treatment.    Admin. Amount: 0.5 mL  Dispense Loc: [facility] Floor Stock  Administrations Remainin  Volume: 2 mL  POC: Dialysis               May take oral meds with a sip of water, the morning of LAN procedure.  Freq: CONTINUOUS PRN Route: XX  PRN Comment: May take oral meds with a sip of water, the morning of LAN procedure.  Start: 18   Dispense Loc: Non Rx dispense  POC: Cardiac Pre-procedure               melatonin tablet 1 mg  Dose: 1 mg  Freq: AT BEDTIME PRN Route: PO  PRN Reason: sleep  Start: 18   Admin Instructions: Do not give unless at least 6 hours of uninterrupted sleep is expected.    Admin. Amount: 1 tablet (1 × 1 mg tablet)  Dispense Loc: [facility] O[facility]               metoprolol succinate (TOPROL-XL) half-tab 12.5 mg  Dose: 12.5 mg  Freq: DAILY Route: PO  Start: 18 0800   Admin Instructions: DO NOT CRUSH. Tablet may be split in half along score line.  Hold for SBP <110    Admin. Amount: 1 half-tab (1 × 12.5 mg half-tab)  Last Admin: 18  Dispense Loc: [facility] OBS       1043-Hold        0826 (12.5 mg)-Given        (0750)-Not Given [C]        0806 (12.5 mg)-Given        [ ] 0800           naloxone (NARCAN) injection 0.1-0.4 mg  Dose: 0.1-0.4 mg  Freq: EVERY 2 MIN PRN Route: IV  PRN Reason: opioid reversal  Start: 18   Admin Instructions: For respiratory rate LESS than or EQUAL to 8.  Partial reversal dose:  0.1 mg titrated q 2 minutes for Analgesia Side  Effects Monitoring Sedation Level of 3 (frequently drowsy, arousable, drifts to sleep during conversation).Full reversal dose:  0.4 mg bolus for Analgesia Side Effects Monitoring Sedation Level of 4 (somnolent, minimal or no response to stimulation).  For ordered IV doses 0.1-2mg give IVP. Give each 0.4mg over 15 seconds in emergency situations. For non-emergent situations further dilute in 9mL of NS to facilitate titration of response.    Admin. Amount: 0.1-0.4 mg = 0.25-1 mL Conc: 0.4 mg/mL  Dispense Loc: [facility] O[facility]  Volume: 1 mL               nitroGLYcerin (NITROSTAT) sublingual tablet 0.4 mg  Dose: 0.4 mg  Freq: EVERY 5 MIN PRN Route: SL  PRN Reason: chest pain  Start: 11/04/18 0240   Admin. Amount: 1 tablet (1 × 0.4 mg tablet)  Dispense Loc: [facility] OBS               ondansetron (ZOFRAN-ODT) ODT tab 4 mg  Dose: 4 mg  Freq: EVERY 6 HOURS PRN Route: PO  PRN Reasons: nausea,vomiting  Start: 11/04/18 0240   Admin Instructions: This is Step 1 of nausea and vomiting management.  If nausea not resolved in 15 minutes, go to Step 2 prochlorperazine (COMPAZINE). Do not push through foil backing. Peel back foil and gently remove. Place on tongue immediately. Administration with liquid unnecessary  With dry hands, peel back foil backing and gently remove tablet; do not push oral disintegrating tablet through foil backing; administer immediately on tongue and oral disintegrating tablet dissolves in seconds; then swallow with saliva; liquid not required.    Admin. Amount: 1 tablet (1 × 4 mg tablet)  Dispense Loc: [facility] O[facility]              Or  ondansetron (ZOFRAN) injection 4 mg  Dose: 4 mg  Freq: EVERY 6 HOURS PRN Route: IV  PRN Reasons: nausea,vomiting  Start: 11/04/18 0240   Admin Instructions: This is Step 1 of nausea and vomiting management.  If nausea not resolved in 15 minutes, go to Step 2 prochlorperazine (COMPAZINE).  Irritant. For ordered IV doses 0.1-4 mg, give IV Push undiluted over 2-5 minutes.     Admin. Amount: 4 mg = 2 mL Conc: 4 mg/2 mL  Dispense Loc: [facility] O[facility]  Infused Over: 2-5 Minutes  Volume: 2 mL               polyethylene glycol (MIRALAX/GLYCOLAX) Packet 17 g  Dose: 17 g  Freq: DAILY Route: PO  Start: 11/04/18 0946   Admin Instructions: 1 Packet = 17 grams. Mixed prescribed dose in 8 ounces of water. Follow with 8 oz. of water.    Admin. Amount: 17 g  Last Admin: 11/06/18 0755  Dispense Loc: [facility] OBS       1216 (17 g)-Given        0826 (17 g)-Given        0755 (17 g)-Given        (1406)-Not Given [C]        [ ] 0800           senna-docusate (SENOKOT-S;PERICOLACE) 8.6-50 MG per tablet 1 tablet  Dose: 1 tablet  Freq: 2 TIMES DAILY PRN Route: PO  PRN Reason: constipation  Start: 11/04/18 0240   Admin Instructions: If no bowel movement in 24 hours, increase to 2 tablets PO.  Hold for loose stools.  This is the first step of a three step constipation treatment.    Admin. Amount: 1 tablet  Dispense Loc: [facility] OBS              Or  senna-docusate (SENOKOT-S;PERICOLACE) 8.6-50 MG per tablet 2 tablet  Dose: 2 tablet  Freq: 2 TIMES DAILY PRN Route: PO  PRN Reason: constipation  Start: 11/04/18 0240   Admin Instructions: Hold for loose stools.  This is the first step of a three step constipation treatment.    Admin. Amount: 2 tablet  Dispense Loc: [facility] OBS               sodium chloride (PF) 0.9% PF flush 3 mL  Dose: 3 mL  Freq: EVERY 8 HOURS Route: IV  Start: 11/07/18 1543   Admin Instructions: And Q1H PRN, to lock peripheral IV dormant line.    Admin. Amount: 3 mL  Last Admin: 11/08/18 0503  Dispense Loc: [facility] Floor Stock  Volume: 4 mL  POC: Cardiac Pre-procedure   Current Line: Peripheral IV 11/03/18 Left         2027 (3 mL)-Given        0503 (3 mL)-Given       [ ] 1230       [ ] 2030           sodium chloride (PF) 0.9% PF flush 3 mL  Dose: 3 mL  Freq: EVERY 1 HOUR PRN Route: IV  PRN Reason: line flush  PRN Comment: for peripheral IV flush post IV meds  Start: 11/07/18 1542   Admin.  Amount: 3 mL  Dispense Loc: [facility] Floor Stock  Volume: 4 mL  POC: Cardiac Pre-procedure               Stop Heparin 60 minutes before end of treatment  Freq: CONTINUOUS PRN Route: XX  Start: 18   Admin Instructions: Stop Heparin 60 minutes before end of treatment    Dispense Loc: Non Rx dispense  POC: Dialysis              Completed Medications  Medications 18         Dose: 300 mL  Freq: ONCE Route: HM Machine  Start: 18   End: 18   Admin Instructions: For Dialyzer Prime. (In Dialyzer)    Admin. Amount: 300 mL  Last Admin: 18  Dispense Loc: [facility] Floor Stock  Administrations Remainin  Volume: 300 mL  POC: Dialysis           0940 (300 mL)-New Bag             Dose: 250 mL  Freq: ONCE IN DIALYSIS Route: IV  Start: 18   End: 18   Admin Instructions: For patient prime during dialysis    Admin. Amount: 250 mL  Last Admin: 18  Dispense Loc: [facility] Floor Stock  Administrations Remainin  Volume: 250 mL  POC: Dialysis           0940 (250 mL)-New Bag             Dose: 300 mL  Freq: ONCE Route: HM Machine  Start: 18   End: 18   Admin Instructions: For Dialyzer Prime. (In Dialyzer)    Admin. Amount: 300 mL  Last Admin: 18  Dispense Loc: [facility] Floor Stock  Administrations Remainin  Volume: 300 mL  POC: Dialysis         0911 (300 mL)-New Bag               Dose: 250 mL  Freq: ONCE IN DIALYSIS Route: IV  Start: 18 0700   End: 18   Admin Instructions: For patient prime during dialysis    Admin. Amount: 250 mL  Last Admin: 18  Dispense Loc: [facility] Floor Stock  Administrations Remainin  Volume: 250 mL  POC: Dialysis         0911 (250 mL)-New Bag               Start: 18 1058   End: 18 1133   Admin Instructions: Ashley El : cabinet override    Last Admin: 18 1133  Administrations Remainin          1133 (1.5  mL)-Given              Dose: 4 mcg  Freq: ONCE IN DIALYSIS Route: IV  Start: 18   End: 18 100   Admin Instructions: GIVE DURING DIALYSIS    Admin. Amount: 4 mcg = 2 mL Conc: 2 mcg/mL  Last Admin: 18  Dispense Loc:  Main Pharmacy  Administrations Remainin  Volume: 2 mL  POC: Dialysis           1009 (4 mcg)-Given             Dose: 4 mcg  Freq: ONCE IN DIALYSIS Route: IV  Start: 18 07   End: 18 1010   Admin Instructions: GIVE DURING DIALYSIS    Admin. Amount: 4 mcg = 2 mL Conc: 2 mcg/mL  Last Admin: 18 101  Dispense Loc:  Main Pharmacy  Administrations Remainin  Volume: 2 mL  POC: Dialysis         1010 (4 mcg)-Given               Dose: 2,000 Units  Freq: ONCE IN DIALYSIS Route: IV  Start: 18   End: 18   Admin Instructions: Give during dialysis.  If giving IV, For ordered IV doses 1-500 units/kg, give IV Push undiluted over 1 minute.    Admin. Amount: 2,000 Units = 1 mL Conc: 2,000 Units/mL  Last Admin: 18  Dispense Loc:  Main Pharmacy  Administrations Remainin  Volume: 1 mL  POC: Dialysis           1009 (2,000 Units)-Given             Dose: 2,000 Units  Freq: ONCE IN DIALYSIS Route: IV  Start: 18 07   End: 18 101   Admin Instructions: Give during dialysis.  If giving IV, For ordered IV doses 1-500 units/kg, give IV Push undiluted over 1 minute.    Admin. Amount: 2,000 Units = 1 mL Conc: 2,000 Units/mL  Last Admin: 18 101  Dispense Loc:  Main Pharmacy  Administrations Remainin  Volume: 1 mL  POC: Dialysis         1011 (2,000 Units)-Given               Start: 18 105   End: 18 1153   Admin Instructions: Ashley El : cabinet override  For ordered IV doses 1-100 mcg give IV Push undiluted over a minimum of 3-5 minutes.    Last Admin: 18 1153  Administrations Remainin          1153 (50 mcg)-Given              Start: 18 1058   End: 18 1132   Admin  Instructions: Ashley El : cabinet override    Last Admin: 18 1132  Administrations Remainin          1132 (0.4 mg)-Given              Dose: 500 Units  Freq: ONCE IN DIALYSIS Route: HM Machine  Start: 18   End: 18   Admin Instructions: LOADING DOSE  Administer loading dose prior to heparin infusion.   PRE DIALYSIS RUN   Dialysis    Admin. Amount: 500 Units = 0.5 mL Conc: 1,000 Units/mL  Last Admin: 18  Dispense Loc:  Main Pharmacy  Administrations Remainin  Volume: 0.5 mL  POC: Dialysis           0943 (500 Units)-Given             Dose: 500 Units  Freq: ONCE IN DIALYSIS Route: HM Machine  Start: 18 07   End: 18   Admin Instructions: LOADING DOSE  Administer loading dose prior to heparin infusion.   PRE DIALYSIS RUN   Dialysis    Admin. Amount: 500 Units = 0.5 mL Conc: 1,000 Units/mL  Last Admin: 18  Dispense Loc: Contact Rx for dose  Administrations Remainin  Volume: 0.5 mL  POC: Dialysis         0911 (500 Units)-Given               Start: 18 1057   End: 18 1125   Admin Instructions: Ashley El : cabinet override    Last Admin: 18 112  Administrations Remainin          1125 (15 mL)-Given              Start: 18 1058   End: 18 1202   Admin Instructions: Ashley El : cabinet override  For ordered IV doses 0.1-2.5 mg give IV Push slowly titrated over a minimum of 2 minutes. Dilute each 1mg in 4mL of NS.    Last Admin: 18 120  Administrations Remainin          1153 (1 mg)-Given       1202 (1 mg)-Given           Discontinued Medications  Medications 18         Dose: 100-150 mL  Freq: EVERY 15 MIN PRN Route: IV  PRN Comment: Until hypotensive symptoms resolve  Start: 1859   End: 18 1244   Admin Instructions: Up to 1000 mL maximum total dose.  Give if needed to manage Systolic Blood Pressure as indicated  in Hemodialysis Single Treatment set up.  Notify provider if patient blood pressure is not responsive with the bolus.    Admin. Amount: 100-150 mL  Dispense Loc: [facility] Floor Stock  Administrations Remaining: 10  Volume: 150 mL  POC: Dialysis         1244-Med Discontinued           Dose: 50 mL  Freq: EVERY 1 HOUR PRN Route: IV  PRN Reason: other  PRN Comment: see administration instructions  Start: 18 0659   End: 18   Admin Instructions: FOR Systolic Blood Pressure less than 90 mmHg or for volume replacement DURING DIALYSIS RUN    Admin. Amount: 50 mL  Dispense Loc:  Main Pharmacy  Volume: 50 mL  POC: Dialysis         1244-Med Discontinued           Start: 18 1058   End: 18   Admin Instructions: Ashley El : cabinet override  Irritant. For ordered IV doses 0.1-1 mg, give IV Push undiluted. Administer each 0.2mg over 15 seconds.    Administrations Remainin                 1224-Med Discontinued          Rate: 0.5 mL/hr Dose: 500 Units/hr  Freq: CONTINUOUS Route: HM Machine  Last Dose: 500 Units/hr (18)  Start: 18 0700   End: 18   Admin Instructions: DURING DIALYSIS TREATMENT    Last Admin: 18  Dispense Loc: Contact Rx for dose  Volume: 10 mL  POC: Dialysis         912 (500 Units/hr)-New Bag       1244-Med Discontinued           Dose: 1-3 Units  Freq: AT BEDTIME Route: SC  Start: 18 0241   End: 18 1512   Admin Instructions: LOW INSULIN RESISTANCE DOSING    Do Not give Bedtime Correction Insulin if BG less than 200.   For  - 299 give 1 unit.   For  - 399 give 2 units   For BG greater than or equal 400 give 3 units.   Notify provider if glucose greater than or equal to 350 mg/dL after administration of correction dose.  If given at mealtime, administer within 30 minutes of start of meal    Admin. Amount: 1-3 Units  Dispense Loc: Contact Rx for dose  Volume: 3 mL       (0250)-Not Given [C]       2148-Hold         1512-Med Discontinued            Dose: 1-3 Units  Freq: 3 TIMES DAILY BEFORE MEALS Route: SC  Start: 18   End: 18   Admin Instructions: Correction Scale - LOW INSULIN RESISTANCE DOSING     Do Not give Correction Insulin if Pre-Meal BG less than 140.   For Pre-Meal  - 239 give 1 unit.   For Pre-Meal  - 339 give 2 units.   For Pre-Meal BG greater than or equal to 340 give 3 units.   To be given with prandial insulin, and based on pre-meal blood glucose.   Notify provider if glucose greater than or equal to 350 mg/dL after administration of correction dose.  If given at mealtime, administer within 30 minutes of start of meal    Admin. Amount: 1-3 Units  Dispense Loc: Contact Rx for dose  Volume: 3 mL       (911)-Not Given [C]       (144)-Not Given       (170)-Not Given        (907)-Not Given [C]       (123)-Not Given [C]       1512-Med Discontinued            Dose: 0.5 mL  Freq: ONCE PRN Route: ID  PRN Comment: For local anesthesia at ARTERIAL fistula/graft site.  Start: 18   End: 18   Admin Instructions: Give once, if needed, at the dialysis treatment.    Admin. Amount: 0.5 mL  Dispense Loc: [facility] Floor Stock  Administrations Remainin  Volume: 2 mL  POC: Dialysis         1244-Med Discontinued           Dose: 0.5 mL  Freq: ONCE PRN Route: ID  PRN Comment: WITHIN 24 HOURS For local anesthesia at fistula/graft site  Start: 18   End: 18   Admin Instructions: For local anesthesia at VENOUS fistula/graft site. Give once, if needed, at the dialysis treatment.    Admin. Amount: 0.5 mL  Dispense Loc: [facility] Floor Stock  Administrations Remainin  Volume: 2 mL  POC: Dialysis         1244-Med Discontinued           Start: 18   End: 18 1224   Admin Instructions: Ashley El : cabinet override  For ordered IV doses 0.1-2mg give IVP. Give each 0.4mg over 15 seconds in emergency situations. For non-emergent situations further  dilute in 9mL of NS to facilitate titration of response.    Administrations Remainin                 1224-Med Discontinued          Freq: CONTINUOUS PRN Route: XX  Start: 18 0659   End: 18 1244   Admin Instructions: Stop Heparin 60 minutes before end of treatment    Dispense Loc: Non Rx dispense  POC: Dialysis         1244-Med Discontinued      Medications 18               INTERAGENCY TRANSFER FORM - NOTES (H&P, Discharge Summary, Consults, Procedures, Therapies)   11/3/2018                       [facility] OBSERVATION DEPARTMENT: [phone]               History & Physicals      H&P by Jayant Ricketts MD at 2018  1:54 AM     Author:  Jayant Ricketts MD Service:  Hospitalist Author Type:  Physician    Filed:  2018  1:54 AM Date of Service:  2018  1:54 AM Creation Time:  2018  1:45 AM    Status:  Signed :  Jayant Ricketts MD (Physician)         [facility]  Hospitalist Admission Note  Name: Maurizio Ohara    MRN: [id number]  YOB: 1929    Age: 89 year old  Date of admission: 11/3/2018  Primary care provider: Justina Moise            Assessment and Plan:   Maurizio Ohara is a 89 year old male with complicated medical history such as chronic anemia, coronary artery disease with prior PCI, end-stage renal disease being maintained on hemodialysis every Tuesday, Thursday, Saturday with last session yesterday and completed with no issues, hypertension, severe pulmonary hypertension, diastolic congestive heart failure, patient of cardiology service and recently seen with echocardiogram suspicion for mobile mass but has negative workup for infectious process and has pending LAN in the near future based on their most recent notes, was recently discharged from a TCU setting of which he stayed for close to a month and currently living at home by himself  but has been having issues with leg swelling, difficulty in ambulation, near fall events, and generalized weakness.  This triggered for him to call his neighbor and asked for assistance and was subsequently brought into the hospital for further management care.     1.  Bilateral leg swelling, chronic, generalized weakness  2.  End-stage renal disease on hemodialysis  3.  Severe pulmonary hypertension  4.  Chart stated history of diabetes mellitus  5.  History of CHF does not appear in acute exacerbation    [place] under observation.  Patient has some concerns regarding going home tonight from the emergency room given his above complaints of weakness, near fall event and has no one to be with at home.  He also has no ride going home from the emergency room.  He stated he is concerned also that he likely was prematurely discharged to TCU as he still appears deconditioned.  He is requesting if we can ask  to take a look at this situation as he does not appear to be ready for home discharge given this generalized weakness and fear of falling event.  We will ask for nephrology service to continue his ESRD management here.  He finished his Saturday session for hemodialysis with no issues arose.  Continue with his home regimen of medications once reconciled.      Code status: Full code as expressed by the patient  Prophylaxis: Ambulate, heparin subcu if staying more than a day  Disposition: to Be determined.          Chief Complaint:   Presented here with his neighbor as he stated that he has been having issues ambulating at home due to generalized weakness and leg swelling       Source of Information:   Patient with fair reliability  Discussion with ED physician  Review of E chart records         History of Present Illness:   Maurizio Ohara is a 89 year old male with complicated medical history such as chronic anemia, coronary artery disease with prior PCI, end-stage renal disease being maintained on  hemodialysis every Tuesday, Thursday, Saturday with last session yesterday and completed with no issues, hypertension, severe pulmonary hypertension, diastolic congestive heart failure, patient of cardiology service and recently seen with echocardiogram suspicion for mobile mass but has negative workup for infectious process and has pending LAN in the near future based on their most recent notes, was recently discharged from a TCU setting of which he stayed for close to a month and currently living at home by himself but has been having issues with leg swelling, difficulty in ambulation, near fall events, and generalized weakness.  This triggered for him to call his neighbor and asked for assistance and was subsequently brought into the hospital for further management care.  He denies any complaints of worsening shortness of breath, chest pain, nausea, vomiting, blurred vision, headaches, focal motor weakness, slurred speech, mental status changes, fever, chills, diarrhea or any bleeding tendencies.    Upon presentation the emergency room he has stable vital signs, no hypoxia, abnormal labs consistent with his renal disease and elevated proBNP, no obvious pulmonary edema in the imaging and exam not consistent with an infectious process.    He was being prepared to be discharged in the emergency room but patient stated that he has no one to be with at home, and has some concerns regarding his generalized weakness, near fall event, in the setting of this leg swelling hence this referral to us for further management care.    During the time of my exam he denies any new complaints and remained cooperative, conversant and pleasant.           Past Medical History:[AG1.1]     Past Medical History:   Diagnosis Date     Anemia      Anemia      CAD (coronary artery disease) 12/24/14    PCI and BMS to mid RCA (5.0 x 20mm) with residual mod diffuse mid LAD disease     Chronic kidney disease     on Dialysis     Diabetes (H)       "Hypertension      Mitral regurgitation 12/24/2014    mild-mod (1-2+) per echo     Mitral valve disorders(424.0) 12/24/2014    mild/mod (1-2+) MR     Myocardial infarction (H) 12/24/2014    NSTEMI     Pulmonary hypertension (H) 12/24/2014    RVSP elevated c/w mild-mod PH per echo     Renal disease due to diabetes mellitus (H)     End stage; on Dialysis[AG1.2]             Past Surgical History:[AG1.1]     Past Surgical History:   Procedure Laterality Date     CORONARY ANGIOGRAPHY ADULT ORDER  12/24/2014     ENT SURGERY      Tonsillectomy     HEART CATH, ANGIOPLASTY  12/24/2014    PCI and BMS (5.0x20mm)to mid RCA     THORACIC SURGERY      Herniated disc[AG1.2]             Social History:[AG1.1]     Social History   Substance Use Topics     Smoking status: Former Smoker     Smokeless tobacco: Never Used     Alcohol use Yes      Comment: \"about as much as you can pour in a thimble in a year\"[AG1.2]             Family History:   Family history was fully reviewed and non-contributory in this case.         Allergies:[AG1.1]     Allergies   Allergen Reactions     Glyburide      Lisinopril      Hyperkalemia when hospitalized 4/5/2011     Metformin      Renal failure stage 4     Penicillins      SHADY Gallagher clarified with pt, pt does not remember rxn, it was a long time ago, and \"nothing crazy\".     Verapamil[AG1.2]              Medications:     Prior to Admission medications    Medication Sig Last Dose Taking? Auth Provider   acetaminophen (TYLENOL) 325 MG tablet Take 3 tablets (975 mg) by mouth every 8 hours   Darin Maher MD   aspirin 81 MG tablet Take 81 mg by mouth daily   Unknown, Entered By History   atorvastatin (LIPITOR) 40 MG tablet Take 40 mg by mouth daily as needed   Unknown, Entered By History   calcium acetate (PHOSLO) 667 MG CAPS Take 2,001 mg by mouth 3 times daily (with meals)   Unknown, Entered By History   diclofenac (VOLTAREN) 1 % GEL topical gel Apply 2 g topically 4 times daily Apply to RLE   " Darin Maher MD   gabapentin (NEURONTIN) 300 MG capsule Take 300 mg by mouth daily   Reported, Patient   hydrOXYzine (ATARAX) 25 MG tablet Take 1 tablet (25 mg) by mouth every 6 hours as needed for itching (pain)  Patient not taking: Reported on 10/19/2018   Darin Maher MD   lidocaine (XYLOCAINE) 5 % ointment Apply topically every 4 hours (while awake) Do not put onto any open skin areas   Darin Maher MD   metoprolol succinate (TOPROL-XL) 25 MG 24 hr tablet Take 12.5 mg by mouth daily    Unknown, Entered By History   nitroglycerin (NITROSTAT) 0.4 MG sublingual tablet Place 1 tablet (0.4 mg) under the tongue every 5 minutes as needed for chest pain   Julianne Dan PA-C   Phenyleph-Promethazine-Cod (PROMETHAZINE VC/CODEINE) 5-6.25-10 MG/5ML SYRP Take 1-2 tsp. by mouth every 6 hours as needed   Unknown, Entered By History   polyethylene glycol (MIRALAX/GLYCOLAX) Packet Take 17 g by mouth daily   Darin Maher MD   sulfamethoxazole-trimethoprim (BACTRIM DS/SEPTRA DS) 800-160 MG per tablet Take 1 tablet by mouth 2 times daily   Reported, Patient   traMADol (ULTRAM) 50 MG tablet Take 1 tablet (50 mg) by mouth every 6 hours as needed for moderate pain  Patient not taking: Reported on 10/19/2018   Darin Maher MD             Review of Systems:   A Comprehensive greater than 10 system review of systems was carried out.  Pertinent positives and negatives are noted above.  Otherwise negative for contributory information.           Physical Exam:[AG1.1]   Blood pressure 125/72, pulse 73, temperature 98.1  F (36.7  C), temperature source Oral, resp. rate 18, SpO2 95 %.  Wt Readings from Last 1 Encounters:   10/19/18 74.7 kg (164 lb 11.2 oz)[AG1.2]     Exam:  GENERAL: No apparent distress. Awake, alert, and fully oriented.  HEENT: Normocephalic, atraumatic. Extraocular movements intact.  CARDIOVASCULAR: Regular rate and rhythm without murmurs or rubs.   PULMONARY: Fair air entry,  no obvious wheezes crackles  ABDOMINAL: Soft, non-tender, non-distended. Bowel sounds normoactive. No hepatosplenomegaly.  EXTREMITIES: No cyanosis or clubbing.  Ecchymosis, multiple bruises on upper bilateral extremities, chronic venous stasis skin changes, pitting edema on both lower extremities  NEUROLOGICAL: CN 2-12 grossly intact, awake and alert x3, spontaneous and coherent speech. no focal neurological deficits.  DERMATOLOGICAL: No rash, ulcer, ecchymoses, jaundice.  Psych: not agitation, not combative, pleasant mood           Data:   EKG: No new EKG seen    Imaging:[AG1.1]  Results for orders placed or performed during the hospital encounter of 11/03/18   XR Chest 2 Views    Narrative    CHEST TWO VIEWS 11/4/2018 1:07 AM     HISTORY: Question congestive heart failure.     COMPARISON: 8/9/2017.    FINDINGS: The heart is mildly enlarged and there is pulmonary vascular  congestion. No pulmonary edema or pleural effusion. No pneumothorax.  No airspace consolidation. The lungs are hyperexpanded.       Impression    IMPRESSION: Cardiomegaly and vascular congestion without pulmonary  edema.     JOEL BRAGA MD[AG1.2]       Labs:[AG1.1]  No results for input(s): CULT in the last 168 hours.    Recent Labs  Lab 11/03/18  2341      POTASSIUM 5.2   CHLORIDE 108   CO2 27   ANIONGAP 7   *   BUN 44*   CR 4.73*   GFRESTIMATED 12*   GFRESTBLACK 14*   KIMBER 8.9       Recent Labs  Lab 11/03/18  2341      POTASSIUM 5.2   CHLORIDE 108   CO2 27   ANIONGAP 7   *   BUN 44*   CR 4.73*   GFRESTIMATED 12*   GFRESTBLACK 14*   KIMBER 8.9       Recent Labs  Lab 11/03/18  2341   *     No results for input(s): INR in the last 168 hours.  No results for input(s): TROPONIN, TROPI, TROPR in the last 168 hours.    Invalid input(s): TROP, TROPONINIES  No results for input(s): COLOR, APPEARANCE, URINEGLC, URINEBILI, URINEKETONE, SG, UBLD, URINEPH, PROTEIN, UROBILINOGEN, NITRITE, LEUKEST, RBCU, WBCU in the last 168  hours.[AG1.3]       Revision History        User Key Date/Time User Provider Type Action    > AG1.3 11/4/2018  1:54 AM Jayant Ricketts MD Physician Sign     AG1.2 11/4/2018  1:46 AM Jayant Ricketts MD Physician      AG1.1 11/4/2018  1:45 AM Jayant Ricketts MD Physician                   Discharge Summaries     No notes of this type exist for this encounter.         Consult Notes      Consults by Melissa Pappas LSW at 11/4/2018  3:50 PM     Author:  Melissa Pappas LSW Service:  (none) Author Type:      Filed:  11/4/2018  3:50 PM Date of Service:  11/4/2018  3:50 PM Creation Time:  11/4/2018  3:43 PM    Status:  Signed :  Melissa Pappas LSW ()         KIP met with patient. He reports he was home one day before he ended up back here because he couldn't walk. He said he was at [facility] and hated it. He is open to going to a TCU but just not that one. He asked that I call Rosa his nephew to help coordinate.     KIP called Rosa ([phone]) to discuss discharge placement. He said his Uncle is on a waiting list for [facility] in [place]. He would like a referral to be made to to [facility] and [place]. KIP faxed over referrals.     MARCELLE Gonsales  [phone][LP1.1]       Revision History        User Key Date/Time User Provider Type Action    > LP1.1 11/4/2018  3:50 PM Melissa Pappas LSW  Sign                     Progress Notes - Physician (Notes for yesterday and today)      Progress Notes by Gennaro Bertrand MD at 11/8/2018  9:27 AM     Author:  Gennaro Bertrand MD Service:  Nephrology Author Type:  Physician    Filed:  11/8/2018  9:34 AM Date of Service:  11/8/2018  9:27 AM Creation Time:  11/8/2018  9:27 AM    Status:  Signed :  Gennaro Bertrand MD (Physician)                  Assessment and Plan:   End-stage renal disease: He is seen during dialysis.  He is getting Hectorol and EPO on the run.  We are set for  3 L of ultrafiltration as tolerated by low blood pressure.  We are using a right upper arm fistula, blood flow rate of 400, 3-hour run.  We will use a 2K and 33 bicarb bath.            Interval History:   Lower extremity edema: His legs are now wrapped, we are removing fluid with dialysis as tolerated by low blood pressure.   Aortic stenosis:   He apparently underwent a LAN E yesterday.  This showed an ejection fraction of 50%, there was a dilated right ventricle with decreased RV function, moderate to severe mitral regurgitation, moderate tricuspid regurgitation, severe valvular aortic stenosis.  Management will be per cardiology.             Review of Systems:   He has no symptoms on dialysis.  He is tolerating ultrafiltration well.  No chest pain or abdominal pain.  No shortness of breath.  He is eating well and denies nausea and vomiting.  He is ambulating only in the room with assistance.          Medications:[JT1.1]       - MEDICATION INSTRUCTIONS for Dialysis Patients -   Does not apply See Admin Instructions     sodium chloride 0.9%  250 mL Intravenous Once in dialysis     sodium chloride 0.9%  300 mL Hemodialysis Machine Once     acetaminophen  975 mg Oral Q8H     aspirin  81 mg Oral Daily     atorvastatin  40 mg Oral At Bedtime     calcium acetate  2,001 mg Oral TID w/meals     doxercalciferol  4 mcg Intravenous Once in dialysis     epoetin curly (EPOGEN,PROCRIT) inj ESRD  2,000 Units Intravenous Once in dialysis     heparin (porcine)  500 Units Hemodialysis Machine Once in dialysis     heparin  5,000 Units Subcutaneous Q8H     metoprolol succinate  12.5 mg Oral Daily     polyethylene glycol  17 g Oral Daily     sodium chloride (PF)  3 mL Intravenous Q8H       - MEDICATION INSTRUCTIONS -       heparin (porcine)       - MEDICATION INSTRUCTIONS -       - MEDICATION INSTRUCTIONS -[JT1.2]       Current active medications and PTA medications reviewed, see medication list for details.            Physical Exam:    Vitals were reviewed  Patient Vitals for the past 24 hrs:   BP Temp Temp src Pulse Heart Rate Resp SpO2 Weight   18 0915 (!) 106/39 - - 72 - 17 100 % -   18 0900 108/60 - - 65 - 17 99 % -   18 0852 111/57 97.7  F (36.5  C) Oral 61 - 18 98 % -   18 0800 - - - - - - - 81.7 kg (180 lb 1.9 oz)   18 0744 106/54 95.2  F (35.1  C) Oral - 62 16 95 % -   18 0500 102/56 96.4  F (35.8  C) Oral - 64 18 93 % -   18 0012 110/61 96.7  F (35.9  C) Oral 70 70 18 95 % -   18 2021 109/59 95.8  F (35.4  C) Oral 69 70 18 95 % -   18 1621 106/56 96.4  F (35.8  C) Temporal - 52 16 96 % -   18 1304 105/65 - - - 68 16 100 % -   18 1303 102/64 - - - 65 16 100 % -   18 1258 104/74 - - - 75 17 100 % -   18 1256 95/59 - - - 65 11 100 % -   18 1253 97/59 - - - 69 13 100 % -   18 1252 100/57 - - - 68 12 99 % -   18 1247 100/68 - - - 66 12 99 % -   18 1242 103/60 - - - 68 15 100 % -   18 1240 100/62 - - - 69 12 100 % -   18 1237 95/64 - - - 66 13 100 % -   18 1235 92/61 - - - 65 13 100 % -   18 1232 101/64 - - - 67 13 100 % -   18 1225 107/67 - - - 69 13 100 % -   18 1224 122/72 - - - 71 16 100 % -   18 1221 108/71 - - - - 16 100 % -   18 1216 100/73 - - - - 16 100 % -   18 1213 105/70 - - - - 16 100 % -   18 1211 98/68 - - - - 22 100 % -   18 1208 111/64 - - - - 20 100 % -   18 1204 117/74 - - - - 16 100 % -   18 1202 110/68 - - - - 16 100 % -   18 1152 118/73 - - - - 16 100 % -       Temp:  [95.2  F (35.1  C)-97.7  F (36.5  C)] 97.7  F (36.5  C)  Pulse:  [61-72] 72  Heart Rate:  [52-75] 62  Resp:  [11-22] 17  BP: ()/(39-76) 106/39  SpO2:  [93 %-100 %] 100 %    Temperatures:  Current - Temp: 97.7  F (36.5  C); Max - Temp  Av.4  F (35.8  C)  Min: 95.2  F (35.1  C)  Max: 97.7  F (36.5  C)  Respiration range: Resp  Avg: 15.6  Min: 11  Max: 22  Pulse  range: Pulse  Av.4  Min: 61  Max: 72  Blood pressure range: Systolic (24hrs), Av , Min:92 , Max:122   ; Diastolic (24hrs), Av, Min:39, Max:76    Pulse oximetry range: SpO2  Av.9 %  Min: 93 %  Max: 100 %         No intake or output data in the 24 hours ending 18 0927    Alert and responsive  Right arm fistula with needles in place  Skin with multiple ecchymoses and ulcerations  Lungs with clear anterior breath sounds  Cardiac exam regular rhythm normal S1-S2, 2/6 systolic murmur from the apex to the aortic region  Lower extremities wrapped to the knees with edema above the wrapping       Wt Readings from Last 4 Encounters:   18 81.7 kg (180 lb 1.9 oz)   10/19/18 74.7 kg (164 lb 11.2 oz)   18 73.2 kg (161 lb 6 oz)   18 68 kg (150 lb)          Data:          Lab Results   Component Value Date     2018     2018     2018    Lab Results   Component Value Date    CHLORIDE 108 2018    CHLORIDE 99 2018    CHLORIDE 102 2018    Lab Results   Component Value Date    BUN 44 2018    BUN 43 2018    BUN 33 2018      Lab Results   Component Value Date    POTASSIUM 5.2 2018    POTASSIUM 4.6 2018    POTASSIUM 4.2 2018    Lab Results   Component Value Date    CO2 27 2018    CO2 28 2018    CO2 26 2018    Lab Results   Component Value Date    CR 4.73 2018    CR 6.18 2018    CR 5.42 2018        Recent Labs   Lab Test  18   0608  18   2341  18   0727  18   0632  18   0650   WBC   --   7.0   --   5.1  5.4   HGB   --   11.3*   --   9.8*  9.4*   HCT   --   36.5*   --   31.3*  30.0*   MCV   --   116*   --   113*  114*   PLT  149*  128*  192  168  104*     Recent Labs   Lab Test  18   1228  17   1810  16   1100   AST  17  19  12   ALT  9  16  11   ALKPHOS  144  53  54   BILITOTAL  1.0  0.7  0.8       Recent Labs   Lab Test   12/24/14   0820   MAG  2.2     Recent Labs   Lab Test  08/15/17   0650  09/27/16   0630  09/26/16   0623   PHOS  4.4  3.3  3.8     Recent Labs   Lab Test  11/03/18   2341  09/22/18   0632  09/19/18   0650   KIMBER  8.9  8.3*  8.4*       Lab Results   Component Value Date    KIMBER 8.9 11/03/2018     Lab Results   Component Value Date    WBC 7.0 11/03/2018    HGB 11.3 (L) 11/03/2018    HCT 36.5 (L) 11/03/2018     (H) 11/03/2018     (L) 11/07/2018     Lab Results   Component Value Date     11/03/2018    POTASSIUM 5.2 11/03/2018    CHLORIDE 108 11/03/2018    CO2 27 11/03/2018     (H) 11/03/2018     Lab Results   Component Value Date    BUN 44 (H) 11/03/2018    CR 4.73 (H) 11/03/2018     Lab Results   Component Value Date    MAG 2.2 12/24/2014     Lab Results   Component Value Date    PHOS 4.4 08/15/2017       Creatinine   Date Value Ref Range Status   11/03/2018 4.73 (H) 0.66 - 1.25 mg/dL Final   09/22/2018 6.18 (H) 0.66 - 1.25 mg/dL Final   09/19/2018 5.42 (H) 0.66 - 1.25 mg/dL Final   09/18/2018 4.05 (H) 0.66 - 1.25 mg/dL Final   08/15/2017 9.07 (H) 0.66 - 1.25 mg/dL Final   08/14/2017 8.01 (H) 0.66 - 1.25 mg/dL Final       Attestation:  I have reviewed today's vital signs, notes, medications, labs and imaging.  Seen on dialysis.     Gennaro Bertrand MD[JT1.1]               Revision History        User Key Date/Time User Provider Type Action    > JT1.2 11/8/2018  9:34 AM Gennaro Bertrand MD Physician Sign     JT1.1 11/8/2018  9:27 AM Gennaro Bertrand MD Physician             Progress Notes by Carine Calderon PA-C at 11/6/2018  3:11 PM     Author:  Carine Calderon PA-C Service:  Hospitalist Author Type:  Physician Assistant - C    Filed:  11/6/2018  5:29 PM Date of Service:  11/6/2018  3:11 PM Creation Time:  11/6/2018  3:11 PM    Status:  Attested :  Carine Calderon PA-C (Physician Assistant - C)    Cosigner:  Lavinia Cage MD at 11/7/2018 12:26 PM         Attestation signed by Lavinia Cage MD at 11/7/2018 12:26 PM        Physician Attestation   I, Lavinia Cage, have reviewed and discussed with the advanced practice provider their history, physical and plan for Maurizio Ohara. I did not participate in a shared visit by interviewing or examining the patient and this should be billed as an advanced practice provider only visit.    Lavinia Cage  Date of Service (when I saw the patient): I did not personally see this patient today.                               [facility]  Hospitalist Progress Note  Carine Calderon PA-C 11/06/2018    Reason for Stay (Diagnosis):[AK1.1] generalized weakness[AK1.2]         Assessment and Plan:      Summary of Stay:[AK1.1] Maurizio Ohara is a 89 year old male with ESRD (HD Tu, Th, Sat), CAD (STEMI in 2014, stent in the RCA with mod LAD disease), recent R LE cellulitis (admitted from 9/18-9/25) who went to TCU and went home x 1 day now admitted on 11/3/2018 with continued weakness and wanting to go back to TCU.[AK1.2]    Problem List:[AK1.1]   Weakness/need for continue rehab    - apparently patient was recommended to continue stay at TCU versus consider long term care when he was at TCU. Patient refused and went home x 1 day and then came back to the ED    - patient states he disliked that TCU and would not go back there    - seen by PT    - will need new TCU-  continuing to lood for a bed     Lower extrem swelling    - was thought to be cellulitis in Sept, but did not respond to antibiotics    - he does not have a fever or elevated on WBC    - likely has underlying PVD    - US was negative for DVT    - ABIs attempted here. They were inconclusive due to his vessels being noncompressible    - legs are wrapped with ace wraps, keep elevated    - referral to Vascular Surgery as outpt     ESRD    - on dialysis Tu/Th/Sat    - Nephrology consulted, completed dialysis run today     CAD    -  STEMI with RCA stent    - currently stable    - cont meds     HLP    - cont meds[AK1.2]    Severe Aortic stenosis  - LAN 09/29/2016 showed moderate aortic stenosis.    Echo 10/15/18: severe aortic stenosis, low flow state, mild AI, echogenic mobile mass attached to aortic aspect of the aortic valve.  This may be vegetation, mobile calcification. Given recent hx of cellulitis, cardiology recommended LAN which is scheduled tomorrow (11/7), cardiology would like to get this completed before pt returns to TCU. Anticipate discharging pt in AM to his LAN appt and then transfer to TCU after this is complete. This is dependent on whether a TCU bed is confirmed prior to the appt.[AK1.3]     DVT Prophylaxis:[AK1.1] Heparin SQ[AK1.2]  Code Status:[AK1.1] Full Code[AK1.2]  Discharge Dispo:[AK1.1] TCU, likely tomorrow[AK1.2]  Estimated Disch Date / # of Days until Disch:[AK1.1] 1 day[AK1.2]        Interval History (Subjective):[AK1.1]      He does not have any complaints.[AK1.2] Dialysis completed, no acute issues[AK1.3]                   Physical Exam:      Last Vital Signs:  /60 (BP Location: Left arm)  Pulse 70  Temp 97  F (36.1  C) (Oral)  Resp 18  Wt 78 kg (172 lb)  SpO2 99%  BMI 26.94 kg/m2[AK1.1]      Intake/Output Summary (Last 24 hours) at 11/06/18 1728  Last data filed at 11/06/18 1145   Gross per 24 hour   Intake              480 ml   Output             3000 ml   Net            -2520 ml[AK1.4]       Constitutional: Awake, alert, cooperative, no apparent distress   Respiratory: Clear to auscultation bilaterally, no crackles or wheezing   Cardiovascular:[AK1.1] irr[AK1.3]egular rate and rhythm, normal S1 and S2, and no murmur noted   Abdomen: Normal bowel sounds, soft, non-distended, non-tender   Skin:    Neuro: Alert and oriented x3,[AK1.1] no[AK1.3] numbness, memory loss   Extremities: normal range of motion   Other(s):        All other systems: Negative          Medications:      All current medications  were reviewed with changes reflected in problem list.         Data:      All new lab and imaging data was reviewed.   Labs:[AK1.1]    Recent Labs  Lab 11/03/18  2341   NTBNPI [id number]*       Recent Labs  Lab 11/03/18  2341   WBC 7.0   HGB 11.3*   HCT 36.5*   *   *       Recent Labs  Lab 11/03/18  2341      POTASSIUM 5.2   CHLORIDE 108   CO2 27   ANIONGAP 7   *   BUN 44*   CR 4.73*   GFRESTIMATED 12*   GFRESTBLACK 14*   KIMBER 8.9[AK1.5]     Lactic aci[AK1.2]d[AK1.3] - 2.0[AK1.2]    Recent Labs  Lab 11/05/18  1200 11/05/18  0839 11/05/18  0155 11/04/18  2146 11/04/18  1210  11/03/18  2341   GLC  --   --   --   --   --   --  115*   * 115* 117* 152* 165*  < >  --    < > = values in this interval not displayed.[AK1.5]   Imaging:[AK1.1]   Recent Results (from the past 48 hour(s))   US JUSTINO Doppler No Exercise    Narrative    ULTRASOUND JUSTINO DOPPLER NO EXERCISE, 1-2 LEVELS, BILAT November 5, 2018  1:54 AM     HISTORY: Right leg erythema/swelling.     COMPARISON: None.    FINDINGS:  Right JUSTINO: Right JUSTINO could not be calculated secondary to  noncompressible vessels.  Left JUSTINO: Left JUSTINO could not be calculated secondary to  noncompressible vessels.    Waveforms: Triphasic bilaterally.      Impression    IMPRESSION: Bilateral ABIs could not be calculated secondary to  noncompressible vessels. Waveforms are triphasic bilaterally.    JUSTINO CRITERIA:  >0.95 Normal  0.90 - 0.94 Mild  0.5 - 0.89 Moderate  0.2 - 0.49 Severe  <0.2 Critical    INOCENCIO LITTLEJOHN DO   US Lower Extremity Venous Duplex Right    Narrative    VENOUS ULTRASOUND RIGHT LEG November 5, 2018 1:54 AM     HISTORY: Rule out deep vein thrombosis.     COMPARISON: 9/18/2018.    FINDINGS: Examination of the deep veins with graded compression and  color flow Doppler with spectral wave form analysis shows no evidence  of thrombus right common femoral vein, femoral vein, popliteal vein or  calf veins. The left common femoral vein was evaluated  for comparison  and is normal.      Impression    IMPRESSION: No deep vein thrombosis in the right lower extremity.    INOCENCIO LITTLEJOHN DO[AK1.5]       Carine Calderon PA-C[AK1.1]     Revision History        User Key Date/Time User Provider Type Action    > AK1.4 11/6/2018  5:29 PM Carine Calderon PA-C Physician Assistant - C Sign     AK1.3 11/6/2018  5:24 PM Carine Calderon PA-C Physician Assistant - C      AK1.5 11/6/2018  5:10 PM Carine Calderon PA-C Physician Assistant - C      AK1.2 11/6/2018  5:09 PM Carine Calderon PA-C Physician Assistant - C      AK1.1 11/6/2018  3:11 PM Carine Calderon PA-C Physician Assistant - C             Progress Notes by Gennaro Bertrand MD at 11/7/2018 10:51 AM     Author:  Gennaro Bertrand MD Service:  Nephrology Author Type:  Physician    Filed:  11/7/2018 10:52 AM Date of Service:  11/7/2018 10:51 AM Creation Time:  11/7/2018 10:51 AM    Status:  Signed :  Gennaro Bertrand MD (Physician)         Patient had dialysis yesterday.  He ran for 3 hours on a 2K bath with net removal of 3 L of fluid.  His right arm fistula worked well.  He was given EPO and Hectorol during the run.  If he is discharged he should return to dialysis at his usual outpatient unit, however if he is in patient tomorrow we will dialyze him here.[JT1.1]     Revision History        User Key Date/Time User Provider Type Action    > JT1.1 11/7/2018 10:52 AM Gennaro Bertrand MD Physician Sign                     Procedure Notes      Procedures by Karthikeyan Littlejohn MD at 11/7/2018 12:33 PM     Author:  Karthikeyan Littlejohn MD Service:  Cardiology Author Type:  Physician    Filed:  11/7/2018 12:33 PM Date of Service:  11/7/2018 12:33 PM Creation Time:  11/7/2018 12:30 PM    Status:  Signed :  Karthikeyan Littlejohn MD (Physician)     Procedure Orders:    1. Transesophageal Echocardiogram [[id number]] ordered by Carine Calderon  ZEFERINO Castillo at 11/07/18 1121            Pre-procedure Diagnoses:    1. Nonrheumatic aortic valve stenosis [I35.0]           Post-procedure Diagnoses:    1. Nonrheumatic aortic valve stenosis [I35.0]           Procedures:    1. TRANSESOPHAGEAL ECHOCARDIOGRAM INTRAOPERATIVE [[id number] (Custom)]               LAN Note    Indication: aortic stenosis    Informed consent was signed by the patient.  A timeout was taken.    Sedation:  Versed 2mg  Fentanyl 50mcg    Findings:  LV: EF 55%  RV: normal  LA: no BERNICE thrombus  Mitral valve: Thickened, some restricted motion of the posterior leaflet, moderate to at most moderately severe regurgitation with eccentric, posterior jet  Aortic valve: Severe trileaflet aortic stenosis.  Trace aortic insufficiency.  On the aortic side of the left coronary cusp, there is a mobile piece of calcification,  this does not appear to be an infective endocarditis or vegetation  Tricuspid valve: Mild to moderate tricuspid regurgitation  Atrial septum: Intact, negative bubble study  Aorta: Normal size, moderate atheroma    No complications.    F/u routinely with cardiology to discuss aortic stenosis.  He potentially could be a TAVR candidate.    Karthikeyan Lokcwood MD  Cardiology - [facility]  Pager: [phone]  Text Page  November 7, 2018[BW1.1]         Revision History        User Key Date/Time User Provider Type Action    > BW1.1 11/7/2018 12:33 PM Karthikeyan Lockwood MD Physician Sign                  Progress Notes - Therapies (Notes from 11/05/18 through 11/08/18)     No notes of this type exist for this encounter.                                          INTERAGENCY TRANSFER FORM - LAB / IMAGING / EKG / EMG RESULTS   11/3/2018                       [facility] OBSERVATION DEPARTMENT: [phone]            Unresulted Labs (24h ago through future)    Start       Ordered    11/09/18 0600  Platelet count  AM DRAW,   Routine      11/08/18 0855    11/07/18 0600  Platelet count   EVERY 72 HOURS,   Timed     Comments:  If no result is listed, this lab has not been done the past 365 days. LATEST LAB RESULT: Platelet Count (10e9/L)       Date                     Value                 11/03/2018               128 (L)          ----------    11/04/18 1656         Lab Results - 3 Days      Glucose by meter [[id number]]  Resulted: 11/07/18 2037, Result status: Final result    Ordering provider: Jayant Ricketts MD  11/07/18 2016 Resulting lab: POINT OF CARE TEST, GLUCOSE    Specimen Information    Type Source Collected On     11/07/18 2016          Components       Value Reference Range Flag Lab   Glucose 81 70 - 99 mg/dL  170            Glucose by meter [[id number]]  Resulted: 11/07/18 1844, Result status: Final result    Ordering provider: Jayant Ricketts MD  11/07/18 1833 Resulting lab: POINT OF CARE TEST, GLUCOSE    Specimen Information    Type Source Collected On     11/07/18 1833          Components       Value Reference Range Flag Lab   Glucose 83 70 - 99 mg/dL  170            Platelet count [[id number]] (Abnormal)  Resulted: 11/07/18 0622, Result status: Final result    Ordering provider: iDonte Mena MD  11/07/18 0000 Resulting lab: [facility]    Specimen Information    Type Source Collected On   Blood  11/07/18 0608          Components       Value Reference Range Flag Lab   Platelet Count 149 150 - 450 10e9/L L FrRdHs            Glucose by meter [[id number]] (Abnormal)  Resulted: 11/05/18 1212, Result status: Final result    Ordering provider: Edin Pfeiffer APRN CNP  11/05/18 1200 Resulting lab: POINT OF CARE TEST, GLUCOSE    Specimen Information    Type Source Collected On     11/05/18 1200          Components       Value Reference Range Flag Lab   Glucose 123 70 - 99 mg/dL H 170            Glucose by meter [[id number]] (Abnormal)  Resulted: 11/05/18 0854, Result status: Final result    Ordering provider: Edin Pfeiffer APRN CNP  11/05/18 0839 Resulting  lab: POINT OF CARE TEST, GLUCOSE    Specimen Information    Type Source Collected On     11/05/18 0839          Components       Value Reference Range Flag Lab   Glucose 115 70 - 99 mg/dL H 170            Glucose by meter [[id number]] (Abnormal)  Resulted: 11/05/18 0209, Result status: Final result    Ordering provider: Edin Pfeiffer APRN CNP  11/05/18 0155 Resulting lab: POINT OF CARE TEST, GLUCOSE    Specimen Information    Type Source Collected On     11/05/18 0155          Components       Value Reference Range Flag Lab   Glucose 117 70 - 99 mg/dL H 170            Testing Performed By     Lab - Abbreviation Name Director Address Valid Date Range    12 - [facility] Unknown [address] [facility] [address] 05/08/15 1057 - Present    170 - Unknown POINT OF CARE TEST, GLUCOSE Unknown Unknown 10/31/11 1114 - Present               Imaging Results - 3 Days      US Lower Extremity Venous Duplex Right [[id number]]  Resulted: 11/05/18 0812, Result status: Final result    Ordering provider: Dionte Mena MD  11/04/18 164 Resulted by: Inocencio Littlejohn DO    Performed: 11/05/18 0113 - 11/05/18 0154 Resulting lab: RADIOLOGY RESULTS    Narrative:       VENOUS ULTRASOUND RIGHT LEG November 5, 2018 1:54 AM     HISTORY: Rule out deep vein thrombosis.     COMPARISON: 9/18/2018.    FINDINGS: Examination of the deep veins with graded compression and  color flow Doppler with spectral wave form analysis shows no evidence  of thrombus right common femoral vein, femoral vein, popliteal vein or  calf veins. The left common femoral vein was evaluated for comparison  and is normal.      Impression:       IMPRESSION: No deep vein thrombosis in the right lower extremity.    INOCENCIO LITTLEJOHN DO      US JUSTINO Doppler No Exercise [[id number]]  Resulted: 11/05/18 0812, Result status: Final result    Ordering provider: Dionte Mena MD  11/04/18 1092 Resulted by: Inocencio Littlejohn DO     Performed: 18 0113 - 18 0154 Resulting lab: RADIOLOGY RESULTS    Narrative:       ULTRASOUND JUSTINO DOPPLER NO EXERCISE, 1-2 LEVELS, BILAT 2018  1:54 AM     HISTORY: Right leg erythema/swelling.     COMPARISON: None.    FINDINGS:  Right JUSTINO: Right JUSTINO could not be calculated secondary to  noncompressible vessels.  Left JUTSINO: Left JUSTINO could not be calculated secondary to  noncompressible vessels.    Waveforms: Triphasic bilaterally.      Impression:       IMPRESSION: Bilateral ABIs could not be calculated secondary to  noncompressible vessels. Waveforms are triphasic bilaterally.    JUSTINO CRITERIA:  >0.95 Normal  0.90 - 0.94 Mild  0.5 - 0.89 Moderate  0.2 - 0.49 Severe  <0.2 Critical    INOCENCIO LITTLEJOHN DO      Testing Performed By     Lab - Abbreviation Name Director Address Valid Date Range    104 - Rad Rslts RADIOLOGY RESULTS Unknown Unknown 05 1553 - Present               ECG/EMG Results      Transesophageal Echocardiogram [[id number]]  Resulted: 18 112, Result status: Edited Result - FINAL    Ordering provider: Kimberlyn Mota PA-C  18 112 Resulted by: Karthikeyan Littlejohn MD    Performed: 18 1128 - 18 1128 Resulting lab: RADIOLOGY RESULTS    Narrative:       [id number]  [id number]  [id number]  [id number]^SVETLANA^KIMBERLYN^ROSSY           [facility]  Echocardiography Laboratory  [address] [place] [address]        Name: YOANA QUIÑONES  MRN: [id number]  : 10/07/1929  Study Date: 2018 11:22 AM  Age: 89 yrs  Gender: Male  Patient Location: [facility]  Reason For Study: Aortic Valve Disorder  Ordering Physician: KIMBERLYN MOTA  Referring Physician: Kimberlyn Mota  Performed By: Ruben Love RDCS     BSA: 1.8 m2  Height: 67 in  Weight: 160 lb  HR: 75  BP: 117/60 mmHg  _____________________________________________________________________________  __        Procedure  Complete LAN Adult. LAN Probe serial #[id number] (R) was used  during the  procedure.  _____________________________________________________________________________  __        Interpretation Summary     1. The left ventricle is normal in size. The visual ejection fraction is  estimated at 50%.  2. The right ventricle is mild to moderately dilated. The right ventricular  systolic function is mild to moderately reduced.  3. There is moderate to mod-severe (2-3+) mitral regurgitation. Thickened  mitral leaflets, some restriction of posterior leaflet motion. MR jet is  posterior directed.  4. There is moderate (2+) tricuspid regurgitation.  5. Severe valvular aortic stenosis. AV is trileaflet with moderate-severe  calcification. Gradients unable to be obtained, but visually appears severely  stenotic.  6. There is a calcified appearing mobile density on the aortic side of the AV,  appears to attach at the junction of the R/L cusps and aortic wall, 7mm long,  5mm in width. Suspect this is mobile calcification, less likely infection-  related vegetation. Trace AI at this site.     Echo from 10-15-18 showed EF 60%, mild RV dilation with normal function, 2+  MR, 3+ TR, RVSP 42mmHg, severe AS with mobile echodensity on the aortic  surface of the valve.  _____________________________________________________________________________  __        LAN  I determined this patient to be an appropriate candidate for the planned  sedation and procedure and have reassessed the patient immediately prior to  sedation and procedure. Total sedation time: 30 minutes of continuous bedside  1:1 monitoring. Versed (2mg) was given intravenously. Fentanyl (50mcg) was  given intravenously. Consent to the procedure was obtained prior to sedation.  The transesophageal probe was passed without difficulty. There were no  complications associated with this procedure.     Left Ventricle  The left ventricle is normal in size. The visual ejection fraction is  estimated at 50%. Normal left ventricular wall motion.      Right Ventricle  The right ventricle is mild to moderately dilated. The right ventricular  systolic function is mild to moderately reduced.     Atria  There is severe biatrial enlargement. There is no color Doppler evidence of an  atrial shunt. No thrombus is detected in the left atrial appendage.        Mitral Valve  The mitral valve leaflets appear thickened, but open well. There is moderate  to mod-severe (2-3+) mitral regurgitation. Thickened mitral leaflets, some  restriction of posterior leaflet motion. MR jet is posterior directed.     Tricuspid Valve  There is moderate (2+) tricuspid regurgitation. The right ventricular systolic  pressure is approximated at 26mmHg plus the right atrial pressure.     Aortic Valve  There is a calcified appearing mobile density on the aortic side of the AV,  appears to attach at the junction of the R/L cusps and aortic wall, 7mm long,  5mm in width. Suspect this is mobile calcification, less likely infection-  related vegetation. Trace AI at this site. There is trace aortic  regurgitation. Severe valvular aortic stenosis. AV is trileaflet with  moderate-severe calcification. Gradients unable to be obtained, but visually  appears severely stenotic.     Pulmonic Valve  The pulmonic valve is normal in structure and function.     Vessels  Normal ascending, transverse (arch), and descending aorta. Normal pulmonary  vein velocity.        Pericardial/Pleural  There is no pericardial effusion.     Rhythm  Sinus rhythm was noted.  _____________________________________________________________________________  __                                Report approved by: Cordell Turner 11/07/2018 04:07 PM                    _____________________________________________________________________________  __       1    Type Source Collected On     11/07/18 1122          View Image (below)              Encounter-Level Documents:     There are no encounter-level documents.      Order-Level  Documents:     There are no order-level documents.

## 2018-11-03 NOTE — IP AVS SNAPSHOT
MRN:[id number]                      After Visit Summary   11/3/2018    Maurizio Ohara    MRN: [id number]           Thank you!     Thank you for choosing [facility] for your care. Our goal is always to provide you with excellent care. Hearing back from our patients is one way we can continue to improve our services. Please take a few minutes to complete the written survey that you may receive in the mail after you visit. If you would like to speak to someone directly about your visit please contact Patient Relations at [phone]. Thank you!          Patient Information     Date Of Birth          10/7/1929        About your hospital stay     You were admitted on:  November 4, 2018 You last received care in the:  [facility] Observation Department    You were discharged on:  November 8, 2018        Reason for your hospital stay       You were admitted for generalized weakness after leaving recent rehab facility. You are now going to another rehab facility for strength improvement.   You have underlying peripheral vascular disease that's causing your swelling. You will need to be referred to a Vascular surgeon upon discharge by your PCP.   In addition we performed your Echocardiogram while you were here and there is no evidence of infection/vegetation on the valves but you do have significant aortic stenosis that you can see a Cardiologist to discuss possible valve replacement. Make appt with PCP in 1-2 weeks.                  Who to Call     For medical emergencies, please call 911.  For non-urgent questions about your medical care, please call your primary care provider or clinic, [phone]          Attending Provider     Provider Specialty    Edin Pfeiffer APRN [facility]    Jayant Ricketts MD Internal Medicine    Rajesh, Dionte Melendez MD Internal Medicine       Primary Care Provider Office Phone # Fax #    Justina Moise [phone]  [phone]      After Care Instructions     Activity - Up with assistive device           Advance Diet as Tolerated       Follow this diet upon discharge: Orders Placed This Encounter      Combination Diet Dialysis Diet            Daily weights       Call Provider for weight gain of more than 2 pounds per day or 5 pounds per week.            Fall precautions           General info for SNF       Length of Stay Estimate:TCU Skilled nursing   Condition at Discharge: Stable  Level of care:skilled   Rehabilitation Potential: Fair  Admission H&P remains valid and up-to-date: Yes  Recent Chemotherapy: N/A  Use Nursing Home Standing Orders: Yes            Mantoux instructions       Give two-step Mantoux (PPD) Per Facility Policy Yes                  Follow-up Appointments     Follow Up and recommended labs and tests       Follow up with jail physician.  The following labs/tests are recommended: routine labs at Dialysis.  Resume previous Dialysis.            Follow-up and recommended labs and tests        Follow up with primary care provider, Justina Moise, within 7 days for hospital follow- up.  The following labs/tests are recommended: Discuss with your Primary Care Provider about a referral to a Vascular Surgeon in regards to your leg swelling/redness/pain.    Follow up with Cardiology clinic in regards to aortic stenosis and consideration for valve replacement at the [facility] in 2-3 weeks  Address: [facility] [address], [place] [address]  Phone: [phone]                  Your next 10 appointments already scheduled     Nov 16, 2018  1:15 PM CST   Return Visit with Michelle Keyes DO   [facility])    [address] [address] [address] [phone]              Additional Services     Occupational Therapy Adult Consult       Evaluate and treat as clinically indicated.    Reason:  Assess adls          "   Physical Therapy Adult Consult       Evaluate and treat as clinically indicated.    Reason:  Weakness and falls                             Further instructions from your care team       We spoke w/ Vladimir Cohen and they would now like you to arrive at 0800 on Tuesday, Thursday and Saturday for your dialysis, this is a change from what you had been doing.     Pending Results     No orders found from 2018 to 2018.            Statement of Approval     Ordered          18 1220  I have reviewed and agree with all the recommendations and orders detailed in this document.  EFFECTIVE NOW     Approved and electronically signed by:  Aisha Franco PA-C             Admission Information     Date & Time Provider Department Dept. Phone    11/3/2018 Dionte Mena MD [facility] Observation Department [phone]      Your Vitals Were     Blood Pressure Pulse Temperature Respirations Weight Pulse Oximetry    109/50 (BP Location: Left arm) 64 96.9  F (36.1  C) (Oral) 18 81.7 kg (180 lb 1.9 oz) 94%    BMI (Body Mass Index)                   28.21 kg/m2           [facility] Information     [facility] lets you send messages to your doctor, view your test results, renew your prescriptions, schedule appointments and more. To sign up, go to [email or web address] . Click on \"Log in\" on the left side of the screen, which will take you to the Welcome page. Then click on \"Sign up Now\" on the right side of the page.     You will be asked to enter the access code listed below, as well as some personal information. Please follow the directions to create your username and password.     Your access code is: [email or web address]  Expires: 2018  1:21 PM     Your access code will  in 90 days. If you need help or a new code, please call your [place] clinic or [phone].        Care EveryWhere ID     This is your Care EveryWhere ID. This could be used by other organizations to access your [place] " medical records  [id number]        Equal Access to Services     ROSALINA SMITH : Hadii tressa rivas bradhiram Sokumarali, waaxda luqadaha, qaybta kanancyramesh fink, catherine bowersalizadevika benites. So [facility] [phone].    ATENCIÓN: Si habla español, tiene a mast disposición servicios gratuitos de asistencia lingüística. Llame al [phone].    We comply with applicable federal civil rights laws and [place] laws. We do not discriminate on the basis of race, color, national origin, age, disability, sex, sexual orientation, or gender identity.               Review of your medicines      CONTINUE these medicines which have NOT CHANGED        Dose / Directions    acetaminophen 500 MG tablet   Commonly known as:  TYLENOL        Dose:  1000 mg   Take 1,000 mg by mouth 3 times daily as needed for mild pain   Refills:  0       aspirin 81 MG tablet        Dose:  81 mg   Take 81 mg by mouth daily   Refills:  0       atorvastatin 40 MG tablet   Commonly known as:  LIPITOR        Dose:  40 mg   Take 40 mg by mouth At Bedtime   Refills:  0       calcium acetate 667 MG Caps capsule   Commonly known as:  PHOSLO        Dose:  2001 mg   Take 2,001 mg by mouth 3 times daily (with meals)   Refills:  0       diclofenac 1 % Gel topical gel   Commonly known as:  VOLTAREN   Used for:  Cellulitis of right lower extremity        Dose:  2 g   Apply 2 g topically 4 times daily Apply to RLE   Refills:  0       lidocaine 5 % ointment   Commonly known as:  XYLOCAINE        Apply topically every 4 hours as needed for moderate pain   Refills:  0       metoprolol succinate 25 MG 24 hr tablet   Commonly known as:  TOPROL-XL        Dose:  12.5 mg   Take 12.5 mg by mouth daily   Refills:  0       nitroGLYcerin 0.4 MG sublingual tablet   Commonly known as:  NITROSTAT   Used for:  NSTEMI (non-ST elevated myocardial infarction) (H)        Dose:  0.4 mg   Place 1 tablet (0.4 mg) under the tongue every 5 minutes as needed for chest pain   Quantity:  25  tablet   Refills:  3       polyethylene glycol Packet   Commonly known as:  MIRALAX/GLYCOLAX   Used for:  Cellulitis of right lower extremity        Dose:  17 g   Take 17 g by mouth daily   Quantity:  7 packet   Refills:  0       PROMETHAZINE VC/CODEINE 5-6.25-10 MG/5ML Syrp   Generic drug:  Phenyleph-Promethazine-Cod        Dose:  1-2 tsp.   Take 1-2 tsp. by mouth every 6 hours as needed   Refills:  0                Protect others around you: Learn how to safely use, store and throw away your medicines at www.disposemymeds.org.             Medication List: This is a list of all your medications and when to take them. Check marks below indicate your daily home schedule. Keep this list as a reference.      Medications           Morning Afternoon Evening Bedtime As Needed    acetaminophen 500 MG tablet   Commonly known as:  TYLENOL   Take 1,000 mg by mouth 3 times daily as needed for mild pain   Last time this was given:  975 mg on 11/8/2018 12:52 PM                                aspirin 81 MG tablet   Take 81 mg by mouth daily                                atorvastatin 40 MG tablet   Commonly known as:  LIPITOR   Take 40 mg by mouth At Bedtime   Last time this was given:  40 mg on 11/7/2018  9:17 PM                                calcium acetate 667 MG Caps capsule   Commonly known as:  PHOSLO   Take 2,001 mg by mouth 3 times daily (with meals)   Last time this was given:  2,001 mg on 11/8/2018 12:52 PM                                diclofenac 1 % Gel topical gel   Commonly known as:  VOLTAREN   Apply 2 g topically 4 times daily Apply to RLE                                lidocaine 5 % ointment   Commonly known as:  XYLOCAINE   Apply topically every 4 hours as needed for moderate pain                                metoprolol succinate 25 MG 24 hr tablet   Commonly known as:  TOPROL-XL   Take 12.5 mg by mouth daily   Last time this was given:  12.5 mg on 11/7/2018  8:06 AM                                 nitroGLYcerin 0.4 MG sublingual tablet   Commonly known as:  NITROSTAT   Place 1 tablet (0.4 mg) under the tongue every 5 minutes as needed for chest pain                                polyethylene glycol Packet   Commonly known as:  MIRALAX/GLYCOLAX   Take 17 g by mouth daily   Last time this was given:  17 g on 11/6/2018  7:55 AM                                PROMETHAZINE VC/CODEINE 5-6.25-10 MG/5ML Syrp   Take 1-2 tsp. by mouth every 6 hours as needed   Generic drug:  Phenyleph-Promethazine-Cod

## 2018-11-03 NOTE — LETTER
Warm Hand-Off to Next Level of Care    Name: Maurizio Ohara  MRN #: [id number]  :  10/7/1929  Reason for Hospitalization:  Pain in both lower extremities [M79.604, M79.605]  Edema, unspecified type [R60.9]  Admit Date/Time: 11/3/2018 10:59 PM  Discharge Date:  18  PCP: Justina Moise    Patient will receive the following: TCU  [facility]   Key Recommendations: Pt admitted for weakness as BLEE 1 day after being discharged from TCU. Pt needing ongoing rehab and was sent to [facility] on  w/ plan to continue his dialysis at [facility] T,Th,Sat.       Angela Jj RN CTS

## 2018-11-03 NOTE — IP AVS SNAPSHOT
[facility] Observation Department    [address]    [facility] [address]    Phone:  [phone]                                       After Visit Summary   11/3/2018    Maurizio Ohara    MRN: [id number]           After Visit Summary Signature Page     I have received my discharge instructions, and my questions have been answered. I have discussed any challenges I see with this plan with the nurse or doctor.    ..........................................................................................................................................  Patient/Patient Representative Signature      ..........................................................................................................................................  Patient Representative Print Name and Relationship to Patient    ..................................................               ................................................  Date                                   Time    ..........................................................................................................................................  Reviewed by Signature/Title    ...................................................              ..............................................  Date                                               Time          22EPIC Rev 08/18

## 2018-11-04 PROBLEM — M79.89 LEG SWELLING: Status: ACTIVE | Noted: 2018-01-01

## 2018-11-04 NOTE — PROGRESS NOTES
ROOM # 225    Living Situation (if not independent, order SW consult): alone in [facility]  Facility name:  :   [facility] [phone]         Activity level at baseline: Ind  Activity level on admit: A2 with gait belt      Patient registered to observation; given Patient Bill of Rights; given the opportunity to ask questions about observation status and their plan of care.  Patient has been oriented to the observation room, bathroom and call light is in place.    Discussed discharge goals and expectations with patient/family.

## 2018-11-04 NOTE — ED PROVIDER NOTES
History     Chief Complaint:  Cellulitis    HPI   Maurizio Ohara is a diabetic 89 year old male on T/Th/Sat dialysis who presents to the emergency department today for evaluation of cellulitis. The patient was just at dialysis earlier today. He was here September 18, 2018 after falling outside and hitting his right knee, later developing cellulitis to his lower right leg. He was admitted for a week prior to going to the TCU, reportedly returning home within the past few days. However, his redness hasn't seemed to resolve whatsoever, and actually has reportedly gotten slightly worse. Further, his legs have started swelling bilaterally; they are swollen from his ankles up to above his knees. He has gained 12 pounds in the last week. He has no shortness of breath, normally has very little swelling, and has no known history of CHF or clots. He does have a cardiac stent, but has had no known associated issues.     Addendum:  The patient was diagnosed with CHF in August of 2017 with a BNP of 37094.    ULTRASOUND RIGHT LOWER EXTREMITY VENOUS DUPLEX September 18, 2018  Negative for deep vein thrombosis.    RIGHT ANKLE THREE OR MORE VIEWS   9/18/2018  No fracture identified.    XR KNEE RT 1 /2 VW   9/18/2018  Mild joint effusion. Otherwise unremarkable.    ULTRASOUND EXTREMITY ARTERIOVENOUS DIALYSIS ACCESS GRAFT 8/22/2018  1. Patent AV fistula in the right upper extremity.  2. Minimal stenosis in the central most cephalic vein, site of  previous stenoses, though improved since previous exam.  2. Diminished blood flow volume on today's exam relative to previous,  averaging approximately 600 mL/min, previously 1043 mL/min.    Allergies:  Glyburide  Lisinopril  Metformin  Penicillins  Verapamil    Medications:    acetaminophen (TYLENOL) 325 MG tablet  aspirin 81 MG tablet  atorvastatin (LIPITOR) 40 MG tablet  calcium acetate (PHOSLO) 667 MG CAPS  diclofenac (VOLTAREN) 1 % GEL topical gel  gabapentin (NEURONTIN) 300 MG  capsule  hydrOXYzine (ATARAX) 25 MG tablet  lidocaine (XYLOCAINE) 5 % ointment  metoprolol succinate (TOPROL-XL) 25 MG 24 hr tablet  nitroglycerin (NITROSTAT) 0.4 MG sublingual tablet  Phenyleph-Promethazine-Cod (PROMETHAZINE VC/CODEINE) 5-6.25-10 MG/5ML SYRP  polyethylene glycol (MIRALAX/GLYCOLAX) Packet  sulfamethoxazole-trimethoprim (BACTRIM DS/SEPTRA DS) 800-160 MG per tablet  traMADol (ULTRAM) 50 MG tablet    Past Medical History:    Anemia   CAD  Chronic kidney disease   Diabetes   Hypertension    Myocardial infarction    Pulmonary hypertension   Renal disease due to diabetes mellitus     Past Surgical History:    History reviewed. No pertinent surgical history.    Family History:    History reviewed. No pertinent family history.    Social History:  The patient was accompanied to the ED by family.  Smoking Status: Former Smoker  Smokeless Tobacco: Never Used  Alcohol Use: Rare  Marital Status:  Single      Review of Systems   Constitutional: Negative for chills and fever.   HENT: Negative.    Respiratory: Negative for cough and shortness of breath.    Cardiovascular: Positive for leg swelling. Negative for chest pain.   All other systems reviewed and are negative.        Physical Exam     Patient Vitals for the past 24 hrs:   BP Temp Temp src Pulse Heart Rate Resp SpO2   11/04/18 0015 107/66 - - - - - 96 %   11/04/18 0000 108/71 - - - - - 95 %   11/03/18 2345 112/65 - - - - - -   11/03/18 2311 139/90 98.1  F (36.7  C) Oral 73 73 18 99 %      Physical Exam  General: Alert, No obvious discomfort, well kept  Eyes: PERRL, conjunctivae pink no scleral icterus or conjunctival injection  ENT:   Moist mucus membranes, posterior oropharynx clear without erythema or exudates, No lymphadenopathy, Normal voice  Resp:  Lungs clear to auscultation bilaterally, no crackles/rubs/wheezes. Good air movement  CV:  Normal rate and rhythm, no murmurs/rubs/gallops, bilateral lower extremity edema edema does go up side of thigh to  hip.  GI:  Abdomen soft and non-distended.  Normoactive BS.  No tenderness, guarding or rebound, No masses  Skin:  Warm, dry.  No rashes or petechiae mild erythema what appears to be chronic discoloration of right lower extremity.  No significant tenderness, no proximal streaking, no open wound  Musculoskeletal: No calf tenderness, Normal gross ROM   Neuro: Alert and oriented to person/place/time, normal sensation  Psychiatric: Normal affect, cooperative, good eye contact      Emergency Department Course     Imaging:  Radiology findings were communicated with the patient who voiced understanding of the findings.    XR Chest 2 Views:  IMPRESSION: Cardiomegaly and vascular congestion without pulmonary  edema.     Laboratory:  Laboratory findings were communicated with the patient who voiced understanding of the findings.    Lactic acid: 2.0  BNP: 19489  CBC: WBC 7.0, HGB 11.3,   BMP: Glucose 115, BUN 44, Creatinine 4.73, GFR 12    Emergency Department Course:    2315 Nursing notes and vitals reviewed.    2323 I performed an exam of the patient as documented above.     2341 IV was inserted and blood was drawn for laboratory testing, results above.     0055 I spoke with Dr. Inocencio Huynh of Nephrology regarding patient's presentation, findings, and plan of care.    0057 The patient was sent for a XR while in the emergency department, results above.      I reviewed findings from laboratory studies and imaging with patient.  I answered his questions to the best of my ability.  He is concerned about his ability to move around his apartment.  He does not have home health currently set up.  He states that he is unable to do his daily activities.  I spoke to  who will accept patient to his service.    Impression & Plan      Medical Decision Making:  Maurizio Ohara is a 89 year old male who presents to the emergency department today for evaluation of possible cellulitis.  Patient is recently  discharged from the TCU for lower extremity cellulitis.  He returned home yesterday and his neighbors noted that he has had increased weight gain and that his feet have seemed more swollen to the point where it is uncomfortable to wear shoes.  He has not had any shortness of breath or fevers.  He is not any chest pain or discomfort.  His examination is consistent with lower extremity edema.  He has a normal white cell count.  The erythema on his lower extremity is most consistent with chronic venous stasis color change it is non-erythematous with no proximal streaking.  Patient has also been seen well in TCU at [facility] and had thorough evaluation for possible DVT.  Workup was negative.  Again he does not have signs or symptoms of DVT at this time and this appears to be a worsening of his edema.  Chest x-ray showed no evidence of pulmonary edema.  BNP is significantly elevated however this is lower than previous for patient when it was found that the elevation was likely due to pulmonary hypertension.  I discussed the case with on-call nephrology who has seen the patient in the past and he concurred that there is no indication for emergent dialysis at this time.  Patient is unable to manage his activities of daily living his apartment.  He is concerned as he is unable to maneuver around his apartment he does not have home health care.  Plan will be to admit patient for the observation unit and possible return to TCU vs need for home care.      Diagnosis:    ICD-10-CM    1. Edema, unspecified type R60.9      Disposition:   Discharge    Scribe Disclosure:  Saúl BRONSON, am serving as a scribe at 11:14 PM on 11/3/2018 to document services personally performed by Edin Pfeiffer APRN based on my observations and the provider's statements to me.     [facility] EMERGENCY DEPARTMENT       Edin Pfeiffer APRN CNP  11/04/18 0136

## 2018-11-04 NOTE — PLAN OF CARE
Problem: Patient Care Overview  Goal: Plan of Care/Patient Progress Review  PRIMARY DIAGNOSIS: ACUTE PAIN and Lower leg edema  OUTPATIENT/OBSERVATION GOALS TO BE MET BEFORE DISCHARGE:  1. Pain Status: Pain free.    2. Return to near baseline physical activity: No    3. Cleared for discharge by consultants (if involved): No    Vitals are Temp: 96.7  F (35.9  C) Temp src: Oral BP: 108/55 Pulse: 73 Heart Rate: 76 Resp: 24 SpO2: 98 %.    Patient is Alert and Oriented x4. They are 2 assist with Gait Belt.  Pt is a dialysis diet and denying pain.  Patient is Saline locked. Patient has a fistula in his right arm.  Plan of care is a nephrology consult and a PT consult.  Patient is stating that he cannot walk and that he is not diabetic.      Discharge Planner Nurse   Safe discharge environment identified: No  Barriers to discharge: Yes          Please review provider order for any additional goals.   Nurse to notify provider when observation goals have been met and patient is ready for discharge.

## 2018-11-04 NOTE — PLAN OF CARE
Problem: Patient Care Overview  Goal: Plan of Care/Patient Progress Review  Outcome: Improving  Problem: Patient Care Overview   Goal: Plan of Care/Patient Progress Review  Outcome: Improving   PRIMARY DIAGNOSIS: ACUTE PAIN and lower leg edema   OUTPATIENT/OBSERVATION GOALS TO BE MET BEFORE DISCHARGE:   1. Pain Status: Pain free.   2. Return to near baseline physical activity: No   3. Cleared for discharge by consultants (if involved): No   Discharge Planner Nurse   Safe discharge environment identified: Yes   Barriers to discharge: Yes, social work   Entered by: Harry Herrera 11/04/2018   Pt is alert and oriented. He denies pain at this time but states it hurts when pressure is on his legs. Lower leg edema, left leg slightly more swollen than right. He was up to the bathroom assist x2 with a gait belt and walker. Pt BG was 165 before lunch, he refused insulin. Fistula in right arm and is on dialysis diet. PT recommends TCU, Social work following for placement.  Please review provider order for any additional goals.   Nurse to notify provider when observation goals have been met and patient is ready for discharge.

## 2018-11-04 NOTE — PHARMACY-ADMISSION MEDICATION HISTORY
Admission medication history interview status for this patient is complete. See [facility] admission navigator for allergy information, prior to admission medications and immunization status.     Medication history interview source(s):Caregiver  Medication history resources (including written lists, pill bottles, clinic record):MAR from [facility]  Primary pharmacy:     Med rec done to best of ability.    Patient was discharged on Friday from [facility].   They faxed me a MAR of what was taking there and said his discharge orders would be the same (they could not access the discharge paperwork at the time I called due to it being a weekend)    Changes made to PTA medication list:  Added:   Deleted: Gabapentin, Hydroxyzine, Bactrim, Tramadol  Changed: Acetaminophen to prn, Lipitor to scheduled, Lido oint to prn,     Actions taken by pharmacist (provider contacted, etc):sticky note to md and paged provider     Additional medication history information:None    Medication reconciliation/reorder completed by provider prior to medication history? Yes    Do you take OTC medications (eg tylenol, ibuprofen, fish oil, eye/ear drops, etc)? Y(Y/N)    For patients on insulin therapy: NA (Y/N)         Prior to Admission medications    Medication Sig Last Dose Taking? Auth Provider   acetaminophen (TYLENOL) 500 MG tablet Take 1,000 mg by mouth 3 times daily as needed for mild pain  Yes Unknown, Entered By History   aspirin 81 MG tablet Take 81 mg by mouth daily  Yes Unknown, Entered By History   atorvastatin (LIPITOR) 40 MG tablet Take 40 mg by mouth At Bedtime  Yes Unknown, Entered By History   calcium acetate (PHOSLO) 667 MG CAPS Take 2,001 mg by mouth 3 times daily (with meals)  Yes Unknown, Entered By History   diclofenac (VOLTAREN) 1 % GEL topical gel Apply 2 g topically 4 times daily Apply to RLE  Yes Darin Maher MD   lidocaine (XYLOCAINE) 5 % ointment Apply topically every 4 hours as needed for  moderate pain  Yes Unknown, Entered By History   metoprolol succinate (TOPROL-XL) 25 MG 24 hr tablet Take 12.5 mg by mouth daily   Yes Unknown, Entered By History   nitroglycerin (NITROSTAT) 0.4 MG sublingual tablet Place 1 tablet (0.4 mg) under the tongue every 5 minutes as needed for chest pain  Yes Julianne Dan PA-C   Phenyleph-Promethazine-Cod (PROMETHAZINE VC/CODEINE) 5-6.25-10 MG/5ML SYRP Take 1-2 tsp. by mouth every 6 hours as needed  Yes Unknown, Entered By History   polyethylene glycol (MIRALAX/GLYCOLAX) Packet Take 17 g by mouth daily  Yes Darin Maher MD

## 2018-11-04 NOTE — PLAN OF CARE
Problem: Patient Care Overview  Goal: Plan of Care/Patient Progress Review  Outcome: Improving  PRIMARY DIAGNOSIS: ACUTE PAIN and lower leg edema  OUTPATIENT/OBSERVATION GOALS TO BE MET BEFORE DISCHARGE:  1. Pain Status: Pain free.    2. Return to near baseline physical activity: No    3. Cleared for discharge by consultants (if involved): No    Discharge Planner Nurse   Safe discharge environment identified: Yes  Barriers to discharge: Yes, social work       Entered by: Harry Herrera 11/04/2018      Pt is alert and oriented. He denies pain at this time but states it hurts when pressure is on his legs. Lower leg edema, left leg slightly more swollen than right. He was up to the bathrom assist x2 with a gait belt and walker. Fistula in right arm and is on dialysis diet. PT consult,  Will be going to a TCU. Social work following for placement.      Please review provider order for any additional goals.   Nurse to notify provider when observation goals have been met and patient is ready for discharge.

## 2018-11-04 NOTE — ED TRIAGE NOTES
Cellulitis to right lower leg with bilateral lower leg swelling, was hospitalized on 9/18 for same complaints and went to TCU. No improvement since discharge with more swelling. Dialysis patient, goes to dialysis T/Th/Sat. Unintentional 12lbs gain. ABCs intact.

## 2018-11-04 NOTE — H&P
[facility]  Hospitalist Admission Note  Name: Maurizio Ohara    MRN: [id number]  YOB: 1929    Age: 89 year old  Date of admission: 11/3/2018  Primary care provider: Justina Moise            Assessment and Plan:   Maurizio Ohara is a 89 year old male with complicated medical history such as chronic anemia, coronary artery disease with prior PCI, end-stage renal disease being maintained on hemodialysis every Tuesday, Thursday, Saturday with last session yesterday and completed with no issues, hypertension, severe pulmonary hypertension, diastolic congestive heart failure, patient of cardiology service and recently seen with echocardiogram suspicion for mobile mass but has negative workup for infectious process and has pending LAN in the near future based on their most recent notes, was recently discharged from a TCU setting of which he stayed for close to a month and currently living at home by himself but has been having issues with leg swelling, difficulty in ambulation, near fall events, and generalized weakness.  This triggered for him to call his neighbor and asked for assistance and was subsequently brought into the hospital for further management care.     1.  Bilateral leg swelling, chronic, generalized weakness  2.  End-stage renal disease on hemodialysis  3.  Severe pulmonary hypertension  4.  Chart stated history of diabetes mellitus  5.  History of CHF does not appear in acute exacerbation    Ray under observation.  Patient has some concerns regarding going home tonight from the emergency room given his above complaints of weakness, near fall event and has no one to be with at home.  He also has no ride going home from the emergency room.  He stated he is concerned also that he likely was prematurely discharged to TCU as he still appears deconditioned.  He is requesting if we can ask  to take a look at this situation as he does not appear to be  ready for home discharge given this generalized weakness and fear of falling event.  We will ask for nephrology service to continue his ESRD management here.  He finished his Saturday session for hemodialysis with no issues arose.  Continue with his home regimen of medications once reconciled.      Code status: Full code as expressed by the patient  Prophylaxis: Ambulate, heparin subcu if staying more than a day  Disposition: to Be determined.          Chief Complaint:   Presented here with his neighbor as he stated that he has been having issues ambulating at home due to generalized weakness and leg swelling       Source of Information:   Patient with fair reliability  Discussion with ED physician  Review of E chart records         History of Present Illness:   Maurizio Ohara is a 89 year old male with complicated medical history such as chronic anemia, coronary artery disease with prior PCI, end-stage renal disease being maintained on hemodialysis every Tuesday, Thursday, Saturday with last session yesterday and completed with no issues, hypertension, severe pulmonary hypertension, diastolic congestive heart failure, patient of cardiology service and recently seen with echocardiogram suspicion for mobile mass but has negative workup for infectious process and has pending LAN in the near future based on their most recent notes, was recently discharged from a TCU setting of which he stayed for close to a month and currently living at home by himself but has been having issues with leg swelling, difficulty in ambulation, near fall events, and generalized weakness.  This triggered for him to call his neighbor and asked for assistance and was subsequently brought into the hospital for further management care.  He denies any complaints of worsening shortness of breath, chest pain, nausea, vomiting, blurred vision, headaches, focal motor weakness, slurred speech, mental status changes, fever, chills, diarrhea or any  "bleeding tendencies.    Upon presentation the emergency room he has stable vital signs, no hypoxia, abnormal labs consistent with his renal disease and elevated proBNP, no obvious pulmonary edema in the imaging and exam not consistent with an infectious process.    He was being prepared to be discharged in the emergency room but patient stated that he has no one to be with at home, and has some concerns regarding his generalized weakness, near fall event, in the setting of this leg swelling hence this referral to us for further management care.    During the time of my exam he denies any new complaints and remained cooperative, conversant and pleasant.           Past Medical History:     Past Medical History:   Diagnosis Date     Anemia      Anemia      CAD (coronary artery disease) 12/24/14    PCI and BMS to mid RCA (5.0 x 20mm) with residual mod diffuse mid LAD disease     Chronic kidney disease     on Dialysis     Diabetes (H)      Hypertension      Mitral regurgitation 12/24/2014    mild-mod (1-2+) per echo     Mitral valve disorders(424.0) 12/24/2014    mild/mod (1-2+) MR     Myocardial infarction (H) 12/24/2014    NSTEMI     Pulmonary hypertension (H) 12/24/2014    RVSP elevated c/w mild-mod PH per echo     Renal disease due to diabetes mellitus (H)     End stage; on Dialysis             Past Surgical History:     Past Surgical History:   Procedure Laterality Date     CORONARY ANGIOGRAPHY ADULT ORDER  12/24/2014     ENT SURGERY      Tonsillectomy     HEART CATH, ANGIOPLASTY  12/24/2014    PCI and BMS (5.0x20mm)to mid RCA     THORACIC SURGERY      Herniated disc             Social History:     Social History   Substance Use Topics     Smoking status: Former Smoker     Smokeless tobacco: Never Used     Alcohol use Yes      Comment: \"about as much as you can pour in a thimble in a year\"             Family History:   Family history was fully reviewed and non-contributory in this case.         Allergies: " "    Allergies   Allergen Reactions     Glyburide      Lisinopril      Hyperkalemia when hospitalized 4/5/2011     Metformin      Renal failure stage 4     Glenn Gallagher RN clarified with pt, pt does not remember rxn, it was a long time ago, and \"nothing crazy\".     Verapamil              Medications:     Prior to Admission medications    Medication Sig Last Dose Taking? Auth Provider   acetaminophen (TYLENOL) 325 MG tablet Take 3 tablets (975 mg) by mouth every 8 hours   Darin Maher MD   aspirin 81 MG tablet Take 81 mg by mouth daily   Unknown, Entered By History   atorvastatin (LIPITOR) 40 MG tablet Take 40 mg by mouth daily as needed   Unknown, Entered By History   calcium acetate (PHOSLO) 667 MG CAPS Take 2,001 mg by mouth 3 times daily (with meals)   Unknown, Entered By History   diclofenac (VOLTAREN) 1 % GEL topical gel Apply 2 g topically 4 times daily Apply to RLE   Darin Maher MD   gabapentin (NEURONTIN) 300 MG capsule Take 300 mg by mouth daily   Reported, Patient   hydrOXYzine (ATARAX) 25 MG tablet Take 1 tablet (25 mg) by mouth every 6 hours as needed for itching (pain)  Patient not taking: Reported on 10/19/2018   Darin Maher MD   lidocaine (XYLOCAINE) 5 % ointment Apply topically every 4 hours (while awake) Do not put onto any open skin areas   Darin Maher MD   metoprolol succinate (TOPROL-XL) 25 MG 24 hr tablet Take 12.5 mg by mouth daily    Unknown, Entered By History   nitroglycerin (NITROSTAT) 0.4 MG sublingual tablet Place 1 tablet (0.4 mg) under the tongue every 5 minutes as needed for chest pain   Julianne Dan PA-C   Phenyleph-Promethazine-Cod (PROMETHAZINE VC/CODEINE) 5-6.25-10 MG/5ML SYRP Take 1-2 tsp. by mouth every 6 hours as needed   Unknown, Entered By History   polyethylene glycol (MIRALAX/GLYCOLAX) Packet Take 17 g by mouth daily   Darin Maher MD   sulfamethoxazole-trimethoprim (BACTRIM DS/SEPTRA DS) 800-160 MG per tablet " Take 1 tablet by mouth 2 times daily   Reported, Patient   traMADol (ULTRAM) 50 MG tablet Take 1 tablet (50 mg) by mouth every 6 hours as needed for moderate pain  Patient not taking: Reported on 10/19/2018   Darin Maher MD             Review of Systems:   A Comprehensive greater than 10 system review of systems was carried out.  Pertinent positives and negatives are noted above.  Otherwise negative for contributory information.           Physical Exam:   Blood pressure 125/72, pulse 73, temperature 98.1  F (36.7  C), temperature source Oral, resp. rate 18, SpO2 95 %.  Wt Readings from Last 1 Encounters:   10/19/18 74.7 kg (164 lb 11.2 oz)     Exam:  GENERAL: No apparent distress. Awake, alert, and fully oriented.  HEENT: Normocephalic, atraumatic. Extraocular movements intact.  CARDIOVASCULAR: Regular rate and rhythm without murmurs or rubs.   PULMONARY: Fair air entry, no obvious wheezes crackles  ABDOMINAL: Soft, non-tender, non-distended. Bowel sounds normoactive. No hepatosplenomegaly.  EXTREMITIES: No cyanosis or clubbing.  Ecchymosis, multiple bruises on upper bilateral extremities, chronic venous stasis skin changes, pitting edema on both lower extremities  NEUROLOGICAL: CN 2-12 grossly intact, awake and alert x3, spontaneous and coherent speech. no focal neurological deficits.  DERMATOLOGICAL: No rash, ulcer, ecchymoses, jaundice.  Psych: not agitation, not combative, pleasant mood           Data:   EKG: No new EKG seen    Imaging:  Results for orders placed or performed during the hospital encounter of 11/03/18   XR Chest 2 Views    Narrative    CHEST TWO VIEWS 11/4/2018 1:07 AM     HISTORY: Question congestive heart failure.     COMPARISON: 8/9/2017.    FINDINGS: The heart is mildly enlarged and there is pulmonary vascular  congestion. No pulmonary edema or pleural effusion. No pneumothorax.  No airspace consolidation. The lungs are hyperexpanded.       Impression    IMPRESSION: Cardiomegaly and  vascular congestion without pulmonary  edema.     JOEL BRAGA MD       Labs:  No results for input(s): CULT in the last 168 hours.    Recent Labs  Lab 11/03/18  2341      POTASSIUM 5.2   CHLORIDE 108   CO2 27   ANIONGAP 7   *   BUN 44*   CR 4.73*   GFRESTIMATED 12*   GFRESTBLACK 14*   KIMBER 8.9       Recent Labs  Lab 11/03/18  2341      POTASSIUM 5.2   CHLORIDE 108   CO2 27   ANIONGAP 7   *   BUN 44*   CR 4.73*   GFRESTIMATED 12*   GFRESTBLACK 14*   KIMBER 8.9       Recent Labs  Lab 11/03/18  2341   *     No results for input(s): INR in the last 168 hours.  No results for input(s): TROPONIN, TROPI, TROPR in the last 168 hours.    Invalid input(s): TROP, TROPONINIES  No results for input(s): COLOR, APPEARANCE, URINEGLC, URINEBILI, URINEKETONE, SG, UBLD, URINEPH, PROTEIN, UROBILINOGEN, NITRITE, LEUKEST, RBCU, WBCU in the last 168 hours.

## 2018-11-04 NOTE — PLAN OF CARE
Problem: Patient Care Overview  Goal: Plan of Care/Patient Progress Review  Outcome: No Change  PRIMARY DIAGNOSIS: leg swelling/ leg pain  OUTPATIENT/OBSERVATION GOALS TO BE MET BEFORE DISCHARGE:  1. Dyspnea improved and O2 sats >88% at RA or at prior home O2 therapy level: Yes        SpO2: 98 %, O2 Device: None (Room air)  There were no vitals filed for this visit.     2. ECHO and other diagnostic testing complete (if applicable): No    3. Return to near baseline physical activity: No    Discharge Planner Nurse   Safe discharge environment identified: No  Barriers to discharge: Yes       Entered by: Neal Ortez 11/04/2018 4:57 PM     Please review provider order for any additional goals.   Nurse to notify provider when observation goals have been met and patient is ready for discharge.    Pt A&Ox4, VSS, on RA, no SOB noted. Up with Ax2 and walker. PT eval done today, SW following for TCU placement. Pt reports pain in legs, 3+ edema in LE, red/kary color, and dry/flaky skin. Tolerating renal diet.US of LE ordered to assess for DVT, heparin injection ordered.

## 2018-11-04 NOTE — CONSULTS
KIP met with patient. He reports he was home one day before he ended up back here because he couldn't walk. He said he was at [facility] and hated it. He is open to going to a TCU but just not that one. He asked that I call Rosa his nephew to help coordinate.     KIP called Rosa ([phone]) to discuss discharge placement. He said his Uncle is on a waiting list for [facility] in [place]. He would like a referral to be made to to [facility] and [place]. KIP faxed over referrals.     MARCELLE Gonsales  [phone]

## 2018-11-04 NOTE — PLAN OF CARE
Problem: Patient Care Overview  Goal: Plan of Care/Patient Progress Review  PT: Patient seen by physical therapy for evaluation.  Patient with PMH including chronic anemia, CAD with prior PCI, ESRD, HTN, severe pulmonary HTN, CHF now admitted with bilateral leg swelling, difficulty with ambulation, near fall events and generalized weakness.  Patient was recently discharged from TCU setting; patient reported that he had been at TCU for 30 days; was given the choice between paying out of pocket for continued TCU/LTC, patient decided to discharge to home.  Patient was admitted approximately 1 day later.  Patient reports that he is on the list for an [facility] in [place], [place].  Patient currently lives alone in a 2 level town house with 7 stairs to enter and 7 stairs to access main level.  Patient reports that at baseline, he uses a 2WW for all mobility.      Discharge Planner PT   Patient plan for discharge: TCU  Current status: Patient supine upon arrival.  Transferred to sitting at EOB with mod A, direct verbal and tactile cueing.  Patient transferred to standing at EOB with 2WW and min A.  Patient amb 5 feet within room with 2WW and CGA; patient with slow gait speed, decreased foot clearance.  Declined further ambulation secondary to LE pain.  Patient transferred to sitting in bedside chair with verbal cueing for safe technique.  Barriers to return to prior living situation: stairs, lives alone, high fall risk, requires assist with all mobility, unable to safely manage at home for longer than 24 hours at discharge from TCU  Recommendations for discharge: TCU  Rationale for recommendations: Patient is not safe to return to home independently at this time.  Requires assist for bed mobility and transfers.  Patient is limited in ambulation distance.  Patient would benefit from ongoing physical therapy to increase strength, activity tolerance and independence with mobility.       Entered by: Fannie Barnhart 11/04/2018 1:39  PM

## 2018-11-04 NOTE — ED NOTES
"[facility]  ED Nurse Handoff Report    Maurizio Ohara is a 89 year old male   ED Chief complaint: Bilateral Lower Leg Swelling. ED Diagnosis:   Final diagnoses:   Edema, unspecified type   Pain in both lower extremities     Allergies:   Allergies   Allergen Reactions     Glyburide      Lisinopril      Hyperkalemia when hospitalized 4/5/2011     Metformin      Renal failure stage 4     Penicillins      SHADY Gallagher clarified with pt, pt does not remember rxn, it was a long time ago, and \"nothing crazy\".     Verapamil        Code Status: Full Code  Activity level - Baseline/Home:  Independent. Activity Level - Current:   Stand with Assist. Lift room needed: No. Bariatric: No   Needed: No   Isolation: No. Infection: Not Applicable.     Vital Signs:   Vitals:    11/04/18 0000 11/04/18 0015 11/04/18 0030 11/04/18 0045   BP: 108/71 107/66 123/78 125/72   Pulse:       Resp:       Temp:       TempSrc:       SpO2: 95% 96% 96% 95%       Cardiac Rhythm:  ,      Pain level:    Patient confused: No. Patient Falls Risk: Yes.   Elimination Status: Has not yet voided in ED, dialysis patient who still makes little urine   Patient Report - Initial Complaint: Maurizio Ohara is a diabetic 89 year old male on T/Th/Sat dialysis who presents to the emergency department today for evaluation of cellulitis. The patient was just at dialysis earlier today. He was here September 18, 2018 after falling outside and hitting his right knee, later developing cellulitis to his lower right leg. He was admitted for a week prior to going to the TCU, reportedly returning home within the past few days. However, his redness hasn't seemed to resolve whatsoever, and actually has reportedly gotten slightly worse. Further, his legs have started swelling bilaterally; they are swollen from his ankles up to above his knees. He has gained 12 pounds in the last week. He has no shortness of breath, normally has very little swelling, " and has no known history of CHF or clots. He does have a stent, but has had no known associated issues. Focused Assessment:   Respiratory Respiratory - Lung Fields: All Fields Throughout All Lung Fields: clear; equal bilaterally     Cardiac Cardiac - Cardiac WDL: WDL Cardiac Comment:  (lower bilateral swelling)     Musculoskeletal Musculoskeletal - Side: Bilateral CMS Intact: Yes Musculoskeletal Comment:  (bilateral lower leg swelling with redness to right lower leg, cool to touch)     Tests Performed: labs, CXR. Abnormal Results:   Labs Ordered and Resulted from Time of ED Arrival Up to the Time of Departure from the ED   CBC WITH PLATELETS - Abnormal; Notable for the following:        Result Value    RBC Count 3.16 (*)     Hemoglobin 11.3 (*)     Hematocrit 36.5 (*)      (*)     MCH 35.8 (*)     MCHC 31.0 (*)     RDW 17.5 (*)     Platelet Count 128 (*)     All other components within normal limits   BASIC METABOLIC PANEL - Abnormal; Notable for the following:     Glucose 115 (*)     Urea Nitrogen 44 (*)     Creatinine 4.73 (*)     GFR Estimate 12 (*)     GFR Estimate If Black 14 (*)     All other components within normal limits   NT PROBNP INPATIENT - Abnormal; Notable for the following:     N-Terminal Pro BNP Inpatient 50316 (*)     All other components within normal limits   LACTIC ACID   MAY SALINE LOCK IV     XR Chest 2 Views   Preliminary Result   IMPRESSION: Cardiomegaly and vascular congestion without pulmonary   edema.          Treatments provided: Monitor  Family Comments: NA  OBS brochure/video discussed/provided to patient:  Yes  ED Medications: Medications - No data to display  Drips infusing:  No  For the majority of the shift, the patient's behavior Green. Interventions performed were NA.     Severe Sepsis OR Septic Shock Diagnosis Present: No      ED Nurse Name/Phone Number: Edson Hagan,   1:21 AM    RECEIVING UNIT ED HANDOFF REVIEW    Above ED Nurse Handoff Report was reviewed: Yes  Reviewed  by: David Amaya on November 4, 2018 at 1:54 AM

## 2018-11-04 NOTE — PROGRESS NOTES
[facility]    Hospitalist Progress Note    Date of Service (when I saw the patient): 11/04/2018    Assessment & Plan   Maurizio Ohara is a 89 year old male with ESRD (HD Tu, Th, Sat), CAD (STEMI in 2014, stent in the RCA with mod LAD disease), recent R LE cellulitis (admitted from 9/18-9/25) who went to TCU and went home x 1 day now admitted on 11/3/2018 with continued weakness and wanting to go back to TCU    Weakness/need for continue rehab    - apparently patient was recommended to continue stay at TCU versus consider long term care when he was at TCU. Patient refused and went home x 1 day and then came back to the ED    - patient states he disliked that TCU and would not go back there    - seen by PT    - will need new TCU    Lower extrem swelling    - was thought to be cellulitis in Sept, but did not respond to antibiotics    - he does not have a fever or elevated on WBC    - likely has underlying PVD    - US for DVT and ABIs ordered    ESRD    - on dialysis Tu/Th/Sat    - Nephrology consult in    CAD    - STEMI with RCA stent    - currently stable    - cont meds    HLP    - cont meds    States he will update family      DVT Prophylaxis: Heparin SQ  Code Status: Full Code    Disposition: Expected discharge once placement done.    Dionte Mena    Interval History   Patient in chair. No chest pain or sob. Eating well. No pain in legs. Legs still the same: swollen/red on right.     -Data reviewed today: I reviewed all new labs and imaging results over the last 24 hours. I personally reviewed no images or EKG's today.    Physical Exam   Temp: 98.1  F (36.7  C) Temp src: Oral BP: 111/47 Pulse: 73 Heart Rate: 72 Resp: 20 SpO2: 98 % O2 Device: None (Room air)    There were no vitals filed for this visit.  Vital Signs with Ranges  Temp:  [96.1  F (35.6  C)-98.1  F (36.7  C)] 98.1  F (36.7  C)  Pulse:  [73] 73  Heart Rate:  [59-77] 72  Resp:  [18-24] 20  BP: (104-139)/(47-90) 111/47  SpO2:  [95  %-99 %] 98 %       Constitutional: Awake, alert, cooperative, no apparent distress   Respiratory: Clear to auscultation bilaterally, no crackles or wheezing   Cardiovascular: Regular rate and rhythm, normal S1 and S2, and no murmur noted   Abdomen: Normal bowel sounds, soft, non-distended, non-tender   Skin: No rashes, no cyanosis, dry to touch   Neuro: Alert and oriented x3, no weakness, numbness, memory loss   Extremities: Bilart lower extren +2-3 edema (pitting) erythema on r: cool. + pulses. wrapped   Other(s):        All other systems: Negative     Medications       acetaminophen  975 mg Oral Q8H     aspirin  81 mg Oral Daily     calcium acetate  2,001 mg Oral TID w/meals     insulin aspart  1-3 Units Subcutaneous TID AC     insulin aspart  1-3 Units Subcutaneous At Bedtime     metoprolol succinate  12.5 mg Oral Daily     polyethylene glycol  17 g Oral Daily       Data     Recent Labs  Lab 11/03/18  2341   WBC 7.0   HGB 11.3*   *   *      POTASSIUM 5.2   CHLORIDE 108   CO2 27   BUN 44*   CR 4.73*   ANIONGAP 7   KIMBER 8.9   *       Recent Results (from the past 24 hour(s))   XR Chest 2 Views    Narrative    CHEST TWO VIEWS 11/4/2018 1:07 AM     HISTORY: Question congestive heart failure.     COMPARISON: 8/9/2017.    FINDINGS: The heart is mildly enlarged and there is pulmonary vascular  congestion. No pulmonary edema or pleural effusion. No pneumothorax.  No airspace consolidation. The lungs are hyperexpanded.       Impression    IMPRESSION: Cardiomegaly and vascular congestion without pulmonary  edema.     JOEL BRAGA MD

## 2018-11-05 NOTE — PLAN OF CARE
Problem: Patient Care Overview  Goal: Plan of Care/Patient Progress Review  PRIMARY DIAGNOSIS: Lower leg pain and Lower leg edema  OUTPATIENT/OBSERVATION GOALS TO BE MET BEFORE DISCHARGE:  1. Pain Status: Pain free.     2. Return to near baseline physical activity: No     3. Cleared for discharge by consultants (if involved): No    Vitals are Temp: 96.1  F (35.6  C) Temp src: Oral BP: 125/66   Heart Rate: 62 Resp: 16 SpO2: 97 %  Patient is Alert and Oriented x4. They are 2 assist with Gait Belt and Walker.  Pt is a Dialysis diet and denying pain.  Patient is Saline locked.  Patient has swelling in his legs and feet.  Patient has a fistula on his right arm.  PT is recommending a TCU.  SW is following for placement.  Patient has a nephrology consult for his dialysis.  Patient went down for ultrasounds of his leg tonight.     Discharge Planner Nurse   Safe discharge environment identified: No  Barriers to discharge: Yes          Please review provider order for any additional goals.   Nurse to notify provider when observation goals have been met and patient is ready for discharge.

## 2018-11-05 NOTE — PROGRESS NOTES
KIP placed calls to facilities where referrals were sent for TCU. They reported that they are reviewing pt for admission at this time.  9:45 AM    KIP met with pt who reported he was using [employer] to get to and from his dialysis.     [facility] who reported they can accept pt but pts dialysis would have to moved to the [facility] in [place]. She stated that they are on the edge of [employer] coverage area so could not guarantee that pt would be able to use that option. She stated that pt would have to use transport set up by them at a cost of 50-75 dollars/round trip if he wasn't able to use [employer]. She stated uncertainty if they could take pt today versus tomorrow. KIP spoke with [facility] who reported that they were still reviewing the pt, requested PT notes. They reported that they do 2-3 hours of therapy daily and were unsure pt could tolerate that. They reported plan to continue reviewing.    KIP called [facility] who reported having openings in the afternoons on T/H/S and stated that SW would have to work with admissions [phone] to arrange transfer.    KIP met with pt to discuss above information. Pt unsure about his ability to do therapy that many hours a day. He also stated that he could not afford the private pay transport cost. Pt agreeable to KIP calling Rosa to discuss further.    KIP spoke with Rosa. Informed him of information from TCU facilities and pt. He stated that they heard from [facility] today and they anticipate the pt will be able to move in in December. He asked SW to update the TCU. He stated that they would speak with the pt about doing more therapies as well. He stated that pts [employer] was being temporarily suspended for missed rides starting 11/9 but that this was due to rides that [facility] arranged and pt had no control over. Rosa's wife reported that she was sending in an appeal letter for the  pt today. Rosa reported that he would also discuss ride cost with pt. Discussed sending additional referrals to facilities within [facility] area and they requested [facility] and [facility]. SW sent referrals and left VM for [facility] to inform of [facility] waitlist.    Discharge Planner   Discharge Plans in progress: TCU  Barriers to discharge plan: placement, arranging dialysis  Follow up plan: Work with family and pt on finding appropriate TCU bed       Entered by: Brandy Rahman 11/05/2018 12:25 PM       SW spoke with [facility] who reported that pt is not appropriate for their TCU. She stated that he would be appropriate for LTC with therapy services but that they do not have any openings there for at least a few days. SW spoke with [facility] who still planned to review pt further and have another staff check on his dialysis needs and plan tomorrow. SW left  for pts nephewRosa, requesting call back.  3:35 PM    SW met with pt and provided update on TCU facilities. Pt stated understanding. KIP spoke with pts nephew Rosa and his wife. Informed them of information from [facility] and that [facility] was full. They stated understanding. Discussed plan to get answer from [facility] tomorrow and pending that pt could go there or to [place]. Discussed that pt may have to pay for transport to dialysis for a period of time. Rosa's wife reported that she would drive him if that was a possibility but that he likely is not strong enough to transfer into and out of a car right now. SW will continue to follow.  4:22 PM

## 2018-11-05 NOTE — PROGRESS NOTES
[facility]    Hospitalist Progress Note    Date of Service (when I saw the patient): 11/05/2018    Assessment & Plan   Maurizio Ohara is a 89 year old male with ESRD (HD Tu, Th, Sat), CAD (STEMI in 2014, stent in the RCA with mod LAD disease), recent R LE cellulitis (admitted from 9/18-9/25) who went to TCU and went home x 1 day now admitted on 11/3/2018 with continued weakness and wanting to go back to TCU    Weakness/need for continue rehab    - apparently patient was recommended to continue stay at TCU versus consider long term care when he was at TCU. Patient refused and went home x 1 day and then came back to the ED    - patient states he disliked that TCU and would not go back there    - seen by PT    - will need new TCU-  is looking for bed    Lower extrem swelling    - was thought to be cellulitis in Sept, but did not respond to antibiotics    - he does not have a fever or elevated on WBC    - likely has underlying PVD    - US was negative for DVT    - I tried to get ABIs here. They were inconclusive due to his vessels being noncompressible    - we have wrapped the legs with ace wraps, keep elevated    - referral to Vascular Surgery as outpt    ESRD    - on dialysis Tu/Th/Sat    - Nephrology has seen patient. He will get dialyzed in am    CAD    - STEMI with RCA stent    - currently stable    - cont meds    HLP    - cont meds    No labs needed in am (can be done with dialysis)    States he will update family      DVT Prophylaxis: Heparin SQ  Code Status: Full Code    Disposition: Expected discharge once placement done.    Dionte Mena    Interval History   Patient in chair. No chest pain or sob. Eating well. No pain in legs. Legs still the same: swollen/red on right. No complaints    -Data reviewed today: I reviewed all new labs and imaging results over the last 24 hours. I personally reviewed no images or EKG's today.    Physical Exam   Temp: 95.6  F (35.3  C) Temp src:  Oral BP: 92/49   Heart Rate: 65 Resp: 16 SpO2: 99 % O2 Device: None (Room air)    Vitals:    11/05/18 0156   Weight: 78 kg (172 lb)     Vital Signs with Ranges  Temp:  [95.1  F (35.1  C)-98.1  F (36.7  C)] 95.6  F (35.3  C)  Heart Rate:  [62-72] 65  Resp:  [16-20] 16  BP: ()/(47-66) 92/49  SpO2:  [93 %-99 %] 99 %       Constitutional: Awake, alert, cooperative, no apparent distress   Respiratory: Clear to auscultation bilaterally, no crackles or wheezing   Cardiovascular: Regular rate and rhythm, normal S1 and S2, and no murmur noted   Abdomen: Normal bowel sounds, soft, non-distended, non-tender   Skin: No rashes, no cyanosis, dry to touch   Neuro: Alert and oriented x3, no weakness, numbness, memory loss   Extremities: Bilart lower extren +2-3 edema (pitting) erythema on r: cool. + pulses. wrapped   Other(s):        All other systems: Negative     Medications       - MEDICATION INSTRUCTIONS for Dialysis Patients -   Does not apply See Admin Instructions     acetaminophen  975 mg Oral Q8H     aspirin  81 mg Oral Daily     atorvastatin  40 mg Oral At Bedtime     calcium acetate  2,001 mg Oral TID w/meals     heparin  5,000 Units Subcutaneous Q8H     insulin aspart  1-3 Units Subcutaneous TID AC     insulin aspart  1-3 Units Subcutaneous At Bedtime     metoprolol succinate  12.5 mg Oral Daily     polyethylene glycol  17 g Oral Daily       Data     Recent Labs  Lab 11/03/18  2341   WBC 7.0   HGB 11.3*   *   *      POTASSIUM 5.2   CHLORIDE 108   CO2 27   BUN 44*   CR 4.73*   ANIONGAP 7   KIMBER 8.9   *       Recent Results (from the past 24 hour(s))   US JUSTINO Doppler No Exercise    Narrative    ULTRASOUND JUSTINO DOPPLER NO EXERCISE, 1-2 LEVELS, BILAT November 5, 2018  1:54 AM     HISTORY: Right leg erythema/swelling.     COMPARISON: None.    FINDINGS:  Right JUSTINO: Right JUSTINO could not be calculated secondary to  noncompressible vessels.  Left JUSTINO: Left JUSTINO could not be calculated secondary  to  noncompressible vessels.    Waveforms: Triphasic bilaterally.      Impression    IMPRESSION: Bilateral ABIs could not be calculated secondary to  noncompressible vessels. Waveforms are triphasic bilaterally.    JUSTINO CRITERIA:  >0.95 Normal  0.90 - 0.94 Mild  0.5 - 0.89 Moderate  0.2 - 0.49 Severe  <0.2 Critical    INOCENCIO LITTLEJOHN DO   US Lower Extremity Venous Duplex Right    Narrative    VENOUS ULTRASOUND RIGHT LEG November 5, 2018 1:54 AM     HISTORY: Rule out deep vein thrombosis.     COMPARISON: 9/18/2018.    FINDINGS: Examination of the deep veins with graded compression and  color flow Doppler with spectral wave form analysis shows no evidence  of thrombus right common femoral vein, femoral vein, popliteal vein or  calf veins. The left common femoral vein was evaluated for comparison  and is normal.      Impression    IMPRESSION: No deep vein thrombosis in the right lower extremity.    INOCENCIO LITTLEJOHN DO

## 2018-11-05 NOTE — PLAN OF CARE
Problem: Patient Care Overview  Goal: Plan of Care/Patient Progress Review  PRIMARY DIAGNOSIS: Lower leg pain and Lower leg edema  OUTPATIENT/OBSERVATION GOALS TO BE MET BEFORE DISCHARGE:  1. Pain Status: Pain free.    2. Return to near baseline physical activity: No    3. Cleared for discharge by consultants (if involved): No    Vitals are Temp: 96.7  F (35.9  C) Temp src: Oral BP: 105/50 Pulse: 73 Heart Rate: 65 Resp: 16 SpO2: 99 %.    Patient is Alert and Oriented x4. They are 2 assist with Gait Belt and Walker.  Pt is a Dialysis diet and denying pain.  Patient is Saline locked.  Patient has swelling in his legs and feet.  Patient has a fistula on his right arm.  PT is recommending a TCU.  SW is following for placement.  Patient has a nephrology consult for his dialysis.    Discharge Planner Nurse   Safe discharge environment identified: No  Barriers to discharge: Yes          Please review provider order for any additional goals.   Nurse to notify provider when observation goals have been met and patient is ready for discharge.

## 2018-11-05 NOTE — PLAN OF CARE
"Problem: Patient Care Overview  Goal: Plan of Care/Patient Progress Review  Outcome: Improving  PRIMARY DIAGNOSIS: GENERALIZED WEAKNESS     OUTPATIENT/OBSERVATION GOALS TO BE MET BEFORE DISCHARGE  1. Orthostatic performed: N/A     2. Tolerating PO medications: Yes     3. Return to near baseline physical activity: Yes- assist of 1 with walker and gait belt      4. Cleared for discharge by consultants (if involved): No     Patient denies pain. Reports that he is \"feeling much better than when I came in.\" Patient was able to tolerate ambulation with assist of 1. Leg ultrasound negative for DVT. Awaiting [facility] care nurse consult. Social work following to assist in safe discharge.      Discharge Planner Nurse   Safe discharge environment identified: No  Barriers to discharge: Yes       Entered by: Tootie Angel 11/05/2018   Please review provider order for any additional goals.   Nurse to notify provider when observation goals have been met and patient is ready for discharge.         "

## 2018-11-05 NOTE — PLAN OF CARE
Problem: Patient Care Overview  Goal: Plan of Care/Patient Progress Review  PRIMARY DIAGNOSIS: Lower leg pain and Lower leg edema  OUTPATIENT/OBSERVATION GOALS TO BE MET BEFORE DISCHARGE:  1. Pain Status: Pain free.      2. Return to near baseline physical activity: No      3. Cleared for discharge by consultants (if involved): No    Vitals are Temp: 95.1  F (35.1  C) Temp src: Oral BP: 105/60   Heart Rate: 63 Resp: 16 SpO2: 97 %  Patient is Alert and Oriented x4. They are 2 assist with Gait Belt and Walker.  Pt is a Dialysis diet and denying pain.  Patient is Saline locked.  Patient has swelling in his legs and feet.  Patient has a fistula on his right arm.  PT is recommending a TCU.  SW is following for placement.  Patient has a nephrology consult for his dialysis.  Patient went down for ultrasounds of his leg tonight.      Discharge Planner Nurse   Safe discharge environment identified: No  Barriers to discharge: Yes     Please review provider order for any additional goals.   Nurse to notify provider when observation goals have been met and patient is ready for discharge.

## 2018-11-05 NOTE — PROGRESS NOTES
11/04/18 1127   Quick Adds   Type of Visit Initial PT Evaluation   Living Environment   Lives With alone   Living Arrangements other (see comments)  ([facility])   Home Accessibility bed and bath on same level   Number of Stairs to Enter Home 7   Number of Stairs Within Home 7   Stair Railings at Home inside, present on right side   Living Environment Comment Patient reports that home is split level with 7 stairs outside of home, 7 steps to access main level. He reports he does not have to go onto lower level at all, since nothing is down there but laundry. Niece does all the cleaning and laundry for pt.   Self-Care   Dominant Hand right   Usual Activity Tolerance moderate   Current Activity Tolerance fair   Equipment Currently Used at Home none   Functional Level Prior   Ambulation 0-->independent   Transferring 0-->independent   Toileting 0-->independent   Bathing 0-->independent   Dressing 0-->independent   Eating 0-->independent   Communication 0-->understands/communicates without difficulty   Swallowing 0-->swallows foods/liquids without difficulty   Cognition 0 - no cognition issues reported   Fall history within last six months yes   Number of times patient has fallen within last six months 1   Which of the above functional risks had a recent onset or change? ambulation;transferring;toileting;bathing;fall history   Prior Functional Level Comment Patient was recently discharged from TCU setting; patient reported that he had been at TCU for 30 days; was given the choice between paying out of pocket for continued TCU/LTC, patient decided to discharge to home. Patient was admitted approximately 1 day later. Patient reports that he is on the list for an [facility] in [place], [place]. Patient currently lives alone in a 2 level town house with 7 stairs to enter and 7 stairs to access main level. Patient reports that at baseline, he uses a 2WW for all mobility.    General Information   Onset of Illness/Injury or Date of  Surgery - Date 11/03/18   Referring Physician Jayant Ricketts MD   Patient/Family Goals Statement patient would like to go to TCU   Pertinent History of Current Problem (include personal factors and/or comorbidities that impact the POC) Patient with PMH including chronic anemia, CAD with prior PCI, ESRD, HTN, severe pulmonary HTN, CHF now admitted with bilateral leg swelling, difficulty with ambulation, near fall events and generalized weakness.     Precautions/Limitations fall precautions   Cognitive Status Examination   Orientation orientation to person, place and time   Pain Assessment   Patient Currently in Pain Yes, see Vital Sign flowsheet  (reports LE pain)   Integumentary/Edema   Integumentary/Edema Comments Patient with bilateral edema, increased erythema in bilateral calves   Posture    Posture Forward head position;Protracted shoulders   Range of Motion (ROM)   ROM Comment WFL   Strength   Strength Comments Severe gloabl strength deficits requiring assist for bed moblity and transfers   Bed Mobility   Bed Mobility Comments Transferred to sitting at EOB with mod A, direct verbal and tactile cueing.     Transfer Skills   Transfer Comments Patient transferred to standing at EOB with 2WW and min A.     Gait   Gait Comments Patient amb 5 feet within room with 2WW and CGA; patient with slow gait speed, decreased foot clearance.  Declined further ambulation secondary to LE pain.     Balance   Balance Comments CGA with all standing dynamic mobility   Clinical Impression   Criteria for Skilled Therapeutic Intervention evaluation only   PT Diagnosis decreased independence with mobility   Influenced by the following impairments LE pain   Clinical Presentation Stable/Uncomplicated   Clinical Presentation Rationale complex pmh, stable presentation, limited social support   Clinical Decision Making (Complexity) Low complexity   Therapy Frequency` (evaluation only)   Predicted Duration of Therapy Intervention  "(days/wks) evaluation only   Anticipated Discharge Disposition Transitional Care Facility   Risk & Benefits of therapy have been explained Yes   Patient, Family & other staff in agreement with plan of care Yes   [facility] TM \"6 Clicks\"   2016, Trustees of [facility], under license to [employer].  All rights reserved.   6 Clicks Short Forms Basic Mobility Inpatient Short Form   [facility] AM-PAC  \"6 Clicks\" V.2 Basic Mobility Inpatient Short Form   1. Turning from your back to your side while in a flat bed without using bedrails? 4 - None   2. Moving from lying on your back to sitting on the side of a flat bed without using bedrails? 2 - A Lot   3. Moving to and from a bed to a chair (including a wheelchair)? 3 - A Little   4. Standing up from a chair using your arms (e.g., wheelchair, or bedside chair)? 3 - A Little   5. To walk in hospital room? 1 - Total   6. Climbing 3-5 steps with a railing? 1 - Total   Basic Mobility Raw Score (Score out of 24.Lower scores equate to lower levels of function) 14   Total Evaluation Time   Total Evaluation Time (Minutes) 25     "

## 2018-11-05 NOTE — PROGRESS NOTES
"Pt ambulated in the hallway  40 feet Ax1 with walker and gait belt. Pt c/o of dizziness,leg numbness and feeling \"tired\". RN notified  "

## 2018-11-05 NOTE — PROGRESS NOTES
Assessment and Plan:   ESRD: HD in am. Orders written. We will run for 3 h and aim for 3 LUF.             Interval History:   ASCVD  CHF  HT  Pulm HT  Chronic edema                 Review of Systems:   C/O weakness and inability to ambulate. Appetite ok with no N or V. Denies SOB , chest pain or abd pain.           Medications:       acetaminophen  975 mg Oral Q8H     aspirin  81 mg Oral Daily     atorvastatin  40 mg Oral At Bedtime     calcium acetate  2,001 mg Oral TID w/meals     heparin  5,000 Units Subcutaneous Q8H     insulin aspart  1-3 Units Subcutaneous TID AC     insulin aspart  1-3 Units Subcutaneous At Bedtime     metoprolol succinate  12.5 mg Oral Daily     polyethylene glycol  17 g Oral Daily         Current active medications and PTA medications reviewed, see medication list for details.            Physical Exam:   Vitals were reviewed  Patient Vitals for the past 24 hrs:   BP Temp Temp src Heart Rate Resp SpO2 Weight   18 1149 92/49 95.6  F (35.3  C) Oral 65 16 99 % -   18 0724 115/64 96.1  F (35.6  C) Oral 65 16 93 % -   18 0401 105/60 95.1  F (35.1  C) Oral 63 16 97 % -   18 0156 - - - - - - 78 kg (172 lb)   18 2357 125/66 96.1  F (35.6  C) Oral 62 16 97 % -   18 2004 105/50 96.7  F (35.9  C) Oral 65 16 99 % -   18 1544 111/47 98.1  F (36.7  C) Oral 72 20 98 % -       Temp:  [95.1  F (35.1  C)-98.1  F (36.7  C)] 95.6  F (35.3  C)  Heart Rate:  [62-72] 65  Resp:  [16-20] 16  BP: ()/(47-66) 92/49  SpO2:  [93 %-99 %] 99 %    Temperatures:  Current - Temp: 95.6  F (35.3  C); Max - Temp  Av.3  F (35.7  C)  Min: 95.1  F (35.1  C)  Max: 98.1  F (36.7  C)  Respiration range: Resp  Av.7  Min: 16  Max: 20  Pulse range: No Data Recorded  Blood pressure range: Systolic (24hrs), Av , Min:92 , Max:125   ; Diastolic (24hrs), Av, Min:47, Max:66    Pulse oximetry range: SpO2  Av.2 %  Min: 93 %  Max: 99 %         No intake or output  data in the 24 hours ending 11/05/18 1309    Alert, sitting up in chair  LE with erythema and woody edema  RUAF with good bruit and vein  Lungs with clear BS  Cor RRR nl S1 S2 no M       Wt Readings from Last 4 Encounters:   11/05/18 78 kg (172 lb)   10/19/18 74.7 kg (164 lb 11.2 oz)   09/22/18 73.2 kg (161 lb 6 oz)   08/22/18 68 kg (150 lb)          Data:          Lab Results   Component Value Date     11/03/2018     09/22/2018     09/19/2018    Lab Results   Component Value Date    CHLORIDE 108 11/03/2018    CHLORIDE 99 09/22/2018    CHLORIDE 102 09/19/2018    Lab Results   Component Value Date    BUN 44 11/03/2018    BUN 43 09/22/2018    BUN 33 09/19/2018      Lab Results   Component Value Date    POTASSIUM 5.2 11/03/2018    POTASSIUM 4.6 09/22/2018    POTASSIUM 4.2 09/19/2018    Lab Results   Component Value Date    CO2 27 11/03/2018    CO2 28 09/22/2018    CO2 26 09/19/2018    Lab Results   Component Value Date    CR 4.73 11/03/2018    CR 6.18 09/22/2018    CR 5.42 09/19/2018        Recent Labs   Lab Test  11/03/18   2341  09/25/18   0727  09/22/18   0632  09/19/18   0650   WBC  7.0   --   5.1  5.4   HGB  11.3*   --   9.8*  9.4*   HCT  36.5*   --   31.3*  30.0*   MCV  116*   --   113*  114*   PLT  128*  192  168  104*     Recent Labs   Lab Test  09/18/18   1228  08/09/17   1810  09/24/16   1100   AST  17  19  12   ALT  9  16  11   ALKPHOS  144  53  54   BILITOTAL  1.0  0.7  0.8       Recent Labs   Lab Test  12/24/14   0820   MAG  2.2     Recent Labs   Lab Test  08/15/17   0650  09/27/16   0630  09/26/16   0623   PHOS  4.4  3.3  3.8     Recent Labs   Lab Test  11/03/18   2341  09/22/18   0632  09/19/18   0650   KIMBER  8.9  8.3*  8.4*       Lab Results   Component Value Date    KIMBER 8.9 11/03/2018     Lab Results   Component Value Date    WBC 7.0 11/03/2018    HGB 11.3 (L) 11/03/2018    HCT 36.5 (L) 11/03/2018     (H) 11/03/2018     (L) 11/03/2018     Lab Results   Component Value  Date     11/03/2018    POTASSIUM 5.2 11/03/2018    CHLORIDE 108 11/03/2018    CO2 27 11/03/2018     (H) 11/03/2018     Lab Results   Component Value Date    BUN 44 (H) 11/03/2018    CR 4.73 (H) 11/03/2018     Lab Results   Component Value Date    MAG 2.2 12/24/2014     Lab Results   Component Value Date    PHOS 4.4 08/15/2017       Creatinine   Date Value Ref Range Status   11/03/2018 4.73 (H) 0.66 - 1.25 mg/dL Final   09/22/2018 6.18 (H) 0.66 - 1.25 mg/dL Final   09/19/2018 5.42 (H) 0.66 - 1.25 mg/dL Final   09/18/2018 4.05 (H) 0.66 - 1.25 mg/dL Final   08/15/2017 9.07 (H) 0.66 - 1.25 mg/dL Final   08/14/2017 8.01 (H) 0.66 - 1.25 mg/dL Final       Attestation:  I have reviewed today's vital signs, notes, medications, labs and imaging.     Gennaro Bertrand MD

## 2018-11-05 NOTE — PLAN OF CARE
Problem: Patient Care Overview  Goal: Plan of Care/Patient Progress Review  Outcome: Improving  PRIMARY DIAGNOSIS: GENERALIZED WEAKNESS    OUTPATIENT/OBSERVATION GOALS TO BE MET BEFORE DISCHARGE  1. Orthostatic performed: N/A    2. Tolerating PO medications: Yes    3. Return to near baseline physical activity: No    4. Cleared for discharge by consultants (if involved): No    Patient denies pain. Reports intermittent shooting pain in BLE but appeared comfortable with activity. RLE is more red then LLE. BLE have mild edema. Patient able to transfer with assist of 2 and use of gait belt and walker; tolerated transfer well and could easily be an assist of 1. Ultrasound completed this morning on BLE. [facility] care nurse consulted to assess legs. Social work following to assist in safe discharge.     Discharge Planner Nurse   Safe discharge environment identified: No  Barriers to discharge: Yes       Entered by: Tootie Angel 11/05/2018 8:15 AM     Please review provider order for any additional goals.   Nurse to notify provider when observation goals have been met and patient is ready for discharge.

## 2018-11-06 NOTE — PLAN OF CARE
Problem: Patient Care Overview  Goal: Plan of Care/Patient Progress Review  Outcome: No Change  PRIMARY DIAGNOSIS: GENERALIZED WEAKNESS     OUTPATIENT/OBSERVATION GOALS TO BE MET BEFORE DISCHARGE  1. Orthostatic performed: No     2. Tolerating PO medications: Yes     3. Return to near baseline physical activity: Yes     4. Cleared for discharge by consultants (if involved): No; Social work looking for TCU placement.     Patient denies pain and discomfort. BLE still edematous. ACE wraps re-applied to BLE. BLE kary, RLE red. Patient is able to ambulate and transfer with the assist of 1 and walker. Patient returned from dialysis at 1330. Parameters added to patient's Metoprolol order. Continuing to await placement in TCU.      Discharge Planner Nurse   Safe discharge environment identified: No  Barriers to discharge: Yes       Entered by: Tootie Angel 11/06/2018   Please review provider order for any additional goals.   Nurse to notify provider when observation goals have been met and patient is ready for discharge.

## 2018-11-06 NOTE — PLAN OF CARE
Problem: Patient Care Overview  Goal: Plan of Care/Patient Progress Review  PRIMARY DIAGNOSIS: GENERALIZED WEAKNESS      OUTPATIENT/OBSERVATION GOALS TO BE MET BEFORE DISCHARGE  1. Orthostatic performed: No      2. Tolerating PO medications: Yes      3. Return to near baseline physical activity: Yes      4. Cleared for discharge by consultants (if involved): No;  TCU placement pending, SW involved.      Temp: 97  F (36.1  C) Temp src: Oral BP: 119/60 Pulse: 70 Heart Rate: 58 Resp: 18 SpO2: 98 % O2 Device: None (Room air)       VSS.  Patient currently denies pain and discomfort.  BLE edemtous. Ace Wraps remain intact.  Moderate amount o f edema on the tops of both feet.  Patient ambulating in the room with assist of one, walker and gait belt.  Patient up in chair consuming his meal tray.  PLAN:  Continue to provide supportive cares.    Discharge Planner Nurse   Safe discharge environment identified: No  Barriers to discharge: Yes       Entered by:  Karen Lesch  11/06/2018  16:00 PM   Please review provider order for any additional goals.   Nurse to notify provider when observation goals have been met and patient is ready for discharge.

## 2018-11-06 NOTE — PROGRESS NOTES
Discharge Planner   Discharge Plans in progress: Yes  Barriers to discharge plan: Recommending TCU at discharge. Patient agreeable to TCU and referrals sent 11/5/18. [facility] has accepted pending insurance auth, however patient and family are no longer wanting to pursue d/t switching dialysis and transportation costs. [facility] has accepted and has a bed, requesting private pay deposit of $2000, which patient and family state is not financially feasible. Requesting [facility] to review for insurance coverage. Additional referrals to be sent to the [place]/ [place] area to accommodate dialysis and closer transportation.   Follow up plan: CM to follow up with TCU referrals and placement.        Entered by: Tatyana Crum 11/06/2018 3:50 PM

## 2018-11-06 NOTE — PLAN OF CARE
Problem: Patient Care Overview  Goal: Plan of Care/Patient Progress Review  PRIMARY DIAGNOSIS: Lower leg edema  OUTPATIENT/OBSERVATION GOALS TO BE MET BEFORE DISCHARGE:  1. Pain Status: Pain free.     2. Return to near baseline physical activity: No     3. Cleared for discharge by consultants (if involved): No     Vitals are Temp: 96.5  F (35.8  C) Temp src: Oral BP: 120/64   Heart Rate: 68 Resp: 18 SpO2: 95 %.    Patient is Alert and Oriented x4. They are 1 Assist with Gait Belt and Walker.  Pt is a Dialysis diet and denying pain.  Patient is Saline locked.  Patients legs are wrapped with an ACE wrap.  PT recommended TCU and SW is looking for placement.  Patient has a right arm fistula.  Patient has a nephrology consult and has dialysis today.        Discharge Planner Nurse   Safe discharge environment identified: No  Barriers to discharge: Yes          Please review provider order for any additional goals.   Nurse to notify provider when observation goals have been met and patient is ready for discharge.

## 2018-11-06 NOTE — PLAN OF CARE
Problem: Patient Care Overview  Goal: Plan of Care/Patient Progress Review  Outcome: Improving  PRIMARY DIAGNOSIS: GENERALIZED WEAKNESS/swollen legs.     OUTPATIENT/OBSERVATION GOALS TO BE MET BEFORE DISCHARGE  1. Orthostatic performed: N/A    2. Tolerating PO medications: Yes    3. Return to near baseline physical activity: Patient is ambulating x1 with walker very slowly due to pain in lower extremities and swelling.     4. Cleared for discharge by consultants (if involved): N/A    Discharge Planner Nurse   Safe discharge environment identified: Yes  Barriers to discharge: yes, sw working on TCU for patient, safe discharge.        Entered by: Tabby Martinez 11/05/2018 10:51 PM     Please review provider order for any additional goals.   Nurse to notify provider when observation goals have been met and patient is ready for discharge.

## 2018-11-06 NOTE — PROGRESS NOTES
Potassium   Date Value Ref Range Status   11/03/2018 5.2 3.4 - 5.3 mmol/L Final   ]  Lab Results   Component Value Date    HGB 11.3 11/03/2018     Weight: 78 kg (172 lb)  POST WT  DIALYSIS PROCEDURE NOTE  Hepatitis status of previous patient on machine log was checked and ok to use with this patient hepatitis status.  Patient dialyzed for 3 hrs on a 2 K bath with a  net fluid removal of 3L.  A BFR of 400ml/min was obtained via RAVF.  Patient was seen by Dr. Lambert during treatment.  Total heparin received during treatment: 1500 units. Meds given: Epogen 2000u and Hectorol 4mcg Complications: None   Procedure and ESRD teaching done and questions answered.  See flowsheet in EPIC for further details and post assessment.  Machine water alarm in place and functioning.  HEPATITIS B SURFACE ANTIGEN Negative Date 10/9/18 HEPATITIS B SURFACE ANTIBODY Immune DATE 10/9/18  CHLORINE AND CHLORAMINES CHECK on WATER SYSTEM EVERY 4 hours.   PT returned via WC  Catheter Access: Aseptic prep done for both on/off.  Report received from: ANTELMO Angel RN  Report given to: ANTELMO Angel RN  Outpatient Dialysis at [facility]

## 2018-11-06 NOTE — PROGRESS NOTES
[facility]  Hospitalist Progress Note  Carine Calderon PA-C 11/06/2018    Reason for Stay (Diagnosis): generalized weakness         Assessment and Plan:      Summary of Stay: Maurizio Ohara is a 89 year old male with ESRD (HD Tu, Th, Sat), CAD (STEMI in 2014, stent in the RCA with mod LAD disease), recent R LE cellulitis (admitted from 9/18-9/25) who went to TCU and went home x 1 day now admitted on 11/3/2018 with continued weakness and wanting to go back to TCU.    Problem List:   Weakness/need for continue rehab    - apparently patient was recommended to continue stay at TCU versus consider long term care when he was at TCU. Patient refused and went home x 1 day and then came back to the ED    - patient states he disliked that TCU and would not go back there    - seen by PT    - will need new TCU-  continuing to lood for a bed     Lower extrem swelling    - was thought to be cellulitis in Sept, but did not respond to antibiotics    - he does not have a fever or elevated on WBC    - likely has underlying PVD    - US was negative for DVT    - ABIs attempted here. They were inconclusive due to his vessels being noncompressible    - legs are wrapped with ace wraps, keep elevated    - referral to Vascular Surgery as outpt     ESRD    - on dialysis Tu/Th/Sat    - Nephrology consulted, completed dialysis run today     CAD    - STEMI with RCA stent    - currently stable    - cont meds     HLP    - cont meds    Severe Aortic stenosis  - LAN 09/29/2016 showed moderate aortic stenosis.    Echo 10/15/18: severe aortic stenosis, low flow state, mild AI, echogenic mobile mass attached to aortic aspect of the aortic valve.  This may be vegetation, mobile calcification. Given recent hx of cellulitis, cardiology recommended LAN which is scheduled tomorrow (11/7), cardiology would like to get this completed before pt returns to TCU. Anticipate discharging pt in AM to his LAN appt and then  transfer to TCU after this is complete. This is dependent on whether a TCU bed is confirmed prior to the appt.     DVT Prophylaxis: Heparin SQ  Code Status: Full Code  Discharge Dispo: TCU, likely tomorrow  Estimated Disch Date / # of Days until Disch: 1 day        Interval History (Subjective):      He does not have any complaints. Dialysis completed, no acute issues                   Physical Exam:      Last Vital Signs:  /60 (BP Location: Left arm)  Pulse 70  Temp 97  F (36.1  C) (Oral)  Resp 18  Wt 78 kg (172 lb)  SpO2 99%  BMI 26.94 kg/m2      Intake/Output Summary (Last 24 hours) at 11/06/18 1728  Last data filed at 11/06/18 1145   Gross per 24 hour   Intake              480 ml   Output             3000 ml   Net            -2520 ml       Constitutional: Awake, alert, cooperative, no apparent distress   Respiratory: Clear to auscultation bilaterally, no crackles or wheezing   Cardiovascular: irregular rate and rhythm, normal S1 and S2, and no murmur noted   Abdomen: Normal bowel sounds, soft, non-distended, non-tender   Skin:    Neuro: Alert and oriented x3, no numbness, memory loss   Extremities: normal range of motion   Other(s):        All other systems: Negative          Medications:      All current medications were reviewed with changes reflected in problem list.         Data:      All new lab and imaging data was reviewed.   Labs:    Recent Labs  Lab 11/03/18  2341   NTBNPI [id number]*       Recent Labs  Lab 11/03/18  2341   WBC 7.0   HGB 11.3*   HCT 36.5*   *   *       Recent Labs  Lab 11/03/18  2341      POTASSIUM 5.2   CHLORIDE 108   CO2 27   ANIONGAP 7   *   BUN 44*   CR 4.73*   GFRESTIMATED 12*   GFRESTBLACK 14*   KIMBER 8.9     Lactic acid - 2.0    Recent Labs  Lab 11/05/18  1200 11/05/18  0839 11/05/18  0155 11/04/18  2146 11/04/18  1210  11/03/18  2341   GLC  --   --   --   --   --   --  115*   * 115* 117* 152* 165*  < >  --    < > = values in this interval  not displayed.   Imaging:   Recent Results (from the past 48 hour(s))   US JUSTINO Doppler No Exercise    Narrative    ULTRASOUND JUSTINO DOPPLER NO EXERCISE, 1-2 LEVELS, BILAT November 5, 2018  1:54 AM     HISTORY: Right leg erythema/swelling.     COMPARISON: None.    FINDINGS:  Right JUSTINO: Right JUSTINO could not be calculated secondary to  noncompressible vessels.  Left JUSTINO: Left JUSTINO could not be calculated secondary to  noncompressible vessels.    Waveforms: Triphasic bilaterally.      Impression    IMPRESSION: Bilateral ABIs could not be calculated secondary to  noncompressible vessels. Waveforms are triphasic bilaterally.    JUSTINO CRITERIA:  >0.95 Normal  0.90 - 0.94 Mild  0.5 - 0.89 Moderate  0.2 - 0.49 Severe  <0.2 Critical    INOCENCIO LITTLEJOHN DO   US Lower Extremity Venous Duplex Right    Narrative    VENOUS ULTRASOUND RIGHT LEG November 5, 2018 1:54 AM     HISTORY: Rule out deep vein thrombosis.     COMPARISON: 9/18/2018.    FINDINGS: Examination of the deep veins with graded compression and  color flow Doppler with spectral wave form analysis shows no evidence  of thrombus right common femoral vein, femoral vein, popliteal vein or  calf veins. The left common femoral vein was evaluated for comparison  and is normal.      Impression    IMPRESSION: No deep vein thrombosis in the right lower extremity.    DO Carine FRANCES PA-C

## 2018-11-06 NOTE — PLAN OF CARE
Problem: Patient Care Overview  Goal: Plan of Care/Patient Progress Review  Outcome: Improving  PRIMARY DIAGNOSIS: GENERALIZED WEAKNESS    OUTPATIENT/OBSERVATION GOALS TO BE MET BEFORE DISCHARGE  1. Orthostatic performed: No    2. Tolerating PO medications: Yes    3. Return to near baseline physical activity: Yes    4. Cleared for discharge by consultants (if involved): No; Social work looking for TCU placement.    Patient denies pain and discomfort. BLE still edematous. ACE wraps re-applied to BLE. BLE kary, RLE red. Patient is able to ambulate and transfer with the assist of 1 and walker. Patient left for dialysis at 0810. AM Metoprolol held until patient returns from dialysis due to BP. Patient to consult with outpatient vascular surgery upon discharge. Patient awaiting placement in TCU.     Discharge Planner Nurse   Safe discharge environment identified: No  Barriers to discharge: Yes       Entered by: Tootie Angel 11/06/2018      Please review provider order for any additional goals.   Nurse to notify provider when observation goals have been met and patient is ready for discharge.

## 2018-11-06 NOTE — TELEPHONE ENCOUNTER
"Patient is scheduled for a LAN tomorrow. Patient is currently inpatient at [facility]. Called and spoke to Tatyana CARUSO on 2nd Floor ([phone]). They are planning to keep patient overnight.     Dr. Lockwood is scheduled to perform the procedure. Discuss inpatient status with Dr. Lockwood. Dr. Lockwood stated he will perform the procedure tomorrow as planned.     Spoke with Carine \"PA\" on 2nd floor, they are concerned about billing. If patient has his procedure as an inpatient, his insurance may not cover it. Discussed with Carine that patient could be discharged, have his procedure,and then be transferred to TCU. Also reviewed that once patient is at TCU, staff should keep an eye on patient due to the sedation and to watch for post procedure complications.     TGarbers RN       "

## 2018-11-06 NOTE — PROGRESS NOTES
Assessment and Plan:   ESRD: Getting hectorol and EPO on the run. Set for 3 L UF, 3 h run, 15 ga needles DORIS. 2K and 33 HCO3.   Continue T Th S schedule.             Interval History:   ASCVD  CHF  HT  Pulm HT  Chronic edema                   Review of Systems:   Feels well on dialysis. No sx during the run.           Medications:       - MEDICATION INSTRUCTIONS for Dialysis Patients -   Does not apply See Admin Instructions     acetaminophen  975 mg Oral Q8H     aspirin  81 mg Oral Daily     atorvastatin  40 mg Oral At Bedtime     calcium acetate  2,001 mg Oral TID w/meals     doxercalciferol  4 mcg Intravenous Once in dialysis     epoetin curly (EPOGEN,PROCRIT) inj ESRD  2,000 Units Intravenous Once in dialysis     heparin  5,000 Units Subcutaneous Q8H     metoprolol succinate  12.5 mg Oral Daily     polyethylene glycol  17 g Oral Daily       heparin (porcine) 500 Units/hr (11/06/18 0912)     - MEDICATION INSTRUCTIONS -       Current active medications and PTA medications reviewed, see medication list for details.            Physical Exam:   Vitals were reviewed  Patient Vitals for the past 24 hrs:   BP Temp Temp src Heart Rate Resp SpO2   11/06/18 0930 108/64 - - 74 17 99 %   11/06/18 0915 110/59 - - 81 17 99 %   11/06/18 0900 133/68 - - 73 17 99 %   11/06/18 0845 123/71 - - 62 17 100 %   11/06/18 0830 128/63 98.3  F (36.8  C) Oral 64 18 100 %   11/06/18 0735 107/57 96.8  F (36  C) Oral 64 16 95 %   11/06/18 0257 120/64 96.5  F (35.8  C) Oral 68 18 95 %   11/05/18 2343 106/59 96.7  F (35.9  C) Oral 62 16 95 %   11/05/18 2010 106/50 96.1  F (35.6  C) Oral 66 16 99 %   11/05/18 1700 110/50 96.4  F (35.8  C) Oral 63 16 97 %   11/05/18 1149 92/49 95.6  F (35.3  C) Oral 65 16 99 %       Temp:  [95.6  F (35.3  C)-98.3  F (36.8  C)] 98.3  F (36.8  C)  Heart Rate:  [62-81] 74  Resp:  [16-18] 17  BP: ()/(49-71) 108/64  SpO2:  [95 %-100 %] 99 %    Temperatures:  Current - Temp: 98.3  F (36.8  C); Max - Temp   Av.6  F (35.9  C)  Min: 95.6  F (35.3  C)  Max: 98.3  F (36.8  C)  Respiration range: Resp  Av.7  Min: 16  Max: 18  Pulse range: No Data Recorded  Blood pressure range: Systolic (24hrs), Av , Min:92 , Max:133   ; Diastolic (24hrs), Av, Min:49, Max:71    Pulse oximetry range: SpO2  Av.9 %  Min: 95 %  Max: 100 %    I/O last 3 completed shifts:  In: 480 [P.O.:480]  Out: -       Intake/Output Summary (Last 24 hours) at 18 0949  Last data filed at 18 0505   Gross per 24 hour   Intake              480 ml   Output                0 ml   Net              480 ml       Alert, responsive  DORIS with needles in place  LE wrapped  Lungs with clear ant BS  Cor RRR nl s1 S2 2/6 sys M         Wt Readings from Last 4 Encounters:   18 78 kg (172 lb)   10/19/18 74.7 kg (164 lb 11.2 oz)   18 73.2 kg (161 lb 6 oz)   18 68 kg (150 lb)          Data:          Lab Results   Component Value Date     2018     2018     2018    Lab Results   Component Value Date    CHLORIDE 108 2018    CHLORIDE 99 2018    CHLORIDE 102 2018    Lab Results   Component Value Date    BUN 44 2018    BUN 43 2018    BUN 33 2018      Lab Results   Component Value Date    POTASSIUM 5.2 2018    POTASSIUM 4.6 2018    POTASSIUM 4.2 2018    Lab Results   Component Value Date    CO2 27 2018    CO2 28 2018    CO2 26 2018    Lab Results   Component Value Date    CR 4.73 2018    CR 6.18 2018    CR 5.42 2018        Recent Labs   Lab Test  18   2341  18   0727  18   0632  18   0650   WBC  7.0   --   5.1  5.4   HGB  11.3*   --   9.8*  9.4*   HCT  36.5*   --   31.3*  30.0*   MCV  116*   --   113*  114*   PLT  128*  192  168  104*     Recent Labs   Lab Test  18   1228  17   1810  16   1100   AST  17  19  12   ALT  9  16  11   ALKPHOS  144  53  54   BILITOTAL  1.0   0.7  0.8       Recent Labs   Lab Test  12/24/14   0820   MAG  2.2     Recent Labs   Lab Test  08/15/17   0650  09/27/16   0630  09/26/16   0623   PHOS  4.4  3.3  3.8     Recent Labs   Lab Test  11/03/18   2341  09/22/18   0632  09/19/18   0650   KIMBER  8.9  8.3*  8.4*       Lab Results   Component Value Date    KIMBER 8.9 11/03/2018     Lab Results   Component Value Date    WBC 7.0 11/03/2018    HGB 11.3 (L) 11/03/2018    HCT 36.5 (L) 11/03/2018     (H) 11/03/2018     (L) 11/03/2018     Lab Results   Component Value Date     11/03/2018    POTASSIUM 5.2 11/03/2018    CHLORIDE 108 11/03/2018    CO2 27 11/03/2018     (H) 11/03/2018     Lab Results   Component Value Date    BUN 44 (H) 11/03/2018    CR 4.73 (H) 11/03/2018     Lab Results   Component Value Date    MAG 2.2 12/24/2014     Lab Results   Component Value Date    PHOS 4.4 08/15/2017       Creatinine   Date Value Ref Range Status   11/03/2018 4.73 (H) 0.66 - 1.25 mg/dL Final   09/22/2018 6.18 (H) 0.66 - 1.25 mg/dL Final   09/19/2018 5.42 (H) 0.66 - 1.25 mg/dL Final   09/18/2018 4.05 (H) 0.66 - 1.25 mg/dL Final   08/15/2017 9.07 (H) 0.66 - 1.25 mg/dL Final   08/14/2017 8.01 (H) 0.66 - 1.25 mg/dL Final       Attestation:  I have reviewed today's vital signs, notes, medications, labs and imaging.  Seen on dialysis.      Gennaro Bertrand MD

## 2018-11-07 NOTE — PROGRESS NOTES
[facility]  Hospitalist Progress Note  Carine Calderon PA-C 11/07/2018    Reason for Stay (Diagnosis): generalized weakness          Assessment and Plan:      Summary of Stay: Maurizio Ohara is a 89 year old male with ESRD (HD Tu, Th, Sat), CAD (STEMI in 2014, stent in the RCA with mod LAD disease), recent R LE cellulitis (admitted from 9/18-9/25) who went to TCU and went home x 1 day now admitted on 11/3/2018 with continued weakness and wanting to go back to TCU.     Problem List:   Weakness/need for continue rehab    - apparently patient was recommended to continue stay at TCU versus consider long term care when he was at TCU. Patient refused and went home x 1 day and then came back to the ED    - patient states he disliked that TCU and would not go back there    - seen by PT    - will need new TCU-  continuing to lood for a bed. Unable to secure bed today as pt was too sedated following his LAN to have a conversation with SW.      Lower extrem swelling    - was thought to be cellulitis in Sept, but did not respond to antibiotics    - he does not have a fever or elevated on WBC    - likely has underlying PVD    - US was negative for DVT    - ABIs attempted here. They were inconclusive due to his vessels being noncompressible    - legs are wrapped with ace wraps, keep elevated    - referral to Vascular Surgery as outpt      ESRD    - on dialysis Tu/Th/Sat    - Nephrology consulted, completed dialysis run yesterday, will be due to run tomorrow prior to discharge.       CAD    - STEMI with RCA stent    - currently stable    - cont meds      HLP    - cont meds     Severe Aortic stenosis  - LAN 09/29/2016 showed moderate aortic stenosis.    Echo 10/15/18: severe aortic stenosis, low flow state, mild AI, echogenic mobile mass attached to aortic aspect of the aortic valve.  This may be vegetation, mobile calcification. Given recent hx of cellulitis, cardiology recommended LAN which was  incidentally scheduled for today so this was completed today prior to discharge. Pt returned from procedure significantly sedated. VS are stable but continues to sleep. Continue to monitor as sedation wears off. Unable to discharge today due to prolonged sedation.      DVT Prophylaxis: Heparin SQ  Code Status: Full Code  Discharge Dispo: TCU pending, likely tomorrow  Estimated Disch Date / # of Days until Disch: 1 day           Interval History (Subjective):      LAN completed today, pt is sedated from procedural medications and not answering questions.                   Physical Exam:      Last Vital Signs:  /65  Pulse 70  Temp 95.6  F (35.3  C) (Oral)  Resp 16  Wt 78 kg (172 lb)  SpO2 100%  BMI 26.94 kg/m2    No intake or output data in the 24 hours ending 11/07/18 1622    Constitutional: Sedated, no apparent distress   Respiratory: Clear to auscultation bilaterally, no crackles or wheezing   Cardiovascular: Regular rate and rhythm, normal S1 and S2, and no murmur noted   Abdomen: Normal bowel sounds, soft   Skin: No rashes, no cyanosis, dry to touch   Neuro: Sedated from procedural meds   Extremities: 1+ bilateral LE edema   Other(s):        All other systems: Negative          Medications:      All current medications were reviewed with changes reflected in problem list.         Data:      All new lab and imaging data was reviewed.   Labs:    Recent Labs  Lab 11/03/18  2341   NTBNPI [id number]*       Recent Labs  Lab 11/07/18  0608 11/03/18  2341   WBC  --  7.0   HGB  --  11.3*   HCT  --  36.5*   MCV  --  116*   * 128*       Recent Labs  Lab 11/03/18  2341      POTASSIUM 5.2   CHLORIDE 108   CO2 27   ANIONGAP 7   *   BUN 44*   CR 4.73*   GFRESTIMATED 12*   GFRESTBLACK 14*   KIMBER 8.9       Recent Labs  Lab 11/05/18  1200 11/05/18  0839 11/05/18  0155 11/04/18  2146 11/04/18  1210  11/03/18  2341   GLC  --   --   --   --   --   --  115*   * 115* 117* 152* 165*  < >  --    < > =  values in this interval not displayed.   Imaging:   No results found for this or any previous visit (from the past 48 hour(s)).    Carine Calderon PA-C

## 2018-11-07 NOTE — PLAN OF CARE
Problem: Patient Care Overview  Goal: Plan of Care/Patient Progress Review  PRIMARY DIAGNOSIS: GENERALIZED WEAKNESS      OUTPATIENT/OBSERVATION GOALS TO BE MET BEFORE DISCHARGE  1. Orthostatic performed: N/A      2. Tolerating PO medications: Yes      3. Return to near baseline physical activity: Yes      4. Cleared for discharge by consultants (if involved): No;  TCU placement pending, SW involved.      A&Ox4, VSS. Denies any pain/SOB. BLE's ace wrapped, CMS intact, +pulses. Fistula to R arm. Assist x1 with walker. PT/SW following, planning for TCU placement at discharge. Will continue to monitor.      Discharge Planner Nurse   Safe discharge environment identified: No  Barriers to discharge: Yes     Please review provider order for any additional goals.   Nurse to notify provider when observation goals have been met and patient is ready for discharge.

## 2018-11-07 NOTE — PROCEDURES
LAN Note    Indication: aortic stenosis    Informed consent was signed by the patient.  A timeout was taken.    Sedation:  Versed 2mg  Fentanyl 50mcg    Findings:  LV: EF 55%  RV: normal  LA: no BERNICE thrombus  Mitral valve: Thickened, some restricted motion of the posterior leaflet, moderate to at most moderately severe regurgitation with eccentric, posterior jet  Aortic valve: Severe trileaflet aortic stenosis.  Trace aortic insufficiency.  On the aortic side of the left coronary cusp, there is a mobile piece of calcification,  this does not appear to be an infective endocarditis or vegetation  Tricuspid valve: Mild to moderate tricuspid regurgitation  Atrial septum: Intact, negative bubble study  Aorta: Normal size, moderate atheroma    No complications.    F/u routinely with cardiology to discuss aortic stenosis.  He potentially could be a TAVR candidate.    Karthikeyan Lockwood MD  Cardiology - [facility]  Pager: [phone]  Text Page  November 7, 2018

## 2018-11-07 NOTE — PROGRESS NOTES
Patient had dialysis yesterday.  He ran for 3 hours on a 2K bath with net removal of 3 L of fluid.  His right arm fistula worked well.  He was given EPO and Hectorol during the run.  If he is discharged he should return to dialysis at his usual outpatient unit, however if he is in patient tomorrow we will dialyze him here.

## 2018-11-07 NOTE — PROGRESS NOTES
Received call from [facility] [phone] that they have accepted pt and state that he will have coverage under his last hospitalization/TCU stay. They do have a few different [facility] near them and out-of-pocket transport generally is around $90 round trip.     Spoke to [facility] [phone], they have accepted pt but they are trying to verify if pt was using his Medicare benefits through at least 10/7, if so they will be able to bill this TCU stay under his last admission/TCU stay. They have called [facility] to verify this information and are waiting for a call back. If pt were to go to [facility] he would be able to continue at [facility]. If his [employer] remains inactive, out-of-pocket transport is $75 round trip.     Attempted to meet w/ pt to discuss but he was on stretcher going down to his LAN. Will discuss upon his return. Called nephew Rosa [phone], spoke w/ him and wife Leticia and updated w/ this information. Will update them again after discussing w/ pt.     Update 1400: Pt back from LAN and noted to be sedated due to medications. Pt arouseable but unable to stay awake long enough to have conversation indicating where he would like to go for TCU. Discussed w/ provider, will plan for discharge to TCU after dialysis tomorrow. Updated family. CTS will plan to follow-up w/ pt/family, facilities and [facility] tomorrow.     CM will continue to follow patient for any additional discharge needs.      Angela Jj RN BSN CTS   [phone]  Care Transitions Team  [facility]

## 2018-11-07 NOTE — PLAN OF CARE
Problem: Patient Care Overview  Goal: Plan of Care/Patient Progress Review  PRIMARY DIAGNOSIS: GENERALIZED WEAKNESS      OUTPATIENT/OBSERVATION GOALS TO BE MET BEFORE DISCHARGE  1. Orthostatic performed: N/A      2. Tolerating PO medications: Yes      3. Return to near baseline physical activity: Yes      4. Cleared for discharge by consultants (if involved): No;  TCU placement pending, SW involved.      A&Ox4, VSS on RA. Denies any pain/SOB. LE's ace wrapped, CMS intact, +pulses. Fistula to R arm. Assist x1 with walker. Had dialysis today. PT/SW following, planning for TCU placement at discharge. Will continue to monitor.      Discharge Planner Nurse   Safe discharge environment identified: No  Barriers to discharge: Yes     Please review provider order for any additional goals.   Nurse to notify provider when observation goals have been met and patient is ready for discharge.

## 2018-11-07 NOTE — PLAN OF CARE
Problem: Patient Care Overview  Goal: Plan of Care/Patient Progress Review  PRIMARY DIAGNOSIS: GENERALIZED WEAKNESS      OUTPATIENT/OBSERVATION GOALS TO BE MET BEFORE DISCHARGE  1. Orthostatic performed: N/A      2. Tolerating PO medications: Yes      3. Return to near baseline physical activity: Yes      4. Cleared for discharge by consultants (if involved): No;  TCU placement pending, SW involved.      A&Ox4, VSS. Denies any pain/SOB. BLE's ace wrapped. Fistula to R arm. Assist x1 with walker. PT/SW following, planning for TCU placement at discharge. Will continue to monitor.      Discharge Planner Nurse   Safe discharge environment identified: No  Barriers to discharge: Yes     Please review provider order for any additional goals.   Nurse to notify provider when observation goals have been met and patient is ready for discharge.

## 2018-11-07 NOTE — PROGRESS NOTES
PRIMARY DIAGNOSIS: Leg swelling, weakness  OUTPATIENT/OBSERVATION GOALS TO BE MET BEFORE DISCHARGE:  1. ADLs back to baseline: Yes    2. Activity and level of assistance: Assist 1 gait belt and walker.    3. Pain status: Pain free.    4.  Return to near baseline physical activity: Yes    This morning prior to LAN patient alert and oriented.  Up with assist of one with gait belt and walker.  Right arm fistula dressing CDI, bruit and thrill present.  Patient denies pain.  VSS.  Bilateral LE ace wrapped, edema 2+ pitting.  Renal diet, NPO prior to LAN.  Lung sounds clear.  Bowel sounds active.  Upon returning from LAN patient lethargic, arouses to voice, gentle touch.  Will continue to monitor.       Discharge Planner Nurse   Safe discharge environment identified: No, placement needed, SW following.   Barriers to discharge: Yes, placement SW following.       Entered by: Sary Stahl 11/07/2018 2:08 PM     Please review provider order for any additional goals.   Nurse to notify provider when observation goals have been met and patient is ready for discharge.

## 2018-11-08 PROBLEM — M79.89 LEG SWELLING: Status: RESOLVED | Noted: 2018-01-01 | Resolved: 2018-01-01

## 2018-11-08 NOTE — PROGRESS NOTES
Assessment and Plan:   End-stage renal disease: He is seen during dialysis.  He is getting Hectorol and EPO on the run.  We are set for 3 L of ultrafiltration as tolerated by low blood pressure.  We are using a right upper arm fistula, blood flow rate of 400, 3-hour run.  We will use a 2K and 33 bicarb bath.            Interval History:   Lower extremity edema: His legs are now wrapped, we are removing fluid with dialysis as tolerated by low blood pressure.   Aortic stenosis:   He apparently underwent a LAN E yesterday.  This showed an ejection fraction of 50%, there was a dilated right ventricle with decreased RV function, moderate to severe mitral regurgitation, moderate tricuspid regurgitation, severe valvular aortic stenosis.  Management will be per cardiology.             Review of Systems:   He has no symptoms on dialysis.  He is tolerating ultrafiltration well.  No chest pain or abdominal pain.  No shortness of breath.  He is eating well and denies nausea and vomiting.  He is ambulating only in the room with assistance.          Medications:       - MEDICATION INSTRUCTIONS for Dialysis Patients -   Does not apply See Admin Instructions     sodium chloride 0.9%  250 mL Intravenous Once in dialysis     sodium chloride 0.9%  300 mL Hemodialysis Machine Once     acetaminophen  975 mg Oral Q8H     aspirin  81 mg Oral Daily     atorvastatin  40 mg Oral At Bedtime     calcium acetate  2,001 mg Oral TID w/meals     doxercalciferol  4 mcg Intravenous Once in dialysis     epoetin curly (EPOGEN,PROCRIT) inj ESRD  2,000 Units Intravenous Once in dialysis     heparin (porcine)  500 Units Hemodialysis Machine Once in dialysis     heparin  5,000 Units Subcutaneous Q8H     metoprolol succinate  12.5 mg Oral Daily     polyethylene glycol  17 g Oral Daily     sodium chloride (PF)  3 mL Intravenous Q8H       - MEDICATION INSTRUCTIONS -       heparin (porcine)       - MEDICATION INSTRUCTIONS -       - MEDICATION  INSTRUCTIONS -       Current active medications and PTA medications reviewed, see medication list for details.            Physical Exam:   Vitals were reviewed  Patient Vitals for the past 24 hrs:   BP Temp Temp src Pulse Heart Rate Resp SpO2 Weight   11/08/18 0915 (!) 106/39 - - 72 - 17 100 % -   11/08/18 0900 108/60 - - 65 - 17 99 % -   11/08/18 0852 111/57 97.7  F (36.5  C) Oral 61 - 18 98 % -   11/08/18 0800 - - - - - - - 81.7 kg (180 lb 1.9 oz)   11/08/18 0744 106/54 95.2  F (35.1  C) Oral - 62 16 95 % -   11/08/18 0500 102/56 96.4  F (35.8  C) Oral - 64 18 93 % -   11/08/18 0012 110/61 96.7  F (35.9  C) Oral 70 70 18 95 % -   11/07/18 2021 109/59 95.8  F (35.4  C) Oral 69 70 18 95 % -   11/07/18 1621 106/56 96.4  F (35.8  C) Temporal - 52 16 96 % -   11/07/18 1304 105/65 - - - 68 16 100 % -   11/07/18 1303 102/64 - - - 65 16 100 % -   11/07/18 1258 104/74 - - - 75 17 100 % -   11/07/18 1256 95/59 - - - 65 11 100 % -   11/07/18 1253 97/59 - - - 69 13 100 % -   11/07/18 1252 100/57 - - - 68 12 99 % -   11/07/18 1247 100/68 - - - 66 12 99 % -   11/07/18 1242 103/60 - - - 68 15 100 % -   11/07/18 1240 100/62 - - - 69 12 100 % -   11/07/18 1237 95/64 - - - 66 13 100 % -   11/07/18 1235 92/61 - - - 65 13 100 % -   11/07/18 1232 101/64 - - - 67 13 100 % -   11/07/18 1225 107/67 - - - 69 13 100 % -   11/07/18 1224 122/72 - - - 71 16 100 % -   11/07/18 1221 108/71 - - - - 16 100 % -   11/07/18 1216 100/73 - - - - 16 100 % -   11/07/18 1213 105/70 - - - - 16 100 % -   11/07/18 1211 98/68 - - - - 22 100 % -   11/07/18 1208 111/64 - - - - 20 100 % -   11/07/18 1204 117/74 - - - - 16 100 % -   11/07/18 1202 110/68 - - - - 16 100 % -   11/07/18 1152 118/73 - - - - 16 100 % -       Temp:  [95.2  F (35.1  C)-97.7  F (36.5  C)] 97.7  F (36.5  C)  Pulse:  [61-72] 72  Heart Rate:  [52-75] 62  Resp:  [11-22] 17  BP: ()/(39-76) 106/39  SpO2:  [93 %-100 %] 100 %    Temperatures:  Current - Temp: 97.7  F (36.5  C); Max - Temp   Av.4  F (35.8  C)  Min: 95.2  F (35.1  C)  Max: 97.7  F (36.5  C)  Respiration range: Resp  Avg: 15.6  Min: 11  Max: 22  Pulse range: Pulse  Av.4  Min: 61  Max: 72  Blood pressure range: Systolic (24hrs), Av , Min:92 , Max:122   ; Diastolic (24hrs), Av, Min:39, Max:76    Pulse oximetry range: SpO2  Av.9 %  Min: 93 %  Max: 100 %         No intake or output data in the 24 hours ending 18 0927    Alert and responsive  Right arm fistula with needles in place  Skin with multiple ecchymoses and ulcerations  Lungs with clear anterior breath sounds  Cardiac exam regular rhythm normal S1-S2, 2/6 systolic murmur from the apex to the aortic region  Lower extremities wrapped to the knees with edema above the wrapping       Wt Readings from Last 4 Encounters:   18 81.7 kg (180 lb 1.9 oz)   10/19/18 74.7 kg (164 lb 11.2 oz)   18 73.2 kg (161 lb 6 oz)   18 68 kg (150 lb)          Data:          Lab Results   Component Value Date     2018     2018     2018    Lab Results   Component Value Date    CHLORIDE 108 2018    CHLORIDE 99 2018    CHLORIDE 102 2018    Lab Results   Component Value Date    BUN 44 2018    BUN 43 2018    BUN 33 2018      Lab Results   Component Value Date    POTASSIUM 5.2 2018    POTASSIUM 4.6 2018    POTASSIUM 4.2 2018    Lab Results   Component Value Date    CO2 27 2018    CO2 28 2018    CO2 26 2018    Lab Results   Component Value Date    CR 4.73 2018    CR 6.18 2018    CR 5.42 2018        Recent Labs   Lab Test  18   0608  18   2341  18   0727  18   0632  18   0650   WBC   --   7.0   --   5.1  5.4   HGB   --   11.3*   --   9.8*  9.4*   HCT   --   36.5*   --   31.3*  30.0*   MCV   --   116*   --   113*  114*   PLT  149*  128*  192  168  104*     Recent Labs   Lab Test  18   1228  17   0146   09/24/16   1100   AST  17  19  12   ALT  9  16  11   ALKPHOS  144  53  54   BILITOTAL  1.0  0.7  0.8       Recent Labs   Lab Test  12/24/14   0820   MAG  2.2     Recent Labs   Lab Test  08/15/17   0650  09/27/16   0630  09/26/16   0623   PHOS  4.4  3.3  3.8     Recent Labs   Lab Test  11/03/18   2341  09/22/18   0632  09/19/18   0650   KIMBER  8.9  8.3*  8.4*       Lab Results   Component Value Date    KIMBER 8.9 11/03/2018     Lab Results   Component Value Date    WBC 7.0 11/03/2018    HGB 11.3 (L) 11/03/2018    HCT 36.5 (L) 11/03/2018     (H) 11/03/2018     (L) 11/07/2018     Lab Results   Component Value Date     11/03/2018    POTASSIUM 5.2 11/03/2018    CHLORIDE 108 11/03/2018    CO2 27 11/03/2018     (H) 11/03/2018     Lab Results   Component Value Date    BUN 44 (H) 11/03/2018    CR 4.73 (H) 11/03/2018     Lab Results   Component Value Date    MAG 2.2 12/24/2014     Lab Results   Component Value Date    PHOS 4.4 08/15/2017       Creatinine   Date Value Ref Range Status   11/03/2018 4.73 (H) 0.66 - 1.25 mg/dL Final   09/22/2018 6.18 (H) 0.66 - 1.25 mg/dL Final   09/19/2018 5.42 (H) 0.66 - 1.25 mg/dL Final   09/18/2018 4.05 (H) 0.66 - 1.25 mg/dL Final   08/15/2017 9.07 (H) 0.66 - 1.25 mg/dL Final   08/14/2017 8.01 (H) 0.66 - 1.25 mg/dL Final       Attestation:  I have reviewed today's vital signs, notes, medications, labs and imaging.  Seen on dialysis.     Gennaro Bertrand MD

## 2018-11-08 NOTE — DISCHARGE SUMMARY
[facility]  Outpatient/Observation Unit  Discharge Summary        Patient ID:  Maurizio Ohara  [id number]  89 year old  10/7/1929    Admit date: 11/3/2018    Discharge date and time: 11/8/2018     Admitting Provider: Jayant Ricketts MD    Discharge Provider: Aisha CRUZC    Admission Diagnoses:  Pain in both lower extremities [M79.604, M79.605]  Edema, unspecified type [R60.9]    Discharge Diagnoses:   1.  Generalized weakness due to recent hospitalization  2.  Chronic lower extremity swelling, suspect related to vascular insufficiency.  3.  End-stage renal disease on dialysis Tuesdays Thursdays and Saturday  4.  History of CAD-stable  5.  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  Repeat echocardiogram performed during this hospitalization showed likely calcified mass then vegetation.  Will be referred to outpatient cardiology to discuss potential valve repair.    Admission Condition: fair    Discharged Condition: fair    Hospital Course:    Reason for your hospital stay       You were admitted for generalized weakness after leaving recent rehab   facility.  Initially you were admitted to the hospital at the end of September for lower extremity cellulitis.  You went to rehab and recently was discharged on November 1.  After going home you were too weak to take care of yourself and therefore presented back to the emergency room with wishes to return to TCU.  You are now going to another rehab facility for strength   improvement.   You have underlying peripheral vascular disease that's causing your   swelling. You will need to be referred to a Vascular surgeon upon   discharge by your PCP.   In addition we performed your Echocardiogram while you were here and there   is no evidence of infection/vegetation on the valves but you do have   significant aortic stenosis that you can see  a Cardiologist to discuss   possible valve replacement. Make appt with PCP in 1-2 weeks.       Consults: nephrology and PT    Significant Diagnostic Studies:     Recent Labs  Lab 11/12/18  1020 11/09/18  0414 11/07/18  0608   WBC 8.4 5.6  --    HGB 10.9* 12.1*  --    HCT 34.2* 39.5*  --    * 116*  --    * 148* 149*       Recent Labs  Lab 11/12/18  1020 11/09/18  0414    138   POTASSIUM 5.6* 5.0   CHLORIDE 99 101   CO2 26 29   ANIONGAP 9 8   * 131*   BUN 49* 41*   CR 5.41* 4.87*   GFRESTIMATED 10* 11*   GFRESTBLACK 12* 14*   KIMBER 9.5 9.7     No results for input(s): CULT in the last 168 hours.    Results for orders placed or performed during the hospital encounter of 11/03/18   XR Chest 2 Views    Narrative    CHEST TWO VIEWS 11/4/2018 1:07 AM     HISTORY: Question congestive heart failure.     COMPARISON: 8/9/2017.    FINDINGS: The heart is mildly enlarged and there is pulmonary vascular  congestion. No pulmonary edema or pleural effusion. No pneumothorax.  No airspace consolidation. The lungs are hyperexpanded.       Impression    IMPRESSION: Cardiomegaly and vascular congestion without pulmonary  edema.     JOEL BRAGA MD   US JUSTINO Doppler No Exercise    Narrative    ULTRASOUND JUSTINO DOPPLER NO EXERCISE, 1-2 LEVELS, BILAT November 5, 2018  1:54 AM     HISTORY: Right leg erythema/swelling.     COMPARISON: None.    FINDINGS:  Right JUSTINO: Right JUSTINO could not be calculated secondary to  noncompressible vessels.  Left JUSTINO: Left JUSTINO could not be calculated secondary to  noncompressible vessels.    Waveforms: Triphasic bilaterally.      Impression    IMPRESSION: Bilateral ABIs could not be calculated secondary to  noncompressible vessels. Waveforms are triphasic bilaterally.    JUSTINO CRITERIA:  >0.95 Normal  0.90 - 0.94 Mild  0.5 - 0.89 Moderate  0.2 - 0.49 Severe  <0.2 Critical    INOCENCIO LITTLEJOHN DO   US Lower Extremity Venous Duplex Right    Narrative    VENOUS ULTRASOUND RIGHT LEG November  5, 2018 1:54 AM     HISTORY: Rule out deep vein thrombosis.     COMPARISON: 9/18/2018.    FINDINGS: Examination of the deep veins with graded compression and  color flow Doppler with spectral wave form analysis shows no evidence  of thrombus right common femoral vein, femoral vein, popliteal vein or  calf veins. The left common femoral vein was evaluated for comparison  and is normal.      Impression    IMPRESSION: No deep vein thrombosis in the right lower extremity.    INOCENCIO LITTLEJOHN, DO       Discharge Exam:    B/P: 93/50, T: 97.7, P: 68, R: 17    GENERAL:  Comfortable.  PSYCH: pleasant, oriented, No acute distress.  HEENT:  PERRLA. Normal conjunctiva, normal hearing, nasal mucosa and Oropharynx are normal.  NECK:  Supple, no neck vein distention, adenopathy or bruits, normal thyroid.  HEART:  Normal S1, S2 with soft murmur, no pericardial rub, gallops or S3 or S4.  LUNGS:  Clear to auscultation, normal Respiratory effort. No wheezing, rales or ronchi.  ABDOMEN:  Soft, no hepatosplenomegaly, normal bowel sounds. Non-tender, non distended.   EXTREMITIES:  Mild LE edema, +2 pulses bilateral and equal.  SKIN:  Dry to touch, No rash, wound or ulcerations.  NEUROLOGIC:  CN 2-12 intact, BL 5/5 symmetric upper and lower extremity strength, sensation is intact with no focal deficits.       Pending Studies:    Unresulted Labs Ordered in the Past 30 Days of this Admission     No orders found from 9/4/2018 to 11/4/2018.           Disposition: [facility]    Patient Instructions:        Review of your medicines      CONTINUE these medicines which have NOT CHANGED       Dose / Directions    acetaminophen 500 MG tablet   Commonly known as:  TYLENOL        Dose:  1000 mg   Take 1,000 mg by mouth 3 times daily as needed for mild pain   Refills:  0       aspirin 81 MG tablet        Dose:  81 mg   Take 81 mg by mouth daily   Refills:  0       atorvastatin 40 MG tablet   Commonly known as:  LIPITOR        Dose:  40 mg   Take 40 mg by  mouth At Bedtime   Refills:  0       calcium acetate 667 MG Caps capsule   Commonly known as:  PHOSLO        Dose:  2001 mg   Take 2,001 mg by mouth 3 times daily (with meals)   Refills:  0       diclofenac 1 % Gel topical gel   Commonly known as:  VOLTAREN   Used for:  Cellulitis of right lower extremity        Dose:  2 g   Apply 2 g topically 4 times daily Apply to RLE   Refills:  0       lidocaine 5 % ointment   Commonly known as:  XYLOCAINE        Apply topically every 4 hours as needed for moderate pain   Refills:  0       metoprolol succinate 25 MG 24 hr tablet   Commonly known as:  TOPROL-XL        Dose:  12.5 mg   Take 12.5 mg by mouth daily   Refills:  0       nitroGLYcerin 0.4 MG sublingual tablet   Commonly known as:  NITROSTAT   Used for:  NSTEMI (non-ST elevated myocardial infarction) (H)        Dose:  0.4 mg   Place 1 tablet (0.4 mg) under the tongue every 5 minutes as needed for chest pain   Quantity:  25 tablet   Refills:  3       polyethylene glycol Packet   Commonly known as:  MIRALAX/GLYCOLAX   Used for:  Cellulitis of right lower extremity        Dose:  17 g   Take 17 g by mouth daily   Quantity:  7 packet   Refills:  0       PROMETHAZINE VC/CODEINE 5-6.25-10 MG/5ML Syrp   Generic drug:  Phenyleph-Promethazine-Cod        Dose:  1-2 tsp.   Take 1-2 tsp. by mouth every 6 hours as needed   Refills:  0                 Activity: activity as tolerated    Active Diet Order      Combination Diet Dialysis Diet      Advance Diet as Tolerated  Wound Care: none needed    Follow-up with PCP in 1 week.    Signed:  Aisha Franco

## 2018-11-08 NOTE — PROGRESS NOTES
Warm Hand-Off to Next Level of Care    Name: Maurizio Ohara  MRN #: [id number]  :  10/7/1929  Reason for Hospitalization:  Pain in both lower extremities [M79.604, M79.605]  Edema, unspecified type [R60.9]  Admit Date/Time: 11/3/2018 10:59 PM  Discharge Date:  18  PCP: Justina Moise    Patient will receive the following: TCU  [facility]   Key Recommendations: Pt admitted for weakness as BLEE 1 day after being discharged from TCU. Pt needing ongoing rehab and was sent to [facility] on  w/ plan to continue his dialysis at [facility] T,Th,Sat.       Angela Jj RN CTS

## 2018-11-08 NOTE — PROGRESS NOTES
Potassium   Date Value Ref Range Status   11/03/2018 5.2 3.4 - 5.3 mmol/L Final   ]  Lab Results   Component Value Date    HGB 11.3 11/03/2018     Weight: 81.7 kg (180 lb 1.9 oz)  POST WT  DIALYSIS PROCEDURE NOTE  Hepatitis status of previous patient on machine log was checked and ok to use with this patient hepatitis status.  Patient dialyzed for 3 hrs on a 2 K bath with a  net fluid removal of 3L.  A BFR of 400ml/min was obtained via RAVF.  Patient was seen by Dr. Bertrand during treatment.  Total heparin received during treatment: 1500units. Meds given: Epogen 2000u and hecterol 4mcg Complications:None   Procedure and ESRD teaching done and questions answered.  See flowsheet in EPIC for further details and post assessment.  Machine water alarm in place and functioning.  HEPATITIS B SURFACE ANTIGEN Negative Date 10/9/18 HEPATITIS B SURFACE ANTIBODY Immune DATE 10/9/18  CHLORINE AND CHLORAMINES CHECK on WATER SYSTEM EVERY 4 hours.   PT returned via WC  Catheter Access: Aseptic prep done for both on/off.  Report received from: JUDI Perez RN  Report given to: roscoe Ray RN  Outpatient Dialysis at [facility]

## 2018-11-08 NOTE — DISCHARGE INSTRUCTIONS
We spoke w/ Vladimir Cohen and they would now like you to arrive at 0800 on Tuesday, Thursday and Saturday for your dialysis, this is a change from what you had been doing.

## 2018-11-08 NOTE — PLAN OF CARE
"Problem: Patient Care Overview  Goal: Plan of Care/Patient Progress Review  Outcome: No Change  PRIMARY DIAGNOSIS: \"GENERIC\" NURSING  OUTPATIENT/OBSERVATION GOALS TO BE MET BEFORE DISCHARGE:  1. ADLs back to baseline: Yes    2. Activity and level of assistance: Up with maximum assistance. Consider SW and/or PT evaluation. Pt up with assist of 1. Pt uses walker.    3. Pain status: Pain free.    4. Return to near baseline physical activity: Yes, assist of 1.     Discharge Planner Nurse   Safe discharge environment identified: No  Barriers to discharge: Yes. Pt needs tcu. CC RN working on transfer.       Entered by: Paloma Littlejohn 11/08/2018 9:49 AM      vss afebrile. Pt sent to dialysis at approx 0830. meds held for dialysis.     Please review provider order for any additional goals.   Nurse to notify provider when observation goals have been met and patient is ready for discharge.      "

## 2018-11-08 NOTE — PLAN OF CARE
Problem: Patient Care Overview  Goal: Plan of Care/Patient Progress Review  Outcome: No Change  Problem: Patient Care Overview  Goal: Plan of Care/Patient Progress Review  Outcome: No Change  PRIMARY DIAGNOSIS: LE Leg Swelling, weakness  OUTPATIENT/OBSERVATION GOALS TO BE MET BEFORE DISCHARGE:  1. ADLs back to baseline: No     2. Activity and level of assistance: 1 assist walker/gaitbelt     3. Pain status: Pain free.     4. Return to near baseline physical activity: No, up with 1A ww/gb.      Discharge Planner Nurse   Safe discharge environment identified: Yes, TCU  Barriers to discharge: Yes, dialysis 11/8/18, then TCU.   Entered by: Matilde Ng 11/08/2018 12:34 AM     Please review provider order for any additional goals.   Nurse to notify provider when observation goals have been met and patient is ready for discharge.          11/07/18  10:03 PM  Pt A&Ox4, VSS, denies pain.  Can be forgetful.  subcutaneous heparin to abdomen. Bilat legs ace wrapped, elevated.  Numbness/tingling baseline LE. Lungs clear and equal.  +4 BS, BM 11/6/18.  Renal diet.  Voids very little.  Dialysis Tue, Thur and Saturday.  Fistula to right forearm +bruit/thrill, dressing C/D/I.  PIV to left forearm, SL. Up 1A GB/WW.  Plan:  Nephrology and SW following.  Dialysis 11/8/18.  SW working on discharge planning to TCU 11/8/18.  Will require referral to vascular surgery as outpt.  Will continue to monitor.    11/08/18 12:36 AM  Pt A&Ox4, VSS.  Denies pain.  Repositioned in bed.  Legs elevated.  Pt removed fistula dressing on right arm, states he always removes it after he's had dialysis.  Not OOB.  Plan:  Dialysis 11/08/18.  Nephrology following.  SW working on discharge to TCU 11/08/18.  Per MD note, will require referral to vascular surgery as outpt.  Will continue to monitor.

## 2018-11-08 NOTE — PLAN OF CARE
Problem: Patient Care Overview  Goal: Plan of Care/Patient Progress Review  Outcome: No Change  PRIMARY DIAGNOSIS: LE Leg Swelling, weakness  OUTPATIENT/OBSERVATION GOALS TO BE MET BEFORE DISCHARGE:  1. ADLs back to baseline: Yes    2. Activity and level of assistance: 1 assist walker/gaitbelt    3. Pain status: Pain free.    4. Return to near baseline physical activity: Yes     Discharge Planner Nurse   Safe discharge environment identified: Yes, TCU  Barriers to discharge: Yes, dialysis 11/8/18, then TCU.       Entered by: Matilde Ng 11/07/2018 10:02 PM     Please review provider order for any additional goals.   Nurse to notify provider when observation goals have been met and patient is ready for discharge.    11/07/18  10:03 PM  Pt A&Ox4, VSS, denies pain.  Can be forgetful.  subcutaneous heparin to abdomen. Bilat legs ace wrapped, elevated.  Numbness/tingling baseline LE. Lungs clear and equal.  +4 BS, BM 11/6/18.  Renal diet.  Voids very little.  Dialysis Tue, Thur and Saturday.  Fistula to right forearm +bruit/thrill, dressing C/D/I.  PIV to left forearm, SL. Up 1A GB/WW.  Plan:  Nephrology and SW following.  Dialysis 11/8/18.  SW working on discharge planning to TCU 11/8/18.  Will require referral to vascular surgery as outpt.  Will continue to monitor.

## 2018-11-08 NOTE — PLAN OF CARE
Problem: Patient Care Overview  Goal: Plan of Care/Patient Progress Review  Outcome: No Change  Problem: Patient Care Overview  Goal: Plan of Care/Patient Progress Review  Outcome: No Change  PRIMARY DIAGNOSIS: LE Leg Swelling, weakness  OUTPATIENT/OBSERVATION GOALS TO BE MET BEFORE DISCHARGE:  1. ADLs back to baseline: No      2. Activity and level of assistance: 1 assist walker/gaitbelt      3. Pain status: Pain free.      4. Return to near baseline physical activity: No, up with 1A ww/gb.      Discharge Planner Nurse   Safe discharge environment identified: Yes, TCU  Barriers to discharge: Yes, dialysis 11/8/18, then TCU.  Entered by: Matilde Ng 11/08/2018 3:59 AM     Please review provider order for any additional goals.   Nurse to notify provider when observation goals have been met and patient is ready for discharge.            11/07/18  10:03 PM  Pt A&Ox4, VSS, denies pain.  Can be forgetful.  subcutaneous heparin to abdomen. Bilat legs ace wrapped, elevated.  Numbness/tingling baseline LE. Lungs clear and equal.  +4 BS, BM 11/6/18.  Renal diet.  Voids very little.  Dialysis Tue, Thur and Saturday.  Fistula to right forearm +bruit/thrill, dressing C/D/I.  PIV to left forearm, SL. Up 1A GB/WW.  Plan:  Nephrology and SW following.  Dialysis 11/8/18.  SW working on discharge planning to TCU 11/8/18.  Will require referral to vascular surgery as outpt.  Will continue to monitor.     11/08/18 12:36 AM  Pt A&Ox4, VSS.  Denies pain.  Repositioned in bed.  Legs elevated.  Pt removed fistula dressing on right arm, states he always removes it after he's had dialysis.  Not OOB.  Plan:  Dialysis 11/08/18.  Nephrology following.  SW working on discharge to TCU 11/08/18.  Per MD note, will require referral to vascular surgery as outpt.  Will continue to monitor.           11/08/18  4:01 AM  Pt sleeping comfortably.  Denies pain.  Repositioning for pain.  Legs elevated.  Right arm fistula.  Left arm PIV is SL.  Not  OOB.  Plan:  Remains the same as above.  Will continue to monitor.

## 2018-11-08 NOTE — PLAN OF CARE
Problem: Patient Care Overview  Goal: Plan of Care/Patient Progress Review  Outcome: No Change  PRIMARY DIAGNOSIS: Leg swelling, weakness  OUTPATIENT/OBSERVATION GOALS TO BE MET BEFORE DISCHARGE:  1. ADLs back to baseline: Yes     2. Activity and level of assistance: Assist 1 gait belt and walker.     3. Pain status: Pain free.     4.  Return to near baseline physical activity: Yes     This morning prior to LAN patient alert and oriented.  Up with assist of one with gait belt and walker.  Right arm fistula dressing CDI, bruit and thrill present.  Patient denies pain.  VSS.  Bilateral LE ace wrapped, edema 2+ pitting.  Renal diet, pt offered food at dinner, but refused.  Lung sounds clear.  Bowel sounds active.  Upon returning from LAN patient sedated, arouses easily to voice, answers appropriately.     Discharge Planner Nurse      Safe discharge environment identified: No, placement needed, SW following.   Barriers to discharge: Yes, placement SW following.       Entered by: Sary Stahl 11/07/2018 2:08 PM     Please review provider order for any additional goals.   Nurse to notify provider when observation goals have been met and patient is ready for discharge.

## 2018-11-08 NOTE — PLAN OF CARE
Problem: Patient Care Overview  Goal: Plan of Care/Patient Progress Review  Outcome: Adequate for Discharge Date Met: 11/08/18  Patient's After Visit Summary was reviewed with patient  Patient verbalized understanding of After Visit Summary, recommended follow up and was given an opportunity to ask questions.   Discharge medications sent home with patient/family: No   Discharged with nephew Rosa, transporting to TCU.  Discharge information packet given to nephew to give to staff at TCU.

## 2018-11-08 NOTE — PLAN OF CARE
"Problem: Patient Care Overview  Goal: Plan of Care/Patient Progress Review  Outcome: Improving  Problem: Patient Care Overview  Goal: Plan of Care/Patient Progress Review  Outcome: No Change  PRIMARY DIAGNOSIS: \"GENERIC\" NURSING  OUTPATIENT/OBSERVATION GOALS TO BE MET BEFORE DISCHARGE:  1. ADLs back to baseline: Yes     2. Activity and level of assistance: Up with maximum assistance. Pt up with assist of 1. Pt uses walker.     3. Pain status: Pain free.     4. Return to near baseline physical activity: Yes, assist of 1.      Discharge Planner Nurse   Safe discharge environment identified: Yes  Barriers to discharge: No     Returned from dialysis 1230, VSS, held Metoprolol for ordered parameters BP.  Denies pain.  Fistula site dressing c/d/i.  Plan for discharge at 1330 via nephew transport to TCU.  Nurse to notify provider when observation goals have been met and patient is ready for discharge.                                  "

## 2018-11-08 NOTE — PROGRESS NOTES
Discharge Planner   Discharge Plans in progress: Met w/ pt in Dialysis room and reviewed TCU options, he elects to go to [facility]. Pt is not agreeable to out-of-pocket costs for HE WC transport or transport to/from dialysis. Pt reports that his family will transport him and feels as though this is a safe plan. Called and spoke w/ nephew Rosa, he is agreeable to transporting pt today and to/from dialysis. Nephew will transport at 1330. Updated staff, [facility] and [facility].   Barriers to discharge plan: None  Follow up plan: Will send orders once entered.        Entered by: Angela Jj 11/08/2018 9:53 AM       Update: Rosa's wife Leticia has been trying to communicate w/ [employer] to get it reinstated. Via phone she has expressed frustration w/ writer about this process as [employer] will not speak to her without consent from pt. On 11/7 Leticia asked writer to get a written consent from pt authorizing this, informed her that pt was sedated and unable to tell me what his name was so writer felt as though this was not appropriate at this time. Discussed w/ Leticia today and she expressed frustration w/ writer that she did not get assistance w/ this yesterday, again explained that pt was not appropriate to sign a consent when writer saw him yesterday afternoon. Pt alert and oriented today and would be appropriate to sign documents. Called [employer] and spoke w/ customer service, they do not have a consent form available for pt's to sign. They request that the pt call them and give verbal permission to discuss his transport needs w/ Rosa and Leticia, a one time verbal authorization will be good for all further communication per [employer] Customer Service. Called and LM for Rosa w/ this information.     Update 1400: Rosa and Leticia here for transport, pt completing his lunch then will discharge. They report that the did receive my message and Leticia has been able to get through to [employer] w/ pt's verbal  permission. Also informed them that pt has a new dialysis start time of 0800. Orders already faxed to [facility].     CM will continue to follow patient for any additional discharge needs.     Angela Jj RN BSN CTS   [phone]  Care Transitions Team  [facility]

## 2018-11-08 NOTE — PROGRESS NOTES
Your information has been submitted on November 08th, 2018 at 11:47:01 AM [facility]. The confirmation number is [id number]

## 2018-11-09 NOTE — ED NOTES
Bed: ED07  Expected date: 11/9/18  Expected time: 3:15 AM  Means of arrival: Ambulance  Comments:  BV 1

## 2018-11-09 NOTE — DISCHARGE INSTRUCTIONS
Please make an appointment to follow up with your primary care provider in 3-5 days if not improving.    Return to ER immediately if you develop: worsening pain, new redness/sores, Fever > 101, persistent nausea or vomiting OR you have any other concerns about your health.        Acute Pain  Pain can be caused by many conditions that range from very minor to very serious. In some cases, though, pain comes and goes with no apparent cause.  We were not able to find the exact cause for your pain. At this time there is no sign of any serious illness causing your pain. More tests may be needed to determine the cause. In many cases, pain like this goes away by itself.  Home care  Take any medicines as prescribed. If another medicine was not prescribed for pain, you can take an over-the-counter pain medicine such as ibuprofen or acetaminophen. Use these as directed on the label.    Follow-up care  Follow up with your healthcare provider or our staff as directed.  When to seek medical advice  Call your healthcare provider for any of the following:    Pain changes in pattern    Pain doesn't lessen or gets worse    New symptoms appear    Fever of 100.4 F (38 C) or higher, or as directed by your healthcare provider  Date Last Reviewed: 4/1/2018 2000-2018 [employer]. [address] [place] [address], [place] [address]. All rights reserved. This information is not intended as a substitute for professional medical care. Always follow your healthcare professional's instructions.          Managing Lymphedema After Cancer    Lymphedema is a problem that may occur after cancer surgery when lymph nodes are removed. Or it may occur after radiation to the lymph nodes. Lymphedema can occur months or even many years after cancer treatment. It is an ongoing (chronic) condition that has no cure. But steps can be taken to help reduce or relieve symptoms. If left untreated, lymphedema can get worse. Treatment can lower your risk for  infections and complications.  Understanding lymphedema  The lymphatic system helps the body fight infection. It is made up of a system of small vessels all over the body. Lymph fluid travels through these vessels. Many tiny organs called lymph nodes are scattered along the vessels. These nodes filter lymph fluid. During surgery for cancer, nearby lymph nodes are often removed. Sometimes radiation is used to treat lymph nodes as part of cancer treatment. Both of these disrupt the flow of lymph fluid, which can lead to swelling. This is lymphedema. Lymphedema can affect one or both arms or legs, the genitals, the head and neck, or the belly, depending on the part of the body treated. Swelling can get worse and become severe. Skin sores or other problems can develop. Affected areas are also more likely to become infected.  Lymphedema treatment  There are no medicines currently used to treat lymphedema. Instead, the most common treatment for lymphedema is complete decongestive therapy (CDT). This is a set of methods used together to reduce your symptoms. CDT is done by trained therapists. To help show how well the treatment is working, your arms or legs may be measured before and after CDT. The treatment most often involves 1 or more of the following:    Manual lymphatic drainage. This is a kind of massage that uses gentle pressure to help move lymph out of areas where it is collecting. It is done by a therapist or nurse with special training. It can also be learned and done at home.    Intermittent pneumatic compression. This uses a device to apply and relieve pressure to the arms or legs. Sleeves are put over the arms or legs. A pump fills the sleeves with air. Then the air is let out. This happens many times in a row.    Compression bandages. This means wearing stretchy or padded fabric on the parts of the body with lymphedema. This may include bandages, tape, or other types of compression wraps. They help support  your tissues so lymph can flow more freely. And they help prevent lymph fluid from building up.    Therapeutic exercises. Some kinds of exercise may help your symptoms. These may include aerobic exercise, such as brisk walking. And they may also include gradual weight-lifting exercises that build muscle.    Skin and nail care. Proper care of your skin and nails will help prevent infection. See the  Preventing Infection for Life  section for more information.    Compression garments. These are worn as often as needed, for life. These include sleeves, gloves, stockings, undershirt, or other types of special clothes. They squeeze or compress parts of the body to help prevent lymph buildup. You may wear these during the day, or at night when you re asleep.     Tips for living with lymphedema    Don't get too cold or too hot. This can cause the skin to swell and dry out. It can also cause more fluid to build up. Be careful around hot objects to avoid burns. Don t use hot tubs, saunas, ice packs, or a heating pad.    Don't wear anything that squeezes the affected area. This may cause more swelling. Wear loose clothing and jewelry. If your legs are affected, don t wear tight socks or undergarments, and don't  cross your legs when you sit. This can block lymph drainage.    Don't gain weight. This can make your symptoms worse.    Tell your healthcare providers. If your arms are affected, tell your healthcare providers about your lymphedema before getting shots, an IV, or having your blood pressure taken.  When to call your healthcare provider  Call your healthcare provider right away if you have:    Fever of 100.4 F (38 C) or higher, or as directed by your healthcare provider    Signs of infection such as red blotches, warmth, or pain    Sudden increase in swelling    New pain in the affected area   Preventing infection for life  An important part of staying healthy with lymphedema is preventing infections in the swollen  areas. Lymphedema makes it easier for germs (bacteria) to grow in those areas. To help prevent infection:    Keep your skin clean, and moisturize it with lotion.    Be extra careful when shaving, and use a clean razor on clean skin.    Check your skin regularly for cuts, sores, bug bites, or other problems.    Use an antibacterial ointment if you have a cut or sore.    Don't pick at, bite, or cut the skin around your fingernails. Use a cuticle stick to push your cuticles back.    Trim your fingernails and toenails straight across to prevent ingrown nails.    If at all possible, don't allow blood to be taken or shots given in any affected limb.    Prevent skin burns by wearing sunscreen and using gloves when cooking or doing household work.    Wear shoes that fit well and don't cause blisters.    Use an insect repellent so you don't get bug bites when outdoors.   Working with your healthcare team  Get regular checkups and report any changes right away. See a specially trained lymphedema therapist to learn more about lymphedema and to get help managing it.  Date Last Reviewed: 2/1/2018 2000-2018 [employer]. [address] [place] [address], [place] [address]. All rights reserved. This information is not intended as a substitute for professional medical care. Always follow your healthcare professional's instructions.

## 2018-11-09 NOTE — ED TRIAGE NOTES
"Pt c/o exacerbation of chronic RLE pain tonight. Denies trauma, states woke up in bed with pain. Describes pain as \"snapping\". No shortening or external rotation noted. ABCs intact, GCS 15  "

## 2018-11-09 NOTE — PROGRESS NOTES
[facility]  PRIMARY CARE PROVIDER AND CLINIC:  Justina Moise [facility] [address] / [address] [facility] [address]*  Chief Complaint   Patient presents with     Hospital F/U     [facility] Record Number:  [id number]  Place of Service where encounter took place:  [facility]  ([facility]) [[id number]]    HPI:    Maurizio Ohara is a 89 year old  (10/7/1929),admitted to the above facility from  [facility].  Hospital stay 11/03/2018 through 11/08/2018. And [facility] on 11/09/2018/   Admitted to this facility for  rehab, medical management and nursing care.  HPI information obtained from: facility chart records.  Current issues are:         Leg swelling  ESRD (end stage renal disease) on dialysis (H)  Pulmonary hypertension (H)  History of diabetes mellitus  Acute diastolic (congestive) heart failure (H)  Physical deconditioning     Hospital Course:    Maurizio Ohara is a 89 year old male with complicated medical history such as chronic anemia, coronary artery disease with prior PCI, end-stage renal disease being maintained on hemodialysis every Tuesday, Thursday, Saturday with last session yesterday and completed with no issues, hypertension, severe pulmonary hypertension, diastolic congestive heart failure, patient of cardiology service and recently seen with echocardiogram suspicion for mobile mass but has negative workup for infectious process and has pending LAN in the near future based on their most recent notes, was recently discharged from a TCU setting of which he stayed for close to a month and currently living at home by himself but has been having issues with leg swelling, difficulty in ambulation, near fall events, and generalized weakness.  This triggered for him to call his neighbor and asked for assistance and was subsequently brought into the hospital for further management care.      1.  Bilateral leg swelling, chronic,  generalized weakness  2.  End-stage renal disease on hemodialysis  3.  Severe pulmonary hypertension  4.  Chart stated history of diabetes mellitus  5.  History of CHF does not appear in acute exacerbation    - Recurrent hosp and recent patient driven discharge from TCU- then rehosp for continues weakness, admitted to TCU and to ED for leg pain. Now back in TCU with no new findings.   Returned to TCU early morning so has been resting and declining meds and tx. Declines exam or visit currently. Would like to sleep.    CODE STATUS/ADVANCE DIRECTIVES DISCUSSION:   CPR/Full code   Patient's living condition: lives alone    ALLERGIES:Glyburide; Lisinopril; Metformin; Penicillins; and Verapamil  PAST MEDICAL HISTORY:  has a past medical history of Anemia; Anemia; CAD (coronary artery disease) (12/24/14); Chronic kidney disease; Diabetes (H); Hypertension; Mitral regurgitation (12/24/2014); Mitral valve disorders(424.0) (12/24/2014); Myocardial infarction (H) (12/24/2014); Pulmonary hypertension (H) (12/24/2014); and Renal disease due to diabetes mellitus (H).  PAST SURGICAL HISTORY:  has a past surgical history that includes Thoracic surgery; ENT surgery; Coronary Angiography Adult Order (12/24/2014); and Heart Cath, Angioplasty (12/24/2014).  FAMILY HISTORY: family history includes Diabetes in his mother.  SOCIAL HISTORY:  reports that he has quit smoking. He has never used smokeless tobacco. He reports that he drinks alcohol. He reports that he does not use illicit drugs.    Post Discharge Medication Reconciliation Status: discharge medications reconciled and changed, per note/orders (see AVS).  Current Outpatient Prescriptions   Medication Sig Dispense Refill     acetaminophen (TYLENOL) 500 MG tablet Take 1,000 mg by mouth 3 times daily as needed for mild pain       aspirin 81 MG tablet Take 81 mg by mouth daily       atorvastatin (LIPITOR) 40 MG tablet Take 40 mg by mouth At Bedtime       calcium acetate (PHOSLO) 667  "MG CAPS Take 2,001 mg by mouth 3 times daily (with meals)       diclofenac (VOLTAREN) 1 % GEL topical gel Apply 2 g topically 4 times daily Apply to RLE       gabapentin (NEURONTIN) 100 MG capsule Take 1 capsule (100 mg) by mouth 3 times daily for 10 days 30 capsule 0     lidocaine (XYLOCAINE) 5 % ointment Apply topically every 4 hours as needed for moderate pain       metoprolol succinate (TOPROL-XL) 25 MG 24 hr tablet Take 12.5 mg by mouth daily        nitroglycerin (NITROSTAT) 0.4 MG sublingual tablet Place 1 tablet (0.4 mg) under the tongue every 5 minutes as needed for chest pain 25 tablet 3     polyethylene glycol (MIRALAX/GLYCOLAX) Packet Take 17 g by mouth daily 7 packet        ROS: Patient declines to talk    Exam:  /61  Pulse 61  Temp 97.7  F (36.5  C)  Resp 14  Ht 5' 7\" (1.702 m)  Wt 173 lb (78.5 kg)  SpO2 94%  BMI 27.1 kg/m2    Declines exam    BP 11/08-11/09: 100-122/61-75 mmHg    Lab/Diagnostic data:    CBC RESULTS:   Recent Labs   Lab Test  11/09/18   0414  11/07/18   0608  11/03/18   2341   WBC  5.6   --   7.0   RBC  3.41*   --   3.16*   HGB  12.1*   --   11.3*   HCT  39.5*   --   36.5*   MCV  116*   --   116*   MCH  35.5*   --   35.8*   MCHC  30.6*   --   31.0*   RDW  17.9*   --   17.5*   PLT  148*  149*  128*       Last Basic Metabolic Panel:  Recent Labs   Lab Test  11/09/18   0414  11/03/18   2341   NA  138  142   POTASSIUM  5.0  5.2   CHLORIDE  101  108   KIMBER  9.7  8.9   CO2  29  27   BUN  41*  44*   CR  4.87*  4.73*   GLC  131*  115*       Liver Function Studies -   Recent Labs   Lab Test  09/18/18   1228  08/15/17   0650  08/09/17   1810   PROTTOTAL  7.5   --   7.3   ALBUMIN  3.4  2.8*  3.6   BILITOTAL  1.0   --   0.7   ALKPHOS  144   --   53   AST  17   --   19   ALT  9   --   16       Lab Results   Component Value Date    A1C 5.1 09/18/2018    A1C 5.5 12/25/2014       ASSESSMENT/PLAN:  Leg swelling  - chronic- recent tx cellulitis. Now WBC WNL  - elevate LE periodically, follow " weights, skin checks    ESRD (end stage renal disease) on dialysis (H)  - chronic- stable  - dialysis 3 x week as per home regimen      History of diabetes mellitus  Lab Results   Component Value Date    A1C 5.1 09/18/2018    A1C 5.5 12/25/2014     - noted, currently not on meds    Acute diastolic (congestive) heart failure (H)  Pulmonary hypertension (H)  CAD   - resp status currently stable  - continue ASA, statin, BB and prn nitroglycerin  - renal diet      Physical deconditioning  - back to back hosp, weakness- lives alone in   - PT/OT  - ongoing discharge planning, SW follow and care conferences per unit protocol          Electronically signed by:  ABNER Weber CNP

## 2018-11-09 NOTE — LETTER
11/9/2018        RE: Maurizio Ohara  [address] [facility] [address]        [facility]  PRIMARY CARE PROVIDER AND CLINIC:  [facility] [address] / [facility]*  Chief Complaint   Patient presents with     Hospital F/U     [facility] Record Number:  [id number]  Place of Service where encounter took place:  [facility]  (S) [[id number]]    HPI:    Maurizio Ohara is a 89 year old  (10/7/1929),admitted to the above facility from  [facility].  Hospital stay 11/03/2018 through 11/08/2018. And [facility] Emergency Room on 11/09/2018/   Admitted to this facility for  rehab, medical management and nursing care.  HPI information obtained from: facility chart records.  Current issues are:         Leg swelling  ESRD (end stage renal disease) on dialysis (H)  Pulmonary hypertension (H)  History of diabetes mellitus  Acute diastolic (congestive) heart failure (H)  Physical deconditioning     Hospital Course:    Maurizio Ohara is a 89 year old male with complicated medical history such as chronic anemia, coronary artery disease with prior PCI, end-stage renal disease being maintained on hemodialysis every Tuesday, Thursday, Saturday with last session yesterday and completed with no issues, hypertension, severe pulmonary hypertension, diastolic congestive heart failure, patient of cardiology service and recently seen with echocardiogram suspicion for mobile mass but has negative workup for infectious process and has pending LAN in the near future based on their most recent notes, was recently discharged from a TCU setting of which he stayed for close to a month and currently living at home by himself but has been having issues with leg swelling, difficulty in ambulation, near fall events, and generalized weakness.  This triggered for him to call his neighbor and asked for assistance and was subsequently  brought into the hospital for further management care.      1.  Bilateral leg swelling, chronic, generalized weakness  2.  End-stage renal disease on hemodialysis  3.  Severe pulmonary hypertension  4.  Chart stated history of diabetes mellitus  5.  History of CHF does not appear in acute exacerbation    - Recurrent hosp and recent patient driven discharge from TCU- then rehosp for continues weakness, admitted to TCU and to ED for leg pain. Now back in TCU with no new findings.   Returned to TCU early morning so has been resting and declining meds and tx. Declines exam or visit currently. Would like to sleep.    CODE STATUS/ADVANCE DIRECTIVES DISCUSSION:   CPR/Full code   Patient's living condition: lives alone    ALLERGIES:Glyburide; Lisinopril; Metformin; Penicillins; and Verapamil  PAST MEDICAL HISTORY:  has a past medical history of Anemia; Anemia; CAD (coronary artery disease) (12/24/14); Chronic kidney disease; Diabetes (H); Hypertension; Mitral regurgitation (12/24/2014); Mitral valve disorders(424.0) (12/24/2014); Myocardial infarction (H) (12/24/2014); Pulmonary hypertension (H) (12/24/2014); and Renal disease due to diabetes mellitus (H).  PAST SURGICAL HISTORY:  has a past surgical history that includes Thoracic surgery; ENT surgery; Coronary Angiography Adult Order (12/24/2014); and Heart Cath, Angioplasty (12/24/2014).  FAMILY HISTORY: family history includes Diabetes in his mother.  SOCIAL HISTORY:  reports that he has quit smoking. He has never used smokeless tobacco. He reports that he drinks alcohol. He reports that he does not use illicit drugs.    Post Discharge Medication Reconciliation Status: discharge medications reconciled and changed, per note/orders (see AVS).  Current Outpatient Prescriptions   Medication Sig Dispense Refill     acetaminophen (TYLENOL) 500 MG tablet Take 1,000 mg by mouth 3 times daily as needed for mild pain       aspirin 81 MG tablet Take 81 mg by mouth daily        "atorvastatin (LIPITOR) 40 MG tablet Take 40 mg by mouth At Bedtime       calcium acetate (PHOSLO) 667 MG CAPS Take 2,001 mg by mouth 3 times daily (with meals)       diclofenac (VOLTAREN) 1 % GEL topical gel Apply 2 g topically 4 times daily Apply to RLE       gabapentin (NEURONTIN) 100 MG capsule Take 1 capsule (100 mg) by mouth 3 times daily for 10 days 30 capsule 0     lidocaine (XYLOCAINE) 5 % ointment Apply topically every 4 hours as needed for moderate pain       metoprolol succinate (TOPROL-XL) 25 MG 24 hr tablet Take 12.5 mg by mouth daily        nitroglycerin (NITROSTAT) 0.4 MG sublingual tablet Place 1 tablet (0.4 mg) under the tongue every 5 minutes as needed for chest pain 25 tablet 3     polyethylene glycol (MIRALAX/GLYCOLAX) Packet Take 17 g by mouth daily 7 packet        ROS: Patient declines to talk    Exam:  /61  Pulse 61  Temp 97.7  F (36.5  C)  Resp 14  Ht 5' 7\" (1.702 m)  Wt 173 lb (78.5 kg)  SpO2 94%  BMI 27.1 kg/m2    Declines exam    BP 11/08-11/09: 100-122/61-75 mmHg    Lab/Diagnostic data:    CBC RESULTS:   Recent Labs   Lab Test  11/09/18   0414  11/07/18   0608  11/03/18   2341   WBC  5.6   --   7.0   RBC  3.41*   --   3.16*   HGB  12.1*   --   11.3*   HCT  39.5*   --   36.5*   MCV  116*   --   116*   MCH  35.5*   --   35.8*   MCHC  30.6*   --   31.0*   RDW  17.9*   --   17.5*   PLT  148*  149*  128*       Last Basic Metabolic Panel:  Recent Labs   Lab Test  11/09/18   0414  11/03/18   2341   NA  138  142   POTASSIUM  5.0  5.2   CHLORIDE  101  108   KIMBER  9.7  8.9   CO2  29  27   BUN  41*  44*   CR  4.87*  4.73*   GLC  131*  115*       Liver Function Studies -   Recent Labs   Lab Test  09/18/18   1228  08/15/17   0650  08/09/17   1810   PROTTOTAL  7.5   --   7.3   ALBUMIN  3.4  2.8*  3.6   BILITOTAL  1.0   --   0.7   ALKPHOS  144   --   53   AST  17   --   19   ALT  9   --   16       Lab Results   Component Value Date    A1C 5.1 09/18/2018    A1C 5.5 12/25/2014 "       ASSESSMENT/PLAN:  Leg swelling  - chronic- recent tx cellulitis. Now WBC WNL  - elevate LE periodically, follow weights, skin checks    ESRD (end stage renal disease) on dialysis (H)  - chronic- stable  - dialysis 3 x week as per home regimen      History of diabetes mellitus  Lab Results   Component Value Date    A1C 5.1 09/18/2018    A1C 5.5 12/25/2014     - noted, currently not on meds    Acute diastolic (congestive) heart failure (H)  Pulmonary hypertension (H)  CAD   - resp status currently stable  - continue ASA, statin, BB and prn nitroglycerin  - renal diet      Physical deconditioning  - back to back hosp, weakness- lives alone in   - PT/OT  - ongoing discharge planning, SW follow and care conferences per unit protocol          Electronically signed by:  ABNER Weber CNP                    Sincerely,        ABNER Weber CNP

## 2018-11-09 NOTE — ED PROVIDER NOTES
"  History     Chief Complaint:  Hip Pain    HPI   Maurizio Ohara is a 89 year old male with a history of CAD with NSTEMI, ESRD on dialysis, diabetes who presents with right hip pain. The patient was recently seen on 11/3 for bilateral lower extremity redness, swelling, and edema. He was diagnosed with a cellulitis and was admitted for lower extremity pain. [facility] and was there tonight sleeping when he woke up with a sensation of a \"veing snapping\" and immediate pain in the right lower leg. The patient mentions that he had his bilateral lower extremities wrapped just before bed and was not having pain prior to this. He states that the pain is a sharp stabbing pain that starts in his lower extremity and radiates into his upper leg/hip. He denies any inciting injury or trauma. He used to be able to ambulate with a walker up until two weeks ago. EMS notes that the patient was ambulatory tonight prior to pain. He has not had fevers, numbness, or weakness in the legs.      Allergies:  Glyburide  Lisinopril  Metformin  Penicillins  Verapamil      Medications:    Tylenol  Aspirin  Lipitor  Metoprolol  Nitroglycerin    Past Medical History:    CHF  Diabetes  Hypertension  NSTEMI  ESRD on dialysis  CAD  Mitral regurgitation  Pulmonary hypertension   Anemia    Past Surgical History:    Coronary angiography  Tonsillectomy  Heart catheter angioplasty  Herniated disc surgery     Family History:    Diabetes     Social History:  Smoking Status: Former Smoker  Alcohol Use: no  Patient presents via EMS.      Review of Systems   Constitutional: Negative for chills and fever.   Musculoskeletal: Positive for arthralgias, gait problem and myalgias.   Skin: Negative for rash.   Neurological: Negative for weakness and numbness.   All other systems reviewed and are negative.      Physical Exam   First Vitals:  BP: 146/80  Pulse: 79  Temp: 97.9  F (36.6  C)  Resp: 16  Weight: 81.6 kg " (180 lb)  SpO2: 95 %      Physical Exam   HENT:   Right Ear: External ear normal.   Left Ear: External ear normal.   Nose: Nose normal.   Eyes: Conjunctivae and lids are normal.   Neck: Neck supple. No tracheal deviation present.   Cardiovascular: Regular rhythm and intact distal pulses.    Pulmonary/Chest: Breath sounds normal. No respiratory distress. He has no wheezes. He has no rales.   Abdominal: Soft. There is no tenderness. There is no rebound and no guarding.   Musculoskeletal:   Bilateral lower extremities 5 out of 5 EHL plantarflexion and dorsiflexion.  Sensation intact throughout both lower extremities.  There is no major joint effusion.  Mild tenderness in the medial malleoli bilaterally with ankle range of motion.  No open sores or bleeding wounds.  He does have 3+ pitting edema of the lower extremities with blanching chronic appearing dermatitis 4+ pedal edema bilaterally.  Negative straight leg bilaterally.  2 out of 4 dorsalis pedis and PT pulse laterally.   Neurological:   MAEE, no gross focal motor or sensory deficit   Skin: Skin is warm and dry. He is not diaphoretic.   Psychiatric: He has a normal mood and affect.   Nursing note and vitals reviewed.      Emergency Department Course     Laboratory:  CBC: HGB 12.1 (L),  (L), o/w WNL (WBC 5.6)   BMP: Glucose 131 (H), BUN 41 (H), Creatinine 4.87 (H), GFR 11 (L), o/w WNL    Interventions:  0419 - Gabapentin 100 mg PO  0419 - Tylenol 650 mg PO    Emergency Department Course:  Past medical records, nursing notes, and vitals reviewed.  0326: I performed an exam of the patient and obtained history, as documented above.      0509: Patient was ambulated here and able to walk though he does report some incidence of pain.    0545: I rechecked the patient. Findings and plan explained to the Patient. Patient discharged home with instructions regarding supportive care, medications, and reasons to return. The importance of close follow-up was reviewed.       Impression & Plan      Medical Decision Makin-year-old gentleman here with bilateral lower extremity pain right worse than left there is no preceding trauma.  Pain is only present with movement feels it radiates from the foot up to the leg.  This is moving from his right leg to his left leg without significant movements or any manipulation on our part during our interview and examination.  I discharged yesterday from our hospital where he was complaining of bilateral lower leg pain has had a workup over the last month or so that have shown negative radiographs negative right lower extremity ultrasound for DVT head ultrasound ABIs documenting triphasic waveforms.  His ABIs here are 1.2 and 1.3.  His leg is warm with easily palpable pulses.  He has no signs of soft tissue infection.  Negative straight leg raise or acute findings concerning for cauda equina syndrome from a compressive radiculopathy.  I think there is likely a component of peripheral neuropathy here also possible that his compressive wraps were wrapped too tightly prior to arrival and these were removed.  He ambulated here without difficulty.  I think he is safe and stable for discharge back to his TCU we will start him on low-dose gabapentin and continue the Tylenol that he is currently on would not start him on a narcotic for this.    Diagnosis:    ICD-10-CM    1. Pain of left lower extremity M79.605    2. Pain of right lower extremity M79.604    3. Idiopathic peripheral neuropathy G60.9    4. Lymphedema of both lower extremities I89.0      Discharge Medications:  New Prescriptions    GABAPENTIN (NEURONTIN) 100 MG CAPSULE    Take 1 capsule (100 mg) by mouth 3 times daily for 10 days         Erick Ibarra  2018   [facility] EMERGENCY DEPARTMENT  I, Erick Ibarra, am serving as a scribe at 3:27 AM on 2018 to document services personally performed by Mario Sena MD based on my observations and the  provider's statements to me.       Mario Sena MD  11/09/18 9711

## 2018-11-12 NOTE — LETTER
11/12/2018        RE: Maurizio Ohara  [address] [facility] [address]        [facility]    Chief Complaint   Patient presents with     RECHECK       [facility] Medical Record Number:  [id number]  Place of Service where encounter took place:  [facility]  ([facility]) [[id number]]    HPI:    Maurizio Ohara is a 89 year old  (10/7/1929), who is being seen today for an episodic care visit.  HPI information obtained from: facility chart records.Today's concern is:     Leg swelling  ESRD (end stage renal disease) on dialysis (H)  History of diabetes mellitus  Acute diastolic (congestive) heart failure (H)  Pulmonary hypertension (H)  CAD (coronary artery disease)  Physical deconditioning      ---------------------    Found sitting up in w/c in room.  Alert, calm, NAD. Reports fatigue and weakness. Denies pain. Reports slept well last night. States appetite is fair. Denies SOB, chest pain, dizziness, fever, chills, n/v or abd pain. VS reviewed. Is working with therapy and hoping to get better so can return home.       ALLERGIES: Glyburide; Lisinopril; Metformin; Penicillins; and Verapamil  Past Medical, Surgical, Family and Social History reviewed and updated in "[employer]".    Current Outpatient Prescriptions   Medication Sig Dispense Refill     acetaminophen (TYLENOL) 500 MG tablet Take 1,000 mg by mouth 3 times daily as needed for mild pain       aspirin 81 MG tablet Take 81 mg by mouth daily       atorvastatin (LIPITOR) 40 MG tablet Take 40 mg by mouth At Bedtime       calcium acetate (PHOSLO) 667 MG CAPS Take 2,001 mg by mouth 3 times daily (with meals)       diclofenac (VOLTAREN) 1 % GEL topical gel Apply 2 g topically 4 times daily Apply to RLE       gabapentin (NEURONTIN) 100 MG capsule Take 1 capsule (100 mg) by mouth 3 times daily for 10 days 30 capsule 0     metoprolol succinate (TOPROL-XL) 25 MG 24 hr tablet Take 12.5 mg by mouth daily        nitroglycerin (NITROSTAT) 0.4  "MG sublingual tablet Place 1 tablet (0.4 mg) under the tongue every 5 minutes as needed for chest pain 25 tablet 3     polyethylene glycol (MIRALAX/GLYCOLAX) Packet Take 1 packet by mouth daily as needed for constipation       polyethylene glycol (MIRALAX/GLYCOLAX) Packet Take 17 g by mouth daily 7 packet      Medications reviewed:  Medications reconciled to facility chart and changes were made to reflect current medications as identified as above med list. Below are the changes that were made:   Medications stopped since last EPIC medication reconciliation:   Medications Discontinued During This Encounter   Medication Reason     lidocaine (XYLOCAINE) 5 % ointment Discontinued by another Health Care Provider       Medications started since last [facility] medication reconciliation:  Orders Placed This Encounter   Medications     polyethylene glycol (MIRALAX/GLYCOLAX) Packet     Sig: Take 1 packet by mouth daily as needed for constipation         REVIEW OF SYSTEMS:  4 point ROS including Respiratory, CV, GI and , other than that noted in the HPI,  is negative    Physical Exam:  /66  Pulse 70  Temp 97.5  F (36.4  C)  Resp 17  Ht 5' 7\" (1.702 m)  Wt 181 lb (82.1 kg)  SpO2 96%  BMI 28.35 kg/m2  General - frial  Resp: Effort WNL, LSCTA   CV: S1-2, no S3-4, 3/6 systolic murmurs noted- 2+ LE woody edema, dialysis access to right UE, + bruit and thrill, no bleeding  Abd- soft, nontender, BS +  Musc- GONZALEZ  Skin- bruised fingers and arms  Psych- alert, calm, pleasant      BP 11/08-11/12:  /50-75 mmHg    Recent Labs:     CBC RESULTS:   Recent Labs   Lab Test  11/09/18 0414 11/07/18   0608  11/03/18   2341   WBC  5.6   --   7.0   RBC  3.41*   --   3.16*   HGB  12.1*   --   11.3*   HCT  39.5*   --   36.5*   MCV  116*   --   116*   MCH  35.5*   --   35.8*   MCHC  30.6*   --   31.0*   RDW  17.9*   --   17.5*   PLT  148*  149*  128*       Last Basic Metabolic Panel:  Recent Labs   Lab Test  11/09/18 0414  " 11/03/18   2341   NA  138  142   POTASSIUM  5.0  5.2   CHLORIDE  101  108   KIMBER  9.7  8.9   CO2  29  27   BUN  41*  44*   CR  4.87*  4.73*   GLC  131*  115*       Liver Function Studies -   Recent Labs   Lab Test  09/18/18   1228  08/15/17   0650  08/09/17   1810   PROTTOTAL  7.5   --   7.3   ALBUMIN  3.4  2.8*  3.6   BILITOTAL  1.0   --   0.7   ALKPHOS  144   --   53   AST  17   --   19   ALT  9   --   16     Lab Results   Component Value Date    A1C 5.1 09/18/2018    A1C 5.5 12/25/2014         Assessment/Plan:  Fatigue  - likely multifactorial- acute/chronic illness and recurrent hosp/ED visits  - follow sleep/wake pattern  - limit sedating meds  - PT/OT  - follow clincially      Leg swelling  - chronic- recent tx cellulitis. Now WBC WNL  - elevate LE periodically, follow weights, skin checks  - dialysis as below     ESRD (end stage renal disease) on dialysis (H)  - chronic- stable  - dialysis 3 x week as per home regimen        History of diabetes mellitus        Lab Results   Component Value Date     A1C 5.1 09/18/2018     A1C 5.5 12/25/2014      - noted, currently not on meds     Acute diastolic (congestive) heart failure (H)  Pulmonary hypertension (H)  CAD   - resp status currently stable  - continue ASA, statin, BB and prn nitroglycerin  - renal diet  - follow VS/clinically        Physical deconditioning  - back to back hosp, weakness- lives alone in   - PT/OT  - ongoing discharge planning, SW follow and care conferences per unit protocol             Electronically signed by  ABNER Weber CNP                      Sincerely,        ABNER Weber CNP

## 2018-11-12 NOTE — PROGRESS NOTES
[facility]    Chief Complaint   Patient presents with     SRINATH       [facility] Medical Record Number:  [id number]  Place of Service where encounter took place:  [facility]  ([facility]) [[id number]]    HPI:    Maurizio Ohara is a 89 year old  (10/7/1929), who is being seen today for an episodic care visit.  HPI information obtained from: facility chart records.Today's concern is:     Leg swelling  ESRD (end stage renal disease) on dialysis (H)  History of diabetes mellitus  Acute diastolic (congestive) heart failure (H)  Pulmonary hypertension (H)  CAD (coronary artery disease)  Physical deconditioning      ---------------------    Found sitting up in w/c in room.  Alert, calm, NAD. Reports fatigue and weakness. Denies pain. Reports slept well last night. States appetite is fair. Denies SOB, chest pain, dizziness, fever, chills, n/v or abd pain. VS reviewed. Is working with therapy and hoping to get better so can return home.       ALLERGIES: Glyburide; Lisinopril; Metformin; Penicillins; and Verapamil  Past Medical, Surgical, Family and Social History reviewed and updated in [facility].    Current Outpatient Prescriptions   Medication Sig Dispense Refill     acetaminophen (TYLENOL) 500 MG tablet Take 1,000 mg by mouth 3 times daily as needed for mild pain       aspirin 81 MG tablet Take 81 mg by mouth daily       atorvastatin (LIPITOR) 40 MG tablet Take 40 mg by mouth At Bedtime       calcium acetate (PHOSLO) 667 MG CAPS Take 2,001 mg by mouth 3 times daily (with meals)       diclofenac (VOLTAREN) 1 % GEL topical gel Apply 2 g topically 4 times daily Apply to RLE       gabapentin (NEURONTIN) 100 MG capsule Take 1 capsule (100 mg) by mouth 3 times daily for 10 days 30 capsule 0     metoprolol succinate (TOPROL-XL) 25 MG 24 hr tablet Take 12.5 mg by mouth daily        nitroglycerin (NITROSTAT) 0.4 MG sublingual tablet Place 1 tablet (0.4 mg) under the tongue every 5 minutes as needed for chest  "pain 25 tablet 3     polyethylene glycol (MIRALAX/GLYCOLAX) Packet Take 1 packet by mouth daily as needed for constipation       polyethylene glycol (MIRALAX/GLYCOLAX) Packet Take 17 g by mouth daily 7 packet      Medications reviewed:  Medications reconciled to facility chart and changes were made to reflect current medications as identified as above med list. Below are the changes that were made:   Medications stopped since last EPIC medication reconciliation:   Medications Discontinued During This Encounter   Medication Reason     lidocaine (XYLOCAINE) 5 % ointment Discontinued by another Health Care Provider       Medications started since last [facility] medication reconciliation:  Orders Placed This Encounter   Medications     polyethylene glycol (MIRALAX/GLYCOLAX) Packet     Sig: Take 1 packet by mouth daily as needed for constipation         REVIEW OF SYSTEMS:  4 point ROS including Respiratory, CV, GI and , other than that noted in the HPI,  is negative    Physical Exam:  /66  Pulse 70  Temp 97.5  F (36.4  C)  Resp 17  Ht 5' 7\" (1.702 m)  Wt 181 lb (82.1 kg)  SpO2 96%  BMI 28.35 kg/m2  General - frial  Resp: Effort WNL, LSCTA   CV: S1-2, no S3-4, 3/6 systolic murmurs noted- 2+ LE woody edema, dialysis access to right UE, + bruit and thrill, no bleeding  Abd- soft, nontender, BS +  Musc- GONZALEZ  Skin- bruised fingers and arms  Psych- alert, calm, pleasant      BP 11/08-11/12:  /50-75 mmHg    Recent Labs:     CBC RESULTS:   Recent Labs   Lab Test  11/09/18 0414  11/07/18   0608  11/03/18   2341   WBC  5.6   --   7.0   RBC  3.41*   --   3.16*   HGB  12.1*   --   11.3*   HCT  39.5*   --   36.5*   MCV  116*   --   116*   MCH  35.5*   --   35.8*   MCHC  30.6*   --   31.0*   RDW  17.9*   --   17.5*   PLT  148*  149*  128*       Last Basic Metabolic Panel:  Recent Labs   Lab Test  11/09/18   0414  11/03/18   2341   NA  138  142   POTASSIUM  5.0  5.2   CHLORIDE  101  108   KIMBER  9.7  8.9   CO2  29  27 "   BUN  41*  44*   CR  4.87*  4.73*   GLC  131*  115*       Liver Function Studies -   Recent Labs   Lab Test  09/18/18   1228  08/15/17   0650  08/09/17   1810   PROTTOTAL  7.5   --   7.3   ALBUMIN  3.4  2.8*  3.6   BILITOTAL  1.0   --   0.7   ALKPHOS  144   --   53   AST  17   --   19   ALT  9   --   16     Lab Results   Component Value Date    A1C 5.1 09/18/2018    A1C 5.5 12/25/2014         Assessment/Plan:  Fatigue  - likely multifactorial- acute/chronic illness and recurrent hosp/ED visits  - follow sleep/wake pattern  - limit sedating meds  - PT/OT  - follow clincially      Leg swelling  - chronic- recent tx cellulitis. Now WBC WNL  - elevate LE periodically, follow weights, skin checks  - dialysis as below     ESRD (end stage renal disease) on dialysis (H)  - chronic- stable  - dialysis 3 x week as per home regimen        History of diabetes mellitus        Lab Results   Component Value Date     A1C 5.1 09/18/2018     A1C 5.5 12/25/2014      - noted, currently not on meds     Acute diastolic (congestive) heart failure (H)  Pulmonary hypertension (H)  CAD   - resp status currently stable  - continue ASA, statin, BB and prn nitroglycerin  - renal diet  - follow VS/clinically        Physical deconditioning  - back to back hosp, weakness- lives alone in [place]  - PT/OT  - ongoing discharge planning, SW follow and care conferences per unit protocol             Electronically signed by  ABNER Weber CNP

## 2018-11-15 NOTE — IP AVS SNAPSHOT
"[facility] OBSERVATION UNIT: [phone]                                              INTERAGENCY TRANSFER FORM - LAB / IMAGING / EKG / EMG RESULTS   11/15/2018                    Hospital Admission Date: 11/15/2018  YOANA QUIÑONES   : 10/7/1929  Sex: Male        Attending Provider: Diego Rondon MD     Allergies:  Glyburide, Lisinopril, Metformin, Penicillins, Verapamil    Infection:  None   Service:  HOSPITALIST    Ht:  1.702 m (5' 7\")   Wt:  82.5 kg (181 lb 12.8 oz)   Admission Wt:  82.5 kg (181 lb 12.8 oz)    BMI:  28.47 kg/m 2   BSA:  1.97 m 2            Patient PCP Information     Provider PCP Type    Justina Moise MD General         Lab Results - 3 Days      Comprehensive metabolic panel [[id number]] (Abnormal)  Resulted: 18 0628, Result status: Final result    Ordering provider: Diego Rondon MD  18 0328 Resulting lab: [facility]    Specimen Information    Type Source Collected On   Blood  18 0552          Components       Value Reference Range Flag Lab   Sodium 135 133 - 144 mmol/L  FrStHsLb   Potassium 5.8 3.4 - 5.3 mmol/L H FrStHsLb   Chloride 101 94 - 109 mmol/L  FrStHsLb   Carbon Dioxide 28 20 - 32 mmol/L  FrStHsLb   Anion Gap 6 3 - 14 mmol/L  FrStHsLb   Glucose 129 70 - 99 mg/dL H FrStHsLb   Urea Nitrogen 51 7 - 30 mg/dL H FrStHsLb   Creatinine 4.60 0.66 - 1.25 mg/dL H FrStHsLb   GFR Estimate 12 >60 mL/min/1.7m2 L FrStHsLb   Comment:  Non  GFR Calc   GFR Estimate If Black 15 >60 mL/min/1.7m2 L FrStHsLb   Comment:  African American GFR Calc   Calcium 8.9 8.5 - 10.1 mg/dL  FrStHsLb   Bilirubin Total 0.8 0.2 - 1.3 mg/dL  FrStHsLb   Albumin 2.6 3.4 - 5.0 g/dL L FrStHsLb   Protein Total 6.0 6.8 - 8.8 g/dL L FrStHsLb   Alkaline Phosphatase 160 40 - 150 U/L H FrStHsLb   ALT 32 0 - 70 U/L  FrStHsLb   AST 37 0 - 45 U/L  FrStHsLb            Lipase [[id number]] (Abnormal)  Resulted: 18 0628, Result status: Final result    " Ordering provider: Diego Rondon MD  11/16/18 0552 Resulting lab: [facility]    Specimen Information    Type Source Collected On     11/16/18 0552          Components       Value Reference Range Flag Lab   Lipase 50 73 - 393 U/L L FrStHsLb            Ammonia [[id number]]  Resulted: 11/16/18 0624, Result status: Final result    Ordering provider: Diego Rondon MD  11/16/18 0328 Resulting lab: [facility]    Specimen Information    Type Source Collected On   Blood  11/16/18 0552          Components       Value Reference Range Flag Lab   Ammonia 21 10 - 50 umol/L  FrStHsLb            Lipase [[id number]]  Resulted: 11/16/18 0610, Result status: Final result    Ordering provider: Diego Rondon MD  11/16/18 0438 Resulting lab: [facility]    Specimen Information    Type Source Collected On   Blood  11/16/18 0552          Components       Value Reference Range Flag Lab   Lipase Canceled, Test credited 73 - 393 U/L  FrStHsLb   Comment:  Test added to previous specimen            Comprehensive metabolic panel [[id number]] (Abnormal)  Resulted: 11/16/18 0120, Result status: Final result    Ordering provider: Arpan Gilmore MD  11/16/18 0029 Resulting lab: [facility]    Specimen Information    Type Source Collected On   Blood  11/16/18 0035          Components       Value Reference Range Flag Lab   Sodium 136 133 - 144 mmol/L  FrStHsLb   Potassium 5.7 3.4 - 5.3 mmol/L H FrStHsLb   Chloride 100 94 - 109 mmol/L  FrStHsLb   Carbon Dioxide 28 20 - 32 mmol/L  FrStHsLb   Anion Gap 8 3 - 14 mmol/L  FrStHsLb   Glucose 134 70 - 99 mg/dL H FrStHsLb   Urea Nitrogen 51 7 - 30 mg/dL H FrStHsLb   Creatinine 4.32 0.66 - 1.25 mg/dL H FrStHsLb   GFR Estimate 13 >60 mL/min/1.7m2 L FrStHsLb   Comment:  Non  GFR Calc   GFR Estimate If Black 16 >60 mL/min/1.7m2 L FrStHsLb   Comment:  African American GFR Calc   Calcium 9.2 8.5 - 10.1 mg/dL   FrStHsLb   Bilirubin Total 0.8 0.2 - 1.3 mg/dL  FrStHsLb   Albumin 2.8 3.4 - 5.0 g/dL L FrStHsLb   Protein Total 6.3 6.8 - 8.8 g/dL L FrStHsLb   Alkaline Phosphatase 176 40 - 150 U/L H FrStHsLb   ALT 33 0 - 70 U/L  FrStHsLb   AST 40 0 - 45 U/L  FrStHsLb            CBC with platelets differential [[id number]] (Abnormal)  Resulted: 11/16/18 0105, Result status: Final result    Ordering provider: Arpan Gilmore MD  11/16/18 0029 Resulting lab: [facility]    Specimen Information    Type Source Collected On   Blood  11/16/18 0035          Components       Value Reference Range Flag Lab   WBC 10.7 4.0 - 11.0 10e9/L  FrStHsLb   RBC Count 3.09 4.4 - 5.9 10e12/L L FrStHsLb   Hemoglobin 10.8 13.3 - 17.7 g/dL L FrStHsLb   Hematocrit 34.3 40.0 - 53.0 % L FrStHsLb    78 - 100 fl H FrStHsLb   MCH 35.0 26.5 - 33.0 pg H FrStHsLb   MCHC 31.5 31.5 - 36.5 g/dL  FrStHsLb   RDW 17.9 10.0 - 15.0 % H FrStHsLb   Platelet Count 147 150 - 450 10e9/L L FrStHsLb   Diff Method Automated Method   FrStHsLb   % Neutrophils 82.8 %  FrStHsLb   % Lymphocytes 7.8 %  FrStHsLb   % Monocytes 8.8 %  FrStHsLb   % Eosinophils 0.1 %  FrStHsLb   % Basophils 0.1 %  FrStHsLb   % Immature Granulocytes 0.4 %  FrStHsLb   Absolute Neutrophil 8.9 1.6 - 8.3 10e9/L H FrStHsLb   Absolute Lymphocytes 0.8 0.8 - 5.3 10e9/L  FrStHsLb   Absolute Monocytes 0.9 0.0 - 1.3 10e9/L  FrStHsLb   Absolute Eosinophils 0.0 0.0 - 0.7 10e9/L  FrStHsLb   Absolute Basophils 0.0 0.0 - 0.2 10e9/L  FrStHsLb   Abs Immature Granulocytes 0.0 0 - 0.4 10e9/L  FrStHsLb            Testing Performed By     Lab - Abbreviation Name Director Address Valid Date Range    14 - FrStHsLb [facility] Unknown [address] [place] [address] 05/08/15 1057 - Present            Unresulted Labs (24h ago through future)    Start       Ordered    11/17/18 0600  CBC with platelets differential  AM DRAW,   Routine     Comments:  Last Lab Result: Hemoglobin (g/dL)       Date                      Value                 11/16/2018               10.8 (L)         ----------    11/16/18 0328    Signed and Held  Hepatitis B Surface Antibody  CONDITIONAL X 1,   Routine     Comments:  Renal Dialysis RN to Release lab order and draw lab, IF hepatitis B status is unknown.    Signed and Held    Signed and Held  Hepatitis B surface antigen  CONDITIONAL X 1,   Routine     Comments:  Renal Dialysis RN to Release lab order and draw lab, IF hepatitis B status is unknown.    Signed and Held    Signed and Held  Basic metabolic panel  ROUTINE,   Routine     Comments:  Pre-Dialysis    Signed and Held    Signed and Held  Hemoglobin  ROUTINE,   Routine     Comments:  Pre-Dialysis    Signed and Held         Imaging Results - 3 Days      XR Chest 2 Views [[id number]]  Resulted: 11/16/18 1456, Result status: Final result    Ordering provider: Diego Rondon MD  11/16/18 0331 Resulted by: Augustus Matson MD    Performed: 11/16/18 0635 - 11/16/18 0642 Resulting lab: RADIOLOGY RESULTS    Narrative:       CHEST TWO VIEWS  11/16/2018 6:42 AM     HISTORY: Dyspnea.     COMPARISON: 11/4/2018      Impression:       IMPRESSION: Worsening cardiomegaly. No change in pulmonary vascular  engorgement. New left lower lobe infiltrate best seen on lateral view,  obscuring the diaphragm posterior medially in the descending aortic  shadow.    AUGUSTUS MATSON MD      US Abdomen Complete [[id number]]  Resulted: 11/16/18 0937, Result status: Final result    Ordering provider: Diego Rondon MD  11/16/18 0331 Resulted by: Chacho Gordon MD    Performed: 11/16/18 0700 - 11/16/18 0733 Resulting lab: RADIOLOGY RESULTS    Narrative:       ULTRASOUND ABDOMEN COMPLETE   11/16/2018 7:33 AM     HISTORY: Abdominal pain and distention.    COMPARISON: None.    FINDINGS: Normal hepatic echogenicity. No hepatic masses. A small  amount of sludge is present in the neck of a mildly distended  gallbladder. Mild diffuse wall  thickening of the gallbladder. No  convincing gallstones or pericholecystic fluid. No focal tenderness  over the gallbladder. No intra- or extrahepatic biliary dilatation.  The common duct measures 0.2 cm in diameter. The pancreas is not well  visualized due to overlying bowel gas. The spleen is relatively small,  measuring 7.6 cm in craniocaudal dimension. The right and left kidneys  measure 7.9 and 10.6 cm in length, respectively. Echogenic renal  echotexture and cortical thinning of both kidneys. No intrarenal  collecting system dilatation bilaterally. 1.0 cm cyst in the  interpolar region of the right kidney. The proximal aorta and inferior  vena cava are visualized. A small amount of free intraperitoneal  fluid, primarily in the right upper quadrant and pelvis.      Impression:       IMPRESSION:   1. A small amount of free intraperitoneal fluid, primarily in the  right upper quadrant and pelvis.  2. A small amount of sludge present within a borderline distended  mildly thick-walled gallbladder. There is no convincing evidence of  acute cholecystitis. If there is a clinical concern for acute  cholecystitis, a HIDA scan could be considered for further evaluation.  3. No biliary dilatation.  4. Both kidneys are echogenic and moderately atrophic, suggesting  medical renal disease.    RON HUGHES MD      CT Head w/o Contrast [[id number]]  Resulted: 11/16/18 0832, Result status: Final result    Ordering provider: Diego Rondon MD  11/16/18 0328 Resulted by: John Bush MD    Performed: 11/16/18 0701 - 11/16/18 0703 Resulting lab: RADIOLOGY RESULTS    Narrative:       CT SCAN OF THE HEAD WITHOUT CONTRAST   11/16/2018 7:03 AM     HISTORY: Fall and somnolence.     TECHNIQUE:  Axial images of the head and coronal reformations without  IV contrast material. Radiation dose for this scan was reduced using  automated exposure control, adjustment of the mA and/or kV according  to patient size, or iterative  reconstruction technique.    COMPARISON: Earlier the same day.    FINDINGS: There is no evidence of intracranial hemorrhage, mass, acute  infarct or anomaly. There is generalized atrophy of the brain. There  is low attenuation in the white matter of the cerebral hemispheres  consistent with sequelae of small vessel ischemic disease. Ventricular  size is within normal limits without evidence of hydrocephalus.     The visualized portions of the sinuses and mastoids appear normal. The  bony calvarium and bones of the skull base appear intact.       Impression:       IMPRESSION:     1. No evidence of acute intracranial hemorrhage, mass, or herniation.  2. There is generalized atrophy of the brain. White matter changes are  present in the cerebral hemispheres that are consistent with small  vessel ischemic disease in this age patient. No significant change  since prior.    FEDERICO EASON MD      CT Head w/o Contrast [[id number]]  Resulted: 11/16/18 0625, Result status: Final result    Ordering provider: Arpan Gilmore MD  11/16/18 0029 Resulted by: Bola Silva MD    Performed: 11/16/18 0054 - 11/16/18 0056 Resulting lab: RADIOLOGY RESULTS    Narrative:       CT SCAN OF THE HEAD WITHOUT CONTRAST  11/16/2018 12:56 AM     HISTORY: Altered level of consciousness.    TECHNIQUE: Axial images of the head and coronal reformations without  IV contrast material. Radiation dose for this scan was reduced using  automated exposure control, adjustment of the mA and/or kV according  to patient size, or iterative reconstruction technique.    COMPARISON: 1/10/2015.    FINDINGS: There is generalized atrophy of the brain. There is low  attenuation in the white matter of the cerebral hemispheres consistent  with sequelae of small vessel ischemic disease. There is no evidence  of intracranial hemorrhage, mass, acute infarct or anomaly.     The visualized portions of the sinuses and mastoids appear normal.  There is no evidence of  trauma.      Impression:       IMPRESSION: No acute abnormality.    VINNIE DIXON MD      Testing Performed By     Lab - Abbreviation Name Director Address Valid Date Range    104 - Rad Rslts RADIOLOGY RESULTS Unknown Unknown 02/16/05 1553 - Present            Encounter-Level Documents:     There are no encounter-level documents.      Order-Level Documents:     There are no order-level documents.

## 2018-11-15 NOTE — PROGRESS NOTES
[facility]    Chief Complaint   Patient presents with     [facility] Acute       [facility] Record Number:  [id number]  Place of Service where encounter took place:  [facility]  ([facility]) [[id number]]    HPI:    Maurizio Ohara is a 89 year old  (10/7/1929), who is being seen today for an episodic care visit.  HPI information obtained from: facility chart records, facility staff, patient report and [facility] chart review.    Today's concern is:  Leg swelling  Physical deconditioning  Depressed mood  Per hospital notes 89 year old male with h/o chronic anemia, CAD with prior PCI, ESRD on dialysis every T,Thur, Sat, hypertension, severe pulmonary hypertension, dCHF (recent echocardiogram suspicion for mobile mass but has negative workup for infectious process and has pending LAN in the near future). Recently discharged from a TCU after staying for a month and currently living at home alone. Developed leg swelling, difficulty in ambulation, near fall events, and generalized weakness - brought into the hospital. No acute issues noted at hospital, sent to TCU for further rehab.  --since admission back to TCU mood depressed. Refuses to come out of room for meals, frequent fatigue. Makes statements that therapies are of no use and he is not getting stronger. When seen today patient denies being depressed or anxious. He states he is frustrated at times with slow progress but otherwise mood is fine and he does not want to see ACP.     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 BP's low at times after dialysis. Will ask staff to start giving  metoprolol at HS vs in the morning. Recent Weights 11/15: 185.0lbs & 11/08: 173.0lbs and BP 11/08-11/14: /50-75 mmHg. BG not routinely monitored. Wt is noted to be up 12 lbs since admission but patient is on dialysis.   Denies chest pain or dyspnea. Did become acutely nauseated after dialysis today and had an emesis x 1 but symptoms now resolved.     ALLERGIES: Glyburide; Lisinopril; Metformin; Penicillins; and Verapamil  Past Medical, Surgical, Family and Social History reviewed and updated in [facility].    Current Outpatient Prescriptions   Medication Sig Dispense Refill     acetaminophen (TYLENOL) 500 MG tablet Take 1,000 mg by mouth 3 times daily as needed for mild pain       aspirin 81 MG tablet Take 81 mg by mouth daily       atorvastatin (LIPITOR) 40 MG tablet Take 40 mg by mouth At Bedtime       calcium acetate (PHOSLO) 667 MG CAPS Take 2,001 mg by mouth 3 times daily (with meals)       diclofenac (VOLTAREN) 1 % GEL topical gel Apply 2 g topically 4 times daily Apply to RLE       gabapentin (NEURONTIN) 100 MG capsule Take 1 capsule (100 mg) by mouth 3 times daily for 10 days 30 capsule 0     metoprolol succinate (TOPROL-XL) 25 MG 24 hr tablet Take 12.5 mg by mouth daily        nitroglycerin (NITROSTAT) 0.4 MG sublingual tablet Place 1 tablet (0.4 mg) under the tongue every 5 minutes as needed for chest pain 25 tablet 3     polyethylene glycol (MIRALAX/GLYCOLAX) Packet Take 1 packet by mouth daily as needed for constipation       polyethylene glycol (MIRALAX/GLYCOLAX) Packet Take 17 g by mouth daily 7 packet      Medications reviewed:  Medications reconciled to facility chart and changes were made to reflect current medications as identified as above med list. Below are the changes that were made:   Medications stopped since last EPIC medication reconciliation:   There are no discontinued medications.    Medications started since last [facility] medication reconciliation:  No orders of the defined types were  "placed in this encounter.    REVIEW OF SYSTEMS:  10 point ROS of systems including Constitutional, Eyes, Respiratory, Cardiovascular, Gastroenterology, Genitourinary, Integumentary, Musculoskeletal, Psychiatric were all negative except for pertinent positives noted in my HPI.    Physical Exam:  /57  Pulse 76  Temp 97.4  F (36.3  C)  Resp 17  Ht 5' 7\" (1.702 m)  Wt 180 lb 3.2 oz (81.7 kg)  SpO2 96%  BMI 28.22 kg/m2  GENERAL APPEARANCE:  Alert, in no distress, pleasant, cooperative, oriented x 4  EYES:  EOM, lids, pupils and irises normal, sclera clear and conjunctiva normal, no discharge or mattering on lids or lashes noted  ENT:  Mouth normal, moist mucous membranes, nose normal without drainage or crusting, external ears without lesions, hearing acuity intact  RESP:  respiratory effort of chest normal, no chest wall tenderness, no respiratory distress, patient is on room air  ABDOMEN:  full, soft, nontender.  M/S:   Gait and station wheelchair bound and walks with assist , no tenderness or swelling of the joints; able to move all extremities   NEURO: cranial nerves 2-12 grossly intact, no facial asymmetry, no speech deficits and able to follow directions, moves all extremities symmetrically  PSYCH:  insight and judgement intact, memory intact, affect and mood normal     Recent Labs:     CBC RESULTS:   Recent Labs   Lab Test  11/12/18   1020  11/09/18   0414   WBC  8.4  5.6   RBC  3.07*  3.41*   HGB  10.9*  12.1*   HCT  34.2*  39.5*   MCV  111*  116*   MCH  35.5*  35.5*   MCHC  31.9  30.6*   RDW  17.8*  17.9*   PLT  127*  148*       Last Basic Metabolic Panel:  Recent Labs   Lab Test  11/12/18   1020  11/09/18   0414   NA  134  138   POTASSIUM  5.6*  5.0   CHLORIDE  99  101   KIMBER  9.5  9.7   CO2  26  29   BUN  49*  41*   CR  5.41*  4.87*   GLC  153*  131*       Liver Function Studies -   Recent Labs   Lab Test  09/18/18   1228  08/15/17   0650  08/09/17   1810   PROTTOTAL  7.5   --   7.3   ALBUMIN  3.4  " 2.8*  3.6   BILITOTAL  1.0   --   0.7   ALKPHOS  144   --   53   AST  17   --   19   ALT  9   --   16     Lab Results   Component Value Date    A1C 5.1 09/18/2018    A1C 5.5 12/25/2014       Assessment/Plan/Orders:  Leg swelling  Physical deconditioning  Depressed mood  Acute on chronic. Continue therapies, f/u with progress next week.   --Recommended ACP consult due to occasionally noted depressed mood but patient declined. Monitor.    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 VS, wt, labs per dialysis routine.   --Change metoprolol admin time to HS starting 11/16.     Electronically signed by  ABNER Ware CNP

## 2018-11-15 NOTE — LETTER
11/15/2018        RE: Maurizio Ohara  [address] [facility] [address]        [facility]    Chief Complaint   Patient presents with     alf Acute       [facility] Record Number:  [id number]  Place of Service where encounter took place:  [facility]  (S) [[id number]]    HPI:    Maurizio Ohara is a 89 year old  (10/7/1929), who is being seen today for an episodic care visit.  HPI information obtained from: facility chart records, facility staff, patient report and [facility] chart review.    Today's concern is:  Leg swelling  Physical deconditioning  Depressed mood  Per hospital notes 89 year old male with h/o chronic anemia, CAD with prior PCI, ESRD on dialysis every T,Thur, Sat, hypertension, severe pulmonary hypertension, dCHF (recent echocardiogram suspicion for mobile mass but has negative workup for infectious process and has pending LAN in the near future). Recently discharged from a TCU after staying for a month and currently living at home alone. Developed leg swelling, difficulty in ambulation, near fall events, and generalized weakness - brought into the hospital. No acute issues noted at hospital, sent to TCU for further rehab.  --since admission back to TCU mood depressed. Refuses to come out of room for meals, frequent fatigue. Makes statements that therapies are of no use and he is not getting stronger. When seen today patient denies being depressed or anxious. He states he is frustrated at times with slow progress but otherwise mood is fine and he does not want to see ACP.     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 BP's low at times after dialysis. Will ask staff to start giving metoprolol at HS vs in the morning. Recent Weights 11/15: 185.0lbs & 11/08: 173.0lbs and BP 11/08-11/14: /50-75 mmHg. BG not routinely monitored. Wt is noted to be up 12 lbs since admission but patient is on dialysis.   Denies chest pain or dyspnea. Did become acutely nauseated after dialysis today and had an emesis x 1 but symptoms now resolved.     ALLERGIES: Glyburide; Lisinopril; Metformin; Penicillins; and Verapamil  Past Medical, Surgical, Family and Social History reviewed and updated in [facility].    Current Outpatient Prescriptions   Medication Sig Dispense Refill     acetaminophen (TYLENOL) 500 MG tablet Take 1,000 mg by mouth 3 times daily as needed for mild pain       aspirin 81 MG tablet Take 81 mg by mouth daily       atorvastatin (LIPITOR) 40 MG tablet Take 40 mg by mouth At Bedtime       calcium acetate (PHOSLO) 667 MG CAPS Take 2,001 mg by mouth 3 times daily (with meals)       diclofenac (VOLTAREN) 1 % GEL topical gel Apply 2 g topically 4 times daily Apply to RLE       gabapentin (NEURONTIN) 100 MG capsule Take 1 capsule (100 mg) by mouth 3 times daily for 10 days 30 capsule 0     metoprolol succinate (TOPROL-XL) 25 MG 24 hr tablet Take 12.5 mg by mouth daily        nitroglycerin (NITROSTAT) 0.4 MG sublingual tablet Place 1 tablet (0.4 mg) under the tongue every 5 minutes as needed for chest pain 25 tablet 3     polyethylene glycol (MIRALAX/GLYCOLAX) Packet Take 1 packet by mouth daily as needed for constipation       polyethylene glycol (MIRALAX/GLYCOLAX) Packet Take 17 g by mouth daily 7 packet      Medications reviewed:  Medications reconciled to facility chart and changes were made to reflect current medications as identified as above med list. Below are the changes that were made:   Medications stopped since last EPIC medication reconciliation:   There are no discontinued medications.    Medications  "started since last EPIC medication reconciliation:  No orders of the defined types were placed in this encounter.    REVIEW OF SYSTEMS:  10 point ROS of systems including Constitutional, Eyes, Respiratory, Cardiovascular, Gastroenterology, Genitourinary, Integumentary, Musculoskeletal, Psychiatric were all negative except for pertinent positives noted in my HPI.    Physical Exam:  /57  Pulse 76  Temp 97.4  F (36.3  C)  Resp 17  Ht 5' 7\" (1.702 m)  Wt 180 lb 3.2 oz (81.7 kg)  SpO2 96%  BMI 28.22 kg/m2  GENERAL APPEARANCE:  Alert, in no distress, pleasant, cooperative, oriented x 4  EYES:  EOM, lids, pupils and irises normal, sclera clear and conjunctiva normal, no discharge or mattering on lids or lashes noted  ENT:  Mouth normal, moist mucous membranes, nose normal without drainage or crusting, external ears without lesions, hearing acuity intact  RESP:  respiratory effort of chest normal, no chest wall tenderness, no respiratory distress, patient is on room air  ABDOMEN:  full, soft, nontender.  M/S:   Gait and station wheelchair bound and walks with assist , no tenderness or swelling of the joints; able to move all extremities   NEURO: cranial nerves 2-12 grossly intact, no facial asymmetry, no speech deficits and able to follow directions, moves all extremities symmetrically  PSYCH:  insight and judgement intact, memory intact, affect and mood normal     Recent Labs:     CBC RESULTS:   Recent Labs   Lab Test  11/12/18   1020  11/09/18   0414   WBC  8.4  5.6   RBC  3.07*  3.41*   HGB  10.9*  12.1*   HCT  34.2*  39.5*   MCV  111*  116*   MCH  35.5*  35.5*   MCHC  31.9  30.6*   RDW  17.8*  17.9*   PLT  127*  148*       Last Basic Metabolic Panel:  Recent Labs   Lab Test  11/12/18   1020  11/09/18   0414   NA  134  138   POTASSIUM  5.6*  5.0   CHLORIDE  99  101   KIMBER  9.5  9.7   CO2  26  29   BUN  49*  41*   CR  5.41*  4.87*   GLC  153*  131*       Liver Function Studies -   Recent Labs   Lab Test  " 09/18/18   1228  08/15/17   0650  08/09/17   1810   PROTTOTAL  7.5   --   7.3   ALBUMIN  3.4  2.8*  3.6   BILITOTAL  1.0   --   0.7   ALKPHOS  144   --   53   AST  17   --   19   ALT  9   --   16     Lab Results   Component Value Date    A1C 5.1 09/18/2018    A1C 5.5 12/25/2014       Assessment/Plan/Orders:  Leg swelling  Physical deconditioning  Depressed mood  Acute on chronic. Continue therapies, f/u with progress next week.   --Recommended ACP consult due to occasionally noted depressed mood but patient declined. Monitor.    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 VS, wt, labs per dialysis routine.   --Change metoprolol admin time to HS starting 11/16.     Electronically signed by  ABNER Ware CNP                      Sincerely,        ABNER Ware CNP

## 2018-11-15 NOTE — IP AVS SNAPSHOT
` ` Patient Information     Patient Name Sex     Maurizio Ohara ([id number]) Male 10/7/1929       Room Bed    OU05 OU05-01      Patient Demographics     Address Phone    [address] [facility] [address] [phone] (Home)  NONE (Work)  [phone] (Mobile) *Preferred*      Patient Ethnicity & Race     Ethnic Group Patient Race    American White      Emergency Contact(s)     Name Relation Home Work Mobile    Karyn Orozco Sister [phone] none none    Rosa Relative [phone]        Documents on File        Status Date Received Description       Documents for the Patient    Privacy Notice - [place] Received 13     Insurance Card Received 17 Medicare/HP    External Medication Information Consent Accepted 01/08/15     Patient ID Received 13     Consent for Services - Hospital/Clinic Received () 13     Consent for EHR Access Received 13     [facility] Specified Other       Consent for Services - Hospital/Clinic Received () 14     HIM MECCA Authorization  14     Insurance Card Received 17 HP -    HIM MECCA Authorization  01/12/15     Consent for Services - Hospital/Clinic Received () 16     HIM MECCA Authorization  16 [facility]    Consent for Services/Privacy Notice - Hospital/Clinic Received () 02/10/16     HIM MECCA Authorization  10/03/16 [facility]    Patient ID Received 18     HIM MECCA Authorization  10/27/16 [facility]    Consent for Services/Privacy Notice - Hospital/Clinic Received () 17     HIM MECCA Authorization  17     Care Everywhere Prospective Auth Received 10/18/17     HIM MECCA Authorization  18     Consent for Services - Hospital and Clinic Received 18     HIE Auth Received 18     HIM MECCA Authorization  18     Insurance Card Received 06/15/18 [employer] 2018    Consent to Communicate Received 18 Auth to Share PHI    HIM MECCA Authorization  18     HIM MECCA  Authorization  11/09/18 INFO CHECKED    Consent for Services - Hospital/Clinic  (Deleted)         Documents for the Encounter    CMS IM for Patient Signature         Admission Information     Attending Provider Admitting Provider Admission Type Admission Date/Time    Diego Rondon MD Jungman, Robert James, MD Emergency 11/15/18  2306    Discharge Date Hospital Service Auth/Cert Status Service Area     Hospitalist Incomplete [facility]    Unit Room/Bed Admission Status        OBSERVATION OU05/OU05-01 Admission (Confirmed)       Admission     Complaint    Altered mental status      Hospital Account     Name Acct ID Class Status Primary Coverage    Maurizio Ohara [id number] Observation Open MEDICARE - MEDICARE FOR HB SUPPLEMENT            Guarantor Account (for Hospital Account #[id number])     Name Relation to Pt Service Area Active? Acct Type    Maurizio Ohara Self FCS Yes Personal/Family    Address Phone          [address] [place] [address] [phone](H)  NONE(O)              Coverage Information (for Hospital Account #[id number])     1. MEDICARE/MEDICARE FOR HB SUPPLEMENT     F/O Payor/Plan Precert #    MEDICARE/MEDICARE FOR HB SUPPLEMENT     Subscriber Subscriber #    Maurizio Ohara [id number]    Address Phone    [employer]  [address]  [place] [address] [phone]          2. [employer]ERS/[employer]REED[employer] PLAN     F/O Payor/Plan Precert #    [employer]ED[employer] PLAN     Subscriber Subscriber #    Maurizio Ohara [id number]    Address Phone    [address]  [place] [address] [phone]

## 2018-11-15 NOTE — IP AVS SNAPSHOT
` [facility] OBSERVATION UNIT: [phone]                 INTERAGENCY TRANSFER FORM - NOTES (H&P, Discharge Summary, Consults, Procedures, Therapies)   11/15/2018                    Hospital Admission Date: 11/15/2018  MAURIZIO QUIÑONES   : 10/7/1929  Sex: Male        Patient PCP Information     Provider PCP Type    Justina Moise MD General         History & Physicals      H&P by Diego Rondon MD at 2018  1:57 AM     Author:  Diego Rondon MD Service:  Hospitalist Author Type:  Physician    Filed:  2018  4:41 AM Date of Service:  2018  1:57 AM Creation Time:  2018  1:57 AM    Status:  Signed :  Diego Rondon MD (Physician)         [facility]    History and Physical  Hospitalist       Date of Admission:  11/15/2018  Date of Service (when I saw the patient): 18    ASSESSMENT  Maurizio Quiñones is a[RJ1.1]n[RJ1.2] 89 year old[RJ1.1] gentleman with extensive pst medical history most notable for ESRD on HD, CAD, chronic diastolic CHF, severe aortic stenosis and mitral regurgitation, and pulmonary hypertension who[RJ1.2] presents with[RJ1.1] unwitnessed fall and progressive somnolence.[RJ1.2]    PLAN[RJ1.1]    1) Fall and somnolence:[RJ1.2] Mr. Quiñones has known aortic stenosis and mitral regurgitation. He was hospitalized 2018 for right lower extremity cellulitis, with negative ultrasound and blood cultures, and was treated with cephalosporins. He was re-hospitalized 11/3/2018 through 2018 for generalized weakness in his lower legs; the case was discussed with Cardiology and he underwent LAN testing 2018 for further evaluation of his cardiac function and was found to have severe aortic stenosis with a mobile calcified vegetation, thought most likely to be non-infectious; moderate to severe TR, diminished RV function, and preserved LVEF.[RJ1.3] His weakness was attributed to lower extremity edema and  deconditioning and he was discharged to a TCU.     Now he presents for an unwitnessed fall there and subsequent increased somnolence. This could be due to sundowning and lack of sleep[RJ1.4], perhaps progressive depression given recent care notes documenting concern for that;[RJ1.2] but we have a low threshold for[RJ1.4] greater[RJ1.2] concern if he does not soon arouse more fully in AM.      -- Observation for now; telemetry; fall precautions and q 4 neuro checks      -- AM Head CT repeated to rule out evolving bleeding; will also check Ammonia level[RJ1.4]       -- Hold Neurontin (the only medication it appears could be exacerbating his somnolence; reconcile all medications in AM to confirm this)       -- His abdomen is mildly tender and distended on exam. Will repeat Hepatic panel with Lipase, CXR and abdominal ultrasound[RJ1.2]      -- If not arousing more fully in AM, may need Neurology consultation. May also need Cardiology consultation if it is felt his fall is due to syncope related to aortic stenosis, because that would portend a grim prognosis without intervention[RJ1.4]. Otherwise, if he returns to his baseline mental status in AM, he could be discharged with close outpatient Cardiology follow up    2) ESRD on HD: Causing hyperkalemia 5.7. It was 5.6 on 11/12. Nephrology consulted for consideration of HD today. Resume PhosLo when verified    3) CAD: Status post NSTEMI and RCA stent placement in 2014.      -- Resume aspirin, Lipitor, Toprol when verified    4) Chronic anemia, thrombocytopenia: Monitor while hospitalized[RJ1.2]    Chief Complaint[RJ1.1]   Fall    Unable to obtain a history from the patient due to confusion; history obtained from[RJ1.2] the ED physician whom I have spoken with    History of Present Illness   Maurizio Ohara is a[RJ1.1]n[RJ1.2] 89 year old[RJ1.1] gentleman[RJ1.2] who presents[RJ1.1] after reportedly being found on the floor at his facility having suffered an unwitnessed  fall and found to have abrasions in his extremities; he was awake at the time he was found but became progressively more sleepy afterward causing them to send him into the ED. On my assessment on the Observation floor, he sleeps but is arousable, opens his eyes briefly when I ask him to, says he does not remember what happened, and denies pain, dyspnea or any other acute complaints. Then he falls back peacefully asleep. Of note, a visit earlier in the day noted progressive depressed mood.[RJ1.2]    In the ED, Blood pressure 108/71, temperature 97.6  F (36.4  C), temperature source Temporal, resp. rate 16, SpO2 100 %.[RJ1.1]    CBC shows WBC 11, HGB 11, . CMP confirms CKD5 with BUN 51, Cr 4.32, K 5.7. Alb 2.8, T prot 6.3. Alk phos 176, other hepatic labs unremarkable. CT Head without contrast was negative for acute pathology. His abrasions were attended to in the ED.[RJ1.2]    Data   Labs Ordered and Resulted from Time of ED Arrival Up to the Time of Departure from the ED   CBC WITH PLATELETS DIFFERENTIAL - Abnormal; Notable for the following:        Result Value    RBC Count 3.09 (*)     Hemoglobin 10.8 (*)     Hematocrit 34.3 (*)      (*)     MCH 35.0 (*)     RDW 17.9 (*)     Platelet Count 147 (*)     Absolute Neutrophil 8.9 (*)     All other components within normal limits   COMPREHENSIVE METABOLIC PANEL - Abnormal; Notable for the following:     Potassium 5.7 (*)     Glucose 134 (*)     Urea Nitrogen 51 (*)     Creatinine 4.32 (*)     GFR Estimate 13 (*)     GFR Estimate If Black 16 (*)     Albumin 2.8 (*)     Protein Total 6.3 (*)     Alkaline Phosphatase 176 (*)     All other components within normal limits   VITAL SIGNS AND NEURO CHECKS       PHYSICAL EXAM  Blood pressure 108/71, temperature 97.6  F (36.4  C), temperature source Temporal, resp. rate 16, SpO2 100 %.  Constitutional:[RJ1.1] somnolent but arousable[RJ1.2]; no apparent distress  HEENT:[RJ1.1] frontal bossing of the skull;[RJ1.2]  moist mucus membranes  Respiratory: lungs clear to auscultation bilaterally  Cardiovascular: regular S1 S2   GI: abdomen soft[RJ1.1] mildly[RJ1.2] tender[RJ1.1] in epigastrium and[RJ1.2] distended[RJ1.1],[RJ1.2] bowel sounds positive  Lymph/Hematologic:[RJ1.1] pale[RJ1.2], no cervical lymphadenopathy  Skin:[RJ1.1] some diffuse abrasions[RJ1.2], good turgor  Musculoskeletal:[RJ1.1] mild bilateral LE mellisa[RJ1.2]  Neurologic: extra-ocular muscles intact; moves all four extremities    Data reviewed today:  I personally reviewed[RJ1.1] the head CT image(s) showing no acute pathology[RJ1.2].    Recent Results (from the past 24 hour(s))   CT Head w/o Contrast    Narrative    CT SCAN OF THE HEAD WITHOUT CONTRAST  11/16/2018 12:56 AM     HISTORY: Altered level of consciousness.    TECHNIQUE: Axial images of the head and coronal reformations without  IV contrast material. Radiation dose for this scan was reduced using  automated exposure control, adjustment of the mA and/or kV according  to patient size, or iterative reconstruction technique.    COMPARISON: 1/10/2015.    FINDINGS: There is generalized atrophy of the brain. There is low  attenuation in the white matter of the cerebral hemispheres consistent  with sequelae of small vessel ischemic disease. There is no evidence  of intracranial hemorrhage, mass, acute infarct or anomaly.     The visualized portions of the sinuses and mastoids appear normal.  There is no evidence of trauma.      Impression    IMPRESSION: No acute abnormality.       DVT Prophylaxis:[RJ1.1] Pneumatic Compression Devices[RJ1.2]  Code Status:[RJ1.1] Full Code presumed[RJ1.2]    Disposition: Expected discharge in[RJ1.1] 0-2 days[RJ1.2]    Diego Rondon MD    Primary Care Physician   *Justina Moise    Past Medical History    I have reviewed this patient's medical history and updated it with pertinent information if needed.   Past Medical History:   Diagnosis Date     Anemia      Anemia       CAD (coronary artery disease) 12/24/14    PCI and BMS to mid RCA (5.0 x 20mm) with residual mod diffuse mid LAD disease     Chronic kidney disease     on Dialysis     Diabetes (H)      Hypertension      Mitral regurgitation 12/24/2014    mild-mod (1-2+) per echo     Mitral valve disorders(424.0) 12/24/2014    mild/mod (1-2+) MR     Myocardial infarction (H) 12/24/2014    NSTEMI     Pulmonary hypertension (H) 12/24/2014    RVSP elevated c/w mild-mod PH per echo     Renal disease due to diabetes mellitus (H)     End stage; on Dialysis       Past Surgical History   I have reviewed this patient's surgical history and updated it with pertinent information if needed.  Past Surgical History:   Procedure Laterality Date     CORONARY ANGIOGRAPHY ADULT ORDER  12/24/2014     ENT SURGERY      Tonsillectomy     HEART CATH, ANGIOPLASTY  12/24/2014    PCI and BMS (5.0x20mm)to mid RCA     THORACIC SURGERY      Herniated disc       Prior to Admission Medications   Prior to Admission Medications   Prescriptions Last Dose Informant Patient Reported? Taking?   acetaminophen (TYLENOL) 500 MG tablet   Yes Yes   Sig: Take 1,000 mg by mouth 3 times daily as needed for mild pain   aspirin 81 MG tablet   Yes Yes   Sig: Take 81 mg by mouth daily   atorvastatin (LIPITOR) 40 MG tablet   Yes Yes   Sig: Take 40 mg by mouth At Bedtime   calcium acetate (PHOSLO) 667 MG CAPS  Self Yes Yes   Sig: Take 2,001 mg by mouth 3 times daily (with meals)   diclofenac (VOLTAREN) 1 % GEL topical gel   No Yes   Sig: Apply 2 g topically 4 times daily Apply to RLE   gabapentin (NEURONTIN) 100 MG capsule   No Yes   Sig: Take 1 capsule (100 mg) by mouth 3 times daily for 10 days   metoprolol succinate (TOPROL-XL) 25 MG 24 hr tablet   Yes Yes   Sig: Take 12.5 mg by mouth daily    nitroglycerin (NITROSTAT) 0.4 MG sublingual tablet   No Yes   Sig: Place 1 tablet (0.4 mg) under the tongue every 5 minutes as needed for chest pain   polyethylene glycol (MIRALAX/GLYCOLAX)  "Packet   No Yes   Sig: Take 17 g by mouth daily   polyethylene glycol (MIRALAX/GLYCOLAX) Packet   Yes No   Sig: Take 1 packet by mouth daily as needed for constipation      Facility-Administered Medications: None     Allergies   Allergies   Allergen Reactions     Glyburide      Lisinopril      Hyperkalemia when hospitalized 4/5/2011     Metformin      Renal failure stage 4     Penicillins      SHADY Gallagher clarified with pt, pt does not remember rxn, it was a long time ago, and \"nothing crazy\".     Verapamil        Social History   I have reviewed this patient's social history and updated it with pertinent information if needed. Maurizio Ohara  reports that he has quit smoking. He has never used smokeless tobacco. He reports that he drinks alcohol. He reports that he does not use illicit drugs.    Family History   Family history assessed and, except as above, is non-contributory.    Family History   Problem Relation Age of Onset     Diabetes Mother        Review of Systems   The 10 point Review of Systems is negative other than noted in the HPI or here.[RJ1.1]      Revision History        User Key Date/Time User Provider Type Action    > RJ1.2 11/16/2018  4:41 AM Diego Rondon MD Physician Sign     RJ1.4 11/16/2018  3:31 AM Diego Rondon MD Physician      RJ1.3 11/16/2018  3:19 AM Diego Rondon MD Physician      RJ1.1 11/16/2018  1:57 AM Diego Rondon MD Physician                   Discharge Summaries     No notes of this type exist for this encounter.         Consult Notes      Consults by Gennaro Bertrand MD at 11/16/2018 10:30 AM     Author:  Gennaro Bertrand MD Service:  Nephrology Author Type:  Physician    Filed:  11/16/2018 10:31 AM Date of Service:  11/16/2018 10:30 AM Creation Time:  11/16/2018 10:30 AM    Status:  Signed :  Gennaro Bertrand MD (Physician)     Consult Orders:    1. Nephrology IP Consult: Patient to be seen: Routine - within 24 " hours; ESRD on HD; Consultant may enter orders: Yes [[id number]] ordered by Diego Rondon MD at 11/16/18 0201                ESRD patient who had dialysis on Thursday. Will plan to run in am as per usual schedule.[JT1.1]      Revision History        User Key Date/Time User Provider Type Action    > JT1.1 11/16/2018 10:31 AM Gennaro Bertrand MD Physician Sign                     Progress Notes - Physician (Notes from 11/13/18 through 11/16/18)      ED Provider Notes by Arpan Gilmore MD at 11/15/2018 11:05 PM     Author:  Arpan Gilmore MD Service:  Emergency Medicine Author Type:  Physician    Filed:  11/16/2018  5:48 AM Date of Service:  11/15/2018 11:05 PM Creation Time:  11/15/2018 11:48 PM    Status:  Signed :  Arpan Gilmore MD (Physician)           History     Chief Complaint:  Altered Mental Status and Fall    HPI   Maurizio Ohara is a 89 year old male with a complex medical history including ESRD on dialysis, previous MI, mitral valve disorder, mitral regurgitation, CAD, pulmonary hypertension, and current TCU status who presents to the emergency department today via EMS for evaluation of altered mental status after a fall. Per EMS, the patient currently is at a TCU facility.  Tonight, the patient had an unwitnessed fall at his care facility around 1845 where he did hit his head.  He did sustain a skin tear to his left and right arms.  The care facility did wrap and address these skin tears.  But, the patient became increasingly confused and had decreased mental status after the fall prompting concern, a call to EMS, and his arrival to the emergency room.  At arrival, the patient is sleepy, and does not answer questions easily.  He denies pain, but does react when he is touched on his arm by his skin tear.    Allergies:[WB1.1]  Glyburide  Lisinopril  Metformin  Penicillins  Verapamil[WB1.2]    Medications:    aspirin 81 MG tablet  atorvastatin (LIPITOR) 40 MG  "tablet  calcium acetate (PHOSLO) 667 MG CAPS  gabapentin (NEURONTIN) 100 MG capsule  metoprolol succinate (TOPROL-XL) 25 MG 24 hr tablet  nitroglycerin (NITROSTAT) 0.4 MG sublingual tablet  polyethylene glycol (MIRALAX/GLYCOLAX) Packet    Past Medical History:    Anemia  CAD  CKD  Diabetes  Hypertension  Mitral regurgitation  Mitral valve disorders  MI  Pulmonary hypertension  ESRD on dialysis    Past Surgical History:    Coronary angiography  Tonsillectomy  Heart Cath, angioplasty  Thoracic surgery-herniated disc    Family History:    Diabetes    Social History:  The patient was[WB1.1] alone[WB1.3].  Smoking Status: Former  Smokeless Tobacco: Never  Alcohol Use: Yes  Marital Status:  Single    Review of Systems   Unable to perform ROS: Mental status change   Skin: Positive for wound.     Physical Exam[WB1.1]     Patient Vitals for the past 24 hrs:   BP Temp Temp src Heart Rate Resp SpO2 Height Weight   11/16/18 0331 114/57 96.6  F (35.9  C) Axillary 66 20 100 % 1.702 m (5' 7\") 82.5 kg (181 lb 12.8 oz)   11/16/18 0310 115/64 - - 63 19 - - -   11/16/18 0300 - - - 64 15 98 % - -   11/16/18 0230 - - - 65 15 99 % - -   11/16/18 0215 - - - 61 11 97 % - -   11/16/18 0200 - - - 64 12 98 % - -   11/16/18 0145 - - - 61 14 99 % - -   11/16/18 0130 - - - 66 14 99 % - -   11/16/18 0115 - - - 64 16 100 % - -   11/16/18 0112 115/64 - - 61 12 100 % - -   11/16/18 0101 - - - 67 16 100 % - -   11/16/18 0053 - - - 65 16 100 % - -   11/16/18 0030 - - - 66 13 99 % - -   11/16/18 0015 - - - 66 (!) 7 98 % - -   11/16/18 0000 - - - 61 14 98 % - -   11/15/18 2345 - - - 64 17 98 % - -   11/15/18 2330 - - - 66 16 98 % - -   11/15/18 2315 - - - - - 98 % - -   11/15/18 2313 108/71 97.6  F (36.4  C) Temporal 74 20 98 % - -[WB1.4]         Physical Exam[WB1.1]  Constitutional: The patient is oriented to person, place, and time. Sleepy, arouses with physical stimulation.    HENT:   Head: Atraumatic  Right Ear: Normal  Left Ear: Normal  Nose: Nose " normal.   Mouth/Throat: Oropharynx is clear and moist. No erythema or exudate.   Eyes: Conjunctivae and EOM are normal. Pupils are equal, round, and reactive to light. No discharge  Neck: Normal range of motion. Neck supple.   Cardiovascular: Normal rate, regular rhythm, no murmur gallops or rubs. Intact distal pulses.    Pulmonary/Chest: CTA bilaterally. No wheezes rale or rhonchi.  Abdominal: Soft. Non tender.  No masses   Musculoskeletal: No edema. No bony deformity. Normal range of motion  Lymphadenopathy:     The patient has no cervical adenopathy.   Neurological: Sleepy, follows commands. Moves all four extremities. No cranial nerve deficit. Coordination normal.  Skin: Skin is warm and dry. No rash noted. The patient is not diaphoretic. Significant skin tear on dorsum of right forearm.   Psychiatric: The patient has a normal mood and affect.[WB1.5]    Emergency Department Course[WB1.1]   ECG:  Indication: AMS  Completed at 2339.  Read at 0016.   Atrial fibrillation  Right axis deviation  Possible right ventricular hypertrophy  Nonspecific ST and T wave abnormality  Abnormal ECG  Rate 68 bpm. NV interval *. QRS duration 86. QT/QTc 386/410. P-R-T axes *  110  231.[WB1.3]    Imaging:  Radiology findings were communicated with the[WB1.1] patient[WB1.6] who voiced understanding of the findings.[WB1.1]  CT Head w/o contrast  IMPRESSION: No acute abnormality.  Report per radiology[WB1.6]     Laboratory:  Laboratory findings were communicated with the[WB1.1] patient[WB1.6] who voiced understanding of the findings.[WB1.1]  CBC: HGB 10.8 (L),  (L) o/w WNL. (WBC 10.7   CMP: Creatinine 4.32 (H), potassium 5.7 (H), glucose 134 (H), BUN 51 (H), GFR estimate 13 (L), albumin 2.8 (L), protein 6.3 (L), alkphos 176 (H) o/w WNL[WB1.6]    Interventions:[WB1.1]  0038 NS infusion 1,000mL IV[WB1.6]    Emergency Department Course:  Nursing notes and vitals reviewed.[WB1.1]  IV was inserted and blood was drawn for laboratory  testing, results above.  The patient was sent for a CT Head w/o contrast while in the emergency department, results above.[WB1.6]   2348: I performed an exam of the patient as documented above.[WB1.1]   0130: Patient rechecked and updated.   0141: I spoke with Dr. Rondon of the hospitalist service regarding patient's presentation, findings, and plan of care.  Findings and plan explained to the Patient who consents to admission. Discussed the patient with Dr. Rondon, who will admit the patient to a Obs bed for further monitoring, evaluation, and treatment.[WB1.6]  I personally reviewed the laboratory[WB1.1], imaging and EKG[WB1.6] results with the[WB1.1] Patient[WB1.6] and answered all related questions prior to[WB1.1] admission[WB1.6].    Impression & Plan    Medical Decision Making:[WB1.1]  Maurizio Patino is a 89 year old male[WB1.5] who presents from transitional care unit where he was found after an unwitnessed fall.  He did sustain significant skin tear to his arm which was repaired at his nursing facility, but over the course of the evening, he did have decreasing mental status and was sent in for evaluation.  The patient is quite sleepy, but arousable with physical stimulation.  He does move all 4 extremities, but I am unable to keep him staying awake.  Vital signs are otherwise unremarkable.  CT of his head does not show signs of bleeding.  Laboratory examination is normal as well.  Because of his altered mental status, I did not feel that he was safe for discharge to home.  This could be a concussion-like syndrome.  I did speak with Dr. Rondon on for the hospitalist service and he will be admitted for further observation.[WB1.7]    Diagnosis:[WB1.1]    ICD-10-CM    1. Altered mental status, unspecified altered mental status type R41.82    2. Skin tear of forearm without complication, initial encounter S51.819A[WB1.8]        Disposition:[WB1.1]  Admitted to Obs bed with Dr. Rondon[WB1.6]    Alyssaibe  Disclosure:  Jeremy BRONSON am serving as a scribe at 11:48 PM on 11/15/2018 to document services personally performed by Arpan Gilmore MD based on my observations and the provider's statements to me.     11/15/2018    EMERGENCY DEPARTMENT[WB1.1]       Arpan Gilmore MD  11/16/18 0548  [RJ1.1]     Revision History        User Key Date/Time User Provider Type Action    > RJ1.1 11/16/2018  5:48 AM Arpan Gilmore MD Physician Sign     WB1.4 11/16/2018  4:03 AM Jayashreer-Bartlett, Jeremy Scribe Share     WB1.7 11/16/2018  4:00 AM Biebalyciar-Bartlett, Jeremy Scribe      WB1.5 11/16/2018  2:52 AM Biebalyciar-Bartlett, Jeremy Scribe Share     WB1.8 11/16/2018  1:59 AM Biebalyciar-Bartlett, Jeremy Scribe Share     WB1.6 11/16/2018  1:55 AM Biebalyciar-Bartlett, Jeremy Scribe      WB1.3 11/16/2018 12:31 AM Biebalyciar-Bartlett, Jeremy Scribe Share     WB1.2 11/15/2018 11:57 PM Biebalyciar-Bartlett, Jeremy Scribe Share     WB1.1 11/15/2018 11:48 PM Biebedna-Bartlett, Jeremy Scribe             ED Notes by Mir Alberts RN at 11/16/2018  3:12 AM     Author:  Mir Alberts RN Service:  Nursing Author Type:  Registered Nurse    Filed:  11/16/2018  3:15 AM Date of Service:  11/16/2018  3:12 AM Creation Time:  11/16/2018  3:15 AM    Status:  Signed :  Mir Alberts RN (Registered Nurse)         Left forearms skin tear wounds redressed with adaptic and gauze.[LG1.1]     Revision History        User Key Date/Time User Provider Type Action    > LG1.1 11/16/2018  3:15 AM Mir Alberts RN Registered Nurse Sign            ED Notes by Mir Alberts RN at 11/16/2018  3:01 AM     Author:  Mir Alberts RN Service:  Nursing Author Type:  Registered Nurse    Filed:  11/16/2018  3:02 AM Date of Service:  11/16/2018  3:01 AM Creation Time:  11/16/2018  3:02 AM    Status:  Signed :  Mir Alberts, RN (Registered Nurse)         Leticia Burrows ([phone]) updated on pts. Being admitted.[LG1.1]      "Revision History        User Key Date/Time User Provider Type Action    > LG1.1 11/16/2018  3:02 AM Mir Alberts RN Registered Nurse Sign            Progress Notes by Ely Wade RN at 11/16/2018  2:21 AM     Author:  Ely Wade RN Service:  (none) Author Type:  Registered Nurse    Filed:  11/16/2018  2:21 AM Date of Service:  11/16/2018  2:21 AM Creation Time:  11/16/2018  2:21 AM    Status:  Signed :  Ely Wade RN (Registered Nurse)         RECEIVING UNIT ED HANDOFF REVIEW    ED Nurse Handoff Report was reviewed by: Ely Wade on November 16, 2018 at 2:21 AM[LN1.1]          Revision History        User Key Date/Time User Provider Type Action    > LN1.1 11/16/2018  2:21 AM Ely Wade RN Registered Nurse Sign            ED Notes by Mir Alberts RN at 11/16/2018  1:53 AM     Author:  Mir Alberts RN Service:  Nursing Author Type:  Registered Nurse    Filed:  11/16/2018  2:03 AM Date of Service:  11/16/2018  1:53 AM Creation Time:  11/16/2018  1:53 AM    Status:  Addendum :  Mir Alberts RN (Registered Nurse)         [facility]  ED Nurse Handoff Report    ED Chief complaint:[LG1.1] Altered Mental Status (fell at TCU at 1845 and hit head.  decreased mental status since ) and Fall[LG1.2]      ED Diagnosis:[LG1.1]   Final diagnoses:   Altered mental status, unspecified altered mental status type   Skin tear of forearm without complication, initial encounter[LG1.2]       Code Status: Full Code-----according to previous visit    Allergies:[LG1.1]   Allergies   Allergen Reactions     Glyburide      Lisinopril      Hyperkalemia when hospitalized 4/5/2011     Metformin      Renal failure stage 4     Penicillins      SHADY Gallagher clarified with pt, pt does not remember rxn, it was a long time ago, and \"nothing crazy\".     Verapamil[LG1.2]        Activity level - Baseline/Home:  Total Care    Activity Level - Current:   Total Care     Needed?: No    Isolation: " No  Infection: Not Applicable  Bariatric?: No    Vital Signs:[LG1.1]   Vitals:    11/16/18 0030 11/16/18 0053 11/16/18 0101 11/16/18 0112   BP:    115/64   Resp: 13 16 16 12   Temp:       TempSrc:       SpO2: 99% 100% 100% 100%[LG1.2]       Cardiac Rhythm: ,   Atrial fibrillation    Pain level:      Is this patient confused?: Yes   [place] - Suicide Severity Rating Scale Completed?  Yes  If yes, what color did the patient score?  White    Patient Report: Initial Complaint: fall with altered mental status  Focused Assessment: pt. Fell at 1845 at TCU hitting head and causing 3 areas of large skin tears to left forearm.  Pt. Arouses to voice and continues to do so with slow sometimes incomprehensible speech. Able to give birthday only.    Tests Performed: labs, head CT  Abnormal Results:[LG1.1]   Results for orders placed or performed during the hospital encounter of 11/15/18   CT Head w/o Contrast    Narrative    CT SCAN OF THE HEAD WITHOUT CONTRAST  11/16/2018 12:56 AM     HISTORY: Altered level of consciousness.    TECHNIQUE: Axial images of the head and coronal reformations without  IV contrast material. Radiation dose for this scan was reduced using  automated exposure control, adjustment of the mA and/or kV according  to patient size, or iterative reconstruction technique.    COMPARISON: 1/10/2015.    FINDINGS: There is generalized atrophy of the brain. There is low  attenuation in the white matter of the cerebral hemispheres consistent  with sequelae of small vessel ischemic disease. There is no evidence  of intracranial hemorrhage, mass, acute infarct or anomaly.     The visualized portions of the sinuses and mastoids appear normal.  There is no evidence of trauma.      Impression    IMPRESSION: No acute abnormality.   CBC with platelets differential   Result Value Ref Range    WBC 10.7 4.0 - 11.0 10e9/L    RBC Count 3.09 (L) 4.4 - 5.9 10e12/L    Hemoglobin 10.8 (L) 13.3 - 17.7 g/dL    Hematocrit 34.3 (L) 40.0  - 53.0 %     (H) 78 - 100 fl    MCH 35.0 (H) 26.5 - 33.0 pg    MCHC 31.5 31.5 - 36.5 g/dL    RDW 17.9 (H) 10.0 - 15.0 %    Platelet Count 147 (L) 150 - 450 10e9/L    Diff Method Automated Method     % Neutrophils 82.8 %    % Lymphocytes 7.8 %    % Monocytes 8.8 %    % Eosinophils 0.1 %    % Basophils 0.1 %    % Immature Granulocytes 0.4 %    Absolute Neutrophil 8.9 (H) 1.6 - 8.3 10e9/L    Absolute Lymphocytes 0.8 0.8 - 5.3 10e9/L    Absolute Monocytes 0.9 0.0 - 1.3 10e9/L    Absolute Eosinophils 0.0 0.0 - 0.7 10e9/L    Absolute Basophils 0.0 0.0 - 0.2 10e9/L    Abs Immature Granulocytes 0.0 0 - 0.4 10e9/L   Comprehensive metabolic panel   Result Value Ref Range    Sodium 136 133 - 144 mmol/L    Potassium 5.7 (H) 3.4 - 5.3 mmol/L    Chloride 100 94 - 109 mmol/L    Carbon Dioxide 28 20 - 32 mmol/L    Anion Gap 8 3 - 14 mmol/L    Glucose 134 (H) 70 - 99 mg/dL    Urea Nitrogen 51 (H) 7 - 30 mg/dL    Creatinine 4.32 (H) 0.66 - 1.25 mg/dL    GFR Estimate 13 (L) >60 mL/min/1.7m2    GFR Estimate If Black 16 (L) >60 mL/min/1.7m2    Calcium 9.2 8.5 - 10.1 mg/dL    Bilirubin Total 0.8 0.2 - 1.3 mg/dL    Albumin 2.8 (L) 3.4 - 5.0 g/dL    Protein Total 6.3 (L) 6.8 - 8.8 g/dL    Alkaline Phosphatase 176 (H) 40 - 150 U/L    ALT 33 0 - 70 U/L    AST 40 0 - 45 U/L[LG1.2]       Treatments provided: IV fluids, redressed left forearm skin tears    Family Comments: no family present    OBS brochure/video discussed/provided to patient/family: No[LG1.1]---pt. Unable to watch due to lethargy and confusion[LG1.3]              Name of person given brochure if not patient: na              Relationship to patient: na    ED Medications:[LG1.1]   Medications   sodium chloride 0.9% infusion ( Intravenous New Bag 11/16/18 0038)[LG1.2]       Drips infusing?:  No    For the majority of the shift this patient was Green.   Interventions performed were routine cares.    Severe Sepsis OR Septic Shock Diagnosis Present: No    To be done/followed  up on inpatient unit:      ED NURSE PHONE NUMBER: *[phone][LG1.1]            Revision History        User Key Date/Time User Provider Type Action    > [N/A] 11/16/2018  2:03 AM Mir Alberts RN Registered Nurse Addend     LG1.3 11/16/2018  2:02 AM Mir Alberts RN Registered Nurse Sign     LG1.2 11/16/2018  2:01 AM Mir Alberts RN Registered Nurse      LG1.1 11/16/2018  1:53 AM Mir Alberts RN Registered Nurse             ED Notes by Mir Alberts RN at 11/16/2018  1:28 AM     Author:  Mir Alberts RN Service:  Nursing Author Type:  Registered Nurse    Filed:  11/16/2018  1:28 AM Date of Service:  11/16/2018  1:28 AM Creation Time:  11/16/2018  1:28 AM    Status:  Signed :  Mir Alberts RN (Registered Nurse)         Spoke with staff at [facility] and let them know pt. Would be staying in hospital.[LG1.1]     Revision History        User Key Date/Time User Provider Type Action    > LG1.1 11/16/2018  1:28 AM Mir Alberts RN Registered Nurse Sign            ED Notes by Miki Tarango, RN at 11/15/2018 11:06 PM     Author:  Miki Tarango, RN Service:  (none) Author Type:  Registered Nurse    Filed:  11/15/2018 11:06 PM Date of Service:  11/15/2018 11:06 PM Creation Time:  11/15/2018 11:06 PM    Status:  Signed :  Miki Tarango RN (Registered Nurse)         Bed: ED09  Expected date: 11/15/18  Expected time: 11:00 PM  Means of arrival: Ambulance  Comments:  [facility] 89M fall eval      Revision History        User Key Date/Time User Provider Type Action    > JK1.1 11/15/2018 11:06 PM Miki Tarango, RN Registered Nurse Sign                  Procedure Notes     No notes of this type exist for this encounter.      Progress Notes - Therapies (Notes from 11/13/18 through 11/16/18)     No notes of this type exist for this encounter.

## 2018-11-15 NOTE — IP AVS SNAPSHOT
` [facility] OBSERVATION UNIT: [phone]            Medication Administration Report for Maurizio Ohara as of 11/16/18 1650   Legend:    Given Hold Not Given Due Canceled Entry Other Actions    Time Time (Time) Time  Time-Action       Inactive    Active    Linked        Medications 11/10/18 11/11/18 11/12/18 11/13/18 11/14/18 11/15/18 11/16/18    - MEDICATION INSTRUCTIONS for Dialysis Patients -  Freq: SEE ADMIN INSTRUCTIONS Route: XX  Start: 11/16/18 1202   Admin Instructions: Do not give any medication that may affect blood pressure or volume before dialysis.   The following medications may be removed by dialysis and should be given after dialysis:    Dispense Loc:  Main Pharmacy               acetaminophen (TYLENOL) Suppository 650 mg  Dose: 650 mg  Freq: EVERY 4 HOURS PRN Route: RE  PRN Reason: mild pain  Start: 11/16/18 0328   Admin Instructions: Alternate ibuprofen (if ordered) with acetaminophen.  Maximum acetaminophen dose from all sources = 75 mg/kg/day not to exceed 4 grams/day.    Admin. Amount: 1 suppository (1 × 650 mg suppository)  Dispense Loc: [facility] OBS               acetaminophen (TYLENOL) tablet 650 mg  Dose: 650 mg  Freq: EVERY 4 HOURS PRN Route: PO  PRN Reason: mild pain  Start: 11/16/18 0328   Admin Instructions: Alternate ibuprofen (if ordered) with acetaminophen.  Maximum acetaminophen dose from all sources = 75 mg/kg/day not to exceed 4 grams/day.    Admin. Amount: 2 tablet (2 × 325 mg tablet)  Dispense Loc: [facility] OBSV               bisacodyl (DULCOLAX) Suppository 10 mg  Dose: 10 mg  Freq: DAILY PRN Route: RE  PRN Reason: constipation  Start: 11/16/18 0328   Admin Instructions: Hold for loose stools.  This is the third step of a three step constipation treatment.    Admin. Amount: 1 suppository (1 × 10 mg suppository)  Dispense Loc: [facility] OBSV               lidocaine (LMX4) cream  Freq: EVERY 1 HOUR PRN Route: Top  PRN Reason: pain  PRN Comment: with VAD insertion or  "accessing implanted port.  Start: 11/16/18 0328   Admin Instructions: Do NOT give if patient has a history of allergy to any local anesthetic or any \"damien\" product.  Apply 30 minutes prior to VAD insertion or port access. MAX Dose: 2.5 g (  of 5 g tube).    Dispense Loc: Contact Rx for dose               lidocaine 1 % 1 mL  Dose: 1 mL  Freq: EVERY 1 HOUR PRN Route: OTHER  PRN Comment: mild pain with VAD insertion or accessing implanted port  Start: 11/16/18 0328   Admin Instructions: Do NOT give if patient has a history of allergy to any local anesthetic or any \"damien\" product. MAX dose 1 mL subcutaneous OR intradermal in divided doses.    Admin. Amount: 1 mL  Dispense Loc: [facility] FLOOR STOCK  Volume: 2 mL               melatonin tablet 3 mg  Dose: 3 mg  Freq: AT BEDTIME PRN Route: PO  PRN Reason: sleep  Start: 11/16/18 0328   Admin Instructions: Do not give unless at least 6 hours of uninterrupted sleep is expected.    Admin. Amount: 3 tablet (3 × 1 mg tablet)  Dispense Loc:  [facility] OBSV               naloxone (NARCAN) injection 0.1-0.4 mg  Dose: 0.1-0.4 mg  Freq: EVERY 2 MIN PRN Route: IV  PRN Reason: opioid reversal  Start: 11/16/18 0328   Admin Instructions: For respiratory rate LESS than or EQUAL to 8.  Partial reversal dose:  0.1 mg titrated q 2 minutes for Analgesia Side Effects Monitoring Sedation Level of 3 (frequently drowsy, arousable, drifts to sleep during conversation).Full reversal dose:  0.4 mg bolus for Analgesia Side Effects Monitoring Sedation Level of 4 (somnolent, minimal or no response to stimulation).  For ordered IV doses 0.1-2mg give IVP. Give each 0.4mg over 15 seconds in emergency situations. For non-emergent situations further dilute in 9mL of NS to facilitate titration of response.    Admin. Amount: 0.1-0.4 mg = 0.25-1 mL Conc: 0.4 mg/mL  Dispense Loc:  [facility] OBSV  Volume: 1 mL               ondansetron (ZOFRAN-ODT) ODT tab 4 mg  Dose: 4 mg  Freq: EVERY 6 HOURS PRN Route: PO  PRN " Reasons: nausea,vomiting  Start: 11/16/18 0328   Admin Instructions: This is Step 1 of nausea and vomiting management.  If nausea not resolved in 15 minutes, go to Step 2 prochlorperazine (COMPAZINE). Do not push through foil backing. Peel back foil and gently remove. Place on tongue immediately. Administration with liquid unnecessary  With dry hands, peel back foil backing and gently remove tablet; do not push oral disintegrating tablet through foil backing; administer immediately on tongue and oral disintegrating tablet dissolves in seconds; then swallow with saliva; liquid not required.    Admin. Amount: 1 tablet (1 × 4 mg tablet)  Dispense Loc: [facility] OBSV              Or  ondansetron (ZOFRAN) injection 4 mg  Dose: 4 mg  Freq: EVERY 6 HOURS PRN Route: IV  PRN Reasons: nausea,vomiting  Start: 11/16/18 0328   Admin Instructions: This is Step 1 of nausea and vomiting management.  If nausea not resolved in 15 minutes, go to Step 2 prochlorperazine (COMPAZINE).  Irritant. For ordered IV doses 0.1-4 mg, give IV Push undiluted over 2-5 minutes.    Admin. Amount: 4 mg = 2 mL Conc: 4 mg/2 mL  Dispense Loc: [facility] OBSV  Infused Over: 2-5 Minutes  Volume: 2 mL               polyethylene glycol (MIRALAX/GLYCOLAX) Packet 17 g  Dose: 17 g  Freq: DAILY PRN Route: PO  PRN Reason: constipation  Start: 11/16/18 0328   Admin Instructions: Give in 8oz of  water, juice, or soda. Hold for loose stools.  This is the second step of a three step constipation treatment.  1 Packet = 17 grams. Mixed prescribed dose in 8 ounces of water. Follow with 8 oz. of water.    Admin. Amount: 17 g  Dispense Loc: [facility] OBSV               senna-docusate (SENOKOT-S;PERICOLACE) 8.6-50 MG per tablet 1 tablet  Dose: 1 tablet  Freq: 2 TIMES DAILY PRN Route: PO  PRN Reason: constipation  Start: 11/16/18 0328   Admin Instructions: If no bowel movement in 24 hours, increase to 2 tablets PO.  Hold for loose stools.  This is the first step of a three step  constipation treatment.    Admin. Amount: 1 tablet  Dispense Loc:  [facility] OBSV              Or  senna-docusate (SENOKOT-S;PERICOLACE) 8.6-50 MG per tablet 2 tablet  Dose: 2 tablet  Freq: 2 TIMES DAILY PRN Route: PO  PRN Reason: constipation  Start: 11/16/18 0328   Admin Instructions: Hold for loose stools.  This is the first step of a three step constipation treatment.    Admin. Amount: 2 tablet  Dispense Loc: [facility] OBSV               sodium chloride (PF) 0.9% PF flush 3 mL  Dose: 3 mL  Freq: EVERY 8 HOURS Route: IK  Start: 11/16/18 0329   Admin Instructions: And Q1H PRN, to lock peripheral IV dormant line.    Admin. Amount: 3 mL  Last Admin: 11/16/18 0406  Dispense Loc: [facility] Floor Stock  Volume: 3 mL           (0348)-Not Given       0406 (3 mL)-Given       (1645)-Not Given           sodium chloride (PF) 0.9% PF flush 3 mL  Dose: 3 mL  Freq: EVERY 1 HOUR PRN Route: IK  PRN Reason: line flush  Start: 11/16/18 0328   Admin Instructions: for peripheral IV flush post IV meds    Admin. Amount: 3 mL  Dispense Loc: [facility] Floor Stock  Volume: 3 mL              Discontinued Medications  Medications 11/10/18 11/11/18 11/12/18 11/13/18 11/14/18 11/15/18 11/16/18         Rate: 80 mL/hr   Freq: CONTINUOUS Route: IV  Last Dose: Stopped (11/16/18 0314)  Start: 11/16/18 0030   End: 11/16/18 0355   Last Admin: 11/16/18 0038  Dispense Loc: [facility] Floor Stock  Volume: 1,000 mL           0038 ( )-New Bag       0314-ED Infusing on Admission/transfer       0355-Med Discontinued

## 2018-11-15 NOTE — IP AVS SNAPSHOT
"` `     [facility] OBSERVATION UNIT: [phone]                                              INTERAGENCY TRANSFER FORM - NURSING   11/15/2018                    Hospital Admission Date: 11/15/2018  YOANA QUIÑONES   : 10/7/1929  Sex: Male        Attending Provider: Diego Rondon MD     Allergies:  Glyburide, Lisinopril, Metformin, Penicillins, Verapamil    Infection:  None   Service:  HOSPITALIST    Ht:  1.702 m (5' 7\")   Wt:  82.5 kg (181 lb 12.8 oz)   Admission Wt:  82.5 kg (181 lb 12.8 oz)    BMI:  28.47 kg/m 2   BSA:  1.97 m 2            Patient PCP Information     Provider PCP Type    Justina Moise MD General      Current Code Status     Date Active Code Status Order ID Comments User Context       2018  2:56 PM DNR/DNI [id number]  Cortney Griffin PA-C Inpatient       Questions for Current Code Status     Question Answer Comment    Code status determined by: Discussion with patient/legal decision maker       Code Status History     Date Active Date Inactive Code Status Order ID Comments User Context    2018  3:28 AM 2018  2:56 PM Full Code [id number]  Diego Rondon MD Inpatient    2018  2:40 AM 2018  4:36 PM Full Code [id number]  Jayant Ricketts MD Inpatient    2018  4:17 PM 2018  6:18 PM DNI [id number]  Aisha Franco PA-C Inpatient    2018  2:58 PM 2018  4:17 PM DNI [id number]  Aisha Franco PA-C ED    8/15/2017  8:27 AM 2018  2:58 PM Full Code [id number]  Jayant Ricketts MD Outpatient    2017 10:43 PM 8/15/2017  8:27 AM Full Code [id number]  Marilee Ramirez MD Inpatient    10/2/2016  2:30 PM 2017 10:43 PM Full Code [id number]  Primitivo Yeboah MD Outpatient    2016  2:49 PM 10/2/2016  2:30 PM Full Code [id number]  Bola Garcia,  Inpatient    2016 10:07 AM 2016  2:49 PM Full Code [id number]  Bola Garcia,  Outpatient    2016  3:10 PM 2016 10:07 AM " Full Code [id number]  Diaz Hale,  Inpatient    12/26/2014 10:57 AM 1/12/2016  3:10 PM Full Code [id number]  Edin Elkins MD Outpatient    12/24/2014  3:28 PM 12/24/2014 11:14 PM Full Code [id number]  Phyllis Barrios MD Inpatient    12/24/2014 11:20 AM 12/24/2014  3:28 PM Full Code [id number]  Cholo Nogueira MD Outpatient    12/24/2014  5:42 AM 12/24/2014 11:20 AM Full Code [id number]  Tip Langston MD Inpatient      Advance Directives        Scanned docmt in ACP Activity?           No scanned doc        Hospital Problems as of 11/16/2018              Priority Class Noted POA    ESRD (end stage renal disease) on dialysis (H) Medium  12/24/2014 Yes    CAD (coronary artery disease) Medium  12/24/2014 Yes    Mitral regurgitation Medium  12/24/2014 Yes    Pulmonary hypertension (H) Medium  12/24/2014 Yes    Diabetes (H) Medium  Unknown Yes    Hypertension Medium  Unknown Yes    Altered mental status Medium  11/16/2018 Yes    Fall Medium  11/16/2018 Unknown    Chronic diastolic heart failure (H) Medium  11/16/2018 Unknown    Severe aortic stenosis Medium  11/16/2018 Unknown      Non-Hospital Problems as of 11/16/2018              Priority Class Noted    NSTEMI (non-ST elevated myocardial infarction) (H) Medium  12/24/2014    Sepsis (H) Medium  1/12/2016    Pneumonia Medium  9/24/2016    Acute diastolic (congestive) heart failure (H) Medium  8/9/2017    Cellulitis Medium  9/18/2018      Immunizations     Name Date      Influenza (High Dose) 3 valent vaccine 09/21/18     Influenza (High Dose) 3 valent vaccine 10/02/16     TD (ADULT, 7+) 09/18/18          END      ASSESSMENT     Discharge Profile Flowsheet     DISCHARGE NEEDS ASSESSMENT     FINAL RESOURCES      Concerns To Be Addressed  no discharge needs identified 08/11/17 1132   Resources List  Skilled Nursing Facility 11/16/18 1631    Equipment Currently Used at Home  none 11/04/18 1751   Skilled Nursing Facility  [facility]  "[facility] [phone], Fax: [phone] 11/16/18 1631    # of Referrals Placed by CTS  Post Acute Facilities 11/16/18 1631   PAS Number  [id number] 11/08/18 1147    [facility] 08/11/17 1132   SKIN      Primary Care MD Name  Dr. Moise 08/11/17 1132   Inspection of bony prominences  Full 11/16/18 1229    Equipment Used at Home  bath bench;grab bar;walker, rolling 01/13/16 1532   Skin WDL  ex 11/16/18 1229    GASTROINTESTINAL (ADULT,PEDIATRIC,OB)     Skin Integrity  abrasion(s);blister(s);bruise(s);scab(s);scar(s);skin tear(s) 11/16/18 1229    GI WDL  WDL 11/16/18 0547   SAFETY      COMMUNICATION ASSESSMENT     Safety WDL  WDL 11/16/18 1230    Patient's communication style  spoken language (English or Bilingual) 11/15/18 2306   All Alarms  alarm(s) activated and audible 11/16/18 1635                 Assessment WDL (Within Defined Limits) Definitions           Safety WDL     Effective: 09/28/15    Row Information: <b>WDL Definition:</b> Bed in low position, wheels locked; call light in reach; upper side rails up x 2; ID band on<br> <font color=\"gray\"><i>Item=AS safety wdl>>List=AS safety wdl>>Version=F14</i></font>      Skin WDL     Effective: 09/28/15    Row Information: <b>WDL Definition:</b> Warm; dry; intact; elastic; without discoloration; pressure points without redness<br> <font color=\"gray\"><i>Item=AS skin wdl>>List=AS skin wdl>>Version=F14</i></font>      Vitals     Vital Signs Flowsheet     QUICK ADDS     ISABELLE COMA SCALE      Quick Adds  EKG Monitoring 11/16/18 0850   Best Eye Response  3-->(E3) to speech 11/16/18 0346    VITAL SIGNS     Best Motor Response  6-->(M6) obeys commands 11/16/18 0346    Temp  96.4  F (35.8  C) 11/16/18 0827   Best Verbal Response  5-->(V5) oriented (with reminders) 11/16/18 0346    Temp src  Axillary 11/16/18 0827   Isabelle Coma Scale Score  14 11/16/18 0346    Resp  18 11/16/18 0827   HEIGHT AND WEIGHT      Heart Rate  " "66 11/16/18 0939   Height  1.702 m (5' 7\") 11/16/18 0348    Pulse/Heart Rate Source  Monitor 11/16/18 0935   Height Method  Stated 11/16/18 0348    BP  (!)  100/28 11/16/18 0827   Weight  82.5 kg (181 lb 12.8 oz) 11/16/18 0348    BP Location  Left arm 11/16/18 0827   Weight Method  Bed scale 11/16/18 0348    OXYGEN THERAPY     BSA (Calculated - sq m)  1.97 11/16/18 0348    SpO2  99 % 11/16/18 0935   BMI (Calculated)  28.53 11/16/18 0348    O2 Device  None (Room air) 11/16/18 0935   EKG MONITORING      Oxygen Delivery  2 LPM 11/16/18 0827   Cardiac Regularity  Irregular 11/16/18 0850    PAIN/COMFORT     Cardiac Rhythm  Atrial fibrillation (SVR) 11/16/18 0850    Patient Currently in Pain  sleeping: patient not able to self report 11/16/18 1058   DAILY CARE      0-10 Pain Scale  10 11/16/18 0346   Activity Management  up in chair 11/16/18 1635    Pain Location  Generalized (\"all over\" per pt) 11/16/18 0346   Activity Assistance Provided  assistance, 1 person 11/16/18 1223    Pain Intervention(s)  Repositioned (pt stated he is able to sleep) 11/16/18 0346   Assistive Device Utilized  walker 11/16/18 1223    Response to Interventions  Absence of nonverbal indicators of pain 11/16/18 0531   POSITIONING      ANALGESIA SIDE EFFECTS MONITORING     Head of Bed (HOB)  HOB at 20-30 degrees 11/16/18 0548    Side Effects Monitoring: Respiratory Quality  R 11/16/18 0733   Body Position  turned 11/16/18 0548    Side Effects Monitoring: Respiratory Depth  N 11/16/18 0733   Chair  Upright in chair 11/16/18 1635    Side Effects Monitoring: Sedation Level  3 11/16/18 0733                 Patient Lines/Drains/Airways Status    Active LINES/DRAINS/AIRWAYS     Name: Placement date: Placement time: Site: Days: Last dressing change:    Hemodialysis Vascular Access Subclavian vein catheter Right Subclavian       Subclavian        Hemodialysis Vascular Access Arteriovenous fistula Right Arm       Arm        Wound 09/19/18 Right Knee " Abrasion(s) 09/19/18   0730   Knee   58     Wound 09/19/18 Right Arm Skin tear moist wound bed near elbow 09/19/18   1500   Arm   58             Patient Lines/Drains/Airways Status    Active PICC/CVC     None            Intake/Output Detail Report     Date Intake     Output Net    Shift P.O. I.V. IV Piggyback Total Urine Total       Day 11/15/18 0700 - 11/15/18 1459 -- -- -- -- -- -- 0    Berenice 11/15/18 1500 - 11/15/18 2259 -- -- -- -- -- -- 0    Noc 11/15/18 2300 - 11/16/18 0659 -- -- -- -- 0 -- 0    Day 11/16/18 0700 - 11/16/18 1459 -- -- -- -- -- -- 0    Berenice 11/16/18 1500 - 11/16/18 2259 300 -- -- 300 -- -- 300      Case Management/Discharge Planning     Case Management/Discharge Planning Flowsheet     REFERRAL INFORMATION     Support Assessment  Adequate family and caregiver support 11/16/18 1631    Did the Initial Social Work Assessment result in a Social Work Case?  Yes 11/16/18 1631   EMPLOYMENT      Admission Type  observation 11/16/18 1631   Do you work full or part-time?  no 11/16/18 1631    Arrived From  rehab facility 11/16/18 1631   COPING/STRESS      # of Referrals Placed by CTS  Post Acute Facilities 11/16/18 1631   Major Change/Loss/Stressor  denies 09/18/18 1621    Post Acute Facilities  TCU 11/16/18 1631   ASSESSMENT/CONCERNS TO BE ADDRESSED      Reason For Consult  discharge planning 11/16/18 1631   Concerns To Be Addressed  no discharge needs identified 08/11/17 1132    Record Reviewed  medical record 11/16/18 1631   DISCHARGE PLANNING      CTS Assigned to Case  Yumiko Ibarra 11/16/18 1631   Equipment Used at Home  bath bench;grab bar;walker, rolling 01/13/16 1532    [facility] 08/11[facility]17 1132   FINAL RESOURCES      Primary Care MD Name  Dr. Moise 08/11/17 1132   Equipment Currently Used at Home  none 11/04/18 1751    LIVING ENVIRONMENT     Resources List  Skilled Nursing Facility 11/16/18 1631    Lives With  facility resident ([facility]) 11/16/18 0964    Skilled Nursing Facility  [facility] [phone], Fax: [phone] 11/16/18 1631    Living Arrangements  extended care facility ([facility]) 11/16/18 1631   PAS Number  [id number] 11/08/18 1147    Quality Of Family Relationships  supportive;involved 11/16/18 1631   ABUSE RISK SCREEN      ASSESSMENT OF FAMILY/SOCIAL SUPPORT     QUESTION TO PATIENT:  Has a member of your family or a partner(now or in the past) intimidated, hurt, manipulated, or controlled you in any way?  patient declined to answer or is unable to answer 11/15/18 2312    Marital Status  Single 11/16/18 1631   QUESTION TO PATIENT: Do you feel safe going back to the place where you are living?  patient declined to answer or is unable to answer 11/15/18 2312    Who is your support system?  Other (specify) (nephew) 11/16/18 1631   OBSERVATION: Is there reason to believe there has been maltreatment of a vulnerable adult (ie. Physical/Sexual/Emotional abuse, self neglect, lack of adequate food, shelter, medical care, or financial exploitation)?  no 11/15/18 2312    Description of Support System  Supportive;Involved 11/16/18 1631

## 2018-11-15 NOTE — IP AVS SNAPSHOT
MRN:[id number]                      After Visit Summary   11/15/2018    Maurizio Ohara    MRN: [id number]           Thank you!     Thank you for choosing [facility] for your care. Our goal is always to provide you with excellent care. Hearing back from our patients is one way we can continue to improve our services. Please take a few minutes to complete the written survey that you may receive in the mail after you visit with us. Thank you!        Patient Information     Date Of Birth          10/7/1929        About your hospital stay     You were admitted on:  November 16, 2018 You last received care in the:  [facility]    You were discharged on:  November 16, 2018        Reason for your hospital stay       You were in the hospital to assess you after your fall                  Who to Call     For medical emergencies, please call 911.  For non-urgent questions about your medical care, please call your primary care provider or clinic, [phone]          Attending Provider     Provider Specialty    Arpan Gilmore MD Emergency Medicine    [facility], Diego Livingston MD Internal Medicine       Primary Care Provider Office Phone # Fax #    Justina Moise [phone]      After Care Instructions     Activity - Up with nursing assistance           Advance Diet as Tolerated       Follow this diet upon discharge: Orders Placed This Encounter      Regular Diet Adult            Fall precautions           General info for SNF       Length of Stay Estimate: Short Term Care: Estimated # of Days 31-90  Condition at Discharge: Stable  Level of care:skilled   Rehabilitation Potential: Fair  Admission H&P remains valid and up-to-date: Yes  Recent Chemotherapy: N/A  Use Nursing Home Standing Orders: Yes            Intake and output       Every shift            Mantoux instructions       Give two-step Mantoux (PPD) Per Facility Policy Yes                  Follow-up Appointments   "   Follow Up and recommended labs and tests       Follow up with Nursing home physician.  No follow up labs or test are needed.  Follow-up with cardiology in the next 1-2 weeks to discuss heart problems                  Additional Services     Occupational Therapy Adult Consult       Evaluate and treat as clinically indicated.    Reason:  Physical deconditioning            Physical Therapy Adult Consult       Evaluate and treat as clinically indicated.    Reason:  Physical deconditioning                  Pending Results     No orders found for last 3 day(s).            Statement of Approval     Ordered          18 1218  I have reviewed and agree with all the recommendations and orders detailed in this document.  EFFECTIVE NOW     Approved and electronically signed by:  Cortney Griffin PA-C             Admission Information     Date & Time Provider Department Dept. Phone    11/15/2018 Diego Rondon MD [facility] [phone]      Your Vitals Were     Blood Pressure Temperature Respirations Height Weight Pulse Oximetry    100/28 (BP Location: Left arm) 96.4  F (35.8  C) (Axillary) 18 1.702 m (5' 7\") 82.5 kg (181 lb 12.8 oz) 99%    BMI (Body Mass Index)                   28.47 kg/m2           [employer] Information     [employer] lets you send messages to your doctor, view your test results, renew your prescriptions, schedule appointments and more. To sign up, go to [email or web address] . Click on \"Log in\" on the left side of the screen, which will take you to the Welcome page. Then click on \"Sign up Now\" on the right side of the page.     You will be asked to enter the access code listed below, as well as some personal information. Please follow the directions to create your username and password.     Your access code is: [email or web address]  Expires: 2018  1:21 PM     Your access code will  in 90 days. If you need help or a new code, please call your [place] clinic or " [phone].        Care EveryWhere ID     This is your Care EveryWhere ID. This could be used by other organizations to access your [facility] medical records  [id number]        Equal Access to Services     ROSALINA SMITH : Hadii aad ku hadmundohiram Jeffrey, waamaliada luqadaha, qaybta kaalmada aleks, catherine daniloin hayaavaldez ziegleryuliet robinsonalizadevika benites. So [facility] [phone].    ATENCIÓN: Si habla español, tiene a mast disposición servicios gratuitos de asistencia lingüística. Llame al [phone].    We comply with applicable federal civil rights laws and [place] laws. We do not discriminate on the basis of race, color, national origin, age, disability, sex, sexual orientation, or gender identity.               Review of your medicines      CONTINUE these medicines which have NOT CHANGED        Dose / Directions    acetaminophen 500 MG tablet   Commonly known as:  TYLENOL        Dose:  1000 mg   Take 1,000 mg by mouth 3 times daily as needed for mild pain   Refills:  0       aspirin 81 MG tablet        Dose:  81 mg   Take 81 mg by mouth daily   Refills:  0       atorvastatin 40 MG tablet   Commonly known as:  LIPITOR        Dose:  40 mg   Take 40 mg by mouth At Bedtime   Refills:  0       calcium acetate 667 MG Caps capsule   Commonly known as:  PHOSLO        Dose:  2001 mg   Take 2,001 mg by mouth 3 times daily (with meals)   Refills:  0       diclofenac 1 % Gel topical gel   Commonly known as:  VOLTAREN   Used for:  Cellulitis of right lower extremity        Dose:  2 g   Apply 2 g topically 4 times daily Apply to RLE   Refills:  0       gabapentin 100 MG capsule   Commonly known as:  NEURONTIN        Dose:  100 mg   Take 1 capsule (100 mg) by mouth 3 times daily for 10 days   Quantity:  30 capsule   Refills:  0       metoprolol succinate 25 MG 24 hr tablet   Commonly known as:  TOPROL-XL        Dose:  12.5 mg   Take 12.5 mg by mouth daily Hold for SBP equal to or less than 100. Hold for HR equal to or less than 60.   Refills:   0       nitroGLYcerin 0.4 MG sublingual tablet   Commonly known as:  NITROSTAT   Used for:  NSTEMI (non-ST elevated myocardial infarction) (H)        Dose:  0.4 mg   Place 1 tablet (0.4 mg) under the tongue every 5 minutes as needed for chest pain   Quantity:  25 tablet   Refills:  3       * polyethylene glycol Packet   Commonly known as:  MIRALAX/GLYCOLAX        Dose:  1 packet   Take 1 packet by mouth daily as needed for constipation   Refills:  0       * polyethylene glycol Packet   Commonly known as:  MIRALAX/GLYCOLAX   Used for:  Cellulitis of right lower extremity        Dose:  17 g   Take 17 g by mouth daily   Quantity:  7 packet   Refills:  0       * Notice:  This list has 2 medication(s) that are the same as other medications prescribed for you. Read the directions carefully, and ask your doctor or other care provider to review them with you.             Protect others around you: Learn how to safely use, store and throw away your medicines at www.disposemymeds.org.             Medication List: This is a list of all your medications and when to take them. Check marks below indicate your daily home schedule. Keep this list as a reference.      Medications           Morning Afternoon Evening Bedtime As Needed    acetaminophen 500 MG tablet   Commonly known as:  TYLENOL   Take 1,000 mg by mouth 3 times daily as needed for mild pain                                aspirin 81 MG tablet   Take 81 mg by mouth daily                                atorvastatin 40 MG tablet   Commonly known as:  LIPITOR   Take 40 mg by mouth At Bedtime                                calcium acetate 667 MG Caps capsule   Commonly known as:  PHOSLO   Take 2,001 mg by mouth 3 times daily (with meals)                                diclofenac 1 % Gel topical gel   Commonly known as:  VOLTAREN   Apply 2 g topically 4 times daily Apply to RLE                                gabapentin 100 MG capsule   Commonly known as:  NEURONTIN   Take 1  capsule (100 mg) by mouth 3 times daily for 10 days                                metoprolol succinate 25 MG 24 hr tablet   Commonly known as:  TOPROL-XL   Take 12.5 mg by mouth daily Hold for SBP equal to or less than 100. Hold for HR equal to or less than 60.                                nitroGLYcerin 0.4 MG sublingual tablet   Commonly known as:  NITROSTAT   Place 1 tablet (0.4 mg) under the tongue every 5 minutes as needed for chest pain                                * polyethylene glycol Packet   Commonly known as:  MIRALAX/GLYCOLAX   Take 1 packet by mouth daily as needed for constipation                                * polyethylene glycol Packet   Commonly known as:  MIRALAX/GLYCOLAX   Take 17 g by mouth daily                                * Notice:  This list has 2 medication(s) that are the same as other medications prescribed for you. Read the directions carefully, and ask your doctor or other care provider to review them with you.

## 2018-11-16 PROBLEM — I35.0 SEVERE AORTIC STENOSIS: Status: ACTIVE | Noted: 2018-01-01

## 2018-11-16 PROBLEM — I50.32 CHRONIC DIASTOLIC HEART FAILURE (H): Status: ACTIVE | Noted: 2018-01-01
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
## 2018-11-16 PROBLEM — R41.82 ALTERED MENTAL STATUS: Status: ACTIVE | Noted: 2018-01-01

## 2018-11-16 NOTE — PLAN OF CARE
Problem: Patient Care Overview  Goal: Plan of Care/Patient Progress Review  Outcome: No Change  PRIMARY DIAGNOSIS: FALL/LETHARGY    OUTPATIENT/OBSERVATION GOALS TO BE MET BEFORE DISCHARGE  1. Orthostatic performed: No    2. Tolerating PO medications: Yes    3. Return to near baseline physical activity: Yes, up with assist of 1 and walker    4. Cleared for discharge by consultants (if involved): Yes    Discharge Planner Nurse   Safe discharge environment identified: No  Barriers to discharge: Yes       Entered by: Deyanira Moore 11/16/2018 12:20 PM     Please review provider order for any additional goals.   Nurse to notify provider when observation goals have been met and patient is ready for discharge.

## 2018-11-16 NOTE — PHARMACY-ADMISSION MEDICATION HISTORY
Admission medication history interview status for the 11/15/2018  admission is complete. See EPIC admission navigator for prior to admission medications     Medication history source reliability:Good    Actions taken by pharmacist (provider contacted, etc):Reviewed [facility] med list in paper chart and reviewed recent [facility] ED notes in EMR.     Additional medication history information not noted on PTA med list :  Gabapentin prescribed in ED at [facility] on 11/9 for 10 days. Rx will be complete 11/18/18.    Medication reconciliation/reorder completed by provider prior to medication history? Yes    Time spent in this activity: 15 minutes    Prior to Admission medications    Medication Sig Last Dose Taking? Auth Provider   acetaminophen (TYLENOL) 500 MG tablet Take 1,000 mg by mouth 3 times daily as needed for mild pain prn at prn Yes Unknown, Entered By History   aspirin 81 MG tablet Take 81 mg by mouth daily 11/15/2018 at Unknown time Yes Unknown, Entered By History   atorvastatin (LIPITOR) 40 MG tablet Take 40 mg by mouth At Bedtime 11/14/2018 at Unknown time Yes Unknown, Entered By History   calcium acetate (PHOSLO) 667 MG CAPS Take 2,001 mg by mouth 3 times daily (with meals) 11/15/2018 at Unknown time Yes Unknown, Entered By History   diclofenac (VOLTAREN) 1 % GEL topical gel Apply 2 g topically 4 times daily Apply to RLE 11/15/2018 at Unknown time Yes Darin Maher MD   gabapentin (NEURONTIN) 100 MG capsule Take 1 capsule (100 mg) by mouth 3 times daily for 10 days 11/15/2018 at end 11/18/18 Yes Mario Sena MD   metoprolol succinate (TOPROL-XL) 25 MG 24 hr tablet Take 12.5 mg by mouth daily Hold for SBP equal to or less than 100. Hold for HR equal to or less than 60. 11/15/2018 at Unknown time Yes Unknown, Entered By History   nitroglycerin (NITROSTAT) 0.4 MG sublingual tablet Place 1 tablet (0.4 mg) under the tongue every 5 minutes as needed for chest pain prn at prn Yes Sy  Julianne BREWSTER PA-C   polyethylene glycol (MIRALAX/GLYCOLAX) Packet Take 1 packet by mouth daily as needed for constipation prn at prn Yes Reported, Patient   polyethylene glycol (MIRALAX/GLYCOLAX) Packet Take 17 g by mouth daily 11/15/2018 at Unknown time Yes Darin Maher MD

## 2018-11-16 NOTE — PLAN OF CARE
Problem: Patient Care Overview  Goal: Plan of Care/Patient Progress Review  Outcome: No Change  VSS. Forgetful. Head CTs negative. Negative abdominal US. Chest xray with worsening cardiomegaly. Up with assist of 1 and walker. More alert this afternoon. Neuros intact. Right arm fistula with thrill/bruit. Tele Afib CVR/SVR. +3/4 edema BLE.

## 2018-11-16 NOTE — CONSULTS
Care Transition Initial Assessment -   Reason For Consult: discharge planning  Met with: Family (Rosa, nephew-in-law)   Active Problems:    ESRD (end stage renal disease) on dialysis (H)    CAD (coronary artery disease)    Diabetes (H)    Hypertension    Mitral regurgitation    Pulmonary hypertension (H)    Altered mental status    Fall    Chronic diastolic heart failure (H)    Severe aortic stenosis       DATA  Lives With: facility resident ([facility])  Living Arrangements: extended care facility ([facility])  Description of Support System: Supportive, Involved  Who is your support system?: Other (specify) (nephew)  Support Assessment: Adequate family and caregiver support.   Identified issues/concerns regarding health management:      Resources List: Skilled Nursing Facility     Quality Of Family Relationships: supportive, involved     Per social work consult for discharge planning.  Patient was admitted on 11-15-18 with an altered mental status.  The tentative date of discharge is 11-16-18.  Patient is admitted under observation status.  Reviewed chart and spoke with niece and nephew-in-law Rosa regarding discharge plans.  Patient was admitted from [facility]  Patient does not have a bed hold, but if he is discharged today he can return to [facility].  Patient's family is asking for transport to be arranged.  Explained that this will be private pay and patient's family is in agreement.  Spoke with the PA who states that she and the MD have spoken with patient and family.  Patient is going to be discharged today.   Call placed to [facility] to arrange for wheelchair transport at 18:00 today.  Patient and family informed of the plan and in agreement to the plan.  Call placed to update [facility] as to the plan.  Patient does not need a PAS as he was admitted from there.    ASSESSMENT  Cognitive Status:  awake  Concerns to be addressed: discharge planning, return to [facility] on discharge.     PLAN  Financial costs for the  patient includes transportation on discharge.  Patient given options and choices for discharge N/A.  Patient/family is agreeable to the plan?  Yes  Patient Goals and Preferences: return to [facility] on discharge.  Patient anticipates discharging to:  [facility].      MILAGRO Maxwell, [facility]  [phone]

## 2018-11-16 NOTE — ED NOTES
Bed: ED09  Expected date: 11/15/18  Expected time: 11:00 PM  Means of arrival: Ambulance  Comments:  [facility] 89M fall eval

## 2018-11-16 NOTE — TELEPHONE ENCOUNTER
Several calls from nursing staff at [facility] about patient   He returned from dialysis this afternoon and c/o not feeling well, with shaking and nausea.   However he had dinner, but then had an unwitnessed fall in his room.   Told nursing staff he hit his head.    Initially neuros intact, and skin tears addressed.     Called back by nurse reporting patient now minimally responsive, not answering questions, lethargic    PLAN:  Refer to ER for [facility] with MS changes.      Joie Gonsales MD

## 2018-11-16 NOTE — ED NOTES
"[facility]  ED Nurse Handoff Report    ED Chief complaint: Altered Mental Status (fell at TCU at 1845 and hit head.  decreased mental status since ) and Fall      ED Diagnosis:   Final diagnoses:   Altered mental status, unspecified altered mental status type   Skin tear of forearm without complication, initial encounter       Code Status: Full Code-----according to previous visit    Allergies:   Allergies   Allergen Reactions     Glyburide      Lisinopril      Hyperkalemia when hospitalized 4/5/2011     Metformin      Renal failure stage 4     Penicillins      SHADY Gallagher clarified with pt, pt does not remember rxn, it was a long time ago, and \"nothing crazy\".     Verapamil        Activity level - Baseline/Home:  Total Care    Activity Level - Current:   Total Care     Needed?: No    Isolation: No  Infection: Not Applicable  Bariatric?: No    Vital Signs:   Vitals:    11/16/18 0030 11/16/18 0053 11/16/18 0101 11/16/18 0112   BP:    115/64   Resp: 13 16 16 12   Temp:       TempSrc:       SpO2: 99% 100% 100% 100%       Cardiac Rhythm: ,   Atrial fibrillation    Pain level:      Is this patient confused?: Yes   Cavalier - Suicide Severity Rating Scale Completed?  Yes  If yes, what color did the patient score?  White    Patient Report: Initial Complaint: fall with altered mental status  Focused Assessment: pt. Fell at 1845 at TCU hitting head and causing 3 areas of large skin tears to left forearm.  Pt. Arouses to voice and continues to do so with slow sometimes incomprehensible speech. Able to give birthday only.    Tests Performed: labs, head CT  Abnormal Results:   Results for orders placed or performed during the hospital encounter of 11/15/18   CT Head w/o Contrast    Narrative    CT SCAN OF THE HEAD WITHOUT CONTRAST  11/16/2018 12:56 AM     HISTORY: Altered level of consciousness.    TECHNIQUE: Axial images of the head and coronal reformations without  IV contrast material. Radiation " dose for this scan was reduced using  automated exposure control, adjustment of the mA and/or kV according  to patient size, or iterative reconstruction technique.    COMPARISON: 1/10/2015.    FINDINGS: There is generalized atrophy of the brain. There is low  attenuation in the white matter of the cerebral hemispheres consistent  with sequelae of small vessel ischemic disease. There is no evidence  of intracranial hemorrhage, mass, acute infarct or anomaly.     The visualized portions of the sinuses and mastoids appear normal.  There is no evidence of trauma.      Impression    IMPRESSION: No acute abnormality.   CBC with platelets differential   Result Value Ref Range    WBC 10.7 4.0 - 11.0 10e9/L    RBC Count 3.09 (L) 4.4 - 5.9 10e12/L    Hemoglobin 10.8 (L) 13.3 - 17.7 g/dL    Hematocrit 34.3 (L) 40.0 - 53.0 %     (H) 78 - 100 fl    MCH 35.0 (H) 26.5 - 33.0 pg    MCHC 31.5 31.5 - 36.5 g/dL    RDW 17.9 (H) 10.0 - 15.0 %    Platelet Count 147 (L) 150 - 450 10e9/L    Diff Method Automated Method     % Neutrophils 82.8 %    % Lymphocytes 7.8 %    % Monocytes 8.8 %    % Eosinophils 0.1 %    % Basophils 0.1 %    % Immature Granulocytes 0.4 %    Absolute Neutrophil 8.9 (H) 1.6 - 8.3 10e9/L    Absolute Lymphocytes 0.8 0.8 - 5.3 10e9/L    Absolute Monocytes 0.9 0.0 - 1.3 10e9/L    Absolute Eosinophils 0.0 0.0 - 0.7 10e9/L    Absolute Basophils 0.0 0.0 - 0.2 10e9/L    Abs Immature Granulocytes 0.0 0 - 0.4 10e9/L   Comprehensive metabolic panel   Result Value Ref Range    Sodium 136 133 - 144 mmol/L    Potassium 5.7 (H) 3.4 - 5.3 mmol/L    Chloride 100 94 - 109 mmol/L    Carbon Dioxide 28 20 - 32 mmol/L    Anion Gap 8 3 - 14 mmol/L    Glucose 134 (H) 70 - 99 mg/dL    Urea Nitrogen 51 (H) 7 - 30 mg/dL    Creatinine 4.32 (H) 0.66 - 1.25 mg/dL    GFR Estimate 13 (L) >60 mL/min/1.7m2    GFR Estimate If Black 16 (L) >60 mL/min/1.7m2    Calcium 9.2 8.5 - 10.1 mg/dL    Bilirubin Total 0.8 0.2 - 1.3 mg/dL    Albumin 2.8 (L)  3.4 - 5.0 g/dL    Protein Total 6.3 (L) 6.8 - 8.8 g/dL    Alkaline Phosphatase 176 (H) 40 - 150 U/L    ALT 33 0 - 70 U/L    AST 40 0 - 45 U/L       Treatments provided: IV fluids, redressed left forearm skin tears    Family Comments: no family present    OBS brochure/video discussed/provided to patient/family: No---pt. Unable to watch due to lethargy and confusion              Name of person given brochure if not patient: na              Relationship to patient: na    ED Medications:   Medications   sodium chloride 0.9% infusion ( Intravenous New Bag 11/16/18 0038)       Drips infusing?:  No    For the majority of the shift this patient was Green.   Interventions performed were routine cares.    Severe Sepsis OR Septic Shock Diagnosis Present: No    To be done/followed up on inpatient unit:      ED NURSE PHONE NUMBER: *[phone]

## 2018-11-16 NOTE — PLAN OF CARE
Problem: Patient Care Overview  Goal: Plan of Care/Patient Progress Review  Outcome: No Change  Observation Goals:    -diagnostic tests and consults completed and resulted - not met  -vital signs normal or at patient baseline - partially met; pt still on O2  -returns to baseline functional status - not met  -safe disposition plan has been identified - not met

## 2018-11-16 NOTE — DISCHARGE INSTRUCTIONS
"Ride arranged for dialysis for Saturday.  \" ride for Saturday, November 17th with a  [facility] [place], [place] to [facility] ( [facility] [address], [place] [address]) with a return ride from dialysis at 1235 pm back to [facility]. \"          Patient will discharge to [facility] wheelchair at 16:00 today.  [facility]'s phone number is [phone].  "

## 2018-11-16 NOTE — ED PROVIDER NOTES
History     Chief Complaint:  Altered Mental Status and Fall    HPI   Maurizio Ohara is a 89 year old male with a complex medical history including ESRD on dialysis, previous MI, mitral valve disorder, mitral regurgitation, CAD, pulmonary hypertension, and current TCU status who presents to the emergency department today via EMS for evaluation of altered mental status after a fall. Per EMS, the patient currently is at a TCU facility.  Tonight, the patient had an unwitnessed fall at his care facility around 1845 where he did hit his head.  He did sustain a skin tear to his left and right arms.  The care facility did wrap and address these skin tears.  But, the patient became increasingly confused and had decreased mental status after the fall prompting concern, a call to EMS, and his arrival to the emergency room.  At arrival, the patient is sleepy, and does not answer questions easily.  He denies pain, but does react when he is touched on his arm by his skin tear.    Allergies:  Glyburide  Lisinopril  Metformin  Penicillins  Verapamil    Medications:    aspirin 81 MG tablet  atorvastatin (LIPITOR) 40 MG tablet  calcium acetate (PHOSLO) 667 MG CAPS  gabapentin (NEURONTIN) 100 MG capsule  metoprolol succinate (TOPROL-XL) 25 MG 24 hr tablet  nitroglycerin (NITROSTAT) 0.4 MG sublingual tablet  polyethylene glycol (MIRALAX/GLYCOLAX) Packet    Past Medical History:    Anemia  CAD  CKD  Diabetes  Hypertension  Mitral regurgitation  Mitral valve disorders  MI  Pulmonary hypertension  ESRD on dialysis    Past Surgical History:    Coronary angiography  Tonsillectomy  Heart Cath, angioplasty  Thoracic surgery-herniated disc    Family History:    Diabetes    Social History:  The patient was alone.  Smoking Status: Former  Smokeless Tobacco: Never  Alcohol Use: Yes  Marital Status:  Single    Review of Systems   Unable to perform ROS: Mental status change   Skin: Positive for wound.     Physical Exam     Patient Vitals  "for the past 24 hrs:   BP Temp Temp src Heart Rate Resp SpO2 Height Weight   11/16/18 0331 114/57 96.6  F (35.9  C) Axillary 66 20 100 % 1.702 m (5' 7\") 82.5 kg (181 lb 12.8 oz)   11/16/18 0310 115/64 - - 63 19 - - -   11/16/18 0300 - - - 64 15 98 % - -   11/16/18 0230 - - - 65 15 99 % - -   11/16/18 0215 - - - 61 11 97 % - -   11/16/18 0200 - - - 64 12 98 % - -   11/16/18 0145 - - - 61 14 99 % - -   11/16/18 0130 - - - 66 14 99 % - -   11/16/18 0115 - - - 64 16 100 % - -   11/16/18 0112 115/64 - - 61 12 100 % - -   11/16/18 0101 - - - 67 16 100 % - -   11/16/18 0053 - - - 65 16 100 % - -   11/16/18 0030 - - - 66 13 99 % - -   11/16/18 0015 - - - 66 (!) 7 98 % - -   11/16/18 0000 - - - 61 14 98 % - -   11/15/18 2345 - - - 64 17 98 % - -   11/15/18 2330 - - - 66 16 98 % - -   11/15/18 2315 - - - - - 98 % - -   11/15/18 2313 108/71 97.6  F (36.4  C) Temporal 74 20 98 % - -         Physical Exam  Constitutional: The patient is oriented to person, place, and time. Sleepy, arouses with physical stimulation.    HENT:   Head: Atraumatic  Right Ear: Normal  Left Ear: Normal  Nose: Nose normal.   Mouth/Throat: Oropharynx is clear and moist. No erythema or exudate.   Eyes: Conjunctivae and EOM are normal. Pupils are equal, round, and reactive to light. No discharge  Neck: Normal range of motion. Neck supple.   Cardiovascular: Normal rate, regular rhythm, no murmur gallops or rubs. Intact distal pulses.    Pulmonary/Chest: CTA bilaterally. No wheezes rale or rhonchi.  Abdominal: Soft. Non tender.  No masses   Musculoskeletal: No edema. No bony deformity. Normal range of motion  Lymphadenopathy:     The patient has no cervical adenopathy.   Neurological: Sleepy, follows commands. Moves all four extremities. No cranial nerve deficit. Coordination normal.  Skin: Skin is warm and dry. No rash noted. The patient is not diaphoretic. Significant skin tear on dorsum of right forearm.   Psychiatric: The patient has a normal mood and " affect.    Emergency Department Course   ECG:  Indication: AMS  Completed at 2339.  Read at 0016.   Atrial fibrillation  Right axis deviation  Possible right ventricular hypertrophy  Nonspecific ST and T wave abnormality  Abnormal ECG  Rate 68 bpm. NJ interval *. QRS duration 86. QT/QTc 386/410. P-R-T axes *  110  231.    Imaging:  Radiology findings were communicated with the patient who voiced understanding of the findings.  CT Head w/o contrast  IMPRESSION: No acute abnormality.  Report per radiology     Laboratory:  Laboratory findings were communicated with the patient who voiced understanding of the findings.  CBC: HGB 10.8 (L),  (L) o/w WNL. (WBC 10.7   CMP: Creatinine 4.32 (H), potassium 5.7 (H), glucose 134 (H), BUN 51 (H), GFR estimate 13 (L), albumin 2.8 (L), protein 6.3 (L), alkphos 176 (H) o/w WNL    Interventions:  0038 NS infusion 1,000mL IV    Emergency Department Course:  Nursing notes and vitals reviewed.  IV was inserted and blood was drawn for laboratory testing, results above.  The patient was sent for a CT Head w/o contrast while in the emergency department, results above.   2348: I performed an exam of the patient as documented above.   0130: Patient rechecked and updated.   0141: I spoke with Dr. Rondon of the hospitalist service regarding patient's presentation, findings, and plan of care.  Findings and plan explained to the Patient who consents to admission. Discussed the patient with Dr. Rondon, who will admit the patient to a Obs bed for further monitoring, evaluation, and treatment.  I personally reviewed the laboratory, imaging and EKG results with the Patient and answered all related questions prior to admission.    Impression & Plan    Medical Decision Making:  Maurizio Patino is a 89 year old male who presents from transitional care unit where he was found after an unwitnessed fall.  He did sustain significant skin tear to his arm which was repaired at his nursing  facility, but over the course of the evening, he did have decreasing mental status and was sent in for evaluation.  The patient is quite sleepy, but arousable with physical stimulation.  He does move all 4 extremities, but I am unable to keep him staying awake.  Vital signs are otherwise unremarkable.  CT of his head does not show signs of bleeding.  Laboratory examination is normal as well.  Because of his altered mental status, I did not feel that he was safe for discharge to home.  This could be a concussion-like syndrome.  I did speak with Dr. Rondon on for the hospitalist service and he will be admitted for further observation.    Diagnosis:    ICD-10-CM    1. Altered mental status, unspecified altered mental status type R41.82    2. Skin tear of forearm without complication, initial encounter S51.819A        Disposition:  Admitted to Obs bed with Dr. Rondon    Scribe Disclosure:  Jeremy BRONSON, am serving as a scribe at 11:48 PM on 11/15/2018 to document services personally performed by Arpan Gilmore MD based on my observations and the provider's statements to me.     11/15/2018    EMERGENCY DEPARTMENT       Arpan Gilmore MD  11/16/18 0536

## 2018-11-16 NOTE — PLAN OF CARE
Problem: Patient Care Overview  Goal: Plan of Care/Patient Progress Review  Outcome: No Change  PRIMARY DIAGNOSIS: FALL/LETHARGY    OUTPATIENT/OBSERVATION GOALS TO BE MET BEFORE DISCHARGE  1. Orthostatic performed: No    2. Tolerating PO medications: Yes    3. Return to near baseline physical activity: No    4. Cleared for discharge by consultants (if involved): No, Nephrology consult    Discharge Planner Nurse   Safe discharge environment identified: No  Barriers to discharge: Yes       Entered by: Deyanira Moore 11/16/2018 8:02 AM     Please review provider order for any additional goals.   Nurse to notify provider when observation goals have been met and patient is ready for discharge.

## 2018-11-16 NOTE — H&P
[facility]    History and Physical  Hospitalist       Date of Admission:  11/15/2018  Date of Service (when I saw the patient): 11/16/18    ASSESSMENT  Maurizio Ohara is an 89 year old gentleman with extensive pst medical history most notable for ESRD on HD, CAD, chronic diastolic CHF, severe aortic stenosis and mitral regurgitation, and pulmonary hypertension who presents with unwitnessed fall and progressive somnolence.    PLAN    1) Fall and somnolence: Mr. Ohara has known aortic stenosis and mitral regurgitation. He was hospitalized 9/2018 for right lower extremity cellulitis, with negative ultrasound and blood cultures, and was treated with cephalosporins. He was re-hospitalized 11/3/2018 through 11/8/2018 for generalized weakness in his lower legs; the case was discussed with Cardiology and he underwent LAN testing 11/7/2018 for further evaluation of his cardiac function and was found to have severe aortic stenosis with a mobile calcified vegetation, thought most likely to be non-infectious; moderate to severe TR, diminished RV function, and preserved LVEF. His weakness was attributed to lower extremity edema and deconditioning and he was discharged to a TCU.     Now he presents for an unwitnessed fall there and subsequent increased somnolence. This could be due to sundowning and lack of sleep, perhaps progressive depression given recent care notes documenting concern for that; but we have a low threshold for greater concern if he does not soon arouse more fully in AM.      -- Observation for now; telemetry; fall precautions and q 4 neuro checks      -- AM Head CT repeated to rule out evolving bleeding; will also check Ammonia level       -- Hold Neurontin (the only medication it appears could be exacerbating his somnolence; reconcile all medications in AM to confirm this)       -- His abdomen is mildly tender and distended on exam. Will repeat Hepatic panel with Lipase, CXR and  abdominal ultrasound      -- If not arousing more fully in AM, may need Neurology consultation. May also need Cardiology consultation if it is felt his fall is due to syncope related to aortic stenosis, because that would portend a grim prognosis without intervention. Otherwise, if he returns to his baseline mental status in AM, he could be discharged with close outpatient Cardiology follow up    2) ESRD on HD: Causing hyperkalemia 5.7. It was 5.6 on 11/12. Nephrology consulted for consideration of HD today. Resume PhosLo when verified    3) CAD: Status post NSTEMI and RCA stent placement in 2014.      -- Resume aspirin, Lipitor, Toprol when verified    4) Chronic anemia, thrombocytopenia: Monitor while hospitalized    Chief Complaint   Fall    Unable to obtain a history from the patient due to confusion; history obtained from the ED physician whom I have spoken with    History of Present Illness   Maurizio Ohara is an 89 year old gentleman who presents after reportedly being found on the floor at his facility having suffered an unwitnessed fall and found to have abrasions in his extremities; he was awake at the time he was found but became progressively more sleepy afterward causing them to send him into the ED. On my assessment on the Observation floor, he sleeps but is arousable, opens his eyes briefly when I ask him to, says he does not remember what happened, and denies pain, dyspnea or any other acute complaints. Then he falls back peacefully asleep. Of note, a visit earlier in the day noted progressive depressed mood.    In the ED, Blood pressure 108/71, temperature 97.6  F (36.4  C), temperature source Temporal, resp. rate 16, SpO2 100 %.    CBC shows WBC 11, HGB 11, . CMP confirms CKD5 with BUN 51, Cr 4.32, K 5.7. Alb 2.8, T prot 6.3. Alk phos 176, other hepatic labs unremarkable. CT Head without contrast was negative for acute pathology. His abrasions were attended to in the ED.    Data   Labs  Ordered and Resulted from Time of ED Arrival Up to the Time of Departure from the ED   CBC WITH PLATELETS DIFFERENTIAL - Abnormal; Notable for the following:        Result Value    RBC Count 3.09 (*)     Hemoglobin 10.8 (*)     Hematocrit 34.3 (*)      (*)     MCH 35.0 (*)     RDW 17.9 (*)     Platelet Count 147 (*)     Absolute Neutrophil 8.9 (*)     All other components within normal limits   COMPREHENSIVE METABOLIC PANEL - Abnormal; Notable for the following:     Potassium 5.7 (*)     Glucose 134 (*)     Urea Nitrogen 51 (*)     Creatinine 4.32 (*)     GFR Estimate 13 (*)     GFR Estimate If Black 16 (*)     Albumin 2.8 (*)     Protein Total 6.3 (*)     Alkaline Phosphatase 176 (*)     All other components within normal limits   VITAL SIGNS AND NEURO CHECKS       PHYSICAL EXAM  Blood pressure 108/71, temperature 97.6  F (36.4  C), temperature source Temporal, resp. rate 16, SpO2 100 %.  Constitutional: somnolent but arousable; no apparent distress  HEENT: frontal bossing of the skull; moist mucus membranes  Respiratory: lungs clear to auscultation bilaterally  Cardiovascular: regular S1 S2   GI: abdomen soft mildly tender in epigastrium and distended, bowel sounds positive  Lymph/Hematologic: pale, no cervical lymphadenopathy  Skin: some diffuse abrasions, good turgor  Musculoskeletal: mild bilateral LE mellisa  Neurologic: extra-ocular muscles intact; moves all four extremities    Data reviewed today:  I personally reviewed the head CT image(s) showing no acute pathology.    Recent Results (from the past 24 hour(s))   CT Head w/o Contrast    Narrative    CT SCAN OF THE HEAD WITHOUT CONTRAST  11/16/2018 12:56 AM     HISTORY: Altered level of consciousness.    TECHNIQUE: Axial images of the head and coronal reformations without  IV contrast material. Radiation dose for this scan was reduced using  automated exposure control, adjustment of the mA and/or kV according  to patient size, or iterative reconstruction  technique.    COMPARISON: 1/10/2015.    FINDINGS: There is generalized atrophy of the brain. There is low  attenuation in the white matter of the cerebral hemispheres consistent  with sequelae of small vessel ischemic disease. There is no evidence  of intracranial hemorrhage, mass, acute infarct or anomaly.     The visualized portions of the sinuses and mastoids appear normal.  There is no evidence of trauma.      Impression    IMPRESSION: No acute abnormality.       DVT Prophylaxis: Pneumatic Compression Devices  Code Status: Full Code presumed    Disposition: Expected discharge in 0-2 days    Diego Rondon MD    Primary Care Physician   *Justina Moise    Past Medical History    I have reviewed this patient's medical history and updated it with pertinent information if needed.   Past Medical History:   Diagnosis Date     Anemia      Anemia      CAD (coronary artery disease) 12/24/14    PCI and BMS to mid RCA (5.0 x 20mm) with residual mod diffuse mid LAD disease     Chronic kidney disease     on Dialysis     Diabetes (H)      Hypertension      Mitral regurgitation 12/24/2014    mild-mod (1-2+) per echo     Mitral valve disorders(424.0) 12/24/2014    mild/mod (1-2+) MR     Myocardial infarction (H) 12/24/2014    NSTEMI     Pulmonary hypertension (H) 12/24/2014    RVSP elevated c/w mild-mod PH per echo     Renal disease due to diabetes mellitus (H)     End stage; on Dialysis       Past Surgical History   I have reviewed this patient's surgical history and updated it with pertinent information if needed.  Past Surgical History:   Procedure Laterality Date     CORONARY ANGIOGRAPHY ADULT ORDER  12/24/2014     ENT SURGERY      Tonsillectomy     HEART CATH, ANGIOPLASTY  12/24/2014    PCI and BMS (5.0x20mm)to mid RCA     THORACIC SURGERY      Herniated disc       Prior to Admission Medications   Prior to Admission Medications   Prescriptions Last Dose Informant Patient Reported? Taking?   acetaminophen (TYLENOL)  "500 MG tablet   Yes Yes   Sig: Take 1,000 mg by mouth 3 times daily as needed for mild pain   aspirin 81 MG tablet   Yes Yes   Sig: Take 81 mg by mouth daily   atorvastatin (LIPITOR) 40 MG tablet   Yes Yes   Sig: Take 40 mg by mouth At Bedtime   calcium acetate (PHOSLO) 667 MG CAPS  Self Yes Yes   Sig: Take 2,001 mg by mouth 3 times daily (with meals)   diclofenac (VOLTAREN) 1 % GEL topical gel   No Yes   Sig: Apply 2 g topically 4 times daily Apply to RLE   gabapentin (NEURONTIN) 100 MG capsule   No Yes   Sig: Take 1 capsule (100 mg) by mouth 3 times daily for 10 days   metoprolol succinate (TOPROL-XL) 25 MG 24 hr tablet   Yes Yes   Sig: Take 12.5 mg by mouth daily    nitroglycerin (NITROSTAT) 0.4 MG sublingual tablet   No Yes   Sig: Place 1 tablet (0.4 mg) under the tongue every 5 minutes as needed for chest pain   polyethylene glycol (MIRALAX/GLYCOLAX) Packet   No Yes   Sig: Take 17 g by mouth daily   polyethylene glycol (MIRALAX/GLYCOLAX) Packet   Yes No   Sig: Take 1 packet by mouth daily as needed for constipation      Facility-Administered Medications: None     Allergies   Allergies   Allergen Reactions     Glyburide      Lisinopril      Hyperkalemia when hospitalized 4/5/2011     Metformin      Renal failure stage 4     Glenn Gallagher RN clarified with pt, pt does not remember rxn, it was a long time ago, and \"nothing crazy\".     Verapamil        Social History   I have reviewed this patient's social history and updated it with pertinent information if needed. Maurizio Ohara  reports that he has quit smoking. He has never used smokeless tobacco. He reports that he drinks alcohol. He reports that he does not use illicit drugs.    Family History   Family history assessed and, except as above, is non-contributory.    Family History   Problem Relation Age of Onset     Diabetes Mother        Review of Systems   The 10 point Review of Systems is negative other than noted in the HPI or here.   "

## 2018-11-16 NOTE — PLAN OF CARE
Problem: Patient Care Overview  Goal: Plan of Care/Patient Progress Review  Outcome: No Change  AVSS; O2 sats 100% on 2L/NC; pt very lethargic, but opens eyes to voice; following commands; speech clear, but soft; pt A&O x 4; moving all extremities, but weakly, JOSELYN; pt c/o 10/10 generalized pain on admission, but fell asleep as soon as assessment was done; pt denied pain at 0615 with neuro recheck; IV SL'd; right arm dialysis access with + thrill and bruit; tele A-fib; Nephrology, care coordinator,  consults ordered; pt currently off the unit for ordered cxray, abdominal U/S, and head CT.

## 2018-11-16 NOTE — DISCHARGE SUMMARY
[facility]  Discharge Summary  Hospitalist    Date of Admission:  11/15/2018  Date of Discharge:  11/16/2018  Discharging Provider: Cortney Griffin PA-C    Discharge Diagnoses   Mechanical fall,     Hospital Course   Maurizio Ohara is an 89 year old gentleman with extensive past medical history most notable for ESRD on HD, CAD, chronic diastolic CHF, severe aortic stenosis and mitral regurgitation, and pulmonary hypertension who presents with unwitnessed fall and progressive somnolence who was admitted to D observation on 11/15/2018.  The following problems were addressed during his hospitalization:    Fall and somnolence: Mr. hOara has known aortic stenosis and mitral regurgitation. He was hospitalized 9/2018 for right lower extremity cellulitis, with negative ultrasound and blood cultures, and was treated with cephalosporins. He was re-hospitalized 11/3/2018 through 11/8/2018 for generalized weakness in his lower legs; the case was discussed with Cardiology and he underwent LAN testing 11/7/2018 for further evaluation of his cardiac function and was found to have severe aortic stenosis with a mobile calcified vegetation, thought most likely to be non-infectious; moderate to severe TR, diminished RV function, and preserved LVEF. His weakness was attributed to lower extremity edema and deconditioning and he was discharged to a TCU. Now he presents for an unwitnessed fall there and subsequent increased somnolence. This could be due to sundowning and lack of sleep, perhaps progressive depression given recent care notes documenting concern for that.    P was admitted to the [facility] Observation unit. He slept well overnight and was more arousable 11/16. Pt's niece and nephew spent much of the day with him. They said his mentation was near baseline. I had a long discussion with them that it was very difficult to know the cause of his somnolence and altered mental status as he likely has  some underlying dementia. I discussed possibly ordering a cardiology and neurology consult while pt was in the observation unit, but family declined as he had an outpatient cardiologist he was seeing about his multiple heart problems and they were comfortable with him discharging back to TCU. He did seem to have an episode of more non-sensical speaking in the early afternoon, which caused family to be concerned. Dr. George had a discussion with pt's family that he has some underlying executive function loss as evidenced by head CT showing chronic changes. After discussion, family was again comfortable with discharge back to TCU.   -During his observation unit stay, Neurontin was held (the only medication it appears could be exacerbating his somnolence), but will be resumed at discharge to TCU.      ESRD on HD: Causing hyperkalemia 5.7. It was 5.6 on 11/12. Nephrology consulted for consideration of HD. He was not due for HD until 11/17. So HD not done. PTA PhosLo was continued      CAD: Status post NSTEMI and RCA stent placement in 2014. Continueded PTA aspirin, Lipitor, Toprol when verified     Chronic anemia, thrombocytopenia: CBC was near his baseline while he was hospitalized    Active Problems:    ESRD (end stage renal disease) on dialysis (H)    CAD (coronary artery disease)    Diabetes (H)    Hypertension    Mitral regurgitation    Pulmonary hypertension (H)    Altered mental status    Fall    Chronic diastolic heart failure (H)    Severe aortic stenosis    Cortney Griffin PA-C    Patient seen and examined.  Agree with impression and plan.     Sorin George  Pager: [phone]  Cell Phone:  [phone]      Significant Results and Procedures   CT SCAN OF THE HEAD WITHOUT CONTRAST   11/16/2018  IMPRESSION:     1. No evidence of acute intracranial hemorrhage, mass, or herniation.  2. There is generalized atrophy of the brain. White matter changes are  present in the cerebral hemispheres that  are consistent with small  vessel ischemic disease in this age patient. No significant change  since prior.    Pending Results   None  Code Status   DNR / DNI       Primary Care Physician   Justina Moise    Physical Exam   Temp: 96.4  F (35.8  C) Temp src: Axillary BP: (!) 100/28   Heart Rate: 66 Resp: 18 SpO2: 99 % O2 Device: None (Room air) Oxygen Delivery: 2 LPM  Vitals:    11/16/18 0331   Weight: 82.5 kg (181 lb 12.8 oz)     Vital Signs with Ranges  Temp:  [96.4  F (35.8  C)-97.6  F (36.4  C)] 96.4  F (35.8  C)  Heart Rate:  [61-74] 66  Resp:  [7-20] 18  BP: (100-115)/(28-71) 100/28  SpO2:  [97 %-100 %] 99 %       Constitutional: alert to voice, no acute distrss  Respiratory: breathing unlabored, no secondary muscle use  Cardiovascular: RRR  GI: Abdomen soft, NTTP  Lymph/Hematologic: bruising along arms, no active bleeding  Skin: Bruising along bilateral arms,   Musculoskeletal:  strength equal, able to feed himself breakfast  Neurologic:  strength equal bilaterally, arouses to voice, vision unchanged  Neuropsychiatric: Mildly disoriented to place (thinks he's at [facility], his facility), disoriented to date and time. Pleasant, cooperative, some word-finding and word salad    Discharge Disposition   Discharged to short-term care facility  Condition at discharge: Stable    Consultations This Hospital Stay   CARE COORDINATOR IP CONSULT  SOCIAL WORK IP CONSULT  NEPHROLOGY IP CONSULT  PHYSICAL THERAPY ADULT IP CONSULT  SOCIAL WORK IP CONSULT  NEUROLOGY IP CONSULT  PHYSICAL THERAPY ADULT IP CONSULT  OCCUPATIONAL THERAPY ADULT IP CONSULT    Time Spent on this Encounter   Cortney BRONSON, personally saw the patient today and spent greater than 30 minutes discharging this patient.    Discharge Orders     General info for SNF   Length of Stay Estimate: Short Term Care: Estimated # of Days 31-90  Condition at Discharge: Stable  Level of care:skilled   Rehabilitation Potential: Fair  Admission H&P  remains valid and up-to-date: Yes  Recent Chemotherapy: N/A  Use Nursing Home Standing Orders: Yes     Mantoux instructions   Give two-step Mantoux (PPD) Per Facility Policy Yes     Reason for your hospital stay   You were in the hospital to assess you after your fall     Intake and output   Every shift     Activity - Up with nursing assistance     Follow Up and recommended labs and tests   Follow up with Nursing home physician.  No follow up labs or test are needed.  Follow-up with cardiology in the next 1-2 weeks to discuss heart problems     Physical Therapy Adult Consult   Evaluate and treat as clinically indicated.    Reason:  Physical deconditioning     Occupational Therapy Adult Consult   Evaluate and treat as clinically indicated.    Reason:  Physical deconditioning     Fall precautions     Advance Diet as Tolerated   Follow this diet upon discharge: Orders Placed This Encounter     Regular Diet Adult       Discharge Medications   Current Discharge Medication List      CONTINUE these medications which have NOT CHANGED    Details   acetaminophen (TYLENOL) 500 MG tablet Take 1,000 mg by mouth 3 times daily as needed for mild pain      aspirin 81 MG tablet Take 81 mg by mouth daily      atorvastatin (LIPITOR) 40 MG tablet Take 40 mg by mouth At Bedtime      calcium acetate (PHOSLO) 667 MG CAPS Take 2,001 mg by mouth 3 times daily (with meals)      diclofenac (VOLTAREN) 1 % GEL topical gel Apply 2 g topically 4 times daily Apply to RLE    Associated Diagnoses: Cellulitis of right lower extremity      gabapentin (NEURONTIN) 100 MG capsule Take 1 capsule (100 mg) by mouth 3 times daily for 10 days  Qty: 30 capsule, Refills: 0      metoprolol succinate (TOPROL-XL) 25 MG 24 hr tablet Take 12.5 mg by mouth daily Hold for SBP equal to or less than 100. Hold for HR equal to or less than 60.      nitroglycerin (NITROSTAT) 0.4 MG sublingual tablet Place 1 tablet (0.4 mg) under the tongue every 5 minutes as needed for  "chest pain  Qty: 25 tablet, Refills: 3    Associated Diagnoses: NSTEMI (non-ST elevated myocardial infarction) (H)      !! polyethylene glycol (MIRALAX/GLYCOLAX) Packet Take 1 packet by mouth daily as needed for constipation      !! polyethylene glycol (MIRALAX/GLYCOLAX) Packet Take 17 g by mouth daily  Qty: 7 packet    Associated Diagnoses: Cellulitis of right lower extremity       !! - Potential duplicate medications found. Please discuss with provider.        Allergies   Allergies   Allergen Reactions     Glyburide      Lisinopril      Hyperkalemia when hospitalized 4/5/2011     Metformin      Renal failure stage 4     Penicillins      SHADY Gallagher clarified with pt, pt does not remember rxn, it was a long time ago, and \"nothing crazy\".     Verapamil      Data   Results for orders placed or performed during the hospital encounter of 11/15/18   CT Head w/o Contrast    Narrative    CT SCAN OF THE HEAD WITHOUT CONTRAST  11/16/2018 12:56 AM     HISTORY: Altered level of consciousness.    TECHNIQUE: Axial images of the head and coronal reformations without  IV contrast material. Radiation dose for this scan was reduced using  automated exposure control, adjustment of the mA and/or kV according  to patient size, or iterative reconstruction technique.    COMPARISON: 1/10/2015.    FINDINGS: There is generalized atrophy of the brain. There is low  attenuation in the white matter of the cerebral hemispheres consistent  with sequelae of small vessel ischemic disease. There is no evidence  of intracranial hemorrhage, mass, acute infarct or anomaly.     The visualized portions of the sinuses and mastoids appear normal.  There is no evidence of trauma.      Impression    IMPRESSION: No acute abnormality.    VINNIE DIXON MD   CT Head w/o Contrast    Narrative    CT SCAN OF THE HEAD WITHOUT CONTRAST   11/16/2018 7:03 AM     HISTORY: Fall and somnolence.     TECHNIQUE:  Axial images of the head and coronal reformations " without  IV contrast material. Radiation dose for this scan was reduced using  automated exposure control, adjustment of the mA and/or kV according  to patient size, or iterative reconstruction technique.    COMPARISON: Earlier the same day.    FINDINGS: There is no evidence of intracranial hemorrhage, mass, acute  infarct or anomaly. There is generalized atrophy of the brain. There  is low attenuation in the white matter of the cerebral hemispheres  consistent with sequelae of small vessel ischemic disease. Ventricular  size is within normal limits without evidence of hydrocephalus.     The visualized portions of the sinuses and mastoids appear normal. The  bony calvarium and bones of the skull base appear intact.       Impression    IMPRESSION:     1. No evidence of acute intracranial hemorrhage, mass, or herniation.  2. There is generalized atrophy of the brain. White matter changes are  present in the cerebral hemispheres that are consistent with small  vessel ischemic disease in this age patient. No significant change  since prior.    FEDERICO EASON MD   XR Chest 2 Views    Narrative    CHEST TWO VIEWS  11/16/2018 6:42 AM     HISTORY: Dyspnea.     COMPARISON: 11/4/2018      Impression    IMPRESSION: Worsening cardiomegaly. No change in pulmonary vascular  engorgement. New left lower lobe infiltrate best seen on lateral view,  obscuring the diaphragm posterior medially in the descending aortic  shadow.    GIOVANNI MATSON MD   US Abdomen Complete    Narrative    ULTRASOUND ABDOMEN COMPLETE   11/16/2018 7:33 AM     HISTORY: Abdominal pain and distention.    COMPARISON: None.    FINDINGS: Normal hepatic echogenicity. No hepatic masses. A small  amount of sludge is present in the neck of a mildly distended  gallbladder. Mild diffuse wall thickening of the gallbladder. No  convincing gallstones or pericholecystic fluid. No focal tenderness  over the gallbladder. No intra- or extrahepatic biliary dilatation.  The common  duct measures 0.2 cm in diameter. The pancreas is not well  visualized due to overlying bowel gas. The spleen is relatively small,  measuring 7.6 cm in craniocaudal dimension. The right and left kidneys  measure 7.9 and 10.6 cm in length, respectively. Echogenic renal  echotexture and cortical thinning of both kidneys. No intrarenal  collecting system dilatation bilaterally. 1.0 cm cyst in the  interpolar region of the right kidney. The proximal aorta and inferior  vena cava are visualized. A small amount of free intraperitoneal  fluid, primarily in the right upper quadrant and pelvis.      Impression    IMPRESSION:   1. A small amount of free intraperitoneal fluid, primarily in the  right upper quadrant and pelvis.  2. A small amount of sludge present within a borderline distended  mildly thick-walled gallbladder. There is no convincing evidence of  acute cholecystitis. If there is a clinical concern for acute  cholecystitis, a HIDA scan could be considered for further evaluation.  3. No biliary dilatation.  4. Both kidneys are echogenic and moderately atrophic, suggesting  medical renal disease.    RON HUGHES MD     Most Recent 3 CBC's:  Recent Labs   Lab Test  11/16/18   0035  11/12/18   1020  11/09/18   0414   WBC  10.7  8.4  5.6   HGB  10.8*  10.9*  12.1*   MCV  111*  111*  116*   PLT  147*  127*  148*     Most Recent 3 BMP's:  Recent Labs   Lab Test  11/16/18   0552  11/16/18   0035  11/12/18   1020   NA  135  136  134   POTASSIUM  5.8*  5.7*  5.6*   CHLORIDE  101  100  99   CO2  28  28  26   BUN  51*  51*  49*   CR  4.60*  4.32*  5.41*   ANIONGAP  6  8  9   KIMBRE  8.9  9.2  9.5   GLC  129*  134*  153*

## 2018-11-16 NOTE — PROGRESS NOTES
Updated patient and family that [facility] 6 pm today. TCU needed transportation to dialysis rescheduled so I called [employer] ( [phone]) and spoke to Omayra and schedule a ride for Saturday, November 17th with a  time of 7 am from [facility] ([address] [place], [place]) to [facility] ( [address], [place] [address]) with a return ride from dialysis at 1235 pm [facility].  Patient's [employer] Tech ID number is [id number]. Charge nurse updated with transport times for dialysis.

## 2018-11-16 NOTE — PROGRESS NOTES
RECEIVING UNIT ED HANDOFF REVIEW    ED Nurse Handoff Report was reviewed by: Ely Wade on November 16, 2018 at 2:21 AM

## 2018-11-17 NOTE — PLAN OF CARE
Problem: Patient Care Overview  Goal: Plan of Care/Patient Progress Review  Outcome: Completed Date Met: 11/16/18  Pt adequate for discharge to TCU. Packet sent. HE transport patient at this time. Belongings sent with.

## 2018-11-19 NOTE — PROGRESS NOTES
[place] [facility]  PRIMARY CARE PROVIDER AND CLINIC:  Justina Moise [facility] [address] / [address] [facility] [address]*  No chief complaint on file.    [place] Medical Record Number:  [id number]  Place of Service where encounter took place:  [facility]  ([facility]) [[id number]]    HPI:    Maurizio Ohara is a 89 year old  (10/7/1929),admitted to the above facility from  [facility].  Hospital stay 11/15/2018 through 11/16/2018.  Admitted to this facility for  rehab, medical management and nursing care.  HPI information obtained from: facility chart records.  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  The following problems were addressed during his hospitalization:     Fall and somnolence: Mr. Ohara has known aortic stenosis and mitral regurgitation. He was hospitalized 9/2018 for right lower extremity cellulitis, with negative ultrasound and blood cultures, and was treated with cephalosporins. He was re-hospitalized 11/3/2018 through 11/8/2018 for generalized weakness in his lower legs; the case was  discussed with Cardiology and he underwent LAN testing 11/7/2018 for further evaluation of his cardiac function and was found to have severe aortic stenosis with a mobile calcified vegetation, thought most likely to be non-infectious; moderate to severe TR, diminished RV function, and preserved LVEF. His weakness was attributed to lower extremity edema and deconditioning and he was discharged to a TCU. Now he presents for an unwitnessed fall there and subsequent increased somnolence. This could be due to sundowning and lack of sleep, perhaps progressive depression given recent care notes documenting concern for that.     P was admitted to the [facility] Observation unit. He slept well overnight and was more arousable 11/16. Pt's niece and nephew spent much of the day with him. They said his mentation was near baseline. I had a long discussion with them that it was very difficult to know the cause of his somnolence and altered mental status as he likely has some underlying dementia. I discussed possibly ordering a cardiology and neurology consult while pt was in the observation unit, but family declined as he had an outpatient cardiologist he was seeing about his multiple heart problems and they were comfortable with him discharging back to TCU. He did seem to have an episode of more non-sensical speaking in the early afternoon, which caused family to be concerned. Dr. Anderson had a discussion with pt's family that he has some underlying executive function loss as evidenced by head CT showing chronic changes. After discussion, family was again comfortable with discharge back to TCU.   -During his observation unit stay, Neurontin was held (the only medication it appears could be exacerbating his somnolence), but will be resumed at discharge to TCU.       ESRD on HD: Causing hyperkalemia 5.7. It was 5.6 on 11/12. Nephrology consulted for consideration of HD. He was not due for HD until 11/17. So HD not done. PTA PhosLo was  continued       CAD: Status post NSTEMI and RCA stent placement in 2014. Continueded PTA aspirin, Lipitor, Toprol when verified      Chronic anemia, thrombocytopenia: CBC was near his baseline while he was hospitalized    ------------------    Patient is alert, calm, NAD today. Much brighter and more alert than have seen patient in past. Reports mild discomfort to lacerations on hand and notes increased LE edema. Denies SOB, chest pain, dizziness. Did work with therapy today without issue.     CODE STATUS/ADVANCE DIRECTIVES DISCUSSION:   CPR/Full code   Patient's living condition: lives alone    ALLERGIES:Glyburide; Lisinopril; Metformin; Penicillins; and Verapamil  PAST MEDICAL HISTORY:  has a past medical history of Anemia; CAD (coronary artery disease) (12/24/14); Chronic kidney disease; Diabetes (H); Hypertension; Mitral regurgitation (12/24/2014); Mitral valve disorders(424.0) (12/24/2014); Myocardial infarction (H) (12/24/2014); Pulmonary hypertension (H) (12/24/2014); and Renal disease due to diabetes mellitus (H).  PAST SURGICAL HISTORY:  has a past surgical history that includes Thoracic surgery; ENT surgery; Coronary Angiography Adult Order (12/24/2014); and Heart Cath, Angioplasty (12/24/2014).  FAMILY HISTORY: family history includes Diabetes in his mother.  SOCIAL HISTORY:  reports that he has quit smoking. He has never used smokeless tobacco. He reports that he drinks alcohol. He reports that he does not use illicit drugs.    Post Discharge Medication Reconciliation Status: discharge medications reconciled and changed, per note/orders (see AVS).  Current Outpatient Prescriptions   Medication Sig Dispense Refill     acetaminophen (TYLENOL) 500 MG tablet Take 1,000 mg by mouth 3 times daily as needed for mild pain       aspirin 81 MG tablet Take 81 mg by mouth daily       atorvastatin (LIPITOR) 40 MG tablet Take 40 mg by mouth At Bedtime       calcium acetate (PHOSLO) 667 MG CAPS Take 2,001 mg by mouth 3  "times daily (with meals)       diclofenac (VOLTAREN) 1 % GEL topical gel Apply 2 g topically 4 times daily Apply to RLE       gabapentin (NEURONTIN) 100 MG capsule Take 1 capsule (100 mg) by mouth 3 times daily for 10 days 30 capsule 0     metoprolol succinate (TOPROL-XL) 25 MG 24 hr tablet Take 12.5 mg by mouth daily Hold for SBP equal to or less than 100. Hold for HR equal to or less than 60.       nitroglycerin (NITROSTAT) 0.4 MG sublingual tablet Place 1 tablet (0.4 mg) under the tongue every 5 minutes as needed for chest pain 25 tablet 3     polyethylene glycol (MIRALAX/GLYCOLAX) Packet Take 1 packet by mouth daily as needed for constipation       polyethylene glycol (MIRALAX/GLYCOLAX) Packet Take 17 g by mouth daily 7 packet        ROS:  10 point ROS of systems including Constitutional, Eyes, Respiratory, Cardiovascular, Gastroenterology, Genitourinary, Integumentary, Musculoskeletal, Psychiatric were all negative except for pertinent positives noted in my HPI.    Exam:  /70  Pulse 66  Temp 97.3  F (36.3  C)  Resp 18  Ht 5' 7\" (1.702 m)  Wt 181 lb 4.8 oz (82.2 kg)  SpO2 93%  BMI 28.4 kg/m2    GENERAL APPEARANCE: Alert, in no distress   ENT: Mouth and posterior oropharynx normal, moist mucous membranes   EYES: EOM, conjunctivae, lids, pupils and irises normal   NECK: No adenopathy,masses or thyromegaly   RESP: respiratory effort and palpation of chest normal, Lung sounds decreased  CV: Palpation and auscultation of heart done , regular rate and rhythm, no murmur, rub, or gallop, peripheral edema 2+ dependent, feet and LE. Shunt RUE + bruit, thrill  ABDOMEN: normal bowel sounds, soft, nontender, no hepatosplenomegaly or other masses   : palpation of bladder WNL   M/S: Gait and station normal   Digits and nails normal - GONZALEZ  SKIN: Inspection of skin and subcutaneous tissue baseline, Palpation of skin and subcutaneous tissue baseline -lacerations to left hand, arm, being cleaned and approximated " with steri strips per nursing, mx bruises arms.  NEURO: Cranial nerves 2-12 are normal tested and grossly at patient's baseline, Examination of sensation by touch normal   PSYCH: oriented X 3, affect and mood normal              BP 11/12-11/19: /39-74 mmHg    Lab/Diagnostic data:    CBC RESULTS:   Recent Labs   Lab Test  11/16/18   0035  11/12/18   1020   WBC  10.7  8.4   RBC  3.09*  3.07*   HGB  10.8*  10.9*   HCT  34.3*  34.2*   MCV  111*  111*   MCH  35.0*  35.5*   MCHC  31.5  31.9   RDW  17.9*  17.8*   PLT  147*  127*       Last Basic Metabolic Panel:  Recent Labs   Lab Test  11/16/18   0552  11/16/18   0035   NA  135  136   POTASSIUM  5.8*  5.7*   CHLORIDE  101  100   KIMBER  8.9  9.2   CO2  28  28   BUN  51*  51*   CR  4.60*  4.32*   GLC  129*  134*       Liver Function Studies -   Recent Labs   Lab Test  11/16/18 0552  11/16/18   0035   PROTTOTAL  6.0*  6.3*   ALBUMIN  2.6*  2.8*   BILITOTAL  0.8  0.8   ALKPHOS  160*  176*   AST  37  40   ALT  32  33       Lab Results   Component Value Date    A1C 5.1 09/18/2018    A1C 5.5 12/25/2014       ASSESSMENT/PLAN:      Fall  Somnolence  - likely multifactorial- acute/chronic illness and recurrent hosp/ED visits  - follow sleep/wake pattern  - limit sedating meds  - anticipate needs, fall prec  - PT/OT  - follow clincially    Laceration  -clean, apply steri strips and cover and assess healing daily          ESRD (end stage renal disease) on dialysis (H)  Hyperkalemia  - chronic- see above  - dialysis 3 x week as per home regimen  - renal diet  - f/w nephrology        History of diabetes mellitus            Lab Results   Component Value Date     A1C 5.1 09/18/2018     A1C 5.5 12/25/2014      - noted, currently not on meds     Chronic diastolic HF  Pulmonary hypertension (H)  CAD   - resp status currently stable  - continue ASA, statin, BB and prn nitroglycerin  - renal diet  - follow VS/clinically     Edema  - chronic- slight worse than has been, recent tx  cellulitis. Now WBC WNL- new blister, weeping no s/s of infection  - add adaptic, kerlix, and gentle ace wraps during day - concern for mixed PVD, dx tests inconclusive inpatient but cool and some n/t.   - elevate LE periodically, follow weights, skin checks  - dialysis as below     Physical deconditioning  - back to back hosp, weakness- lives alone in   - PT/OT  - ongoing discharge planning, SW follow and care conferences per unit protocol        Electronically signed by:  ABNER Weber CNP

## 2018-11-19 NOTE — LETTER
11/19/2018        RE: Maurizio Ohara  [address] [facility] [address]        [place] [facility]  PRIMARY CARE PROVIDER AND CLINIC:  [facility] [address] / [facility] [address]*  No chief complaint on file.    [place] Medical Record Number:  [id number]  Place of Service where encounter took place:  [facility]  (S) [[id number]]    HPI:    Maurizio Ohara is a 89 year old  (10/7/1929),admitted to the above facility from  [facility].  Hospital stay 11/15/2018 through 11/16/2018.  Admitted to this facility for  rehab, medical management and nursing care.  HPI information obtained from: facility chart records.  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  The following problems were addressed during his hospitalization:     Fall and somnolence: Mr. Ohara has known aortic stenosis and mitral regurgitation. He was hospitalized 9/2018 for right lower extremity cellulitis, with negative ultrasound and blood cultures, and was treated with cephalosporins. He was  re-hospitalized 11/3/2018 through 11/8/2018 for generalized weakness in his lower legs; the case was discussed with Cardiology and he underwent LAN testing 11/7/2018 for further evaluation of his cardiac function and was found to have severe aortic stenosis with a mobile calcified vegetation, thought most likely to be non-infectious; moderate to severe TR, diminished RV function, and preserved LVEF. His weakness was attributed to lower extremity edema and deconditioning and he was discharged to a TCU. Now he presents for an unwitnessed fall there and subsequent increased somnolence. This could be due to sundowning and lack of sleep, perhaps progressive depression given recent care notes documenting concern for that.     P was [facility] to [facility]. He slept well overnight and was more arousable 11/16. Pt's niece and nephew spent much of the day with him. They said his mentation was near baseline. I had a long discussion with them that it was very difficult to know the cause of his somnolence and altered mental status as he likely has some underlying dementia. I discussed possibly ordering a cardiology and neurology consult while pt was in the observation unit, but family declined as he had an outpatient cardiologist he was seeing about his multiple heart problems and they were comfortable with him discharging back to TCU. He did seem to have an episode of more non-sensical speaking in the early afternoon, which caused family to be concerned. Dr. Anderson had a discussion with pt's family that he has some underlying executive function loss as evidenced by head CT showing chronic changes. After discussion, family was again comfortable with discharge back to TCU.   -During his observation unit stay, Neurontin was held (the only medication it appears could be exacerbating his somnolence), but will be resumed at discharge to TCU.       ESRD on HD: Causing hyperkalemia 5.7. It was 5.6 on 11/12. Nephrology  consulted for consideration of HD. He was not due for HD until 11/17. So HD not done. PTA PhosLo was continued       CAD: Status post NSTEMI and RCA stent placement in 2014. Continueded PTA aspirin, Lipitor, Toprol when verified      Chronic anemia, thrombocytopenia: CBC was near his baseline while he was hospitalized    ------------------    Patient is alert, calm, NAD today. Much brighter and more alert than have seen patient in past. Reports mild discomfort to lacerations on hand and notes increased LE edema. Denies SOB, chest pain, dizziness. Did work with therapy today without issue.     CODE STATUS/ADVANCE DIRECTIVES DISCUSSION:   CPR/Full code   Patient's living condition: lives alone    ALLERGIES:Glyburide; Lisinopril; Metformin; Penicillins; and Verapamil  PAST MEDICAL HISTORY:  has a past medical history of Anemia; CAD (coronary artery disease) (12/24/14); Chronic kidney disease; Diabetes (H); Hypertension; Mitral regurgitation (12/24/2014); Mitral valve disorders(424.0) (12/24/2014); Myocardial infarction (H) (12/24/2014); Pulmonary hypertension (H) (12/24/2014); and Renal disease due to diabetes mellitus (H).  PAST SURGICAL HISTORY:  has a past surgical history that includes Thoracic surgery; ENT surgery; Coronary Angiography Adult Order (12/24/2014); and Heart Cath, Angioplasty (12/24/2014).  FAMILY HISTORY: family history includes Diabetes in his mother.  SOCIAL HISTORY:  reports that he has quit smoking. He has never used smokeless tobacco. He reports that he drinks alcohol. He reports that he does not use illicit drugs.    Post Discharge Medication Reconciliation Status: discharge medications reconciled and changed, per note/orders (see AVS).  Current Outpatient Prescriptions   Medication Sig Dispense Refill     acetaminophen (TYLENOL) 500 MG tablet Take 1,000 mg by mouth 3 times daily as needed for mild pain       aspirin 81 MG tablet Take 81 mg by mouth daily       atorvastatin (LIPITOR) 40 MG  "tablet Take 40 mg by mouth At Bedtime       calcium acetate (PHOSLO) 667 MG CAPS Take 2,001 mg by mouth 3 times daily (with meals)       diclofenac (VOLTAREN) 1 % GEL topical gel Apply 2 g topically 4 times daily Apply to RLE       gabapentin (NEURONTIN) 100 MG capsule Take 1 capsule (100 mg) by mouth 3 times daily for 10 days 30 capsule 0     metoprolol succinate (TOPROL-XL) 25 MG 24 hr tablet Take 12.5 mg by mouth daily Hold for SBP equal to or less than 100. Hold for HR equal to or less than 60.       nitroglycerin (NITROSTAT) 0.4 MG sublingual tablet Place 1 tablet (0.4 mg) under the tongue every 5 minutes as needed for chest pain 25 tablet 3     polyethylene glycol (MIRALAX/GLYCOLAX) Packet Take 1 packet by mouth daily as needed for constipation       polyethylene glycol (MIRALAX/GLYCOLAX) Packet Take 17 g by mouth daily 7 packet        ROS:  10 point ROS of systems including Constitutional, Eyes, Respiratory, Cardiovascular, Gastroenterology, Genitourinary, Integumentary, Musculoskeletal, Psychiatric were all negative except for pertinent positives noted in my HPI.    Exam:  /70  Pulse 66  Temp 97.3  F (36.3  C)  Resp 18  Ht 5' 7\" (1.702 m)  Wt 181 lb 4.8 oz (82.2 kg)  SpO2 93%  BMI 28.4 kg/m2    GENERAL APPEARANCE: Alert, in no distress   ENT: Mouth and posterior oropharynx normal, moist mucous membranes   EYES: EOM, conjunctivae, lids, pupils and irises normal   NECK: No adenopathy,masses or thyromegaly   RESP: respiratory effort and palpation of chest normal, Lung sounds decreased  CV: Palpation and auscultation of heart done , regular rate and rhythm, no murmur, rub, or gallop, peripheral edema 2+ dependent, feet and LE. Shunt RUE + bruit, thrill  ABDOMEN: normal bowel sounds, soft, nontender, no hepatosplenomegaly or other masses   : palpation of bladder WNL   M/S: Gait and station normal   Digits and nails normal - GONZALEZ  SKIN: Inspection of skin and subcutaneous tissue baseline, Palpation " of skin and subcutaneous tissue baseline -lacerations to left hand, arm, being cleaned and approximated with steri strips per nursing, mx bruises arms.  NEURO: Cranial nerves 2-12 are normal tested and grossly at patient's baseline, Examination of sensation by touch normal   PSYCH: oriented X 3, affect and mood normal              BP 11/12-11/19: /39-74 mmHg    Lab/Diagnostic data:    CBC RESULTS:   Recent Labs   Lab Test  11/16/18   0035  11/12/18   1020   WBC  10.7  8.4   RBC  3.09*  3.07*   HGB  10.8*  10.9*   HCT  34.3*  34.2*   MCV  111*  111*   MCH  35.0*  35.5*   MCHC  31.5  31.9   RDW  17.9*  17.8*   PLT  147*  127*       Last Basic Metabolic Panel:  Recent Labs   Lab Test  11/16/18   0552  11/16/18   0035   NA  135  136   POTASSIUM  5.8*  5.7*   CHLORIDE  101  100   KIMBER  8.9  9.2   CO2  28  28   BUN  51*  51*   CR  4.60*  4.32*   GLC  129*  134*       Liver Function Studies -   Recent Labs   Lab Test  11/16/18   0552  11/16/18   0035   PROTTOTAL  6.0*  6.3*   ALBUMIN  2.6*  2.8*   BILITOTAL  0.8  0.8   ALKPHOS  160*  176*   AST  37  40   ALT  32  33       Lab Results   Component Value Date    A1C 5.1 09/18/2018    A1C 5.5 12/25/2014       ASSESSMENT/PLAN:      Fall  Somnolence  - likely multifactorial- acute/chronic illness and recurrent hosp/ED visits  - follow sleep/wake pattern  - limit sedating meds  - anticipate needs, fall prec  - PT/OT  - follow clincially    Laceration  -clean, apply steri strips and cover and assess healing daily          ESRD (end stage renal disease) on dialysis (H)  Hyperkalemia  - chronic- see above  - dialysis 3 x week as per home regimen  - renal diet  - f/w nephrology        History of diabetes mellitus            Lab Results   Component Value Date     A1C 5.1 09/18/2018     A1C 5.5 12/25/2014      - noted, currently not on meds     Chronic diastolic HF  Pulmonary hypertension (H)  CAD   - resp status currently stable  - continue ASA, statin, BB and prn  nitroglycerin  - renal diet  - follow VS/clinically     Edema  - chronic- slight worse than has been, recent tx cellulitis. Now WBC WNL- new blister, weeping no s/s of infection  - add adaptic, kerlix, and gentle ace wraps during day - concern for mixed PVD, dx tests inconclusive inpatient but cool and some n/t.   - elevate LE periodically, follow weights, skin checks  - dialysis as below     Physical deconditioning  - back to back hosp, weakness- lives alone in   - PT/OT  - ongoing discharge planning, SW follow and care conferences per unit protocol        Electronically signed by:  ABNER Weber CNP                    Sincerely,        ABNER Weber CNP

## 2018-11-21 NOTE — PROGRESS NOTES
"[place] [facility]    Chief Complaint   Patient presents with     Wound Check       [place] Medical Record Number:  [id number]  Place of Service where encounter took place:  [facility]  (S) [[id number]]    HPI:    Maurizio Ohara is a 89 year old  (10/7/1929), who is being seen today for an episodic care visit.  HPI information obtained from: facility staff and patient report. Today's concern is:  Cellulitis of left hand excluding fingers and thumb  Nurse request patient to be seen by provider because the skin tear he sustained a few days ago.  The area is very tender (9/10), edematous, with warmth.  Due to significant bruising and chronically discolored skin, erythema is difficult to see.  I removed the bloody steri strips that were saturated with serous drainage and irrigated the wounds with pressurized Saline wound cleanser.  There were several areas of dead skin I removed with sharps.  There has been some healing as well as the continuous drainage, so am unable to approximate those edges with steri strips. Once wounds all cleansed with the Saline, they were gently patted dry, then applied non-stick 4x4 foam dressing one over each area, I then secured with Kerlix.      REVIEW OF SYSTEMS:  4 point ROS including Respiratory, CV, GI and , other than that noted in the HPI,  is negative    /71  Pulse 65  Temp 98.1  F (36.7  C)  Resp 14  Ht 5' 7\" (1.702 m)  Wt 191 lb 3.2 oz (86.7 kg)  SpO2 92%  BMI 29.95 kg/m2  GENERAL APPEARANCE:  Alert, in no distress  Left hand and forearm: deeply bruised discoloration.  Wound beds primarily crimson red, there are areas of slough forming about 10% in the beginning before cleansing and debridement and 0% after debridement.  Bloody drainage on the forearm wound plus serous drainage on both forearm and hand.  Tender, warm Left hand and cool right hand.    ASSESSMENT/PLAN:  (L03.114) Cellulitis of left hand excluding fingers and thumb  " (primary encounter diagnosis)  Comment: early  Plan: 7 day course of Clindamycin 300 mg BID.  Follow above dressing daily and PRN if soiled or removed.  F/U PRN    Electronically signed by:  ABNER Rodriguez CNP

## 2018-11-21 NOTE — LETTER
"    11/21/2018        RE: Maurizio Ohara  [address] [facility] [address]        [place] [facility]    Chief Complaint   Patient presents with     Wound Check       [place] Medical Record Number:  [id number]  Place of Service where encounter took place:  [facility]  ([facility]) [[id number]]    HPI:    Maurizio Ohara is a 89 year old  (10/7/1929), who is being seen today for an episodic care visit.  HPI information obtained from: facility staff and patient report. Today's concern is:  Cellulitis of left hand excluding fingers and thumb  Nurse request patient to be seen by provider because the skin tear he sustained a few days ago.  The area is very tender (9/10), edematous, with warmth.  Due to significant bruising and chronically discolored skin, erythema is difficult to see.  I removed the bloody steri strips that were saturated with serous drainage and irrigated the wounds with pressurized Saline wound cleanser.  There were several areas of dead skin I removed with sharps.  There has been some healing as well as the continuous drainage, so am unable to approximate those edges with steri strips. Once wounds all cleansed with the Saline, they were gently patted dry, then applied non-stick 4x4 foam dressing one over each area, I then secured with Kerlix.      REVIEW OF SYSTEMS:  4 point ROS including Respiratory, CV, GI and , other than that noted in the HPI,  is negative    /71  Pulse 65  Temp 98.1  F (36.7  C)  Resp 14  Ht 5' 7\" (1.702 m)  Wt 191 lb 3.2 oz (86.7 kg)  SpO2 92%  BMI 29.95 kg/m2  GENERAL APPEARANCE:  Alert, in no distress  Left hand and forearm: deeply bruised discoloration.  Wound beds primarily crimson red, there are areas of slough forming about 10% in the beginning before cleansing and debridement and 0% after debridement.  Bloody drainage on the forearm wound plus serous drainage on both forearm and hand.  Tender, warm Left hand and cool right " hand.    ASSESSMENT/PLAN:  (L03.114) Cellulitis of left hand excluding fingers and thumb  (primary encounter diagnosis)  Comment: early  Plan: 7 day course of Clindamycin 300 mg BID.  Follow above dressing daily and PRN if soiled or removed.  F/U PRN    Electronically signed by:  ABNER Rodriguez CNP      Sincerely,        ABNER Rodriguez CNP

## 2018-11-25 NOTE — TELEPHONE ENCOUNTER
Patient with ESRD on HD. Had a run yesterday, now wt up 4 lbs. LSC but increased peripheral edema since last week, 3+ per staff. He is already getting wraps to LEs.     PLAN  Continue wraps to legs as ordered. Ask patient to rest in bed with legs above heart level BID x 1 hr. Dialysis next week as scheduled. Update provider if any respiratory distress.     Electronically signed by ABNER Ware GNP

## 2018-11-26 NOTE — PROGRESS NOTES
HPI and Plan:   See dictation total time spent in education, counseling and coordination of care 40min    No orders of the defined types were placed in this encounter.      Orders Placed This Encounter   Medications     cephALEXin (KEFLEX) 500 MG capsule     Sig: Take 500 mg by mouth 2 times daily     lidocaine (XYLOCAINE) 5 % ointment     Promethazine-Phenyleph-Codeine 6.25-5-10 MG/5ML SYRP     Sig: Take 5 mLs by mouth     traMADol (ULTRAM) 50 MG tablet     Sig: Take 50 mg by mouth every 6 hours as needed for severe pain       There are no discontinued medications.      No diagnosis found.    CURRENT MEDICATIONS:  Current Outpatient Prescriptions   Medication Sig Dispense Refill     acetaminophen (TYLENOL) 500 MG tablet Take 1,000 mg by mouth 3 times daily as needed for mild pain       aspirin 81 MG tablet Take 81 mg by mouth daily       atorvastatin (LIPITOR) 40 MG tablet Take 40 mg by mouth At Bedtime       calcium acetate (PHOSLO) 667 MG CAPS Take 2,001 mg by mouth 3 times daily (with meals)       cephALEXin (KEFLEX) 500 MG capsule Take 500 mg by mouth 2 times daily       diclofenac (VOLTAREN) 1 % GEL topical gel Apply 2 g topically 4 times daily Apply to RLE       lidocaine (XYLOCAINE) 5 % ointment        metoprolol succinate (TOPROL-XL) 25 MG 24 hr tablet Take 25 mg by mouth daily Hold for SBP equal to or less than 100. Hold for HR equal to or less than 60.       MIDODRINE HCL PO Take 10 mg by mouth three times a week on Tues, Thurs, Sat 1 hour before dialysis AND Give 10 mg by mouth one time a day every Tue, Thu for dialysis Send one tablet with resident to dialysis       nitroglycerin (NITROSTAT) 0.4 MG sublingual tablet Place 1 tablet (0.4 mg) under the tongue every 5 minutes as needed for chest pain 25 tablet 3     polyethylene glycol (MIRALAX/GLYCOLAX) Packet Take 17 g by mouth daily 7 packet      Promethazine-Phenyleph-Codeine 6.25-5-10 MG/5ML SYRP Take 5 mLs by mouth       traMADol (ULTRAM) 50 MG tablet  "Take 50 mg by mouth every 6 hours as needed for severe pain       GABAPENTIN PO Take 100 mg by mouth 3 times daily       polyethylene glycol (MIRALAX/GLYCOLAX) Packet Take 1 packet by mouth daily as needed for constipation         ALLERGIES     Allergies   Allergen Reactions     Glyburide      Lisinopril      Hyperkalemia when hospitalized 4/5/2011     Metformin      Renal failure stage 4     Penicillins      SHADY Gallagher clarified with pt, pt does not remember rxn, it was a long time ago, and \"nothing crazy\".     Verapamil        PAST MEDICAL HISTORY:  Past Medical History:   Diagnosis Date     Anemia      CAD (coronary artery disease) 12/24/14    PCI and BMS to mid RCA (5.0 x 20mm) with residual mod diffuse mid LAD disease     Chronic kidney disease     on Dialysis     Diabetes (H)      Hypertension      Mitral regurgitation 12/24/2014    mild-mod (1-2+) per echo     Mitral valve disorders(424.0) 12/24/2014    mild/mod (1-2+) MR     Myocardial infarction (H) 12/24/2014    NSTEMI     Pulmonary hypertension (H) 12/24/2014    RVSP elevated c/w mild-mod PH per echo     Renal disease due to diabetes mellitus (H)     End stage; on Dialysis       PAST SURGICAL HISTORY:  Past Surgical History:   Procedure Laterality Date     CORONARY ANGIOGRAPHY ADULT ORDER  12/24/2014     ENT SURGERY      Tonsillectomy     HEART CATH, ANGIOPLASTY  12/24/2014    PCI and BMS (5.0x20mm)to mid RCA     THORACIC SURGERY      Herniated disc       FAMILY HISTORY:  Family History   Problem Relation Age of Onset     Diabetes Mother        SOCIAL HISTORY:  Social History     Social History     Marital status: Single     Spouse name: N/A     Number of children: N/A     Years of education: N/A     Social History Main Topics     Smoking status: Former Smoker     Smokeless tobacco: Never Used     Alcohol use Yes      Comment: \"about as much as you can pour in a thimble in a year\"     Drug use: No     Sexual activity: Not Currently     Partners: Female " "    Other Topics Concern     Caffeine Concern Yes     1-2 cups daily     Sleep Concern No     Special Diet Yes     kidney diet     Exercise Yes     walking     Seat Belt Yes     Social History Narrative       Review of Systems:  Skin:  Positive for bruising sk's; scattered ecchymosis   Eyes:  not assessed glasses reading glasses ,  hx of cataract surgery - both eyes  ENT:  not assessed      Respiratory:  Positive for shortness of breath     Cardiovascular:  Negative edema;Positive for;fatigue no further left upper arm pain which is anginal equivalent  Gastroenterology: Negative      Genitourinary:  not assessed   Kidney Failure - stage 4 - dialysis 3 times per week  Musculoskeletal:  not assessed      Neurologic:  Negative      Psychiatric:  not assessed      Heme/Lymph/Imm:  Positive for easy bruising RX allergies   Endocrine:  Negative        Physical Exam:  Vitals: /68  Pulse 69  Ht 1.702 m (5' 7\")  BMI 29.54 kg/m2    Constitutional:  cooperative chronically ill;frail      Skin:    bruises present   multiple ecchymosis    Head:  normocephalic        Eyes:  pupils equal and round;conjunctivae and lids unremarkable   mild scleral icterus    Lymph:      ENT:    poor dentition      Neck:           Respiratory:  clear to auscultation;normal symmetry         Cardiac: regular rhythm             II/VI systolic murmur at USB, II/VI blowing systolic murmur at apex, I-II/IV soft diastolic murmur  not assessed this visit                                        GI:  abdomen soft obese      Extremities and Muscular Skeletal:            dialysis vascular access in right arm, RLE: 1-2+ pitting edema, mild erythema, not hot.  LLE trace edema.  BLEs with chronic venous stasis changes, fingernails are unremarkable as are the fingertips     Neurological:  affect appropriate   in wheel chair    Psych:  Alert and Oriented x 3          CC  No referring provider defined for this encounter.                  "

## 2018-11-26 NOTE — PROGRESS NOTES
[place] [facility]    Chief Complaint   Patient presents with     RECHECK       [place] Medical Record Number:  [id number]  Place of Service where encounter took place:  [facility]  ([facility]) [[id number]]    HPI:    Maurizio Ohara is a 89 year old  (10/7/1929), who is being seen today for an episodic care visit.  HPI information obtained from: facility chart records.Today's concern is:     Fall, subsequent encounter  Somnolence  ESRD (end stage renal disease) on dialysis (H)  Hyperkalemia  History of diabetes mellitus  Chronic diastolic congestive heart failure (H)  Pulmonary hypertension (H)  Coronary artery disease involving native heart without angina pectoris, unspecified vessel or lesion type  Edema, unspecified type  Physical deconditioning     Patient found in room sitting up in w/c. Just worked with therapy. Rehab gains have been limited. Patient reports fatigue and sensitive skin/pain with even light touch. Weights stable but overall up. They have not been able to take much weight off at dialysis 2/2 BPs. Patient in and out of hospital with mx comorbid chronic conditions limiting rehab gains. Patient and family have been instructed to f/w cards and vascular. And patient continues in Dialysis and with f/u with nephrology. Is on fluid restriction. Cellulitis slowly resolving on abx. Afebriles and VSS    ALLERGIES: Glyburide; Lisinopril; Metformin; Penicillins; and Verapamil  Past Medical, Surgical, Family and Social History reviewed and updated in [facility].    Current Outpatient Prescriptions   Medication Sig Dispense Refill     acetaminophen (TYLENOL) 500 MG tablet Take 1,000 mg by mouth 3 times daily as needed for mild pain       aspirin 81 MG tablet Take 81 mg by mouth daily       atorvastatin (LIPITOR) 40 MG tablet Take 40 mg by mouth At Bedtime       calcium acetate (PHOSLO) 667 MG CAPS Take 2,001 mg by mouth 3 times daily (with meals)       diclofenac (VOLTAREN) 1 % GEL topical gel  "Apply 2 g topically 4 times daily Apply to RLE       GABAPENTIN PO Take 100 mg by mouth 3 times daily       metoprolol succinate (TOPROL-XL) 25 MG 24 hr tablet Take 12.5 mg by mouth daily Hold for SBP equal to or less than 100. Hold for HR equal to or less than 60.       MIDODRINE HCL PO Take 10 mg by mouth three times a week on Tues, Thurs, Sat 1 hour before dialysis AND Give 10 mg by mouth one time a day every Tue, Thu for dialysis Send one tablet with resident to dialysis       nitroglycerin (NITROSTAT) 0.4 MG sublingual tablet Place 1 tablet (0.4 mg) under the tongue every 5 minutes as needed for chest pain 25 tablet 3     polyethylene glycol (MIRALAX/GLYCOLAX) Packet Take 1 packet by mouth daily as needed for constipation       polyethylene glycol (MIRALAX/GLYCOLAX) Packet Take 17 g by mouth daily 7 packet      Medications reviewed:  Medications reconciled to facility chart and changes were made to reflect current medications as identified as above med list. Below are the changes that were made:   Medications stopped since last EPIC medication reconciliation:   There are no discontinued medications.    Medications started since last [facility] medication reconciliation:  Orders Placed This Encounter   Medications     GABAPENTIN PO     Sig: Take 100 mg by mouth 3 times daily         REVIEW OF SYSTEMS:  4 point ROS including Respiratory, CV, GI and , other than that noted in the HPI,  is negative    Physical Exam:  /61  Pulse 59  Temp 97.2  F (36.2  C)  Resp 18  Ht 5' 7\" (1.702 m)  Wt 188 lb 9.6 oz (85.5 kg)  SpO2 91%  BMI 29.54 kg/m2    Resp: Effort WNL, LS decreased throughout  CV: S1-2, no S3-4, 3/6 systolic murmur noted- generalized edema,   Abd- soft, nontender, BS +  Musc- GONZALEZ  Skin- dressing to LUE, mx bruises and delicate skin  Psych- sleepy, calm, pleasant       Weights:  11/26: 188.6lbs  11/25: 191.0lbs  11/24: 186.6lbs  11/23: 187.4lbs  11/22: 184.2lbs  11/21: 191.2lbs  11/20: 192.2lbs    BP " 11/19-11/26: /51-90 mmHg    Recent Labs:     CBC RESULTS:   Recent Labs   Lab Test  11/16/18   0035  11/12/18   1020   WBC  10.7  8.4   RBC  3.09*  3.07*   HGB  10.8*  10.9*   HCT  34.3*  34.2*   MCV  111*  111*   MCH  35.0*  35.5*   MCHC  31.5  31.9   RDW  17.9*  17.8*   PLT  147*  127*       Last Basic Metabolic Panel:  Recent Labs   Lab Test  11/16/18   0552  11/16/18   0035   NA  135  136   POTASSIUM  5.8*  5.7*   CHLORIDE  101  100   KIMBER  8.9  9.2   CO2  28  28   BUN  51*  51*   CR  4.60*  4.32*   GLC  129*  134*       Liver Function Studies -   Recent Labs   Lab Test  11/16/18   0552  11/16/18   0035   PROTTOTAL  6.0*  6.3*   ALBUMIN  2.6*  2.8*   BILITOTAL  0.8  0.8   ALKPHOS  160*  176*   AST  37  40   ALT  32  33       Lab Results   Component Value Date    A1C 5.1 09/18/2018    A1C 5.5 12/25/2014         Assessment/Plan:    Somnolence  - likely multifactorial- acute/chronic illness and recurrent hosp/ED visits  - follow sleep/wake pattern  - limit sedating meds  - anticipate needs, fall prec  - PT/OT  - follow clincially     Laceration  Cellulitis  - keep wound clean and dry and continue current wound care orders  - complete abx  - monitor  - recheck CBC             ESRD (end stage renal disease) on dialysis (H)  Hyperkalemia  - chronic- see above- takes midodrine pre-dialysis 2/2 hypotension during runs- hypotension has limited amount of fluid able to be removed during runs  - dialysis 3 x week as per home regimen  - renal diet, fluid restriction  - have requested dialysis run information  - f/w nephrology          History of diabetes mellitus                Lab Results   Component Value Date      A1C 5.1 09/18/2018      A1C 5.5 12/25/2014       - noted, currently not on meds      Chronic diastolic HF  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 Now WBC WNL- new blister, weeping no s/s of infection  - continue skin prep and ace wraps as well as elevation. Concern for mixed PVD, dx tests inconclusive inpatient but cool and some n/t and sensitivity  - follow weights, skin checks  -      Physical deconditioning  - back to back hosp, weakness in setting of mx chronic comorbid medical conditions and advanced age. Frail. Was living alone in [place]- family looking in to skilled nursing facility  - PT/OT  - ongoing discharge planning, SW follow and care conferences per unit protocol          Electronically signed by  ABNER Weber CNP

## 2018-11-26 NOTE — LETTER
11/26/2018        RE: Maurizio Ohara  [address] [facility] [address]        [place] [facility]    Chief Complaint   Patient presents with     RECHECK       [place] Medical Record Number:  [id number]  Place of Service where encounter took place:  [facility]) [[id number]]    HPI:    Maurizio Ohara is a 89 year old  (10/7/1929), who is being seen today for an episodic care visit.  HPI information obtained from: facility chart records.Today's concern is:     Fall, subsequent encounter  Somnolence  ESRD (end stage renal disease) on dialysis (H)  Hyperkalemia  History of diabetes mellitus  Chronic diastolic congestive heart failure (H)  Pulmonary hypertension (H)  Coronary artery disease involving native heart without angina pectoris, unspecified vessel or lesion type  Edema, unspecified type  Physical deconditioning     Patient found in room sitting up in w/c. Just worked with therapy. Rehab gains have been limited. Patient reports fatigue and sensitive skin/pain with even light touch. Weights stable but overall up. They have not been able to take much weight off at dialysis 2/2 BPs. Patient in and out of hospital with mx comorbid chronic conditions limiting rehab gains. Patient and family have been instructed to f/w cards and vascular. And patient continues in Dialysis and with f/u with nephrology. Is on fluid restriction. Cellulitis slowly resolving on abx. Afebriles and VSS    ALLERGIES: Glyburide; Lisinopril; Metformin; Penicillins; and Verapamil  Past Medical, Surgical, Family and Social History reviewed and updated in [facility].    Current Outpatient Prescriptions   Medication Sig Dispense Refill     acetaminophen (TYLENOL) 500 MG tablet Take 1,000 mg by mouth 3 times daily as needed for mild pain       aspirin 81 MG tablet Take 81 mg by mouth daily       atorvastatin (LIPITOR) 40 MG tablet Take 40 mg by mouth At Bedtime       calcium acetate (PHOSLO) 667 MG CAPS  "Take 2,001 mg by mouth 3 times daily (with meals)       diclofenac (VOLTAREN) 1 % GEL topical gel Apply 2 g topically 4 times daily Apply to RLE       GABAPENTIN PO Take 100 mg by mouth 3 times daily       metoprolol succinate (TOPROL-XL) 25 MG 24 hr tablet Take 12.5 mg by mouth daily Hold for SBP equal to or less than 100. Hold for HR equal to or less than 60.       MIDODRINE HCL PO Take 10 mg by mouth three times a week on Tues, Thurs, Sat 1 hour before dialysis AND Give 10 mg by mouth one time a day every Tue, Thu for dialysis Send one tablet with resident to dialysis       nitroglycerin (NITROSTAT) 0.4 MG sublingual tablet Place 1 tablet (0.4 mg) under the tongue every 5 minutes as needed for chest pain 25 tablet 3     polyethylene glycol (MIRALAX/GLYCOLAX) Packet Take 1 packet by mouth daily as needed for constipation       polyethylene glycol (MIRALAX/GLYCOLAX) Packet Take 17 g by mouth daily 7 packet      Medications reviewed:  Medications reconciled to facility chart and changes were made to reflect current medications as identified as above med list. Below are the changes that were made:   Medications stopped since last EPIC medication reconciliation:   There are no discontinued medications.    Medications started since last [facility] medication reconciliation:  Orders Placed This Encounter   Medications     GABAPENTIN PO     Sig: Take 100 mg by mouth 3 times daily         REVIEW OF SYSTEMS:  4 point ROS including Respiratory, CV, GI and , other than that noted in the HPI,  is negative    Physical Exam:  /61  Pulse 59  Temp 97.2  F (36.2  C)  Resp 18  Ht 5' 7\" (1.702 m)  Wt 188 lb 9.6 oz (85.5 kg)  SpO2 91%  BMI 29.54 kg/m2    Resp: Effort WNL, LS decreased throughout  CV: S1-2, no S3-4, 3/6 systolic murmur noted- generalized edema,   Abd- soft, nontender, BS +  Musc- GONZALEZ  Skin- dressing to LUE, mx bruises and delicate skin  Psych- sleepy, calm, pleasant       Weights:  11/26: 188.6lbs  11/25: " 191.0lbs  11/24: 186.6lbs  11/23: 187.4lbs  11/22: 184.2lbs  11/21: 191.2lbs  11/20: 192.2lbs    BP 11/19-11/26: /51-90 mmHg    Recent Labs:     CBC RESULTS:   Recent Labs   Lab Test  11/16/18   0035  11/12/18   1020   WBC  10.7  8.4   RBC  3.09*  3.07*   HGB  10.8*  10.9*   HCT  34.3*  34.2*   MCV  111*  111*   MCH  35.0*  35.5*   MCHC  31.5  31.9   RDW  17.9*  17.8*   PLT  147*  127*       Last Basic Metabolic Panel:  Recent Labs   Lab Test  11/16/18   0552  11/16/18   0035   NA  135  136   POTASSIUM  5.8*  5.7*   CHLORIDE  101  100   KIMBER  8.9  9.2   CO2  28  28   BUN  51*  51*   CR  4.60*  4.32*   GLC  129*  134*       Liver Function Studies -   Recent Labs   Lab Test  11/16/18   0552  11/16/18   0035   PROTTOTAL  6.0*  6.3*   ALBUMIN  2.6*  2.8*   BILITOTAL  0.8  0.8   ALKPHOS  160*  176*   AST  37  40   ALT  32  33       Lab Results   Component Value Date    A1C 5.1 09/18/2018    A1C 5.5 12/25/2014         Assessment/Plan:    Somnolence  - likely multifactorial- acute/chronic illness and recurrent hosp/ED visits  - follow sleep/wake pattern  - limit sedating meds  - anticipate needs, fall prec  - PT/OT  - follow clincially     Laceration  Cellulitis  - keep wound clean and dry and continue current wound care orders  - complete abx  - monitor  - recheck CBC             ESRD (end stage renal disease) on dialysis (H)  Hyperkalemia  - chronic- see above- takes midodrine pre-dialysis 2/2 hypotension during runs- hypotension has limited amount of fluid able to be removed during runs  - dialysis 3 x week as per home regimen  - renal diet, fluid restriction  - have requested dialysis run information  - f/w nephrology          History of diabetes mellitus                Lab Results   Component Value Date      A1C 5.1 09/18/2018      A1C 5.5 12/25/2014       - noted, currently not on meds      Chronic diastolic HF  Valvular heart disease with severe aortic stenosis, mod-severe mitral and tricuspid  insufficiency  Pulmonary hypertension (H)  CAD   - resp status currently stable, weak, fatigued with generalized mild edema- weights trend up and now stabilized  - continue ASA, statin, BB and prn nitroglycerin  - renal diet- fluid restriction  - follow VS/clinically  -  f/w cards has been recommended      LE Edema  - chronic- slight worse than has been, recent tx cellulitis. Now WBC WNL- new blister, weeping no s/s of infection  - continue skin prep and ace wraps as well as elevation. Concern for mixed PVD, dx tests inconclusive inpatient but cool and some n/t and sensitivity  - follow weights, skin checks  -      Physical deconditioning  - back to back hosp, weakness in setting of mx chronic comorbid medical conditions and advanced age. Frail. Was living alone in - family looking in to skilled nursing facility  - PT/OT  - ongoing discharge planning, SW follow and care conferences per unit protocol          Electronically signed by  ABNER Weber CNP                    Sincerely,        ABNER Weber CNP

## 2018-11-26 NOTE — MR AVS SNAPSHOT
"              After Visit Summary   11/26/2018    Maurizio Ohara    MRN: [id number]           Patient Information     Date Of Birth          10/7/1929        Visit Information        Provider Department      11/26/2018 4:15 PM Michelle Keyes,  [facility]        Today's Diagnoses     Aortic valve stenosis, etiology of cardiac valve disease unspecified    -  1    NSTEMI (non-ST elevated myocardial infarction) (H)        Coronary artery disease involving native coronary artery of native heart without angina pectoris        Mitral valve insufficiency, unspecified etiology        ESRD (end stage renal disease) (H)           Follow-ups after your visit        Who to contact     If you have questions or need follow up information about today's clinic visit or your schedule please contact [facility] directly at [phone].  Normal or non-critical lab and imaging results will be communicated to you by [employer], letter or phone within 4 business days after the clinic has received the results. If you do not hear from us within 7 days, please contact the clinic through [employer] or phone. If you have a critical or abnormal lab result, we will notify you by phone as soon as possible.  Submit refill requests through [employer] or call your pharmacy and they will forward the refill request to us. Please allow 3 business days for your refill to be completed.          Additional Information About Your Visit        [employer] [place] Information     [employer] lets you send messages to your doctor, view your test results, renew your prescriptions, schedule appointments and more. To sign up, go to [email or web address] . Click on \"Log in\" on the left side of the screen, which will take you to the Welcome page. Then click on \"Sign up Now\" on the right side of the page.     You will be asked to enter the access code listed below, as well as some personal " "information. Please follow the directions to create your username and password.     Your access code is: [email or web address]  Expires: 2018  1:21 PM     Your access code will  in 90 days. If you need help or a new code, please call your [facility] clinic or [phone].        Care EveryWhere ID     This is your Care EveryWhere ID. This could be used by other organizations to access your [facility] medical records  [id number]        Your Vitals Were     Pulse Height BMI (Body Mass Index)             69 1.702 m (5' 7\") 29.54 kg/m2          Blood Pressure from Last 3 Encounters:   18 109/68   18 105/61   18 112/71    Weight from Last 3 Encounters:   18 85.5 kg (188 lb 9.6 oz)   18 86.7 kg (191 lb 3.2 oz)   18 87.2 kg (192 lb 3.2 oz)              Today, you had the following     No orders found for display      Information about OPIOIDS     PRESCRIPTION OPIOIDS: WHAT YOU NEED TO KNOW   We gave you an opioid (narcotic) pain medicine. It is important to manage your pain, but opioids are not always the best choice. You should first try all the other options your care team gave you. Take this medicine for as short a time (and as few doses) as possible.    Some activities can increase your pain, such as bandage changes or therapy sessions. It may help to take your pain medicine 30 to 60 minutes before these activities. Reduce your stress by getting enough sleep, working on hobbies you enjoy and practicing relaxation or meditation. Talk to your care team about ways to manage your pain beyond prescription opioids.    These medicines have risks:    DO NOT drive when on new or higher doses of pain medicine. These medicines can affect your alertness and reaction times, and you could be arrested for driving under the influence (DUI). If you need to use opioids long-term, talk to your care team about driving.    DO NOT operate heavy machinery    DO NOT do any other dangerous activities while " taking these medicines.    DO NOT drink any alcohol while taking these medicines.     If the opioid prescribed includes acetaminophen, DO NOT take with any other medicines that contain acetaminophen. Read all labels carefully. Look for the word  acetaminophen  or  Tylenol.  Ask your pharmacist if you have questions or are unsure.    You can get addicted to pain medicines, especially if you have a history of addiction (chemical, alcohol or substance dependence). Talk to your care team about ways to reduce this risk.    All opioids tend to cause constipation. Drink plenty of water and eat foods that have a lot of fiber, such as fruits, vegetables, prune juice, apple juice and high-fiber cereal. Take a laxative (Miralax, milk of magnesia, Colace, Senna) if you don t move your bowels at least every other day. Other side effects include upset stomach, sleepiness, dizziness, throwing up, tolerance (needing more of the medicine to have the same effect), physical dependence and slowed breathing.    Store your pills in a secure place, locked if possible. We will not replace any lost or stolen medicine. If you don t finish your medicine, please throw away (dispose) as directed by your pharmacist. The [place] [employer]ution [employer] has more information about safe disposal: [email or web address].[email or web address].[place].[email or web address]/living-green/[email or web address]-unwanted-medications         Primary Care Provider Office Phone # Fax #    Justina Moise [phone]       [facility] [address] [address]        Equal Access to Services     ROSALINA SMITH AH: Hadii tressa salinaso Solora, waaxda luqadaha, qaybta kaalmada adeyulietyaramesh, catherine benites. So [facility] [phone].    ATENCIÓN: Si habla español, tiene a mast disposición servicios gratuitos de asistencia lingüística. Llame al [phone].    We comply with applicable federal civil rights laws and [place] laws. We do not discriminate on the  basis of race, color, national origin, age, disability, sex, sexual orientation, or gender identity.            Thank you!     Thank you for choosing [facility]  for your care. Our goal is always to provide you with excellent care. Hearing back from our patients is one way we can continue to improve our services. Please take a few minutes to complete the written survey that you may receive in the mail after your visit with us. Thank you!             Your Updated Medication List - Protect others around you: Learn how to safely use, store and throw away your medicines at www.disposemymeds.org.          This list is accurate as of 11/26/18  5:00 PM.  Always use your most recent med list.                   Brand Name Dispense Instructions for use Diagnosis    acetaminophen 500 MG tablet    TYLENOL     Take 1,000 mg by mouth 3 times daily as needed for mild pain        aspirin 81 MG tablet    ASA     Take 81 mg by mouth daily        atorvastatin 40 MG tablet    LIPITOR     Take 40 mg by mouth At Bedtime        calcium acetate 667 MG Caps capsule    PHOSLO     Take 2,001 mg by mouth 3 times daily (with meals)        cephALEXin 500 MG capsule    KEFLEX     Take 500 mg by mouth 2 times daily        diclofenac 1 % topical gel    VOLTAREN     Apply 2 g topically 4 times daily Apply to RLE    Cellulitis of right lower extremity       * GABAPENTIN PO      Take 100 mg by mouth 3 times daily        * GABAPENTIN PO      Take 100 mg by mouth 3 times daily        lidocaine 5 % ointment    XYLOCAINE          metoprolol succinate 25 MG 24 hr tablet    TOPROL-XL     Take 25 mg by mouth daily Hold for SBP equal to or less than 100. Hold for HR equal to or less than 60.        MIDODRINE HCL PO      Take 10 mg by mouth three times a week on Tues, Thurs, Sat 1 hour before dialysis AND Give 10 mg by mouth one time a day every Tue, Thu for dialysis Send one tablet with resident to dialysis         nitroGLYcerin 0.4 MG sublingual tablet    NITROSTAT    25 tablet    Place 1 tablet (0.4 mg) under the tongue every 5 minutes as needed for chest pain    NSTEMI (non-ST elevated myocardial infarction) (H)       * polyethylene glycol Packet    MIRALAX/GLYCOLAX     Take 1 packet by mouth daily as needed for constipation        * polyethylene glycol Packet    MIRALAX/GLYCOLAX    7 packet    Take 17 g by mouth daily    Cellulitis of right lower extremity       Promethazine-Phenyleph-Codeine 6.25-5-10 MG/5ML Syrp      Take 5 mLs by mouth        traMADol 50 MG tablet    ULTRAM     Take 50 mg by mouth every 6 hours as needed for severe pain        * Notice:  This list has 4 medication(s) that are the same as other medications prescribed for you. Read the directions carefully, and ask your doctor or other care provider to review them with you.

## 2018-11-26 NOTE — LETTER
11/26/2018    Justina Moise MD  [facility] [address]  [facility] [address]    RE: Maurizio Ohara       Dear Colleague,    I had the pleasure of seeing Maurizio Ohara in the [facility].    HPI and Plan:   See dictation total time spent in education, counseling and coordination of care 40min    No orders of the defined types were placed in this encounter.      Orders Placed This Encounter   Medications     cephALEXin (KEFLEX) 500 MG capsule     Sig: Take 500 mg by mouth 2 times daily     lidocaine (XYLOCAINE) 5 % ointment     Promethazine-Phenyleph-Codeine 6.25-5-10 MG/5ML SYRP     Sig: Take 5 mLs by mouth     traMADol (ULTRAM) 50 MG tablet     Sig: Take 50 mg by mouth every 6 hours as needed for severe pain       There are no discontinued medications.      No diagnosis found.    CURRENT MEDICATIONS:  Current Outpatient Prescriptions   Medication Sig Dispense Refill     acetaminophen (TYLENOL) 500 MG tablet Take 1,000 mg by mouth 3 times daily as needed for mild pain       aspirin 81 MG tablet Take 81 mg by mouth daily       atorvastatin (LIPITOR) 40 MG tablet Take 40 mg by mouth At Bedtime       calcium acetate (PHOSLO) 667 MG CAPS Take 2,001 mg by mouth 3 times daily (with meals)       cephALEXin (KEFLEX) 500 MG capsule Take 500 mg by mouth 2 times daily       diclofenac (VOLTAREN) 1 % GEL topical gel Apply 2 g topically 4 times daily Apply to RLE       lidocaine (XYLOCAINE) 5 % ointment        metoprolol succinate (TOPROL-XL) 25 MG 24 hr tablet Take 25 mg by mouth daily Hold for SBP equal to or less than 100. Hold for HR equal to or less than 60.       MIDODRINE HCL PO Take 10 mg by mouth three times a week on Tues, Thurs, Sat 1 hour before dialysis AND Give 10 mg by mouth one time a day every Tue, Thu for dialysis Send one tablet with resident to dialysis       nitroglycerin (NITROSTAT) 0.4 MG sublingual tablet Place 1 tablet (0.4 mg) under the tongue  "every 5 minutes as needed for chest pain 25 tablet 3     polyethylene glycol (MIRALAX/GLYCOLAX) Packet Take 17 g by mouth daily 7 packet      Promethazine-Phenyleph-Codeine 6.25-5-10 MG/5ML SYRP Take 5 mLs by mouth       traMADol (ULTRAM) 50 MG tablet Take 50 mg by mouth every 6 hours as needed for severe pain       GABAPENTIN PO Take 100 mg by mouth 3 times daily       polyethylene glycol (MIRALAX/GLYCOLAX) Packet Take 1 packet by mouth daily as needed for constipation         ALLERGIES     Allergies   Allergen Reactions     Glyburide      Lisinopril      Hyperkalemia when hospitalized 4/5/2011     Metformin      Renal failure stage 4     Glenn Gallagher RN clarified with pt, pt does not remember rxn, it was a long time ago, and \"nothing crazy\".     Verapamil        PAST MEDICAL HISTORY:  Past Medical History:   Diagnosis Date     Anemia      CAD (coronary artery disease) 12/24/14    PCI and BMS to mid RCA (5.0 x 20mm) with residual mod diffuse mid LAD disease     Chronic kidney disease     on Dialysis     Diabetes (H)      Hypertension      Mitral regurgitation 12/24/2014    mild-mod (1-2+) per echo     Mitral valve disorders(424.0) 12/24/2014    mild/mod (1-2+) MR     Myocardial infarction (H) 12/24/2014    NSTEMI     Pulmonary hypertension (H) 12/24/2014    RVSP elevated c/w mild-mod PH per echo     Renal disease due to diabetes mellitus (H)     End stage; on Dialysis       PAST SURGICAL HISTORY:  Past Surgical History:   Procedure Laterality Date     CORONARY ANGIOGRAPHY ADULT ORDER  12/24/2014     ENT SURGERY      Tonsillectomy     HEART CATH, ANGIOPLASTY  12/24/2014    PCI and BMS (5.0x20mm)to mid RCA     THORACIC SURGERY      Herniated disc       FAMILY HISTORY:  Family History   Problem Relation Age of Onset     Diabetes Mother        SOCIAL HISTORY:  Social History     Social History     Marital status: Single     Spouse name: N/A     Number of children: N/A     Years of education: N/A     Social " "History Main Topics     Smoking status: Former Smoker     Smokeless tobacco: Never Used     Alcohol use Yes      Comment: \"about as much as you can pour in a thimble in a year\"     Drug use: No     Sexual activity: Not Currently     Partners: Female     Other Topics Concern     Caffeine Concern Yes     1-2 cups daily     Sleep Concern No     Special Diet Yes     kidney diet     Exercise Yes     walking     Seat Belt Yes     Social History Narrative       Review of Systems:  Skin:  Positive for bruising sk's; scattered ecchymosis   Eyes:  not assessed glasses reading glasses ,  hx of cataract surgery - both eyes  ENT:  not assessed      Respiratory:  Positive for shortness of breath     Cardiovascular:  Negative edema;Positive for;fatigue no further left upper arm pain which is anginal equivalent  Gastroenterology: Negative      Genitourinary:  not assessed   Kidney Failure - stage 4 - dialysis 3 times per week  Musculoskeletal:  not assessed      Neurologic:  Negative      Psychiatric:  not assessed      Heme/Lymph/Imm:  Positive for easy bruising RX allergies   Endocrine:  Negative        Physical Exam:  Vitals: /68  Pulse 69  Ht 1.702 m (5' 7\")  BMI 29.54 kg/m2    Constitutional:  cooperative chronically ill;frail      Skin:    bruises present   multiple ecchymosis    Head:  normocephalic        Eyes:  pupils equal and round;conjunctivae and lids unremarkable   mild scleral icterus    Lymph:      ENT:    poor dentition      Neck:           Respiratory:  clear to auscultation;normal symmetry         Cardiac: regular rhythm             II/VI systolic murmur at USB, II/VI blowing systolic murmur at apex, I-II/IV soft diastolic murmur  not assessed this visit                                        GI:  abdomen soft obese      Extremities and Muscular Skeletal:            dialysis vascular access in right arm, RLE: 1-2+ pitting edema, mild erythema, not hot.  LLE trace edema.  BLEs with chronic venous stasis " changes, fingernails are unremarkable as are the fingertips     Neurological:  affect appropriate   in wheel chair    Psych:  Alert and Oriented x 3          CC  No referring provider defined for this encounter.                    Thank you for allowing me to participate in the care of your patient.      Sincerely,     Michelle Keyes, DO     [facility]    cc:   No referring provider defined for this encounter.

## 2018-11-26 NOTE — LETTER
11/26/2018      Justina Moise MD  [facility] [address]  [address] [facility] [address]      RE: Maurizio NORWOOD Lev       Dear Colleague,    I had the pleasure of seeing Maurizio Ohara in the [facility].    Service Date: 11/26/2018      HISTORY OF PRESENT ILLNESS:  Mr. Ohara is a pleasant 89-year-old male with a history of coronary disease, previous revascularization of his right coronary artery in 2014.  He has severe valvular heart disease.  He has severe aortic stenosis which has progressed over the past year, moderate to severe mitral and tricuspid insufficiency.  He does have preserved LV systolic function.  He has end-stage renal disease on dialysis and chronic issues with chronic hypotension.        He has deteriorated or declined significantly in the past year with cognitive decline, frequent falling and progression of his valvular heart disease.  Initially on one admission, he was diagnosed with right lower leg cellulitis and had this treated.  Within that next month, he had had an echocardiogram that I ordered in followup from our previous visit last year.  The echocardiogram had suggested again a progression of aortic stenosis to severe.  There was also a question about a possible vegetation on the valve, especially with the recent history of cellulitis.  He had blood cultures drawn that did not demonstrate evidence of blood borne infection following this echocardiogram which was done 10/15.  He then had a transesophageal echocardiogram that actually ended up being performed during another hospitalization; this time for a fall and generalized weakness.  The transesophageal echocardiogram suggested significant calcification of the aortic valve with a calcified nodule but no evidence of vegetation or evidence of endocarditis was noted.        Mr. Ohara was again just recently seen in the ED but not admitted.  He is now in a transitional care unit.   He had fallen out of bed and hit his head.  His family members, who are present with him, state he has had a significant cognitive decline even since hitting his head.  He does not recall anything of the events of falling or how he ended falling and hitting his head.  He has been quite lethargic as well.  He denies any active chest pain symptoms or difficulty breathing.  He had been taking midodrine prior to dialysis because of low blood pressures.      PHYSICAL EXAMINATION:  Today in our office, his blood pressure is 109/68, pulse is 69.  He has significant ecchymosis to his forearms and a wrap on his left forearm from his recent fall.  He is alert and talkative and appropriate today in clinic.      We spent the majority of this appointment in education and counseling about his valvular heart disease, the progression of aortic stenosis in particular and the natural progression of this along with his end-stage renal disease.      IMPRESSION:   In summary, Mr. Ohara is a very pleasant 89-year-old male with multiple medical issues.  He has end-stage renal disease on dialysis Tuesday, Thursdays and Saturdays.  He has had progression of valvular heart disease, now with severe aortic stenosis and moderate to severe mitral and tricuspid insufficiencies.  He has preserved LV systolic function, mild to moderate pulmonary hypertension noted, frequent falling, low blood pressures and probable syncopal episodes as well as significant cognitive decline in the last several months.        I did discuss briefly with them the possibility of intervention with a transcatheter aortic valve replacement or TAVR.  I do not feel he would be a candidate for open heart valve surgery given his comorbidities and age.  I also explained to him that TAVR would likely not provide much meaningful change in his symptoms, other than probably slightly improving his blood pressure, the risk of fainting or syncopal episodes and possibly prolong  his 2-prong prolonging his life 1-2 or more years.  With his other comorbidities, he has a very high likelihood of mortality within the next year.        I also explained that there is a lot of upfront risk if they determine he was a candidate TAVR.  I printed a brochure off for him and his family to review and consider as an option, knowing that his prognosis is grim at this point.        His family is attempting to make arrangements for discharge from transitional care to an appropriate facility that will meet his needs and given his history of chronic low blood pressures issues with dialysis with this and severe aortic stenosis, I am going to have him stop metoprolol at this time.  He was taking this for his history of coronary disease, but he cannot tolerate this and is likely causing more harm than good, given his low blood pressures, so I will ask them to discontinue the use of metoprolol altogether and to contact me if he is interested in meeting with the structural heart team in regards to a TAVR procedure.        Please feel free to contact me with any questions you have in regards to his care.      cc:      Donna Moise MD   [facility]   [address] [place] [address]         SALTY HORAN DO             D: 2018   T: 2018   MT: KEO      Name:     YOANA QUIÑONES   MRN:      [id number]        Account:      [id number]   :      10/07/1929           Service Date: 2018      Document: [id number]         Outpatient Encounter Prescriptions as of 2018   Medication Sig Dispense Refill     acetaminophen (TYLENOL) 500 MG tablet Take 1,000 mg by mouth 3 times daily as needed for mild pain       aspirin 81 MG tablet Take 81 mg by mouth daily       atorvastatin (LIPITOR) 40 MG tablet Take 40 mg by mouth At Bedtime       calcium acetate (PHOSLO) 667 MG CAPS Take 2,001 mg by mouth 3 times daily (with meals)       diclofenac (VOLTAREN) 1 % GEL  topical gel Apply 2 g topically 4 times daily Apply to RLE       MIDODRINE HCL PO Take 10 mg by mouth three times a week on Tues, Thurs, Sat 1 hour before dialysis AND Give 10 mg by mouth one time a day every Tue, Thu for dialysis Send one tablet with resident to dialysis       nitroglycerin (NITROSTAT) 0.4 MG sublingual tablet Place 1 tablet (0.4 mg) under the tongue every 5 minutes as needed for chest pain 25 tablet 3     polyethylene glycol (MIRALAX/GLYCOLAX) Packet Take 17 g by mouth daily 7 packet      [DISCONTINUED] cephALEXin (KEFLEX) 500 MG capsule Take 500 mg by mouth 2 times daily       [DISCONTINUED] lidocaine (XYLOCAINE) 5 % ointment        [DISCONTINUED] metoprolol succinate (TOPROL-XL) 25 MG 24 hr tablet Take 25 mg by mouth daily Hold for SBP equal to or less than 100. Hold for HR equal to or less than 60.       [DISCONTINUED] Promethazine-Phenyleph-Codeine 6.25-5-10 MG/5ML SYRP Take 5 mLs by mouth       [DISCONTINUED] traMADol (ULTRAM) 50 MG tablet Take 50 mg by mouth every 6 hours as needed for severe pain       GABAPENTIN PO Take 100 mg by mouth 3 times daily       GABAPENTIN PO Take 100 mg by mouth 3 times daily       polyethylene glycol (MIRALAX/GLYCOLAX) Packet Take 1 packet by mouth daily as needed for constipation       No facility-administered encounter medications on file as of 11/26/2018.        Again, thank you for allowing me to participate in the care of your patient.      Sincerely,    Michelle Keyes, DO     [facility]

## 2018-11-26 NOTE — PROGRESS NOTES
Service Date: 11/26/2018      HISTORY OF PRESENT ILLNESS:  Mr. Ohara is a pleasant 89-year-old male with a history of coronary disease, previous revascularization of his right coronary artery in 2014.  He has severe valvular heart disease.  He has severe aortic stenosis which has progressed over the past year, moderate to severe mitral and tricuspid insufficiency.  He does have preserved LV systolic function.  He has end-stage renal disease on dialysis and chronic issues with chronic hypotension.        He has deteriorated or declined significantly in the past year with cognitive decline, frequent falling and progression of his valvular heart disease.  Initially on one admission, he was diagnosed with right lower leg cellulitis and had this treated.  Within that next month, he had had an echocardiogram that I ordered in followup from our previous visit last year.  The echocardiogram had suggested again a progression of aortic stenosis to severe.  There was also a question about a possible vegetation on the valve, especially with the recent history of cellulitis.  He had blood cultures drawn that did not demonstrate evidence of blood borne infection following this echocardiogram which was done 10/15.  He then had a transesophageal echocardiogram that actually ended up being performed during another hospitalization; this time for a fall and generalized weakness.  The transesophageal echocardiogram suggested significant calcification of the aortic valve with a calcified nodule but no evidence of vegetation or evidence of endocarditis was noted.        Mr. Ohara was again just recently seen in the ED but not admitted.  He is now in a transitional care unit.  He had fallen out of bed and hit his head.  His family members, who are present with him, state he has had a significant cognitive decline even since hitting his head.  He does not recall anything of the events of falling or how he ended falling and  hitting his head.  He has been quite lethargic as well.  He denies any active chest pain symptoms or difficulty breathing.  He had been taking midodrine prior to dialysis because of low blood pressures.      PHYSICAL EXAMINATION:  Today in our office, his blood pressure is 109/68, pulse is 69.  He has significant ecchymosis to his forearms and a wrap on his left forearm from his recent fall.  He is alert and talkative and appropriate today in clinic.      We spent the majority of this appointment in education and counseling about his valvular heart disease, the progression of aortic stenosis in particular and the natural progression of this along with his end-stage renal disease.      IMPRESSION:   In summary, Mr. Ohara is a very pleasant 89-year-old male with multiple medical issues.  He has end-stage renal disease on dialysis Tuesday, Thursdays and Saturdays.  He has had progression of valvular heart disease, now with severe aortic stenosis and moderate to severe mitral and tricuspid insufficiencies.  He has preserved LV systolic function, mild to moderate pulmonary hypertension noted, frequent falling, low blood pressures and probable syncopal episodes as well as significant cognitive decline in the last several months.        I did discuss briefly with them the possibility of intervention with a transcatheter aortic valve replacement or TAVR.  I do not feel he would be a candidate for open heart valve surgery given his comorbidities and age.  I also explained to him that TAVR would likely not provide much meaningful change in his symptoms, other than probably slightly improving his blood pressure, the risk of fainting or syncopal episodes and possibly prolong his 2-prong prolonging his life 1-2 or more years.  With his other comorbidities, he has a very high likelihood of mortality within the next year.        I also explained that there is a lot of upfront risk if they determine he was a candidate TAVR.  I  printed a brochure off for him and his family to review and consider as an option, knowing that his prognosis is grim at this point.        His family is attempting to make arrangements for discharge from transitional care to an appropriate facility that will meet his needs and given his history of chronic low blood pressures issues with dialysis with this and severe aortic stenosis, I am going to have him stop metoprolol at this time.  He was taking this for his history of coronary disease, but he cannot tolerate this and is likely causing more harm than good, given his low blood pressures, so I will ask them to discontinue the use of metoprolol altogether and to contact me if he is interested in meeting with the structural heart team in regards to a TAVR procedure.        Please feel free to contact me with any questions you have in regards to his care.      cc:      Donna Moise MD   [address] [address]         SALTY HORAN DO             D: 2018   T: 2018   MT: KEO      Name:     YOANA QUIÑONES   MRN:      [id number]        Account:      [id number]   :      10/07/1929           Service Date: 2018      Document: [id number]

## 2018-11-30 NOTE — LETTER
11/30/2018        RE: Maurizio Ohara  [address] [facility] [address]          PRIMARY CARE PROVIDER AND CLINIC RESPONSIBLE:  Justina Moise ENTIRA [facility] [address] / [facility] [address]*        ADMISSION HISTORY AND PHYSICAL EXAMINATION     Chief Complaint   Patient presents with     Hospital F/U         HISTORY OF PRESENT ILLNESS:  89 year old male, (10/7/1929), [facility] and [facility].    Pt admitted [facility] 11/15 to 11/16 for fall. Hx of ESRD on HD, diastolic HF and severe aortic stenosis. Was admitted 11/3 to 11/8 for leg swelling.    Pt denies any chest pain/SOB/fever/chills/N/V.    Please see Beverly Tran's CNP admit noted dated 11/19 for details of admission, past medical history, family history, allergies, medication list, social history and other details pertinent with this admission. Hospital admission and dc summary reviewed.      Past Medical History:   Diagnosis Date     Anemia      CAD (coronary artery disease) 12/24/14    PCI and BMS to mid RCA (5.0 x 20mm) with residual mod diffuse mid LAD disease     Chronic kidney disease     on Dialysis     Diabetes (H)      Hypertension      Mitral regurgitation 12/24/2014    mild-mod (1-2+) per echo     Mitral valve disorders(424.0) 12/24/2014    mild/mod (1-2+) MR     Myocardial infarction (H) 12/24/2014    NSTEMI     Pulmonary hypertension (H) 12/24/2014    RVSP elevated c/w mild-mod PH per echo     Renal disease due to diabetes mellitus (H)     End stage; on Dialysis       Past Surgical History:   Procedure Laterality Date     CORONARY ANGIOGRAPHY ADULT ORDER  12/24/2014     ENT SURGERY      Tonsillectomy     HEART CATH, ANGIOPLASTY  12/24/2014    PCI and BMS (5.0x20mm)to mid RCA     THORACIC SURGERY      Herniated disc       Current Outpatient Prescriptions   Medication Sig     acetaminophen (TYLENOL) 500 MG tablet Take 1,000 mg by mouth 3 times daily as needed for mild  "pain     aspirin 81 MG tablet Take 81 mg by mouth daily     atorvastatin (LIPITOR) 40 MG tablet Take 40 mg by mouth At Bedtime     calcium acetate (PHOSLO) 667 MG CAPS Take 2,001 mg by mouth 3 times daily (with meals)     diclofenac (VOLTAREN) 1 % GEL topical gel Apply 2 g topically 4 times daily Apply to RLE     GABAPENTIN PO Take 100 mg by mouth 3 times daily     MIDODRINE HCL PO Take 10 mg by mouth three times a week on Tues, Thurs, Sat 1 hour before dialysis AND Give 10 mg by mouth one time a day every Tue, Thu for dialysis Send one tablet with resident to dialysis     nitroglycerin (NITROSTAT) 0.4 MG sublingual tablet Place 1 tablet (0.4 mg) under the tongue every 5 minutes as needed for chest pain     polyethylene glycol (MIRALAX/GLYCOLAX) Packet Take 1 packet by mouth daily as needed for constipation     polyethylene glycol (MIRALAX/GLYCOLAX) Packet Take 17 g by mouth daily     No current facility-administered medications for this visit.        Allergies   Allergen Reactions     Glyburide      Lisinopril      Hyperkalemia when hospitalized 4/5/2011     Metformin      Renal failure stage 4     Glenn Gallagher RN clarified with pt, pt does not remember rxn, it was a long time ago, and \"nothing crazy\".     Verapamil        Social History     Social History     Marital status: Single     Spouse name: N/A     Number of children: N/A     Years of education: N/A     Occupational History     Not on file.     Social History Main Topics     Smoking status: Former Smoker     Smokeless tobacco: Never Used     Alcohol use Yes      Comment: \"about as much as you can pour in a thimble in a year\"     Drug use: No     Sexual activity: Not Currently     Partners: Female     Other Topics Concern     Caffeine Concern Yes     1-2 cups daily     Sleep Concern No     Special Diet Yes     kidney diet     Exercise Yes     walking     Seat Belt Yes     Social History Narrative          Information reviewed:  Medications, vital " "signs, orders, nursing notes, problem list, hospital information.     ROS: All 10 point review of system completed, those pertinent positive, please see H&P, the remaining ROS is negative.    /80  Pulse 64  Temp 97.6  F (36.4  C)  Resp 16  Ht 5' 7\" (1.702 m)  Wt 187 lb 1.6 oz (84.9 kg)  SpO2 95%  BMI 29.3 kg/m2    PHYSICAL EXAMINATION:   GENERAL:  No acute distress. Laying in bed.  SKIN:  Dry and warm.  There is no rash, lesions, ulcers or juandice at area of skin examined.  HEENT:  Head without trauma.  Pupils round, reactive. Exam of conjunctiva and lids are normal. Sclera without icterus. There is no oral thrush.  NECK:  Supple.  There is no cervical adenopathy, no thyromegaly. No jugular venous distension.  CHEST: No reproducible chest tenderness.   LUNGS:  Normal respiratory effort. Lungs are Clear on ascultation.  HEART:  Regular rate and rhythm.  No murmur, gallops or rubs auscultated.  ABDOMEN:  Soft, bowel sounds positive.  There is no tenderness or guarding.   EXTREMITIES: 1+ edema with dressings which I did not uncover. + AVR RUE with thrill.  NEUROLOGIC:  Alert and oriented to self and situation.    Lab/Diagnostic data:  Reviewed    Lab Results   Component Value Date    WBC 5.4 12/05/2018     Lab Results   Component Value Date    RBC 3.37 12/05/2018     Lab Results   Component Value Date    HGB 12.0 12/05/2018     Lab Results   Component Value Date    HCT 38.7 12/05/2018     Lab Results   Component Value Date     12/05/2018     Lab Results   Component Value Date    MCH 35.6 12/05/2018     Lab Results   Component Value Date    MCHC 31.0 12/05/2018     Lab Results   Component Value Date    RDW 19.1 12/05/2018     Lab Results   Component Value Date     12/05/2018       Last Comprehensive Metabolic Panel:  Sodium   Date Value Ref Range Status   12/05/2018 133 133 - 144 mmol/L Final     Potassium   Date Value Ref Range Status   12/05/2018 5.1 3.4 - 5.3 mmol/L Final     Chloride "   Date Value Ref Range Status   12/05/2018 98 94 - 109 mmol/L Final     Carbon Dioxide   Date Value Ref Range Status   12/05/2018 29 20 - 32 mmol/L Final     Anion Gap   Date Value Ref Range Status   12/05/2018 6 3 - 14 mmol/L Final     Glucose   Date Value Ref Range Status   12/05/2018 108 (H) 70 - 99 mg/dL Final     Urea Nitrogen   Date Value Ref Range Status   12/05/2018 75 (H) 7 - 30 mg/dL Final     Creatinine   Date Value Ref Range Status   12/05/2018 5.94 (H) 0.66 - 1.25 mg/dL Final     GFR Estimate   Date Value Ref Range Status   12/05/2018 9 (L) >60 mL/min/1.7m2 Final     Comment:     Non  GFR Calc     Calcium   Date Value Ref Range Status   12/05/2018 9.1 8.5 - 10.1 mg/dL Final       ASSESSMENT / PLAN:     Fall, subsequent encounter  - CT head showed no acute process.  - CXR showed LLL infiltrate which can be followed by PCP to ensure resolution.  - Not on Rx for LLL as asymptomatic.    ESRD (end stage renal disease) on dialysis (H)  - HD diet.  - On HD per nephrology.    Coronary artery disease involving native coronary artery of native heart without angina pectoris  - on ASA and lipitor.    Chronic diastolic heart failure (H)  - TTE 10/2018 showed nml EF with severe aortic stenosis and moderate to severe pulm HTN.        Other problems with same care. Primary care doctor and other specialists to address those chronic problems in next clinic appointment to be scheduled upon discharge from the TCU.    Total time spent with patient visit was 41 min including patient visit, review of past records, 1/2 time on patients counseling and coordinating care.        Mic Chauhan MD      Sincerely,        Mic Chauhan MD

## 2018-11-30 NOTE — PROGRESS NOTES
PRIMARY CARE PROVIDER AND CLINIC RESPONSIBLE:  Justina Moise ENTIRA [facility] [address] / [facility] [address]        ADMISSION HISTORY AND PHYSICAL EXAMINATION     Chief Complaint   Patient presents with     Hospital F/U         HISTORY OF PRESENT ILLNESS:  89 year old male, (10/7/1929), [facility].    Pt admitted [facility] 11/15 to 11/16 for fall. Hx of ESRD on HD, diastolic HF and severe aortic stenosis. Was admitted 11/3 to 11/8 for leg swelling.    Pt denies any chest pain/SOB/fever/chills/N/V.    Please see Beverly Tran's CNP admit noted dated 11/19 for details of admission, past medical history, family history, allergies, medication list, social history and other details pertinent with this admission. Hospital admission and dc summary reviewed.      Past Medical History:   Diagnosis Date     Anemia      CAD (coronary artery disease) 12/24/14    PCI and BMS to mid RCA (5.0 x 20mm) with residual mod diffuse mid LAD disease     Chronic kidney disease     on Dialysis     Diabetes (H)      Hypertension      Mitral regurgitation 12/24/2014    mild-mod (1-2+) per echo     Mitral valve disorders(424.0) 12/24/2014    mild/mod (1-2+) MR     Myocardial infarction (H) 12/24/2014    NSTEMI     Pulmonary hypertension (H) 12/24/2014    RVSP elevated c/w mild-mod PH per echo     Renal disease due to diabetes mellitus (H)     End stage; on Dialysis       Past Surgical History:   Procedure Laterality Date     CORONARY ANGIOGRAPHY ADULT ORDER  12/24/2014     ENT SURGERY      Tonsillectomy     HEART CATH, ANGIOPLASTY  12/24/2014    PCI and BMS (5.0x20mm)to mid RCA     THORACIC SURGERY      Herniated disc       Current Outpatient Prescriptions   Medication Sig     acetaminophen (TYLENOL) 500 MG tablet Take 1,000 mg by mouth 3 times daily as needed for mild pain     aspirin 81 MG tablet Take 81 mg by mouth daily     atorvastatin (LIPITOR) 40 MG tablet  "Take 40 mg by mouth At Bedtime     calcium acetate (PHOSLO) 667 MG CAPS Take 2,001 mg by mouth 3 times daily (with meals)     diclofenac (VOLTAREN) 1 % GEL topical gel Apply 2 g topically 4 times daily Apply to RLE     GABAPENTIN PO Take 100 mg by mouth 3 times daily     MIDODRINE HCL PO Take 10 mg by mouth three times a week on Tues, Thurs, Sat 1 hour before dialysis AND Give 10 mg by mouth one time a day every Tue, Thu for dialysis Send one tablet with resident to dialysis     nitroglycerin (NITROSTAT) 0.4 MG sublingual tablet Place 1 tablet (0.4 mg) under the tongue every 5 minutes as needed for chest pain     polyethylene glycol (MIRALAX/GLYCOLAX) Packet Take 1 packet by mouth daily as needed for constipation     polyethylene glycol (MIRALAX/GLYCOLAX) Packet Take 17 g by mouth daily     No current facility-administered medications for this visit.        Allergies   Allergen Reactions     Glyburide      Lisinopril      Hyperkalemia when hospitalized 4/5/2011     Metformin      Renal failure stage 4     Glenn Gallagher RN clarified with pt, pt does not remember rxn, it was a long time ago, and \"nothing crazy\".     Verapamil        Social History     Social History     Marital status: Single     Spouse name: N/A     Number of children: N/A     Years of education: N/A     Occupational History     Not on file.     Social History Main Topics     Smoking status: Former Smoker     Smokeless tobacco: Never Used     Alcohol use Yes      Comment: \"about as much as you can pour in a thimble in a year\"     Drug use: No     Sexual activity: Not Currently     Partners: Female     Other Topics Concern     Caffeine Concern Yes     1-2 cups daily     Sleep Concern No     Special Diet Yes     kidney diet     Exercise Yes     walking     Seat Belt Yes     Social History Narrative          Information reviewed:  Medications, vital signs, orders, nursing notes, problem list, hospital information.     ROS: All 10 point review " "of system completed, those pertinent positive, please see H&P, the remaining ROS is negative.    /80  Pulse 64  Temp 97.6  F (36.4  C)  Resp 16  Ht 5' 7\" (1.702 m)  Wt 187 lb 1.6 oz (84.9 kg)  SpO2 95%  BMI 29.3 kg/m2    PHYSICAL EXAMINATION:   GENERAL:  No acute distress. Laying in bed.  SKIN:  Dry and warm.  There is no rash, lesions, ulcers or juandice at area of skin examined.  HEENT:  Head without trauma.  Pupils round, reactive. Exam of conjunctiva and lids are normal. Sclera without icterus. There is no oral thrush.  NECK:  Supple.  There is no cervical adenopathy, no thyromegaly. No jugular venous distension.  CHEST: No reproducible chest tenderness.   LUNGS:  Normal respiratory effort. Lungs are Clear on ascultation.  HEART:  Regular rate and rhythm.  No murmur, gallops or rubs auscultated.  ABDOMEN:  Soft, bowel sounds positive.  There is no tenderness or guarding.   EXTREMITIES: 1+ edema with dressings which I did not uncover. + AVR RUE with thrill.  NEUROLOGIC:  Alert and oriented to self and situation.    Lab/Diagnostic data:  Reviewed    Lab Results   Component Value Date    WBC 5.4 12/05/2018     Lab Results   Component Value Date    RBC 3.37 12/05/2018     Lab Results   Component Value Date    HGB 12.0 12/05/2018     Lab Results   Component Value Date    HCT 38.7 12/05/2018     Lab Results   Component Value Date     12/05/2018     Lab Results   Component Value Date    MCH 35.6 12/05/2018     Lab Results   Component Value Date    MCHC 31.0 12/05/2018     Lab Results   Component Value Date    RDW 19.1 12/05/2018     Lab Results   Component Value Date     12/05/2018       Last Comprehensive Metabolic Panel:  Sodium   Date Value Ref Range Status   12/05/2018 133 133 - 144 mmol/L Final     Potassium   Date Value Ref Range Status   12/05/2018 5.1 3.4 - 5.3 mmol/L Final     Chloride   Date Value Ref Range Status   12/05/2018 98 94 - 109 mmol/L Final     Carbon Dioxide   Date Value " Ref Range Status   12/05/2018 29 20 - 32 mmol/L Final     Anion Gap   Date Value Ref Range Status   12/05/2018 6 3 - 14 mmol/L Final     Glucose   Date Value Ref Range Status   12/05/2018 108 (H) 70 - 99 mg/dL Final     Urea Nitrogen   Date Value Ref Range Status   12/05/2018 75 (H) 7 - 30 mg/dL Final     Creatinine   Date Value Ref Range Status   12/05/2018 5.94 (H) 0.66 - 1.25 mg/dL Final     GFR Estimate   Date Value Ref Range Status   12/05/2018 9 (L) >60 mL/min/1.7m2 Final     Comment:     Non  GFR Calc     Calcium   Date Value Ref Range Status   12/05/2018 9.1 8.5 - 10.1 mg/dL Final       ASSESSMENT / PLAN:     Fall, subsequent encounter  - CT head showed no acute process.  - CXR showed LLL infiltrate which can be followed by PCP to ensure resolution.  - Not on Rx for LLL as asymptomatic.    ESRD (end stage renal disease) on dialysis (H)  - HD diet.  - On HD per nephrology.    Coronary artery disease involving native coronary artery of native heart without angina pectoris  - on ASA and lipitor.    Chronic diastolic heart failure (H)  - TTE 10/2018 showed nml EF with severe aortic stenosis and moderate to severe pulm HTN.        Other problems with same care. Primary care doctor and other specialists to address those chronic problems in next clinic appointment to be scheduled upon discharge from the TCU.    Total time spent with patient visit was 41 min including patient visit, review of past records, 1/2 time on patients counseling and coordinating care.        Mic Chauhan MD

## 2018-12-01 PROBLEM — E87.5 HYPERKALEMIA: Status: ACTIVE | Noted: 2018-01-01

## 2018-12-01 NOTE — IP AVS SNAPSHOT
"` `           [place] [address] MEDICAL SURGICAL: [phone]                                              INTERAGENCY TRANSFER FORM - NURSING   2018                    Hospital Admission Date: 2018  YOANA QUIÑONES   : 10/7/1929  Sex: Male        Attending Provider: Caryl William MD     Allergies:  Glyburide, Lisinopril, Metformin, Penicillins, Verapamil    Infection:  None   Service:  Observation    Ht:  1.702 m (5' 7\")   Wt:  82.8 kg (182 lb 8 oz)   Admission Wt:  77.1 kg (170 lb)    BMI:  28.58 kg/m 2   BSA:  1.98 m 2            Patient PCP Information     Provider PCP Type    Justina Moise MD General      Current Code Status     Date Active Code Status Order ID Comments User Context       2018 11:31 PM DNR/DNI [id number]  Caryl William MD Inpatient       Questions for Current Code Status     Question Answer Comment    Code status determined by: Discussion with patient/legal decision maker       Code Status History     Date Active Date Inactive Code Status Order ID Comments User Context    2018  2:56 PM 2018  8:11 PM DNR/DNI [id number]  Cortney Griffin PA-C Inpatient    2018  3:28 AM 2018  2:56 PM Full Code [id number]  Diego Rondon MD Inpatient    2018  2:40 AM 2018  4:36 PM Full Code [id number]  Jayant Ricketts MD Inpatient    2018  4:17 PM 2018  6:18 PM DNI [id number]  Aisha Franco PA-C Inpatient    2018  2:58 PM 2018  4:17 PM DNI [id number]  Aisha Franco PA-C ED    8/15/2017  8:27 AM 2018  2:58 PM Full Code [id number]  Jayant Ricketts MD Outpatient    2017 10:43 PM 8/15/2017  8:27 AM Full Code [id number]  Marilee Ramirez MD Inpatient    10/2/2016  2:30 PM 2017 10:43 PM Full Code [id number]  Primitivo Ybeoah MD Outpatient    2016  2:49 PM 10/2/2016  2:30 PM Full Code [id number]  Bola Garcia DO Inpatient    2016 10:07 AM 2016  2:49 PM Full " Code [id number]  Bola Garcia,  Outpatient    1/12/2016  3:10 PM 1/17/2016 10:07 AM Full Code [id number]  Diaz Hale,  Inpatient    12/26/2014 10:57 AM 1/12/2016  3:10 PM Full Code [id number]  Edin Elkins MD Outpatient    12/24/2014  3:28 PM 12/24/2014 11:14 PM Full Code [id number]  Phyllis Barrios MD Inpatient    12/24/2014 11:20 AM 12/24/2014  3:28 PM Full Code [id number]  Cholo Nogueira MD Outpatient    12/24/2014  5:42 AM 12/24/2014 11:20 AM Full Code [id number]  Tip Langston MD Inpatient      Advance Directives        Scanned docmt in ACP Activity?           No scanned doc        Hospital Problems as of 12/2/2018              Priority Class Noted POA    Hyperkalemia Medium  12/1/2018 Yes      Non-Hospital Problems as of 12/2/2018              Priority Class Noted    NSTEMI (non-ST elevated myocardial infarction) (H) Medium  12/24/2014    ESRD (end stage renal disease) on dialysis (H) Medium  12/24/2014    CAD (coronary artery disease) Medium  12/24/2014    Mitral regurgitation Medium  12/24/2014    Pulmonary hypertension (H) Medium  12/24/2014    Diabetes (H) Medium  Unknown    Hypertension Medium  Unknown    Sepsis (H) Medium  1/12/2016    Pneumonia Medium  9/24/2016    Acute diastolic (congestive) heart failure (H) Medium  8/9/2017    Cellulitis Medium  9/18/2018    Altered mental status Medium  11/16/2018    Fall Medium  11/16/2018    Chronic diastolic heart failure (H) Medium  11/16/2018    Severe aortic stenosis Medium  11/16/2018      Immunizations     Name Date      Influenza (High Dose) 3 valent vaccine 09/21/18     Influenza (High Dose) 3 valent vaccine 10/02/16     TD (ADULT, 7+) 09/18/18          END      ASSESSMENT     Discharge Profile Flowsheet     EXPECTED DISCHARGE     FINAL RESOURCES      Expected Discharge Date  12/03/18 (From [facility]) 12/02/18 0817   Resources List  Skilled Nursing Facility 11/16/18 1631    DISCHARGE NEEDS ASSESSMENT     PAS  Number  [id number] 11/08/18 1147    Concerns To Be Addressed  no discharge needs identified 08/11/17 1132   SKIN      Readmission Within The Last 30 Days  current reason for admission unrelated to previous admission 12/02/18 0818   Inspection of bony prominences  Full 12/02/18 0819    Equipment Currently Used at Home  none 11/04/18 1751   Inspection under devices  Full 12/02/18 0533    # of Referrals Placed by CTS  Post Acute Facilities 11/16/18 1631   Skin WDL  ex;all 12/02/18 0533    Primary Care Clinic Name  [facility] 08/11/17 1132   Skin Color/Characteristics  bruised (ecchymotic);redness blanchable;redness nonblanchable;kary 12/02/18 0533    Primary Care MD Name  Justina Moise [phone] 12/02/18 1605   Skin Temperature  warm 12/02/18 0533    Return Category  New Diagnosis 12/02/18 0818   Skin Moisture  flaky;dry 12/02/18 0533    Equipment Used at Home  bath bench;grab bar;walker, rolling 01/13/16 1532   Skin Elasticity  slow return to original state 12/02/18 0533    GASTROINTESTINAL (ADULT,PEDIATRIC,OB)     Skin Integrity  bruise(s);cracked;scab(s);skin tear(s);wound(s) 12/02/18 0533    GI WDL  WDL 12/02/18 0920   Additional Documentation  Wound (LDA) 12/02/18 0533    All Quadrants Bowel Sounds  audible and active in all quadrants 12/02/18 0533   SAFETY      Last Bowel Movement  12/02/18 12/02/18 0819   Safety WDL  WDL 12/02/18 1434    Passing flatus  yes 12/02/18 0533   Safety Factors  upper side rails raised x 2;call light in reach;wheels locked;bed in low position 12/02/18 1434    COMMUNICATION ASSESSMENT     All Alarms  alarm(s) activated and audible 12/02/18 1434    Patient's communication style  spoken language (English or Bilingual) 11/15/18 2306                      Assessment WDL (Within Defined Limits) Definitions           Safety WDL     Effective: 09/28/15    Row Information: <b>WDL Definition:</b> Bed in low position, wheels locked; call light in reach; upper side rails  "up x 2; ID band on<br> <font color=\"gray\"><i>Item=AS safety wdl>>List=AS safety wdl>>Version=F14</i></font>      Skin WDL     Effective: 09/28/15    Row Information: <b>WDL Definition:</b> Warm; dry; intact; elastic; without discoloration; pressure points without redness<br> <font color=\"gray\"><i>Item=AS skin wdl>>List=AS skin wdl>>Version=F14</i></font>      Vitals     Vital Signs Flowsheet     VITAL SIGNS     PAINAD Score  4 12/02/18 0746    Temp  98  F (36.7  C) 12/02/18 1431   Pain Intervention(s)  Repositioned;Music therapy 12/02/18 0746    Temp src  Axillary 12/02/18 1232   ISABELLE COMA SCALE      Resp  18 12/02/18 1431   Best Eye Response  4-->(E4) spontaneous 12/02/18 0316    Pulse  68 12/01/18 1829   Best Motor Response  6-->(M6) obeys commands 12/02/18 0316    Heart Rate  67 12/02/18 1527   Best Verbal Response  4-->(V4) confused 12/02/18 0316    Pulse/Heart Rate Source  Monitor 12/02/18 1527   Isabelle Coma Scale Score  14 12/02/18 0316    BP  (!)  85/43 12/02/18 1527   HEIGHT AND WEIGHT      BP Location  Left arm 12/02/18 1527   Height  1.702 m (5' 7\") 11/29/18 2038    OXYGEN THERAPY     Weight  82.8 kg (182 lb 8 oz) 12/02/18 0613    SpO2  96 % 12/02/18 1431   Weight Method  Bed scale 12/02/18 0613    O2 Device  None (Room air) 12/02/18 1431   Bed Scale  Standard (fitted sheet, draw sheet/ pad, cover/flat sheet, blanket, two pillows) 12/02/18 0613    PAIN/COMFORT     POSITIONING      Patient Currently in Pain  denies 12/02/18 0935   Body Position  side-lying, left 12/02/18 1434    Preferred Pain Scale  PAINAD (Pain Assessment in Advance Dementia Scale) 12/02/18 0746   Head of Bed (HOB)  HOB at 20-30 degrees 12/02/18 1434    0-10 Pain Scale  0 12/01/18 1829   DAILY CARE      PAINAD Breathing  1-->occasional labored breathing, short period of hyperventilation 12/02/18 0746   Activity Management  activity adjusted per tolerance 12/02/18 1434    PAINAD Negative Vocalization  1-->occasional moan/groan: " low speech, negative/disapproving quality 12/02/18 0746   Activity Assistance Provided  assistance, 2 people 12/02/18 1434    PAINAD Facial Expression  1-->sad, frightened, frown 12/02/18 0746   ECG      PAINAD Body Language  1-->tense: distressed pacing, fidgeting 12/02/18 0746   ECG Rhythm  Atrial fibrillation 12/02/18 1431    PAINAD Consolability  0-->no need to console 12/02/18 0746                 Patient Lines/Drains/Airways Status    Active LINES/DRAINS/AIRWAYS     Name: Placement date: Placement time: Site: Days: Last dressing change:    Hemodialysis Vascular Access Subclavian vein catheter Right Subclavian       Subclavian        Hemodialysis Vascular Access Arteriovenous fistula Right Arm       Arm        Peripheral IV 12/01/18 Left Upper forearm 12/01/18   1845   Upper forearm   less than 1     Wound 09/19/18 Right Knee Abrasion(s) 09/19/18   0730   Knee   74     Wound 09/19/18 Right Arm Skin tear moist wound bed near elbow 09/19/18   1500   Arm   74     Wound 12/02/18 Anterior;Left Foot Skin tear 12/02/18   0215   Foot   less than 1     Wound 12/02/18 Anterior;Left Hand Suspected pressure ulcer 12/02/18   0215   Hand   less than 1     Wound 12/02/18 Anterior;Left Arm Suspected pressure ulcer 12/02/18   0215   Arm   less than 1     Wound 12/02/18 Anterior;Left Elbow Suspected pressure ulcer 12/02/18   0215   Elbow   less than 1             Patient Lines/Drains/Airways Status    Active PICC/CVC     None            Intake/Output Detail Report     Date Intake     Output Net    Shift P.O. I.V. IV Piggyback Total Other Total       Day 12/01/18 0700 - 12/01/18 1459 -- -- -- -- -- -- 0    Berenice 12/01/18 1500 - 12/01/18 2259 -- -- -- -- -- -- 0    Noc 12/01/18 2300 - 12/02/18 0659 -- -- -- -- -- -- 0    Day 12/02/18 0700 - 12/02/18 1459 100 -- -- 100 2500 2500 -2400    Berenice 12/02/18 1500 - 12/02/18 2259 -- -- -- -- -- -- 0      Last Void/BM       Most Recent Value    Urine Occurrence     Stool Occurrence 1 at  12/02/2018 0530      Case Management/Discharge Planning     Case Management/Discharge Planning Flowsheet     REFERRAL INFORMATION     Marital Status  Single 12/02/18 1613    Did the Initial Social Work Assessment result in a Social Work Case?  Yes 12/02/18 1555   Who is your support system?  Sibling(s);Other (specify) 12/02/18 1613    Admission Type  observation 12/02/18 1555   COPING/STRESS      Arrived From  skilled nursing facility 12/02/18 1605   Major Change/Loss/Stressor  denies 09/18/18 1621    Referral Source  interdisciplinary rounds 12/02/18 1605   EXPECTED DISCHARGE      # of Referrals Placed by CTS  Post Acute Facilities 11/16/18 1631   Expected Discharge Date  12/03/18 (From [facility]) 12/02/18 0817    Reason For Consult  discharge planning 12/02/18 1605   ASSESSMENT/CONCERNS TO BE ADDRESSED      Record Reviewed  history and physical;medical record;patient profile 12/02/18 1605   Concerns To Be Addressed  no discharge needs identified 08/11/17 1132    CTS Assigned to Case  MAHENDRA Rm 12/02/18 1605   DISCHARGE PLANNING      Primary Care Clinic Name  [facility] 08/11/17 1132   Readmission Within The Last 30 Days  current reason for admission unrelated to previous admission 12/02/18 0818    Primary Care MD Name  Justina Moise [phone] 12/02/18 1605   Equipment Used at Home  bath bench;grab bar;walker, rolling 01/13/16 1532    LIVING ENVIRONMENT     FINAL RESOURCES      Lives With  facility resident 12/02/18 1613   Equipment Currently Used at Home  none 11/04/18 1751    Living Arrangements  residential facility 12/02/18 1613   Resources List  Skilled Nursing Facility 11/16/18 1631    Quality Of Family Relationships  supportive;helpful;involved 12/02/18 1613   PAS Number  [id number] 11/08/18 1147    Able to Return to Prior Living Arrangements  yes 12/02/18 1613   OTHER      ASSESSMENT OF FAMILY/SOCIAL SUPPORT     Are you depressed or being treated for depression?  No 12/02/18  0812

## 2018-12-01 NOTE — IP AVS SNAPSHOT
` `           [place] [address] MEDICAL SURGICAL: [phone]                 INTERAGENCY TRANSFER FORM - NOTES (H&P, Discharge Summary, Consults, Procedures, Therapies)   2018                    Hospital Admission Date: 2018  YOANA QUIÑONES   : 10/7/1929  Sex: Male        Patient PCP Information     Provider PCP Type    Justina Moise MD General         History & Physicals      H&P by Caryl William MD at 2018  9:29 PM     Author:  Caryl William MD Service:  Hospitalist Author Type:  Physician    Filed:  2018 10:32 PM Date of Service:  2018  9:29 PM Creation Time:  2018  8:57 PM    Status:  Signed :  Caryl William MD (Physician)         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  Patient was found to be increasingly weak after he missed his dialysis today.  In the emergency room he was hyperkalemic.    Increased weakness, fatigue and malaise  --At baseline patient communicates with 1 word answers and is mainly nonverbal.  --There was concern of underlying dementia during prior hospitalization.  He was discharged to transitional care unit due to somnolence and altered mental status on 2018  --Patient was increasingly weak was sent to the emergency room from facility  --Per report patient missed his dialysis  today (Tuesday Thursdays and Saturdays)  --Nephrologist was contacted by the emergency room physician. Recommended admission for dialysis tomorrow    Hyperkalemia  --No EKG changes  --Started on hyperkalemia protocol as recommended by nephrologist on call in the emergency room  --Monitor potassium.  Kayexalate for mild hyperkalemia with no EKG changes  --Monitor on telemetry    Multiple skin abrasions and wounds  --From recent falls  --No evidence of infection  --Wound care    History of coronary artery disease and valvular disease  --Continue all prior to admission meds    h/o diabetes mellitus  --Last hemoglobin A1c in September was 5.1  --Has not required any treatment  --We will monitor blood glucose and will watch for hypoglycemia    Resume all prior to admission meds patient at baseline per nursing home report  Patient's condition discussed with patient's niece Leticia[SM1.3]      DVT Prophylaxis:[SM1.1] Pneumatic Compression Devices[SM1.3]  Code Status:[SM1.1] DNR / DNI[SM1.3]    Disposition: Expected discharge in[SM1.1] 1[SM1.3] days[SM1.1] after dialysis to transitional care unit[SM1.3]    Primary Care Physician   Justina Moise    Chief Complaint[SM1.1]   Increased weakness per report from TCU after missing dialysis today    Unable to obtain a history from the patient due to mental status  History obtained from ER records and from his niece Leticia[SM1.3]    History of Present Illness   Maurizio Ohara is a 89 year old male[SM1.1] with past medical history significant for diabetes mellitus, hypertension, coronary artery disease, aortic stenosis, pulmonary hypertension, 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  Patient was recently hospitalized at [facility] after a fall in for altered mental status.  He was discharged due to worsening mental status and risk for falls at transitional care unit on  11/16/2018.  Report he was being moved in the process somehow his dialysis got missed today.  It was thought that patient could resume dialysis on Monday however as the day progressed he became increasingly weak.  Family was contacted and patient was transferred to hospital for further evaluation.  Patient is somnolent nonverbal was snoring and sleeping when I saw him unable to get any history or review of systems from him.  Per report from facility patient at baseline answers in one words when called.  He is usually arousable and at times is  somnolent and nonverbal.  Review of records from prior hospitalization there was concern for underlying dementia and clinical deterioration after recent falls and hospitalizations.  There is no history of any new symptoms of fever chills.  No new trauma or falls.[SM1.3]    Past Medical History    Past Medical History:   Diagnosis Date     Anemia      CAD (coronary artery disease) 12/24/14    PCI and BMS to mid RCA (5.0 x 20mm) with residual mod diffuse mid LAD disease     Chronic kidney disease     on Dialysis     Diabetes (H)      Hypertension      Mitral regurgitation 12/24/2014    mild-mod (1-2+) per echo     Mitral valve disorders(424.0) 12/24/2014    mild/mod (1-2+) MR     Myocardial infarction (H) 12/24/2014    NSTEMI     Pulmonary hypertension (H) 12/24/2014    RVSP elevated c/w mild-mod PH per echo     Renal disease due to diabetes mellitus (H)     End stage; on Dialysis         Past Surgical History   Past Surgical History:   Procedure Laterality Date     CORONARY ANGIOGRAPHY ADULT ORDER  12/24/2014     ENT SURGERY      Tonsillectomy     HEART CATH, ANGIOPLASTY  12/24/2014    PCI and BMS (5.0x20mm)to mid RCA     THORACIC SURGERY      Herniated disc       Prior to Admission Medications   Prior to Admission Medications   Prescriptions Last Dose Informant Patient Reported? Taking?   GABAPENTIN PO   Yes No   Sig: Take 100 mg by mouth 3 times daily   MIDODRINE HCL PO   Yes  "No   Sig: Take 10 mg by mouth three times a week on Tues, Thurs, Sat 1 hour before dialysis AND Give 10 mg by mouth one time a day every Tue, Thu for dialysis Send one tablet with resident to dialysis   acetaminophen (TYLENOL) 500 MG tablet  Other Yes No   Sig: Take 1,000 mg by mouth 3 times daily as needed for mild pain   aspirin 81 MG tablet  Other Yes No   Sig: Take 81 mg by mouth daily   atorvastatin (LIPITOR) 40 MG tablet  Other Yes No   Sig: Take 40 mg by mouth At Bedtime   calcium acetate (PHOSLO) 667 MG CAPS  Other Yes No   Sig: Take 2,001 mg by mouth 3 times daily (with meals)   diclofenac (VOLTAREN) 1 % GEL topical gel  Other No No   Sig: Apply 2 g topically 4 times daily Apply to RLE   nitroglycerin (NITROSTAT) 0.4 MG sublingual tablet  Other No No   Sig: Place 1 tablet (0.4 mg) under the tongue every 5 minutes as needed for chest pain   polyethylene glycol (MIRALAX/GLYCOLAX) Packet  Other No No   Sig: Take 17 g by mouth daily   polyethylene glycol (MIRALAX/GLYCOLAX) Packet  Other Yes No   Sig: Take 1 packet by mouth daily as needed for constipation      Facility-Administered Medications: None     Allergies   Allergies   Allergen Reactions     Glyburide      Lisinopril      Hyperkalemia when hospitalized 4/5/2011     Metformin      Renal failure stage 4     Glenn Gallagher RN clarified with pt, pt does not remember rxn, it was a long time ago, and \"nothing crazy\".     Verapamil        Social History   Social History   Substance Use Topics     Smoking status: Former Smoker     Smokeless tobacco: Never Used     Alcohol use Yes      Comment: \"about as much as you can pour in a thimble in a year\"     Social History     Social History Narrative[SM1.1]     Was sent from facility.  Per patient's niece he is not a smoker.  But occasionally uses alcohol[SM1.3]    Family History[SM1.1]   Significant for diabetes mellitus[SM1.3]    Review of Systems[SM1.1]   Patient somnolent sleepy unable to get any review " of systems[SM1.3].    Physical Exam   Temp: 98.2  F (36.8  C) Temp src: Rectal BP: 113/71 Pulse: 68 Heart Rate: 67 Resp: 18 SpO2: 94 % O2 Device: None (Room air)    Vital Signs with Ranges  Temp:  [97.8  F (36.6  C)-98.2  F (36.8  C)] 98.2  F (36.8  C)  Pulse:  [68] 68  Heart Rate:  [67] 67  Resp:  [18] 18  BP: (110-113)/(71) 113/71  SpO2:  [94 %-96 %] 94 %  170 lbs 0 oz    GEN:[SM1.1] Somnolent, sleeping and snoring at the time of my evaluation.  Was arousable to verbal and painful stimuli.[SM1.3]  no overt distress  HEENT:  Normocephalic/atraumatic, no scleral icterus, no nasal discharge, mouth[SM1.1] dry[SM1.3]  CV:  Regular rate and rhythm,[SM1.1] pensive[SM1.3] murmur.  S1 + S2 noted, no S3 or S4.  LUNGS:[SM1.1] Bibasilar crackles[SM1.3].  Symmetric chest rise on inhalation noted.  ABD:  Active bowel sounds, soft,.  No rebound/guarding/rigidity.  EXT:[SM1.1]+[SM1.3]  edema.  No cyanosis.    SKIN:  Dry[SM1.1] and wrinkled skin multiple bruising open abrasions and areas of denuded skin.[SM1.3]  NEURO:[SM1.1] Somnolent cannot assess[SM1.3]    Data   Data reviewed today:  I personally reviewed[SM1.1] the EKG tracing showing Sinus rhythm with T wave inversion in inferior lateral leads not new when compared to prior EKG[SM1.3].[SM1.1]      Recent Labs  Lab 12/01/18  1913 12/01/18  1859 11/28/18  1035   WBC  --  9.1 7.8   HGB 13.9 12.5* 11.9*   HCT  --  39.9* 36.7*   MCV  --  115* 108*   PLT  --  140* 162       Recent Labs  Lab 12/01/18 2137 12/01/18  1913 12/01/18  1859 11/28/18  1035   NA  --  131* 132* 131*   POTASSIUM 5.5* 5.9* 6.1* 5.4*   CHLORIDE  --  96 95 95   CO2  --   --  27 24   ANIONGAP  --  8 10 12   GLC  --  158* 156* 225*   BUN  --  92* 102* 77*   CR  --   --  7.19* 5.90*   GFRESTIMATED  --  7* 7* 9*   GFRESTBLACK  --  8* 9* 11*   KIMBER  --   --  10.5* 9.4       Recent Labs  Lab 12/01/18  1859   AST 25   ALT 23   ALKPHOS 173*   BILITOTAL 0.8     No results for input(s): INR in the last 168 hours.  No  results for input(s): COLOR, APPEARANCE, URINEGLC, URINEBILI, URINEKETONE, SG, UBLD, URINEPH, PROTEIN, UROBILINOGEN, NITRITE, LEUKEST, RBCU, WBCU in the last 168 hours.[SM1.4]    No results found for this or any previous visit (from the past 24 hour(s)).[SM1.1]       Revision History        User Key Date/Time User Provider Type Action    > SM1.2 12/1/2018 10:32 PM Caryl William MD Physician Sign     SM1.4 12/1/2018 10:28 PM Caryl William MD Physician      SM1.3 12/1/2018 10:07 PM Caryl William MD Physician      SM1.1 12/1/2018  8:57 PM Caryl William MD Physician                      Discharge Summaries      Discharge Summaries by John Cabrales MD at 12/2/2018  3:41 PM     Author:  John Cabrales MD Service:  Hospitalist Author Type:  Physician    Filed:  12/2/2018  3:41 PM Date of Service:  12/2/2018  3:41 PM Creation Time:  12/2/2018  3:37 PM    Status:  Signed :  John Cabrales MD (Physician)         [facility]    Discharge Summary  Hospitalist    Date of Admission:  12/1/2018  Date of Discharge:[RK1.1]  12/2/2018[RK1.2]  Discharging Provider: John Cabrales MD, MD  Date of Service (when I saw the patient):[RK1.1] 12/02/18[RK1.2]    Discharge Diagnoses   Missed HD      Hospital Course   Maurizio Ohara is a 89 year old male past medical history significant for coronary artery disease, 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  Patient was found to be increasingly weak after he missed his dialysis today.  In the emergency room he was hyperkalemic.  He was admitted for inpatient HD which was completed on 12/2.  Nephrology has arranged for him to return to his previous HD schedule on 12/4    John HERNANDEZ  MD [place]    Significant Results and Procedures   N/A    Pending Results   N/A      Code Status   DNR / DNI       Primary Care Physician   Justina Moise        Discharge Disposition   Discharged to nursing home  Condition at discharge: Stable    Consultations This Hospital Stay   PHARMACY IP CONSULT  NEPHROLOGY IP CONSULT  SOCIAL WORK IP CONSULT    Time Spent on this Encounter   I, John Cabrales, personally saw the patient today and spent greater than 30 minutes discharging this patient.    Discharge Orders     General info for SNF   Length of Stay Estimate: Long Term Care  Condition at Discharge: Stable  Level of care:board and care  Rehabilitation Potential: Fair  Admission H&P remains valid and up-to-date: Yes  Recent Chemotherapy: N/A  Use Nursing Home Standing Orders: N/A     Reason for your hospital stay   You were admitted for inpatient hemodialysis after missing session yesterday.     Activity - Up with nursing assistance     Advance Diet as Tolerated   Follow this diet upon discharge: Orders Placed This Encounter     Room Service     Dialysis Diet       Discharge Medications   Current Discharge Medication List      CONTINUE these medications which have NOT CHANGED    Details   aspirin 81 MG tablet Take 81 mg by mouth daily      atorvastatin (LIPITOR) 40 MG tablet Take 40 mg by mouth At Bedtime      calcium acetate (PHOSLO) 667 MG CAPS Take 2,001 mg by mouth 3 times daily (with meals)      diclofenac (VOLTAREN) 1 % GEL topical gel Apply 2 g topically 4 times daily Apply to RLE    Associated Diagnoses: Cellulitis of right lower extremity      GABAPENTIN PO Take 100 mg by mouth 3 times daily      MIDODRINE HCL PO Take 10 mg by mouth three times a week on Tues, Thurs, Sat 1 hour before dialysis AND Give 10 mg by mouth one time a day every Tue, Thu for dialysis Send one tablet with resident to dialysis      !! polyethylene glycol (MIRALAX/GLYCOLAX) Packet Take 17 g by mouth daily  Qty: 7 packet    Associated  "Diagnoses: Cellulitis of right lower extremity      acetaminophen (TYLENOL) 500 MG tablet Take 1,000 mg by mouth 3 times daily as needed for mild pain      nitroglycerin (NITROSTAT) 0.4 MG sublingual tablet Place 1 tablet (0.4 mg) under the tongue every 5 minutes as needed for chest pain  Qty: 25 tablet, Refills: 3    Associated Diagnoses: NSTEMI (non-ST elevated myocardial infarction) (H)      !! polyethylene glycol (MIRALAX/GLYCOLAX) Packet Take 1 packet by mouth daily as needed for constipation       !! - Potential duplicate medications found. Please discuss with provider.        Allergies   Allergies   Allergen Reactions     Glyburide      Lisinopril      Hyperkalemia when hospitalized 4/5/2011     Metformin      Renal failure stage 4     Penicillins      SHADY Gallagher clarified with pt, pt does not remember rxn, it was a long time ago, and \"nothing crazy\".     Verapamil      Data   Most Recent 3 CBC's:  Recent Labs   Lab Test  12/01/18 1913 12/01/18 1859 11/28/18   1035  11/16/18   0035   WBC   --   9.1  7.8  10.7   HGB  13.9  12.5*  11.9*  10.8*   MCV   --   115*  108*  111*   PLT   --   140*  162  147*      Most Recent 3 BMP's:  Recent Labs   Lab Test  12/02/18   0900  12/02/18   0120  12/01/18   2237   12/01/18 1913 12/01/18 1859 11/28/18   1035   NA  131*   --    --    --   131*  132*  131*   POTASSIUM  5.9*  5.8*  5.6*   < >  5.9*  6.1*  5.4*   CHLORIDE  96   --    --    --   96  95  95   CO2  25   --    --    --    --   27  24   BUN  107*   --    --    --   92*  102*  77*   CR  7.75*   --    --    --    --   7.19*  5.90*   ANIONGAP  10   --    --    --   8  10  12   KIMBER  10.2*   --    --    --    --   10.5*  9.4   GLC  140*   --    --    --   158*  156*  225*    < > = values in this interval not displayed.     Most Recent 2 LFT's:  Recent Labs   Lab Test  12/01/18 1859 11/16/18   0552   AST  25  37   ALT  23  32   ALKPHOS  173*  160*   BILITOTAL  0.8  0.8     Most Recent INR's and " Anticoagulation Dosing History:  Anticoagulation Dose History     Recent Dosing and Labs Latest Ref Rng & Units 12/24/2014 9/28/2016    INR 0.86 - 1.14 0.98 1.17(H)        Most Recent 3 Troponin's:  Recent Labs   Lab Test  08/09/17   1810  09/24/16   1100  12/25/14   1247   12/24/14   0037   TROPI  0.020  <0.015  The 99th percentile for upper reference range is 0.045 ug/L.  Troponin values in   the range of 0.045 - 0.120 ug/L may be associated with risks of adverse   clinical events.    49.031*   < >   --    TROPONIN   --    --    --    --   0.17*    < > = values in this interval not displayed.     Most Recent Cholesterol Panel:  Recent Labs   Lab Test  12/25/14   0715   CHOL  135   LDL  63   HDL  56   TRIG  80     Most Recent 6 Bacteria Isolates From Any Culture (See EPIC Reports for Culture Details):  Recent Labs   Lab Test  10/19/18   1456  10/19/18   1448  09/18/18   1241  09/18/18   1232  08/11/17   1015  08/10/17   1515   CULT  No growth  No growth  No growth  No growth  Light growth Normal will  No growth     Most Recent TSH, T4 and A1c Labs:  Recent Labs   Lab Test  09/18/18   1228  09/25/16   1049   TSH   --   2.36   A1C  5.1   --      Results for orders placed or performed during the hospital encounter of 11/15/18   CT Head w/o Contrast    Narrative    CT SCAN OF THE HEAD WITHOUT CONTRAST  11/16/2018 12:56 AM     HISTORY: Altered level of consciousness.    TECHNIQUE: Axial images of the head and coronal reformations without  IV contrast material. Radiation dose for this scan was reduced using  automated exposure control, adjustment of the mA and/or kV according  to patient size, or iterative reconstruction technique.    COMPARISON: 1/10/2015.    FINDINGS: There is generalized atrophy of the brain. There is low  attenuation in the white matter of the cerebral hemispheres consistent  with sequelae of small vessel ischemic disease. There is no evidence  of intracranial hemorrhage, mass, acute infarct or  anomaly.     The visualized portions of the sinuses and mastoids appear normal.  There is no evidence of trauma.      Impression    IMPRESSION: No acute abnormality.    VINNIE DIXON MD   CT Head w/o Contrast    Narrative    CT SCAN OF THE HEAD WITHOUT CONTRAST   11/16/2018 7:03 AM     HISTORY: Fall and somnolence.     TECHNIQUE:  Axial images of the head and coronal reformations without  IV contrast material. Radiation dose for this scan was reduced using  automated exposure control, adjustment of the mA and/or kV according  to patient size, or iterative reconstruction technique.    COMPARISON: Earlier the same day.    FINDINGS: There is no evidence of intracranial hemorrhage, mass, acute  infarct or anomaly. There is generalized atrophy of the brain. There  is low attenuation in the white matter of the cerebral hemispheres  consistent with sequelae of small vessel ischemic disease. Ventricular  size is within normal limits without evidence of hydrocephalus.     The visualized portions of the sinuses and mastoids appear normal. The  bony calvarium and bones of the skull base appear intact.       Impression    IMPRESSION:     1. No evidence of acute intracranial hemorrhage, mass, or herniation.  2. There is generalized atrophy of the brain. White matter changes are  present in the cerebral hemispheres that are consistent with small  vessel ischemic disease in this age patient. No significant change  since prior.    FEDERICO EASON MD   XR Chest 2 Views    Narrative    CHEST TWO VIEWS  11/16/2018 6:42 AM     HISTORY: Dyspnea.     COMPARISON: 11/4/2018      Impression    IMPRESSION: Worsening cardiomegaly. No change in pulmonary vascular  engorgement. New left lower lobe infiltrate best seen on lateral view,  obscuring the diaphragm posterior medially in the descending aortic  shadow.    GIOVANNI MATSON MD   US Abdomen Complete    Narrative    ULTRASOUND ABDOMEN COMPLETE   11/16/2018 7:33 AM     HISTORY: Abdominal pain  and distention.    COMPARISON: None.    FINDINGS: Normal hepatic echogenicity. No hepatic masses. A small  amount of sludge is present in the neck of a mildly distended  gallbladder. Mild diffuse wall thickening of the gallbladder. No  convincing gallstones or pericholecystic fluid. No focal tenderness  over the gallbladder. No intra- or extrahepatic biliary dilatation.  The common duct measures 0.2 cm in diameter. The pancreas is not well  visualized due to overlying bowel gas. The spleen is relatively small,  measuring 7.6 cm in craniocaudal dimension. The right and left kidneys  measure 7.9 and 10.6 cm in length, respectively. Echogenic renal  echotexture and cortical thinning of both kidneys. No intrarenal  collecting system dilatation bilaterally. 1.0 cm cyst in the  interpolar region of the right kidney. The proximal aorta and inferior  vena cava are visualized. A small amount of free intraperitoneal  fluid, primarily in the right upper quadrant and pelvis.      Impression    IMPRESSION:   1. A small amount of free intraperitoneal fluid, primarily in the  right upper quadrant and pelvis.  2. A small amount of sludge present within a borderline distended  mildly thick-walled gallbladder. There is no convincing evidence of  acute cholecystitis. If there is a clinical concern for acute  cholecystitis, a HIDA scan could be considered for further evaluation.  3. No biliary dilatation.  4. Both kidneys are echogenic and moderately atrophic, suggesting  medical renal disease.    RON HUGHES MD[RK1.1]            Revision History        User Key Date/Time User Provider Type Action    > RK1.2 12/2/2018  3:41 PM John Cabrales MD Physician Sign     RK1.1 12/2/2018  3:37 PM John Cabrales MD Physician                      Consult Notes      Consults by Sujey Manning LSW at 12/2/2018  4:16 PM     Author:  Sujey Manning LSW Service:  Social Work Author Type:      Filed:  12/2/2018  4:17 PM Date of  Service:  12/2/2018  4:16 PM Creation Time:  12/2/2018  4:13 PM    Status:  Addendum :  Sujey Manning LSW ()     Consult Orders:    1. Social Work IP Consult [[id number]] ordered by John Cabrales MD at 12/02/18 0827                Care Transition Initial Assessment -   Reason For Consult: discharge planning[TM1.1]  Active Problems:    Hyperkalemia[TM1.2]       DATA  Lives With: facility resident  Living Arrangements: residential facility  Who is your support system?: Sibling(s), Other (specify)  Identified issues/concerns regarding health management: Pt needs to return to [facility].      Quality Of Family Relationships: supportive, helpful, involved     ASSESSMENT  Concerns to be addressed: Pt needs to return to [facility]. SW spoke to Nursing at [facility]. Pt is able to return today. SW arranged WC transportation. Pt will be picked up by [facility] at 6:30pm.  sent the discharge order to [phone].    PLAN  Patient anticipates discharging to: [facility] today at 6:30pm.[TM1.1]      MAHENDRA Lundberg  Casual SW [phone][TM1.3]      Revision History        User Key Date/Time User Provider Type Action    > TM1.3 12/2/2018  4:17 PM Sujey Manning LSW  Addend     TM1.2 12/2/2018  4:16 PM Sujey Manning LSW  Sign     TM1.1 12/2/2018  4:13 PM Sujey Manning LSW              Consults by Rich Yung MD at 12/2/2018 11:52 AM     Author:  Rich Yung MD Service:  Nephrology Author Type:  Physician    Filed:  12/2/2018 11:53 AM Date of Service:  12/2/2018 11:52 AM Creation Time:  12/2/2018 11:26 AM    Status:  Signed :  Rich Yung MD (Physician)     Consult Orders:    1. Nephrology IP Consult: Patient to be seen: Routine - within 24 hours; Renal Failure: missed dialysis; Consultant may enter orders: Yes [[id number]] ordered by Caryl William MD at 12/01/18 2211                [facility]    RENAL CONSULTATION NOTE    REFERRING  MD:  Dr. William    REASON FOR CONSULTATION:  ESKD c hyperkalemia    DATE OF CONSULTATION: 12/02/18    SHORTHAND KEY FOR MY NOTES:  c = with, s = without, p = after, a = before, x = except, asx = asymptomatic, tx = transplant or treatment, sx = symptoms or symptomatic, cx = canceled or culture, rxn = reaction, yday = yesterday, nl = normal, abx = antibiotics, fxn = function, dx = diagnosis, dz = disease, m/h = melena/hematochezia, c/d/l/ha = cramping/dizziness/lightheadedness/headache, d/c = discharge or diarrhea/constipation, f/c/n/v = fevers/chills/nausea/vomiting, cp/sob = chest pain/shortness of breath.    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 under my care and that of Luis Schulz PA-C and was admitted on 12/1/2018 c hyperkalemia.    Pt was supposed to dialyse yday per his routine schedule, but due to a transportation issue he didn't go.  Later in the day, he was noted to be weak and the NH staff sent him in to the ER for further eval.  In the ER, he was found to have a high k.  He was treated medically and admitted for HD today.    Today, his BP remains low in the 80s and he is asx.  He hasn't taken his midodrine, yet, but will do so now.  He has a dry mouth and after drinking water, he is able to talk better. No f/c/n/v.     ROS:  A complete review of systems was performed and is negative x as noted above.    PMH:    Past Medical History:   Diagnosis Date     Anemia      CAD (coronary artery disease) 12/24/14    PCI and BMS to mid RCA (5.0 x 20mm) with residual mod diffuse mid LAD disease     Chronic kidney disease     on Dialysis     Diabetes (H)      Hypertension      Mitral regurgitation 12/24/2014    mild-mod (1-2+) per echo     Mitral valve disorders(424.0) 12/24/2014    mild/mod (1-2+) MR     Myocardial infarction (H) 12/24/2014    NSTEMI     Pulmonary hypertension (H) 12/24/2014    RVSP elevated c/w mild-mod  "PH per echo     Renal disease due to diabetes mellitus (H)     End stage; on Dialysis     PSH:    Past Surgical History:   Procedure Laterality Date     CORONARY ANGIOGRAPHY ADULT ORDER  12/24/2014     ENT SURGERY      Tonsillectomy     HEART CATH, ANGIOPLASTY  12/24/2014    PCI and BMS (5.0x20mm)to mid RCA     THORACIC SURGERY      Herniated disc     MEDICATIONS:      sodium chloride 0.9%  250 mL Intravenous Once in dialysis     aspirin  81 mg Oral Daily     atorvastatin  40 mg Oral QPM     calcium acetate  2,001 mg Oral TID w/meals     diclofenac  2 g Topical 4x Daily     gabapentin (NEURONTIN) capsule 100 mg  100 mg Oral TID     insulin aspart  1-4 Units Subcutaneous Q4H     [START ON 12/4/2018] midodrine (PROAMATINE) tablet 10 mg  10 mg Oral Once per day on Tue Thu Sat     - MEDICATION INSTRUCTIONS -   Does not apply Once     polyethylene glycol  17 g Oral Daily     sodium polystyrene  15 g Oral Once     ALLERGIES:    Allergies as of 12/01/2018 - Naif as Reviewed 12/01/2018   Allergen Reaction Noted     Glyburide  12/24/2014     Lisinopril  01/08/2015     Metformin  01/08/2015     Penicillins  12/24/2014     Verapamil  12/24/2014     FH:    Family History   Problem Relation Age of Onset     Diabetes Mother      SH:    Social History     Social History     Marital status: Single     Spouse name: N/A     Number of children: N/A     Years of education: N/A     Occupational History     Not on file.     Social History Main Topics     Smoking status: Former Smoker     Smokeless tobacco: Never Used     Alcohol use Yes      Comment: \"about as much as you can pour in a thimble in a year\"     Drug use: No     Sexual activity: Not Currently     Partners: Female     Other Topics Concern     Caffeine Concern Yes     1-2 cups daily     Sleep Concern No     Special Diet Yes     kidney diet     Exercise Yes     walking     Seat Belt Yes     Social History Narrative     PHYSICAL EXAM:    BP 97/50  Pulse 68  Temp 97.7  F (36.5 "  C) (Axillary)  Resp 16  Wt 82.8 kg (182 lb 8 oz)  SpO2 94%  BMI 28.58 kg/m2    GENERAL: sleepy, but arousable; interactive and appropriate; speaking/answering questions properly x thought he was at [facility], NAD  HEENT:  Normocephalic. No gross abnormalities.  Dry mouth. Pupils equal.  EOMI.  No scleral icterus.  CV: RRR c 1/6 murmurs, 2+ b foot edema (chronic - unchanged); tr ble edema  RESP: Crackles bilaterally, decent efforts  GI: Abdomen o/s/nt/nd  MUSCULOSKELETAL: extremities nl - no gross deformities noted  SKIN: no suspicious lesions or rashes, dry to touch  NEURO:  Weak, but moves extremities   PSYCH: mood ok, affect appropriate  LYMPH: No palpable ant/post cervical and supraclavicular adenopathy  OTHER:  Access - DORIS    Pt seen on HD.  Stable run.  -2L UF goal.  BP 80-90s    LABS:      CBC RESULTS:     Recent Labs  Lab 12/01/18 1913 12/01/18 1859 11/28/18  1035   WBC  --  9.1 7.8   RBC  --  3.48* 3.40*   HGB 13.9 12.5* 11.9*   HCT  --  39.9* 36.7*   PLT  --  140* 162     BMP RESULTS:    Recent Labs  Lab 12/02/18  0900 12/02/18  0120 12/01/18  2237 12/01/18  2137 12/01/18 1913 12/01/18 1859 11/28/18  1035   *  --   --   --  131* 132* 131*   POTASSIUM 5.9* 5.8* 5.6* 5.5* 5.9* 6.1* 5.4*   CHLORIDE 96  --   --   --  96 95 95   CO2 25  --   --   --   --  27 24   *  --   --   --  92* 102* 77*   CR 7.75*  --   --   --   --  7.19* 5.90*   *  --   --   --  158* 156* 225*   KIMBER 10.2*  --   --   --   --  10.5* 9.4     INRNo lab results found in last 7 days.     DIAGNOSTICS:  Personally reviewed ECG - no peaked Ts    A/P:  Maurizio Ohara is a 89 year old male c ESKD who has hyperkalemia.    1.  ESKD.  Pt missed his run yday due to transportation issues.  He is running now.  A.  Next planned run on Tues as outpt.    2.  Hyperkalemia.  Pt's K has been high lately, in part due to food choices at the NH.  He usually runs on a K 1.5 bath and his adequacy has been ok.  His fistula has been  working fine, too, so recirc could be a possibility, too, but of lower likelihood.  A.  Dialysis diet.  B.  Follow K.  C.  Will ask outpt dietitian to contact NH dietitian.    3.  Hypotension.  Pt has low BPs and is not on any HTN meds.  He takes midodrine c HD.  So far, tolerating fluid removal.  A.  Continue midodrine.  B.  Follow BPs, clinically as fluid is removed.    4.  Disposition.  He can be d/c'd p HD today.    Thank you for this consultation. We will follow c you.  Please call if any questions.      Attestation:   I have reviewed today's relevant vital signs, notes, medications, labs and imaging.    Rich Yung MD  [facility] Nephrology  [phone][RS1.1]     Revision History        User Key Date/Time User Provider Type Action    > RS1.1 12/2/2018 11:53 AM Rich Yung MD Physician Sign                     Progress Notes - Physician (Notes from 11/29/18 through 12/02/18)      Progress Notes by Meghan Garcia RN at 12/2/2018  9:25 AM     Author:  Meghan Garcia RN Service:  (none) Author Type:  Registered Nurse    Filed:  12/2/2018  1:01 PM Date of Service:  12/2/2018  9:25 AM Creation Time:  12/2/2018  9:25 AM    Status:  Signed :  Meghan Garcia RN (Registered Nurse)         Lab Results   Component Value Date    POTASSIUM 5.8 12/02/2018     Lab Results   Component Value Date    HGB 13.9 12/01/2018          Hemodialysis Note:      All machine safety checks completed and passed.  Total chlorine checks less than 0.1ppm   All connections secured, saline line double clamped.  Venous and arterial parameters set  Report rec'd from Sandra Hankins RN    Rec'd pt per bed acc'd by transport team. Verbal consent obtained for dialysis Rx this hospitalization.  Hepatitis B labs verified on machine log from previous patient.  Good circulation to fistula arm. Time out taken.    DORIS cannulated with 15 Ga needles with no difficulty. Tourniquet used.   Pt ran 3.5 hours on a  "K 2 bath, with[JR1.1] 2.5[JR1.2]L removed. Blood flow rate of 450[JR1.1] to 350[JR1.2] ml/min was obtained.   PRE-WEIGHT:82.7kgs,       POST-WT[JR1.1] 80.2kgs[JR1.2].      Complications: Pt states, \"my blood pressure always is in the 80's with dialysis\".  Education regarding dialysis and fluid removal  Protocol explained to staff RN regarding possibility of incapacitated dialysis RN.  Vascular Access: Aseptic prep done for both on/off  Total Heparin given: 0    Meds given:[JR1.1] Midodrine[JR1.2].      [JR1.1] Dilshad[JR1.2] visited pt during run.   Pods checked Q 15 minutes, remain clear throughout Rx.  Machine water alarms in place and functioning.  Total blood volume processed:[JR1.1]72.4L[JR1.3]  Hemostasis of needle sites achieved after[JR1.1] 15[JR1.3] minutes. Patient dialyzes at [facility] Q T-TH-SAT.  POST ASSESSMENTS: Skin warm and dry,[JR1.1] somnolent, disoriented to time and place[JR1.2], denies discomfort, Lungs[JR1.1] diminished with crackles[JR1.2], Resp rate regular, apical rate regular. No[JR1.1] change of[JR1.2] edema.  See flowsheet in EPIC for further details.  Report given to[JR1.1] Sandra Hankins,[JR1.2]  SHADY.   Diandra Garcia RN  [facility][JR1.1]               Revision History        User Key Date/Time User Provider Type Action    > JR1.3 12/2/2018  1:01 PM Meghan Garcia RN Registered Nurse Sign     JR1.2 12/2/2018 12:10 PM Meghan Garcia RN Registered Nurse      JR1.1 12/2/2018  9:25 AM Meghan Garcia RN Registered Nurse             Progress Notes by Ellen Duncan RN at 12/1/2018 11:20 PM     Author:  Ellen Duncan RN Service:  (none) Author Type:  Registered Nurse    Filed:  12/1/2018 11:22 PM Date of Service:  12/1/2018 11:20 PM Creation Time:  12/1/2018 11:20 PM    Status:  Signed :  Ellen Duncan RN (Registered Nurse)         Pt arrived to room 343 for admission from ED at 2250.  Pt non-responsive, which is unchanged from ED.  " "Transferred into bed via hover mat and a of 3.[HH1.1]       Revision History        User Key Date/Time User Provider Type Action    > HH1.1 12/1/2018 11:22 PM Ellen Duncan RN Registered Nurse Sign            ED Notes by Ellen Duncan RN at 12/1/2018  9:22 PM     Author:  Ellen Duncan RN Service:  (none) Author Type:  Registered Nurse    Filed:  12/1/2018 10:31 PM Date of Service:  12/1/2018  9:22 PM Creation Time:  12/1/2018  9:22 PM    Status:  Addendum :  Ellen Duncan RN (Registered Nurse)         [facility]  ED Nurse Handoff Report    Maurizio Ohara is a 89 year old male   ED Chief complaint: Generalized Weakness  . ED Diagnosis:   Final diagnoses:   None     Allergies:   Allergies   Allergen Reactions     Glyburide      Lisinopril      Hyperkalemia when hospitalized 4/5/2011     Metformin      Renal failure stage 4     Penicillins      SHADY Gallagher clarified with pt, pt does not remember rxn, it was a long time ago, and \"nothing crazy\".     Verapamil        Code Status: DNR / DNI  Activity level - Baseline/Home:  Stand with Assist of 2. Activity Level - Current:   Stand with Assist of 2. Lift room needed: No. Bariatric: No   Needed: No   Isolation: No. Infection: Not Applicable.     Vital Signs:   Vitals:    12/01/18 1827 12/01/18 1845 12/01/18 1941 12/01/18 2026   BP: 110/71 113/71     Pulse: 68      Resp: 18      Temp: 97.8  F (36.6  C)  98.2  F (36.8  C)    TempSrc: Oral  Rectal    SpO2: 95% 96%  94%   Weight: 77.1 kg (170 lb)          Cardiac Rhythm:  ,      Pain level: 0-10 Pain Scale: 0  Patient confused: Yes. Patient Falls Risk: Yes.   Elimination Status: has not voided yet, states he does make urine and pee's   Patient Report - Initial ComplaintPt [facility] d/t increased weakness. Pt is a dialysis pt who missed his appointment today d/t transportation issue. Per EMS staff at facility sent pt in d/t weakness. Pt is alert, oriented to " self, and month, confused as to place and situation at this time. ABCD's intact :    Focused Assessment:  Cognitive - Cognitive/Neuro/Behavioral WDL:  WDL except; level of consciousness Level Of Consciousness: confused Orientation: disoriented to; situation; place Follows Commands: yes  Skin - Skin WDL:  WDL except Skin Comment: Patient presents wtih ace wraps on both lower calf's, ace wraps were poorly wrapped and tied improperly to cause venous restriction, currently taken off ace wraps, calves appear swollen and edematous, skin tear also apparent on right hand on top of the hand and of the wrist of the posterior side    [facility] called, Report from Karyn Dominguez. Pt has been increasingly weak for a week and a half following a fall. Pt is confused at baseline and she reports this weakness in not new, pt has been declining since fall. MD made aware[NE1.1]     Spoke with Timmy from [facility] @2200, she stated his baseline this afternoon was feeding himself, transferring with an assist of two,  States he has a Monday morning dialysis rescheduled at 6 am [facility], for his missed appointment today, call back number to [facility] : [phone][NE1.2]  Tests Performed: Labs. Abnormal Results:[NE1.1]   Labs Ordered and Resulted from Time of ED Arrival Up to the Time of Departure from the ED   CBC WITH PLATELETS DIFFERENTIAL - Abnormal; Notable for the following:        Result Value    RBC Count 3.48 (*)     Hemoglobin 12.5 (*)     Hematocrit 39.9 (*)      (*)     MCH 35.9 (*)     MCHC 31.3 (*)     RDW 19.6 (*)     Platelet Count 140 (*)     Nucleated RBCs 2 (*)     All other components within normal limits   COMPREHENSIVE METABOLIC PANEL - Abnormal; Notable for the following:     Sodium 132 (*)     Potassium 6.1 (*)     Glucose 156 (*)     Urea Nitrogen 102 (*)     Creatinine 7.19 (*)     GFR Estimate 7 (*)     GFR Estimate If Black 9 (*)     Calcium 10.5 (*)     Albumin 3.1 (*)     Alkaline Phosphatase  173 (*)     All other components within normal limits   MAGNESIUM - Abnormal; Notable for the following:     Magnesium 2.5 (*)     All other components within normal limits   ISTAT BASIC MET ICA HCT POCT - Abnormal; Notable for the following:     Sodium 131 (*)     Potassium 5.9 (*)     Glucose 158 (*)     Urea Nitrogen 92 (*)     Creatinine 7.5 (*)     GFR Estimate 7 (*)     GFR Estimate If Black 8 (*)     Calcium Ionized 5.3 (*)     All other components within normal limits   PHOSPHORUS   POTASSIUM   PERIPHERAL IV CATHETER   ISTAT CG4 GASES LACTATE GIOVANA NURSING POCT   CARDIAC CONTINUOUS MONITORING   ASSESS FOR HYPOGLYCEMIA SYMPTOMS   NOTIFY PHYSICIAN[NE1.3]   .   Treatments provided: Hyperkalemia Protocol, Glucose, Insulin, Lasix, Kayexelate not given yet as patient has been sleeping and will be unable to attempt oral route will be administering rectally, Gluc checks every 30 minutes currently  Family Comments: Niece : Was given phone number for Niece and gave information to admitting doctor who stated she will be contacting her  OBS brochure/video discussed/provided to patient:  Yes  ED Medications:   Medications   glucose gel 15-30 g (not administered)     Or   dextrose 50 % injection 25-50 mL (not administered)     Or   glucagon injection 1 mg (not administered)   dextrose 10% infusion (not administered)   sodium polystyrene (KAYEXALATE) suspension 15 g (not administered)   dextrose 50 % injection 25 g (25 g Intravenous Given 12/1/18 2055)   insulin (regular) (HumuLIN R/NovoLIN R) injection 8 Units (8 Units Intravenous Given 12/1/18 2103)   albuterol (PROVENTIL) neb solution 10 mg (10 mg Nebulization Given 12/1/18 2026)   furosemide (LASIX) injection 40 mg (40 mg Intravenous Given 12/1/18 2115)     Drips infusing:  No  For the majority of the shift, the patient's behavior Green. Interventions performed were N/A.     Severe Sepsis OR Septic Shock Diagnosis Present: No      ED Nurse Name/Phone Number: [facility] LAU  Elder,   9:22 PM[NE1.1]  RECEIVING UNIT ED HANDOFF REVIEW    Above ED Nurse Handoff Report was reviewed: Yes  Reviewed by: Ellen Duncan on December 1, 2018 at 10:31 PM[HH1.1]          Revision History        User Key Date/Time User Provider Type Action    > HH1.1 12/1/2018 10:31 PM Ellen Duncan RN Registered Nurse Addend     NE1.2 12/1/2018 10:02 PM James Webber RN Registered Nurse Addend     [N/A] 12/1/2018  9:52 PM James Webber RN Registered Nurse Addend     NE1.3 12/1/2018  9:29 PM James Webber RN Registered Nurse Sign     NE1.1 12/1/2018  9:22 PM James Webber RN Registered Nurse             ED Notes by Satya Bravo at 12/1/2018 10:14 PM     Author:  Satya Bravo Service:  (none) Author Type:  Emergency Room Technician    Filed:  12/1/2018 10:14 PM Date of Service:  12/1/2018 10:14 PM Creation Time:  12/1/2018 10:14 PM    Status:  Signed :  Satya Bravo (Emergency Room Technician)         PT taken off O2 per RN request.[JL1.1]     Revision History        User Key Date/Time User Provider Type Action    > JL1.1 12/1/2018 10:14 PM Satya Bravo Emergency Room Technician Sign            ED Notes by James Webber RN at 12/1/2018  8:50 PM     Author:  James Webber RN Service:  (none) Author Type:  Registered Nurse    Filed:  12/1/2018  9:06 PM Date of Service:  12/1/2018  8:50 PM Creation Time:  12/1/2018  9:06 PM    Status:  Signed :  James Webber RN (Registered Nurse)         Patient sleeping comfortably in room, will not awake to verbal, woke up to sternal stimulation, explained medication to be administered to patient[NE1.1]     Revision History        User Key Date/Time User Provider Type Action    > NE1.1 12/1/2018  9:06 PM James Webber RN Registered Nurse Sign            ED Notes by Rush Melvin RN at 12/1/2018  7:20 PM     Author:  Rush Melvin RN Service:  (none) Author Type:  Registered Nurse    Filed:  12/1/2018   7:23 PM Date of Service:  12/1/2018  7:20 PM Creation Time:  12/1/2018  7:23 PM    Status:  Signed :  Rush Melvin RN (Registered Nurse)         [facility] called, Report from Karyn Dominguez. Pt has been increasingly weak for a week and a half following a fall. Pt is confused at baseline and she reports this weakness in not new, pt has been declining since fall. MD made aware[EG1.1]      Revision History        User Key Date/Time User Provider Type Action    > EG1.1 12/1/2018  7:23 PM Rush Melvin RN Registered Nurse Sign            ED Notes by Cassandra Elliott RN at 12/1/2018  6:24 PM     Author:  Cassandra Elliott RN Service:  (none) Author Type:  Registered Nurse    Filed:  12/1/2018  6:24 PM Date of Service:  12/1/2018  6:24 PM Creation Time:  12/1/2018  6:24 PM    Status:  Signed :  Cassandra Elliott RN (Registered Nurse)         Bed: ED16  Expected date: 12/1/18  Expected time: 6:14 PM  Means of arrival: Ambulance  Comments:  BV2       Revision History        User Key Date/Time User Provider Type Action    > TT1.1 12/1/2018  6:24 PM Cassandra Elliott RN Registered Nurse Sign                  Procedure Notes     No notes of this type exist for this encounter.      Progress Notes - Therapies (Notes from 11/29/18 through 12/02/18)     No notes of this type exist for this encounter.

## 2018-12-01 NOTE — IP AVS SNAPSHOT
` `     [place] [address] [facility]: [phone]            Medication Administration Report for Maurizio Ohara as of 12/02/18 1733   Legend:    Given Hold Not Given Due Canceled Entry Other Actions    Time Time (Time) Time  Time-Action       Inactive    Active    Linked        Medications 11/26/18 11/27/18 11/28/18 11/29/18 11/30/18 12/01/18 12/02/18    acetaminophen (TYLENOL) Suppository 650 mg  Dose: 650 mg  Freq: EVERY 4 HOURS PRN Route: RE  PRN Reason: mild pain  Start: 12/01/18 2331   Admin Instructions: Alternate ibuprofen (if ordered) with acetaminophen.  Maximum acetaminophen dose from all sources = 75 mg/kg/day not to exceed 4 grams/day.    Admin. Amount: 1 suppository (1 × 650 mg suppository)  Dispense Loc: [facility]               acetaminophen (TYLENOL) tablet 650 mg  Dose: 650 mg  Freq: EVERY 4 HOURS PRN Route: PO  PRN Reason: mild pain  Start: 12/01/18 2331   Admin Instructions: Alternate ibuprofen (if ordered) with acetaminophen.  Maximum acetaminophen dose from all sources = 75 mg/kg/day not to exceed 4 grams/day.    Admin. Amount: 2 tablet (2 × 325 mg tablet)  Dispense Loc: [facility]               aspirin EC tablet 81 mg  Dose: 81 mg  Freq: DAILY Route: PO  Start: 12/02/18 0900   Admin. Amount: 1 tablet (1 × 81 mg tablet)  Last Admin: 12/02/18 1316  Dispense Loc: [facility]           1316 (81 mg)-Given           atorvastatin (LIPITOR) tablet 40 mg  Dose: 40 mg  Freq: EVERY EVENING Route: PO  Start: 12/01/18 2345   Admin. Amount: 1 tablet (1 × 40 mg tablet)  Dispense Loc: [facility]           (0310)-Not Given [C]       [ ] 2000           calcium acetate (PHOSLO) capsule 2,001 mg  Dose: 2,001 mg  Freq: 3 TIMES DAILY WITH MEALS Route: PO  Start: 12/02/18 0900   Admin Instructions: Best if given with meals. Take with meals.    Admin. Amount: 3 capsule (3 × 667 mg capsule)  Last Admin: 12/02/18 1729  Dispense Loc: [facility]           (1010)-Not Given [C]       1316 (2,001  mg)-Given       1729 (2,001 mg)-Given           diclofenac (VOLTAREN) 1 % topical gel 2 g  Dose: 2 g  Freq: 4 TIMES DAILY Route: Top  Start: 12/02/18 0900   Admin Instructions: Apply to  E  Send dosing card with product.    Admin. Amount: 2 g  Last Admin: 12/02/18 1424  Dispense Loc:  Main Pharmacy           (0900)-Not Given       1424 (2 g)-Given       [ ] 1800       [ ] 2200           gabapentin (NEURONTIN) capsule 100 mg  Dose: 100 mg  Freq: 3 TIMES DAILY Route: PO  Start: 12/02/18 0900   Admin. Amount: 1 capsule (1 × 100 mg capsule)  Last Admin: 12/02/18 1730  Dispense Loc:  [facility] MS3[facility]           (0900)-Not Given [C]       1316 (100 mg)-Given       1730 (100 mg)-Given       [ ] 2100           glucose gel 15-30 g  Dose: 15-30 g  Freq: EVERY 15 MIN PRN Route: PO  PRN Reason: low blood sugar  Start: 12/01/18 2331   Admin Instructions: Give 15 g for BG 51 to 69 mg/dL IF patient is conscious and able to swallow. Give 30 g for BG less than or equal to 50 mg/dL IF patient is conscious and able to swallow. Do NOT give glucose gel via enteral tube.  IF patient has enteral tube: give apple juice 120 mL (4 oz or 15 g of CHO) via enteral tube for BG 51 to 69 mg/dL.  Give apple juice 240 mL (8 oz or 30 g of CHO) via enteral tube for BG less than or equal to 50 mg/dL.    ~Oral gel is preferable for conscious and able to swallow patient.   ~IF gel unavailable or patient refuses may provide apple juice 120 mL (4 oz or 15 g of CHO). Document juice on I and O flowsheet.    Admin. Amount: 15-30 g  Dispense Loc:  ADS MS3W  Volume: 75 mL              Or  dextrose 50 % injection 25-50 mL  Dose: 25-50 mL  Freq: EVERY 15 MIN PRN Route: IV  PRN Reason: low blood sugar  Start: 12/01/18 2331   Admin Instructions: Use if have IV access, BG less than 70 mg/dL and meet dose criteria below:  Dose if conscious and alert (or disorientated) and NPO = 25 mL  Dose if unconscious / not alert = 50 mL  Vesicant. For ordered doses up to 25 g,  give IV Push undiluted. Give each 5g over 1 minute.    Admin. Amount: 25-50 mL  Dispense Loc: [facility]  Infused Over: 1-5 Minutes  Volume: 50 mL              Or  glucagon injection 1 mg  Dose: 1 mg  Freq: EVERY 15 MIN PRN Route: SC  PRN Reason: low blood sugar  PRN Comment: May repeat x 1 only  Start: 12/01/18 2331   Admin Instructions: May give SQ or IM. ONLY use glucagon IF patient has NO IV access AND is UNABLE to swallow AND blood glucose is LESS than or EQUAL to 50 mg/dL.  If ordered IV, give IV Push over 1 minute. Reconstitute with 1mL sterile water.    Admin. Amount: 1 mg  Dispense Loc: [facility]               insulin aspart (NovoLOG) inj (RAPID ACTING)  Dose: 1-4 Units  Freq: EVERY 4 HOURS Route: SC  Start: 12/01/18 2345   Admin Instructions: Correction Scale - LOW INSULIN RESISTANCE DOSING     Do Not give Correction Insulin if BG less than 140.  For  - 239 give 1 unit.  For  - 339 give 2 units.  For BG  340 - 439  give 3 units  For BG greater than or equal to 440 give 4 units  Check blood glucose Q4H and administer based on blood glucose.   Notify provider if glucose greater than or equal to 350 mg/dL after administration of correction dose.  If given at mealtime, administer within 30 minutes of start of meal    Admin. Amount: 1-4 Units  Last Admin: 12/02/18 0644  Dispense Loc: Contact Rx for dose  Volume: 3 mL           (0130)-Not Given [C]       0644 (1 Units)-Given [C]       (1029)-Not Given [C]       (1402)-Not Given       [ ] 1800       [ ] 2200           melatonin tablet 1 mg  Dose: 1 mg  Freq: AT BEDTIME PRN Route: PO  PRN Reason: sleep  Start: 12/01/18 2331   Admin Instructions: Do not give unless at least 6 hours of uninterrupted sleep is expected.    Admin. Amount: 1 tablet (1 × 1 mg tablet)  Dispense Loc: [facility]               naloxone (NARCAN) injection 0.1-0.4 mg  Dose: 0.1-0.4 mg  Freq: EVERY 2 MIN PRN Route: IV  PRN Reason: opioid reversal  Start: 12/01/18 2331   Admin  Instructions: For respiratory rate LESS than or EQUAL to 8.  Partial reversal dose:  0.1 mg titrated q 2 minutes for Analgesia Side Effects Monitoring Sedation Level of 3 (frequently drowsy, arousable, drifts to sleep during conversation).Full reversal dose:  0.4 mg bolus for Analgesia Side Effects Monitoring Sedation Level of 4 (somnolent, minimal or no response to stimulation).  For ordered IV doses 0.1-2mg give IVP. Give each 0.4mg over 15 seconds in emergency situations. For non-emergent situations further dilute in 9mL of NS to facilitate titration of response.    Admin. Amount: 0.1-0.4 mg = 0.25-1 mL Conc: 0.4 mg/mL  Dispense Loc: [facility]  Volume: 1 mL               nitroGLYcerin (NITROSTAT) sublingual tablet 0.4 mg  Dose: 0.4 mg  Freq: EVERY 5 MIN PRN Route: SL  PRN Reason: chest pain  Start: 12/01/18 2331   Admin. Amount: 1 tablet (1 × 0.4 mg tablet)  Dispense Loc: [facility]               polyethylene glycol (MIRALAX/GLYCOLAX) Packet 17 g  Dose: 17 g  Freq: DAILY PRN Route: PO  PRN Reason: constipation  Start: 12/01/18 2331   Admin Instructions: 1 Packet = 17 grams. Mixed prescribed dose in 8 ounces of water. Follow with 8 oz. of water.    Admin. Amount: 17 g  Dispense Loc: [facility] MS[facility]               polyethylene glycol (MIRALAX/GLYCOLAX) Packet 17 g  Dose: 17 g  Freq: DAILY Route: PO  Start: 12/02/18 0900   Admin Instructions: 1 Packet = 17 grams. Mixed prescribed dose in 8 ounces of water. Follow with 8 oz. of water.    Admin. Amount: 17 g  Last Admin: 12/02/18 1316  Dispense Loc: [facility]           (0900)-Not Given       1316 (17 g)-Given           sodium polystyrene (KAYEXALATE) suspension 15 g  Dose: 15 g  Freq: ONCE Route: PO  Start: 12/01/18 2029   Admin Instructions: All orders for Kayexalate should include a specified duration of therapy.  Open ended orders, without a stop date and time, are prohibited. (per Medication Safety Comm. 7/04) Sodium polystyrene sulfonate (Kayexalate) can reduce  the absorption of other oral medications.  When feasible, schedule other oral medications to be given at least 3 hours before or after Kayexalate.  Do not delay the administration of a Kayexalate dose without consulting a provider or pharmacist.    Admin. Amount: 15 g = 60 mL Conc: 0.25 g/mL  Dispense Loc:  Main Pharmacy  Administrations Remainin  Volume: 60 mL                     Future Medications  Medications 18       midodrine (PROAMATINE) tablet 10 mg  Dose: 10 mg  Freq: Once per day on Tue Thu Sat Route: PO  Start: 18 0900   Admin Instructions: Give 10mg three times weekly before Dialysis.    Admin. Amount: 2 tablet (2 × 5 mg tablet)  Last Admin: 18  Dispense Loc:  ADS MS3W           (0937)-Not Given [C]       1317 (10 mg)-Given [C]          Completed Medications  Medications 18         Dose: 300 mL  Freq: ONCE Route: HM Machine  Start: 18   End: 18   Admin Instructions: For Dialyzer Prime. (In Dialyzer)    Admin. Amount: 300 mL  Last Admin: 18  Dispense Loc: [facility] Floor Stock  Administrations Remainin  Volume: 300 mL  POC: Dialysis           0820 (300 mL)-New Bag             Dose: 250 mL  Freq: ONCE IN DIALYSIS Route: IV  Start: 18   End: 18   Admin Instructions: For patient prime during dialysis    Admin. Amount: 250 mL  Last Admin: 18  Dispense Loc: [facility] Floor Stock  Administrations Remainin  Volume: 250 mL  POC: Dialysis           1225 (250 mL)-New Bag             Dose: 10 mg  Freq: ONCE Route: NEBULIZATION  Start: 18   End: 18   Admin. Amount: 10 mg = 2 mL Conc: 2.5 mg/0.5 mL  Last Admin: 18  Dispense Loc:  ADS ERA  Infused Over: 10-15 Minutes  Administrations Remainin  Volume: 2 mL          2026 (10 mg)-Given              Dose: 25 g  Freq: ONCE Route:  IV  Start: 18   End: 18   Admin Instructions: Give with INSULIN for Hyperkalemia.  DO NOT give dextrose IF blood glucose is greater than 250 mg/dL IF blood glucose is greater than 250 mg/dL, monitor BG and give dextrose IF level drops below 100 mg/dL.   Monitor POCT glucose Q 30 minutes X4 then Q1 hour X2 then consult with provider.  Potassium check 2 hours following treatment.  Notify provider if potassium is outside normal range.  Vesicant. For ordered doses up to 25 g, give IV Push undiluted. Give each 5g over 1 minute.    Admin. Amount: 25 g = 50 mL Conc: 50 g/100 mL  Last Admin: 18  Dispense Loc: [facility]S ERA  Infused Over: 1-5 Minutes  Administrations Remainin  Volume: 50 mL   Current Line: Peripheral IV 18 Left Upper forearm          (25 g)-Given              Dose: 40 mg  Freq: ONCE Route: IV  Start: 18   End: 18   Admin Instructions: For ordered IV doses 1-40 mg, give IV Push undiluted over 1-2 minutes.    Admin. Amount: 40 mg = 4 mL Conc: 10 mg/mL  Last Admin: 18  Dispense Loc: [facility] ADS ERA  Infused Over: 1-2 Minutes  Administrations Remainin  Volume: 4 mL   Current Line: Peripheral IV 18 Left Upper forearm          (40 mg)-Given              Dose: 0.1 Units/kg  Weight Dosing Info: 77.1 kg  Freq: ONCE Route: IV  Start: 18   End: 18   Admin Instructions: Give with DEXTROSE for hyperkalemia. Monitor POCT glucose Q 30 minutes X4 then Q1 hour X2 then consult with provider.   Potassium check 2 hours following treatment.  Notify provider if potassium is outside normal range    Admin. Amount: 8 Units = 8 mL Conc: 1 Units/mL  Last Admin: 18  Dispense Loc:  Main Pharmacy  Administrations Remainin  Volume: 8 mL   Current Line: Peripheral IV 18 Left Upper forearm          (8 Units)-Given              Dose: 10 mg  Freq: ONCE Route: PO  Start: 18   End: 18    Admin Instructions: Give 10mg three times weekly before Dialysis.    Admin. Amount: 2 tablet (2 × 5 mg tablet)  Last Admin: 18  Dispense Loc:  ADS MS3W  Administrations Remainin           1122 (10 mg)-Given             Freq: ONCE Route: XX  Start: 18 0845   End: 18 1257   Admin Instructions: Check ACT during first 30 minutes of dialysis. Normal saline flushes may be used to maintain patency of circuit.    Last Admin: 18  Dispense Loc: Non Rx dispense  Administrations Remainin  POC: Dialysis           0800 ( )-Handoff       0801 ( )-Given by Other       1318 ( )-Handoff             Dose: 30 g  Freq: ONCE Route: RE  Start: 18   End: 18   Admin Instructions: Give NOW. Avoid in post-operative patients.    Admin. Amount: 30 g = 120 mL Conc: 0.25 g/mL  Last Admin: 18  Dispense Loc:  Main Pharmacy  Administrations Remainin  Volume: 120 mL           0152 (30 g)-Given          Discontinued Medications  Medications 18         Dose: 100-150 mL  Freq: EVERY 15 MIN PRN Route: IV  PRN Comment: Until hypotensive symptoms resolve  Start: 18   End: 18 1257   Admin Instructions: Up to 1000 mL maximum total dose.  Give if needed to manage Systolic Blood Pressure as indicated in Hemodialysis Single Treatment set up.  Notify provider if patient blood pressure is not responsive with the bolus.    Admin. Amount: 100-150 mL  Dispense Loc: [facility] Floor Stock  Administrations Remaining: 10  Volume: 150 mL  POC: Dialysis                  1257-Med Discontinued         Dose: 1,000 mg  Freq: 3 TIMES DAILY PRN Route: PO  PRN Reason: mild pain  Start: 18   End: 18   Admin Instructions: Maximum acetaminophen dose from all sources = 75 mg/kg/day not to exceed 4 gram    Admin. Amount: 2 tablet (2 × 500 mg tablet)          2340-Med Discontinued          Dose: 1 g  Freq:  ONCE Route: IV  Start: 18   End: 18   Admin Instructions: Begin infusion before dextrose and insulin but do not delay administration of dextrose and insulin for completion of calcium infusion. (Maximum infusion rate of 150 mg/minute)    Admin. Amount: 1 g  Dispense Loc:  Main Pharmacy  Infused Over: 15 Minutes  Administrations Remainin  Volume: 100 mL   Mixture Administration Information:   Medication Type Amount   calcium gluconate 10 % SOLN Medications 1 g   D5W 5 % SOLN Base 100 mL                         -Med Discontinued          Rate: 75 mL/hr   Freq: CONTINUOUS Route: IV  Start: 18   End: 18   Admin Instructions: For hyperkalemia. Monitor POCT Blood glucose with infusion. Notify provider IF blood glucose is greater than 250 mg/dL or less than 100 mg/dL. Discontinue infusion after 2 hours if BG greater than 250 mg/dL and notify provider.    Dispense Loc: [facility] Stock  Volume: 500 mL   Current Line: Peripheral IV 18 Left Upper forearm                2343-Med Discontinued          Dose: 15-30 g  Freq: EVERY 15 MIN PRN Route: PO  PRN Reason: low blood sugar  Start: 18   End: 18   Admin Instructions: Give 15 g for BG 51 to 69 mg/dL IF patient is conscious and able to swallow. Give 30 g for BG less than or equal to 50 mg/dL IF patient is conscious and able to swallow. Do NOT give glucose gel via enteral tube.  IF patient has enteral tube: give apple juice 120 mL (4 oz or 15 g of CHO) via enteral tube for BG 51 to 69 mg/dL.  Give apple juice 240 mL (8 oz or 30 g of CHO) via enteral tube for BG less than or equal to 50 mg/dL.    ~Oral gel is preferable for conscious and able to swallow patient.   ~IF gel unavailable or patient refuses may provide apple juice 120 mL (4 oz or 15 g of CHO). Document juice on I and O flowsheet.    Admin. Amount: 15-30 g  Volume: 75 mL          233-Med Discontinued       Or    Dose: 25-50 mL  Freq: EVERY  15 MIN PRN Route: IV  PRN Reason: low blood sugar  Start: 12/01/18 2331   End: 12/01/18 2338   Admin Instructions: Use if have IV access, BG less than 70 mg/dL and meet dose criteria below:  Dose if conscious and alert (or disorientated) and NPO = 25 mL  Dose if unconscious / not alert = 50 mL  Vesicant. For ordered doses up to 25 g, give IV Push undiluted. Give each 5g over 1 minute.    Admin. Amount: 25-50 mL  Infused Over: 1-5 Minutes  Volume: 50 mL          2338-Med Discontinued       Or    Dose: 1 mg  Freq: EVERY 15 MIN PRN Route: SC  PRN Reason: low blood sugar  PRN Comment: May repeat x 1 only  Start: 12/01/18 2331   End: 12/01/18 2338   Admin Instructions: May give SQ or IM. ONLY use glucagon IF patient has NO IV access AND is UNABLE to swallow AND blood glucose is LESS than or EQUAL to 50 mg/dL.  If ordered IV, give IV Push over 1 minute. Reconstitute with 1mL sterile water.    Admin. Amount: 1 mg          2338-Med Discontinued          Dose: 15-30 g  Freq: EVERY 15 MIN PRN Route: PO  PRN Reason: low blood sugar  Start: 12/01/18 2003   End: 12/01/18 2338   Admin Instructions: Give 15 g for BG 51 to 69 mg/dL IF patient is conscious and able to swallow. Give 30 g for BG less than or equal to 50 mg/dL IF patient is conscious and able to swallow. Do NOT give glucose gel via enteral tube.  IF patient has enteral tube: give apple juice 120 mL (4 oz or 15 g of CHO) via enteral tube for BG 51 to 69 mg/dL.  Give apple juice 240 mL (8 oz or 30 g of CHO) via enteral tube for BG less than or equal to 50 mg/dL.    ~Oral gel is preferable for conscious and able to swallow patient.   ~IF gel unavailable or patient refuses may provide apple juice 120 mL (4 oz or 15 g of CHO). Document juice on I and O flowsheet.    Admin. Amount: 15-30 g  Dispense Loc: RH ADS MS3W  Volume: 75 mL          2338-Med Discontinued       Or    Dose: 25-50 mL  Freq: EVERY 15 MIN PRN Route: IV  PRN Reason: low blood sugar  Start: 12/01/18 2003    End: 12/01/18 2338   Admin Instructions: Use if have IV access, BG less than 70 mg/dL and meet dose criteria below:  Dose if conscious and alert (or disorientated) and NPO = 25 mL  Dose if unconscious / not alert = 50 mL  Vesicant. For ordered doses up to 25 g, give IV Push undiluted. Give each 5g over 1 minute.    Admin. Amount: 25-50 mL  Dispense Loc: RH ADS MS3W  Infused Over: 1-5 Minutes  Volume: 50 mL          2338-Med Discontinued       Or    Dose: 1 mg  Freq: EVERY 15 MIN PRN Route: SC  PRN Reason: low blood sugar  PRN Comment: May repeat x 1 only  Start: 12/01/18 2003   End: 12/01/18 2338   Admin Instructions: May give SQ or IM. ONLY use glucagon IF patient has NO IV access AND is UNABLE to swallow AND blood glucose is LESS than or EQUAL to 50 mg/dL.  If ordered IV, give IV Push over 1 minute. Reconstitute with 1mL sterile water.    Admin. Amount: 1 mg  Dispense Loc: RH ADS MS3W          2338-Med Discontinued     Medications 11/26/18 11/27/18 11/28/18 11/29/18 11/30/18 12/01/18 12/02/18

## 2018-12-01 NOTE — IP AVS SNAPSHOT
[place] [address] [facility]    [address]    [facility] [address]    Phone:  [phone]    Fax:  [phone]                                       After Visit Summary   12/1/2018    Maurizio Ohara    MRN: [id number]           After Visit Summary Signature Page     I have received my discharge instructions, and my questions have been answered. I have discussed any challenges I see with this plan with the nurse or doctor.    ..........................................................................................................................................  Patient/Patient Representative Signature      ..........................................................................................................................................  Patient Representative Print Name and Relationship to Patient    ..................................................               ................................................  Date                                   Time    ..........................................................................................................................................  Reviewed by Signature/Title    ...................................................              ..............................................  Date                                               Time          22EPIC Rev 08/18

## 2018-12-01 NOTE — IP AVS SNAPSHOT
` ` Patient Information     Patient Name Sex     Maurizio Ohara ([id number]) Male 10/7/1929       Room Bed    [facility] 0343-06      Patient Demographics     Address Phone    [address] [facility] [address] [phone] (Home)  NONE (Work)  [phone] (Mobile) *Preferred*      Patient Ethnicity & Race     Ethnic Group Patient Race    American White      Emergency Contact(s)     Name Relation Home Work Mobile    Karyn Orozco Sister [phone] none none    Rosa Relative [phone]        Documents on File        Status Date Received Description       Documents for the Patient    Privacy Notice - [place] Received 13     Insurance Card Received 17 Medicare/HP    External Medication Information Consent Accepted 01/08/15     Patient ID Received 13     Consent for Services - Hospital/Clinic Received () 13     Consent for EHR Access Received 13     [facility] Specified Other       Consent for Services - Hospital/Clinic Received () 14     HIM MECCA Authorization  14     Insurance Card Received 17 HP -    HIM MECCA Authorization  01/12/15     Consent for Services - Hospital/Clinic Received () 16     HIM MECCA Authorization  16 [facility]    Consent for Services/Privacy Notice - Hospital/Clinic Received () 02/10/16     HIM MECCA Authorization  10/03/16 [facility]    Patient ID Received 18     HIM MECCA Authorization  10/27/16 [facility]    Consent for Services/Privacy Notice - Hospital/Clinic Received () 17     HIM MECCA Authorization  17     Care Everywhere Prospective Auth Received 10/18/17     HIM MECCA Authorization  18     Consent for Services - Hospital and Clinic Received 18     HIE Auth Received 18     HIM MECCA Authorization  18     Insurance Card Received 06/15/18 [employer] 2018    Consent to Communicate Received 18 Auth to Share PHI    HIM MECCA Authorization  18     HIM MECCA  Authorization  11/09/18 INFO CHECKED    Advance Directives and Living Will Received 11/19/18 POLST 11/09/18    HIM MECCA Authorization  11/19/18     Consent for Services - Hospital/Clinic  (Deleted)         Documents for the Encounter    CMS IM for Patient Signature         Admission Information     Attending Provider Admitting Provider Admission Type Admission Date/Time    Caryl William MD Mithani, Salima Aziz, MD Emergency 12/01/18  1824    Discharge Date Hospital Service Auth/Cert Status Service Area     Observation Incomplete [facility] SERVICES    Unit Room/Bed Admission Status        3 MEDICAL SURGICAL 0343/0343-01 Admission (Confirmed)       Admission     Complaint    Hyperkalemia      Hospital Account     Name Acct ID Class Status Primary Coverage    Maurizio Ohara [id number] Observation Open MEDICARE - MEDICARE FOR HB SUPPLEMENT            Guarantor Account (for Hospital Account #[id number])     Name Relation to Pt Service Area Active? Acct Type    Maurizio Ohara Self FCS Yes Personal/Family    Address Phone          [address] [place] [address] [phone](H)  NONE(O)              Coverage Information (for Hospital Account #[id number])     1. MEDICARE/MEDICARE FOR HB SUPPLEMENT     F/O Payor/Plan Precert #    MEDICARE/MEDICARE FOR HB SUPPLEMENT     Subscriber Subscriber #    Maurizio Ohara [id number]    Address Phone    [employer]  [address]  [place] [address] [phone]          2. [employer]AN     F/O Payor/Plan Precert #    [employer]ED[employer]AN     Subscriber Subscriber #    Maurizio Ohara [id number]    Address Phone    [address]  [place] [address] [phone]

## 2018-12-01 NOTE — IP AVS SNAPSHOT
MRN:[id number]                      After Visit Summary   12/1/2018    Maurizio Ohara    MRN: [id number]           Thank you!     Thank you for choosing [facility] for your care. Our goal is always to provide you with excellent care. Hearing back from our patients is one way we can continue to improve our services. Please take a few minutes to complete the written survey that you may receive in the mail after you visit. If you would like to speak to someone directly about your visit please contact Patient Relations at [phone]. Thank you!          Patient Information     Date Of Birth          10/7/1929        About your hospital stay     You were admitted on:  December 1, 2018 You last received care in the:  [facility]    You were discharged on:  December 2, 2018        Reason for your hospital stay       You were admitted for inpatient hemodialysis after missing session yesterday.                  Who to Call     For medical emergencies, please call 911.  For non-urgent questions about your medical care, please call your primary care provider or clinic, [phone]          Attending Provider     Provider Specialty    Yohana Capellan DO Emergency Medicine    Aisha Og MD Emergency Medicine    Caryl William MD Internal Medicine       Primary Care Provider Office Phone # Fax #    Justina Moise [phone]      After Care Instructions     Activity - Up with nursing assistance           Advance Diet as Tolerated       Follow this diet upon discharge: Orders Placed This Encounter      Room Service      Dialysis Diet            General info for SNF       Length of Stay Estimate: Long Term Care  Condition at Discharge: Stable  Level of care:board and care  Rehabilitation Potential: Fair  Admission H&P remains valid and up-to-date: Yes  Recent Chemotherapy: N/A  Use Nursing Home Standing Orders: N/A       "            Pending Results     No orders found for last 3 day(s).            Statement of Approval     Ordered          18 1537  I have reviewed and agree with all the recommendations and orders detailed in this document.  EFFECTIVE NOW     Approved and electronically signed by:  John Cabrales MD             Admission Information     Date & Time Provider Department Dept. Phone    2018 Caryl William MD [place] [address] Medical Surgical [phone]      Your Vitals Were     Blood Pressure Pulse Temperature Respirations Weight Pulse Oximetry    85/43 (BP Location: Left arm) 68 98  F (36.7  C) 18 82.8 kg (182 lb 8 oz) 96%    BMI (Body Mass Index)                   28.58 kg/m2           MyChart Information     [employer] lets you send messages to your doctor, view your test results, renew your prescriptions, schedule appointments and more. To sign up, go to [email or web address] . Click on \"Log in\" on the left side of the screen, which will take you to the Welcome page. Then click on \"Sign up Now\" on the right side of the page.     You will be asked to enter the access code listed below, as well as some personal information. Please follow the directions to create your username and password.     Your access code is: [email or web address]  Expires: 2018  1:21 PM     Your access code will  in 90 days. If you need help or a new code, please call your [place] clinic or [phone].        Care EveryWhere ID     This is your Care EveryWhere ID. This could be used by other organizations to access your [place] medical records  [id number]        Equal Access to Services     [facility]: Hadii tressa piper Solora, waaxda luqadaha, qaybta kaalmacatherine melendez. So [facility] [phone].    ATENCIÓN: Si habla español, tiene a mast disposición servicios gratuitos de asistencia lingüística. Llame al [phone].    We comply with applicable federal civil rights " and [place] laws. We do not discriminate on the basis of race, color, national origin, age, disability, sex, sexual orientation or gender identity.                             Review of your medicines      CONTINUE these medicines which have NOT CHANGED        Dose / Directions    acetaminophen 500 MG tablet   Commonly known as:  TYLENOL        Dose:  1000 mg   Take 1,000 mg by mouth 3 times daily as needed for mild pain   Refills:  0       aspirin 81 MG tablet   Commonly known as:  ASA        Dose:  81 mg   Take 81 mg by mouth daily   Refills:  0       atorvastatin 40 MG tablet   Commonly known as:  LIPITOR        Dose:  40 mg   Take 40 mg by mouth At Bedtime   Refills:  0       calcium acetate 667 MG Caps capsule   Commonly known as:  PHOSLO        Dose:  2001 mg   Take 2,001 mg by mouth 3 times daily (with meals)   Refills:  0       diclofenac 1 % topical gel   Commonly known as:  VOLTAREN   Used for:  Cellulitis of right lower extremity        Dose:  2 g   Apply 2 g topically 4 times daily Apply to RLE   Refills:  0       GABAPENTIN PO        Dose:  100 mg   Take 100 mg by mouth 3 times daily   Refills:  0       MIDODRINE HCL PO        Dose:  10 mg   Take 10 mg by mouth three times a week on Tues, Thurs, Sat 1 hour before dialysis AND Give 10 mg by mouth one time a day every Tue, Thu for dialysis Send one tablet with resident to dialysis   Refills:  0       nitroGLYcerin 0.4 MG sublingual tablet   Commonly known as:  NITROSTAT   Used for:  NSTEMI (non-ST elevated myocardial infarction) (H)        Dose:  0.4 mg   Place 1 tablet (0.4 mg) under the tongue every 5 minutes as needed for chest pain   Quantity:  25 tablet   Refills:  3       * polyethylene glycol packet   Commonly known as:  MIRALAX/GLYCOLAX        Dose:  1 packet   Take 1 packet by mouth daily as needed for constipation   Refills:  0       * polyethylene glycol packet   Commonly known as:  MIRALAX/GLYCOLAX   Used for:  Cellulitis of right lower  extremity        Dose:  17 g   Take 17 g by mouth daily   Quantity:  7 packet   Refills:  0       * Notice:  This list has 2 medication(s) that are the same as other medications prescribed for you. Read the directions carefully, and ask your doctor or other care provider to review them with you.             Protect others around you: Learn how to safely use, store and throw away your medicines at www.disposemymeds.org.             Medication List: This is a list of all your medications and when to take them. Check marks below indicate your daily home schedule. Keep this list as a reference.      Medications           Morning Afternoon Evening Bedtime As Needed    acetaminophen 500 MG tablet   Commonly known as:  TYLENOL   Take 1,000 mg by mouth 3 times daily as needed for mild pain                                aspirin 81 MG tablet   Commonly known as:  ASA   Take 81 mg by mouth daily                                atorvastatin 40 MG tablet   Commonly known as:  LIPITOR   Take 40 mg by mouth At Bedtime                                calcium acetate 667 MG Caps capsule   Commonly known as:  PHOSLO   Take 2,001 mg by mouth 3 times daily (with meals)   Last time this was given:  2,001 mg on 12/2/2018  5:29 PM                                diclofenac 1 % topical gel   Commonly known as:  VOLTAREN   Apply 2 g topically 4 times daily Apply to RLE   Last time this was given:  2 g on 12/2/2018  2:24 PM                                GABAPENTIN PO   Take 100 mg by mouth 3 times daily   Last time this was given:  100 mg on 12/2/2018  5:30 PM                                MIDODRINE HCL PO   Take 10 mg by mouth three times a week on Tues, Thurs, Sat 1 hour before dialysis AND Give 10 mg by mouth one time a day every Tue, Thu for dialysis Send one tablet with resident to dialysis   Last time this was given:  10 mg on 12/2/2018  1:17 PM                                nitroGLYcerin 0.4 MG sublingual tablet   Commonly known as:   NITROSTAT   Place 1 tablet (0.4 mg) under the tongue every 5 minutes as needed for chest pain                                * polyethylene glycol packet   Commonly known as:  MIRALAX/GLYCOLAX   Take 1 packet by mouth daily as needed for constipation   Last time this was given:  17 g on 12/2/2018  1:16 PM                                * polyethylene glycol packet   Commonly known as:  MIRALAX/GLYCOLAX   Take 17 g by mouth daily   Last time this was given:  17 g on 12/2/2018  1:16 PM                                * Notice:  This list has 2 medication(s) that are the same as other medications prescribed for you. Read the directions carefully, and ask your doctor or other care provider to review them with you.

## 2018-12-01 NOTE — IP AVS SNAPSHOT
MRN:[id number]                      After Visit Summary   12/1/2018    Maurizio Ohara    MRN: [id number]           Thank you!     Thank you for choosing [facility] for your care. Our goal is always to provide you with excellent care. Hearing back from our patients is one way we can continue to improve our services. Please take a few minutes to complete the written survey that you may receive in the mail after you visit. If you would like to speak to someone directly about your visit please contact Patient Relations at [phone]. Thank you!          Patient Information     Date Of Birth          10/7/1929        About your hospital stay     You were admitted on:  December 1, 2018 You last received care in the:  [facility]    You were discharged on:  December 2, 2018        Reason for your hospital stay       You were admitted for inpatient hemodialysis after missing session yesterday.                  Who to Call     For medical emergencies, please call 911.  For non-urgent questions about your medical care, please call your primary care provider or clinic, [phone]          Attending Provider     Provider Specialty    Yohana Capellan DO Emergency Medicine    Aisha Og MD Emergency Medicine    Caryl William MD Internal Medicine       Primary Care Provider Office Phone # Fax #    Justina Moise [phone]      After Care Instructions     Activity - Up with nursing assistance           Advance Diet as Tolerated       Follow this diet upon discharge: Orders Placed This Encounter      Room Service      Dialysis Diet            General info for SNF       Length of Stay Estimate: Long Term Care  Condition at Discharge: Stable  Level of care:board and care  Rehabilitation Potential: Fair  Admission H&P remains valid and up-to-date: Yes  Recent Chemotherapy: N/A  Use Nursing Home Standing Orders: N/A             "      Pending Results     No orders found for last 3 day(s).            Statement of Approval     Ordered          18 1537  I have reviewed and agree with all the recommendations and orders detailed in this document.  EFFECTIVE NOW     Approved and electronically signed by:  John Cabrales MD             Admission Information     Date & Time Provider Department Dept. Phone    2018 Caryl William MD [place] [address] Medical Surgical [phone]      Your Vitals Were     Blood Pressure Pulse Temperature Respirations Weight Pulse Oximetry    85/43 (BP Location: Left arm) 68 98  F (36.7  C) 18 82.8 kg (182 lb 8 oz) 96%    BMI (Body Mass Index)                   28.58 kg/m2           MyChart Information     [employer] lets you send messages to your doctor, view your test results, renew your prescriptions, schedule appointments and more. To sign up, go to [email or web address] . Click on \"Log in\" on the left side of the screen, which will take you to the Welcome page. Then click on \"Sign up Now\" on the right side of the page.     You will be asked to enter the access code listed below, as well as some personal information. Please follow the directions to create your username and password.     Your access code is: [email or web address]  Expires: 2018  1:21 PM     Your access code will  in 90 days. If you need help or a new code, please call your [place] clinic or [phone].        Care EveryWhere ID     This is your Care EveryWhere ID. This could be used by other organizations to access your [place] medical records  [id number]        Equal Access to Services     [facility]: Hadii tressa piper Solora, waaxda luqadaha, qaybta kaalmacatherine melendez. So [facility] [phone].    ATENCIÓN: Si habla español, tiene a mast disposición servicios gratuitos de asistencia lingüística. Llame al [phone].    We comply with applicable federal civil rights laws " and [place] laws. We do not discriminate on the basis of race, color, national origin, age, disability, sex, sexual orientation, or gender identity.               Review of your medicines      CONTINUE these medicines which have NOT CHANGED        Dose / Directions    acetaminophen 500 MG tablet   Commonly known as:  TYLENOL        Dose:  1000 mg   Take 1,000 mg by mouth 3 times daily as needed for mild pain   Refills:  0       aspirin 81 MG tablet   Commonly known as:  ASA        Dose:  81 mg   Take 81 mg by mouth daily   Refills:  0       atorvastatin 40 MG tablet   Commonly known as:  LIPITOR        Dose:  40 mg   Take 40 mg by mouth At Bedtime   Refills:  0       calcium acetate 667 MG Caps capsule   Commonly known as:  PHOSLO        Dose:  2001 mg   Take 2,001 mg by mouth 3 times daily (with meals)   Refills:  0       diclofenac 1 % topical gel   Commonly known as:  VOLTAREN   Used for:  Cellulitis of right lower extremity        Dose:  2 g   Apply 2 g topically 4 times daily Apply to RLE   Refills:  0       GABAPENTIN PO        Dose:  100 mg   Take 100 mg by mouth 3 times daily   Refills:  0       MIDODRINE HCL PO        Dose:  10 mg   Take 10 mg by mouth three times a week on Tues, Thurs, Sat 1 hour before dialysis AND Give 10 mg by mouth one time a day every Tue, Thu for dialysis Send one tablet with resident to dialysis   Refills:  0       nitroGLYcerin 0.4 MG sublingual tablet   Commonly known as:  NITROSTAT   Used for:  NSTEMI (non-ST elevated myocardial infarction) (H)        Dose:  0.4 mg   Place 1 tablet (0.4 mg) under the tongue every 5 minutes as needed for chest pain   Quantity:  25 tablet   Refills:  3       * polyethylene glycol packet   Commonly known as:  MIRALAX/GLYCOLAX        Dose:  1 packet   Take 1 packet by mouth daily as needed for constipation   Refills:  0       * polyethylene glycol packet   Commonly known as:  MIRALAX/GLYCOLAX   Used for:  Cellulitis of right lower extremity         Dose:  17 g   Take 17 g by mouth daily   Quantity:  7 packet   Refills:  0       * Notice:  This list has 2 medication(s) that are the same as other medications prescribed for you. Read the directions carefully, and ask your doctor or other care provider to review them with you.             Protect others around you: Learn how to safely use, store and throw away your medicines at www.disposemymeds.org.             Medication List: This is a list of all your medications and when to take them. Check marks below indicate your daily home schedule. Keep this list as a reference.      Medications           Morning Afternoon Evening Bedtime As Needed    acetaminophen 500 MG tablet   Commonly known as:  TYLENOL   Take 1,000 mg by mouth 3 times daily as needed for mild pain                                aspirin 81 MG tablet   Commonly known as:  ASA   Take 81 mg by mouth daily                                atorvastatin 40 MG tablet   Commonly known as:  LIPITOR   Take 40 mg by mouth At Bedtime                                calcium acetate 667 MG Caps capsule   Commonly known as:  PHOSLO   Take 2,001 mg by mouth 3 times daily (with meals)   Last time this was given:  2,001 mg on 12/2/2018  5:29 PM                                diclofenac 1 % topical gel   Commonly known as:  VOLTAREN   Apply 2 g topically 4 times daily Apply to RLE   Last time this was given:  2 g on 12/2/2018  2:24 PM                                GABAPENTIN PO   Take 100 mg by mouth 3 times daily   Last time this was given:  100 mg on 12/2/2018  5:30 PM                                MIDODRINE HCL PO   Take 10 mg by mouth three times a week on Tues, Thurs, Sat 1 hour before dialysis AND Give 10 mg by mouth one time a day every Tue, Thu for dialysis Send one tablet with resident to dialysis   Last time this was given:  10 mg on 12/2/2018  1:17 PM                                nitroGLYcerin 0.4 MG sublingual tablet   Commonly known as:  NITROSTAT    Place 1 tablet (0.4 mg) under the tongue every 5 minutes as needed for chest pain                                * polyethylene glycol packet   Commonly known as:  MIRALAX/GLYCOLAX   Take 1 packet by mouth daily as needed for constipation   Last time this was given:  17 g on 12/2/2018  1:16 PM                                * polyethylene glycol packet   Commonly known as:  MIRALAX/GLYCOLAX   Take 17 g by mouth daily   Last time this was given:  17 g on 12/2/2018  1:16 PM                                * Notice:  This list has 2 medication(s) that are the same as other medications prescribed for you. Read the directions carefully, and ask your doctor or other care provider to review them with you.

## 2018-12-01 NOTE — TELEPHONE ENCOUNTER
Patient missed dialysis ride today due to ride did not pick him up. Dialysis center aware - he will have a make up run on Monday. Meanwhile watching for s/s fluid overload or elevated K levels.     Electronically signed by ABNER Ware GNP

## 2018-12-01 NOTE — IP AVS SNAPSHOT
"[place] [address] MEDICAL SURGICAL: [phone]                                              INTERAGENCY TRANSFER FORM - PHYSICIAN ORDERS   2018                    Hospital Admission Date: 2018  YOANA QUIÑONES   : 10/7/1929  Sex: Male        Attending Provider: Caryl William MD     Allergies:  Glyburide, Lisinopril, Metformin, Penicillins, Verapamil    Infection:  None   Service:  Observation    Ht:  1.702 m (5' 7\")   Wt:  82.8 kg (182 lb 8 oz)   Admission Wt:  77.1 kg (170 lb)    BMI:  28.58 kg/m 2   BSA:  1.98 m 2            Patient PCP Information     Provider PCP Type    Justina Moise MD General      ED Clinical Impression     Diagnosis Description Comment Added By Time Added    Hyperkalemia [E87.5] Hyperkalemia [E87.5]  Aisha Og MD 2018 10:22 PM    ESRD (end stage renal disease) on dialysis (H) [N18.6, Z99.2] ESRD (end stage renal disease) on dialysis (H) [N18.6, Z99.2]  Aisha Og MD 2018 10:22 PM    Generalized muscle weakness [M62.81] Generalized muscle weakness [M62.81]  Aisha Og MD 2018 10:22 PM    Multiple abrasions [T07.XXXA] Multiple abrasions [T07.XXXA]  iAsha Og MD 2018 10:22 PM      Hospital Problems as of 2018              Priority Class Noted POA    Hyperkalemia Medium  2018 Yes      Non-Hospital Problems as of 2018              Priority Class Noted    NSTEMI (non-ST elevated myocardial infarction) (H) Medium  2014    ESRD (end stage renal disease) on dialysis (H) Medium  2014    CAD (coronary artery disease) Medium  2014    Mitral regurgitation Medium  2014    Pulmonary hypertension (H) Medium  2014    Diabetes (H) Medium  Unknown    Hypertension Medium  Unknown    Sepsis (H) Medium  2016    Pneumonia Medium  2016    Acute diastolic (congestive) heart failure (H) Medium  2017    Cellulitis Medium  2018    Altered mental " status Medium  11/16/2018    Fall Medium  11/16/2018    Chronic diastolic heart failure (H) Medium  11/16/2018    Severe aortic stenosis Medium  11/16/2018      Code Status History     Date Active Date Inactive Code Status Order ID Comments User Context    11/16/2018  2:56 PM 11/16/2018  8:11 PM DNR/DNI [id number]  Elias CortneyNANCY ObrienC Inpatient    11/16/2018  3:28 AM 11/16/2018  2:56 PM Full Code [id number]  Diego Rondon MD Inpatient    11/4/2018  2:40 AM 11/8/2018  4:36 PM Full Code [id number]  Jayant Ricketts MD Inpatient    9/18/2018  4:17 PM 9/25/2018  6:18 PM DNI [id number]  Aisha Franco PA-C Inpatient    9/18/2018  2:58 PM 9/18/2018  4:17 PM DNI [id number]  Aisha Franco PA-C ED    8/15/2017  8:27 AM 9/18/2018  2:58 PM Full Code [id number]  Jayant Ricketts MD Outpatient    8/9/2017 10:43 PM 8/15/2017  8:27 AM Full Code [id number]  Marilee Ramirez MD Inpatient    10/2/2016  2:30 PM 8/9/2017 10:43 PM Full Code [id number]  Primitivo Yeboah MD Outpatient    9/24/2016  2:49 PM 10/2/2016  2:30 PM Full Code [id number]  Bola Garcia,  Inpatient    1/17/2016 10:07 AM 9/24/2016  2:49 PM Full Code [id number]  Bola Garcia, DO Outpatient    1/12/2016  3:10 PM 1/17/2016 10:07 AM Full Code [id number]  Diaz Hale,  Inpatient    12/26/2014 10:57 AM 1/12/2016  3:10 PM Full Code [id number]  Edin Elkins MD Outpatient    12/24/2014  3:28 PM 12/24/2014 11:14 PM Full Code [id number]  Phyllis Barrios MD Inpatient    12/24/2014 11:20 AM 12/24/2014  3:28 PM Full Code [id number]  Cholo Nogueira MD Outpatient    12/24/2014  5:42 AM 12/24/2014 11:20 AM Full Code [id number]  Tip Langston MD Inpatient         Medication Review      CONTINUE these medications which have NOT CHANGED        Dose / Directions Comments    acetaminophen 500 MG tablet   Commonly known as:  TYLENOL        Dose:  1000 mg   Take 1,000 mg by mouth 3 times daily as needed for  mild pain   Refills:  0        aspirin 81 MG tablet   Commonly known as:  ASA        Dose:  81 mg   Take 81 mg by mouth daily   Refills:  0        atorvastatin 40 MG tablet   Commonly known as:  LIPITOR        Dose:  40 mg   Take 40 mg by mouth At Bedtime   Refills:  0        calcium acetate 667 MG Caps capsule   Commonly known as:  PHOSLO        Dose:  2001 mg   Take 2,001 mg by mouth 3 times daily (with meals)   Refills:  0        diclofenac 1 % topical gel   Commonly known as:  VOLTAREN   Used for:  Cellulitis of right lower extremity        Dose:  2 g   Apply 2 g topically 4 times daily Apply to RLE   Refills:  0        GABAPENTIN PO        Dose:  100 mg   Take 100 mg by mouth 3 times daily   Refills:  0        MIDODRINE HCL PO        Dose:  10 mg   Take 10 mg by mouth three times a week on Tues, Thurs, Sat 1 hour before dialysis AND Give 10 mg by mouth one time a day every Tue, Thu for dialysis Send one tablet with resident to dialysis   Refills:  0        nitroGLYcerin 0.4 MG sublingual tablet   Commonly known as:  NITROSTAT   Used for:  NSTEMI (non-ST elevated myocardial infarction) (H)        Dose:  0.4 mg   Place 1 tablet (0.4 mg) under the tongue every 5 minutes as needed for chest pain   Quantity:  25 tablet   Refills:  3        * polyethylene glycol packet   Commonly known as:  MIRALAX/GLYCOLAX        Dose:  1 packet   Take 1 packet by mouth daily as needed for constipation   Refills:  0        * polyethylene glycol packet   Commonly known as:  MIRALAX/GLYCOLAX   Used for:  Cellulitis of right lower extremity        Dose:  17 g   Take 17 g by mouth daily   Quantity:  7 packet   Refills:  0        * Notice:  This list has 2 medication(s) that are the same as other medications prescribed for you. Read the directions carefully, and ask your doctor or other care provider to review them with you.            Summary of Visit     Reason for your hospital stay       You were admitted for inpatient hemodialysis  after missing session yesterday.             After Care     Activity - Up with nursing assistance           Advance Diet as Tolerated       Follow this diet upon discharge: Orders Placed This Encounter      Room Service      Dialysis Diet       General info for SNF       Length of Stay Estimate: Long Term Care  Condition at Discharge: Stable  Level of care:board and care  Rehabilitation Potential: Fair  Admission H&P remains valid and up-to-date: Yes  Recent Chemotherapy: N/A  Use Nursing Home Standing Orders: N/A             Statement of Approval     Ordered          12/02/18 1537  I have reviewed and agree with all the recommendations and orders detailed in this document.  EFFECTIVE NOW     Approved and electronically signed by:  John Cabrales MD

## 2018-12-01 NOTE — IP AVS SNAPSHOT
"    [place] [address] MEDICAL SURGICAL: [phone]                                              INTERAGENCY TRANSFER FORM - LAB / IMAGING / EKG / EMG RESULTS   2018                    Hospital Admission Date: 2018  YOANA QUIÑONES   : 10/7/1929  Sex: Male        Attending Provider: Caryl William MD     Allergies:  Glyburide, Lisinopril, Metformin, Penicillins, Verapamil    Infection:  None   Service:  Observation    Ht:  1.702 m (5' 7\")   Wt:  82.8 kg (182 lb 8 oz)   Admission Wt:  77.1 kg (170 lb)    BMI:  28.58 kg/m 2   BSA:  1.98 m 2            Patient PCP Information     Provider PCP Type    Justina Moise MD General         Lab Results - 3 Days      Glucose by meter [[id number]]  Resulted: 18 1404, Result status: Final result    Ordering provider: Caryl William MD  18 1345 Resulting lab: POINT OF CARE TEST, GLUCOSE    Specimen Information    Type Source Collected On     18 1345          Components       Value Reference Range Flag Lab   Glucose 99 70 - 99 mg/dL  170            Basic metabolic panel [[id number]] (Abnormal)  Resulted: 18 0941, Result status: Final result    Ordering provider: Caryl William MD  18 0000 Resulting lab: [facility]    Specimen Information    Type Source Collected On   Blood  18 0900          Components       Value Reference Range Flag Lab   Sodium 131 133 - 144 mmol/L L FrRdHs   Potassium 5.9 3.4 - 5.3 mmol/L H FrRdHs   Chloride 96 94 - 109 mmol/L  FrRdHs   Carbon Dioxide 25 20 - 32 mmol/L  FrRdHs   Anion Gap 10 3 - 14 mmol/L  FrRdHs   Glucose 140 70 - 99 mg/dL H FrRdHs   Urea Nitrogen 107 7 - 30 mg/dL H FrRdHs   Creatinine 7.75 0.66 - 1.25 mg/dL H FrRdHs   GFR Estimate 7 >60 mL/min/1.7m2 L FrRdHs   Comment:  Non  GFR Calc   GFR Estimate If Black 8 >60 mL/min/1.7m2 L FrRdHs   Comment:  African American GFR Calc   Calcium 10.2 8.5 - 10.1 mg/dL H FrRdHs            Glucose by " meter [[id number]] (Abnormal)  Resulted: 12/02/18 0542, Result status: Final result    Ordering provider: Caryl William MD  12/02/18 0156 Resulting lab: POINT OF CARE TEST, GLUCOSE    Specimen Information    Type Source Collected On     12/02/18 0156          Components       Value Reference Range Flag Lab   Glucose 139 70 - 99 mg/dL H 170            Glucose by meter [[id number]] (Abnormal)  Resulted: 12/02/18 0542, Result status: Final result    Ordering provider: Caryl William MD  12/02/18 0522 Resulting lab: POINT OF CARE TEST, GLUCOSE    Specimen Information    Type Source Collected On     12/02/18 0522          Components       Value Reference Range Flag Lab   Glucose 148 70 - 99 mg/dL H 170            Potassium [[id number]] (Abnormal)  Resulted: 12/02/18 0139, Result status: Final result    Ordering provider: Caryl William MD  12/02/18 0105 Resulting lab: [facility]    Specimen Information    Type Source Collected On     12/02/18 0120          Components       Value Reference Range Flag Lab   Potassium 5.8 3.4 - 5.3 mmol/L H FrRdHs            Potassium [[id number]]  Resulted: 12/02/18 0049, Result status: In process    Ordering provider: Caryl William MD  12/01/18 2331 Resulting lab: [facility]    Specimen Information    Type Source Collected On   Blood  12/02/18 0049            Potassium [[id number]] (Abnormal)  Resulted: 12/01/18 2256, Result status: Final result    Ordering provider: Aisha Og MD  12/01/18 2200 Resulting lab: [facility]    Specimen Information    Type Source Collected On   Blood  12/01/18 2237          Components       Value Reference Range Flag Lab   Potassium 5.6 3.4 - 5.3 mmol/L H FrRdHs            Glucose by meter [[id number]] (Abnormal)  Resulted: 12/01/18 2222, Result status: Final result    Ordering provider: Aisha Og MD  12/01/18 2210 Resulting lab: POINT OF CARE TEST, GLUCOSE    Specimen Information     Type Source Collected On     12/01/18 2210          Components       Value Reference Range Flag Lab   Glucose 201 70 - 99 mg/dL H 170   Comment:  Dr/RN Notified            Potassium [[id number]] (Abnormal)  Resulted: 12/01/18 2152, Result status: Final result    Ordering provider: Aisha Og MD  12/01/18 2100 Resulting lab: [facility]    Specimen Information    Type Source Collected On   Blood  12/01/18 2137          Components       Value Reference Range Flag Lab   Potassium 5.5 3.4 - 5.3 mmol/L H FrRdHs            Glucose by meter [[id number]] (Abnormal)  Resulted: 12/01/18 2141, Result status: Final result    Ordering provider: Aisha Og MD  12/01/18 2059 Resulting lab: POINT OF CARE TEST, GLUCOSE    Specimen Information    Type Source Collected On     12/01/18 2059          Components       Value Reference Range Flag Lab   Glucose 154 70 - 99 mg/dL H 170            Glucose by meter [[id number]] (Abnormal)  Resulted: 12/01/18 2141, Result status: Final result    Ordering provider: Aisha Og MD  12/01/18 2130 Resulting lab: POINT OF CARE TEST, GLUCOSE    Specimen Information    Type Source Collected On     12/01/18 2130          Components       Value Reference Range Flag Lab   Glucose 201 70 - 99 mg/dL H 170            Comprehensive metabolic panel [[id number]] (Abnormal)  Resulted: 12/01/18 1950, Result status: Final result    Ordering provider: Aisha gO MD  12/01/18 1909 Resulting lab: [facility]    Specimen Information    Type Source Collected On   Blood  12/01/18 1859          Components       Value Reference Range Flag Lab   Sodium 132 133 - 144 mmol/L L FrRdHs   Potassium 6.1 3.4 - 5.3 mmol/L HH FrRdHs   Comment:         Critical Value called to and read back by  RHONDA HDEZ (ERA) 12.1.18 AT 1944 M     Chloride 95 94 - 109 mmol/L  FrRdHs   Carbon Dioxide 27 20 - 32 mmol/L  FrRdHs   Anion Gap 10 3 - 14 mmol/L  FrRdHs    Glucose 156 70 - 99 mg/dL H FrRdHs   Urea Nitrogen 102 7 - 30 mg/dL H FrRdHs   Creatinine 7.19 0.66 - 1.25 mg/dL H FrRdHs   GFR Estimate 7 >60 mL/min/1.7m2 L FrRdHs   Comment:  Non  GFR Calc   GFR Estimate If Black 9 >60 mL/min/1.7m2 L FrRdHs   Comment:  African American GFR Calc   Calcium 10.5 8.5 - 10.1 mg/dL H FrRdHs   Bilirubin Total 0.8 0.2 - 1.3 mg/dL  FrRdHs   Albumin 3.1 3.4 - 5.0 g/dL L FrRdHs   Protein Total 7.1 6.8 - 8.8 g/dL  FrRdHs   Alkaline Phosphatase 173 40 - 150 U/L H FrRdHs   ALT 23 0 - 70 U/L  FrRdHs   AST 25 0 - 45 U/L  FrRdHs            Phosphorus [[id number]]  Resulted: 12/01/18 1950, Result status: Final result    Ordering provider: Aisha Og MD  12/01/18 1909 Resulting lab: [facility]    Specimen Information    Type Source Collected On   Blood  12/01/18 1859          Components       Value Reference Range Flag Lab   Phosphorus 3.4 2.5 - 4.5 mg/dL  FrRdHs            Magnesium [[id number]] (Abnormal)  Resulted: 12/01/18 1950, Result status: Final result    Ordering provider: Aisha Og MD  12/01/18 1909 Resulting lab: [facility]    Specimen Information    Type Source Collected On   Blood  12/01/18 1859          Components       Value Reference Range Flag Lab   Magnesium 2.5 1.6 - 2.3 mg/dL H FrRdHs            CBC with platelets differential [[id number]] (Abnormal)  Resulted: 12/01/18 1931, Result status: Final result    Ordering provider: Aisha Og MD  12/01/18 1909 Resulting lab: [facility]    Specimen Information    Type Source Collected On   Blood  12/01/18 1859          Components       Value Reference Range Flag Lab   WBC 9.1 4.0 - 11.0 10e9/L  FrRdHs   RBC Count 3.48 4.4 - 5.9 10e12/L L FrRdHs   Hemoglobin 12.5 13.3 - 17.7 g/dL L FrRdHs   Hematocrit 39.9 40.0 - 53.0 % L [facility]    78 - 100 fl H FrRd   MCH 35.9 26.5 - 33.0 pg H Rd   MCHC 31.3 31.5 - 36.5 g/dL L [facility]   RDW 19.6  10.0 - 15.0 % H FrRdHs   Platelet Count 140 150 - 450 10e9/L L FrRdHs   Diff Method Automated Method   FrRdHs   % Neutrophils 68.8 %  FrRdHs   % Lymphocytes 13.0 %  FrRdHs   % Monocytes 12.2 %  FrRdHs   % Eosinophils 0.1 %  FrRdHs   % Basophils 0.1 %  FrRdHs   % Immature Granulocytes 3.8 %  FrRdHs   Nucleated RBCs 2 0 /100 H FrRdHs   Absolute Neutrophil 6.5 1.6 - 8.3 10e9/L  FrRdHs   Absolute Lymphocytes 1.2 0.8 - 5.3 10e9/L  FrRdHs   Absolute Monocytes 1.1 0.0 - 1.3 10e9/L  FrRdHs   Absolute Eosinophils 0.0 0.0 - 0.7 10e9/L  FrRdHs   Absolute Basophils 0.0 0.0 - 0.2 10e9/L  FrRdHs   Abs Immature Granulocytes 0.4 0 - 0.4 10e9/L  FrRdHs   Absolute Nucleated RBC 0.2   FrRdHs            Testing Performed By     Lab - Abbreviation Name Director Address Valid Date Range    12 - FrRdHs [facility] Unknown [address] [facility] [address] 05/08/15 1057 - Present    45 - KOH106 [facility] Unknown Unknown 01/28/02 0000 - Present    170 - Unknown POINT OF CARE TEST, GLUCOSE Unknown Unknown 10/31/11 1114 - Present            Unresulted Labs     None      Encounter-Level Documents:     There are no encounter-level documents.      Order-Level Documents:     There are no order-level documents.

## 2018-12-02 NOTE — PROGRESS NOTES
Pt arrived to room 343 for admission from ED at 2250.  Pt non-responsive, which is unchanged from ED.  Transferred into bed via hover mat and a of 3.

## 2018-12-02 NOTE — ED TRIAGE NOTES
Pt in via EMS from [facility] d/t increased weakness. Pt is a dialysis pt who missed his appointment today d/t transportation issue. Per EMS staff at facility sent pt in d/t weakness. Pt is alert, oriented to self, and month, confused as to place and situation at this time. ABCD's intact

## 2018-12-02 NOTE — PROGRESS NOTES
"Lab Results   Component Value Date    POTASSIUM 5.8 12/02/2018     Lab Results   Component Value Date    HGB 13.9 12/01/2018          Hemodialysis Note:      All machine safety checks completed and passed.  Total chlorine checks less than 0.1ppm   All connections secured, saline line double clamped.  Venous and arterial parameters set  Report rec'd from Sandra Hankins RN    Rec'd pt per bed acc'd by transport team. Verbal consent obtained for dialysis Rx this hospitalization.  Hepatitis B labs verified on machine log from previous patient.  Good circulation to fistula arm. Time out taken.    DORIS cannulated with 15 Ga needles with no difficulty. Tourniquet used.   Pt ran 3.5 hours on a K 2 bath, with 2.5L removed. Blood flow rate of 450 to 350 ml/min was obtained.   PRE-WEIGHT:82.7kgs,       POST-WT 80.2kgs.      Complications: Pt states, \"my blood pressure always is in the 80's with dialysis\".  Education regarding dialysis and fluid removal  Protocol explained to staff RN regarding possibility of incapacitated dialysis RN.  Vascular Access: Aseptic prep done for both on/off  Total Heparin given: 0    Meds given: Midodrine.      Dr. uYng visited pt during run.   Pods checked Q 15 minutes, remain clear throughout Rx.  Machine water alarms in place and functioning.  Total blood volume processed:72.4L  Hemostasis of needle sites achieved after 15 minutes. Patient dialyzes at [facility] Q T-TH-SAT.  POST ASSESSMENTS: Skin warm and dry, somnolent, disoriented to time and place, denies discomfort, Lungs diminished with crackles, Resp rate regular, apical rate regular. No change of edema.  See flowsheet in EPIC for further details.  Report given to Sandra Hankins RN.   Diandra Garcia RN  [facility]            "

## 2018-12-02 NOTE — ED NOTES
Patient sleeping comfortably in room, will not awake to verbal, woke up to sternal stimulation, explained medication to be administered to patient

## 2018-12-02 NOTE — DISCHARGE SUMMARY
[facility]    Discharge Summary  Hospitalist    Date of Admission:  12/1/2018  Date of Discharge:  12/2/2018  Discharging Provider: John Cabrales MD, MD  Date of Service (when I saw the patient): 12/02/18    Discharge Diagnoses   Missed HD      Hospital Course   Maurizio Ohara is a 89 year old male past medical history significant for coronary artery disease, 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  Patient was found to be increasingly weak after he missed his dialysis today.  In the emergency room he was hyperkalemic.  He was admitted for inpatient HD which was completed on 12/2.  Nephrology has arranged for him to return to his previous HD schedule on 12/4    John Cabrales MD    Significant Results and Procedures   N/A    Pending Results   N/A      Code Status   DNR / DNI       Primary Care Physician   Justina Moise        Discharge Disposition   Discharged to nursing home  Condition at discharge: Stable    Consultations This Hospital Stay   PHARMACY IP CONSULT  NEPHROLOGY IP CONSULT  SOCIAL WORK IP CONSULT    Time Spent on this Encounter   I, John Cabrales, personally saw the patient today and spent greater than 30 minutes discharging this patient.    Discharge Orders     General info for SNF   Length of Stay Estimate: Long Term Care  Condition at Discharge: Stable  Level of care:board and care  Rehabilitation Potential: Fair  Admission H&P remains valid and up-to-date: Yes  Recent Chemotherapy: N/A  Use Nursing Home Standing Orders: N/A     Reason for your hospital stay   You were admitted for inpatient hemodialysis after missing session yesterday.     Activity - Up with nursing assistance     Advance Diet as Tolerated   Follow this  "diet upon discharge: Orders Placed This Encounter     Room Service     Dialysis Diet       Discharge Medications   Current Discharge Medication List      CONTINUE these medications which have NOT CHANGED    Details   aspirin 81 MG tablet Take 81 mg by mouth daily      atorvastatin (LIPITOR) 40 MG tablet Take 40 mg by mouth At Bedtime      calcium acetate (PHOSLO) 667 MG CAPS Take 2,001 mg by mouth 3 times daily (with meals)      diclofenac (VOLTAREN) 1 % GEL topical gel Apply 2 g topically 4 times daily Apply to RLE    Associated Diagnoses: Cellulitis of right lower extremity      GABAPENTIN PO Take 100 mg by mouth 3 times daily      MIDODRINE HCL PO Take 10 mg by mouth three times a week on Tues, Thurs, Sat 1 hour before dialysis AND Give 10 mg by mouth one time a day every Tue, Thu for dialysis Send one tablet with resident to dialysis      !! polyethylene glycol (MIRALAX/GLYCOLAX) Packet Take 17 g by mouth daily  Qty: 7 packet    Associated Diagnoses: Cellulitis of right lower extremity      acetaminophen (TYLENOL) 500 MG tablet Take 1,000 mg by mouth 3 times daily as needed for mild pain      nitroglycerin (NITROSTAT) 0.4 MG sublingual tablet Place 1 tablet (0.4 mg) under the tongue every 5 minutes as needed for chest pain  Qty: 25 tablet, Refills: 3    Associated Diagnoses: NSTEMI (non-ST elevated myocardial infarction) (H)      !! polyethylene glycol (MIRALAX/GLYCOLAX) Packet Take 1 packet by mouth daily as needed for constipation       !! - Potential duplicate medications found. Please discuss with provider.        Allergies   Allergies   Allergen Reactions     Glyburide      Lisinopril      Hyperkalemia when hospitalized 4/5/2011     Metformin      Renal failure stage 4     Glenn Gallagher RN clarified with pt, pt does not remember rxn, it was a long time ago, and \"nothing crazy\".     Verapamil      Data   Most Recent 3 CBC's:  Recent Labs   Lab Test  12/01/18   1913  12/01/18   1859  11/28/18   1035 "  11/16/18   0035   WBC   --   9.1  7.8  10.7   HGB  13.9  12.5*  11.9*  10.8*   MCV   --   115*  108*  111*   PLT   --   140*  162  147*      Most Recent 3 BMP's:  Recent Labs   Lab Test  12/02/18   0900  12/02/18   0120  12/01/18   2237   12/01/18   1913  12/01/18   1859  11/28/18   1035   NA  131*   --    --    --   131*  132*  131*   POTASSIUM  5.9*  5.8*  5.6*   < >  5.9*  6.1*  5.4*   CHLORIDE  96   --    --    --   96  95  95   CO2  25   --    --    --    --   27  24   BUN  107*   --    --    --   92*  102*  77*   CR  7.75*   --    --    --    --   7.19*  5.90*   ANIONGAP  10   --    --    --   8  10  12   KIMBER  10.2*   --    --    --    --   10.5*  9.4   GLC  140*   --    --    --   158*  156*  225*    < > = values in this interval not displayed.     Most Recent 2 LFT's:  Recent Labs   Lab Test  12/01/18   1859  11/16/18   0552   AST  25  37   ALT  23  32   ALKPHOS  173*  160*   BILITOTAL  0.8  0.8     Most Recent INR's and Anticoagulation Dosing History:  Anticoagulation Dose History     Recent Dosing and Labs Latest Ref Rng & Units 12/24/2014 9/28/2016    INR 0.86 - 1.14 0.98 1.17(H)        Most Recent 3 Troponin's:  Recent Labs   Lab Test  08/09/17   1810  09/24/16   1100  12/25/14   1247   12/24/14   0037   TROPI  0.020  <0.015  The 99th percentile for upper reference range is 0.045 ug/L.  Troponin values in   the range of 0.045 - 0.120 ug/L may be associated with risks of adverse   clinical events.    49.031*   < >   --    TROPONIN   --    --    --    --   0.17*    < > = values in this interval not displayed.     Most Recent Cholesterol Panel:  Recent Labs   Lab Test  12/25/14   0715   CHOL  135   LDL  63   HDL  56   TRIG  80     Most Recent 6 Bacteria Isolates From Any Culture (See EPIC Reports for Culture Details):  Recent Labs   Lab Test  10/19/18   1456  10/19/18   1448  09/18/18   1241  09/18/18   1232  08/11/17   1015  08/10/17   1515   CULT  No growth  No growth  No growth  No growth  Light growth  Normal will  No growth     Most Recent TSH, T4 and A1c Labs:  Recent Labs   Lab Test  09/18/18   1228  09/25/16   1049   TSH   --   2.36   A1C  5.1   --      Results for orders placed or performed during the hospital encounter of 11/15/18   CT Head w/o Contrast    Narrative    CT SCAN OF THE HEAD WITHOUT CONTRAST  11/16/2018 12:56 AM     HISTORY: Altered level of consciousness.    TECHNIQUE: Axial images of the head and coronal reformations without  IV contrast material. Radiation dose for this scan was reduced using  automated exposure control, adjustment of the mA and/or kV according  to patient size, or iterative reconstruction technique.    COMPARISON: 1/10/2015.    FINDINGS: There is generalized atrophy of the brain. There is low  attenuation in the white matter of the cerebral hemispheres consistent  with sequelae of small vessel ischemic disease. There is no evidence  of intracranial hemorrhage, mass, acute infarct or anomaly.     The visualized portions of the sinuses and mastoids appear normal.  There is no evidence of trauma.      Impression    IMPRESSION: No acute abnormality.    VINNIE DIXON MD   CT Head w/o Contrast    Narrative    CT SCAN OF THE HEAD WITHOUT CONTRAST   11/16/2018 7:03 AM     HISTORY: Fall and somnolence.     TECHNIQUE:  Axial images of the head and coronal reformations without  IV contrast material. Radiation dose for this scan was reduced using  automated exposure control, adjustment of the mA and/or kV according  to patient size, or iterative reconstruction technique.    COMPARISON: Earlier the same day.    FINDINGS: There is no evidence of intracranial hemorrhage, mass, acute  infarct or anomaly. There is generalized atrophy of the brain. There  is low attenuation in the white matter of the cerebral hemispheres  consistent with sequelae of small vessel ischemic disease. Ventricular  size is within normal limits without evidence of hydrocephalus.     The visualized portions of  the sinuses and mastoids appear normal. The  bony calvarium and bones of the skull base appear intact.       Impression    IMPRESSION:     1. No evidence of acute intracranial hemorrhage, mass, or herniation.  2. There is generalized atrophy of the brain. White matter changes are  present in the cerebral hemispheres that are consistent with small  vessel ischemic disease in this age patient. No significant change  since prior.    FEDERICO EASON MD   XR Chest 2 Views    Narrative    CHEST TWO VIEWS  11/16/2018 6:42 AM     HISTORY: Dyspnea.     COMPARISON: 11/4/2018      Impression    IMPRESSION: Worsening cardiomegaly. No change in pulmonary vascular  engorgement. New left lower lobe infiltrate best seen on lateral view,  obscuring the diaphragm posterior medially in the descending aortic  shadow.    GIOVANNI MATSON MD   US Abdomen Complete    Narrative    ULTRASOUND ABDOMEN COMPLETE   11/16/2018 7:33 AM     HISTORY: Abdominal pain and distention.    COMPARISON: None.    FINDINGS: Normal hepatic echogenicity. No hepatic masses. A small  amount of sludge is present in the neck of a mildly distended  gallbladder. Mild diffuse wall thickening of the gallbladder. No  convincing gallstones or pericholecystic fluid. No focal tenderness  over the gallbladder. No intra- or extrahepatic biliary dilatation.  The common duct measures 0.2 cm in diameter. The pancreas is not well  visualized due to overlying bowel gas. The spleen is relatively small,  measuring 7.6 cm in craniocaudal dimension. The right and left kidneys  measure 7.9 and 10.6 cm in length, respectively. Echogenic renal  echotexture and cortical thinning of both kidneys. No intrarenal  collecting system dilatation bilaterally. 1.0 cm cyst in the  interpolar region of the right kidney. The proximal aorta and inferior  vena cava are visualized. A small amount of free intraperitoneal  fluid, primarily in the right upper quadrant and pelvis.      Impression     IMPRESSION:   1. A small amount of free intraperitoneal fluid, primarily in the  right upper quadrant and pelvis.  2. A small amount of sludge present within a borderline distended  mildly thick-walled gallbladder. There is no convincing evidence of  acute cholecystitis. If there is a clinical concern for acute  cholecystitis, a HIDA scan could be considered for further evaluation.  3. No biliary dilatation.  4. Both kidneys are echogenic and moderately atrophic, suggesting  medical renal disease.    RON HUGHES MD

## 2018-12-02 NOTE — CONSULTS
Care Transition Initial Assessment - KIP  Reason For Consult: discharge planning  Active Problems:    Hyperkalemia       DATA  Lives With: facility resident  Living Arrangements: residential facility  Who is your support system?: Sibling(s), Other (specify)  Identified issues/concerns regarding health management: Pt needs to return to [facility].      Quality Of Family Relationships: supportive, helpful, involved     ASSESSMENT  Concerns to be addressed: Pt needs to return to [facility]. KIP spoke to Nursing at [facility]. Pt is able to return today. KIP arranged WC transportation. Pt will be picked up by [facility] at 6:30pm. KIP sent the discharge order to [phone].    PLAN  Patient anticipates discharging to: [facility] today at 6:30pm.      MAHENDRA Lundberg  Casual KIP [phone]

## 2018-12-02 NOTE — PLAN OF CARE
"Problem: Patient Care Overview  Goal: Plan of Care/Patient Progress Review  Outcome: Improving  PRIMARY DIAGNOSIS: Hyperkalemia  OUTPATIENT/OBSERVATION GOALS TO BE MET BEFORE DISCHARGE:  1. ADLs back to baseline: No    2. Activity and level of assistance: Up with maximum assistance. Consider SW and/or PT evaluation.     3. Pain status: Improved with use of alternative comfort measures i.e.: positioning    4. Return to near baseline physical activity: No     Discharge Planner Nurse   Safe discharge environment identified: No  Barriers to discharge: Yes        Please review provider order for any additional goals.   Nurse to notify provider when observation goals have been met and patient is ready for discharge.    Pt is nonverbal with short \"yea\" answers at times. Ax2 w/lift. VSS. LS diminished. Tele NSR. BS audible/active x4, , K 5.8- Kayexalate given. Pt has poor skin integrity, multiple wounds to Lt arm and bilateral feet, cleansed and covered with mepilex. Pt to have hemodialysis today and discharge back to [facility] when symptoms improve. Will continue to monitor.      "

## 2018-12-02 NOTE — ED NOTES
"[facility]  ED Nurse Handoff Report    Maurizio Ohara is a 89 year old male   ED Chief complaint: Generalized Weakness  . ED Diagnosis:   Final diagnoses:   None     Allergies:   Allergies   Allergen Reactions     Glyburide      Lisinopril      Hyperkalemia when hospitalized 4/5/2011     Metformin      Renal failure stage 4     Penicillins      SHADY Gallagher clarified with pt, pt does not remember rxn, it was a long time ago, and \"nothing crazy\".     Verapamil        Code Status: DNR / DNI  Activity level - Baseline/Home:  Stand with Assist of 2. Activity Level - Current:   Stand with Assist of 2. Lift room needed: No. Bariatric: No   Needed: No   Isolation: No. Infection: Not Applicable.     Vital Signs:   Vitals:    12/01/18 1827 12/01/18 1845 12/01/18 1941 12/01/18 2026   BP: 110/71 113/71     Pulse: 68      Resp: 18      Temp: 97.8  F (36.6  C)  98.2  F (36.8  C)    TempSrc: Oral  Rectal    SpO2: 95% 96%  94%   Weight: 77.1 kg (170 lb)          Cardiac Rhythm:  ,      Pain level: 0-10 Pain Scale: 0  Patient confused: Yes. Patient Falls Risk: Yes.   Elimination Status: has not voided yet, states he does make urine and pee's   Patient Report - Initial ComplaintPt in via EMS from [facility] d/t increased weakness. Pt is a dialysis pt who missed his appointment today d/t transportation issue. Per EMS staff at facility sent pt in d/t weakness. Pt is alert, oriented to self, and month, confused as to place and situation at this time. ABCD's intact :    Focused Assessment:  Cognitive - Cognitive/Neuro/Behavioral WDL:  WDL except; level of consciousness Level Of Consciousness: confused Orientation: disoriented to; situation; place Follows Commands: yes  Skin - Skin WDL:  WDL except Skin Comment: Patient presents wtih ace wraps on both lower calf's, ace wraps were poorly wrapped and tied improperly to cause venous restriction, currently taken off ace wraps, calves appear swollen and edematous, " skin tear also apparent on right hand on top of the hand and of the wrist of the posterior side    [facility] called, Report from Karyn Dominguez. Pt has been increasingly weak for a week and a half following a fall. Pt is confused at baseline and she reports this weakness in not new, pt has been declining since fall. MD made aware     Spoke with Timmy from [facility] @2200, she stated his baseline this afternoon was feeding himself, transferring with an assist of two,  States he has a Monday morning dialysis rescheduled at 6 am [facility], for his missed appointment today, call back number to [facility] : [phone]  Tests Performed: Labs. Abnormal Results:   Labs Ordered and Resulted from Time of ED Arrival Up to the Time of Departure from the ED   CBC WITH PLATELETS DIFFERENTIAL - Abnormal; Notable for the following:        Result Value    RBC Count 3.48 (*)     Hemoglobin 12.5 (*)     Hematocrit 39.9 (*)      (*)     MCH 35.9 (*)     MCHC 31.3 (*)     RDW 19.6 (*)     Platelet Count 140 (*)     Nucleated RBCs 2 (*)     All other components within normal limits   COMPREHENSIVE METABOLIC PANEL - Abnormal; Notable for the following:     Sodium 132 (*)     Potassium 6.1 (*)     Glucose 156 (*)     Urea Nitrogen 102 (*)     Creatinine 7.19 (*)     GFR Estimate 7 (*)     GFR Estimate If Black 9 (*)     Calcium 10.5 (*)     Albumin 3.1 (*)     Alkaline Phosphatase 173 (*)     All other components within normal limits   MAGNESIUM - Abnormal; Notable for the following:     Magnesium 2.5 (*)     All other components within normal limits   ISTAT BASIC MET ICA HCT POCT - Abnormal; Notable for the following:     Sodium 131 (*)     Potassium 5.9 (*)     Glucose 158 (*)     Urea Nitrogen 92 (*)     Creatinine 7.5 (*)     GFR Estimate 7 (*)     GFR Estimate If Black 8 (*)     Calcium Ionized 5.3 (*)     All other components within normal limits   PHOSPHORUS   POTASSIUM   PERIPHERAL IV CATHETER   ISTAT CG4 GASES  LACTATE GIOVANA NURSING POCT   CARDIAC CONTINUOUS MONITORING   ASSESS FOR HYPOGLYCEMIA SYMPTOMS   NOTIFY PHYSICIAN   .   Treatments provided: Hyperkalemia Protocol, Glucose, Insulin, Lasix, Kayexelate not given yet as patient has been sleeping and will be unable to attempt oral route will be administering rectally, Gluc checks every 30 minutes currently  Family Comments: Niece : Was given phone number for Niece and gave information to admitting doctor who stated she will be contacting her  OBS brochure/video discussed/provided to patient:  Yes  ED Medications:   Medications   glucose gel 15-30 g (not administered)     Or   dextrose 50 % injection 25-50 mL (not administered)     Or   glucagon injection 1 mg (not administered)   dextrose 10% infusion (not administered)   sodium polystyrene (KAYEXALATE) suspension 15 g (not administered)   dextrose 50 % injection 25 g (25 g Intravenous Given 12/1/18 2055)   insulin (regular) (HumuLIN R/NovoLIN R) injection 8 Units (8 Units Intravenous Given 12/1/18 2103)   albuterol (PROVENTIL) neb solution 10 mg (10 mg Nebulization Given 12/1/18 2026)   furosemide (LASIX) injection 40 mg (40 mg Intravenous Given 12/1/18 2115)     Drips infusing:  No  For the majority of the shift, the patient's behavior Green. Interventions performed were N/A.     Severe Sepsis OR Septic Shock Diagnosis Present: No      ED Nurse Name/Phone Number: James Webber,   9:22 PM  RECEIVING UNIT ED HANDOFF REVIEW    Above ED Nurse Handoff Report was reviewed: Yes  Reviewed by: Ellen Duncan on December 1, 2018 at 10:31 PM

## 2018-12-02 NOTE — ED PROVIDER NOTES
History     Chief Complaint:  Generalized Weakness    The history is limited by the condition of the patient and the absence of a caregiver.      Maurizio Ohara is a 89 year old male who presents to the emergency department today with generalized weakness. Patient presents after he was unable to get to dialysis today due to mechanical difficulties of the transportation of his nursing facility. Nursing home staff noted that he was weaker than baseline and called the ambulance for him.  Upon presentation, patient denies pain, shortness of breath, vomiting, diarrhea, chest pain. Patient reports he does feel somewhat weak everywhere.  He answers direct questions and is oriented, but does not provide history unprompted or with detail.    Additional history from nurse who called patient's facility: patient fell at TCU 2 weeks ago, had several skin tears, L arm x-ray (negative), head CT negative.  Has been generally weaker since and was moved to their long term area within the past 24 hours.  Weakness was not acutely worse to the staff that knew him on the TCU side.    Allergies:  Glyburide  Lisinopril  Metformin  Penicillins  Verapamil    Medications:    Tylenol  Aspirin   Lipitor   Phoslo  Voltaren  Gabapentin  Midodrine  Nitrostat   Miralax    Past Medical History:    Altered mental status  Fall  Chronic diastolic heart failure (H)  Severe aortic stenosis  Cellulitis  Acute diastolic (congestive) heart failure (H)  Pneumonia  Sepsis (H)  Diabetes (H)  Hypertension  NSTEMI (non-ST elevated myocardial infarction) (H)  ESRD (end stage renal disease) on dialysis (H)  CAD (coronary artery disease)  Mitral regurgitation  Anemia   CAD (coronary artery disease)   Chronic kidney disease   Diabetes (H)   Hypertension   Mitral regurgitation   Mitral valve disorders(424.0)   Myocardial infarction (H)   Pulmonary hypertension (H)   Renal disease due to diabetes mellitus (H)     Past Surgical History:    Coronary  angiography  ENT surgery  Heart catheter   Thoracic surgery     Family History:    Diabetes    Social History:  The patient was alone.  Smoking Status: Former  Smokeless Tobacco: Never  Alcohol Use: Yes   Marital Status:  Single    Review of Systems   Constitutional: Positive for fatigue.   Respiratory: Negative for shortness of breath.    Cardiovascular: Negative for chest pain.   Gastrointestinal: Negative for diarrhea, nausea and vomiting.   Neurological: Positive for weakness.   All other systems reviewed and are negative.     Physical Exam     Patient Vitals for the past 24 hrs:   BP Temp Temp src Pulse Heart Rate Resp SpO2 Weight   12/01/18 2230 103/59 - - - 68 - 98 % -   12/01/18 2215 109/57 - - - - - - -   12/01/18 2148 - - - - - - 100 % -   12/01/18 2145 103/63 - - - - - - -   12/01/18 2143 - - - - - - 100 % -   12/01/18 2140 112/63 - - - - - - -   12/01/18 2045 100/56 - - - - - - -   12/01/18 2026 - - - - - - 94 % -   12/01/18 2000 109/75 - - - 60 - 93 % -   12/01/18 1941 - 98.2  F (36.8  C) Rectal - - - - -   12/01/18 1845 113/71 - - - 67 - 96 % -   12/01/18 1827 110/71 97.8  F (36.6  C) Oral 68 - 18 95 % 77.1 kg (170 lb)      Physical Exam  Eyes:  Sclera white; Pupils are equal and round  ENT:    External ears and nares normal  CV:  Rate as above with irregular rhythm   Resp:  Breath sounds clear and equal bilaterally, no crackles  GI:  Abdomen is soft, non-tender, non-distended  MS:  Moves all extremities  Skin:  Warm and dry, skin tears to L hand and forearm which are tender - no cellulitis or purulence, skin tears to both feet also w/o cellulitis  Neuro:  Oriented, clear speech but limited details in answers      Emergency Department Course     ECG:  Indication: Weakness   Completed at 1932.  Read at 1934.   Atrial fibrillation  Right ventricular hypertrophy  Nonspecific ST and T wave abnormality  Abnormal ECG    Rate 73 bpm. UT interval *. QRS duration 96. QT/QTc 352/387. P-R-T axes * 112 -57.      Laboratory:  Laboratory findings were communicated with the patient who voiced understanding of the findings.  ISTAT BMP: 131(L) Na, 5.9(H) K, 158(H) glucose, 7(L) GFR, 5.3(H) Ca Ionized o/w WNL (Creatinine 7.5(H))   CBC: AWNL (WBC 9.1, HGB 12.5(L), (L))   CMP:132(L) Na, 6.1(H) K, 156(H) glucose, 7(L) GFR, 10.5(H) Ca, 3.1 (L) Albumin, 173(H) Alkphos o/w WNL (Creatinine 7.19(H))   Phosphorus: 3.4   Magnesium: 2.5(H)     Recent Labs  Lab 12/01/18  2210 12/01/18  2130 12/01/18  2059 12/01/18  1913 12/01/18  1859 11/28/18  1035   GLC  --   --   --  158* 156* 225*   * 201* 154*  --   --   --      Interventions:  2026: Proventil 10 mg Nebulization   2055: Dextrose 20 g IV   2103: Insulin 8 units IV   2115: Lasix 40 mg IV     Emergency Department Course:  Nursing notes and vitals reviewed.  1901: I performed an exam of the patient as documented above.   IV was inserted and blood was drawn for laboratory testing, results above.  2025: I spoke with Dr. Oakes of the Nephrology service regarding patient's presentation, findings, and plan of care.   2105: Findings and plan explained to the Patient who consents to admission. Discussed the patient with Dr. William, who will admit the patient to a Tele bed for further monitoring, evaluation, and treatment.   I personally reviewed the laboratory results with the Patient and answered all related questions prior to admission.   Impression & Plan    Medical Decision Making:  Maurizio Ohara is a 89 year old male who presents from his living facility for evaluation of generalized weakness after missing dialysis today. Differential includes electrolyte derangements, volume overload, and infection. EKG was obtained without dysrhythmia or peaked T waves. Blood work was checked with hyperkalemia of 6.1. Hyperkalemia treatments were ordered to prevent dysrhythmia and death. There is no evidence of pulmonary edema or hypoxia on exam and initial chest XR was cancelled  after blood work and core temperature came back not consistent with infection. Case was discussed with nephrology who will plan on dialysis in the morning and was in agreement with shifting his potassium and treating medically.  No evidence of dysrhythmia developed throughout ED course while on cardiac monitoring.  Patient reports he does still urinate suggesting role for lasix.  I left voicemails for both his sister and his nephew, who are listed on his cover sheet as his next of kin and contacts. However, I did not hear back from them.     Critical Care time:  35 minutes    Diagnosis:    ICD-10-CM    1. Hyperkalemia E87.5 Potassium     Glucose by meter     Glucose by meter   2. ESRD (end stage renal disease) on dialysis (H) N18.6     Z99.2    3. Generalized muscle weakness M62.81    4. Multiple abrasions T07.XXXA        Disposition:  Admitted to Tele     Scribe Disclosure:  I, Dasia Clifton, am serving as a scribe at 7:01 PM on 12/1/2018 to document services personally performed by Aisha Og, based on my observations and the provider's statements to me.    12/1/2018   [facility] EMERGENCY DEPARTMENT       Aisha Og MD  12/03/18 8815

## 2018-12-02 NOTE — PHARMACY-CONSULT NOTE
Pharmacy reviewed all medications for possible side effect of hyperkalemia (i.e. ACE inhibitor, potassium supplements, maintenance IV fluids, TPN, spironolactone, etc.) -- None identified.

## 2018-12-02 NOTE — ED NOTES
[facility] called, Report from Karyn Dominguez. Pt has been increasingly weak for a week and a half following a fall. Pt is confused at baseline and she reports this weakness in not new, pt has been declining since fall. MD made aware

## 2018-12-02 NOTE — CONSULTS
[facility]    RENAL CONSULTATION NOTE    REFERRING MD:  Dr. William    REASON FOR CONSULTATION:  ESKD c hyperkalemia    DATE OF CONSULTATION: 12/02/18    SHORTHAND KEY FOR MY NOTES:  c = with, s = without, p = after, a = before, x = except, asx = asymptomatic, tx = transplant or treatment, sx = symptoms or symptomatic, cx = canceled or culture, rxn = reaction, yday = yesterday, nl = normal, abx = antibiotics, fxn = function, dx = diagnosis, dz = disease, m/h = melena/hematochezia, c/d/l/ha = cramping/dizziness/lightheadedness/headache, d/c = discharge or diarrhea/constipation, f/c/n/v = fevers/chills/nausea/vomiting, cp/sob = chest pain/shortness of breath.    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 under my care and that of Luis Schulz PA-C and was admitted on 12/1/2018 c hyperkalemia.    Pt was supposed to dialyse yday per his routine schedule, but due to a transportation issue he didn't go.  Later in the day, he was noted to be weak and the NH staff sent him in to the ER for further eval.  In the ER, he was found to have a high k.  He was treated medically and admitted for HD today.    Today, his BP remains low in the 80s and he is asx.  He hasn't taken his midodrine, yet, but will do so now.  He has a dry mouth and after drinking water, he is able to talk better. No f/c/n/v.     ROS:  A complete review of systems was performed and is negative x as noted above.    PMH:    Past Medical History:   Diagnosis Date     Anemia      CAD (coronary artery disease) 12/24/14    PCI and BMS to mid RCA (5.0 x 20mm) with residual mod diffuse mid LAD disease     Chronic kidney disease     on Dialysis     Diabetes (H)      Hypertension      Mitral regurgitation 12/24/2014    mild-mod (1-2+) per echo     Mitral valve disorders(424.0) 12/24/2014    mild/mod (1-2+) MR     Myocardial infarction (H) 12/24/2014    NSTEMI      "Pulmonary hypertension (H) 12/24/2014    RVSP elevated c/w mild-mod PH per echo     Renal disease due to diabetes mellitus (H)     End stage; on Dialysis     PSH:    Past Surgical History:   Procedure Laterality Date     CORONARY ANGIOGRAPHY ADULT ORDER  12/24/2014     ENT SURGERY      Tonsillectomy     HEART CATH, ANGIOPLASTY  12/24/2014    PCI and BMS (5.0x20mm)to mid RCA     THORACIC SURGERY      Herniated disc     MEDICATIONS:      sodium chloride 0.9%  250 mL Intravenous Once in dialysis     aspirin  81 mg Oral Daily     atorvastatin  40 mg Oral QPM     calcium acetate  2,001 mg Oral TID w/meals     diclofenac  2 g Topical 4x Daily     gabapentin (NEURONTIN) capsule 100 mg  100 mg Oral TID     insulin aspart  1-4 Units Subcutaneous Q4H     [START ON 12/4/2018] midodrine (PROAMATINE) tablet 10 mg  10 mg Oral Once per day on Tue Thu Sat     - MEDICATION INSTRUCTIONS -   Does not apply Once     polyethylene glycol  17 g Oral Daily     sodium polystyrene  15 g Oral Once     ALLERGIES:    Allergies as of 12/01/2018 - Naif as Reviewed 12/01/2018   Allergen Reaction Noted     Glyburide  12/24/2014     Lisinopril  01/08/2015     Metformin  01/08/2015     Penicillins  12/24/2014     Verapamil  12/24/2014     FH:    Family History   Problem Relation Age of Onset     Diabetes Mother      SH:    Social History     Social History     Marital status: Single     Spouse name: N/A     Number of children: N/A     Years of education: N/A     Occupational History     Not on file.     Social History Main Topics     Smoking status: Former Smoker     Smokeless tobacco: Never Used     Alcohol use Yes      Comment: \"about as much as you can pour in a thimble in a year\"     Drug use: No     Sexual activity: Not Currently     Partners: Female     Other Topics Concern     Caffeine Concern Yes     1-2 cups daily     Sleep Concern No     Special Diet Yes     kidney diet     Exercise Yes     walking     Seat Belt Yes     Social History " Narrative     PHYSICAL EXAM:    BP 97/50  Pulse 68  Temp 97.7  F (36.5  C) (Axillary)  Resp 16  Wt 82.8 kg (182 lb 8 oz)  SpO2 94%  BMI 28.58 kg/m2    GENERAL: sleepy, but arousable; interactive and appropriate; speaking/answering questions properly x thought he was at [facility], NAD  HEENT:  Normocephalic. No gross abnormalities.  Dry mouth. Pupils equal.  EOMI.  No scleral icterus.  CV: RRR c 1/6 murmurs, 2+ b foot edema (chronic - unchanged); tr ble edema  RESP: Crackles bilaterally, decent efforts  GI: Abdomen o/s/nt/nd  MUSCULOSKELETAL: extremities nl - no gross deformities noted  SKIN: no suspicious lesions or rashes, dry to touch  NEURO:  Weak, but moves extremities   PSYCH: mood ok, affect appropriate  LYMPH: No palpable ant/post cervical and supraclavicular adenopathy  OTHER:  Access - DORIS    Pt seen on HD.  Stable run.  -2L UF goal.  BP 80-90s    LABS:      CBC RESULTS:     Recent Labs  Lab 12/01/18 1913 12/01/18 1859 11/28/18  1035   WBC  --  9.1 7.8   RBC  --  3.48* 3.40*   HGB 13.9 12.5* 11.9*   HCT  --  39.9* 36.7*   PLT  --  140* 162     BMP RESULTS:    Recent Labs  Lab 12/02/18  0900 12/02/18  0120 12/01/18  2237 12/01/18  2137 12/01/18 1913 12/01/18 1859 11/28/18  1035   *  --   --   --  131* 132* 131*   POTASSIUM 5.9* 5.8* 5.6* 5.5* 5.9* 6.1* 5.4*   CHLORIDE 96  --   --   --  96 95 95   CO2 25  --   --   --   --  27 24   *  --   --   --  92* 102* 77*   CR 7.75*  --   --   --   --  7.19* 5.90*   *  --   --   --  158* 156* 225*   KIMBER 10.2*  --   --   --   --  10.5* 9.4     INRNo lab results found in last 7 days.     DIAGNOSTICS:  Personally reviewed ECG - no peaked Ts    A/P:  Maurizio Ohara is a 89 year old male c ESKD who has hyperkalemia.    1.  ESKD.  Pt missed his run yday due to transportation issues.  He is running now.  A.  Next planned run on Tues as outpt.    2.  Hyperkalemia.  Pt's K has been high lately, in part due to food choices at the NH.  He usually  runs on a K 1.5 bath and his adequacy has been ok.  His fistula has been working fine, too, so recirc could be a possibility, too, but of lower likelihood.  A.  Dialysis diet.  B.  Follow K.  C.  Will ask outpt dietitian to contact NH dietitian.    3.  Hypotension.  Pt has low BPs and is not on any HTN meds.  He takes midodrine c HD.  So far, tolerating fluid removal.  A.  Continue midodrine.  B.  Follow BPs, clinically as fluid is removed.    4.  Disposition.  He can be d/c'd p HD today.    Thank you for this consultation. We will follow c you.  Please call if any questions.      Attestation:   I have reviewed today's relevant vital signs, notes, medications, labs and imaging.    Rich Yung MD  [facility] - Nephrology  [phone]

## 2018-12-02 NOTE — PHARMACY-ADMISSION MEDICATION HISTORY
Admission medication history interview status for this patient is complete. See [facility] admission navigator for allergy information, prior to admission medications and immunization status.     Medication history interview source(s):Patient  Medication history resources (including written lists, pill bottles, clinic record):Nursing home MAR  Primary pharmacy:nursing [place]    Changes made to PTA medication list:  No changes     Actions taken by pharmacist (provider contacted, etc):None     Additional medication history information:FYI Metoprolol XL was listed several places in chart.  As recently as 11/26 NH visit (12.5mg daily) and 11/26 Cardiology visit (25mg daily) (neither says it was a change so it is confusing)  Then 11/30 NH visit note doesn't indicate a change but Metoprolol is no longer listed.     Medication reconciliation/reorder completed by provider prior to medication history? Yes    Do you take OTC medications (eg tylenol, ibuprofen, fish oil, eye/ear drops, etc)? Y    For patients on insulin therapy: N      Prior to Admission medications    Medication Sig Last Dose Taking? Auth Provider   aspirin 81 MG tablet Take 81 mg by mouth daily 12/1/2018 at Unknown time Yes Unknown, Entered By History   atorvastatin (LIPITOR) 40 MG tablet Take 40 mg by mouth At Bedtime 11/30/2018 at 2000 Yes Unknown, Entered By History   calcium acetate (PHOSLO) 667 MG CAPS Take 2,001 mg by mouth 3 times daily (with meals) 12/1/2018 at 1200 Yes Unknown, Entered By History   diclofenac (VOLTAREN) 1 % GEL topical gel Apply 2 g topically 4 times daily Apply to RLE 12/1/2018 at 1600 Yes Darin Maher MD   GABAPENTIN PO Take 100 mg by mouth 3 times daily 12/1/2018 at 1600 Yes Reported, Patient   MIDODRINE HCL PO Take 10 mg by mouth three times a week on Tues, Thurs, Sat 1 hour before dialysis AND Give 10 mg by mouth one time a day every Tue, Thu for dialysis Send one tablet with resident to dialysis 12/1/2018 at 0700 Yes  Reported, Patient   polyethylene glycol (MIRALAX/GLYCOLAX) Packet Take 17 g by mouth daily 12/1/2018 at 0800 Yes Darin Maher MD   acetaminophen (TYLENOL) 500 MG tablet Take 1,000 mg by mouth 3 times daily as needed for mild pain Unknown at Unknown time  Unknown, Entered By History   nitroglycerin (NITROSTAT) 0.4 MG sublingual tablet Place 1 tablet (0.4 mg) under the tongue every 5 minutes as needed for chest pain Unknown at Unknown time  Julianne Dan PA-C   polyethylene glycol (MIRALAX/GLYCOLAX) Packet Take 1 packet by mouth daily as needed for constipation Unknown at Unknown time  Reported, Patient

## 2018-12-02 NOTE — H&P
[facility]    Hospitalist History and Physical    Name: Maurizio Ohara    MRN: [id number]  YOB: 1929    Age: 89 year old  Date of Admission:  12/1/2018  Date of Service (when I saw the patient): 12/01/18    Assessment & Plan   Maurizio Ohara is a 89 year old male past medical history significant for coronary artery disease, chronic kidney disease on hemodialysis, diabetes mellitus, pulmonary hypertension, diastolic congestive heart failure , severe aortic stenosis and mitral regurgitation multiple fall, recently transferred to assisted living facility was sent to the emergency room with increased weakness and somnolence.  Patient was found to be increasingly weak after he missed his dialysis today.  In the emergency room he was hyperkalemic.    Increased weakness, fatigue and malaise  --At baseline patient communicates with 1 word answers and is mainly nonverbal.  --There was concern of underlying dementia during prior hospitalization.  He was discharged to transitional care unit due to somnolence and altered mental status on 11/16/2018  --Patient was increasingly weak was sent to the emergency room from facility  --Per report patient missed his dialysis today (Tuesday Thursdays and Saturdays)  --Nephrologist was contacted by the emergency room physician. Recommended admission for dialysis tomorrow    Hyperkalemia  --No EKG changes  --Started on hyperkalemia protocol as recommended by nephrologist on call in the emergency room  --Monitor potassium.  Kayexalate for mild hyperkalemia with no EKG changes  --Monitor on telemetry    Multiple skin abrasions and wounds  --From recent falls  --No evidence of infection  --Wound care    History of coronary artery disease and valvular disease  --Continue all prior to admission meds    h/o diabetes mellitus  --Last hemoglobin A1c in September was 5.1  --Has not required any treatment  --We will monitor blood glucose and will watch for  hypoglycemia    Resume all prior to admission meds patient at baseline per nursing home report  Patient's condition discussed with patient's niece Leticia      DVT Prophylaxis: Pneumatic Compression Devices  Code Status: DNR / DNI    Disposition: Expected discharge in 1 days after dialysis to transitional care unit    Primary Care Physician   Justina Moise    Chief Complaint   Increased weakness per report from TCU after missing dialysis today    Unable to obtain a history from the patient due to mental status  History obtained from ER records and from his niece Leticia    History of Present Illness   Maurizio Ohara is a 89 year old male with past medical history significant for diabetes mellitus, hypertension, coronary artery disease, aortic stenosis, pulmonary hypertension, 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  Patient was recently hospitalized at [facility] after a fall in for altered mental status.  He was discharged due to worsening mental status and risk for falls at transitional care unit on 11/16/2018.  Report he was being moved in the process somehow his dialysis got missed today.  It was thought that patient could resume dialysis on Monday however as the day progressed he became increasingly weak.  Family was contacted and patient was transferred to hospital for further evaluation.  Patient is somnolent nonverbal was snoring and sleeping when I saw him unable to get any history or review of systems from him.  Per report from facility patient at baseline answers in one words when called.  He is usually arousable and at times is  somnolent and nonverbal.  Review of records from prior hospitalization there was concern for underlying dementia and clinical deterioration after recent falls and hospitalizations.  There is no history of any new symptoms of fever chills.  No new trauma or falls.    Past  Medical History    Past Medical History:   Diagnosis Date     Anemia      CAD (coronary artery disease) 12/24/14    PCI and BMS to mid RCA (5.0 x 20mm) with residual mod diffuse mid LAD disease     Chronic kidney disease     on Dialysis     Diabetes (H)      Hypertension      Mitral regurgitation 12/24/2014    mild-mod (1-2+) per echo     Mitral valve disorders(424.0) 12/24/2014    mild/mod (1-2+) MR     Myocardial infarction (H) 12/24/2014    NSTEMI     Pulmonary hypertension (H) 12/24/2014    RVSP elevated c/w mild-mod PH per echo     Renal disease due to diabetes mellitus (H)     End stage; on Dialysis         Past Surgical History   Past Surgical History:   Procedure Laterality Date     CORONARY ANGIOGRAPHY ADULT ORDER  12/24/2014     ENT SURGERY      Tonsillectomy     HEART CATH, ANGIOPLASTY  12/24/2014    PCI and BMS (5.0x20mm)to mid RCA     THORACIC SURGERY      Herniated disc       Prior to Admission Medications   Prior to Admission Medications   Prescriptions Last Dose Informant Patient Reported? Taking?   GABAPENTIN PO   Yes No   Sig: Take 100 mg by mouth 3 times daily   MIDODRINE HCL PO   Yes No   Sig: Take 10 mg by mouth three times a week on Tues, Thurs, Sat 1 hour before dialysis AND Give 10 mg by mouth one time a day every Tue, Thu for dialysis Send one tablet with resident to dialysis   acetaminophen (TYLENOL) 500 MG tablet  Other Yes No   Sig: Take 1,000 mg by mouth 3 times daily as needed for mild pain   aspirin 81 MG tablet  Other Yes No   Sig: Take 81 mg by mouth daily   atorvastatin (LIPITOR) 40 MG tablet  Other Yes No   Sig: Take 40 mg by mouth At Bedtime   calcium acetate (PHOSLO) 667 MG CAPS  Other Yes No   Sig: Take 2,001 mg by mouth 3 times daily (with meals)   diclofenac (VOLTAREN) 1 % GEL topical gel  Other No No   Sig: Apply 2 g topically 4 times daily Apply to RLE   nitroglycerin (NITROSTAT) 0.4 MG sublingual tablet  Other No No   Sig: Place 1 tablet (0.4 mg) under the tongue every 5  "minutes as needed for chest pain   polyethylene glycol (MIRALAX/GLYCOLAX) Packet  Other No No   Sig: Take 17 g by mouth daily   polyethylene glycol (MIRALAX/GLYCOLAX) Packet  Other Yes No   Sig: Take 1 packet by mouth daily as needed for constipation      Facility-Administered Medications: None     Allergies   Allergies   Allergen Reactions     Glyburide      Lisinopril      Hyperkalemia when hospitalized 4/5/2011     Metformin      Renal failure stage 4     Penicillinemiliano Gallagher RN clarified with pt, pt does not remember rxn, it was a long time ago, and \"nothing crazy\".     Verapamil        Social History   Social History   Substance Use Topics     Smoking status: Former Smoker     Smokeless tobacco: Never Used     Alcohol use Yes      Comment: \"about as much as you can pour in a thimble in a year\"     Social History     Social History Narrative     Was sent from facility.  Per patient's niece he is not a smoker.  But occasionally uses alcohol    Family History   Significant for diabetes mellitus    Review of Systems   Patient somnolent sleepy unable to get any review of systems.    Physical Exam   Temp: 98.2  F (36.8  C) Temp src: Rectal BP: 113/71 Pulse: 68 Heart Rate: 67 Resp: 18 SpO2: 94 % O2 Device: None (Room air)    Vital Signs with Ranges  Temp:  [97.8  F (36.6  C)-98.2  F (36.8  C)] 98.2  F (36.8  C)  Pulse:  [68] 68  Heart Rate:  [67] 67  Resp:  [18] 18  BP: (110-113)/(71) 113/71  SpO2:  [94 %-96 %] 94 %  170 lbs 0 oz    GEN: Somnolent, sleeping and snoring at the time of my evaluation.  Was arousable to verbal and painful stimuli.  no overt distress  HEENT:  Normocephalic/atraumatic, no scleral icterus, no nasal discharge, mouth dry  CV:  Regular rate and rhythm, pensive murmur.  S1 + S2 noted, no S3 or S4.  LUNGS: Bibasilar crackles.  Symmetric chest rise on inhalation noted.  ABD:  Active bowel sounds, soft,.  No rebound/guarding/rigidity.  EXT:+  edema.  No cyanosis.    SKIN:  Dry and wrinkled skin " multiple bruising open abrasions and areas of denuded skin.  NEURO: Somnolent cannot assess    Data   Data reviewed today:  I personally reviewed the EKG tracing showing Sinus rhythm with T wave inversion in inferior lateral leads not new when compared to prior EKG.      Recent Labs  Lab 12/01/18 1913 12/01/18 1859 11/28/18  1035   WBC  --  9.1 7.8   HGB 13.9 12.5* 11.9*   HCT  --  39.9* 36.7*   MCV  --  115* 108*   PLT  --  140* 162       Recent Labs  Lab 12/01/18 2137 12/01/18 1913 12/01/18 1859 11/28/18  1035   NA  --  131* 132* 131*   POTASSIUM 5.5* 5.9* 6.1* 5.4*   CHLORIDE  --  96 95 95   CO2  --   --  27 24   ANIONGAP  --  8 10 12   GLC  --  158* 156* 225*   BUN  --  92* 102* 77*   CR  --   --  7.19* 5.90*   GFRESTIMATED  --  7* 7* 9*   GFRESTBLACK  --  8* 9* 11*   KIMBER  --   --  10.5* 9.4       Recent Labs  Lab 12/01/18 1859   AST 25   ALT 23   ALKPHOS 173*   BILITOTAL 0.8     No results for input(s): INR in the last 168 hours.  No results for input(s): COLOR, APPEARANCE, URINEGLC, URINEBILI, URINEKETONE, SG, UBLD, URINEPH, PROTEIN, UROBILINOGEN, NITRITE, LEUKEST, RBCU, WBCU in the last 168 hours.    No results found for this or any previous visit (from the past 24 hour(s)).

## 2018-12-02 NOTE — PLAN OF CARE
"Problem: Patient Care Overview  Goal: Plan of Care/Patient Progress Review  Outcome: Improving  PRIMARY DIAGNOSIS: Hyperkalemia  OUTPATIENT/OBSERVATION GOALS TO BE MET BEFORE DISCHARGE:  1. ADLs back to baseline: No     2. Activity and level of assistance: Up with maximum assistance. Consider SW and/or PT evaluation.      3. Pain status: Improved with use of alternative comfort measures i.e.: positioning     4. Return to near baseline physical activity: No      Discharge Planner Nurse   Safe discharge environment identified: No  Barriers to discharge: Yes      Please review provider order for any additional goals.   Nurse to notify provider when observation goals have been met and patient is ready for discharge.     Pt is nonverbal with short \"yea\" answers at times. Ax2 w/lift. VSS. LS diminished. Tele NSR. BS audible/active x4, , K 5.8- Kayexalate given, next re-check at 0530. Pt has poor skin integrity, multiple wounds to Lt arm and bilateral feet, cleansed and covered with mepilex. Pt to have hemodialysis today and discharge back to [facility] when symptoms improve. Will continue to monitor.            "

## 2018-12-02 NOTE — ED NOTES
Bed: ED16  Expected date: 12/1/18  Expected time: 6:14 PM  Means of arrival: Ambulance  Comments:  VINH2

## 2018-12-03 NOTE — PROGRESS NOTES
"D/c'ed back to [facility] at 1800 this tonny per [facility] transport. Pt's POA is a niece \" Leticia. She called for update this am & was very upset that pt was admitted for missing HD Sat. Updated her that he was getting his HD this am. Discussed that he was lethargic & evidently non-verbal in ER & during night. Was answering simple questions this am. Able to state year \"2018 & holiday season\" . Niece upset that his status has deteriorated since summer/fall. Asked her if he has discussed hi goals /desires with any family. SHe became very angry & questioning if \"we were  Giving up on him\" . Explained we were just asking some basic questions. Discussed her anger with SW & Care Cord. Suzanna PERES ( Suzanna MAZARIEGOS was very familiar with hostile, angry statements from Niece from last admit ) Then received another angry call from Mir at discharge time as she said [facility] charged her $1000 last transport) . She stated \" I want the  name & phone so she can pay the bill for transport\" . Pt stated they lived in [place] an hour away but would have come in to transport . Attempted to de-escalate behavior but she remains very angry as has been on prior admits per team.   "

## 2018-12-04 NOTE — ED PROVIDER NOTES
History     Chief Complaint:  Altered Mental Status      HPI   History limited secondary to the patient's altered mental status. History supplemented by EMS report.    Maurizio Ohara is a 89 year old male with a complex medical history pertinent for coronary artery disease, end stage renal disease on hemodialysis, diabetes , pulmonary hypertension, diastolic congestive heart failure, severe aortic stenosis, and mitral regurgitation who presents to the ED via EMS for evaluation of altered mental status. The patient was recently seen in the ED on 12/1/18 for increasing weakness and somnolence, at which time he was found to by hyperkalemic and admitted for inpatient dialysis. This morning, EMS reports that the patient was being dialyzed when he became progressively more unresponsive. He is typically oriented x3 at baseline, and staff noted that he became increasingly drowsy, began mumbling his words, and his eyes rolled backwards. Staff stopped dialysis 2/3 of the way through and called EMS. EMS found the patient to have a glucose level of 249 and noted him to be in atrial fibrillation from 60-80 bpm en route to the ED. Here, the patient arrives responsive and speaking. He presents with a dialysis clamp in place and has complaints of right arm pain. Of note, the patient is not anticoagulated. He currently resides in the [facility].    Allergies:  Glyburide  Lisinopril  Metformin  Penicillins  Verapamil     Medications:    Aspirin  Lipitor  Phoslo  Voltaren  Gabapentin  Midodrine  Nitrostat  Miralax    Past Medical History:    Anemia  CAD  CKD  Diabetes  Hypertension  Mitral regurgitation  MI  Pulmonary hypertension  Sepsis  Pneumonia  CHF  Hyperkalemia    Past Surgical History:    Coronary angiography  Tonsillectomy  Heart angioplasty  Thoracic surgery    Family History:    Diabetes    Social History:  Former smoker  Occasional alcohol use.    Marital Status:  Single [1]    Review of Systems    Constitutional: Negative for chills and fever.   Cardiovascular: Negative for chest pain.   Gastrointestinal: Negative for abdominal pain and blood in stool.   Musculoskeletal: Positive for myalgias (R. arm pain).   Neurological: Positive for speech difficulty (per dialysis center). Negative for weakness.   All other systems reviewed and are negative.      Physical Exam     Patient Vitals for the past 24 hrs:   BP Temp Temp src Pulse Heart Rate Resp SpO2 Weight   12/04/18 1554 93/63 - - 61 - 20 100 % -   12/04/18 1315 100/71 - - - 68 21 100 % -   12/04/18 1300 110/67 - - - 63 21 100 % -   12/04/18 1245 110/68 - - - 56 21 100 % -   12/04/18 1230 111/65 - - - 51 22 100 % -   12/04/18 1215 104/60 - - - 63 24 100 % -   12/04/18 1145 93/51 - - - 60 17 100 % -   12/04/18 1131 107/59 97.2  F (36.2  C) Temporal 57 - 16 100 % 83 kg (182 lb 15.7 oz)   12/04/18 1130 107/59 - - - 55 15 100 % -        Physical Exam  Nursing note and vitals reviewed.  Constitutional: Well nourished. Resting comfortably.   Eyes: Conjunctiva normal.  Pupils are equal, round, and reactive to light.   ENT: Nose normal. Mucous membranes pink and moist.    Neck: Normal range of motion.  CVS: Normal rate, regular rhythm.  Normal heart sounds.  No murmur.  Pulmonary: Diminished lung sounds bilaterally.   GI: Abdomen soft. Nontender, nondistended. No rigidity or guarding.    MSK: No calf tenderness or swelling.  Neuro: Alert. Follows simple commands.  Skin: Skin is warm and dry. Multiple, older appearing ecchymosis to bilateral upper extremities.  RUE AV fistula with palpable thrill, slight bleeding on arrival, arm clamp applied to site  Psychiatric: Blunt affect.     Emergency Department Course   ECG:  Indication: Altered mental status  Time: 1059  Vent. Rate 63 bpm. WV interval *. QRS duration 90. QT/QTc 372/380. P-R-T axis * 103 -80.  Atrial fibrillation. Possible right ventricular hypertrophy. Nonspecific ST and T wave abnormality. Abnormal ECG. No  significant from EKG dated 12/1/18. Read time: 1105    Imaging:  Radiographic findings were communicated with the patient who voiced understanding of the findings.  XR Chest 2 views:   Cardiomegaly and pulmonary vascular congestion without  significant change. New mild elevation right hemidiaphragm. Mild  rounded opacity just above the medial right hemidiaphragm could be  lower lobe pulmonary vessel. Left hemidiaphragm is difficult to  visualize, either by enlarged heart or lower lobe infiltrate. Lateral  chest x-ray may be helpful. As per radiology.     CT Head without contrast:   1. No acute abnormality.  2.  Atrophy of the brain. White matter changes consistent with  sequelae of small vessel ischemic disease.  3. Bilateral proptosis.  4. No change.  as per radiology.     Laboratory:  CBC: WBC: 7.0, HGB: 11.4 (L), PLT: 128 (L)  BMP: Glucose 227 (H), BUN 56 (H), Creatinine 4.51 (H), GFR 12 (L), o/w WNL    Magnesium: 2.2    1127 Troponin: <0.015      Emergency Department Course:  Nursing notes and vitals reviewed. (1048) I performed an exam of the patient as documented above.     (1051) Patient's dialysis arm clamp was removed. We applied Surgifoam dressing and an ACE wrap with successful control of the bleeding.    Blood drawn. This was sent to the lab for further testing, results above.    The patient was sent for a head CT and chest x-ray while in the emergency department, findings above.     (1222) I rechecked the patient and discussed the results of his workup thus far. He was conversing with me at bedside and was amenable to being discharged back to his care facility.     (1325) The patient's family and DPOA arrived to the emergency department, and we discussed his presentation and disposition here. They were agreeable to his discharge back to the [facility] Emergency Department at this time.    Findings and plan explained to the Patient and family. Patient discharged back to his nursing facility via ambulance with  "instructions regarding supportive care, medications, and reasons to return. The importance of close follow-up was reviewed. I personally reviewed the laboratory results with the Patient and answered all related questions prior to discharge.     Impression & Plan    Medical Decision Making:  Patient is an 89-year-old male with complicated past medical history most notably end-stage renal disease on dialysis, diabetes, diastolic heart failure presenting with reported altered mental status at today's dialysis.  Patient was awake and alert on arrival.  He was following simple commands.  I had a strong suspicion presentation was possibly secondary to mild disequilibrium syndrome versus fluid shifts during dialysis.  No known BP meds though possible cerebral hypoperfusion during dialysis.  There is no sign of infection today.  Electrolytes are stable.  EKG is without focal ischemia, troponin screen reassuring.  Of note, when the patient arrived he had a dialysis clamp on his arm at his fistula site.  There was some bleeding on arrival, though this was controlled with Surgifoam dressing as well as an ACE wrap.  Plans to discharge back to nursing facility.  Instructed to return to ED should signs or symptoms worsen or change.      ADDENDUM    (1196) The patient presented back from his nursing care facility as they \"did not have appropriate placement for him\" given his level of care. His DPOA, Leticia Brady, was at bedside, and we discussed her wishes to change the patient to a DNR/DNI status. Patient's nursing care facility refusing patient at this time as no updated DNR/DNI status on file at their facility.     (2221)  I consulted with Dr. Langston of the hospitalist services. They are in agreement to accept the patient for admission.      Findings and plan explained to the Patient and family who consents to admission. Discussed the patient with Dr. Langston, who will admit the patient to an observation bed for " further monitoring, evaluation, and treatment.    Diagnosis:    ICD-10-CM    1. ESRD (end stage renal disease) on dialysis (H) N18.6 Glucose by meter    Z99.2 Glucose by meter   2. Anemia of chronic renal failure, unspecified CKD stage N18.9     D63.1    3. Complication of arteriovenous dialysis fistula, initial encounter T82.9XXA        Disposition:  Admitted to Dr. Langston.    Scribe Disclosure:  I, Tabby Feliz, am serving as a scribe on 12/4/2018 at 10:57 AM to personally document services performed by No att. providers found based on my observations and the provider's statements to me.      Tabby Feliz  12/4/2018   [facility] EMERGENCY DEPARTMENT         Yohana Capellan, DO  12/04/18 1625       Yohana Capellan, DO  12/05/18 0818

## 2018-12-04 NOTE — IP AVS SNAPSHOT
[facility] Observation Department    [address]    [facility] [address]    Phone:  [phone]                                       After Visit Summary   12/4/2018    Maurizio Ohara    MRN: [id number]           After Visit Summary Signature Page     I have received my discharge instructions, and my questions have been answered. I have discussed any challenges I see with this plan with the nurse or doctor.    ..........................................................................................................................................  Patient/Patient Representative Signature      ..........................................................................................................................................  Patient Representative Print Name and Relationship to Patient    ..................................................               ................................................  Date                                   Time    ..........................................................................................................................................  Reviewed by Signature/Title    ...................................................              ..............................................  Date                                               Time          22EPIC Rev 08/18

## 2018-12-04 NOTE — IP AVS SNAPSHOT
` `     [facility] OBSERVATION DEPARTMENT: [phone]                 INTERAGENCY TRANSFER FORM - NOTES (H&P, Discharge Summary, Consults, Procedures, Therapies)   2018                    Hospital Admission Date: 2018  MAURIZIO QUIÑONES   : 10/7/1929  Sex: Male        Patient PCP Information     Provider PCP Type    Justina Moise MD General         History & Physicals      H&P by Tip Langston MD at 2018  4:03 PM     Author:  Tip Langston MD Service:  Hospitalist Author Type:  Physician    Filed:  2018  5:13 PM Date of Service:  2018  4:03 PM Creation Time:  2018  4:03 PM    Status:  Signed :  Tip Langston MD (Physician)         [facility]  Hospitalist Admission Note  Name: Maurizio Quiñones    MRN: [id number]  YOB: 1929    Age: 89 year old  Date of admission: 2018  Primary care provider: Justina Moise    Chief Complaint: Altered mental status, disposition issue.    Maurizio Quiñones is a 89 year old male with PMH including ESRD, coronary artery disease, pulmonary hypertension aortic stenosis,  and diabetes who presents with altered mental status.  He was brought in by EMS who provided some of the history.  He was seen here in the ER 3 days ago for concerns regarding increasing weakness and somnolence at which point he was found to be hyperkalemic and admitted for dialysis.  He was being dialyzed in the outpatient setting this morning when he became progressively less responsive.[MF1.1]  EMS was called and[MF1.2] dialysis unit stopped dialysis about two thirds of the way through his run.      Here in the emergency room he was hemodynamically stable and afebrile.  He was found to be in rate controlled A. fib on EKG.  Chest x-ray showed cardiomegaly and pulmonary vascular congestion felt to be unchanged from a prior x-ray.  CT scan of the head showed no acute  pathology but did show atrophy and white matter changes consistent with chronic small vessel ischemic disease.    Lab workup was otherwise unremarkable with normal white blood count and troponin which was undetectable.  He was felt to be clinically improving and the plan was to discharge him back to his care facility which is [facility].  However, apparently staff at his nursing facility felt he was not back to baseline and requested admission for observation overnight.[MF1.1]    The primary issue here is goals of care and consideration of transition to comfort care measures.  I did have a long meeting with family including his daughter who is identified as the POA.  She confirms he is DNR/DNI and confirms that his quality of life and general mobility and functional status have dramatically declined over the last 3 months.  They are considering transition to comfort cares and have questions about hospice.[MF1.2]      Assessment and Plan:   1. Resolving altered mental status: Cause unclear but I suspect this was related to hemodynamic changes during dialysis in the setting of one with little reserve[MF1.1] and severe aortic stenosis which may be leading to some hypoperfusion[MF1.2].[MF1.1]  CT scan of his brain shows significant atrophy and microvascular infarcts.  For now family feels we should not accelerate cares or do more aggressive workup and we are to have.  The plan is to meet with palliative care and further define goals of care first.  His recent baseline over the past few weeks to months is slurred speech with some intermittent hallucinations and delirium.[MF1.2]  --monitor overnight, admit to observation status  --[MF1.1]monitor mental status though overall trend is gradual improvement.[MF1.3]    2.   ESRD on hemodialysis: Reportedly completed about two thirds of his run today.  No acute indication for dialysis[MF1.1] tonight. Will[MF1.3] consult nephrology for routine dialysis[MF1.1] and consideration of  an extra run tomorrow given pulmonary vascular congestion on CXR[MF1.3] and to also help facilitate any sort of discussion regarding possibly withdrawing from dialysis.[MF1.2].[MF1.1]  dialyses TRS via a DORIS at the [facility] w/ Dr. Yung.[MF1.3]  I did call/talked to Dr. Yung today to gather some collateral information and discuss potential for potentially withdrawing from dialysis so he is aware.[MF1.2]    3.   History of coronary artery disease, diastolic CHF, aortic stenosis and pulmonary hypertension:[MF1.1] chronic borderline hypotension for which he is on midodrine w/ HD.[MF1.3]  Not on any blood pressure medications.[MF1.2]  No acute cardiopulmonary complaints[MF1.3] though I question some degree of cerebral hypoperfusion during dialysis.  T[MF1.2]roponin negative.  Resume home medications.  CXR suggests some cardiomegaly and vascular congestion which is stable, could consider running on HD again tomorrow if symptomatic.[MF1.3]    4.[MF1.1]   Generalized weakness[MF1.3], failure to thrive[MF1.2] w/[MF1.3] Disposition issue: [facility] felt unable to take him back.  Consult PT/OT/SW to help w/ disposition concerns.  [facility] is requesting POLST be completed so will consult w/ palliative care to complete that[MF1.1] and also to help with goals of care discussion as patient/family are certainly considering a transition to comfort care.[MF1.2]    5.[MF1.1]   General cognitive and physical decline: Family notes dramatic decline since early September.  He has had speech changes, is generally quite weak and no longer can get up out of a chair or walk.  He has apparently a sacral decubitus ulcer and very poor quality of life.  He has mentioned to family that the sooner he dies, the better given his quality of life.  Will consult palliative care and social work to help further discuss possible transition to comfort cares.[MF1.2]    DVT Prophylaxis:[MF1.1] Mobilize as able[MF1.2]    Code Status:  DNR / DNI but OK for[MF1.1] very basic restorative cares.  As per my discussion with family if he takes a turn for the worse we should take that is assigned to keep him comfortable.  Certainly would not escalate to ICU level cares as per my discussion with him.[MF1.2]    Discharge Dispo: Admit under observation status[MF1.1].[MF1.2]      History of Present Illness:  Maurizio Ohara is a 89 year old male with PMH including ESRD, coronary artery disease, pulmonary hypertension aortic stenosis,  and diabetes who presents with altered mental status.  He was brought in by EMS who provided some of the history.  He was seen here in the ER 3 days ago for concerns regarding increasing weakness and somnolence at which point he was found to be hyperkalemic and admitted for dialysis.  He was being dialyzed in the outpatient setting this morning when he became progressively less responsive.  His speech began to mumble and staff at his dialysis unit stopped dialysis about two thirds of the way through his run.  His blood sugar was checked by EMS and noted to be 249 in the field.[MF1.1]    I did have a long meeting with family including his daughter who is identified as the POA.  She confirms he is DNR/DNI and confirms that his quality of life and general mobility and functional status have dramatically declined over the last 3 months.  They are considering transition to comfort cares and have questions about hospice.[MF1.2]     Past Medical History:  Past Medical History:   Diagnosis Date     Anemia      CAD (coronary artery disease) 12/24/14    PCI and BMS to mid RCA (5.0 x 20mm) with residual mod diffuse mid LAD disease     Chronic kidney disease     on Dialysis     Diabetes (H)      Hypertension      Mitral regurgitation 12/24/2014    mild-mod (1-2+) per echo     Mitral valve disorders(424.0) 12/24/2014    mild/mod (1-2+) MR     Myocardial infarction (H) 12/24/2014    NSTEMI     Pulmonary hypertension (H) 12/24/2014     "RVSP elevated c/w mild-mod PH per echo     Renal disease due to diabetes mellitus (H)     End stage; on Dialysis     Past Surgical History:  Past Surgical History:   Procedure Laterality Date     CORONARY ANGIOGRAPHY ADULT ORDER  12/24/2014     ENT SURGERY      Tonsillectomy     HEART CATH, ANGIOPLASTY  12/24/2014    PCI and BMS (5.0x20mm)to mid RCA     THORACIC SURGERY      Herniated disc     Social History:  Social History   Substance Use Topics     Smoking status: Former Smoker     Smokeless tobacco: Never Used     Alcohol use Yes      Comment: \"about as much as you can pour in a thimble in a year\"     Social History     Social History Narrative     Family History:  Family History   Problem Relation Age of Onset     Diabetes Mother      Allergies:  Allergies   Allergen Reactions     Glyburide      Lisinopril      Hyperkalemia when hospitalized 4/5/2011     Metformin      Renal failure stage 4     Glenn Gallagher RN clarified with pt, pt does not remember rxn, it was a long time ago, and \"nothing crazy\".     Verapamil      Medications:[MF1.1]  Prior to Admission Medications   Prescriptions Last Dose Informant Patient Reported? Taking?   GABAPENTIN PO   Yes No   Sig: Take 100 mg by mouth 3 times daily   MIDODRINE HCL PO   Yes No   Sig: Take 10 mg by mouth three times a week on Tues, Thurs, Sat 1 hour before dialysis AND Give 10 mg by mouth one time a day every Tue, Thu for dialysis Send one tablet with resident to dialysis   acetaminophen (TYLENOL) 500 MG tablet  Other Yes No   Sig: Take 1,000 mg by mouth 3 times daily as needed for mild pain   aspirin 81 MG tablet  Other Yes No   Sig: Take 81 mg by mouth daily   atorvastatin (LIPITOR) 40 MG tablet  Other Yes No   Sig: Take 40 mg by mouth At Bedtime   calcium acetate (PHOSLO) 667 MG CAPS  Other Yes No   Sig: Take 2,001 mg by mouth 3 times daily (with meals)   diclofenac (VOLTAREN) 1 % GEL topical gel  Other No No   Sig: Apply 2 g topically 4 times daily " Apply to RLE   nitroglycerin (NITROSTAT) 0.4 MG sublingual tablet  Other No No   Sig: Place 1 tablet (0.4 mg) under the tongue every 5 minutes as needed for chest pain   polyethylene glycol (MIRALAX/GLYCOLAX) Packet  Other No No   Sig: Take 17 g by mouth daily   polyethylene glycol (MIRALAX/GLYCOLAX) Packet  Other Yes No   Sig: Take 1 packet by mouth daily as needed for constipation      Facility-Administered Medications: None[MF1.4]         Review of Systems:  A Comprehensive greater than 10 system review of systems was carried out.  Pertinent positives and negatives are noted above.  Otherwise negative for contributory information.     Physical Exam:  Blood pressure 93/63, pulse 61, temperature 97.2  F (36.2  C), temperature source Temporal, resp. rate 20, weight 83 kg (182 lb 15.7 oz), SpO2 100 %.  Wt Readings from Last 1 Encounters:   12/04/18 83 kg (182 lb 15.7 oz)     Exam:  General:[MF1.1] Somnolent, intermittently moans.[MF1.2]  HEENT: NC/AT, eyes anicteric Dentition WNL, MM moist.  Cardiac:[MF1.1] Irregularly irregular, rate in the 60s.[MF1.2] , S1, S2.[MF1.1]  3 out of 6 systolic murmur.[MF1.2]  Pulmonary: Normal chest rise, normal work of breathing.  Lungs CTA BL  Abdomen: soft, non-tender, non-distended.  Bowel Sounds Present.  No guarding.  Extremities:[MF1.1] Numerous superficial appearing abrasions, skin tears and dressings in place with scattered bruises.  Also has an occlusive dressing over his sacral area.[MF1.2] Warm, well perfused.  Skin:[MF1.1] As above.[MF1.2]  Neuro:[MF1.1] Opens eyes to voice but not following commands.  Grimaces in pain when repositioned.  No clonus or tremor.  Pupils equal round and reactive to light and accommodation.[MF1.2]    Psych: Appropriate affect.    Data:  EKG:[MF1.1] Rate controlled A. fib[MF1.2]  Imaging:[MF1.1]  Recent Results (from the past 48 hour(s))   Head CT w/o contrast    Narrative    CT SCAN OF THE HEAD WITHOUT CONTRAST December 4, 2018 12:07 PM      HISTORY: Altered mental status.    TECHNIQUE: Axial images of the head and coronal reformations without  IV contrast material. Radiation dose for this scan was reduced using  automated exposure control, adjustment of the mA and/or kV according  to patient size, or iterative reconstruction technique.    COMPARISON: 11/16/2018.    FINDINGS: There is generalized atrophy of the brain. There is low  attenuation in the white matter of the cerebral hemispheres consistent  with sequelae of small vessel ischemic disease. There is no evidence  of intracranial hemorrhage, mass, acute infarct or anomaly.     The visualized portions of the sinuses and mastoids appear normal.  There is no evidence of trauma. There is bilateral proptosis,  unchanged.      Impression    IMPRESSION:   1. No acute abnormality.  2.  Atrophy of the brain. White matter changes consistent with  sequelae of small vessel ischemic disease.  3. Bilateral proptosis.  4. No change.     GIOVANNI MATSON MD   XR Chest 2 Views    Narrative    CHEST TWO VIEWS  12/4/2018 12:08 PM     HISTORY: AMS;     COMPARISON: 11/16/2018 chest x-ray      Impression    IMPRESSION: Cardiomegaly and pulmonary vascular congestion without  significant change. New mild elevation right hemidiaphragm. Mild  rounded opacity just above the medial right hemidiaphragm could be  lower lobe pulmonary vessel. Left hemidiaphragm is difficult to  visualize, either by enlarged heart or lower lobe infiltrate. Lateral  chest x-ray may be helpful.    RAVI FERNANDO MD[MF1.5]     Labs:[MF1.1]    Recent Labs  Lab 12/04/18  1127 12/01/18  1913 12/01/18  1859 11/28/18  1035   WBC 7.0  --  9.1 7.8   HGB 11.4* 13.9 12.5* 11.9*   HCT 36.5*  --  39.9* 36.7*   *  --  115* 108*   *  --  140* 162[MF1.5]          Lab Results   Component Value Date     12/04/2018     12/02/2018     12/01/2018    Lab Results   Component Value Date    CHLORIDE 97 12/04/2018    CHLORIDE 96  12/02/2018    CHLORIDE 96 12/01/2018    Lab Results   Component Value Date    BUN 56 12/04/2018     12/02/2018    BUN 92 12/01/2018      Lab Results   Component Value Date    POTASSIUM 4.5 12/04/2018    POTASSIUM 5.9 12/02/2018    POTASSIUM 5.8 12/02/2018    Lab Results   Component Value Date    CO2 29 12/04/2018    CO2 25 12/02/2018    CO2 27 12/01/2018    Lab Results   Component Value Date    CR 4.51 12/04/2018    CR 7.75 12/02/2018    CR 7.19 12/01/2018[MF1.2]          Recent Labs  Lab 12/04/18  1127   TROPI <0.015[MF1.5]         Brian Langston MD  Hospitalist  [facility][MF1.1]             Revision History        User Key Date/Time User Provider Type Action    > MF1.5 12/4/2018  5:13 PM Tip Langston MD Physician Sign     MF1.4 12/4/2018  5:10 PM Tip Langston MD Physician      MF1.2 12/4/2018  5:05 PM Tip Langston MD Physician      MF1.3 12/4/2018  4:23 PM Tip Langston MD Physician      MF1.1 12/4/2018  4:03 PM Tip Langston MD Physician                      Discharge Summaries      Discharge Summaries by Lavinia Cage MD at 12/6/2018 10:22 AM     Author:  Lavinia Cage MD Service:  Hospitalist Author Type:  Physician    Filed:  12/6/2018 10:22 AM Date of Service:  12/6/2018 10:22 AM Creation Time:  12/6/2018 10:08 AM    Status:  Signed :  Lavinia Cage MD (Physician)         [facility]  Discharge Summary  Name: Maurizio Ohara    MRN: [id number]  YOB: 1929    Age: 89 year old  Date of Discharge:[AC1.1]  12/6/2018[AC1.2]  Date of Admission: 12/4/2018  Primary Care Provider: Justina Moise  Discharge Physician:  Lavinia Cage MD  Discharging Service:  Hospitalist      Discharge Diagnoses:  1. AMS during HD  2. Generalized decline in the setting of chronic end stage disease  3. ESRD  4. Severe/Progressive Aortic Stenosis  5. Pulmonary HTN, CAD, Chronic  Diastolic CHF  6. DM     Hospital Course:  Maurizio Ohara is a 89 year old male with PMH including ESRD, coronary artery disease, pulmonary hypertension aortic stenosis,  and diabetes who was admitted 12/4/2018 with altered mental status. He was seen here in the ER 3 days PTA for concerns regarding increasing weakness and somnolence at which point he was found to be hyperkalemic and admitted for dialysis.  He was being dialyzed in the outpatient setting on the morning of admission when he became progressively less responsive.  EMS was called and dialysis unit stopped dialysis about two thirds of the way through his run.       Lab workup was otherwise unremarkable with normal white blood count and troponin which was undetectable.  He was felt to be clinically improving and the plan was to discharge him back to his care facility which is [facility].  However, apparently staff at his nursing facility felt he was not back to baseline and requested admission for observation overnight.     The primary issue here is goals of care and consideration of transition to comfort care measures.  Family meeting was held and palliative care was consulted.  Ultimate decision was to enroll the patient in hospice and transfer to LTC.  given his quality of life and general mobility and functional status have dramatically declined over the last 3 months.      HD was stopped during this admission and he was started on comfort cares.  He will have referral to hospice upon discharge to LTC.    # Discharge Pain Plan:   - During his hospitalization, Maurizio experienced pain due to hospice care.  The pain plan for discharge was discussed with Maurizio and the plan was created in a collaborative fashion.    - Opioids prescribed on discharge: Dilaudid  - Duration of opioids after discharge: Hospice care, LTC will monitor medications  - Bowel regimen: miralax and docusate         Discharge Disposition:  Admitted to hospice care.   [facility]  Discharged to long-term care facility     Allergies:  Allergies   Allergen Reactions     Glyburide      Lisinopril      Hyperkalemia when hospitalized 4/5/2011     Metformin      Renal failure stage 4     Penicillins      SHADY Gallagher clarified with pt, pt does not remember rxn, it was a long time ago, and \"nothing crazy\".     Verapamil         Condition on Discharge:  Discharge condition: Terminal   Discharge vitals: Blood pressure 101/57, pulse 56, temperature 95.3  F (35.2  C), temperature source Axillary, resp. rate 20, weight 83 kg (182 lb 15.7 oz), SpO2 93 %.   Code status on discharge: DNR / DNI     History of Illness:  See detailed admission note for full details.    Physical Exam:  Blood pressure 101/57, pulse 56, temperature 95.3  F (35.2  C), temperature source Axillary, resp. rate 20, weight 83 kg (182 lb 15.7 oz), SpO2 93 %.  Wt Readings from Last 1 Encounters:   12/04/18 83 kg (182 lb 15.7 oz)     General: Awake, eating breakfast with assistance, NAD.  HEENT: NC/AT, eyes anicteric Dentition WNL, MM moist.  Cardiac: Irregularly irregular without RVR , S1, S2.  3 out of 6 systolic murmur.  Pulmonary: Normal chest rise, normal work of breathing.  Lungs CTA BL  Abdomen: soft, non-tender, non-distended.  Bowel Sounds Present.  No guarding.  Extremities: Numerous superficial appearing abrasions, skin tears and dressings in place with scattered bruises.  Also has an occlusive dressing over his sacral area. Warm, well perfused.  Skin: As above.  Neuro: Answering questions, alert, pleasant, diffusely weak but able to move all extremities although requires assistance to eat.    Psych: Appropriate affect. Oriented to person    Procedures other than Imaging:  NA     Imaging:[AC1.1]  Results for orders placed or performed during the hospital encounter of 12/04/18   XR Chest 2 Views    Narrative    CHEST TWO VIEWS  12/4/2018 12:08 PM     HISTORY: AMS;     COMPARISON: 11/16/2018 chest x-ray      Impression    " IMPRESSION: Cardiomegaly and pulmonary vascular congestion without  significant change. New mild elevation right hemidiaphragm. Mild  rounded opacity just above the medial right hemidiaphragm could be  lower lobe pulmonary vessel. Left hemidiaphragm is difficult to  visualize, either by enlarged heart or lower lobe infiltrate. Lateral  chest x-ray may be helpful.    RAVI FERNANDO MD   Head CT w/o contrast    Narrative    CT SCAN OF THE HEAD WITHOUT CONTRAST December 4, 2018 12:07 PM     HISTORY: Altered mental status.    TECHNIQUE: Axial images of the head and coronal reformations without  IV contrast material. Radiation dose for this scan was reduced using  automated exposure control, adjustment of the mA and/or kV according  to patient size, or iterative reconstruction technique.    COMPARISON: 11/16/2018.    FINDINGS: There is generalized atrophy of the brain. There is low  attenuation in the white matter of the cerebral hemispheres consistent  with sequelae of small vessel ischemic disease. There is no evidence  of intracranial hemorrhage, mass, acute infarct or anomaly.     The visualized portions of the sinuses and mastoids appear normal.  There is no evidence of trauma. There is bilateral proptosis,  unchanged.      Impression    IMPRESSION:   1. No acute abnormality.  2.  Atrophy of the brain. White matter changes consistent with  sequelae of small vessel ischemic disease.  3. Bilateral proptosis.  4. No change.     GIOVANNI MATSON MD[AC1.3]        Consultations:  Consultations This Hospital Stay   SOCIAL WORK IP CONSULT  PHYSICAL THERAPY ADULT IP CONSULT  OCCUPATIONAL THERAPY ADULT IP CONSULT  NEPHROLOGY IP CONSULT  WOUND OSTOMY CONTINENCE NURSE  IP CONSULT  PALLIATIVE CARE ADULT IP CONSULT     Recent Lab Results:[AC1.1]    Recent Labs  Lab 12/05/18  0626 12/04/18  1127 12/01/18  1913 12/01/18  1859   WBC 5.4 7.0  --  9.1   HGB 12.0* 11.4* 13.9 12.5*   HCT 38.7* 36.5*  --  39.9*   * 113*  --  115*    * 128*  --  140*       Recent Labs  Lab 12/05/18  0626 12/04/18  1127 12/02/18  0900    135 131*   POTASSIUM 5.1 4.5 5.9*   CHLORIDE 98 97 96   CO2 29 29 25   ANIONGAP 6 9 10   * 227* 140*   BUN 75* 56* 107*   CR 5.94* 4.51* 7.75*   GFRESTIMATED 9* 12* 7*   GFRESTBLACK 11* 15* 8*   KIMBER 9.1 9.1 10.2*[AC1.4]          Pending Results:    Unresulted Labs Ordered in the Past 30 Days of this Admission     No orders found from 10/5/2018 to 12/5/2018.           Discharge Instructions and Follow-Up:   Discharge Orders     HOSPICE REFERRAL     General info for SNF   Length of Stay Estimate: Long Term Care  Condition at Discharge: Terminal  Level of care:skilled   Rehabilitation Potential: Poor  Admission H&P remains valid and up-to-date: Yes  Recent Chemotherapy: N/A  Use Nursing Home Standing Orders: Yes     Mantoux instructions   Give two-step Mantoux (PPD) Per Facility Policy Yes     Reason for your hospital stay   You were hospitalized for progressive decline and will be discharged on hospice care.     Additional Discharge Instructions   [facility] to consult and treat.     Activity - Up with nursing assistance     DNR/DNI     Advance Diet as Tolerated   Follow this diet upon discharge: Orders Placed This Encounter     Regular Diet Adult       Discharge Medications   Current Discharge Medication List      START taking these medications    Details   atropine 1 % ophthalmic solution Place 1-2 drops under the tongue every hour as needed for other (secretions)  Qty: 1 Bottle, Refills: 0    Associated Diagnoses: Hospice care patient      bisacodyl (DULCOLAX) 10 MG suppository Place 1 suppository (10 mg) rectally daily as needed for constipation  Qty: 30 suppository, Refills: 0    Associated Diagnoses: Hospice care patient      camphor-menthol (DERMASARRA) 0.5-0.5 % external lotion Apply to area of itch, intact skin only    Associated Diagnoses: Hospice care patient      HYDROmorphone, STANDARD  CONC, (DILAUDID) 1 MG/ML oral solution Take 1-2 mLs (1-2 mg) by mouth every 2 hours as needed for moderate to severe pain (or dyspnea)  Qty: 20 mL, Refills: 0    Associated Diagnoses: Hospice care patient      ondansetron (ZOFRAN-ODT) 4 MG ODT tab Take 1 tablet (4 mg) by mouth every 6 hours as needed for nausea or vomiting  Qty: 30 tablet, Refills: 0    Associated Diagnoses: Hospice care patient         CONTINUE these medications which have NOT CHANGED    Details   acetaminophen (TYLENOL) 500 MG tablet Take 1,000 mg by mouth 3 times daily as needed for mild pain      diclofenac (VOLTAREN) 1 % GEL topical gel Apply 2 g topically 4 times daily Apply to RLE    Associated Diagnoses: Cellulitis of right lower extremity      GABAPENTIN PO Take 100 mg by mouth 3 times daily      nitroglycerin (NITROSTAT) 0.4 MG sublingual tablet Place 1 tablet (0.4 mg) under the tongue every 5 minutes as needed for chest pain  Qty: 25 tablet, Refills: 3    Associated Diagnoses: NSTEMI (non-ST elevated myocardial infarction) (H)      polyethylene glycol (MIRALAX/GLYCOLAX) Packet Take 1 packet by mouth daily as needed for constipation         STOP taking these medications       aspirin 81 MG tablet Comments:   Reason for Stopping:         atorvastatin (LIPITOR) 40 MG tablet Comments:   Reason for Stopping:         calcium acetate (PHOSLO) 667 MG CAPS Comments:   Reason for Stopping:         MIDODRINE HCL PO Comments:   Reason for Stopping:               Time Spent on this Encounter   I, Lavinia Cage, personally saw the patient today and spent greater than 30 minutes discharging this patient.    Lavinia Cage MD[AC1.1]       Revision History        User Key Date/Time User Provider Type Action    > AC1.4 12/6/2018 10:22 AM Lavinia Cage MD Physician Sign     AC1.3 12/6/2018 10:21 AM Lavinia Cage MD Physician      AC1.2 12/6/2018 10:10 AM Lavinia Cage MD Physician      AC1.1 12/6/2018 10:08 AM Lavinia Cage MD Physician                       Consult Notes      Consults by Kelsy Martinez APRN CNS at 12/5/2018  4:04 PM     Author:  Kelsy Martinez APRN CNS Service:  Palliative Author Type:  Clinical Nurse Specialist    Filed:  12/5/2018  5:28 PM Date of Service:  12/5/2018  4:04 PM Creation Time:  12/5/2018  4:04 PM    Status:  Signed :  Kelsy Martinez APRN CNS (Clinical Nurse Specialist)         [facility]    Palliative Care Consultation   Text Page    Date of Admission:  12/4/2018    Assessment & Plan   Maurizio Ohara is a 89 year old male who was admitted on 12/4/2018. I was asked to see the patient for[AK1.1] goals of care, patient and family support[AK1.2].    Recommendations:  1.Decisional Capacity -[AK1.1]  Questionable[AK1.2].[AK1.1] Patient does not have an advance directive. Per  informed consent policy next of kin should be involved in all consent and decision making.  His daughter in law cristina is acting as spokesperson with son Rosa and sister Karyn[AK1.2]  2. Pain-[AK1.1] Denies  3. Delirium- Family notes off and on hallucinations, currently somnolent oriented to self only.  Discussed with family as a symptom to expect as patient continues to decline. Haldol PRN comfort.   4. Pruritis, not currently but at risk for without dialysis[AK1.2]-[AK1.1] Sarna Cream PRN  5[AK1.2]. Care Planning-[AK1.1] Appreciate [facility] of [facility], planning for LTC with hospice support through end of life.   Spiritual Care- Patient is Jewish, Family reflected upon their fond memories of him over his lifetime and things he used to enjoy that he is no longer able to enjoy.[AK1.2]     Goal of Care:[AK1.1]DNR/DNI, comfort only, no further life prolonging medical interventions.  Orders changed to reflect this.  Comfort care order set.  Discussed dying process and things family may expect.  Gone from my sight pamphlet given to Sister Karyn.[AK1.2]     Disease Process/es & Symptoms:  Maurizio NORWOOD  "Lev is a 89 year old patient admitted with[AK1.1] declining health and change in mental status[AK1.2]. This is in the setting of[AK1.1] Chronic kidney disease on dialysis with failinghealth[AK1.2].[AK1.1]  =H[AK1.2]e[AK1.1] has been hospitalized 5 times in the past 4 months and it is noted to be more of a burdern to get to and tolerate dialysis and subsequent fatigue than the benefit of further longevity.  Patient and family have decided to stop dialysis and focus on comfort care.[AK1.2]      Findings & plan of care discussed with:[AK1.1] Dr. Mena, [facility] Ibarra, Nurse Marcelle.[AK1.2]  Follow-up plan from palliative team:[AK1.1] Will continue to folklow in support of comfort care.[AK1.2]  Thank you for involving us in the patient's care.     Kelsy Martinez[AK1.1] APRN, CNS, CNP[AK1.2]  Pain Management and Palliative Care  [facility]  Pgr: [phone]    Time Spent on this Encounter   I spent[AK1.1]  95[AK1.2] minutes[AK1.1] total, >50%[AK1.2]  in assessment of the patient, counseling and discussion with the patient and family as documented in[AK1.1] this note as well as[AK1.2] coordination of care and discussion with the health care team.    Reason for Consult   Reason for consult: I was asked by[AK1.1] Dr. Mena[AK1.2] to evaluate this patient for[AK1.1] Symptom management  Goals of care  Patient and family support.[AK1.2]    Primary Care Physician   Justina Moise    Chief Complaint[AK1.1]   Fatigue, \"I would just like to close my eyes.\"  Biggest Concern : \"Whats going to happen to me from here?\"    History is obtained from the patient, electronic health record and patient's family[AK1.2]    History of Present Illness   Maurizio Ohara is a 89 year old male who presents with[AK1.1] declining health and change in mental status[AK1.2]. This is in the setting of[AK1.1] Chronic kidney disease on dialysis with failinghealth[AK1.2].[AK1.1]  =H[AK1.2]e[AK1.1] has been hospitalized 5 " times in the past 4 months and it is noted to be more of a burdern to get to and tolerate dialysis and subsequent fatigue than the benefit of further longevity.  Patient and family have decided to stop dialysis and focus on comfort care.[AK1.2]      The following symptoms are noted by patient as concerning to his quality of life.[AK1.1]  Fatigue   Drowsiness   Hallucinations per family[AK1.2]    Decision-Making & Goals of Care:  Discussed on December 5, 2018 with Kelsy Martinez[AK1.1] APRN, CNS. CNP[AK1.2]:[AK1.1]   Met with patient and woke from sleep. He is currently disoriented to place and situation.  He tells me he is tired and wishes he could get some rest.  He denies most symptoms and agrees he has a supportive family who is helping with his biggest concern which is planning for future. I then met with son, dtr in law and sister.  They relayed patient's failing health and difficulty managing the schedule and endurance for dialysis.  They relay previous conversations with dr. Mena and Dr. Yung and agree stopping dialysis makes sense given the now unclear benefit of longevity.  They agree to focus on comfort and we discussed what that would look like, and the dying process.  We completed a POLST.  They also spent time reflecting on good memories of [place] though the years.[AK1.2]      Patient has decision-making capacity[AK1.1] Unreliable[AK1.2]  Patient has[AK1.1] no known legal document designating a decision maker. Per policy next of kin is the designated decision maker. See System Informed Consent policy for guidance.  Physician orders for life-sustaining treatment (POLST) form is on file but updated today.[AK1.2]  Code Status:[AK1.1] Do not resusitate / Do not intubate[AK1.2]     Past Medical History[AK1.1]   I have reviewed this patient's medical history and updated it with pertinent information if needed.[AK1.2]   Past Medical History:   Diagnosis Date     Anemia      CAD (coronary artery disease)  12/24/14    PCI and BMS to mid RCA (5.0 x 20mm) with residual mod diffuse mid LAD disease     Chronic kidney disease     on Dialysis     Diabetes (H)      Hypertension      Mitral regurgitation 12/24/2014    mild-mod (1-2+) per echo     Mitral valve disorders(424.0) 12/24/2014    mild/mod (1-2+) MR     Myocardial infarction (H) 12/24/2014    NSTEMI     Pulmonary hypertension (H) 12/24/2014    RVSP elevated c/w mild-mod PH per echo     Renal disease due to diabetes mellitus (H)     End stage; on Dialysis[AK1.3]       Past Surgical History[AK1.1]   I have reviewed this patient's surgical history and updated it with pertinent information if needed.[AK1.2]  Past Surgical History:   Procedure Laterality Date     CORONARY ANGIOGRAPHY ADULT ORDER  12/24/2014     ENT SURGERY      Tonsillectomy     HEART CATH, ANGIOPLASTY  12/24/2014    PCI and BMS (5.0x20mm)to mid RCA     THORACIC SURGERY      Herniated disc[AK1.3]       Prior to Admission Medications   Prior to Admission Medications   Prescriptions Last Dose Informant Patient Reported? Taking?   GABAPENTIN PO 12/4/2018 at 1200  Yes Yes   Sig: Take 100 mg by mouth 3 times daily   MIDODRINE HCL PO 12/4/2018 at 0700  Yes Yes   Sig: Take 10 mg by mouth three times a week on Tues, Thurs, Sat 1 hour before dialysis AND Give 10 mg by mouth one time a day every Tue, Thu for dialysis Send one tablet with resident to dialysis   acetaminophen (TYLENOL) 500 MG tablet  Other Yes Yes   Sig: Take 1,000 mg by mouth 3 times daily as needed for mild pain   aspirin 81 MG tablet 12/4/2018 at 0800 Other Yes Yes   Sig: Take 81 mg by mouth daily   atorvastatin (LIPITOR) 40 MG tablet 12/3/2018 at hs Other Yes Yes   Sig: Take 40 mg by mouth At Bedtime   calcium acetate (PHOSLO) 667 MG CAPS 12/4/2018 at 1200 Other Yes Yes   Sig: Take 2,001 mg by mouth 3 times daily (with meals)   diclofenac (VOLTAREN) 1 % GEL topical gel 12/4/2018 at 1200 Other No Yes   Sig: Apply 2 g topically 4 times daily Apply  "to RLE   nitroglycerin (NITROSTAT) 0.4 MG sublingual tablet  Other No Yes   Sig: Place 1 tablet (0.4 mg) under the tongue every 5 minutes as needed for chest pain   polyethylene glycol (MIRALAX/GLYCOLAX) Packet 12/4/2018 at 0800 Other No Yes   Sig: Take 17 g by mouth daily   polyethylene glycol (MIRALAX/GLYCOLAX) Packet  Other Yes Yes   Sig: Take 1 packet by mouth daily as needed for constipation      Facility-Administered Medications: None     Allergies   Allergies   Allergen Reactions     Glyburide      Lisinopril      Hyperkalemia when hospitalized 4/5/2011     Metformin      Renal failure stage 4     Penicillins      SHADY Gallagher clarified with pt, pt does not remember rxn, it was a long time ago, and \"nothing crazy\".     Verapamil        Social History   I have updated and reviewed the following Social History Narrative:   Social History     Social History Narrative      Living situation:[AK1.1] LTC[AK1.2]  Family system:[AK1.1] son, dtr in law and sister and brother in [place][AK1.2]  Functional status (needs help with ADLs or IADLs):[AK1.1] dependant on most care[AK1.2]  Employment/education:[AK1.1] WWII vet, , [employer]. Retired[AK1.2]  Use of community resources:[AK1.1] no[AK1.2]  Activities/interests:[AK1.1] House Boats, Playing [employer], His dogs[AK1.2]  History of substance use/abuse:[AK1.1] unknown[AK1.2]  Anglican affiliation:[AK1.1] Taoism[AK1.2]  Involvement in xiao community:[AK1.1] unknown[AK1.2]    Family History[AK1.1]   I have reviewed this patient's family history and updated it with pertinent information if needed.[AK1.2]   Family History   Problem Relation Age of Onset     Diabetes Mother[AK1.4]        Review of Systems[AK1.1]   The 10 point Review of Systems is negative other than noted in the HPI or here.[AK1.2]     Palliative Symptom Review (0=no symptom/no concern, 1=mild, 2=moderate, 3=severe):      Pain:[AK1.1] 0-none[AK1.2]      Fatigue:[AK1.1] 3-severe[AK1.2]      " Nausea:[AK1.1] 0-none[AK1.2]      Constipation:[AK1.1] 0-none[AK1.2]      Diarrhea:[AK1.1] 0-none[AK1.2]      Depressive Symptoms:[AK1.1] 0-none[AK1.2]      Anxiety:[AK1.1] 0-none[AK1.2]      Drowsiness:[AK1.1] 2-moderate[AK1.2]      Poor Appetite:[AK1.1] 0-none[AK1.2]      Shortness of Breath:[AK1.1] 0-none[AK1.2]      Insomnia:[AK1.1] 0-none[AK1.2]    Physical Exam[AK1.1]   Temp:  [95.2  F (35.1  C)-96.6  F (35.9  C)] 96.4  F (35.8  C)  Pulse:  [65] 65  Heart Rate:  [57-65] 64  Resp:  [16-22] 20  BP: ()/(41-58) 79/42  SpO2:  [88 %-99 %] 92 %[AK1.4]  182 lbs 15.71 oz  GEN:[AK1.1]  Somnolent, answers questions but keeps eyes closed, oriented x 1[AK1.2], appears comfortable, NAD.  HEENT:  Normocephalic/atraumatic, no scleral icterus, no nasal discharge, mouth moist.  CV:  RRR, S1, S2; no murmurs or other irregularities noted.  +3 DP/PT pulses bilatererally; no edema BLE.  RESP:  Clear to auscultation bilaterally without rales/rhonchi/wheezing/retractions.  Symmetric chest rise on inhalation noted.  Normal respiratory effort.  ABD:  Rounded, soft, non-tender/non-distended.  +BS  EXT:  Edema & pulses as noted above.  CMS intact x 4.     M/S:   Tender to palpation[AK1.1] B LE.[AK1.2]   SKIN:  Dry to touch, no exanthems noted in the visualized areas.[AK1.1]  Areas of open wounds dressed and clean on all extremities.[AK1.2]  NEURO:[AK1.1]Appears weak and lethargic, moves all extremities.[AK1.2]   Psych:[AK1.1]  somnolent[AK1.2].  Calm, cooperative, conversant appropriately[AK1.1] but minimally[AK1.2].     Delirium Screen/CAM:  Delirium = (#1 and #2 = YES) + (#3 and/or #4)   1) Acute onset and fluctuating course:[AK1.1]   YES[AK1.2]   (acute change in mental status from baseline over last 24 hours)  2) Inattention:[AK1.1]   YES[AK1.2]   (difficulty focusing, distractible, can't follow conversation)  3) Disorganized thinking:[AK1.1]   YES[AK1.2]   (score only if #1 and #2 are YES)  (rambling/irrelevant conversation,  unclear/illogical thoughts, inconsistency)  4) Altered level of consciousness:[AK1.1]   YES[AK1.2]   (score only if #1 and #2 are YES)  (other than alert, calm, cooperative)    Delirium/CAM score:[AK1.1] 4[AK1.2]/4  Interpretation:  1)  Delirium:[AK1.1]  Present[AK1.2]  2)  Type:[AK1.1]  mixed[AK1.2]  3)  Severity:[AK1.1]  moderate[AK1.2]    Data[AK1.1]   Results for orders placed or performed during the hospital encounter of 12/04/18 (from the past 24 hour(s))   Basic metabolic panel   Result Value Ref Range    Sodium 133 133 - 144 mmol/L    Potassium 5.1 3.4 - 5.3 mmol/L    Chloride 98 94 - 109 mmol/L    Carbon Dioxide 29 20 - 32 mmol/L    Anion Gap 6 3 - 14 mmol/L    Glucose 108 (H) 70 - 99 mg/dL    Urea Nitrogen 75 (H) 7 - 30 mg/dL    Creatinine 5.94 (H) 0.66 - 1.25 mg/dL    GFR Estimate 9 (L) >60 mL/min/1.7m2    GFR Estimate If Black 11 (L) >60 mL/min/1.7m2    Calcium 9.1 8.5 - 10.1 mg/dL   CBC with platelets   Result Value Ref Range    WBC 5.4 4.0 - 11.0 10e9/L    RBC Count 3.37 (L) 4.4 - 5.9 10e12/L    Hemoglobin 12.0 (L) 13.3 - 17.7 g/dL    Hematocrit 38.7 (L) 40.0 - 53.0 %     (H) 78 - 100 fl    MCH 35.6 (H) 26.5 - 33.0 pg    MCHC 31.0 (L) 31.5 - 36.5 g/dL    RDW 19.1 (H) 10.0 - 15.0 %    Platelet Count 115 (L) 150 - 450 10e9/L[AK1.5]        Revision History        User Key Date/Time User Provider Type Action    > AK1.5 12/5/2018  5:28 PM Kelsy Martinez APRN CNS Clinical Nurse Specialist Sign     AK1.4 12/5/2018  5:22 PM Kelsy Martinez APRN CNS Clinical Nurse Specialist      AK1.3 12/5/2018  5:20 PM Kelsy Martinez APRN CNS Clinical Nurse Specialist      AK1.2 12/5/2018  4:59 PM Kelsy Martinez APRN CNS Clinical Nurse Specialist      AK1.1 12/5/2018  4:04 PM Kelsy Martinez APRN CNS Clinical Nurse Specialist             Consults by Kassandra Ibarra LSW at 12/5/2018  4:05 PM     Author:  Kassandra Ibarra LSW Service:  (none) Author Type:      Filed:   "12/5/2018  4:05 PM Date of Service:  12/5/2018  4:05 PM Creation Time:  12/5/2018  3:52 PM    Status:  Signed :  Kassandra Ibarra LSW ()         .Care Transition Initial Assessment -   Reason For Consult: discharge planning, see also previous SW documentation from today in medical record  Met with: Family    Active Problems:    Altered mental status       DATA  Lives With: facility resident ([facility], no bed hold at this time)  Living Arrangements: extended care facility  Description of Support System: Supportive, Involved  Who is your support system?: Sibling(s) (niece and nephew)  Support Assessment: Adequate family and caregiver support.       Resources List: Hospice, Skilled Nursing Facility     Quality Of Family Relationships: supportive, involved  Transportation Available:  (to be determined)    INTERVENTION    Met this afternoon with pt's sister, Karyn, her daughter, Leticia and her , Rosa... Pt asleep so did not involve him in the conversation today.  They have affirmed planning for pt's comfort care status at this time with the discontinuing of the dialysis services for pt noting that what he is currently experiencing does not represent quality of life for pt based on them knowing of his wishes. Rosa has shared that in Septemeber they went on a charter boat, and pt \"captained\" the boat.      They have affirmed that they are not holding bed at [facility], wish to have him closer to where they live which is in the [facility] area. They have identified [facility], and [facility] as facilities of consideration, referrals have been made. In discussing opportunity for hospice services they indicate that they wish to pursue. Based on review of agencies available and discussion, referral has been made to [facility], agency representative will contact Leticia to arrange either here at [facility], or at LTC facility depending on when pt discharges.       Also to note that to " the knowledge of family present pt does not have a HCPOA, Leticia is durable power of . When asked who pt would assign for helping him make HC decisions, Leticia indicated that pt has asked her to do this for him.         KIP has discussed with family that pt will not have insurance covergae at the LTC facility which they acknowledge understanding of, discussed that [facility] services would be covered by Medicare.     ASSESSMENT  Good family support     PLAN  Financial costs for the patient includes: Noted above  Anticipate discharging to:  LTC facility with hospice services. Awaiting facility assessments.[GJ1.1]                Revision History        User Key Date/Time User Provider Type Action    > GJ1.1 12/5/2018  4:05 PM Kassandra Ibarra LSW  Sign                     Progress Notes - Physician (Notes from 12/03/18 through 12/06/18)      Progress Notes by Kassandra Ibarra LSW at 12/6/2018  9:59 AM     Author:  Kassandra Ibarra LSW Service:  (none) Author Type:      Filed:  12/6/2018 10:14 AM Date of Service:  12/6/2018  9:59 AM Creation Time:  12/6/2018  9:59 AM    Status:  Signed :  Kassandra Ibarra LSW ()         SWS     D: [facility] with hosp[facility] services, shared room.  It has been requested that if pt/family are in agreement that the hospice services be arranged through [facility] hospice as pt will be followed there by [facility] MD. Discussed with MD who informs that pt would be ready for discharge today.      I: Spoke by phone this morning to pt's niece, Leticia and discussed above. She has asked on pt's behalf that planning continue for his transfer to [facility] and that the arrangements for hospice be made through [facility], referral has been made to Leticia.. KIP will fax clinical information when MD orders are completed. It has been requested  by Leticia ochoa that the hospice consult be done at [facility], agency representative will contact Leticia to arrange.         Medical transport has been requested by Leticia, she is in agreement with arrangements for stretcher based on pt's current status..Discussed with her that the transport will be billed to pt's insurances but that  is unable to guarantee coverage, transport cost of $936/base and $4.50/mi which she acknowledges and agrees. [facility] arranged for 1230.          Also discussed with Leticia that there is no upfront cost at the facility for pt's admission today, daily cost to be determined but per admission coordinator there ([place]) it would be $250-$350/day, Leticia acknowledges and accepts.       KIP has reached out to  at [facility] and asked that pt's PAS information be faxed by them to [facility].     A/P: Anticipate no problem with arrangements made for discharge today, with discharge no further SWS.[GJ1.1]        Revision History        User Key Date/Time User Provider Type Action    > GJ1.1 12/6/2018 10:14 AM Kassandra Ibarra LSW  Sign            Progress Notes by BABITA Pradhan RN at 12/5/2018  3:04 PM     Author:  BABITA Pradhan RN Service:  [facility] Nurse Author Type:  Registered Nurse    Filed:  12/5/2018  3:33 PM Date of Service:  12/5/2018  3:04 PM Creation Time:  12/5/2018  3:04 PM    Status:  Signed :  BABITA Pradhan RN (Registered Nurse)         [facility] Nurse Inpatient Wound Assessment     Assessment of wound(s) on pt's:[MM1.1]   Right dorsal foot, left dorsal foot, left dorsal hand, left forearm, left elbow[MM1.2]        Data:   Patient History:[MM1.1]  Per MD notes, patient is an 89 year old male with hx of ESRD with Dialysis, CAD, Pulmonary Hypertension, Diabetes Mellitus. Admitted 12-4 with altered mental status.[MM1.2]       Moisture Management:[MM1.1]  Brief[MM1.2]    Current Diet / Nutrition:           Active Diet Order       Combination Diet Regular Diet Adult; Dialysis Diet; No Caffeine Diet            Jaskaran Assessment and sub scores:   Jaskaran Score  Av.1  Min: 11  Max: 19     Labs:         Recent Labs   Lab Test  18   0626   18   1859   18   0632   18   1228   16   0843   ALBUMIN   --    --   3.1*   < >   --    --   3.4   < >   --    HGB  12.0*   < >  12.5*   < >  9.8*   < >  10.5*   < >   --    RBC  3.37*   < >  3.48*   < >  2.76*   < >  2.96*   < >   --    WBC  5.4   < >  9.1   < >  5.1   < >  6.4   < >   --    PLT  115*   < >  140*   < >  168   < >  106*   < >   --    INR   --    --    --    --    --    --    --    --   1.17*   A1C   --    --    --    --    --    --   5.1   --    --    CRP   --    --    --    --   18.5*   --    --    --    --     < > = values in this interval not displayed.          Wound Assessment (location #1[MM1.1] Right dorsal foot[MM1.2]):     Wound History:[MM1.1]  Hx unclear, pt not responsive. Nurse states that pt from [facility] and had fallen recently.[MM1.2]    Specific Dimensions (length x width x depth, in cm):[MM1.1]   2 x1 cm, thin yellow eschar. Periwound tissue fragile. Both legs with 1+ edema.[MM1.2]    Tunneling:[MM1.1]  N/A[MM1.2]      Undermining:[MM1.1] N/A[MM1.2]    Wound Base:[MM1.1] dry eschar[MM1.2]     Palpation of the wound bed:[MM1.1]  firm[MM1.2]    Slough appearance:[MM1.1]  firm[MM1.2]         Eschar appearance:[MM1.1]  dry[MM1.2]    Periwound Skin:[MM1.1] intact[MM1.2],      Color:[MM1.1] normal and consistent with surrounding tissue[MM1.2]    Temperature[MM1.1]  normal[MM1.2]     Drainage:[MM1.1]  none[MM1.2]     Odor:[MM1.1] none[MM1.2]       Pain:[MM1.1]  absent[MM1.2]   Wound Assessment (location #2[MM1.1] Left dorsal foot[MM1.2] ):     Wound History:[MM1.1]  See above[MM1.2]    Specific Dimensions (length x width x depth, in cm):[MM1.1]   5.6 x 5.2 cm, unroofed blister[MM1.2]    Tunneling:[MM1.1]  N/A[MM1.2]      Undermining:[MM1.1]  N/A[MM1.2]    Wound Base:[MM1.1] dry granulation[MM1.2]    Palpation of the wound bed:[MM1.1]  firm[MM1.2]    Slough appearance:[MM1.1]  none[MM1.2]         Eschar appearance:[MM1.1]  none[MM1.2]    Periwound Skin:[MM1.1] intact[MM1.2],[MM1.1]  fragile[MM1.2]    Color:[MM1.1] normal and consistent with surrounding tissue[MM1.2]    Temperature[MM1.1]  normal[MM1.2]     Drainage:[MM1.1]  none[MM1.2]    Odor:[MM1.1] none[MM1.2]  Pain:[MM1.1]  absent[MM1.2]   Wound Assessment (location #3[MM1.1] Left dorsal hand[MM1.2]):     Wound History:[MM1.1]  See above[MM1.2]    Specific Dimensions (length x width x depth, in cm):[MM1.1]   4.5 x 5.2 cm[MM1.2]    Tunneling:[MM1.1]  N/A[MM1.2]      Undermining:[MM1.1] N/A[MM1.2]    Wound Base:[MM1.1] moist and slough[MM1.2]     Palpation of the wound bed:[MM1.1]  firm[MM1.2]    Slough appearance:[MM1.1] tan[MM1.2]          Eschar appearance:[MM1.1]  none[MM1.2]    Periwound Skin:[MM1.1] intact[MM1.2],[MM1.1]  fragile[MM1.2]    Color:[MM1.1] normal and consistent with surrounding tissue[MM1.2]    Temperature[MM1.1]  normal[MM1.2]     Drainage:[MM1.1]  Scant serous[MM1.2]     Odor:[MM1.1] none[MM1.2]  Pain:[MM1.1]  absent[MM1.2]     Wound Assessment (location #4[MM1.1] Left forearm[MM1.2]):    Wound History:[MM1.1]  See above[MM1.2]    Specific Dimensions (length x width x depth, in cm):[MM1.1]   7x 1 x 0.2 cm[MM1.2]    Tunneling:[MM1.1]  N/A[MM1.2]      Undermining:[MM1.1] N/A[MM1.2]    Wound Base:[MM1.1] moist and slough[MM1.2]     Palpation of the wound bed:[MM1.1]  firm[MM1.2]    Slough appearance:[MM1.1]  tan[MM1.2]         Eschar appearance:[MM1.1]  none[MM1.2]    Periwound Skin:[MM1.1] fragile[MM1.2],      Color:[MM1.1] normal and consistent with surrounding tissue[MM1.2]    Temperature[MM1.1]  normal[MM1.2]     Drainage:[MM1.1]  none[MM1.2]     Odor:[MM1.1] none[MM1.2]  Pain:[MM1.1]  absent[MM1.2]     Wound Assessment (location #5[MM1.1] Left elbow[MM1.2]):     Wound  History:[MM1.1]  See abpve[MM1.2]    Specific Dimensions (length x width x depth, in cm):[MM1.1]   1 x 2.8 x 0.2 cm[MM1.2]    Tunneling:[MM1.1]  N/A[MM1.2]      Undermining:[MM1.1] N/A[MM1.2]    Wound Base:[MM1.1] moist and slough[MM1.2]     Palpation of the wound bed:[MM1.1]  firm[MM1.2]    Slough appearance:[MM1.1]  tan[MM1.2]         Eschar appearance:[MM1.1]  none[MM1.2]    Periwound Skin:[MM1.1] intact[MM1.2],[MM1.1]  fragile[MM1.2]    Color:[MM1.1] normal and consistent with surrounding tissue[MM1.2]    Temperature[MM1.1]  normal[MM1.2]     Drainage:[MM1.1]  none[MM1.2]     Odor:[MM1.1] none[MM1.2]  Pain:[MM1.1]  absent[MM1.2]           Intervention:     Patient's chart evaluated.      Wound[MM1.1]s were  assessed[MM1.2]    Wound Care:[MM1.1] was not[MM1.2] done:[MM1.1]  Nurse to do[MM1.2]    Orders[MM1.1]  Written[MM1.2]    Supplies[MM1.1]  discussed with RN[MM1.2]    Discussed plan of care with[MM1.1] Nurse Ibanez[MM1.2]          Assessment:[MM1.1]       Open areas on feet, left hand and arm with slough. Skin fragile, legs with 1+ edema. Will start Vaseline gauze to keep moist and protect.      Family has now decided on comfort care and hospice, will be discharged when can make arrangements with a  facility for cares.[MM1.2]              Plan:     Nursing to notify the Provider(s) and re-consult the [facility] Nurse if wound[MM1.1]s[MM1.2] deteriorateor if the wound care plan needs reevaluation.    Plan of care for wound located on[MM1.1]  Right dorsal foot, left dorsal foot, left hand, left forearm, left elbow: Cleanse with Microklenz, pat dry. Apply folded Vaseline gauze, cover with gauze 4x4, secure with kerlix. Change once daily.[MM1.2]     WOC Nurse will return: weekly and prn[MM1.1]               Revision History        User Key Date/Time User Provider Type Action    > MM1.2 12/5/2018  3:33 PM BABITA Pradhan, RN Registered Nurse Sign     MM1.1 12/5/2018  3:04 PM BABITA Pradhan, RN Registered Nurse              Progress Notes by Kassandra Ibarra LSW at 12/5/2018  2:46 PM     Author:  Kassandra Ibarra LSW Service:  (none) Author Type:      Filed:  12/5/2018  2:52 PM Date of Service:  12/5/2018  2:46 PM Creation Time:  12/5/2018  2:46 PM    Status:  Signed :  Kassandra Ibarra LSW ()         SWS     D: Per request to assist with discharge planning, discussed status with Kip VALENTIN aware of pending palliative care consult. Noted pt's admission from [facility] where he had been in the long term care arae of the facility. It is noted that pt had been receiving dialysis, noted reports of pt experiencing altered mental status during dialysis yesterday prompting pt's presentation to the ED.. After assessment in ED pt returned to his residence at [facility], and documentation of the staff there felling that they could not readmit him at that time, pt's return then to the hospital. KIP has spoken today to admissions coordinator at [facility] who informed that pt's family did not chose to bed hold there at this time.     A/P: Awaiting availability of family for discharge planning.[GJ1.1]      Revision History        User Key Date/Time User Provider Type Action    > GJ1.1 12/5/2018  2:52 PM Kassandra Ibarra LSW  Sign            Progress Notes by Dionte Mena MD at 12/5/2018 11:33 AM     Author:  Dionte Mena MD Service:  Hospitalist Author Type:  Physician    Filed:  12/5/2018 11:52 AM Date of Service:  12/5/2018 11:33 AM Creation Time:  12/5/2018 11:33 AM    Status:  Addendum :  Dionte Mena MD (Physician)         [facility]    Hospitalist Progress Note    Date of Service (when I saw the patient):[JY1.1] 12/05/2018    Assessment & Plan[JY1.2]   Maurizio Ohara is a 89 year old male with PMH including ESRD on HD x 9 yrs, coronary artery disease, pulmonary hypertension, severe aortic stenosis (non-operable) and diabetes who was  admitted on 12/4/2018 with declining health and altered mental status at HD (which has been occurring)    Altered mental status during HD    - resolved    - has been occurring in recent months    - patient has had an overall decline in recent months ( see below)    Generalized decline in setting of chronic end stage disease    - met with patient, gabriela Kelly (POA), sister Karyn and friend Rosa today: all agreeing to hospice care at this point    - all understand this would include stopping dialysis    - will have Palliative Care see patient (spoke with Kelsy)    - spoke with Kassandra (social work) who will meet with family    ESRD    - will be stopping dialysis    - I already spoke with Dr Yung (Nephrology)    Has severe /progressive aortic stenosis    - last met with Dr. Keyes on Nov 26- non operable      Pulmonary HTN/CAD/Diastolic CHF    - stable    Diabetes    - patient not on meds[JY1.1]    No labs in am as patient is transitioning to hospice[JY1.3]    Met with family (as above)    DVT Prophylaxis: will be hospice/comfort care  Code Status:[JY1.1] DNR/DNI[JY1.2]    Disposition: Expected discharge once facility found[JY1.1]    Dionte Mena    Interval History[JY1.2]   Patient this am is able to tell me Dr. Yung had been in to see him. He knows he is in [facility] (was just told by Dr. Yung). He cannot tell me the year or the president. He states he has some left arm pain which has been there. No chest pain, or SOB. He did eat breakfast    -Data reviewed today: I reviewed all new labs and imaging results over the last 24 hours. I personally reviewed no images or EKG's today.[JY1.1]    Physical Exam   Temp: 95.6  F (35.3  C) Temp src: Oral BP: (!) 84/41 Pulse: 65 Heart Rate: 65 Resp: 18 SpO2: 92 % O2 Device: None (Room air) Oxygen Delivery: 1.5 LPM  Vitals:    12/04/18 1131   Weight: 83 kg (182 lb 15.7 oz)[JY1.2]     Vital Signs with Ranges[JY1.1]  Temp:  [95.2  F (35.1  C)-97.4  F (36.3  C)] 95.6  F  (35.3  C)  Pulse:  [61-65] 65  Heart Rate:  [51-68] 65  Resp:  [17-24] 18  BP: ()/(41-71) 84/41  SpO2:  [88 %-100 %] 92 %[JY1.2]       Constitutional: Awake, alert, cooperative, no apparent distress   Respiratory: Mild crackles in left base   Cardiovascular: Regular rate and rhythm, normal S1 and S2, and no murmur noted   Abdomen: Normal bowel sounds, soft, non-distended, non-tender   Skin: No rashes, no cyanosis, dry to touch   Neuro: Alert and oriented x2, no weakness, numbness, memory loss   Extremities: No edema, normal range of motion   Other(s):        All other systems: Negative[JY1.1]     Medications       aspirin  81 mg Oral Daily     calcium acetate  2,001 mg Oral TID w/meals     diclofenac  2 g Topical 4x Daily     gabapentin (NEURONTIN) capsule 100 mg  100 mg Oral TID       Data     Recent Labs  Lab 12/05/18  0626 12/04/18  1127 12/02/18  0900  12/01/18  1913 12/01/18  1859   WBC 5.4 7.0  --   --   --  9.1   HGB 12.0* 11.4*  --   --  13.9 12.5*   * 113*  --   --   --  115*   * 128*  --   --   --  140*    135 131*  --  131* 132*   POTASSIUM 5.1 4.5 5.9*  < > 5.9* 6.1*   CHLORIDE 98 97 96  --  96 95   CO2 29 29 25  --   --  27   BUN 75* 56* 107*  --  92* 102*   CR 5.94* 4.51* 7.75*  --   --  7.19*   ANIONGAP 6 9 10  --  8 10   KIMBER 9.1 9.1 10.2*  --   --  10.5*   * 227* 140*  --  158* 156*   ALBUMIN  --   --   --   --   --  3.1*   PROTTOTAL  --   --   --   --   --  7.1   BILITOTAL  --   --   --   --   --  0.8   ALKPHOS  --   --   --   --   --  173*   ALT  --   --   --   --   --  23   AST  --   --   --   --   --  25   TROPI  --  <0.015  --   --   --   --    < > = values in this interval not displayed.    Recent Results (from the past 24 hour(s))   Head CT w/o contrast    Narrative    CT SCAN OF THE HEAD WITHOUT CONTRAST December 4, 2018 12:07 PM     HISTORY: Altered mental status.    TECHNIQUE: Axial images of the head and coronal reformations without  IV contrast material.  Radiation dose for this scan was reduced using  automated exposure control, adjustment of the mA and/or kV according  to patient size, or iterative reconstruction technique.    COMPARISON: 11/16/2018.    FINDINGS: There is generalized atrophy of the brain. There is low  attenuation in the white matter of the cerebral hemispheres consistent  with sequelae of small vessel ischemic disease. There is no evidence  of intracranial hemorrhage, mass, acute infarct or anomaly.     The visualized portions of the sinuses and mastoids appear normal.  There is no evidence of trauma. There is bilateral proptosis,  unchanged.      Impression    IMPRESSION:   1. No acute abnormality.  2.  Atrophy of the brain. White matter changes consistent with  sequelae of small vessel ischemic disease.  3. Bilateral proptosis.  4. No change.     GIOVANNI MATSON MD   XR Chest 2 Views    Narrative    CHEST TWO VIEWS  12/4/2018 12:08 PM     HISTORY: AMS;     COMPARISON: 11/16/2018 chest x-ray      Impression    IMPRESSION: Cardiomegaly and pulmonary vascular congestion without  significant change. New mild elevation right hemidiaphragm. Mild  rounded opacity just above the medial right hemidiaphragm could be  lower lobe pulmonary vessel. Left hemidiaphragm is difficult to  visualize, either by enlarged heart or lower lobe infiltrate. Lateral  chest x-ray may be helpful.    RAVI FERNANDO MD[JY1.2]        Revision History        User Key Date/Time User Provider Type Action    > JY1.3 12/5/2018 11:52 AM Dionte Mena MD Physician Addend     JY1.2 12/5/2018 11:51 AM Dionte Mena MD Physician Sign     JY1.1 12/5/2018 11:33 AM Dionte Mena MD Physician             Progress Notes by Rich Yung MD at 12/5/2018 11:42 AM     Author:  Rich Yung MD Service:  Nephrology Author Type:  Physician    Filed:  12/5/2018 11:43 AM Date of Service:  12/5/2018 11:42 AM Creation Time:  12/5/2018 11:42 AM    Status:  Signed :  Rich Yung  MD (Physician)         Met c pt, Dr. Mena.  He is intermittently confused.  Decision per family is to move to comfort care.  I will alert the [facility] unit of this decision.[RS1.1]     Revision History        User Key Date/Time User Provider Type Action    > RS1.1 12/5/2018 11:43 AM Rich Yung MD Physician Sign            ED Provider Notes by Yohana Capellan DO at 12/4/2018 10:47 AM     Author:  Yohana Capellan DO Service:  Emergency Medicine Author Type:  Physician    Filed:  12/5/2018  8:18 AM Date of Service:  12/4/2018 10:47 AM Creation Time:  12/4/2018 10:48 AM    Status:  Addendum :  Yohana Capellan DO (Physician)           History     Chief Complaint:[EC1.1]  Altered Mental Status[EC1.2]      HPI   History limited secondary to the patient's[EC1.1] altered mental status[EC1.3]. History supplemented by EMS report.    Maurizio Ohara is a 89 year old male with a complex medical history pertinent for[EC1.1] coronary artery disease, end stage renal disease on hemodialysis, diabetes , pulmonary hypertension, diastolic congestive heart failure, severe aortic stenosis, and mitral regurgitation[EC1.4] who presents to the ED via EMS for evaluation of altered mental status. The patient was recently[EC1.1] seen in the ED on 12/1[EC1.4]/18[EC1.2] for increasing weakness and somnolence, at which time he was found to by hyperkalemic and admitted for inpatient dialysis. This morning, EMS reports that[EC1.4] the patient was being dialyzed when he became progressively more unresponsive. He is typically oriented x3 at baseline, and staff noted that he became[EC1.1] increasingly drowsy[EC1.4], began mumbling his words, and his eyes rolled backwards. Staff stopped dialysis 2/3 of the way through and called EMS. EMS found the patient to have a glucose level of 249 and noted him to be in atrial fibrillation from 60-80[EC1.1] bpm en route to the ED. Here, the patient arrives responsive and  speaking. He presents with a dialysis clamp in place and has complaints of right arm pain. Of note, the patient is not anticoagulated. He currently resides in the [facility].[EC1.4]    Allergies:[EC1.1]  Glyburide  Lisinopril  Metformin  Penicillins  Verapamil[EC1.5]     Medications:[EC1.1]    Aspirin  Lipitor  Phoslo  Voltaren  Gabapentin  Midodrine  Nitrostat  Miralax[EC1.4]    Past Medical History:[EC1.1]    Anemia  CAD  CKD  Diabetes  Hypertension  Mitral regurgitation  MI  Pulmonary hypertension  Sepsis  Pneumonia  CHF  Hyperkalemia[EC1.4]    Past Surgical History:[EC1.1]    Coronary angiography  Tonsillectomy  Heart angioplasty  Thoracic surgery[EC1.2]    Family History:[EC1.1]    Diabetes[EC1.2]    Social History:[EC1.1]  Former smoker  Occasional alcohol use.[EC1.2]    Marital Status:  Single [1]    Review of Systems   Constitutional: Negative for[EC1.1] chills[LM1.1] and[EC1.1] fever[LM1.1].   Cardiovascular: Negative for[EC1.1] chest pain[LM1.1].   Gastrointestinal: Negative for[EC1.1] abdominal pain[LM1.1] and[EC1.1] blood in stool[LM1.1].   Musculoskeletal: Positive for[EC1.1] myalgias (R. arm pain)[LM1.1].   Neurological: Positive for[EC1.1] speech difficulty (per dialysis center)[LM1.1]. Negative for[EC1.1] weakness[LM1.1].[EC1.1]   All other systems reviewed and are negative[LM1.1].      Physical Exam[EC1.1]     Patient Vitals for the past 24 hrs:   BP Temp Temp src Pulse Heart Rate Resp SpO2 Weight   12/04/18 1554 93/63 - - 61 - 20 100 % -   12/04/18 1315 100/71 - - - 68 21 100 % -   12/04/18 1300 110/67 - - - 63 21 100 % -   12/04/18 1245 110/68 - - - 56 21 100 % -   12/04/18 1230 111/65 - - - 51 22 100 % -   12/04/18 1215 104/60 - - - 63 24 100 % -   12/04/18 1145 93/51 - - - 60 17 100 % -   12/04/18 1131 107/59 97.2  F (36.2  C) Temporal 57 - 16 100 % 83 kg (182 lb 15.7 oz)   12/04/18 1130 107/59 - - - 55 15 100 % -[EC1.6]        Physical Exam[EC1.1]  Nursing note and vitals  reviewed.  Constitutional: Well nourished. Resting comfortably.   Eyes: Conjunctiva normal.  Pupils are equal, round, and reactive to light.   ENT: Nose normal. Mucous membranes pink and moist.    Neck: Normal range of motion.  CVS: Normal rate, regular rhythm.  Normal heart sounds.  No murmur.  Pulmonary:[LM1.2] Diminished lung sounds bilaterally.[LM1.3]   GI: Abdomen soft. Nontender, nondistended. No rigidity or guarding.    MSK: No calf tenderness or swelling.  Neuro: Alert. Follows simple commands.  Skin: Skin is warm and dry. Multiple, older appearing ecchymosis to bilateral upper extremities[LM1.2].  RUE AV fistula with palpable thrill, slight bleeding on arrival, arm clamp applied to site[LM1.1]  Psychiatric:[LM1.2] Blunt[LM1.1] affect.[LM1.2]     Emergency Department Course   ECG:[EC1.1]  Indication: Altered mental status  Time: 1059  Vent. Rate 63 bpm. WV interval *. QRS duration 90. QT/QTc 372/380. P-R-T axis * 103 -80.  Atrial fibrillation. Possible right ventricular hypertrophy. Nonspecific ST and T wave abnormality. Abnormal ECG. No significant from EKG dated 12/1/18. Read time: 1105[EC1.2]    Imaging:[EC1.1]  Radiographic findings were communicated with the patient who voiced understanding of the findings.  XR Chest 2 views:   Cardiomegaly and pulmonary vascular congestion without  significant change. New mild elevation right hemidiaphragm. Mild  rounded opacity just above the medial right hemidiaphragm could be  lower lobe pulmonary vessel. Left hemidiaphragm is difficult to  visualize, either by enlarged heart or lower lobe infiltrate. Lateral  chest x-ray may be helpful. As per radiology.     CT Head without contrast:   1. No acute abnormality.  2.  Atrophy of the brain. White matter changes consistent with  sequelae of small vessel ischemic disease.  3. Bilateral proptosis.  4. No change.  as per radiology.[EC1.2]     Laboratory:[EC1.1]  CBC: WBC: 7.0, HGB: 11.4 (L), PLT: 128 (L)  BMP: Glucose 227  (H), BUN 56 (H), Creatinine 4.51 (H), GFR 12 (L), o/w WNL    Magnesium: 2.2    1127 Troponin: <0.015[EC1.2]      Emergency Department Course:  Nursing notes and vitals reviewed. (1048) I performed an exam of the patient as documented above.     (1051)[EC1.1] Patient's d[EC1.4]ialysis[EC1.1] arm[LM1.1] clamp[EC1.1] was[EC1.4] removed[EC1.1]. We applied[EC1.4] S[EC1.2]urgifoam[EC1.1] dressing[EC1.7] and[EC1.1] an ACE wrap[EC1.2] with successful control of the bleeding.    Blood drawn. This was sent to the lab for further testing, results above.    The patient was sent for a[EC1.1] head CT and chest x-ray[EC1.2] while in the emergency department, findings above.     ([EC1.1]1222[EC1.2]) I rechecked the patient and discussed the results of his workup thus far.[EC1.1] He was conversing with me at bedside and was amenable[EC1.2] to[LM1.1] being discharged back to his care facility.     (1325) The patient's[EC1.2] family and[EC1.8] D[LM1.3]POA[EC1.8] arrived to the emergency department, and we discussed his presentation and disposition here. They were agreeable to[EC1.2] his discharge back to the [facility] Emergency Department[EC1.7] at this time[LM1.1].[EC1.7]    Findings and plan explained to the[EC1.1] Patient[EC1.2] and family[EC1.7]. Patient discharged[EC1.1] back to his nursing facility[EC1.7] via ambulance[EC1.8] with instructions regarding supportive care, medications, and reasons to return. The importance of close follow-up was reviewed. I personally reviewed the laboratory results with the[EC1.1] Patient[EC1.2] and answered all related questions prior to discharge.     Impression & Plan    Medical Decision Making:[EC1.1]  Patient is an 89-year-old male with complicated past medical history most notably end-stage renal disease on dialysis, diabetes, diastolic heart failure presenting with reported altered mental status at t[LM1.4]santhosh's[EC1.2] dialysis.  Patient was awake and alert on arrival.  He was following simple  "commands.  I had a strong suspicion presentation was possibly secondary to[LM1.4] mild d[LM1.1]isequilibrium syndrome versus fluid shifts[LM1.4] during dialysis.  No known BP meds though possible cerebral hypoperfusion during dialysis.[LM1.5]  There is no sign of infection today.  Electrolytes[LM1.4] are[EC1.2] stable.  EKG[LM1.4] is[EC1.2] without focal ischemia, troponin screen reassuring.  Of note[LM1.4],[EC1.2] when the patient arrived he had a dialysis clamp on his arm at his fistula site.  There was some bleeding on arrival[LM1.4],[EC1.2] though this was controlled with Surgifoam dressing as well as an[LM1.4] ACE[EC1.2] wrap.  Plans to discharge back to nursing facility.  Instructed to return to ED should signs or symptoms worsen or change.[LM1.4]      ADDENDUM    (1538) The patient presented back from his nursing care facility as they \"did not have appropriate placement for him\" given his level of care. His DPOA, Leticia Brady, was at bedside, and we discussed her wishes to change the patient to a DNR/DNI status. Patient's nursing care facility refusing patient at this time as no updated DNR/DNI status on file at their facility.     (1600)  I consulted with Dr. Langston of the hospitalist services. They are in agreement to accept the patient for admission.      Findings and plan explained to the Patient and family who consents to admission. Discussed the patient with Dr. Langston, who will admit the patient to an observation bed for further monitoring, evaluation, and treatment.[LM1.6]    Diagnosis:[EC1.1]    ICD-10-CM    1. ESRD (end stage renal disease) on dialysis (H) N18.6 Glucose by meter    Z99.2 Glucose by meter   2. Anemia of chronic renal failure, unspecified CKD stage N18.9     D63.1    3. Complication of arteriovenous dialysis fistula, initial encounter T82.9XXA[LM1.7]        Disposition:[EC1.1]  Admitted to [EC1.8] [facility].[EC1.9]    Scribe Disclosure:  I, Tabby Feliz, am " serving as a scribe on 12/4/2018 at 10:57 AM to personally document services performed by No att. providers found based on my observations and the provider's statements to me.      Tabby Feliz  12/4/2018   [facility] EMERGENCY DEPARTMENT[EC1.1]         Yohana Capellan, DO  12/04/18 1625[LM1.7]       Yohana Capellan, DO  12/05/18 0818  [LM1.8]     Revision History        User Key Date/Time User Provider Type Action    > LM1.8 12/5/2018  8:18 AM Yohana Capellan, DO Physician Addend     LM1.5 12/5/2018  8:17 AM Yohana Capellan, DO Physician      LM1.7 12/4/2018  4:25 PM Yohana Capellan, DO Physician Addend     LM1.6 12/4/2018  4:23 PM Yohana Capellan, DO Physician Sign     LM1.1 12/4/2018  4:17 PM Yohana Capellan, DO Physician      [N/A] 12/4/2018  4:16 PM Trini, Tabby Scribe Share     EC1.6 12/4/2018  4:16 PM Harlan, Tabby Scribe Share     LM1.3 12/4/2018  4:15 PM Yohana Capellan, DO Physician Share     EC1.9 12/4/2018  4:15 PM Harlan, Tabby Scribe      EC1.8 12/4/2018  3:58 PM Trini, Tabby Scribe Share     [N/A] 12/4/2018  3:49 PM Harlan, Tabby Scribe Share     [N/A] 12/4/2018  3:44 PM Harlan, Tabby Scribe Share     EC1.7 12/4/2018  3:42 PM Harlan, Tabby Scribe Share     EC1.2 12/4/2018  1:35 PM Trini, Tabby Scribe Share     LM1.4 12/4/2018 12:42 PM Yohana Capellan, DO Physician Share     [N/A] 12/4/2018 11:22 AM Trini, Tabby Scribe Share     EC1.3 12/4/2018 11:22 AM Harlan, Tabby Scribe Share     [N/A] 12/4/2018 11:19 AM Yohana Capellan, DO Physician Share     EC1.5 12/4/2018 11:13 AM Tabby Feliz     LM1.2 12/4/2018 11:13 AM Yohana Capellan DO Physician      EC1.4 12/4/2018 11:06 AM Tabby Feliz      EC1.1 12/4/2018 11:02 AM Tabby Feliz            ED Notes by Raisa Kaminski RN at 12/4/2018  4:53 PM     Author:  Raisa Kaminski RN Service:  (none) Author Type:  Registered Nurse    Filed:  12/4/2018  " 5:28 PM Date of Service:  12/4/2018  4:53 PM Creation Time:  12/4/2018  4:53 PM    Status:  Addendum :  Raisa Kaminski RN (Registered Nurse)         [facility]  ED Nurse Handoff Report    Maurizio Ohara is a 89 year old male   ED Chief complaint: Altered Mental Status  . ED Diagnosis:   Final diagnoses:   ESRD (end stage renal disease) on dialysis (H)   Anemia of chronic renal failure, unspecified CKD stage   Complication of arteriovenous dialysis fistula, initial encounter     Allergies:   Allergies   Allergen Reactions     Glyburide      Lisinopril      Hyperkalemia when hospitalized 4/5/2011     Metformin      Renal failure stage 4     Penicillins      SHADY Gallagher clarified with pt, pt does not remember rxn, it was a long time ago, and \"nothing crazy\".     Verapamil        Code Status: DNR / DNI  Activity level - Baseline/Home:  Stand with Assist. Activity Level - Current:   Stand with Assist of 2. Lift room needed: No. Bariatric: No   Needed: No   Isolation: No. Infection: Not Applicable.     Vital Signs:   Vitals:    12/04/18 1300 12/04/18 1315 12/04/18 1554 12/04/18 1619   BP: 110/67 100/71 93/63    Pulse:   61    Resp: 21 21 20    Temp:    97.4  F (36.3  C)   TempSrc:    Oral   SpO2: 100% 100% 100%    Weight:           Cardiac Rhythm:  ,      Pain level:    Patient confused: No. Patient Falls Risk: Yes.   Elimination Status: Depends dry. Dialysis today.    Patient Report - Initial Complaint: Pt evaluated in ED this morning, EMS reports that the patient was being dialyzed when he became progressively more unresponsive. He is typically oriented x3 at baseline, and staff noted that he became increasingly drowsy, began mumbling his words, and his eyes rolled backwards. Staff stopped dialysis 2/3 of the way through and called EMS.  He currently resides in the [facility].Pt discharge back to [facility] at 1430 but refuses his return. Pt returned to ED.  Focused " "Assessment: Pt arouses to verbal stimuli.Speech soft with few words. Updated on plan of care for admission. Denies shortness of breath. Fluids offered. Pt declines. Pt has multiple areas of skin breakdown and wound dressings. Complains of pain \" On my back where my wheelchair hits.\" Ductibus dressing intact to area. Dialysis fistula dry and intact. No bleeding. Lungs with scattered rales at bases. Heart tones regular with murmur.    Tests Performed:[LF1.1]   XR Chest 2 Views   Final Result   IMPRESSION: Cardiomegaly and pulmonary vascular congestion without   significant change. New mild elevation right hemidiaphragm. Mild   rounded opacity just above the medial right hemidiaphragm could be   lower lobe pulmonary vessel. Left hemidiaphragm is difficult to   visualize, either by enlarged heart or lower lobe infiltrate. Lateral   chest x-ray may be helpful.      RAVI FERNANDO MD      Head CT w/o contrast   Final Result   IMPRESSION:    1. No acute abnormality.   2.  Atrophy of the brain. White matter changes consistent with   sequelae of small vessel ischemic disease.   3. Bilateral proptosis.   4. No change.       GIOVANNI MATSON MD[LF1.2]      . Abnormal Results:[LF1.1]   Labs Ordered and Resulted from Time of ED Arrival Up to the Time of Departure from the ED   CBC WITH PLATELETS DIFFERENTIAL - Abnormal; Notable for the following:        Result Value    RBC Count 3.24 (*)     Hemoglobin 11.4 (*)     Hematocrit 36.5 (*)      (*)     MCH 35.2 (*)     MCHC 31.2 (*)     RDW 19.0 (*)     Platelet Count 128 (*)     Nucleated RBCs 3 (*)     All other components within normal limits   BASIC METABOLIC PANEL - Abnormal; Notable for the following:     Glucose 227 (*)     Urea Nitrogen 56 (*)     Creatinine 4.51 (*)     GFR Estimate 12 (*)     GFR Estimate If Black 15 (*)     All other components within normal limits   GLUCOSE BY METER - Abnormal; Notable for the following:     Glucose 164 (*)     All other components " "within normal limits   MAGNESIUM   TROPONIN I[LF1.2]   .   Family Comments: Family had consult with Admitting physician.   OBS brochure/video discussed/provided to patient:  N/A Pt not able to view and comprehend video. Family aware of admission status   ED Medications: Medications - No data to display  Drips infusing:  No  For the majority of the shift, the patient's behavior Green. Interventions performed were n/a.     Severe Sepsis OR Septic Shock Diagnosis Present: No      ED Nurse Name/Phone Number: Lizy Martinez,   4:53 PM[LF1.1]    RECEIVING UNIT ED HANDOFF REVIEW    Above ED Nurse Handoff Report was reviewed: yes  Reviewed by: Raisa Kaminski on December 4, 2018 at 5:27 PM[JB1.1]        Revision History        User Key Date/Time User Provider Type Action    > JB1.1 12/4/2018  5:28 PM Raisa Kaminski RN Registered Nurse Addend     LF1.2 12/4/2018  5:00 PM Lizy Martinez RN Registered Nurse Sign     LF1.1 12/4/2018  4:53 PM Lizy Martinez RN Registered Nurse             ED Notes by Lizy Martinez RN at 12/4/2018  4:45 PM     Author:  Lizy Martinez RN Service:  (none) Author Type:  Registered Nurse    Filed:  12/4/2018  4:53 PM Date of Service:  12/4/2018  4:45 PM Creation Time:  12/4/2018  4:53 PM    Status:  Signed :  Lizy Martinez RN (Registered Nurse)         Pt arouses to verbal stimuli.Speech soft with few words. Updated on plan of care for admission. Denies shortness of breath. Fluids offered. Pt declines. Pt has multiple areas of skin breakdown and wound dressings. Complains of pain \" On my back where my wheelchair hits.\" Ductibus dressing intact to area. Dialysis fistula dry and intact. No bleeding. Lungs with scattered rales at bases. Heart tones regular with murmur.[LF1.1]      Revision History        User Key Date/Time User Provider Type Action    > LF1.1 12/4/2018  4:53 PM Lizy Martinez, RN Registered Nurse Sign            ED Notes by Lizy Martinez RN at 12/4/2018 " " 3:45 PM     Author:  Lizy Martinez RN Service:  (none) Author Type:  Registered Nurse    Filed:  12/4/2018  4:49 PM Date of Service:  12/4/2018  3:45 PM Creation Time:  12/4/2018  4:49 PM    Status:  Signed :  Lizy Martinez RN (Registered Nurse)         Pt returns from Nursing Home. EMS state \" We didn't even get him off the cart. They refused him. They do not have orders for do not hospitalize or DNR/DNI.\" Spoke to Director of Nursing and she conveys they are not able to care for him unless his orders are changed to do not hospitalize and DNR/DNI.[LF1.1]      Revision History        User Key Date/Time User Provider Type Action    > LF1.1 12/4/2018  4:49 PM Lizy Martinez RN Registered Nurse Sign            ED Notes by Glenn Vallejo RN at 12/4/2018  3:36 PM     Author:  Glenn Vallejo RN Service:  (none) Author Type:  Registered Nurse    Filed:  12/4/2018  3:36 PM Date of Service:  12/4/2018  3:36 PM Creation Time:  12/4/2018  3:36 PM    Status:  Signed :  Glenn Vallejo RN (Registered Nurse)         Bed: ED20  Expected date:   Expected time:   Means of arrival:   Comments:  [facility]     Revision History        User Key Date/Time User Provider Type Action    > TE1.1 12/4/2018  3:36 PM Glenn Vallejo RN Registered Nurse Sign            ED Notes by Aileen Fuentes RN at 12/4/2018  1:28 PM     Author:  Aileen Fuentes RN Service:  Emergency Medicine Author Type:  Registered Nurse    Filed:  12/4/2018  1:31 PM Date of Service:  12/4/2018  1:28 PM Creation Time:  12/4/2018  1:28 PM    Status:  Signed :  Aileen Fuentes RN (Registered Nurse)         Report called to Audra at [facility].  Transportation via stretcher arranged.[BR1.1]     Revision History        User Key Date/Time User Provider Type Action    > BR1.1 12/4/2018  1:31 PM Aileen Fuentes RN Registered Nurse Sign            ED Notes by Aileen Fuentes RN at 12/4/2018  1:17 PM     Author:  " Aileen Fuentes RN Service:  Emergency Medicine Author Type:  Registered Nurse    Filed:  12/4/2018  1:23 PM Date of Service:  12/4/2018  1:17 PM Creation Time:  12/4/2018  1:17 PM    Status:  Signed :  Aileen Fuentes RN (Registered Nurse)         Upon arrival to ED patient noted to have clamp on dialysis access to Rt upper arm.  Clamp removed with Dr. Capellan and patient noted to have continued oozing from dialysis site.  Pressure dressing applied with gel foam to control bleeding. During stay in ED there was no further bleeding from dialysis site.  Gel foam removed and access site covered with 4x4 and tape.[BR1.1]      Revision History        User Key Date/Time User Provider Type Action    > BR1.1 12/4/2018  1:23 PM Aileen Fuentes RN Registered Nurse Sign            ED Notes by Bridget Butt RN at 12/4/2018 10:48 AM     Author:  Bridget Butt RN Service:  (none) Author Type:  Registered Nurse    Filed:  12/4/2018 10:48 AM Date of Service:  12/4/2018 10:48 AM Creation Time:  12/4/2018 10:48 AM    Status:  Signed :  Bridget Butt RN (Registered Nurse)         Bed: ED18  Expected date: 12/4/18  Expected time:   Means of arrival: Ambulance  Comments:  BV-1     Revision History        User Key Date/Time User Provider Type Action    > KJ1.1 12/4/2018 10:48 AM Bridget Butt RN Registered Nurse Sign                  Procedure Notes     No notes of this type exist for this encounter.      Progress Notes - Therapies (Notes from 12/03/18 through 12/06/18)     No notes of this type exist for this encounter.

## 2018-12-04 NOTE — PHARMACY-ADMISSION MEDICATION HISTORY
Admission medication history interview status for this patient is complete. See [facility] admission navigator for allergy information, prior to admission medications and immunization status.     Medication history interview source(s): None  Medication history resources (including written lists, pill bottles, clinic record):MAR from [facility]    Changes made to PTA medication list:  Added: none   Deleted: none  Changed: none    Actions taken by pharmacist (provider contacted, etc):None     Additional medication history information:None    Medication reconciliation/reorder completed by provider prior to medication history? Yes    For patients on insulin therapy: no (Yes/No)   Lantus/levemir/NPH/Mix 70/30 dose: ___ in AM/PM or twice daily   Sliding scale Novolog Y/N   If Yes, do you have a baseline novolog pre-meal dose: ______units with meals   Patients eat three meals a day: Y/N ---  How many episodes of hypoglycemia (low blood glucose) do you have weekly: ---   How many missed doses do you have a week: ---  How many times do you check your blood glucose per day: ---  Any Barriers to therapy: cost of medications/comfortable with giving injections (if applicable)/ comfortable and confident with current diabetes regimen ---      Prior to Admission medications    Medication Sig Last Dose Taking? Auth Provider   acetaminophen (TYLENOL) 500 MG tablet Take 1,000 mg by mouth 3 times daily as needed for mild pain  Yes Unknown, Entered By History   aspirin 81 MG tablet Take 81 mg by mouth daily 12/4/2018 at 0800 Yes Unknown, Entered By History   atorvastatin (LIPITOR) 40 MG tablet Take 40 mg by mouth At Bedtime 12/3/2018 at hs Yes Unknown, Entered By History   calcium acetate (PHOSLO) 667 MG CAPS Take 2,001 mg by mouth 3 times daily (with meals) 12/4/2018 at 1200 Yes Unknown, Entered By History   diclofenac (VOLTAREN) 1 % GEL topical gel Apply 2 g topically 4 times daily Apply to RLE 12/4/2018 at 1200 Yes Darin Maher  MD Walt   GABAPENTIN PO Take 100 mg by mouth 3 times daily 12/4/2018 at 1200 Yes Reported, Patient   MIDODRINE HCL PO Take 10 mg by mouth three times a week on Tues, Thurs, Sat 1 hour before dialysis AND Give 10 mg by mouth one time a day every Tue, Thu for dialysis Send one tablet with resident to dialysis 12/4/2018 at 0700 Yes Reported, Patient   nitroglycerin (NITROSTAT) 0.4 MG sublingual tablet Place 1 tablet (0.4 mg) under the tongue every 5 minutes as needed for chest pain  Yes Julianne Dan PA-C   polyethylene glycol (MIRALAX/GLYCOLAX) Packet Take 1 packet by mouth daily as needed for constipation  Yes Reported, Patient   polyethylene glycol (MIRALAX/GLYCOLAX) Packet Take 17 g by mouth daily 12/4/2018 at 0800 Yes Darin Maher MD

## 2018-12-04 NOTE — H&P
[facility]  Hospitalist Admission Note  Name: Maurizio Ohara    MRN: [id number]  YOB: 1929    Age: 89 year old  Date of admission: 12/4/2018  Primary care provider: Justina Moise    Chief Complaint: Altered mental status, disposition issue.    Maurizio Ohara is a 89 year old male with PMH including ESRD, coronary artery disease, pulmonary hypertension aortic stenosis,  and diabetes who presents with altered mental status.  He was brought in by EMS who provided some of the history.  He was seen here in the ER 3 days ago for concerns regarding increasing weakness and somnolence at which point he was found to be hyperkalemic and admitted for dialysis.  He was being dialyzed in the outpatient setting this morning when he became progressively less responsive.  EMS was called and dialysis unit stopped dialysis about two thirds of the way through his run.      Here in the emergency room he was hemodynamically stable and afebrile.  He was found to be in rate controlled A. fib on EKG.  Chest x-ray showed cardiomegaly and pulmonary vascular congestion felt to be unchanged from a prior x-ray.  CT scan of the head showed no acute pathology but did show atrophy and white matter changes consistent with chronic small vessel ischemic disease.    Lab workup was otherwise unremarkable with normal white blood count and troponin which was undetectable.  He was felt to be clinically improving and the plan was to discharge him back to his care facility which is [facility].  However, apparently staff at his nursing facility felt he was not back to baseline and requested admission for observation overnight.    The primary issue here is goals of care and consideration of transition to comfort care measures.  I did have a long meeting with family including his daughter who is identified as the POA.  She confirms he is DNR/DNI and confirms that his quality of life and general mobility and functional  status have dramatically declined over the last 3 months.  They are considering transition to comfort cares and have questions about hospice.      Assessment and Plan:   1. Resolving altered mental status: Cause unclear but I suspect this was related to hemodynamic changes during dialysis in the setting of one with little reserve and severe aortic stenosis which may be leading to some hypoperfusion.  CT scan of his brain shows significant atrophy and microvascular infarcts.  For now family feels we should not accelerate cares or do more aggressive workup and we are to have.  The plan is to meet with palliative care and further define goals of care first.  His recent baseline over the past few weeks to months is slurred speech with some intermittent hallucinations and delirium.  --monitor overnight, admit to observation status  --monitor mental status though overall trend is gradual improvement.    2.   ESRD on hemodialysis: Reportedly completed about two thirds of his run today.  No acute indication for dialysis tonight. Will consult nephrology for routine dialysis and consideration of an extra run tomorrow given pulmonary vascular congestion on CXR and to also help facilitate any sort of discussion regarding possibly withdrawing from dialysis..  dialyses TRS via a DORIS at the [facility] w/ Dr. Yung.  I did call/talked to Dr. Yung today to gather some collateral information and discuss potential for potentially withdrawing from dialysis so he is aware.    3.   History of coronary artery disease, diastolic CHF, aortic stenosis and pulmonary hypertension: chronic borderline hypotension for which he is on midodrine w/ HD.  Not on any blood pressure medications.  No acute cardiopulmonary complaints though I question some degree of cerebral hypoperfusion during dialysis.  Troponin negative.  Resume home medications.  CXR suggests some cardiomegaly and vascular congestion which is stable, could  consider running on HD again tomorrow if symptomatic.    4.   Generalized weakness, failure to thrive w/ Disposition issue: [facility] felt unable to take him back.  Consult PT/OT/SW to help w/ disposition concerns.  [facility] is requesting POLST be completed so will consult w/ palliative care to complete that and also to help with goals of care discussion as patient/family are certainly considering a transition to comfort care.    5.   General cognitive and physical decline: Family notes dramatic decline since early September.  He has had speech changes, is generally quite weak and no longer can get up out of a chair or walk.  He has apparently a sacral decubitus ulcer and very poor quality of life.  He has mentioned to family that the sooner he dies, the better given his quality of life.  Will consult palliative care and social work to help further discuss possible transition to comfort cares.    DVT Prophylaxis: Mobilize as able    Code Status: DNR / DNI but OK for very basic restorative cares.  As per my discussion with family if he takes a turn for the worse we should take that is assigned to keep him comfortable.  Certainly would not escalate to ICU level cares as per my discussion with him.    Discharge Dispo: Admit under observation status.      History of Present Illness:  Maurizio Ohara is a 89 year old male with PMH including ESRD, coronary artery disease, pulmonary hypertension aortic stenosis,  and diabetes who presents with altered mental status.  He was brought in by EMS who provided some of the history.  He was seen here in the ER 3 days ago for concerns regarding increasing weakness and somnolence at which point he was found to be hyperkalemic and admitted for dialysis.  He was being dialyzed in the outpatient setting this morning when he became progressively less responsive.  His speech began to mumble and staff at his dialysis unit stopped dialysis about two thirds of the way through his run.   "His blood sugar was checked by EMS and noted to be 249 in the field.    I did have a long meeting with family including his daughter who is identified as the POA.  She confirms he is DNR/DNI and confirms that his quality of life and general mobility and functional status have dramatically declined over the last 3 months.  They are considering transition to comfort cares and have questions about hospice.     Past Medical History:  Past Medical History:   Diagnosis Date     Anemia      CAD (coronary artery disease) 12/24/14    PCI and BMS to mid RCA (5.0 x 20mm) with residual mod diffuse mid LAD disease     Chronic kidney disease     on Dialysis     Diabetes (H)      Hypertension      Mitral regurgitation 12/24/2014    mild-mod (1-2+) per echo     Mitral valve disorders(424.0) 12/24/2014    mild/mod (1-2+) MR     Myocardial infarction (H) 12/24/2014    NSTEMI     Pulmonary hypertension (H) 12/24/2014    RVSP elevated c/w mild-mod PH per echo     Renal disease due to diabetes mellitus (H)     End stage; on Dialysis     Past Surgical History:  Past Surgical History:   Procedure Laterality Date     CORONARY ANGIOGRAPHY ADULT ORDER  12/24/2014     ENT SURGERY      Tonsillectomy     HEART CATH, ANGIOPLASTY  12/24/2014    PCI and BMS (5.0x20mm)to mid RCA     THORACIC SURGERY      Herniated disc     Social History:  Social History   Substance Use Topics     Smoking status: Former Smoker     Smokeless tobacco: Never Used     Alcohol use Yes      Comment: \"about as much as you can pour in a thimble in a year\"     Social History     Social History Narrative     Family History:  Family History   Problem Relation Age of Onset     Diabetes Mother      Allergies:  Allergies   Allergen Reactions     Glyburide      Lisinopril      Hyperkalemia when hospitalized 4/5/2011     Metformin      Renal failure stage 4     Glenn Gallagher RN clarified with pt, pt does not remember rxn, it was a long time ago, and \"nothing crazy\".     " Verapamil      Medications:  Prior to Admission Medications   Prescriptions Last Dose Informant Patient Reported? Taking?   GABAPENTIN PO   Yes No   Sig: Take 100 mg by mouth 3 times daily   MIDODRINE HCL PO   Yes No   Sig: Take 10 mg by mouth three times a week on Tues, Thurs, Sat 1 hour before dialysis AND Give 10 mg by mouth one time a day every Tue, Thu for dialysis Send one tablet with resident to dialysis   acetaminophen (TYLENOL) 500 MG tablet  Other Yes No   Sig: Take 1,000 mg by mouth 3 times daily as needed for mild pain   aspirin 81 MG tablet  Other Yes No   Sig: Take 81 mg by mouth daily   atorvastatin (LIPITOR) 40 MG tablet  Other Yes No   Sig: Take 40 mg by mouth At Bedtime   calcium acetate (PHOSLO) 667 MG CAPS  Other Yes No   Sig: Take 2,001 mg by mouth 3 times daily (with meals)   diclofenac (VOLTAREN) 1 % GEL topical gel  Other No No   Sig: Apply 2 g topically 4 times daily Apply to RLE   nitroglycerin (NITROSTAT) 0.4 MG sublingual tablet  Other No No   Sig: Place 1 tablet (0.4 mg) under the tongue every 5 minutes as needed for chest pain   polyethylene glycol (MIRALAX/GLYCOLAX) Packet  Other No No   Sig: Take 17 g by mouth daily   polyethylene glycol (MIRALAX/GLYCOLAX) Packet  Other Yes No   Sig: Take 1 packet by mouth daily as needed for constipation      Facility-Administered Medications: None         Review of Systems:  A Comprehensive greater than 10 system review of systems was carried out.  Pertinent positives and negatives are noted above.  Otherwise negative for contributory information.     Physical Exam:  Blood pressure 93/63, pulse 61, temperature 97.2  F (36.2  C), temperature source Temporal, resp. rate 20, weight 83 kg (182 lb 15.7 oz), SpO2 100 %.  Wt Readings from Last 1 Encounters:   12/04/18 83 kg (182 lb 15.7 oz)     Exam:  General: Somnolent, intermittently moans.  HEENT: NC/AT, eyes anicteric Dentition WNL, MM moist.  Cardiac: Irregularly irregular, rate in the 60s. , S1, S2.   3 out of 6 systolic murmur.  Pulmonary: Normal chest rise, normal work of breathing.  Lungs CTA BL  Abdomen: soft, non-tender, non-distended.  Bowel Sounds Present.  No guarding.  Extremities: Numerous superficial appearing abrasions, skin tears and dressings in place with scattered bruises.  Also has an occlusive dressing over his sacral area. Warm, well perfused.  Skin: As above.  Neuro: Opens eyes to voice but not following commands.  Grimaces in pain when repositioned.  No clonus or tremor.  Pupils equal round and reactive to light and accommodation.    Psych: Appropriate affect.    Data:  EKG: Rate controlled A. fib  Imaging:  Recent Results (from the past 48 hour(s))   Head CT w/o contrast    Narrative    CT SCAN OF THE HEAD WITHOUT CONTRAST December 4, 2018 12:07 PM     HISTORY: Altered mental status.    TECHNIQUE: Axial images of the head and coronal reformations without  IV contrast material. Radiation dose for this scan was reduced using  automated exposure control, adjustment of the mA and/or kV according  to patient size, or iterative reconstruction technique.    COMPARISON: 11/16/2018.    FINDINGS: There is generalized atrophy of the brain. There is low  attenuation in the white matter of the cerebral hemispheres consistent  with sequelae of small vessel ischemic disease. There is no evidence  of intracranial hemorrhage, mass, acute infarct or anomaly.     The visualized portions of the sinuses and mastoids appear normal.  There is no evidence of trauma. There is bilateral proptosis,  unchanged.      Impression    IMPRESSION:   1. No acute abnormality.  2.  Atrophy of the brain. White matter changes consistent with  sequelae of small vessel ischemic disease.  3. Bilateral proptosis.  4. No change.     GIOVANNI MATSON MD   XR Chest 2 Views    Narrative    CHEST TWO VIEWS  12/4/2018 12:08 PM     HISTORY: AMS;     COMPARISON: 11/16/2018 chest x-ray      Impression    IMPRESSION: Cardiomegaly and pulmonary  vascular congestion without  significant change. New mild elevation right hemidiaphragm. Mild  rounded opacity just above the medial right hemidiaphragm could be  lower lobe pulmonary vessel. Left hemidiaphragm is difficult to  visualize, either by enlarged heart or lower lobe infiltrate. Lateral  chest x-ray may be helpful.    RAVI FENRANDO MD     Labs:    Recent Labs  Lab 12/04/18  1127 12/01/18  1913 12/01/18  1859 11/28/18  1035   WBC 7.0  --  9.1 7.8   HGB 11.4* 13.9 12.5* 11.9*   HCT 36.5*  --  39.9* 36.7*   *  --  115* 108*   *  --  140* 162          Lab Results   Component Value Date     12/04/2018     12/02/2018     12/01/2018    Lab Results   Component Value Date    CHLORIDE 97 12/04/2018    CHLORIDE 96 12/02/2018    CHLORIDE 96 12/01/2018    Lab Results   Component Value Date    BUN 56 12/04/2018     12/02/2018    BUN 92 12/01/2018      Lab Results   Component Value Date    POTASSIUM 4.5 12/04/2018    POTASSIUM 5.9 12/02/2018    POTASSIUM 5.8 12/02/2018    Lab Results   Component Value Date    CO2 29 12/04/2018    CO2 25 12/02/2018    CO2 27 12/01/2018    Lab Results   Component Value Date    CR 4.51 12/04/2018    CR 7.75 12/02/2018    CR 7.19 12/01/2018          Recent Labs  Lab 12/04/18  1127   TROPI <0.015         Brian Langston MD  Hospitalist  [facility]

## 2018-12-04 NOTE — ED NOTES
"Pt returns from Nursing Home. EMS state \" We didn't even get him off the cart. They refused him. They do not have orders for do not hospitalize or DNR/DNI.\" Spoke to Director of Nursing and she conveys they are not able to care for him unless his orders are changed to do not hospitalize and DNR/DNI.   "

## 2018-12-04 NOTE — IP AVS SNAPSHOT
"    [facility] OBSERVATION DEPARTMENT: [phone]                                              INTERAGENCY TRANSFER FORM - LAB / IMAGING / EKG / EMG RESULTS   2018                    Hospital Admission Date: 2018  YOANA QUIÑONES   : 10/7/1929  Sex: Male        Attending Provider: Tip Langston MD     Allergies:  Glyburide, Lisinopril, Metformin, Penicillins, Verapamil    Infection:  None   Service:  Observation    Ht:  1.702 m (5' 7\")   Wt:  83 kg (182 lb 15.7 oz)   Admission Wt:  83 kg (182 lb 15.7 oz)    BMI:  28.66 kg/m 2   BSA:  1.98 m 2            Patient PCP Information     Provider PCP Type    Justina Moise MD General         Lab Results - 3 Days      Basic metabolic panel [[id number]] (Abnormal)  Resulted: 18 0716, Result status: Final result    Ordering provider: Tip Langston MD  18 0004 Resulting lab: [facility]    Specimen Information    Type Source Collected On   Blood  18 0626          Components       Value Reference Range Flag Lab   Sodium 133 133 - 144 mmol/L  FrRdHs   Potassium 5.1 3.4 - 5.3 mmol/L  FrRdHs   Chloride 98 94 - 109 mmol/L  FrRdHs   Carbon Dioxide 29 20 - 32 mmol/L  FrRdHs   Anion Gap 6 3 - 14 mmol/L  FrRdHs   Glucose 108 70 - 99 mg/dL H FrRdHs   Urea Nitrogen 75 7 - 30 mg/dL H FrRdHs   Creatinine 5.94 0.66 - 1.25 mg/dL H FrRdHs   GFR Estimate 9 >60 mL/min/1.7m2 L FrRdHs   Comment:  Non  GFR Calc   GFR Estimate If Black 11 >60 mL/min/1.7m2 L FrRdHs   Comment:  African American GFR Calc   Calcium 9.1 8.5 - 10.1 mg/dL  FrRdHs            CBC with platelets [[id number]] (Abnormal)  Resulted: 18 0701, Result status: Final result    Ordering provider: Rich Yung MD  18 0004 Resulting lab: [facility]    Specimen Information    Type Source Collected On   Blood  18 06          Components       Value Reference Range Flag Lab   WBC 5.4 4.0 - " 11.0 10e9/L  FrRdHs   RBC Count 3.37 4.4 - 5.9 10e12/L L FrRdHs   Hemoglobin 12.0 13.3 - 17.7 g/dL L FrRdHs   Hematocrit 38.7 40.0 - 53.0 % L FrRdHs    78 - 100 fl H FrRdHs   MCH 35.6 26.5 - 33.0 pg H FrRdHs   MCHC 31.0 31.5 - 36.5 g/dL L FrRdHs   RDW 19.1 10.0 - 15.0 % H FrRdHs   Platelet Count 115 150 - 450 10e9/L L FrRdHs            Glucose by meter [[id number]] (Abnormal)  Resulted: 12/04/18 1421, Result status: Final result    Ordering provider: Yohana Capellan,   12/04/18 1409 Resulting lab: POINT OF CARE TEST, GLUCOSE    Specimen Information    Type Source Collected On     12/04/18 1409          Components       Value Reference Range Flag Lab   Glucose 164 70 - 99 mg/dL H 170            Basic metabolic panel [[id number]] (Abnormal)  Resulted: 12/04/18 1204, Result status: Final result    Ordering provider: Yohana Capellan,   12/04/18 1104 Resulting lab: [facility]    Specimen Information    Type Source Collected On   Blood  12/04/18 1127          Components       Value Reference Range Flag Lab   Sodium 135 133 - 144 mmol/L  FrRdHs   Potassium 4.5 3.4 - 5.3 mmol/L  FrRdHs   Chloride 97 94 - 109 mmol/L  FrRdHs   Carbon Dioxide 29 20 - 32 mmol/L  FrRdHs   Anion Gap 9 3 - 14 mmol/L  FrRdHs   Glucose 227 70 - 99 mg/dL H FrRdHs   Urea Nitrogen 56 7 - 30 mg/dL H FrRdHs   Creatinine 4.51 0.66 - 1.25 mg/dL H FrRdHs   GFR Estimate 12 >60 mL/min/1.7m2 L FrRdHs   Comment:  Non  GFR Calc   GFR Estimate If Black 15 >60 mL/min/1.7m2 L FrRdHs   Comment:  African American GFR Calc   Calcium 9.1 8.5 - 10.1 mg/dL  FrRdHs            Magnesium [[id number]]  Resulted: 12/04/18 1204, Result status: Final result    Ordering provider: Yohana Capellan,   12/04/18 1104 Resulting lab: [facility]    Specimen Information    Type Source Collected On   Blood  12/04/18 1127          Components       Value Reference Range Flag Lab   Magnesium 2.2 1.6 - 2.3 mg/dL  [facility]             Troponin I [[id number]]  Resulted: 12/04/18 1204, Result status: Final result    Ordering provider: Yohana Capellan,   12/04/18 1104 Resulting lab: [facility]    Specimen Information    Type Source Collected On   Blood  12/04/18 1127          Components       Value Reference Range Flag Lab   Troponin I ES <0.015 0.000 - 0.045 ug/L  FrRd   Comment:         The 99th percentile for upper reference range is 0.045 ug/L.  Troponin values   in the range of 0.045 - 0.120 ug/L may be associated with risks of adverse   clinical events.              CBC with platelets differential [[id number]] (Abnormal)  Resulted: 12/04/18 1139, Result status: Final result    Ordering provider: Yohana Capellan,   12/04/18 1104 Resulting lab: [facility]    Specimen Information    Type Source Collected On   Blood  12/04/18 1127          Components       Value Reference Range Flag Lab   WBC 7.0 4.0 - 11.0 10e9/L  FrRdHs   RBC Count 3.24 4.4 - 5.9 10e12/L L FrRdHs   Hemoglobin 11.4 13.3 - 17.7 g/dL L FrRdHs   Hematocrit 36.5 40.0 - 53.0 % L FrRdHs    78 - 100 fl H FrRdHs   MCH 35.2 26.5 - 33.0 pg H FrRdHs   MCHC 31.2 31.5 - 36.5 g/dL L FrRdHs   RDW 19.0 10.0 - 15.0 % H FrRdHs   Platelet Count 128 150 - 450 10e9/L L FrRdHs   Diff Method Automated Method   FrRdHs   % Neutrophils 73.2 %  FrRdHs   % Lymphocytes 11.7 %  FrRdHs   % Monocytes 10.9 %  FrRdHs   % Eosinophils 0.3 %  FrRdHs   % Basophils 0.0 %  FrRdHs   % Immature Granulocytes 0.9 %  FrRdHs   Nucleated RBCs 3 0 /100 H FrRdHs   Absolute Neutrophil 5.3 1.6 - 8.3 10e9/L  FrRdHs   Absolute Lymphocytes 0.8 0.8 - 5.3 10e9/L  FrRdHs   Absolute Monocytes 0.8 0.0 - 1.3 10e9/L  FrRdHs   Absolute Eosinophils 0.0 0.0 - 0.7 10e9/L  FrRdHs   Absolute Basophils 0.0 0.0 - 0.2 10e9/L  FrRdHs   Abs Immature Granulocytes 0.1 0 - 0.4 10e9/L  FrRdHs   Absolute Nucleated RBC 0.2   FrRdHs            Testing Performed By     Lab - Abbreviation Name Director  Address Valid Date Range    12 - [facility] Unknown [address]  [facility] [address] 05/08/15 1057 - Present    170 - Unknown POINT OF CARE TEST, GLUCOSE Unknown Unknown 10/31/11 1114 - Present            Unresulted Labs     None         Imaging Results - 3 Days      XR Chest 2 Views [[id number]]  Resulted: 12/04/18 1224, Result status: Final result    Ordering provider: Yohana Capellan,   12/04/18 1104 Resulted by: Ravi Goodrich MD    Performed: 12/04/18 1203 - 12/04/18 1208 Resulting lab: RADIOLOGY RESULTS    Narrative:       CHEST TWO VIEWS  12/4/2018 12:08 PM     HISTORY: AMS;     COMPARISON: 11/16/2018 chest x-ray      Impression:       IMPRESSION: Cardiomegaly and pulmonary vascular congestion without  significant change. New mild elevation right hemidiaphragm. Mild  rounded opacity just above the medial right hemidiaphragm could be  lower lobe pulmonary vessel. Left hemidiaphragm is difficult to  visualize, either by enlarged heart or lower lobe infiltrate. Lateral  chest x-ray may be helpful.    RAVI GOODRICH MD      Head CT w/o contrast [[id number]]  Resulted: 12/04/18 1214, Result status: Final result    Ordering provider: Yohana Capellan DO  12/04/18 1104 Resulted by: Augustus Torres MD    Performed: 12/04/18 1158 - 12/04/18 1207 Resulting lab: RADIOLOGY RESULTS    Narrative:       CT SCAN OF THE HEAD WITHOUT CONTRAST December 4, 2018 12:07 PM     HISTORY: Altered mental status.    TECHNIQUE: Axial images of the head and coronal reformations without  IV contrast material. Radiation dose for this scan was reduced using  automated exposure control, adjustment of the mA and/or kV according  to patient size, or iterative reconstruction technique.    COMPARISON: 11/16/2018.    FINDINGS: There is generalized atrophy of the brain. There is low  attenuation in the white matter of the cerebral hemispheres consistent  with sequelae of small vessel ischemic disease.  There is no evidence  of intracranial hemorrhage, mass, acute infarct or anomaly.     The visualized portions of the sinuses and mastoids appear normal.  There is no evidence of trauma. There is bilateral proptosis,  unchanged.      Impression:       IMPRESSION:   1. No acute abnormality.  2.  Atrophy of the brain. White matter changes consistent with  sequelae of small vessel ischemic disease.  3. Bilateral proptosis.  4. No change.     GIOVANNI MATSON MD      Testing Performed By     Lab - Abbreviation Name Director Address Valid Date Range    104 - [facility] RADIOLOGY RESULTS Unknown Unknown 02/16/05 1553 - Present            Encounter-Level Documents:     There are no encounter-level documents.      Order-Level Documents:     There are no order-level documents.

## 2018-12-04 NOTE — IP AVS SNAPSHOT
"` `           [facility] OBSERVATION DEPARTMENT: [phone]                                              INTERAGENCY TRANSFER FORM - NURSING   2018                    Hospital Admission Date: 2018  YOANA QUIÑONES   : 10/7/1929  Sex: Male        Attending Provider: Tip Langston MD     Allergies:  Glyburide, Lisinopril, Metformin, Penicillins, Verapamil    Infection:  None   Service:  Observation    Ht:  1.702 m (5' 7\")   Wt:  83 kg (182 lb 15.7 oz)   Admission Wt:  83 kg (182 lb 15.7 oz)    BMI:  28.66 kg/m 2   BSA:  1.98 m 2            Patient PCP Information     Provider PCP Type    Justina Moise MD General      Current Code Status     Date Active Code Status Order ID Comments User Context       Prior      Code Status History     Date Active Date Inactive Code Status Order ID Comments User Context    2018 10:08 AM  DNR/DNI [id number]  Lavinia Cage MD Outpatient    2018  6:03 PM 2018 10:08 AM DNR/DNI [id number]  Tip Langston MD Inpatient    2018 11:31 PM 2018  7:43 PM DNR/DNI [id number]  Caryl William MD Inpatient    2018  2:56 PM 2018  8:11 PM DNR/DNI [id number]  Cortney Griffin PA-C Inpatient    2018  3:28 AM 2018  2:56 PM Full Code [id number]  Diego Rondon MD Inpatient    2018  2:40 AM 2018  4:36 PM Full Code [id number]  Jayant Ricketts MD Inpatient    2018  4:17 PM 2018  6:18 PM DNI [id number]  Aisha Franco PA-C Inpatient    2018  2:58 PM 2018  4:17 PM DNI [id number]  Aisha Franco PA-C ED    8/15/2017  8:27 AM 2018  2:58 PM Full Code [id number]  Jayant Ricketts MD Outpatient    2017 10:43 PM 8/15/2017  8:27 AM Full Code [id number]  Marilee Ramirez MD Inpatient    10/2/2016  2:30 PM 2017 10:43 PM Full Code [id number]  Primitivo Yeboah MD Outpatient    2016  2:49 PM 10/2/2016  2:30 PM Full Code " [id number]  Bola Garcia, DO Inpatient    1/17/2016 10:07 AM 9/24/2016  2:49 PM Full Code [id number]  Bola Garcia, DO Outpatient    1/12/2016  3:10 PM 1/17/2016 10:07 AM Full Code [id number]  Diaz Hale, DO Inpatient    12/26/2014 10:57 AM 1/12/2016  3:10 PM Full Code [id number]  Edin Elkins MD Outpatient    12/24/2014  3:28 PM 12/24/2014 11:14 PM Full Code [id number]  Phyllis Barrios MD Inpatient    12/24/2014 11:20 AM 12/24/2014  3:28 PM Full Code [id number]  Cholo Nogueira MD Outpatient    12/24/2014  5:42 AM 12/24/2014 11:20 AM Full Code [id number]  Tip Langston MD Inpatient      Advance Directives        Scanned docmt in ACP Activity?           Yes, scanned ACP docmt        Hospital Problems as of 12/6/2018              Priority Class Noted POA    Altered mental status Medium  11/16/2018 Yes      Non-Hospital Problems as of 12/6/2018              Priority Class Noted    NSTEMI (non-ST elevated myocardial infarction) (H) Medium  12/24/2014    ESRD (end stage renal disease) on dialysis (H) Medium  12/24/2014    CAD (coronary artery disease) Medium  12/24/2014    Mitral regurgitation Medium  12/24/2014    Pulmonary hypertension (H) Medium  12/24/2014    Diabetes (H) Medium  Unknown    Hypertension Medium  Unknown    Sepsis (H) Medium  1/12/2016    Pneumonia Medium  9/24/2016    Acute diastolic (congestive) heart failure (H) Medium  8/9/2017    Cellulitis Medium  9/18/2018    Fall Medium  11/16/2018    Chronic diastolic heart failure (H) Medium  11/16/2018    Severe aortic stenosis Medium  11/16/2018    Hyperkalemia Medium  12/1/2018      Immunizations     Name Date      Influenza (High Dose) 3 valent vaccine 09/21/18     Influenza (High Dose) 3 valent vaccine 10/02/16     TD (ADULT, 7+) 09/18/18          END      ASSESSMENT     Discharge Profile Flowsheet     EXPECTED DISCHARGE     COMMUNICATION ASSESSMENT      Expected Discharge Date  12/05/18 ([facility])  12/05/18 1032   Patient's communication style  spoken language (English or Bilingual) 11/15/18 2306    DISCHARGE NEEDS ASSESSMENT     FINAL RESOURCES      Concerns To Be Addressed  no discharge needs identified 08/11/17 1132   Resources List  Hospice;Skilled Nursing Facility 12/05/18 1546    Patient/family verbalizes understanding of discharge plan recommendations?  Yes 12/05/18 1546   Hospice  [facility] [phone], Fax: [phone] 12/06/18 1016    Medical Team notified of plan?  yes 12/05/18 1546   Skilled Nursing [facility] [phone], Fax: [phone] 12/06/18 1016    Equipment Currently Used at Home  none 11/04/18 1751   PAS Number  [id number] 11/08/18 1147    Transportation Available  other (see comments) ([place], 12/6 @ 1230) 12/06/18 1016   SKIN      # of Referrals Placed by CTS  Transportation 12/06/18 1016   Inspection of bony prominences  Full except (identify areas not inspected) 12/06/18 0042    New Steerage to  Clinics?  No 12/05/18 1446   Inspection under devices  Full 12/05/18 1944    Primary Care Clinic Name  [facility] 08/11/17 1132   Skin WDL  ex;all 12/06/18 0042    Primary Care MD Name  Justina Moise [phone] 12/02/18 1605   Skin Color/Characteristics  bruised (ecchymotic);mottled;redness blanchable;kary 12/06/18 0042    Equipment Used at Home  bath bench;grab bar;walker, rolling 01/13/16 1532   Skin Temperature  cool 12/06/18 0042    ASSESSMENT OF FUNCTIONAL STATUS     Skin Moisture  flaky;dry 12/06/18 0042    Prior to admission patient needed assistance with:  Medications;Meal preparation;Laundry/Housekeeping;Shopping;Transportation;Handling finances 12/05/18 1546   Skin Elasticity  slow return to original state 12/05/18 1518    Assesssment of Functional Status  Needs placement in a SNF/TCF for rehabilitation 12/05/18 1546   Skin Integrity   "abrasion(s);bruise(s);cracked;excoriation;pressure ulcer(s);scab(s);scar(s);skin tear(s);wound(s) 12/06/18 0042    GASTROINTESTINAL (ADULT,PEDIATRIC,OB)     Additional Documentation  Wound (LDA) 12/04/18 1833    GI WDL  WDL 12/06/18 0914   Full except areas not inspected   Buttock, left;Buttock, right;Sacrum;Coccyx 12/06/18 0042    Abdominal Appearance  obese 12/06/18 0042   SAFETY      Last Bowel Movement  12/04/18 12/05/18 1518   Safety WDL  WDL 12/06/18 0914    Passing flatus  yes 12/05/18 1518   All Alarms  alarm(s) activated and audible 12/06/18 0042                 Assessment WDL (Within Defined Limits) Definitions           Safety WDL     Effective: 09/28/15    Row Information: <b>WDL Definition:</b> Bed in low position, wheels locked; call light in reach; upper side rails up x 2; ID band on<br> <font color=\"gray\"><i>Item=AS safety wdl>>List=AS safety wdl>>Version=F14</i></font>      Skin WDL     Effective: 09/28/15    Row Information: <b>WDL Definition:</b> Warm; dry; intact; elastic; without discoloration; pressure points without redness<br> <font color=\"gray\"><i>Item=AS skin wdl>>List=AS skin wdl>>Version=F14</i></font>      Vitals     Vital Signs Flowsheet     QUICK ADDS     FACES Pain Rating  6-->hurts even more (when turned) 12/04/18 1807    Quick Adds  Positioning 12/04/18 1807   PAINAD Breathing  0-->normal 12/04/18 1941    VITAL SIGNS     PAINAD Negative Vocalization  0-->none 12/04/18 1941    Temp  95.3  F (35.2  C) 12/06/18 0832   PAINAD Facial Expression  0-->smiling or inexpressive 12/04/18 1941    Temp src  Axillary 12/06/18 0832   PAINAD Body Language  1-->tense: distressed pacing, fidgeting 12/04/18 1941    Resp  20 12/06/18 0832   PAINAD Consolability  0-->no need to console 12/04/18 1941    Pulse  56 12/06/18 0832   PAINAD Score  1 12/04/18 1941    Heart Rate  78 12/05/18 2139   Nonverbal Indicators Of Pain  body stiff;clenching teeth;grimace;guarding;moaning;muscle tension 12/04/18 1807    " Pulse/Heart Rate Source  Monitor 12/06/18 0832   Pain Intervention(s)  Rest;Repositioned 12/04/18 1807    BP  101/57 12/06/18 0832   ISABELLE COMA SCALE      BP Location  Left arm 12/06/18 0832   Best Eye Response  3-->(E3) to speech 12/06/18 0042    POSITIONING     Best Motor Response  6-->(M6) obeys commands 12/06/18 0042    Body Position  turned;supine 12/06/18 0042   Best Verbal Response  5-->(V5) oriented 12/06/18 0042    Head of Bed (HOB)  HOB at 15 degrees 12/05/18 1529   [place] Coma Scale Score  14 12/06/18 0042    Positioning/Transfer Devices  mechanical lift utilized;pillows;in use 12/05/18 1944   Assessment Qualifiers  patient not sedated/intubated 12/04/18 1139    OXYGEN THERAPY     HEIGHT AND WEIGHT      SpO2  93 % 12/06/18 0900   Weight  83 kg (182 lb 15.7 oz) 12/04/18 1132    O2 Device  None (Room air) 12/06/18 0900   DAILY CARE      Oxygen Delivery  2 LPM 12/06/18 0832   Activity Assistance Provided  assistance, 2 people 12/05/18 1944    PAIN/COMFORT     Activity Management  activity adjusted per tolerance;bedrest 12/05/18 1944    Patient Currently in Pain  denies 12/06/18 0036   Assistive Device Utilized  mechanical lift 12/05/18 1944    Preferred Pain Scale  PAINAD (Pain Assessment in Advance Dementia Scale) 12/04/18 1941   Additional Documentation  Activity Device Assistance (Row) 12/05/18 1944    0-10 Pain Scale  6 12/05/18 2111                 Patient Lines/Drains/Airways Status    Active LINES/DRAINS/AIRWAYS     Name: Placement date: Placement time: Site: Days: Last dressing change:    Hemodialysis Vascular Access Subclavian vein catheter Right Subclavian       Subclavian        Hemodialysis Vascular Access Arteriovenous fistula Right Arm       Arm        Pressure Injury 12/04/18 Medial Buttocks 12/04/18   1818    1     Wound 09/19/18 Right Arm Skin tear moist wound bed near elbow 09/19/18   1500   Arm   77     Wound 12/02/18 Anterior;Left Foot Skin tear 12/02/18   0215   Foot   4     Wound  12/02/18 Anterior;Left Hand Suspected pressure ulcer 12/02/18   0215   Hand   4     Wound 12/02/18 Anterior;Left Arm Suspected pressure ulcer 12/02/18   0215   Arm   4     Wound 12/02/18 Anterior;Left;Distal Elbow Suspected pressure ulcer 12/02/18   0215   Elbow   4     Wound 12/04/18 Left Foot Ulceration 12/04/18   1852   Foot   1     Wound 12/05/18 Right;Anterior Suspected pressure ulcer 12/05/18   1507      less than 1     Wound 12/05/18 Anterior;Left;Lower;Medial Arm Laceration 12/05/18   1518   Arm   less than 1     Wound 12/05/18 Left;Anterior Hand Suspected pressure ulcer 12/05/18   1521   Hand   less than 1             Patient Lines/Drains/Airways Status    Active PICC/CVC     None            Intake/Output Detail Report     None      Case Management/Discharge Planning     Case Management/Discharge Planning Flowsheet     REFERRAL INFORMATION     COPING/STRESS      Did the Initial Social Work Assessment result in a Social Work Case?  Yes 12/05/18 1446   Major Change/Loss/Stressor  denies 09/18/18 1621    Admission Type  observation 12/05/18 1446   EXPECTED DISCHARGE      Arrived From  long-term care 12/05/18 1446   Expected Discharge Date  12/05/18 ([facility]) 12/05/18 1032    Referral Source  physician 12/05/18 1446   ASSESSMENT/CONCERNS TO BE ADDRESSED      New Steerage to  Clinics?  No 12/05/18 1446   Concerns To Be Addressed  no discharge needs identified 08/11/17 1132    # of Referrals Placed by CTS  Transportation 12/06/18 1016   DISCHARGE PLANNING      Reason For Consult  discharge planning 12/05/18 1552   Patient/family verbalizes understanding of discharge plan recommendations?  Yes 12/05/18 1546    Record Reviewed  clinical discipline documentation;plan of care;patient profile 12/05/18 1446   Medical Team notified of plan?  yes 12/05/18 1546    CTS Assigned to Case  Kassandra Ibarra 12/05/18 1446   Transportation Available  other (see comments) (thiago Brooks, 12/6 @ 1230) 12/06/18  1016    Primary Care Clinic Name  [facility]s include [facility] beds [facility] the [facility] 08/11/17 1132   Equipment Used at Home  bath bench;grab bar;walker, rolling 01/13/16 1532    Primary Care MD Name  DayanaJustina ferro [phone] 12/02/18 1605   PATIENT PLACEMENT INFORMATION      LIVING ENVIRONMENT     Did the patient choose [facility]?  Yes 12/05/18 1546    Lives With  facility resident ([facility], no bed hold at this time) 12/05/18 1606   Placement Choice Reason  location 12/05/18 1546    Living Arrangements  extended care facility 12/05/18 1546   Did [facility] accept the patient?  Yes 12/06/18 1016    Quality Of Family Relationships  supportive;involved 12/05/18 1546   FINAL RESOURCES      Able to Return to Prior Living Arrangements  no 12/05/18 1546   Equipment Currently Used at Home  none 11/04/18 1751    ASSESSMENT OF FAMILY/SOCIAL SUPPORT     Resources List  Hospice;Skilled Nursing Facility 12/05/18 1546    Marital Status  Single 12/05/18 1546   [facility] [phone], Fax: [phone] 12/06/18 1016    Who is your support system?  Sibling(s) (niece and nephew) 12/05/18 1546   [facility] [place] [phone], Fax: [phone] 12/06/18 1016    Description of Support System  Supportive;Involved 12/05/18 1546   PAS Number  [id number] 11/08/18 1147    Support Assessment  Adequate family and caregiver support 12/05/18 1546   ABUSE RISK SCREEN      ASSESSMENT OF FUNCTIONAL STATUS     QUESTION TO PATIENT:  Has a member of your family or a partner(now or in the past) intimidated, hurt, manipulated, or controlled you in any way?  patient declined to answer or is unable to answer 12/04/18 1135    Prior to admission patient needed assistance with:  Medications;Meal preparation;Laundry/Housekeeping;Shopping;Transportation;Handling finances 12/05/18 1546   QUESTION TO PATIENT: Do you feel safe going back to the place where you are living?  patient declined to  answer or is unable to answer 12/04/18 1135    Assesssment of Functional Status  Needs placement in a SNF/TCF for rehabilitation 12/05/18 1546   OBSERVATION: Is there reason to believe there has been maltreatment of a vulnerable adult (ie. Physical/Sexual/Emotional abuse, self neglect, lack of adequate food, shelter, medical care, or financial exploitation)?  no 12/04/18 1135    EMPLOYMENT     HOMICIDE RISK      Do you work full or part-time?  no 12/05/18 1546   Feels Like Hurting Others  other (see comments) (patient unable to answer) 12/04/18 1133

## 2018-12-04 NOTE — ED NOTES
"Pt arouses to verbal stimuli.Speech soft with few words. Updated on plan of care for admission. Denies shortness of breath. Fluids offered. Pt declines. Pt has multiple areas of skin breakdown and wound dressings. Complains of pain \" On my back where my wheelchair hits.\" Ductibus dressing intact to area. Dialysis fistula dry and intact. No bleeding. Lungs with scattered rales at bases. Heart tones regular with murmur.   "

## 2018-12-04 NOTE — IP AVS SNAPSHOT
"    [facility] OBSERVATION DEPARTMENT: [phone]                                              INTERAGENCY TRANSFER FORM - PHYSICIAN ORDERS   2018                    Hospital Admission Date: 2018  YOANA QUIÑONES   : 10/7/1929  Sex: Male        Attending Provider: Tip Langston MD     Allergies:  Glyburide, Lisinopril, Metformin, Penicillins, Verapamil    Infection:  None   Service:  Observation    Ht:  1.702 m (5' 7\")   Wt:  83 kg (182 lb 15.7 oz)   Admission Wt:  83 kg (182 lb 15.7 oz)    BMI:  28.66 kg/m 2   BSA:  1.98 m 2            Patient PCP Information     Provider PCP Type    Justina Moise MD General      ED Clinical Impression     Diagnosis Description Comment Added By Time Added    ESRD (end stage renal disease) on dialysis (H) [N18.6, Z99.2] ESRD (end stage renal disease) on dialysis (H) [N18.6, Z99.2]  Yohana Capellan DO 2018 12:37 PM    Anemia of chronic renal failure, unspecified CKD stage [N18.9, D63.1] Anemia of chronic renal failure, unspecified CKD stage [N18.9, D63.1]  Yohana Capellan DO 2018 12:37 PM    Complication of arteriovenous dialysis fistula, initial encounter [T82.9XXA] Complication of arteriovenous dialysis fistula, initial encounter [T82.9XXA]  Yohana Capellan DO 2018  4:24 PM      Hospital Problems as of 2018              Priority Class Noted POA    Altered mental status Medium  2018 Yes      Non-Hospital Problems as of 2018              Priority Class Noted    NSTEMI (non-ST elevated myocardial infarction) (H) Medium  2014    ESRD (end stage renal disease) on dialysis (H) Medium  2014    CAD (coronary artery disease) Medium  2014    Mitral regurgitation Medium  2014    Pulmonary hypertension (H) Medium  2014    Diabetes (H) Medium  Unknown    Hypertension Medium  Unknown    Sepsis (H) Medium  2016    Pneumonia Medium  2016    Acute diastolic " (congestive) heart failure (H) Medium  8/9/2017    Cellulitis Medium  9/18/2018    Fall Medium  11/16/2018    Chronic diastolic heart failure (H) Medium  11/16/2018    Severe aortic stenosis Medium  11/16/2018    Hyperkalemia Medium  12/1/2018      Code Status History     Date Active Date Inactive Code Status Order ID Comments User Context    12/6/2018 10:08 AM  DNR/DNI [id number]  Lavinia Cage MD Outpatient    12/4/2018  6:03 PM 12/6/2018 10:08 AM DNR/DNI [id number]  Tip Langston MD Inpatient    12/1/2018 11:31 PM 12/2/2018  7:43 PM DNR/DNI [id number]  Caryl William MD Inpatient    11/16/2018  2:56 PM 11/16/2018  8:11 PM DNR/DNI [id number]  Cortney Griffin PA-C Inpatient    11/16/2018  3:28 AM 11/16/2018  2:56 PM Full Code [id number]  Diego Rondon MD Inpatient    11/4/2018  2:40 AM 11/8/2018  4:36 PM Full Code [id number]  Jayant Ricketts MD Inpatient    9/18/2018  4:17 PM 9/25/2018  6:18 PM DNI [id number]  Aisha Franco PA-C Inpatient    9/18/2018  2:58 PM 9/18/2018  4:17 PM DNI [id number]  Aisha Franco PA-C ED    8/15/2017  8:27 AM 9/18/2018  2:58 PM Full Code [id number]  Jayant Ricketts MD Outpatient    8/9/2017 10:43 PM 8/15/2017  8:27 AM Full Code [id number]  Marilee Ramirez MD Inpatient    10/2/2016  2:30 PM 8/9/2017 10:43 PM Full Code [id number]  Primitivo Yeboah MD Outpatient    9/24/2016  2:49 PM 10/2/2016  2:30 PM Full Code [id number]  Bola Garcia DO Inpatient    1/17/2016 10:07 AM 9/24/2016  2:49 PM Full Code [id number]  Bola Garcia, DO Outpatient    1/12/2016  3:10 PM 1/17/2016 10:07 AM Full Code [id number]  Diaz Hale,  Inpatient    12/26/2014 10:57 AM 1/12/2016  3:10 PM Full Code [id number]  Edin Elkins MD Outpatient    12/24/2014  3:28 PM 12/24/2014 11:14 PM Full Code [id number]  Phyllis Barrios MD Inpatient    12/24/2014 11:20 AM 12/24/2014  3:28 PM Full Code [id number]  Cholo Nogueira MD  Outpatient    12/24/2014  5:42 AM 12/24/2014 11:20 AM Full Code [id number]  Tip Langston MD Inpatient         Medication Review      START taking        Dose / Directions Comments    atropine 1 % ophthalmic solution   Used for:  Hospice care patient        Dose:  1-2 drop   Place 1-2 drops under the tongue every hour as needed for other (secretions)   Quantity:  1 Bottle   Refills:  0        bisacodyl 10 MG suppository   Commonly known as:  DULCOLAX   Used for:  Hospice care patient        Dose:  10 mg   Place 1 suppository (10 mg) rectally daily as needed for constipation   Quantity:  30 suppository   Refills:  0        camphor-menthol 0.5-0.5 % external lotion   Commonly known as:  DERMASARRA   Used for:  Hospice care patient        Apply to area of itch, intact skin only   Refills:  0        HYDROmorphone (STANDARD CONC) 1 MG/ML oral solution   Commonly known as:  DILAUDID   Used for:  Hospice care patient        Dose:  1-2 mg   Take 1-2 mLs (1-2 mg) by mouth every 2 hours as needed for moderate to severe pain (or dyspnea)   Quantity:  20 mL   Refills:  0        ondansetron 4 MG ODT tab   Commonly known as:  ZOFRAN-ODT   Used for:  Hospice care patient        Dose:  4 mg   Take 1 tablet (4 mg) by mouth every 6 hours as needed for nausea or vomiting   Quantity:  30 tablet   Refills:  0          CONTINUE these medications which may have CHANGED, or have new prescriptions. If we are uncertain of the size of tablets/capsules you have at home, strength may be listed as something that might have changed.        Dose / Directions Comments    polyethylene glycol packet   Commonly known as:  MIRALAX/GLYCOLAX   This may have changed:  Another medication with the same name was removed. Continue taking this medication, and follow the directions you see here.        Dose:  1 packet   Take 1 packet by mouth daily as needed for constipation   Refills:  0          CONTINUE these medications which have NOT CHANGED         Dose / Directions Comments    acetaminophen 500 MG tablet   Commonly known as:  TYLENOL        Dose:  1000 mg   Take 1,000 mg by mouth 3 times daily as needed for mild pain   Refills:  0        diclofenac 1 % topical gel   Commonly known as:  VOLTAREN   Used for:  Cellulitis of right lower extremity        Dose:  2 g   Apply 2 g topically 4 times daily Apply to RLE   Refills:  0        GABAPENTIN PO        Dose:  100 mg   Take 100 mg by mouth 3 times daily   Refills:  0        nitroGLYcerin 0.4 MG sublingual tablet   Commonly known as:  NITROSTAT   Used for:  NSTEMI (non-ST elevated myocardial infarction) (H)        Dose:  0.4 mg   Place 1 tablet (0.4 mg) under the tongue every 5 minutes as needed for chest pain   Quantity:  25 tablet   Refills:  3          STOP taking     aspirin 81 MG tablet   Commonly known as:  ASA           atorvastatin 40 MG tablet   Commonly known as:  LIPITOR           calcium acetate 667 MG Caps capsule   Commonly known as:  PHOSLO           MIDODRINE HCL PO                     Further instructions from your care team         Hemodialysis Access Bleeding  You have a hemodialysis access in your arm, either an arteriovenous (AV) fistula or an artery to vein graft. It has been bleeding. Blood needs to flow freely through the fistula or graft. As part of your treatment, you are also taking medicine that thins your blood. This makes you bleed more easily. It is important to stop your fistula or graft from bleeding as soon as possible--you can quickly bleed to death from a rapidly bleeding hemodialysis access.     For a quickly bleeding fistula or graft, use firm pressure with a gauze or cloth until it stops.   Home care  If your fistula or graft site is bleeding:    If it is bleeding quickly, right away put pressure right on the spot that is bleeding with a gauze or cloth. Push hard till it stops. Then, get medical help right away.    If it is just oozing a small amount of blood, follow  these directions:    Wash your hands. Dry them on a clean towel.    Put a small piece of sterile gauze on the area that is bleeding.    Press gently with your fingertips only on the area that is bleeding. Hold your fingers there for 30 minutes. Don't press on the whole forearm. Don't wrap anything around the wrist or arm, including bandages.    After 30 minutes, take your fingers off the area that was bleeding.    Wash and dry your hands again.    If it is still bleeding, call your healthcare provider or go to a hospital.  General access site care  The following guidelines will help you care for your access site:    Wash your hands often. Keep the access site clean.    Check the fistula or graft for the pulsing feeling ( thrill ) every morning and night.    Follow these don'ts:       ? Don't let anyone take your blood pressure on your hemodialysis access arm.  ? Don't let anyone take blood from or inject medication into the arm.  ? Don't wear jewelry or tight sleeves over the access site.  ? Don't wiggle the fistula or graft, or pick at your skin near the access.  ? Don't carry anything over the arm or lift heavy objects with that arm.  ? Don't sleep on your arm with the access site.  Follow-up care  Follow up with your healthcare provider, or as advised.  Call 911  Call 911 if any of the following occur:    Pain, cold, or numbness in the hand that won't go away    Pale color of the hand that won't go back to pink    Any major bleeding, or minor bleeding from the access site that won't stop  When to seek medical advice  Call your healthcare provider right away if you have any of the following:    Fever of 100.4 F (38 C) or higher, or as directed by your healthcare provider    Red, hot, or swollen area around the access site    Light-colored fluid coming from the area around the access site    Pain or hardness around the access site    Loss of pulsing feeling ( thrill ) over the fistula or graft  Date Last Reviewed:  10/1/2016    [id number] [employer]. [address] [place] [address], [place] [address]. All rights reserved. This information is not intended as a substitute for professional medical care. Always follow your healthcare professional's instructions.          Summary of Visit     Reason for your hospital stay       You were hospitalized for progressive decline and will be discharged on hospice care.             After Care     Activity - Up with nursing assistance           Additional Discharge Instructions       [facility] to consult and treat.       Advance Diet as Tolerated       Follow this diet upon discharge: Orders Placed This Encounter      Regular Diet Adult       General info for SNF       Length of Stay Estimate: Long Term Care  Condition at Discharge: Terminal  Level of care:skilled   Rehabilitation Potential: Poor  Admission H&P remains valid and up-to-date: Yes  Recent Chemotherapy: N/A  Use Nursing Home Standing Orders: Yes       Mantoux instructions       Give two-step Mantoux (PPD) Per Facility Policy Yes             Referrals     HOSPICE REFERRAL       **Order classes of: [facility], [facility] and [facility] will route to the Home Care and Hospice Referral Pool.  Home Care or Hospice will then contact the patient to schedule their appointment.**    Hospice eligibility overview: prognosis of 6 months or less, end stage disease, patient goals are comfort only, patient is not seeking curative treatment.    If you do not hear from Hospice, or you would like to call to schedule, please call the referring place of service that your provider has listed below.  ______________________________________________________________________    Your provider has referred you to: [facility]    Anticipated Start Date for Services: 12/06/18   PLEASE Schedule Hospice consult for 24 - 48 hours.  Please call if there is need for a variance to this timeline.    REASON FOR REFERRAL: Hospice Diagnosis:  ESRD no longer on HD, Failure to thrive, cognitive decline    ADDITIONAL SERVICES NEEDED: NA    OTHER PERTINENT INFORMATION: Patient was last seen by provider on 12/06/18 for progressive weakness, FTT.  Factors that indicate prognosis of less than 6 months:  Weight loss, cognitive decline, ESRD - no longer on HD    No current outpatient prescriptions on file.    Patient Active Problem List:     NSTEMI (non-ST elevated myocardial infarction) (H)     ESRD (end stage renal disease) on dialysis (H)     CAD (coronary artery disease)     Diabetes (H)     Hypertension     Mitral regurgitation     Pulmonary hypertension (H)     Sepsis (H)     Pneumonia     Acute diastolic (congestive) heart failure (H)     Cellulitis     Altered mental status     Fall     Chronic diastolic heart failure (H)     Severe aortic stenosis     Hyperkalemia    This patient is under my care, and I have initiated the establishment of the plan of care. This patient will be followed by a physician who will periodically review the plan of care.    Physician/Provider to provide follow up care: Justina Moise [place] certified Physician at time of discharge: Lavinia Cage MD     Please be aware that coverage of these services is subject to the terms and limitations of your health insurance plan.  Call member services at your health plan with any benefit or coverage questions.             Statement of Approval     Ordered          12/06/18 1008  I have reviewed and agree with all the recommendations and orders detailed in this document.  EFFECTIVE NOW     Approved and electronically signed by:  Lavinia Cage MD

## 2018-12-04 NOTE — IP AVS SNAPSHOT
` ` Patient Information     Patient Name Sex     Maurizio Ohara ([id number]) Male 10/7/1929       Room Bed    [facility] 0225-01      Patient Demographics     Address Phone    [address] [facility] [address] [phone] (Home)  NONE (Work)  [phone] (Mobile) *Preferred*      Patient Ethnicity & Race     Ethnic Group Patient Race    American White      Emergency Contact(s)     Name Relation Home Work Mobile    Karyn Orozco Sister [phone] none none    Rosa Relative [phone]      Leticia Other   [phone]      Documents on File        Status Date Received Description       Documents for the Patient    Privacy Notice - [place] Received 13     Insurance Card Received 17 Medicare/    External Medication Information Consent Accepted 01/08/15     Patient ID Received 13     Consent for Services - Hospital/Clinic Received () 13     Consent for EHR Access Received 13     [facility] Specified Other       Consent for Services - Hospital/Clinic Received () 14     HIM MECCA Authorization  14     Insurance Card Received 17 HP -    HIM MECCA Authorization  01/12/15     Consent for Services - Hospital/Clinic Received () 16     HIM MECCA Authorization  16 DAVITA    Consent for Services/Privacy Notice - Hospital/Clinic Received () 02/10/16     HIM MECCA Authorization  10/03/16 [facility]    Patient ID Received 18     HIM MECCA Authorization  10/27/16 [facility]    Consent for Services/Privacy Notice - Hospital/Clinic Received () 17     HIM MECCA Authorization  17     Care Everywhere Prospective Auth Received 10/18/17     HIM MECCA Authorization  18     Consent for Services - Hospital and Clinic Received 18     HIE Auth Received 18     HIM MECCA Authorization  18     Insurance Card Received 06/15/18 [employer] 2018    Consent to Communicate Received 18 Auth to Share PHI    HIM MECCA  Authorization  09/28/18     HIM MECCA Authorization  11/09/18 INFO CHECKED    Advance Directives and Living Will Received 11/19/18 POLST 11/09/18    HIM MECCA Authorization  11/19/18     HIM MECCA Authorization  12/04/18     Consent for Services - Hospital/Clinic  (Deleted)         Documents for the Encounter    CMS IM for Patient Signature       Observation Notice Received 12/05/18 JANIE DO W/ FAMILY    CMS KAT for Patient Signature Received 12/06/18 left  for sister      Admission Information     Attending Provider Admitting Provider Admission Type Admission Date/Time    Tip Langston MD Foehrenbacher, Matthew Henry, MD Emergency 12/04/18  1048    Discharge Date Hospital Service Auth/Cert Status Service Area     Observation Incomplete [facility] SERVICES    Unit Room/Bed Admission Status        OBSERVATION DEPT 0225/0225-01 Admission (Confirmed)       Admission     Complaint    Altered mental status      Hospital Account     Name Acct ID Class Status Primary Coverage    Maurizio Ohara [id number] Observation Open MEDICARE - MEDICARE FOR HB SUPPLEMENT            Guarantor Account (for Hospital Account #[id number])     Name Relation to Pt Service Area Active? Acct Type    Maurizio Ohara Self FCS Yes Personal/Family    Address Phone          [address] [place] [address] [phone](H)  NONE(O)              Coverage Information (for Hospital Account #[id number])     1. MEDICARE/MEDICARE FOR HB SUPPLEMENT     F/O Payor/Plan Precert #    MEDICARE/MEDICARE FOR HB SUPPLEMENT     Subscriber Subscriber #    Maurizio Ohara [id number]    Address Phone    [employer]  [address]  [place] [address] [phone]          2. HE[employer]ERS/HE[employer] FREEDOM PLAN     F/O Payor/Plan Precert #    [employer]ERS/HE[employer] FREEDOM PLAN     Subscriber Subscriber #    Maurizio Ohara [id number]    Address Phone    [address]  [place] [address]  [phone]

## 2018-12-04 NOTE — ED AVS SNAPSHOT
[facility] Emergency Department    [address] [place] [address]    Phone:  [phone]    Fax:  [phone]                                       Maurizio Ohara   MRN: [id number]    Department:  [facility] Emergency Department   Date of Visit:  12/4/2018           Patient Information     Date Of Birth          10/7/1929        Your diagnoses for this visit were:     ESRD (end stage renal disease) on dialysis (H)     Anemia of chronic renal failure, unspecified CKD stage        You were seen by Yohana Capellan DO.      Follow-up Information     Follow up with Justina Moise MD. Schedule an appointment as soon as possible for a visit in 2 days.    Specialty:  Family Practice    Contact information:    [facility]  [address]  [address] [facility] [id number] [phone]          Discharge Instructions         Hemodialysis Access Bleeding  You have a hemodialysis access in your arm, either an arteriovenous (AV) fistula or an artery to vein graft. It has been bleeding. Blood needs to flow freely through the fistula or graft. As part of your treatment, you are also taking medicine that thins your blood. This makes you bleed more easily. It is important to stop your fistula or graft from bleeding as soon as possible--you can quickly bleed to death from a rapidly bleeding hemodialysis access.     For a quickly bleeding fistula or graft, use firm pressure with a gauze or cloth until it stops.   Home care  If your fistula or graft site is bleeding:    If it is bleeding quickly, right away put pressure right on the spot that is bleeding with a gauze or cloth. Push hard till it stops. Then, get medical help right away.    If it is just oozing a small amount of blood, follow these directions:    Wash your hands. Dry them on a clean towel.    Put a small piece of sterile gauze on the area that is bleeding.    Press gently with your fingertips only on the area  that is bleeding. Hold your fingers there for 30 minutes. Don't press on the whole forearm. Don't wrap anything around the wrist or arm, including bandages.    After 30 minutes, take your fingers off the area that was bleeding.    Wash and dry your hands again.    If it is still bleeding, call your healthcare provider or go to a hospital.  General access site care  The following guidelines will help you care for your access site:    Wash your hands often. Keep the access site clean.    Check the fistula or graft for the pulsing feeling ( thrill ) every morning and night.    Follow these don'ts:       ? Don't let anyone take your blood pressure on your hemodialysis access arm.  ? Don't let anyone take blood from or inject medication into the arm.  ? Don't wear jewelry or tight sleeves over the access site.  ? Don't wiggle the fistula or graft, or pick at your skin near the access.  ? Don't carry anything over the arm or lift heavy objects with that arm.  ? Don't sleep on your arm with the access site.  Follow-up care  Follow up with your healthcare provider, or as advised.  Call 911  Call 911 if any of the following occur:    Pain, cold, or numbness in the hand that won't go away    Pale color of the hand that won't go back to pink    Any major bleeding, or minor bleeding from the access site that won't stop  When to seek medical advice  Call your healthcare provider right away if you have any of the following:    Fever of 100.4 F (38 C) or higher, or as directed by your healthcare provider    Red, hot, or swollen area around the access site    Light-colored fluid coming from the area around the access site    Pain or hardness around the access site    Loss of pulsing feeling ( thrill ) over the fistula or graft  Date Last Reviewed: 10/1/2016    5985-0509 [employer]. [address] [place] [address], [place] [address]. All rights reserved. This information is not intended as a substitute for professional medical  care. Always follow your healthcare professional's instructions.          24 Hour Appointment Hotline       To make an appointment at any [facility], call [phone] ([phone]). If you don't have a family doctor or clinic, we will help you find one. [place] clinics are conveniently located to serve the needs of you and your family.             Review of your medicines      Our records show that you are taking the medicines listed below. If these are incorrect, please call your family doctor or clinic.        Dose / Directions Last dose taken    acetaminophen 500 MG tablet   Commonly known as:  TYLENOL   Dose:  1000 mg        Take 1,000 mg by mouth 3 times daily as needed for mild pain   Refills:  0        aspirin 81 MG tablet   Commonly known as:  ASA   Dose:  81 mg        Take 81 mg by mouth daily   Refills:  0        atorvastatin 40 MG tablet   Commonly known as:  LIPITOR   Dose:  40 mg        Take 40 mg by mouth At Bedtime   Refills:  0        calcium acetate 667 MG Caps capsule   Commonly known as:  PHOSLO   Dose:  2001 mg        Take 2,001 mg by mouth 3 times daily (with meals)   Refills:  0        diclofenac 1 % topical gel   Commonly known as:  VOLTAREN   Dose:  2 g        Apply 2 g topically 4 times daily Apply to RLE   Refills:  0        GABAPENTIN PO   Dose:  100 mg        Take 100 mg by mouth 3 times daily   Refills:  0        MIDODRINE HCL PO   Dose:  10 mg        Take 10 mg by mouth three times a week on Tues, Thurs, Sat 1 hour before dialysis AND Give 10 mg by mouth one time a day every Tue, Thu for dialysis Send one tablet with resident to dialysis   Refills:  0        nitroGLYcerin 0.4 MG sublingual tablet   Commonly known as:  NITROSTAT   Dose:  0.4 mg   Quantity:  25 tablet        Place 1 tablet (0.4 mg) under the tongue every 5 minutes as needed for chest pain   Refills:  3        * polyethylene glycol packet   Commonly known as:  MIRALAX/GLYCOLAX   Dose:  1 packet        Take 1  packet by mouth daily as needed for constipation   Refills:  0        * polyethylene glycol packet   Commonly known as:  MIRALAX/GLYCOLAX   Dose:  17 g   Quantity:  7 packet        Take 17 g by mouth daily   Refills:  0        * Notice:  This list has 2 medication(s) that are the same as other medications prescribed for you. Read the directions carefully, and ask your doctor or other care provider to review them with you.            Procedures and tests performed during your visit     Basic metabolic panel    CBC with platelets differential    EKG 12-lead, tracing only    Head CT w/o contrast    Magnesium    Troponin I    XR Chest 2 Views      Orders Needing Specimen Collection     None      Pending Results     No orders found from 12/2/2018 to 12/5/2018.            Pending Culture Results     No orders found from 12/2/2018 to 12/5/2018.            Pending Results Instructions     If you had any lab results that were not finalized at the time of your Discharge, you can call the ED Lab Result RN at [phone]. You will be contacted by this team for any positive Lab results or changes in treatment. The nurses are available 7 days a week from 10A to 6:30P.  You can leave a message 24 hours per day and they will return your call.        Test Results From Your Hospital Stay        12/4/2018 11:39 AM      Component Results     Component Value Ref Range & Units Status    WBC 7.0 4.0 - 11.0 10e9/L Final    RBC Count 3.24 (L) 4.4 - 5.9 10e12/L Final    Hemoglobin 11.4 (L) 13.3 - 17.7 g/dL Final    Hematocrit 36.5 (L) 40.0 - 53.0 % Final     (H) 78 - 100 fl Final    MCH 35.2 (H) 26.5 - 33.0 pg Final    MCHC 31.2 (L) 31.5 - 36.5 g/dL Final    RDW 19.0 (H) 10.0 - 15.0 % Final    Platelet Count 128 (L) 150 - 450 10e9/L Final    Diff Method Automated Method  Final    % Neutrophils 73.2 % Final    % Lymphocytes 11.7 % Final    % Monocytes 10.9 % Final    % Eosinophils 0.3 % Final    % Basophils 0.0 % Final    % Immature  Granulocytes 0.9 % Final    Nucleated RBCs 3 (H) 0 /100 Final    Absolute Neutrophil 5.3 1.6 - 8.3 10e9/L Final    Absolute Lymphocytes 0.8 0.8 - 5.3 10e9/L Final    Absolute Monocytes 0.8 0.0 - 1.3 10e9/L Final    Absolute Eosinophils 0.0 0.0 - 0.7 10e9/L Final    Absolute Basophils 0.0 0.0 - 0.2 10e9/L Final    Abs Immature Granulocytes 0.1 0 - 0.4 10e9/L Final    Absolute Nucleated RBC 0.2  Final         12/4/2018 12:04 PM      Component Results     Component Value Ref Range & Units Status    Sodium 135 133 - 144 mmol/L Final    Potassium 4.5 3.4 - 5.3 mmol/L Final    Chloride 97 94 - 109 mmol/L Final    Carbon Dioxide 29 20 - 32 mmol/L Final    Anion Gap 9 3 - 14 mmol/L Final    Glucose 227 (H) 70 - 99 mg/dL Final    Urea Nitrogen 56 (H) 7 - 30 mg/dL Final    Creatinine 4.51 (H) 0.66 - 1.25 mg/dL Final    GFR Estimate 12 (L) >60 mL/min/1.7m2 Final    Non  GFR Calc    GFR Estimate If Black 15 (L) >60 mL/min/1.7m2 Final    African American GFR Calc    Calcium 9.1 8.5 - 10.1 mg/dL Final         12/4/2018 12:04 PM      Component Results     Component Value Ref Range & Units Status    Magnesium 2.2 1.6 - 2.3 mg/dL Final         12/4/2018 12:04 PM      Component Results     Component Value Ref Range & Units Status    Troponin I ES <0.015 0.000 - 0.045 ug/L Final    The 99th percentile for upper reference range is 0.045 ug/L.  Troponin values   in the range of 0.045 - 0.120 ug/L may be associated with risks of adverse   clinical events.           12/4/2018 12:26 PM      Narrative     CHEST TWO VIEWS  12/4/2018 12:08 PM     HISTORY: AMS;     COMPARISON: 11/16/2018 chest x-ray        Impression     IMPRESSION: Cardiomegaly and pulmonary vascular congestion without  significant change. New mild elevation right hemidiaphragm. Mild  rounded opacity just above the medial right hemidiaphragm could be  lower lobe pulmonary vessel. Left hemidiaphragm is difficult to  visualize, either by enlarged heart or lower  lobe infiltrate. Lateral  chest x-ray may be helpful.    RAVI FERNANDO MD         12/4/2018 12:15 PM      Narrative     CT SCAN OF THE HEAD WITHOUT CONTRAST December 4, 2018 12:07 PM     HISTORY: Altered mental status.    TECHNIQUE: Axial images of the head and coronal reformations without  IV contrast material. Radiation dose for this scan was reduced using  automated exposure control, adjustment of the mA and/or kV according  to patient size, or iterative reconstruction technique.    COMPARISON: 11/16/2018.    FINDINGS: There is generalized atrophy of the brain. There is low  attenuation in the white matter of the cerebral hemispheres consistent  with sequelae of small vessel ischemic disease. There is no evidence  of intracranial hemorrhage, mass, acute infarct or anomaly.     The visualized portions of the sinuses and mastoids appear normal.  There is no evidence of trauma. There is bilateral proptosis,  unchanged.        Impression     IMPRESSION:   1. No acute abnormality.  2.  Atrophy of the brain. White matter changes consistent with  sequelae of small vessel ischemic disease.  3. Bilateral proptosis.  4. No change.     GIOVANNI MATSON MD                Clinical Quality Measure: Blood Pressure Screening     Your blood pressure was checked while you were in the emergency department today. The last reading we obtained was  BP: 100/71 . Please read the guidelines below about what these numbers mean and what you should do about them.  If your systolic blood pressure (the top number) is less than 120 and your diastolic blood pressure (the bottom number) is less than 80, then your blood pressure is normal. There is nothing more that you need to do about it.  If your systolic blood pressure (the top number) is 120-139 or your diastolic blood pressure (the bottom number) is 80-89, your blood pressure may be higher than it should be. You should have your blood pressure rechecked within a year by a primary care  "provider.  If your systolic blood pressure (the top number) is 140 or greater or your diastolic blood pressure (the bottom number) is 90 or greater, you may have high blood pressure. High blood pressure is treatable, but if left untreated over time it can put you at risk for heart attack, stroke, or kidney failure. You should have your blood pressure rechecked by a primary care provider within the next 4 weeks.  If your provider in the emergency department today gave you specific instructions to follow-up with your doctor or provider even sooner than that, you should follow that instruction and not wait for up to 4 weeks for your follow-up visit.        Thank you for choosing [facility]       Thank you for choosing [facility] for your care. Our goal is always to provide you with excellent care. Hearing back from our patients is one way we can continue to improve our services. Please take a few minutes to complete the written survey that you may receive in the mail after you visit with us. Thank you!        [employer] Information     [employer] lets you send messages to your doctor, view your test results, renew your prescriptions, schedule appointments and more. To sign up, go to [email or web address] . Click on \"Log in\" on the left side of the screen, which will take you to the Welcome page. Then click on \"Sign up Now\" on the right side of the page.     You will be asked to enter the access code listed below, as well as some personal information. Please follow the directions to create your username and password.     Your access code is: [email or web address]  Expires: 2018  1:21 PM     Your access code will  in 90 days. If you need help or a new code, please call your [facility] clinic or [phone].        Care EveryWhere ID     This is your Care EveryWhere ID. This could be used by other organizations to access your [facility] medical records  [id number]        Equal Access to Services     ROSALINA ZHONG: Gwen rivas " feliz Murphy, denice edwards, pierre fink, catherine benites. So [facility] [phone].    ATENCIÓN: Si habla español, tiene a mast disposición servicios gratuitos de asistencia lingüística. Llame al [phone].    We comply with applicable federal civil rights laws and [place] laws. We do not discriminate on the basis of race, color, national origin, age, disability, sex, sexual orientation, or gender identity.            After Visit Summary       This is your record. Keep this with you and show to your community pharmacist(s) and doctor(s) at your next visit.

## 2018-12-04 NOTE — ED NOTES
Report called to Audra at [facility].  Transportation via stretcher arranged.

## 2018-12-04 NOTE — ED AVS SNAPSHOT
[facility] Emergency Department    [address]    [facility] [address]    Phone:  [phone]    Fax:  [phone]                                       Maurizio Ohara   MRN: [id number]    Department:  [facility] Emergency Department   Date of Visit:  12/4/2018           After Visit Summary Signature Page     I have received my discharge instructions, and my questions have been answered. I have discussed any challenges I see with this plan with the nurse or doctor.    ..........................................................................................................................................  Patient/Patient Representative Signature      ..........................................................................................................................................  Patient Representative Print Name and Relationship to Patient    ..................................................               ................................................  Date                                   Time    ..........................................................................................................................................  Reviewed by Signature/Title    ...................................................              ..............................................  Date                                               Time          22EPIC Rev 08/18

## 2018-12-04 NOTE — ED NOTES
Upon arrival to ED patient noted to have clamp on dialysis access to Rt upper arm.  Clamp removed with Dr. Capellan and patient noted to have continued oozing from dialysis site.  Pressure dressing applied with gel foam to control bleeding. During stay in ED there was no further bleeding from dialysis site.  Gel foam removed and access site covered with 4x4 and tape.

## 2018-12-04 NOTE — IP AVS SNAPSHOT
MRN:[id number]                      After Visit Summary   12/4/2018    Maurizio Ohara    MRN: [id number]           Thank you!     Thank you for choosing [facility] for your care. Our goal is always to provide you with excellent care. Hearing back from our patients is one way we can continue to improve our services. Please take a few minutes to complete the written survey that you may receive in the mail after you visit. If you would like to speak to someone directly about your visit please contact Patient Relations at [phone]. Thank you!          Patient Information     Date Of Birth          10/7/1929        About your hospital stay     You were admitted on:  December 4, 2018 You last received care in the:  [facility] Observation Department    You were discharged on:  December 6, 2018        Reason for your hospital stay       You were hospitalized for progressive decline and will be discharged on hospice care.                  Who to Call     For medical emergencies, please call 911.  For non-urgent questions about your medical care, please call your primary care provider or clinic, [phone]          Attending Provider     Provider Specialty    Yohana Capellan DO Emergency Medicine    Tip Langtson MD Internal Medicine       Primary Care Provider Office Phone # Fax #    Justina Moise [phone]      After Care Instructions     Activity - Up with nursing assistance           Additional Discharge Instructions       [facility] to consult and treat.            Advance Diet as Tolerated       Follow this diet upon discharge: Orders Placed This Encounter      Regular Diet Adult            General info for SNF       Length of Stay Estimate: Long Term Care  Condition at Discharge: Terminal  Level of care:skilled   Rehabilitation Potential: Poor  Admission H&P remains valid and up-to-date: Yes  Recent  Chemotherapy: N/A  Use Nursing Home Standing Orders: Yes            Mantoux instructions       Give two-step Mantoux (PPD) Per Facility Policy Yes                  Additional Services     HOSPICE REFERRAL       **Order classes of: [facility], [facility] and [facility] will route to the Home Care and Hospice Referral Pool.  Home Care or Hospice will then contact the patient to schedule their appointment.**    Hospice eligibility overview: prognosis of 6 months or less, end stage disease, patient goals are comfort only, patient is not seeking curative treatment.    If you do not hear from Hospice, or you would like to call to schedule, please call the referring place of service that your provider has listed below.  ______________________________________________________________________    Your provider has referred you to: [facility]    Anticipated Start Date for Services: 12/06/18   PLEASE Schedule Hospice consult for 24 - 48 hours.  Please call if there is need for a variance to this timeline.    REASON FOR REFERRAL: Hospice Diagnosis: ESRD no longer on HD, Failure to thrive, cognitive decline    ADDITIONAL SERVICES NEEDED: NA    OTHER PERTINENT INFORMATION: Patient was last seen by provider on 12/06/18 for progressive weakness, FTT.  Factors that indicate prognosis of less than 6 months:  Weight loss, cognitive decline, ESRD - no longer on HD    No current outpatient prescriptions on file.    Patient Active Problem List:     NSTEMI (non-ST elevated myocardial infarction) (H)     ESRD (end stage renal disease) on dialysis (H)     CAD (coronary artery disease)     Diabetes (H)     Hypertension     Mitral regurgitation     Pulmonary hypertension (H)     Sepsis (H)     Pneumonia     Acute diastolic (congestive) heart failure (H)     Cellulitis     Altered mental status     Fall     Chronic diastolic heart failure (H)     Severe aortic stenosis     Hyperkalemia    This patient is under my care, and I have  initiated the establishment of the plan of care. This patient will be followed by a physician who will periodically review the plan of care.    Physician/Provider to provide follow up care: Justina Moise    [facility] certified Physician at time of discharge: Lavinia Cage MD     Please be aware that coverage of these services is subject to the terms and limitations of your health insurance plan.  Call member services at your health plan with any benefit or coverage questions.                   Follow-up Information     Follow up with Justina Moise MD. Schedule an appointment as soon as possible for a visit in 2 days.    Specialty:  Family Practice    Contact information:    [facility]  [address]  [address] [facility] [address]  [phone]                  Further instructions from your care team         Hemodialysis Access Bleeding  You have a hemodialysis access in your arm, either an arteriovenous (AV) fistula or an artery to vein graft. It has been bleeding. Blood needs to flow freely through the fistula or graft. As part of your treatment, you are also taking medicine that thins your blood. This makes you bleed more easily. It is important to stop your fistula or graft from bleeding as soon as possible--you can quickly bleed to death from a rapidly bleeding hemodialysis access.     For a quickly bleeding fistula or graft, use firm pressure with a gauze or cloth until it stops.   Home care  If your fistula or graft site is bleeding:    If it is bleeding quickly, right away put pressure right on the spot that is bleeding with a gauze or cloth. Push hard till it stops. Then, get medical help right away.    If it is just oozing a small amount of blood, follow these directions:    Wash your hands. Dry them on a clean towel.    Put a small piece of sterile gauze on the area that is bleeding.    Press gently with your fingertips only on the area that is bleeding. Hold your fingers there  for 30 minutes. Don't press on the whole forearm. Don't wrap anything around the wrist or arm, including bandages.    After 30 minutes, take your fingers off the area that was bleeding.    Wash and dry your hands again.    If it is still bleeding, call your healthcare provider or go to a hospital.  General access site care  The following guidelines will help you care for your access site:    Wash your hands often. Keep the access site clean.    Check the fistula or graft for the pulsing feeling ( thrill ) every morning and night.    Follow these don'ts:       ? Don't let anyone take your blood pressure on your hemodialysis access arm.  ? Don't let anyone take blood from or inject medication into the arm.  ? Don't wear jewelry or tight sleeves over the access site.  ? Don't wiggle the fistula or graft, or pick at your skin near the access.  ? Don't carry anything over the arm or lift heavy objects with that arm.  ? Don't sleep on your arm with the access site.  Follow-up care  Follow up with your healthcare provider, or as advised.  Call 911  Call 911 if any of the following occur:    Pain, cold, or numbness in the hand that won't go away    Pale color of the hand that won't go back to pink    Any major bleeding, or minor bleeding from the access site that won't stop  When to seek medical advice  Call your healthcare provider right away if you have any of the following:    Fever of 100.4 F (38 C) or higher, or as directed by your healthcare provider    Red, hot, or swollen area around the access site    Light-colored fluid coming from the area around the access site    Pain or hardness around the access site    Loss of pulsing feeling ( thrill ) over the fistula or graft  Date Last Reviewed: 10/1/2016    4784-0639 [employer]. [address] [place] [address], [place] [address]. All rights reserved. This information is not intended as a substitute for professional medical care. Always follow your healthcare  "professional's instructions.          Pending Results     No orders found from 2018 to 2018.            Statement of Approval     Ordered          18 1008  I have reviewed and agree with all the recommendations and orders detailed in this document.  EFFECTIVE NOW     Approved and electronically signed by:  Lavinia Cage MD             Admission Information     Date & Time Provider Department Dept. Phone    2018 Tip Langston MD [facility] Observation Department [phone]      Your Vitals Were     Blood Pressure Pulse Temperature Respirations Weight Pulse Oximetry    101/57 (BP Location: Left arm) 56 95.3  F (35.2  C) (Axillary) 20 83 kg (182 lb 15.7 oz) 93%    BMI (Body Mass Index)                   28.66 kg/m2           MyChart Information     [employer] lets you send messages to your doctor, view your test results, renew your prescriptions, schedule appointments and more. To sign up, go to [email or web address] . Click on \"Log in\" on the left side of the screen, which will take you to the Welcome page. Then click on \"Sign up Now\" on the right side of the page.     You will be asked to enter the access code listed below, as well as some personal information. Please follow the directions to create your username and password.     Your access code is: [email or web address]  Expires: 2018  1:21 PM     Your access code will  in 90 days. If you need help or a new code, please call your [place] clinic or [phone].        Care EveryWhere ID     This is your Care EveryWhere ID. This could be used by other organizations to access your [place] medical records  [id number]        Equal Access to Services     [facility]: Hadii tressa Murphy, waamaliada lumagyadaha, qaybta reynaldoalcatherine miller . So [facility] [phone].    ATENCIÓN: Si habla español, tiene a mast disposición servicios gratuitos de asistencia lingüística. " Tikafrida urena [phone].    We comply with applicable federal civil rights laws and [place] laws. We do not discriminate on the basis of race, color, national origin, age, disability, sex, sexual orientation, or gender identity.               Review of your medicines      START taking        Dose / Directions    atropine 1 % ophthalmic solution   Used for:  Hospice care patient        Dose:  1-2 drop   Place 1-2 drops under the tongue every hour as needed for other (secretions)   Quantity:  1 Bottle   Refills:  0       bisacodyl 10 MG suppository   Commonly known as:  DULCOLAX   Used for:  Hospice care patient        Dose:  10 mg   Place 1 suppository (10 mg) rectally daily as needed for constipation   Quantity:  30 suppository   Refills:  0       camphor-menthol 0.5-0.5 % external lotion   Commonly known as:  DERMASARRA   Used for:  Hospice care patient        Apply to area of itch, intact skin only   Refills:  0       HYDROmorphone (STANDARD CONC) 1 MG/ML oral solution   Commonly known as:  DILAUDID   Used for:  Hospice care patient        Dose:  1-2 mg   Take 1-2 mLs (1-2 mg) by mouth every 2 hours as needed for moderate to severe pain (or dyspnea)   Quantity:  20 mL   Refills:  0       ondansetron 4 MG ODT tab   Commonly known as:  ZOFRAN-ODT   Used for:  Hospice care patient        Dose:  4 mg   Take 1 tablet (4 mg) by mouth every 6 hours as needed for nausea or vomiting   Quantity:  30 tablet   Refills:  0         CONTINUE these medicines which may have CHANGED, or have new prescriptions. If we are uncertain of the size of tablets/capsules you have at home, strength may be listed as something that might have changed.        Dose / Directions    polyethylene glycol packet   Commonly known as:  MIRALAX/GLYCOLAX   This may have changed:  Another medication with the same name was removed. Continue taking this medication, and follow the directions you see here.        Dose:  1 packet   Take 1 packet by mouth daily  as needed for constipation   Refills:  0         CONTINUE these medicines which have NOT CHANGED        Dose / Directions    acetaminophen 500 MG tablet   Commonly known as:  TYLENOL        Dose:  1000 mg   Take 1,000 mg by mouth 3 times daily as needed for mild pain   Refills:  0       diclofenac 1 % topical gel   Commonly known as:  VOLTAREN   Used for:  Cellulitis of right lower extremity        Dose:  2 g   Apply 2 g topically 4 times daily Apply to RLE   Refills:  0       GABAPENTIN PO        Dose:  100 mg   Take 100 mg by mouth 3 times daily   Refills:  0       nitroGLYcerin 0.4 MG sublingual tablet   Commonly known as:  NITROSTAT   Used for:  NSTEMI (non-ST elevated myocardial infarction) (H)        Dose:  0.4 mg   Place 1 tablet (0.4 mg) under the tongue every 5 minutes as needed for chest pain   Quantity:  25 tablet   Refills:  3         STOP taking     aspirin 81 MG tablet   Commonly known as:  ASA           atorvastatin 40 MG tablet   Commonly known as:  LIPITOR           calcium acetate 667 MG Caps capsule   Commonly known as:  PHOSLO           MIDODRINE HCL PO                Where to get your medicines      Some of these will need a paper prescription and others can be bought over the counter. Ask your nurse if you have questions.     You don't need a prescription for these medications     atropine 1 % ophthalmic solution    bisacodyl 10 MG suppository    camphor-menthol 0.5-0.5 % external lotion    ondansetron 4 MG ODT tab         Information about where to get these medications is not yet available     ! Ask your nurse or doctor about these medications     HYDROmorphone (STANDARD CONC) 1 MG/ML oral solution                Protect others around you: Learn how to safely use, store and throw away your medicines at www.disposemymeds.org.        Information about OPIOIDS     PRESCRIPTION OPIOIDS: WHAT YOU NEED TO KNOW   We gave you an opioid (narcotic) pain medicine. It is important to manage your pain,  but opioids are not always the best choice. You should first try all the other options your care team gave you. Take this medicine for as short a time (and as few doses) as possible.    Some activities can increase your pain, such as bandage changes or therapy sessions. It may help to take your pain medicine 30 to 60 minutes before these activities. Reduce your stress by getting enough sleep, working on hobbies you enjoy and practicing relaxation or meditation. Talk to your care team about ways to manage your pain beyond prescription opioids.    These medicines have risks:    DO NOT drive when on new or higher doses of pain medicine. These medicines can affect your alertness and reaction times, and you could be arrested for driving under the influence (DUI). If you need to use opioids long-term, talk to your care team about driving.    DO NOT operate heavy machinery    DO NOT do any other dangerous activities while taking these medicines.    DO NOT drink any alcohol while taking these medicines.     If the opioid prescribed includes acetaminophen, DO NOT take with any other medicines that contain acetaminophen. Read all labels carefully. Look for the word  acetaminophen  or  Tylenol.  Ask your pharmacist if you have questions or are unsure.    You can get addicted to pain medicines, especially if you have a history of addiction (chemical, alcohol or substance dependence). Talk to your care team about ways to reduce this risk.    All opioids tend to cause constipation. Drink plenty of water and eat foods that have a lot of fiber, such as fruits, vegetables, prune juice, apple juice and high-fiber cereal. Take a laxative (Miralax, milk of magnesia, Colace, Senna) if you don t move your bowels at least every other day. Other side effects include upset stomach, sleepiness, dizziness, throwing up, tolerance (needing more of the medicine to have the same effect), physical dependence and slowed breathing.    Store your  pills in a secure place, locked if possible. We will not replace any lost or stolen medicine. If you don t finish your medicine, please throw away (dispose) as directed by your pharmacist. The [place] Pollution Control Agency has more information about safe disposal: https://www.pca.[facility].[place].us/living-green/managing-unwanted-medications             Medication List: This is a list of all your medications and when to take them. Check marks below indicate your daily home schedule. Keep this list as a reference.      Medications           Morning Afternoon Evening Bedtime As Needed    acetaminophen 500 MG tablet   Commonly known as:  TYLENOL   Take 1,000 mg by mouth 3 times daily as needed for mild pain   Last time this was given:  650 mg on 12/5/2018  8:23 AM                                atropine 1 % ophthalmic solution   Place 1-2 drops under the tongue every hour as needed for other (secretions)                                bisacodyl 10 MG suppository   Commonly known as:  DULCOLAX   Place 1 suppository (10 mg) rectally daily as needed for constipation                                camphor-menthol 0.5-0.5 % external lotion   Commonly known as:  DERMASARRA   Apply to area of itch, intact skin only                                diclofenac 1 % topical gel   Commonly known as:  VOLTAREN   Apply 2 g topically 4 times daily Apply to RLE   Last time this was given:  2 g on 12/5/2018  1:04 PM                                GABAPENTIN PO   Take 100 mg by mouth 3 times daily   Last time this was given:  100 mg on 12/6/2018  8:41 AM                                HYDROmorphone (STANDARD CONC) 1 MG/ML oral solution   Commonly known as:  DILAUDID   Take 1-2 mLs (1-2 mg) by mouth every 2 hours as needed for moderate to severe pain (or dyspnea)                                nitroGLYcerin 0.4 MG sublingual tablet   Commonly known as:  NITROSTAT   Place 1 tablet (0.4 mg) under the tongue every 5 minutes as needed for chest  pain                                ondansetron 4 MG ODT tab   Commonly known as:  ZOFRAN-ODT   Take 1 tablet (4 mg) by mouth every 6 hours as needed for nausea or vomiting                                polyethylene glycol packet   Commonly known as:  MIRALAX/GLYCOLAX   Take 1 packet by mouth daily as needed for constipation

## 2018-12-04 NOTE — IP AVS SNAPSHOT
` `     [facility] OBSERVATION DEPARTMENT: [phone]            Medication Administration Report for Maurizio Ohara as of 12/06/18 1206   Legend:    Given Hold Not Given Due Canceled Entry Other Actions    Time Time (Time) Time  Time-Action       Inactive    Active    Linked        Medications 11/30/18 12/01/18 12/02/18 12/03/18 12/04/18 12/05/18 12/06/18    acetaminophen (TYLENOL) Suppository 650 mg  Dose: 650 mg  Freq: EVERY 4 HOURS PRN Route: RE  PRN Reason: mild pain  Start: 12/04/18 1803   Admin Instructions: Alternate ibuprofen (if ordered) with acetaminophen.  Maximum acetaminophen dose from all sources = 75 mg/kg/day not to exceed 4 grams/day.    Admin. Amount: 1 suppository (1 × 650 mg suppository)  Dispense Loc:  ADS MS2B OBS               acetaminophen (TYLENOL) tablet 650 mg  Dose: 650 mg  Freq: EVERY 4 HOURS PRN Route: PO  PRN Reason: mild pain  Start: 12/04/18 1803   Admin Instructions: Alternate ibuprofen (if ordered) with acetaminophen.  Maximum acetaminophen dose from all sources = 75 mg/kg/day not to exceed 4 grams/day.    Admin. Amount: 2 tablet (2 × 325 mg tablet)  Last Admin: 12/05/18 0823  Dispense Loc:  ADS MS2B OBS          0823 (650 mg)-Given            atropine 1 % ophthalmic solution 1-2 drop  Dose: 1-2 drop  Freq: EVERY 1 HOUR PRN Route: SL  PRN Reason: other  PRN Comment: secretions  Start: 12/05/18 1603   Admin. Amount: 1-2 drop  Dispense Loc:  Main Pharmacy  Volume: 15 mL               bisacodyl (DULCOLAX) Suppository 10 mg  Dose: 10 mg  Freq: DAILY PRN Route: RE  PRN Reason: constipation  Start: 12/04/18 1803   Admin Instructions: Hold for loose stools.  This is the third step of a three step constipation treatment.    Admin. Amount: 1 suppository (1 × 10 mg suppository)  Dispense Loc:  ADS MS2B OBS               camphor-menthol (DERMASARRA) lotion  Freq: EVERY 6 HOURS PRN Route: Top  PRN Reasons: skin care,other  PRN Comment: itch  Start: 12/05/18 1601    Admin Instructions: Apply to area of itch, intact skin only    Dispense Loc:  Main Pharmacy  Volume: 222 mL               diclofenac (VOLTAREN) 1 % topical gel 2 g  Dose: 2 g  Freq: 4 TIMES DAILY Route: Top  Start: 12/05/18 1200   Admin Instructions: Apply to E  Send dosing card with product.    Admin. Amount: 2 g  Last Admin: 12/05/18 1304  Dispense Loc:  Main Pharmacy          1304 (2 g)-Given       (1721)-Not Given               (0859)-Not Given       [ ] 1200       [ ] 1600       [ ] 2000           gabapentin (NEURONTIN) capsule 100 mg  Dose: 100 mg  Freq: 3 TIMES DAILY Route: PO  Start: 12/04/18 2000   Admin. Amount: 1 capsule (1 × 100 mg capsule)  Last Admin: 12/06/18 0841  Dispense Loc:  ADS MS2B OBS         1943-Hold [C]        0826 (100 mg)-Given       1306 (100 mg)-Given       1847 (100 mg)-Given               0841 (100 mg)-Given       [ ] 1400       [ ] 2000           haloperidol lactate (HALDOL) injection 0.5-1 mg  Dose: 0.5-1 mg  Freq: EVERY 1 HOUR PRN Route: IV  PRN Reason: agitation  Start: 12/05/18 1603   Admin Instructions: Call provider if agitation not relieved at these doses.  Contraindicated in Parkinsonism.  For ordered doses up to 5 mg, drug can be give IV Push undiluted over 1 minute.    Admin. Amount: 0.5-1 mg = 0.1-0.2 mL Conc: 5 mg/mL  Dispense Loc:  ADS MS2B OBS  Infused Over: 1 Minutes  Volume: 1 mL               HYDROmorphone (STANDARD CONC) (DILAUDID) oral solution 1-2 mg  Dose: 1-2 mg  Freq: EVERY 2 HOURS PRN Route: PO  PRN Reason: moderate to severe pain  PRN Comment: or dyspnea  Start: 12/05/18 1602   Admin. Amount: 1-2 mg = 1-2 mL Conc: 1 mg/mL  Dispense Loc:  Main Pharmacy  Volume: 2 mL                                          Or  HYDROmorphone (DILAUDID) half-tab 1-2 mg  Dose: 1-2 mg  Freq: EVERY 2 HOURS PRN Route: PO  PRN Reason: moderate to severe pain  PRN Comment: or dyspnea  Start: 12/05/18 1602   Admin. Amount: 1-2 half-tab (1-2 × 1 mg half-tab)  Last Admin:  12/06/18 1202  Dispense Loc: RH ADS MS2B OBS          1847 (1 mg)-Given       2107 (1 mg)-Given        0842 (1 mg)-Given       1202 (1 mg)-Given           melatonin tablet 1 mg  Dose: 1 mg  Freq: AT BEDTIME PRN Route: PO  PRN Reason: sleep  Start: 12/04/18 1803   Admin Instructions: Do not give unless at least 6 hours of uninterrupted sleep is expected.    Admin. Amount: 1 tablet (1 × 1 mg tablet)  Dispense Loc: RH ADS MS2B OBS               naloxone (NARCAN) injection 0.1-0.4 mg  Dose: 0.1-0.4 mg  Freq: EVERY 2 MIN PRN Route: IV  PRN Reason: opioid reversal  Start: 12/05/18 1603   Admin Instructions: Try to minimize reversal of analgesia especially in end-of-life patients.  For ordered IV doses 0.1-2mg give IVP. Give each 0.4mg over 15 seconds in emergency situations. For non-emergent situations further dilute in 9mL of NS to facilitate titration of response.    Admin. Amount: 0.1-0.4 mg = 0.25-1 mL Conc: 0.4 mg/mL  Dispense Loc: RH ADS MS2B OBS  Volume: 1 mL               ondansetron (ZOFRAN-ODT) ODT tab 4 mg  Dose: 4 mg  Freq: EVERY 6 HOURS PRN Route: PO  PRN Reasons: nausea,vomiting  Start: 12/04/18 1803   Admin Instructions: This is Step 1 of nausea and vomiting management.  If nausea not resolved in 15 minutes, go to Step 2 prochlorperazine (COMPAZINE). Do not push through foil backing. Peel back foil and gently remove. Place on tongue immediately. Administration with liquid unnecessary  With dry hands, peel back foil backing and gently remove tablet; do not push oral disintegrating tablet through foil backing; administer immediately on tongue and oral disintegrating tablet dissolves in seconds; then swallow with saliva; liquid not required.    Admin. Amount: 1 tablet (1 × 4 mg tablet)  Dispense Loc: RH ADS MS2B OBS              Or  ondansetron (ZOFRAN) injection 4 mg  Dose: 4 mg  Freq: EVERY 6 HOURS PRN Route: IV  PRN Reasons: nausea,vomiting  Start: 12/04/18 1803   Admin Instructions: This is Step 1 of  nausea and vomiting management.  If nausea not resolved in 15 minutes, go to Step 2 prochlorperazine (COMPAZINE).  Irritant. For ordered IV doses 0.1-4 mg, give IV Push undiluted over 2-5 minutes.    Admin. Amount: 4 mg = 2 mL Conc: 4 mg/2 mL  Dispense Loc: RH ADS MS2B OBS  Infused Over: 2-5 Minutes  Volume: 2 mL               polyethylene glycol (MIRALAX/GLYCOLAX) Packet 17 g  Dose: 17 g  Freq: DAILY PRN Route: PO  PRN Reason: constipation  Start: 12/04/18 1803   Admin Instructions: Give in 8oz of  water, juice, or soda. Hold for loose stools.  This is the second step of a three step constipation treatment.  1 Packet = 17 grams. Mixed prescribed dose in 8 ounces of water. Follow with 8 oz. of water.    Admin. Amount: 17 g  Dispense Loc: RH ADS MS2B OBS               senna-docusate (SENOKOT-S/PERICOLACE) 8.6-50 MG per tablet 1 tablet  Dose: 1 tablet  Freq: 2 TIMES DAILY PRN Route: PO  PRN Reason: constipation  Start: 12/04/18 1803   Admin Instructions: If no bowel movement in 24 hours, increase to 2 tablets PO.  Hold for loose stools.  This is the first step of a three step constipation treatment.    Admin. Amount: 1 tablet  Dispense Loc: RH ADS MS2B OBS              Or  senna-docusate (SENOKOT-S/PERICOLACE) 8.6-50 MG per tablet 2 tablet  Dose: 2 tablet  Freq: 2 TIMES DAILY PRN Route: PO  PRN Reason: constipation  Start: 12/04/18 1803   Admin Instructions: Hold for loose stools.  This is the first step of a three step constipation treatment.    Admin. Amount: 2 tablet  Dispense Loc: RH ADS MS2B OBS              Discontinued Medications  Medications 11/30/18 12/01/18 12/02/18 12/03/18 12/04/18 12/05/18 12/06/18         Dose: 81 mg  Freq: DAILY Route: PO  Start: 12/05/18 0800   End: 12/05/18 1601   Admin. Amount: 1 tablet (1 × 81 mg tablet)  Last Admin: 12/05/18 0826  Dispense Loc: RH ADS MS2B OBS          0826 (81 mg)-Given       1601-Med Discontinued          Dose: 40 mg  Freq: AT BEDTIME Route: PO  Start: 12/04/18  2200   End: 12/05/18 1133   Admin. Amount: 1 tablet (1 × 40 mg tablet)  Dispense Loc: RH ADS MS2B OBS         2102-Hold [C]        1133-Med Discontinued          Dose: 2,001 mg  Freq: 3 TIMES DAILY WITH MEALS Route: PO  Start: 12/04/18 1804   End: 12/05/18 1601   Admin Instructions: Best if given with meals. Take with meals.    Admin. Amount: 3 capsule (3 × 667 mg capsule)  Last Admin: 12/05/18 1238  Dispense Loc: RH ADS MS2B OBS         (1850)-Not Given [C]        0826 (2,001 mg)-Given       1238 (2,001 mg)-Given       1601-Med Discontinued          Dose: 10 mg  Freq: Once per day on Mon Wed Fri Route: PO  Start: 12/05/18 1130   End: 12/05/18 1133   Admin. Amount: 2 tablet (2 × 5 mg tablet)  Dispense Loc: RH ADS MS2B OBS                 1133-Med Discontinued          Dose: 0.1-0.4 mg  Freq: EVERY 2 MIN PRN Route: IV  PRN Reason: opioid reversal  Start: 12/04/18 1803   End: 12/05/18 1601   Admin Instructions: For respiratory rate LESS than or EQUAL to 8.  Partial reversal dose:  0.1 mg titrated q 2 minutes for Analgesia Side Effects Monitoring Sedation Level of 3 (frequently drowsy, arousable, drifts to sleep during conversation).Full reversal dose:  0.4 mg bolus for Analgesia Side Effects Monitoring Sedation Level of 4 (somnolent, minimal or no response to stimulation).  For ordered IV doses 0.1-2mg give IVP. Give each 0.4mg over 15 seconds in emergency situations. For non-emergent situations further dilute in 9mL of NS to facilitate titration of response.    Admin. Amount: 0.1-0.4 mg = 0.25-1 mL Conc: 0.4 mg/mL  Dispense Loc: RH ADS MS2B OBS  Volume: 1 mL          1601-Med Discontinued          Dose: 17 g  Freq: DAILY PRN Route: PO  PRN Reason: constipation  Start: 12/05/18 1047   End: 12/05/18 1105   Admin Instructions: 1 Packet = 17 grams. Mixed prescribed dose in 8 ounces of water. Follow with 8 oz. of water.    Admin. Amount: 17 g          1105-Med Discontinued     Medications 11/30/18 12/01/18 12/02/18  12/03/18 12/04/18 12/05/18 12/06/18

## 2018-12-04 NOTE — ED NOTES
"[facility]  ED Nurse Handoff Report    Maurizio Ohara is a 89 year old male   ED Chief complaint: Altered Mental Status  . ED Diagnosis:   Final diagnoses:   ESRD (end stage renal disease) on dialysis (H)   Anemia of chronic renal failure, unspecified CKD stage   Complication of arteriovenous dialysis fistula, initial encounter     Allergies:   Allergies   Allergen Reactions     Glyburide      Lisinopril      Hyperkalemia when hospitalized 4/5/2011     Metformin      Renal failure stage 4     Penicillins      SHADY Gallagher clarified with pt, pt does not remember rxn, it was a long time ago, and \"nothing crazy\".     Verapamil        Code Status: DNR / DNI  Activity level - Baseline/Home:  Stand with Assist. Activity Level - Current:   Stand with Assist of 2. Lift room needed: No. Bariatric: No   Needed: No   Isolation: No. Infection: Not Applicable.     Vital Signs:   Vitals:    12/04/18 1300 12/04/18 1315 12/04/18 1554 12/04/18 1619   BP: 110/67 100/71 93/63    Pulse:   61    Resp: 21 21 20    Temp:    97.4  F (36.3  C)   TempSrc:    Oral   SpO2: 100% 100% 100%    Weight:           Cardiac Rhythm:  ,      Pain level:    Patient confused: No. Patient Falls Risk: Yes.   Elimination Status: Depends dry. Dialysis today.    Patient Report - Initial Complaint: Pt evaluated in ED this morning, EMS reports that the patient was being dialyzed when he became progressively more unresponsive. He is typically oriented x3 at baseline, and staff noted that he became increasingly drowsy, began mumbling his words, and his eyes rolled backwards. Staff stopped dialysis 2/3 of the way through and called EMS.  He currently resides in the [facility] [place].Pt discharge back to [facility] at 1430 but refuses his return. Pt returned to ED.  Focused Assessment: Pt arouses to verbal stimuli.Speech soft with few words. Updated on plan of care for admission. Denies shortness of breath. Fluids offered. Pt declines. Pt " "has multiple areas of skin breakdown and wound dressings. Complains of pain \" On my back where my wheelchair hits.\" Ductibus dressing intact to area. Dialysis fistula dry and intact. No bleeding. Lungs with scattered rales at bases. Heart tones regular with murmur.    Tests Performed:   XR Chest 2 Views   Final Result   IMPRESSION: Cardiomegaly and pulmonary vascular congestion without   significant change. New mild elevation right hemidiaphragm. Mild   rounded opacity just above the medial right hemidiaphragm could be   lower lobe pulmonary vessel. Left hemidiaphragm is difficult to   visualize, either by enlarged heart or lower lobe infiltrate. Lateral   chest x-ray may be helpful.      RAVI FERNANDO MD      Head CT w/o contrast   Final Result   IMPRESSION:    1. No acute abnormality.   2.  Atrophy of the brain. White matter changes consistent with   sequelae of small vessel ischemic disease.   3. Bilateral proptosis.   4. No change.       GIOVANNI MATSON MD      . Abnormal Results:   Labs Ordered and Resulted from Time of ED Arrival Up to the Time of Departure from the ED   CBC WITH PLATELETS DIFFERENTIAL - Abnormal; Notable for the following:        Result Value    RBC Count 3.24 (*)     Hemoglobin 11.4 (*)     Hematocrit 36.5 (*)      (*)     MCH 35.2 (*)     MCHC 31.2 (*)     RDW 19.0 (*)     Platelet Count 128 (*)     Nucleated RBCs 3 (*)     All other components within normal limits   BASIC METABOLIC PANEL - Abnormal; Notable for the following:     Glucose 227 (*)     Urea Nitrogen 56 (*)     Creatinine 4.51 (*)     GFR Estimate 12 (*)     GFR Estimate If Black 15 (*)     All other components within normal limits   GLUCOSE BY METER - Abnormal; Notable for the following:     Glucose 164 (*)     All other components within normal limits   MAGNESIUM   TROPONIN I   .   Family Comments: Family had consult with Admitting physician.   OBS brochure/video discussed/provided to patient:  N/A Pt not able to " view and comprehend video. Family aware of admission status   ED Medications: Medications - No data to display  Drips infusing:  No  For the majority of the shift, the patient's behavior Green. Interventions performed were n/a.     Severe Sepsis OR Septic Shock Diagnosis Present: No      ED Nurse Name/Phone Number: Lizy Martinez,   4:53 PM    RECEIVING UNIT ED HANDOFF REVIEW    Above ED Nurse Handoff Report was reviewed: yes  Reviewed by: Raisa Kaminski on December 4, 2018 at 5:27 PM

## 2018-12-05 NOTE — PLAN OF CARE
Problem: Patient Care Overview  Goal: Plan of Care/Patient Progress Review  Unable to do admission profile. Patient awakes to voice but falls easily back asleep. Only speaks a few garbled words at a time.

## 2018-12-05 NOTE — PROGRESS NOTES
[facility] Nurse Inpatient Wound Assessment     Assessment of wound(s) on pt's:   Right dorsal foot, left dorsal foot, left dorsal hand, left forearm, left elbow        Data:   Patient History:  Per MD notes, patient is an 89 year old male with hx of ESRD with Dialysis, CAD, Pulmonary Hypertension, Diabetes Mellitus. Admitted 12-4 with altered mental status.       Moisture Management:  Brief    Current Diet / Nutrition:           Active Diet Order      Combination Diet Regular Diet Adult; Dialysis Diet; No Caffeine Diet            Jaskaran Assessment and sub scores:   Jaskaran Score  Av.1  Min: 11  Max: 19     Labs:         Recent Labs   Lab Test  18   0626   18   1859   18   0632   18   1228   16   0843   ALBUMIN   --    --   3.1*   < >   --    --   3.4   < >   --    HGB  12.0*   < >  12.5*   < >  9.8*   < >  10.5*   < >   --    RBC  3.37*   < >  3.48*   < >  2.76*   < >  2.96*   < >   --    WBC  5.4   < >  9.1   < >  5.1   < >  6.4   < >   --    PLT  115*   < >  140*   < >  168   < >  106*   < >   --    INR   --    --    --    --    --    --    --    --   1.17*   A1C   --    --    --    --    --    --   5.1   --    --    CRP   --    --    --    --   18.5*   --    --    --    --     < > = values in this interval not displayed.          Wound Assessment (location #1 Right dorsal foot):     Wound History:  Hx unclear, pt not responsive. Nurse states that pt from [facility] and had fallen recently.    Specific Dimensions (length x width x depth, in cm):   2 x1 cm, thin yellow eschar. Periwound tissue fragile. Both legs with 1+ edema.    Tunneling:  N/A      Undermining: N/A    Wound Base: dry eschar     Palpation of the wound bed:  firm    Slough appearance:  firm         Eschar appearance:  dry    Periwound Skin: intact,      Color: normal and consistent with surrounding tissue    Temperature  normal     Drainage:  none     Odor: none       Pain:  absent   Wound  Assessment (location #2 Left dorsal foot ):     Wound History:  See above    Specific Dimensions (length x width x depth, in cm):   5.6 x 5.2 cm, unroofed blister    Tunneling:  N/A      Undermining: N/A    Wound Base: dry granulation    Palpation of the wound bed:  firm    Slough appearance:  none         Eschar appearance:  none    Periwound Skin: intact,  fragile    Color: normal and consistent with surrounding tissue    Temperature  normal     Drainage:  none    Odor: none  Pain:  absent   Wound Assessment (location #3 Left dorsal hand):     Wound History:  See above    Specific Dimensions (length x width x depth, in cm):   4.5 x 5.2 cm    Tunneling:  N/A      Undermining: N/A    Wound Base: moist and slough     Palpation of the wound bed:  firm    Slough appearance: barreto          Eschar appearance:  none    Periwound Skin: intact,  fragile    Color: normal and consistent with surrounding tissue    Temperature  normal     Drainage:  Scant serous     Odor: none  Pain:  absent     Wound Assessment (location #4 Left forearm):    Wound History:  See above    Specific Dimensions (length x width x depth, in cm):   7x 1 x 0.2 cm    Tunneling:  N/A      Undermining: N/A    Wound Base: moist and slough     Palpation of the wound bed:  firm    Slough appearance:  barreto         Eschar appearance:  none    Periwound Skin: fragile,      Color: normal and consistent with surrounding tissue    Temperature  normal     Drainage:  none     Odor: none  Pain:  absent     Wound Assessment (location #5 Left elbow):     Wound History:  See abpve    Specific Dimensions (length x width x depth, in cm):   1 x 2.8 x 0.2 cm    Tunneling:  N/A      Undermining: N/A    Wound Base: moist and slough     Palpation of the wound bed:  firm    Slough appearance:  barreto         Eschar appearance:  none    Periwound Skin: intact,  fragile    Color: normal and consistent with surrounding tissue    Temperature  normal     Drainage:  none     Odor:  none  Pain:  absent           Intervention:     Patient's chart evaluated.      Wounds were  assessed    Wound Care: was not done:  Nurse to do    Orders  Written    Supplies  discussed with RN    Discussed plan of care with Nurse Ibanez          Assessment:       Open areas on feet, left hand and arm with slough. Skin fragile, legs with 1+ edema. Will start Vaseline gauze to keep moist and protect.      Family has now decided on comfort care and hospice, will be discharged when can make arrangements with a  facility for cares.              Plan:     Nursing to notify the Provider(s) and re-consult the WO Nurse if wounds deteriorateor if the wound care plan needs reevaluation.    Plan of care for wound located on  Right dorsal foot, left dorsal foot, left hand, left forearm, left elbow: Cleanse with Microklenz, pat dry. Apply folded Vaseline gauze, cover with gauze 4x4, secure with kerlix. Change once daily.     WOC Nurse will return: weekly and prn

## 2018-12-05 NOTE — PROGRESS NOTES
SWS     D: Per request to assist with discharge planning, discussed status with Liz VALENTIN aware of pending palliative care consult. Noted pt's admission from [facility] where he had been in the long term care arae of the facility. It is noted that pt had been receiving dialysis, noted reports of pt experiencing altered mental status during dialysis yesterday prompting pt's presentation to the ED.. After assessment in ED pt returned to his residence at [facility], and documentation of the staff there felling that they could not readmit him at that time, pt's return then to the hospital. LIZ has spoken today to admissions coordinator at [facility] who informed that pt's family did not chose to bed hold there at this time.     A/P: Awaiting availability of family for discharge planning.

## 2018-12-05 NOTE — PLAN OF CARE
Problem: Patient Care Overview  Goal: Plan of Care/Patient Progress Review  PRIMARY DIAGNOSIS: Altered Mental Status  OUTPATIENT/OBSERVATION GOALS TO BE MET BEFORE DISCHARGE:  1. ADLs back to baseline: No    2. Activity and level of assistance: Up with maximum assistance. Consider SW and/or PT evaluation.     3. Pain status: Pain free.    4. Return to near baseline physical activity: No     Vitals are Temp: 95.2  F (35.1  C) Temp src: Oral BP: 101/51 Pulse: 61 Heart Rate: 57 Resp: 22 SpO2: 94 %.    We are unable to assess patient's orientation. Patient seems to be alert to self.  They are 2 assist with Lift.  Pt is a Regular, No caffeine and Dialysis diet and denying pain.  Patient has no IV access.    Patient is being repositioned every 2 hours.  Patient is unable to arouse enough to take any oral medications.  Plan of care for patient is a nephrology consult, wound ostomy for wounds, Social work consult, physical therapy consult, and an occupational therapy consult.    Discharge Planner Nurse   Safe discharge environment identified: No  Barriers to discharge: Yes          Please review provider order for any additional goals.   Nurse to notify provider when observation goals have been met and patient is ready for discharge.

## 2018-12-05 NOTE — PROGRESS NOTES
[facility]    Hospitalist Progress Note    Date of Service (when I saw the patient): 12/05/2018    Assessment & Plan   Maurizio Ohara is a 89 year old male with PMH including ESRD on HD x 9 yrs, coronary artery disease, pulmonary hypertension, severe aortic stenosis (non-operable) and diabetes who was admitted on 12/4/2018 with declining health and altered mental status at HD (which has been occurring)    Altered mental status during HD    - resolved    - has been occurring in recent months    - patient has had an overall decline in recent months ( see below)    Generalized decline in setting of chronic end stage disease    - met with patient, niece Leticia (POA), sister Karyn and friend Rosa today: all agreeing to hospice care at this point    - all understand this would include stopping dialysis    - will have Palliative Care see patient (spoke with Kelsy)    - spoke with Kassandra (social work) who will meet with family    ESRD    - will be stopping dialysis    - I already spoke with Dr Yung (Nephrology)    Has severe /progressive aortic stenosis    - last met with Dr. Keyes on Nov 26- non operable      Pulmonary HTN/CAD/Diastolic CHF    - stable    Diabetes    - patient not on meds    No labs in am as patient is transitioning to hospice    Met with family (as above)    DVT Prophylaxis: will be hospice/comfort care  Code Status: DNR/DNI    Disposition: Expected discharge once facility found    Dionte Mena    Interval History   Patient this am is able to tell me Dr. Yung had been in to see him. He knows he is in [facility] (was just told by Dr. Yung). He cannot tell me the year or the president. He states he has some left arm pain which has been there. No chest pain, or SOB. He did eat breakfast    -Data reviewed today: I reviewed all new labs and imaging results over the last 24 hours. I personally reviewed no images or EKG's today.    Physical Exam   Temp: 95.6  F (35.3  C) Temp  src: Oral BP: (!) 84/41 Pulse: 65 Heart Rate: 65 Resp: 18 SpO2: 92 % O2 Device: None (Room air) Oxygen Delivery: 1.5 LPM  Vitals:    12/04/18 1131   Weight: 83 kg (182 lb 15.7 oz)     Vital Signs with Ranges  Temp:  [95.2  F (35.1  C)-97.4  F (36.3  C)] 95.6  F (35.3  C)  Pulse:  [61-65] 65  Heart Rate:  [51-68] 65  Resp:  [17-24] 18  BP: ()/(41-71) 84/41  SpO2:  [88 %-100 %] 92 %       Constitutional: Awake, alert, cooperative, no apparent distress   Respiratory: Mild crackles in left base   Cardiovascular: Regular rate and rhythm, normal S1 and S2, and no murmur noted   Abdomen: Normal bowel sounds, soft, non-distended, non-tender   Skin: No rashes, no cyanosis, dry to touch   Neuro: Alert and oriented x2, no weakness, numbness, memory loss   Extremities: No edema, normal range of motion   Other(s):        All other systems: Negative     Medications       aspirin  81 mg Oral Daily     calcium acetate  2,001 mg Oral TID w/meals     diclofenac  2 g Topical 4x Daily     gabapentin (NEURONTIN) capsule 100 mg  100 mg Oral TID       Data     Recent Labs  Lab 12/05/18  0626 12/04/18  1127 12/02/18  0900  12/01/18  1913 12/01/18  1859   WBC 5.4 7.0  --   --   --  9.1   HGB 12.0* 11.4*  --   --  13.9 12.5*   * 113*  --   --   --  115*   * 128*  --   --   --  140*    135 131*  --  131* 132*   POTASSIUM 5.1 4.5 5.9*  < > 5.9* 6.1*   CHLORIDE 98 97 96  --  96 95   CO2 29 29 25  --   --  27   BUN 75* 56* 107*  --  92* 102*   CR 5.94* 4.51* 7.75*  --   --  7.19*   ANIONGAP 6 9 10  --  8 10   KIMBER 9.1 9.1 10.2*  --   --  10.5*   * 227* 140*  --  158* 156*   ALBUMIN  --   --   --   --   --  3.1*   PROTTOTAL  --   --   --   --   --  7.1   BILITOTAL  --   --   --   --   --  0.8   ALKPHOS  --   --   --   --   --  173*   ALT  --   --   --   --   --  23   AST  --   --   --   --   --  25   TROPI  --  <0.015  --   --   --   --    < > = values in this interval not displayed.    Recent Results (from the  past 24 hour(s))   Head CT w/o contrast    Narrative    CT SCAN OF THE HEAD WITHOUT CONTRAST December 4, 2018 12:07 PM     HISTORY: Altered mental status.    TECHNIQUE: Axial images of the head and coronal reformations without  IV contrast material. Radiation dose for this scan was reduced using  automated exposure control, adjustment of the mA and/or kV according  to patient size, or iterative reconstruction technique.    COMPARISON: 11/16/2018.    FINDINGS: There is generalized atrophy of the brain. There is low  attenuation in the white matter of the cerebral hemispheres consistent  with sequelae of small vessel ischemic disease. There is no evidence  of intracranial hemorrhage, mass, acute infarct or anomaly.     The visualized portions of the sinuses and mastoids appear normal.  There is no evidence of trauma. There is bilateral proptosis,  unchanged.      Impression    IMPRESSION:   1. No acute abnormality.  2.  Atrophy of the brain. White matter changes consistent with  sequelae of small vessel ischemic disease.  3. Bilateral proptosis.  4. No change.     GIOVANNI MATSON MD   XR Chest 2 Views    Narrative    CHEST TWO VIEWS  12/4/2018 12:08 PM     HISTORY: AMS;     COMPARISON: 11/16/2018 chest x-ray      Impression    IMPRESSION: Cardiomegaly and pulmonary vascular congestion without  significant change. New mild elevation right hemidiaphragm. Mild  rounded opacity just above the medial right hemidiaphragm could be  lower lobe pulmonary vessel. Left hemidiaphragm is difficult to  visualize, either by enlarged heart or lower lobe infiltrate. Lateral  chest x-ray may be helpful.    RAVI FERNANDO MD

## 2018-12-05 NOTE — PLAN OF CARE
Problem: Patient Care Overview  Goal: Plan of Care/Patient Progress Review  PRIMARY DIAGNOSIS: Altered Mental Status  OUTPATIENT/OBSERVATION GOALS TO BE MET BEFORE DISCHARGE:  1. ADLs back to baseline: No     2. Activity and level of assistance: Up with maximum assistance. Consider SW and/or PT evaluation.      3. Pain status: Pain free.     4. Return to near baseline physical activity: No      Vitals are Temp: 95.6  F (35.3  C) Temp src: Oral BP: 108/55 Pulse: 61 Heart Rate: 63 Resp: 18 SpO2: 98 %  We are unable to assess patient's orientation. Patient seems to be alert to self.  They are 2 assist with Lift.  Pt is a Regular, No caffeine and Dialysis diet and denying pain.  Patient has no IV access.    Patient is being repositioned every 2 hours.  Patient is unable to arouse enough to take any oral medications.  Plan of care for patient is a nephrology consult, wound ostomy for wounds, Social work consult, physical therapy consult, and an occupational therapy consult.     Discharge Planner Nurse   Safe discharge environment identified: No  Barriers to discharge: Yes          Please review provider order for any additional goals.   Nurse to notify provider when observation goals have been met and patient is ready for discharge.

## 2018-12-05 NOTE — CONSULTS
[facility]    Palliative Care Consultation   Text Page    Date of Admission:  12/4/2018    Assessment & Plan   Maurizio Ohara is a 89 year old male who was admitted on 12/4/2018. I was asked to see the patient for goals of care, patient and family support.    Recommendations:  1.Decisional Capacity -  Questionable. Patient does not have an advance directive. Per  informed consent policy next of kin should be involved in all consent and decision making.  His daughter in law cristina is acting as spokesperson with son Rosa and sister Karyn  2. Pain- Denies  3. Delirium- Family notes off and on hallucinations, currently somnolent oriented to self only.  Discussed with family as a symptom to expect as patient continues to decline. Haldol PRN comfort.   4. Pruritis, not currently but at risk for without dialysis- Sarna Cream PRN  5. Care Planning- Appreciate Care of [facility], planning for LTC with hospice support through end of life.   Spiritual Care- Patient is Zoroastrianism, Family reflected upon their fond memories of him over his lifetime and things he used to enjoy that he is no longer able to enjoy.     Goal of Care:DNR/DNI, comfort only, no further life prolonging medical interventions.  Orders changed to reflect this.  Comfort care order set.  Discussed dying process and things family may expect.  Gone from my sight pamphlet given to Sister Karyn.     Disease Process/es & Symptoms:  Maurizio Ohara is a 89 year old patient admitted with declining health and change in mental status. This is in the setting of Chronic kidney disease on dialysis with failinghealth.  =He has been hospitalized 5 times in the past 4 months and it is noted to be more of a burdern to get to and tolerate dialysis and subsequent fatigue than the benefit of further longevity.  Patient and family have decided to stop dialysis and focus on comfort care.      Findings & plan of care discussed with: KIP Mathew  "Nurse Marcelle Ibarra.  Follow-up plan from palliative team: Will continue to folklow in support of comfort care.  Thank you for involving us in the patient's care.     Kelsy LEVINE, CNS, CNP  Pain Management and Palliative Care  [facility]  Pgr: [phone]    Time Spent on this Encounter   I spent  95 minutes total, >50%  in assessment of the patient, counseling and discussion with the patient and family as documented in this note as well as coordination of care and discussion with the health care team.    Reason for Consult   Reason for consult: I was asked by Dr. Mena to evaluate this patient for Symptom management  Goals of care  Patient and family support.    Primary Care Physician   Justina Mosie    Chief Complaint   Fatigue, \"I would just like to close my eyes.\"  Biggest Concern : \"Whats going to happen to me from here?\"    History is obtained from the patient, electronic health record and patient's family    History of Present Illness   Maurizio Ohara is a 89 year old male who presents with declining health and change in mental status. This is in the setting of Chronic kidney disease on dialysis with failinghealth.  =He has been hospitalized 5 times in the past 4 months and it is noted to be more of a burdern to get to and tolerate dialysis and subsequent fatigue than the benefit of further longevity.  Patient and family have decided to stop dialysis and focus on comfort care.      The following symptoms are noted by patient as concerning to his quality of life.  Fatigue   Drowsiness   Hallucinations per family    Decision-Making & Goals of Care:  Discussed on December 5, 2018 with CARL Grant. CNP:   Met with patient and woke from sleep. He is currently disoriented to place and situation.  He tells me he is tired and wishes he could get some rest.  He denies most symptoms and agrees he has a supportive family who is helping with his biggest concern " which is planning for future. I then met with son, dtr in law and sister.  They relayed patient's failing health and difficulty managing the schedule and endurance for dialysis.  They relay previous conversations with dr. Mena and Dr. Yung and agree stopping dialysis makes sense given the now unclear benefit of longevity.  They agree to focus on comfort and we discussed what that would look like, and the dying process.  We completed a POLST.  They also spent time reflecting on good memories of [place] though the years.      Patient has decision-making capacity Unreliable  Patient has no known legal document designating a decision maker. Per policy next of kin is the designated decision maker. See System Informed Consent policy for guidance.  Physician orders for life-sustaining treatment (POLST) form is on file but updated today.  Code Status: Do not resusitate / Do not intubate     Past Medical History   I have reviewed this patient's medical history and updated it with pertinent information if needed.   Past Medical History:   Diagnosis Date     Anemia      CAD (coronary artery disease) 12/24/14    PCI and BMS to mid RCA (5.0 x 20mm) with residual mod diffuse mid LAD disease     Chronic kidney disease     on Dialysis     Diabetes (H)      Hypertension      Mitral regurgitation 12/24/2014    mild-mod (1-2+) per echo     Mitral valve disorders(424.0) 12/24/2014    mild/mod (1-2+) MR     Myocardial infarction (H) 12/24/2014    NSTEMI     Pulmonary hypertension (H) 12/24/2014    RVSP elevated c/w mild-mod PH per echo     Renal disease due to diabetes mellitus (H)     End stage; on Dialysis       Past Surgical History   I have reviewed this patient's surgical history and updated it with pertinent information if needed.  Past Surgical History:   Procedure Laterality Date     CORONARY ANGIOGRAPHY ADULT ORDER  12/24/2014     ENT SURGERY      Tonsillectomy     HEART CATH, ANGIOPLASTY  12/24/2014    PCI and BMS (5.0x20mm)to  "mid RCA     THORACIC SURGERY      Herniated disc       Prior to Admission Medications   Prior to Admission Medications   Prescriptions Last Dose Informant Patient Reported? Taking?   GABAPENTIN PO 12/4/2018 at 1200  Yes Yes   Sig: Take 100 mg by mouth 3 times daily   MIDODRINE HCL PO 12/4/2018 at 0700  Yes Yes   Sig: Take 10 mg by mouth three times a week on Tues, Thurs, Sat 1 hour before dialysis AND Give 10 mg by mouth one time a day every Tue, Thu for dialysis Send one tablet with resident to dialysis   acetaminophen (TYLENOL) 500 MG tablet  Other Yes Yes   Sig: Take 1,000 mg by mouth 3 times daily as needed for mild pain   aspirin 81 MG tablet 12/4/2018 at 0800 Other Yes Yes   Sig: Take 81 mg by mouth daily   atorvastatin (LIPITOR) 40 MG tablet 12/3/2018 at hs Other Yes Yes   Sig: Take 40 mg by mouth At Bedtime   calcium acetate (PHOSLO) 667 MG CAPS 12/4/2018 at 1200 Other Yes Yes   Sig: Take 2,001 mg by mouth 3 times daily (with meals)   diclofenac (VOLTAREN) 1 % GEL topical gel 12/4/2018 at 1200 Other No Yes   Sig: Apply 2 g topically 4 times daily Apply to RLE   nitroglycerin (NITROSTAT) 0.4 MG sublingual tablet  Other No Yes   Sig: Place 1 tablet (0.4 mg) under the tongue every 5 minutes as needed for chest pain   polyethylene glycol (MIRALAX/GLYCOLAX) Packet 12/4/2018 at 0800 Other No Yes   Sig: Take 17 g by mouth daily   polyethylene glycol (MIRALAX/GLYCOLAX) Packet  Other Yes Yes   Sig: Take 1 packet by mouth daily as needed for constipation      Facility-Administered Medications: None     Allergies   Allergies   Allergen Reactions     Glyburide      Lisinopril      Hyperkalemia when hospitalized 4/5/2011     Metformin      Renal failure stage 4     Glenn Gallagher RN clarified with pt, pt does not remember rxn, it was a long time ago, and \"nothing crazy\".     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 Retired  Use of community resources: no  Activities/interests: House Boats, Playing [employer]ion, His dogs  History of substance use/abuse: unknown  [place]ahai affiliation: Oriental orthodox  Involvement in xiao community: unknown    Family History   I have reviewed this patient's family history and updated it with pertinent information if needed.   Family History   Problem Relation Age of Onset     Diabetes Mother        Review of Systems   The 10 point Review of Systems is negative other than noted in the HPI or here.     Palliative Symptom Review (0=no symptom/no concern, 1=mild, 2=moderate, 3=severe):      Pain: 0-none      Fatigue: 3-severe      Nausea: 0-none      Constipation: 0-none      Diarrhea: 0-none      Depressive Symptoms: 0-none      Anxiety: 0-none      Drowsiness: 2-moderate      Poor Appetite: 0-none      Shortness of Breath: 0-none      Insomnia: 0-none    Physical Exam   Temp:  [95.2  F (35.1  C)-96.6  F (35.9  C)] 96.4  F (35.8  C)  Pulse:  [65] 65  Heart Rate:  [57-65] 64  Resp:  [16-22] 20  BP: ()/(41-58) 79/42  SpO2:  [88 %-99 %] 92 %  182 lbs 15.71 oz  GEN:  Somnolent, answers questions but keeps eyes closed, oriented x 1, appears comfortable, NAD.  HEENT:  Normocephalic/atraumatic, no scleral icterus, no nasal discharge, mouth moist.  CV:  RRR, S1, S2; no murmurs or other irregularities noted.  +3 DP/PT pulses bilatererally; no edema BLE.  RESP:  Clear to auscultation bilaterally without rales/rhonchi/wheezing/retractions.  Symmetric chest rise on inhalation noted.  Normal respiratory effort.  ABD:  Rounded, soft, non-tender/non-distended.  +BS  EXT:  Edema & pulses as noted above.  CMS intact x 4.     M/S:   Tender to palpation B LE.   SKIN:  Dry to touch, no exanthems  noted in the visualized areas.  Areas of open wounds dressed and clean on all extremities.  NEURO:Appears weak and lethargic, moves all extremities.   Psych:  somnolent.  Calm, cooperative, conversant appropriately but minimally.     Delirium Screen/CAM:  Delirium = (#1 and #2 = YES) + (#3 and/or #4)   1) Acute onset and fluctuating course:   YES   (acute change in mental status from baseline over last 24 hours)  2) Inattention:   YES   (difficulty focusing, distractible, can't follow conversation)  3) Disorganized thinking:   YES   (score only if #1 and #2 are YES)  (rambling/irrelevant conversation, unclear/illogical thoughts, inconsistency)  4) Altered level of consciousness:   YES   (score only if #1 and #2 are YES)  (other than alert, calm, cooperative)    Delirium/CAM score: 4/4  Interpretation:  1)  Delirium:  Present  2)  Type:  mixed  3)  Severity:  moderate    Data   Results for orders placed or performed during the hospital encounter of 12/04/18 (from the past 24 hour(s))   Basic metabolic panel   Result Value Ref Range    Sodium 133 133 - 144 mmol/L    Potassium 5.1 3.4 - 5.3 mmol/L    Chloride 98 94 - 109 mmol/L    Carbon Dioxide 29 20 - 32 mmol/L    Anion Gap 6 3 - 14 mmol/L    Glucose 108 (H) 70 - 99 mg/dL    Urea Nitrogen 75 (H) 7 - 30 mg/dL    Creatinine 5.94 (H) 0.66 - 1.25 mg/dL    GFR Estimate 9 (L) >60 mL/min/1.7m2    GFR Estimate If Black 11 (L) >60 mL/min/1.7m2    Calcium 9.1 8.5 - 10.1 mg/dL   CBC with platelets   Result Value Ref Range    WBC 5.4 4.0 - 11.0 10e9/L    RBC Count 3.37 (L) 4.4 - 5.9 10e12/L    Hemoglobin 12.0 (L) 13.3 - 17.7 g/dL    Hematocrit 38.7 (L) 40.0 - 53.0 %     (H) 78 - 100 fl    MCH 35.6 (H) 26.5 - 33.0 pg    MCHC 31.0 (L) 31.5 - 36.5 g/dL    RDW 19.1 (H) 10.0 - 15.0 %    Platelet Count 115 (L) 150 - 450 10e9/L

## 2018-12-05 NOTE — PLAN OF CARE
Problem: Patient Care Overview  Goal: Plan of Care/Patient Progress Review  ROOM # 225    Living Situation (if not independent, order SW consult): [facility] name:  : Unknown     Activity level at baseline: W/C bound   Activity level on admit: x2      Patient registered to observation; given Patient Bill of Rights; given the opportunity to ask questions about observation status and their plan of care.  Patient has been oriented to the observation room, bathroom and call light is in place.    Discussed discharge goals and expectations with patient/family.

## 2018-12-05 NOTE — PLAN OF CARE
Denied. Patient was asked to follow up in 8 weeks, around 3/16/17. He also no showed an appointment.    Problem: Patient Care Overview  Goal: Plan of Care/Patient Progress Review  Outcome: No Change  Vitals: Temp: 95.6  F (35.3  C) Temp src: Oral BP: 91/46 Pulse: 65 Heart Rate: 65 Resp: 18 SpO2: 92 % O2 Device: None (Room air) BP soft, pt reports some dizziness with movement  Labs:Creat 5.94  Neuro:Disoriented to place. Able to answer questions and follow command  Lungs:WDL  Cardiac: Ken at times, BP soft  GI/:Oliguria, pt on hemodialysis   Skin: Multiple wound, WOC saw today. All wounds in flowsheet, dressed and CDI, needs to be repositioned Q2, pulsating mattress ordered  Diet: Reg, dialysis diet, unable to feed self  Pain: Tylenol given this AM for discomforted noted when repositioning  Activity: Unable to get out of bed. Reposition 2ast.   Plan: SW, palliative care, Dressing changes once daily with petroleum, sterile gauze and gauze wrap. Reposition frequently

## 2018-12-05 NOTE — CONSULTS
".Care Transition Initial Assessment - SW  Reason For Consult: discharge planning, see also previous SW documentation from today in medical record  Met with: Family    Active Problems:    Altered mental status       DATA  Lives With: facility resident ([facility], no bed hold at this time)  Living Arrangements: extended care facility  Description of Support System: Supportive, Involved  Who is your support system?: Sibling(s) (niece and nephew)  Support Assessment: Adequate family and caregiver support.       Resources List: Hospice, Skilled Nursing Facility     Quality Of Family Relationships: supportive, involved  Transportation Available:  (to be determined)    INTERVENTION    Met this afternoon with pt's sister, Karyn, her daughter, Leticia and her , Rosa... Pt asleep so did not involve him in the conversation today.  They have affirmed planning for pt's comfort care status at this time with the discontinuing of the dialysis services for pt noting that what he is currently experiencing does not represent quality of life for pt based on them knowing of his wishes. Rosa has shared that in Septemeber they went on a charter boat, and pt \"captained\" the boat.      They have affirmed that they are not holding bed at [facility], wish to have him closer to where they live which is in the [facility] area. They have identified [facility], and [facility] as facilities of consideration, referrals have been made. In discussing opportunity for hospice services they indicate that they wish to pursue. Based on review of agencies available and discussion, referral has been made to [place], agency representative will contact Leticia to arrange either here at [facility], or at LTC facility depending on when pt discharges.       Also to note that to the knowledge of family present pt does not have a HCPOA, Leticia is durable power of . When asked who pt would assign for helping him make HC decisions, " Leticia indicated that pt has asked her to do this for him.         SW has discussed with family that pt will not have insurance covergae at the LTC facility which they acknowledge understanding of, discussed that [facility] services would be covered by Medicare.     ASSESSMENT  Good family support     PLAN  Financial costs for the patient includes: Noted above  Anticipate discharging to:  LTC facility with hospice services. Awaiting facility assessments.

## 2018-12-05 NOTE — PROGRESS NOTES
Met c pt, Dr. Mena.  He is intermittently confused.  Decision per family is to move to comfort care.  I will alert the [facility] unit of this decision.

## 2018-12-05 NOTE — PLAN OF CARE
Problem: Patient Care Overview  Goal: Plan of Care/Patient Progress Review  PRIMARY DIAGNOSIS: Altered Mental Status  OUTPATIENT/OBSERVATION GOALS TO BE MET BEFORE DISCHARGE:  1. ADLs back to baseline: No      2. Activity and level of assistance: Up with maximum assistance. Consider SW and/or PT evaluation.       3. Pain status: Pain free.      4. Return to near baseline physical activity: No      Vitals are Temp: 96.6  F (35.9  C) Temp src: Axillary BP: 98/55 Pulse: 65 Heart Rate: 63 Resp: 18 SpO2: 97 %  We are unable to assess patient's orientation. Patient seems to be alert to self.  They are 2 assist with Lift.  Pt is a Regular, No caffeine and Dialysis diet and denying pain.  Patient has no IV access.    Patient is being repositioned every 2 hours.  Patient is unable to arouse enough to take any oral medications.  Plan of care for patient is a nephrology consult, wound ostomy for wounds, Social work consult, physical therapy consult, and an occupational therapy consult.      Discharge Planner Nurse   Safe discharge environment identified: No  Barriers to discharge: Yes     Please review provider order for any additional goals.   Nurse to notify provider when observation goals have been met and patient is ready for discharge.

## 2018-12-06 NOTE — PLAN OF CARE
Problem: Patient Care Overview  Goal: Plan of Care/Patient Progress Review  PRIMARY DIAGNOSIS: ALTERED MENTAL STATUS    OUTPATIENT/OBSERVATION GOALS TO BE MET BEFORE DISCHARGE  1. Orthostatic performed: N/A    2. Tolerating PO medications: Yes    3. Return to near baseline physical activity: No    4. Cleared for discharge by consultants (if involved): Yes    Discharge Planner Nurse   Safe discharge environment identified: No  Barriers to discharge: Yes- placement       Entered by: Luisana Watson 12/06/2018 9:49 AM     Please review provider order for any additional goals.   Nurse to notify provider when observation goals have been met and patient is ready for discharge.

## 2018-12-06 NOTE — PROGRESS NOTES
SWS     D: Discharge planning continuing... [facility] has assessed and will be able to accept pt today, LTC with hospice services, shared room.  It has been requested that if pt/family are in agreement that the hospice services be arranged through [facility] hospice as pt will be followed there by [facility] MD. Discussed with MD who informs that pt would be ready for discharge today.      I: Spoke by phone this morning to pt's Leticia ochoa and discussed above. She has asked on pt's behalf that planning continue for his transfer to [facility] and that the arrangements for hospice be made through [facility], referral has been made to Leticia.. SW will fax clinical information when MD orders are completed. It has been requested by Leticia ochoa that the hospice consult be done at [facility], agency representative will contact Leticia to arrange.         Medical transport has been requested by Leticia, she is in agreement with arrangements for stretcher based on pt's current status..Discussed with her that the transport will be billed to pt's insurances but that  is unable to guarantee coverage, transport cost of $936/base and $4.50/mi which she acknowledges and agrees. [facility] arranged for 1230.          Also discussed with Leticia that there is no upfront cost at the facility for pt's admission today, daily cost to be determined but per admission coordinator there ([place]) it would be $250-$350/day, Leticia acknowledges and accepts.       KIP has reached out to [facility] at [facility] and asked that pt's PAS information be faxed by them to [facility].     A/P: Anticipate no problem with arrangements made for discharge today, with discharge no further SWS.

## 2018-12-06 NOTE — DISCHARGE INSTRUCTIONS
Hemodialysis Access Bleeding  You have a hemodialysis access in your arm, either an arteriovenous (AV) fistula or an artery to vein graft. It has been bleeding. Blood needs to flow freely through the fistula or graft. As part of your treatment, you are also taking medicine that thins your blood. This makes you bleed more easily. It is important to stop your fistula or graft from bleeding as soon as possible--you can quickly bleed to death from a rapidly bleeding hemodialysis access.     For a quickly bleeding fistula or graft, use firm pressure with a gauze or cloth until it stops.   Home care  If your fistula or graft site is bleeding:    If it is bleeding quickly, right away put pressure right on the spot that is bleeding with a gauze or cloth. Push hard till it stops. Then, get medical help right away.    If it is just oozing a small amount of blood, follow these directions:    Wash your hands. Dry them on a clean towel.    Put a small piece of sterile gauze on the area that is bleeding.    Press gently with your fingertips only on the area that is bleeding. Hold your fingers there for 30 minutes. Don't press on the whole forearm. Don't wrap anything around the wrist or arm, including bandages.    After 30 minutes, take your fingers off the area that was bleeding.    Wash and dry your hands again.    If it is still bleeding, call your healthcare provider or go to a hospital.  General access site care  The following guidelines will help you care for your access site:    Wash your hands often. Keep the access site clean.    Check the fistula or graft for the pulsing feeling ( thrill ) every morning and night.    Follow these don'ts:       ? Don't let anyone take your blood pressure on your hemodialysis access arm.  ? Don't let anyone take blood from or inject medication into the arm.  ? Don't wear jewelry or tight sleeves over the access site.  ? Don't wiggle the fistula or graft, or pick at your skin near the  access.  ? Don't carry anything over the arm or lift heavy objects with that arm.  ? Don't sleep on your arm with the access site.  Follow-up care  Follow up with your healthcare provider, or as advised.  Call 911  Call 911 if any of the following occur:    Pain, cold, or numbness in the hand that won't go away    Pale color of the hand that won't go back to pink    Any major bleeding, or minor bleeding from the access site that won't stop  When to seek medical advice  Call your healthcare provider right away if you have any of the following:    Fever of 100.4 F (38 C) or higher, or as directed by your healthcare provider    Red, hot, or swollen area around the access site    Light-colored fluid coming from the area around the access site    Pain or hardness around the access site    Loss of pulsing feeling ( thrill ) over the fistula or graft  Date Last Reviewed: 10/1/2016    3937-4278 [employer]. [address] [place] [address]. All rights reserved. This information is not intended as a substitute for professional medical care. Always follow your healthcare professional's instructions.    Plan of care for wound located on  Right dorsal foot, left dorsal foot, left hand, left forearm, left elbow:   1. According to drainage on old dressin. Cleanse with wound spray, pat dry.   3. Cover with non adherent or ABD (depending on drainage). Secure with roll gauze.

## 2018-12-06 NOTE — PLAN OF CARE
Problem: Patient Care Overview  Goal: Plan of Care/Patient Progress Review  Outcome: No Change  Patient on comfort cares now.  BPs low, no intervention.  Patient agreeable to trying oral pain meds this evening and pain more tolerable.  Patient placed on a pulsate mattress.  Multiple wounds on skin covered w/ dressings c/d/i.  Continue comfort cares.

## 2018-12-06 NOTE — PLAN OF CARE
Discharge via stretcher with [facility]. PO dilaudid given prior to transport. AVS given to drive.     OBSERVATION patient END time: 1230

## 2018-12-06 NOTE — PLAN OF CARE
Problem: Patient Care Overview  Goal: Plan of Care/Patient Progress Review  Outcome: No Change  PRIMARY DIAGNOSIS: ALTERED MENTAL STATUS  OUTPATIENT/OBSERVATION GOALS TO BE MET BEFORE DISCHARGE:  1. Pain Status: Improved-controlled with oral pain medications.    2. Return to near baseline physical activity: No    3. Cleared for discharge by consultants (if involved): N/A    Patient alert and oriented x4; speech is garbled. Patient receiving comfort cares. Patient denies pain and is resting comfortably. Patient repositioned. Nursing to continue to monitor.     Discharge Planner Nurse   Safe discharge environment identified: No  Barriers to discharge: Placement        Entered by: Tootie Angel 12/06/2018    Please review provider order for any additional goals.   Nurse to notify provider when observation goals have been met and patient is ready for discharge.

## 2018-12-06 NOTE — PLAN OF CARE
Problem: Patient Care Overview  Goal: Plan of Care/Patient Progress Review  Outcome: No Change  PRIMARY DIAGNOSIS: ALTERED MENTAL STATUS  OUTPATIENT/OBSERVATION GOALS TO BE MET BEFORE DISCHARGE:  1. Pain Status: Improved-controlled with oral pain medications.     2. Return to near baseline physical activity: No     3. Cleared for discharge by consultants (if involved): N/A     Patient alert and oriented x4; speech is garbled. Patient receiving comfort cares. Patient denies pain and is resting comfortably. Declines offers for interventions at this time. Patient repositioned per request. Nursing to continue to monitor. Plan of care to consult with social work to find placement with hospice.      Discharge Planner Nurse   Safe discharge environment identified: No  Barriers to discharge: Placement        Entered by: Tootie Angel 12/06/2018      Please review provider order for any additional goals.   Nurse to notify provider when observation goals have been met and patient is ready for discharge.

## 2018-12-06 NOTE — PROGRESS NOTES
Focus: wounds   D/I: Pt now on Comfort care and updated wound care orders to reflect appropriate care  A/P: Plan of care for wound located on  Right dorsal foot, left dorsal foot, left hand, left forearm, left elbow:   1. According to drainage on old dressin. Cleanse with wound spray, pat dry.   3. Cover with non adherent or ABD (depending on drainage). Secure with roll gauze.

## 2018-12-06 NOTE — DISCHARGE SUMMARY
[facility]  Discharge Summary  Name: Maurizio Ohara    MRN: [id number]  YOB: 1929    Age: 89 year old  Date of Discharge:  12/6/2018  Date of Admission: 12/4/2018  Primary Care Provider: Justina Moise  Discharge Physician:  Lavinia Cage MD  Discharging Service:  Hospitalist      Discharge Diagnoses:  1. AMS during HD  2. Generalized decline in the setting of chronic end stage disease  3. ESRD  4. Severe/Progressive Aortic Stenosis  5. Pulmonary HTN, CAD, Chronic Diastolic CHF  6. DM     Hospital Course:  Maurizio Ohara is a 89 year old male with PMH including ESRD, coronary artery disease, pulmonary hypertension aortic stenosis,  and diabetes who was admitted 12/4/2018 with altered mental status. He was seen here in the ER 3 days PTA for concerns regarding increasing weakness and somnolence at which point he was found to be hyperkalemic and admitted for dialysis.  He was being dialyzed in the outpatient setting on the morning of admission when he became progressively less responsive.  EMS was called and dialysis unit stopped dialysis about two thirds of the way through his run.       Lab workup was otherwise unremarkable with normal white blood count and troponin which was undetectable.  He was felt to be clinically improving and the plan was to discharge him back to his care facility which is [facility].  However, apparently staff at his nursing facility felt he was not back to baseline and requested admission for observation overnight.     The primary issue here is goals of care and consideration of transition to comfort care measures.  Family meeting was held and palliative care was consulted.  Ultimate decision was to enroll the patient in hospice and transfer to LTC.  given his quality of life and general mobility and functional status have dramatically declined over the last 3 months.      HD was stopped during this admission and he was started on comfort cares.  He will  "have referral to hospice upon discharge to LTC.    # Discharge Pain Plan:   - During his hospitalization, Maurizio experienced pain due to hospice care.  The pain plan for discharge was discussed with Maurizio and the plan was created in a collaborative fashion.    - Opioids prescribed on discharge: Dilaudid  - Duration of opioids after discharge: Hospice care, LTC will monitor medications  - Bowel regimen: miralax and docusate         Discharge Disposition:  Admitted to hospice care.   Agency: [facility].  Discharged to long-term care facility     Allergies:  Allergies   Allergen Reactions     Glyburide      Lisinopril      Hyperkalemia when hospitalized 4/5/2011     Metformin      Renal failure stage 4     Penicillins      SHADY Gallagher clarified with pt, pt does not remember rxn, it was a long time ago, and \"nothing crazy\".     Verapamil         Condition on Discharge:  Discharge condition: Terminal   Discharge vitals: Blood pressure 101/57, pulse 56, temperature 95.3  F (35.2  C), temperature source Axillary, resp. rate 20, weight 83 kg (182 lb 15.7 oz), SpO2 93 %.   Code status on discharge: DNR / DNI     History of Illness:  See detailed admission note for full details.    Physical Exam:  Blood pressure 101/57, pulse 56, temperature 95.3  F (35.2  C), temperature source Axillary, resp. rate 20, weight 83 kg (182 lb 15.7 oz), SpO2 93 %.  Wt Readings from Last 1 Encounters:   12/04/18 83 kg (182 lb 15.7 oz)     General: Awake, eating breakfast with assistance, NAD.  HEENT: NC/AT, eyes anicteric Dentition WNL, MM moist.  Cardiac: Irregularly irregular without RVR , S1, S2.  3 out of 6 systolic murmur.  Pulmonary: Normal chest rise, normal work of breathing.  Lungs CTA BL  Abdomen: soft, non-tender, non-distended.  Bowel Sounds Present.  No guarding.  Extremities: Numerous superficial appearing abrasions, skin tears and dressings in place with scattered bruises.  Also has an occlusive dressing over his sacral area. Warm, " well perfused.  Skin: As above.  Neuro: Answering questions, alert, pleasant, diffusely weak but able to move all extremities although requires assistance to eat.    Psych: Appropriate affect. Oriented to person    Procedures other than Imaging:  NA     Imaging:  Results for orders placed or performed during the hospital encounter of 12/04/18   XR Chest 2 Views    Narrative    CHEST TWO VIEWS  12/4/2018 12:08 PM     HISTORY: AMS;     COMPARISON: 11/16/2018 chest x-ray      Impression    IMPRESSION: Cardiomegaly and pulmonary vascular congestion without  significant change. New mild elevation right hemidiaphragm. Mild  rounded opacity just above the medial right hemidiaphragm could be  lower lobe pulmonary vessel. Left hemidiaphragm is difficult to  visualize, either by enlarged heart or lower lobe infiltrate. Lateral  chest x-ray may be helpful.    RAVI FERNANDO MD   Head CT w/o contrast    Narrative    CT SCAN OF THE HEAD WITHOUT CONTRAST December 4, 2018 12:07 PM     HISTORY: Altered mental status.    TECHNIQUE: Axial images of the head and coronal reformations without  IV contrast material. Radiation dose for this scan was reduced using  automated exposure control, adjustment of the mA and/or kV according  to patient size, or iterative reconstruction technique.    COMPARISON: 11/16/2018.    FINDINGS: There is generalized atrophy of the brain. There is low  attenuation in the white matter of the cerebral hemispheres consistent  with sequelae of small vessel ischemic disease. There is no evidence  of intracranial hemorrhage, mass, acute infarct or anomaly.     The visualized portions of the sinuses and mastoids appear normal.  There is no evidence of trauma. There is bilateral proptosis,  unchanged.      Impression    IMPRESSION:   1. No acute abnormality.  2.  Atrophy of the brain. White matter changes consistent with  sequelae of small vessel ischemic disease.  3. Bilateral proptosis.  4. No change.     GIOVANNI  MD HUYEN        Consultations:  Consultations This Hospital Stay   SOCIAL WORK IP CONSULT  PHYSICAL THERAPY ADULT IP CONSULT  OCCUPATIONAL THERAPY ADULT IP CONSULT  NEPHROLOGY IP CONSULT  WOUND OSTOMY CONTINENCE NURSE  IP CONSULT  PALLIATIVE CARE ADULT IP CONSULT     Recent Lab Results:    Recent Labs  Lab 12/05/18  0626 12/04/18  1127 12/01/18  1913 12/01/18  1859   WBC 5.4 7.0  --  9.1   HGB 12.0* 11.4* 13.9 12.5*   HCT 38.7* 36.5*  --  39.9*   * 113*  --  115*   * 128*  --  140*       Recent Labs  Lab 12/05/18  0626 12/04/18  1127 12/02/18  0900    135 131*   POTASSIUM 5.1 4.5 5.9*   CHLORIDE 98 97 96   CO2 29 29 25   ANIONGAP 6 9 10   * 227* 140*   BUN 75* 56* 107*   CR 5.94* 4.51* 7.75*   GFRESTIMATED 9* 12* 7*   GFRESTBLACK 11* 15* 8*   KIMBER 9.1 9.1 10.2*          Pending Results:    Unresulted Labs Ordered in the Past 30 Days of this Admission     No orders found from 10/5/2018 to 12/5/2018.           Discharge Instructions and Follow-Up:   Discharge Orders     HOSPICE REFERRAL     General info for SNF   Length of Stay Estimate: Long Term Care  Condition at Discharge: Terminal  Level of care:skilled   Rehabilitation Potential: Poor  Admission H&P remains valid and up-to-date: Yes  Recent Chemotherapy: N/A  Use Nursing Home Standing Orders: Yes     Mantoux instructions   Give two-step Mantoux (PPD) Per Facility Policy Yes     Reason for your hospital stay   You were hospitalized for progressive decline and will be discharged on hospice care.     Additional Discharge Instructions   [facility] to consult and treat.     Activity - Up with nursing assistance     DNR/DNI     Advance Diet as Tolerated   Follow this diet upon discharge: Orders Placed This Encounter     Regular Diet Adult       Discharge Medications   Current Discharge Medication List      START taking these medications    Details   atropine 1 % ophthalmic solution Place 1-2 drops under the tongue every hour as  needed for other (secretions)  Qty: 1 Bottle, Refills: 0    Associated Diagnoses: Hospice care patient      bisacodyl (DULCOLAX) 10 MG suppository Place 1 suppository (10 mg) rectally daily as needed for constipation  Qty: 30 suppository, Refills: 0    Associated Diagnoses: Hospice care patient      camphor-menthol (DERMASARRA) 0.5-0.5 % external lotion Apply to area of itch, intact skin only    Associated Diagnoses: Hospice care patient      HYDROmorphone, STANDARD CONC, (DILAUDID) 1 MG/ML oral solution Take 1-2 mLs (1-2 mg) by mouth every 2 hours as needed for moderate to severe pain (or dyspnea)  Qty: 20 mL, Refills: 0    Associated Diagnoses: Hospice care patient      ondansetron (ZOFRAN-ODT) 4 MG ODT tab Take 1 tablet (4 mg) by mouth every 6 hours as needed for nausea or vomiting  Qty: 30 tablet, Refills: 0    Associated Diagnoses: Hospice care patient         CONTINUE these medications which have NOT CHANGED    Details   acetaminophen (TYLENOL) 500 MG tablet Take 1,000 mg by mouth 3 times daily as needed for mild pain      diclofenac (VOLTAREN) 1 % GEL topical gel Apply 2 g topically 4 times daily Apply to RLE    Associated Diagnoses: Cellulitis of right lower extremity      GABAPENTIN PO Take 100 mg by mouth 3 times daily      nitroglycerin (NITROSTAT) 0.4 MG sublingual tablet Place 1 tablet (0.4 mg) under the tongue every 5 minutes as needed for chest pain  Qty: 25 tablet, Refills: 3    Associated Diagnoses: NSTEMI (non-ST elevated myocardial infarction) (H)      polyethylene glycol (MIRALAX/GLYCOLAX) Packet Take 1 packet by mouth daily as needed for constipation         STOP taking these medications       aspirin 81 MG tablet Comments:   Reason for Stopping:         atorvastatin (LIPITOR) 40 MG tablet Comments:   Reason for Stopping:         calcium acetate (PHOSLO) 667 MG CAPS Comments:   Reason for Stopping:         MIDODRINE HCL PO Comments:   Reason for Stopping:               Time Spent on this  Encounter   I, Lavinia Cage, personally saw the patient today and spent greater than 30 minutes discharging this patient.    Lavinia Cage MD

## 2018-12-10 ENCOUNTER — AMBULATORY - HEALTHEAST (OUTPATIENT)
Dept: GERIATRICS | Facility: CLINIC | Age: 83
End: 2018-12-10

## 2019-03-12 NOTE — IP AVS SNAPSHOT
MRN:[id number]                      After Visit Summary   8/9/2017    Maurizio Ohara    MRN: [id number]           Thank you!     Thank you for choosing [facility] for your care. Our goal is always to provide you with excellent care. Hearing back from our patients is one way we can continue to improve our services. Please take a few minutes to complete the written survey that you may receive in the mail after you visit. If you would like to speak to someone directly about your visit please contact Patient Relations at [phone]. Thank you!          Patient Information     Date Of Birth          10/7/1929        Designated Caregiver       Most Recent Value    Caregiver    Will someone help with your care after discharge? yes    Name of designated caregiver roberto    Phone number of caregiver [phone]    Caregiver address [place]      About your hospital stay     You were admitted on:  August 9, 2017 You last received care in the:  [place] [address] [facility]    You were discharged on:  August 15, 2017        Reason for your hospital stay       Admitted for intermittent fever in a setting of a dialysis patient                  Who to Call     For medical emergencies, please call 911.  For non-urgent questions about your medical care, please call your primary care provider or clinic, [phone]          Attending Provider     Provider Specialty    Mario Sena MD Emergency Medicine    Edin Pfeiffer APRN [facility]    Marilee Ramirez MD Internal Medicine       Primary Care Provider Office Phone # Fax #    Justina Moise [phone]      After Care Instructions     Diet       Follow this diet upon discharge: Orders Placed This Encounter      Combination Diet Regular Diet Adult; Dialysis Diet                  Follow-up Appointments     Follow-up and recommended labs and tests        Follow up with primary care provider,  Report given to [facility]STEN "Justina Moise, within 7 days to evaluate medication change, to evaluate treatment change and for hospital follow- up.  No follow up labs or test are needed.  Follow up with hemodialysis as scheduled                  Your next 10 appointments already scheduled     Oct 16, 2017  1:00 PM CDT   Ech Complete with [id number] ([facility])    [address]  [facility] [id number] [phone]           1. Please bring or wear a comfortable two-piece outfit. 2. You may eat, drink and take your normal medicines. 3. For any questions that cannot be answered, please contact the ordering physician ***Please check-in at the [place] Registration Office located in [address] in the [facility] building. When you are finished registering, please go to [address] and have a seat. The technician will call your name for the test.            Oct 18, 2017  1:15 PM CDT   Return Visit with Michelle Keyes DO   [facility] ([facility])    [address]  [facility] [id number] [phone]              Pending Results     Date and Time Order Name Status Description    8/10/2017 1134 Blood culture Preliminary     8/9/2017 2350 Blood culture Preliminary     8/9/2017 2350 Blood culture Preliminary             Statement of Approval     Ordered          08/15/17 1031  I have reviewed and agree with all the recommendations and orders detailed in this document.  EFFECTIVE NOW     Approved and electronically signed by:  Jayant Ricketts MD             Admission Information     Date & Time Provider Department Dept. Phone    8/9/2017 Marilee Ramirez MD [place] [address] [facility] [phone]      Your Vitals Were     Blood Pressure Pulse Temperature Respirations Height Weight    140/58 52 98.6  F (37  C) (Oral) 18 1.676 m (5' 6\") 73.7 kg (162 lb 7.7 oz)    Pulse " "Oximetry BMI (Body Mass Index)                94% 26.22 kg/m2          Impact Productshar[employer]st Information     authorGEN lets you send messages to your doctor, view your test results, renew your prescriptions, schedule appointments and more. To sign up, go to [email or web address] . Click on \"Log in\" on the left side of the screen, which will take you to the Welcome page. Then click on \"Sign up Now\" on the right side of the page.     You will be asked to enter the access code listed below, as well as some personal information. Please follow the directions to create your username and password.     Your access code is: [email or web address]  Expires: 2017 10:41 AM     Your access code will  in 90 days. If you need help or a new code, please call your [place] clinic or [phone].        Care EveryWhere ID     This is your Care EveryWhere ID. This could be used by other organizations to access your [place] medical records  [id number]        Equal Access to Services     MECHE SMITH : Hadkp salinaso Solora, waaxda luqadaha, qaybta kaalmaramesh fink, catherine verdugo . So [facility] [phone].    ATENCIÓN: Si habla español, tiene a mast disposición servicios gratuitos de asistencia lingüística. Llame al [phone].    We comply with applicable federal civil rights laws and [place] laws. We do not discriminate on the basis of race, color, national origin, age, disability sex, sexual orientation or gender identity.               Review of your medicines      CONTINUE these medicines which may have CHANGED, or have new prescriptions. If we are uncertain of the size of tablets/capsules you have at home, strength may be listed as something that might have changed.        Dose / Directions    metoprolol 50 MG 24 hr tablet   Commonly known as:  TOPROL XL   This may have changed:  how much to take   Used for:  Essential hypertension        Dose:  50 mg   Take 1 tablet (50 mg) by mouth daily   Quantity:  " 30 tablet   Refills:  0         CONTINUE these medicines which have NOT CHANGED        Dose / Directions    aspirin 81 MG tablet        Dose:  81 mg   Take 81 mg by mouth daily   Refills:  0       atorvastatin 40 MG tablet   Commonly known as:  LIPITOR   Used for:  NSTEMI (non-ST elevated myocardial infarction) (H)        Dose:  40 mg   Take 1 tablet (40 mg) by mouth daily   Quantity:  30 tablet   Refills:  0       calcium acetate 667 MG Caps capsule   Commonly known as:  PHOSLO        Dose:  2001 mg   Take 2,001 mg by mouth 3 times daily (with meals)   Refills:  0       NIFEDIAC CC 90 MG Tb24   Generic drug:  NIFEdipine ER        Dose:  90 mg   Take 90 mg by mouth every morning   Refills:  0       nitroGLYcerin 0.4 MG sublingual tablet   Commonly known as:  NITROSTAT   Used for:  NSTEMI (non-ST elevated myocardial infarction) (H)        Dose:  0.4 mg   Place 1 tablet (0.4 mg) under the tongue every 5 minutes as needed for chest pain   Quantity:  25 tablet   Refills:  3       PROMETHAZINE VC/CODEINE 5-6.25-10 MG/5ML Syrp   Generic drug:  Phenyleph-Promethazine-Cod        Dose:  1-2 tsp.   Take 1-2 tsp. by mouth every 6 hours as needed   Refills:  0       VITAMIN D (CHOLECALCIFEROL) PO        Dose:  2000 Units   Take 2,000 Units by mouth daily   Refills:  0            Where to get your medicines      These medications were sent to [facility] [place] [address]     Phone:  [phone]     metoprolol 50 MG 24 hr tablet                Protect others around you: Learn how to safely use, store and throw away your medicines at www.disposemymeds.org.             Medication List: This is a list of all your medications and when to take them. Check marks below indicate your daily home schedule. Keep this list as a reference.      Medications           Morning Afternoon Evening Bedtime As Needed    aspirin 81 MG tablet   Take 81 mg by mouth  daily            Resume tomorrow                       atorvastatin 40 MG tablet   Commonly known as:  LIPITOR   Take 1 tablet (40 mg) by mouth daily   Last time this was given:  40 mg on 8/14/2017  8:45 PM                    Resume tonight               calcium acetate 667 MG Caps capsule   Commonly known as:  PHOSLO   Take 2,001 mg by mouth 3 times daily (with meals)   Last time this was given:  2,001 mg on 8/15/2017 12:04 PM                          Resume with supper               metoprolol 50 MG 24 hr tablet   Commonly known as:  TOPROL XL   Take 1 tablet (50 mg) by mouth daily   Last time this was given:  75 mg on 8/14/2017  8:14 AM            Resume tomorrow                       NIFEDIAC CC 90 MG Tb24   Take 90 mg by mouth every morning   Last time this was given:  90 mg on 8/15/2017 12:04 PM   Generic drug:  NIFEdipine ER            Resume tomorrow                       nitroGLYcerin 0.4 MG sublingual tablet   Commonly known as:  NITROSTAT   Place 1 tablet (0.4 mg) under the tongue every 5 minutes as needed for chest pain                                   PROMETHAZINE VC/CODEINE 5-6.25-10 MG/5ML Syrp   Take 1-2 tsp. by mouth every 6 hours as needed   Generic drug:  Phenyleph-Promethazine-Cod                                   VITAMIN D (CHOLECALCIFEROL) PO   Take 2,000 Units by mouth daily   Last time this was given:  2,000 Units on 8/15/2017 12:04 PM            Resume tomorrow                                 More Information        Sepsis     To treat sepsis, antibiotics and fluids may by given through an intravenous (IV) line.     Sepsis happens when your body responds with widespread inflammation to a bad infection or bacteremia--the presence of bacteria in your bloodstream. Sepsis can be deadly. Blood pressure may drop and the lungs and kidneys may start to fail. Emergency care for sepsis is crucial.  Risk factors  Those most at risk for sepsis are:    Infants or older adults    People who have an  illness that weakens their immune system, such as cancer, AIDS, or diabetes    People being treated with chemotherapy medicines or radiation, which weakens the immune system    People who have had a transplant    People with a very severe infection such as pneumonia, meningitis, or a urinary tract infection  When to go to the emergency department (ED)  Sepsis is an emergency. Go to the nearest ED if you have a fever with any of these symptoms:    Chills and shaking    Rapid heartbeat and breathing    Trouble breathing    Severe nausea or uncontrolled vomiting    Confusion, disorientation, drowsiness, or dizziness    Decreased urination    Severe pain, including in the back or joints   What to expect in the ED    Blood and urine tests are done to look for bacteria. They also check for organ failure.    Blood, urine, or sputum cultures may be taken. The samples are sent to a lab. They are placed in a special container. Any bacteria should grow in 24 hours.    X-rays or other imaging tests may be done.  A person with sepsis will be admitted to the hospital and treated with antibiotics. Treatment may also include oxygen and intravenous fluids.  Date Last Reviewed: 10/1/2016    3647-2527 [employer]. [address] [place] [address]. All rights reserved. This information is not intended as a substitute for professional medical care. Always follow your healthcare professional's instructions.

## 2020-01-31 NOTE — ED PROVIDER NOTES
History     Chief Complaint:  Epistaxis        HPI   Maurizio Ohara is a 88 year old male who presents to the ED today with epistaxis. The patient was seen here in the ED 6 days ago for a nosebleed to the left nare, controlled with topical afrin spray, lidocaine w/ epinephrine soaked cotton pledgets, and nasal cautery. He states that today, he was just sitting down, when he felt his nose start to drip. He states that the blood is coming from the left nostril - the same one as before. He denies scratching his nose or blowing it too hard. He also denies any nausea or vomiting.  He is not on anticoagulation aside from ASA.  He denies any other trauma.      Allergies:  Glyburdine   Lisinopril  Metformin   Penicillins   Verapamil      Medications:    Metoprolol   Nitrostat   Lipitor   Nifediac   Phoslo   Promethazine/Codeine      Past Medical History:    Anemia   CAD   Chronic kidney disease   Diabetes   Hypertension   Mitral regurgitation   Mitral valve disorders   Myocardial infarction   Pulmonary hypertension   Renal disease     Past Surgical History:    ENT surgery - tonsillectomy   Heart cath angioplasty  - 12/24/2014  Thoracic surgery - herniated disc     Family History:    Diabetes - mother     Social History:  Marital Status: single   Presents to the ED alone   Tobacco Use: former smoker   Alcohol Use: yes   PCP: Justina Moise     Review of Systems   HENT: Positive for nosebleeds.    Gastrointestinal: Negative for nausea and vomiting.   All other systems reviewed and are negative.      Physical Exam   First Vitals:  BP: 139/63  Pulse: 68  Temp: 98  F (36.7  C)  Resp: 18  SpO2: 94 %      Physical Exam  General:                        Well-nourished                        Speaking in full sentences  Eyes:                        Conjunctiva without injection or scleral icterus                        PERRL                        EOM full w/out entrapment or proptosis  ENT:                        Moist  mucous membranes                        Posterior oropharynx clear without erythema or exudate, no blood noted                        No tonsillar hypertrophy, exudate, asymmetry, nor uvular deviation                        No oral lesions                        Nares patent                        Dried blood in L nare                        No active bleeding                        No visualized culprit vessel                        No blood from R nare                        Pinnae normal                        No midface swelling, erythema, or asymmetry  Neck:                        Full ROM                        No stiffness appreciated  Resp:                        Lungs CTAB                        No crackles, wheezing or audible rubs                        Good air movement  CV:                                        Normal rate, regular rhythm                        S1 and S2 present                        III/VI systolic murmur  GI:                        BS present                        Abdomen soft without distention                        Non-tender to light and deep palpation                        No guarding or rebound tenderness  Skin:                        Warm, dry, well perfused                        No rashes or open wounds on exposed skin  MSK:                        Moves all extremities                        No focal deformities or swelling  Neuro:                        Alert                        Answers questions appropriately                        Moves all extremities equally                        Gait stable  Psych:                        Normal affect, normal mood    Emergency Department Course   Interventions:  (1552) afrin 0.05%, nasal spray      Emergency Department Course:  Nursing notes and vitals reviewed.  (1549) I performed an exam of the patient as documented above.    (1613) I rechecked on the patient.  No bleeding  (1721) I rechecked on the patient.  No  bleeding  Findings and plan explained to the patient . Patient discharged home with instructions regarding supportive care, medications, and reasons to return. The importance of close follow-up was reviewed.     Impression & Plan    Medical Decision Making:  Maurizio Ohara is a 88 year old male who presents to the ER for evaluation of epistaxis.  VS on presentation reveal elevated BP, though are otherwise unremarkbale.  Physical examination is notable for dried blood from the left nare.  Possible etiologies of epistaxis include dry nasal mucosa, local trauma, nasal foreign body, allergic rhinitis, environmental irritants, underlying coagulopathy, drug induced (i.e. Coumadin), intranasal neoplasia, among others.     On evaluation, patient is not showing signs of hemorrhagic shock.  There is no evidence of airway compromise requiring emergent airway intervention. On initial evaluation, bleeding had subsided.  He was given topical afrin nasal spray, and clamp held in place for 15 minutes.  This was removed.  No bleeding was noted initially and after 1 hour of observation.  No culprit vessel visualized, and thus cautery and packing was not performed.      Posterior epistaxis was considered although presently felt to be unlikely in the absence of brisk bleeding, and above ED evaluation.  Patient has remained hemodynamically stable with no development of respiratory compromise.  In the absence of severe blood loss, severe coagulopathy, or suspicion for posterior epistaxis, patient is felt safe for discharge home with close outpatient follow-up.  Present social situation dictates that reliable follow-up is possible and reasonable.   Referral information for follow-up with otolaryngology was provided.  Outpatient cares were discussed including avoidance of nose blowing for 12 hours, avoidance of local nasal trauma, humidified air, avoidance of heavy lifting, and ways to control bleeding should bleeding recur.  Strict  return precautions were discussed including immediate return with any recurrent bleeding not stopping with simple pinching at home, development of severe pain, dizziness/lightheadedness, syncope, shortness of breath, or any other new or troubling symptoms.  Patient felt comfortable with this plan of care and all questions were answered prior to discharge.     Diagnosis:    ICD-10-CM    1. Epistaxis R04.0        Disposition:  discharged to home    Kelsea BRONSON am serving as a scribe on 11/19/2017 at 3:49 PM to personally document services performed by Dr. Meza based on my observations and the provider's statements to me.    11/19/2017   [facility] EMERGENCY DEPARTMENT       Chris Meza MD  11/19/17 1027     home

## 2020-03-01 ENCOUNTER — HEALTH MAINTENANCE LETTER (OUTPATIENT)
Age: 85
End: 2020-03-01

## 2020-07-09 NOTE — PROGRESS NOTES
Orthopedic Surgery  Maurizio Ohara  2018  Admit Date:  2018  Right LE cellulitis    Patient resting comfortably in bed.    Pain controlled. Right LE still very sensitive but states it is slowly improving  Tolerating oral intake.    Denies nausea or vomiting  Denies chest pain or shortness of breath  No events overnight.     Alert and orient to person, place, and time.  Vital Sign Ranges  Temperature Temp  Av  F (36.7  C)  Min: 97.7  F (36.5  C)  Max: 98.5  F (36.9  C)   Blood pressure Systolic (24hrs), Av , Min:97 , Max:121        Diastolic (24hrs), Av, Min:40, Max:57      Pulse Pulse  Av  Min: 64  Max: 64   Respirations Resp  Av.2  Min: 16  Max: 20   Pulse oximetry SpO2  Av.7 %  Min: 93 %  Max: 100 %       Erythema receeding within drawn boarders  Very sensitive to touch but improving  No fluctuant area palpated  Moderate area of scabbing over patellar tendon   Bilateral calves are soft, non-tender.  Bilateral lower extremity is NVI.  Sensation intact bilateral lower extremities  Active dorsi and plantar flexion bilaterally  +Dp pulse    Labs:  Recent Labs   Lab Test  18   0650  18   1228  08/15/17   0650   POTASSIUM  4.2  4.5  4.3     Recent Labs   Lab Test  18   0650  18   1228  08/15/17   0650   HGB  9.4*  10.5*  8.3*     Recent Labs   Lab Test  16   0843  14   0030   INR  1.17*  0.98     Recent Labs   Lab Test  18   0650  18   1228  17   0655   PLT  104*  106*  128*       A/P  1. Plan   Non-operative treatment   Continue IV abx per hospitalist.     Mobilize with PT/OT    WBAT   Continue current pain regiment.      Phyllis Everett PA-C   No

## 2021-01-26 NOTE — PROGRESS NOTES
08/13/17 1000   Quick Adds   Type of Visit Initial PT Evaluation   Living Environment   Lives With alone   Living Arrangements other (see comments)  ([facility])   Home Accessibility bed and bath on same level   Number of Stairs to Enter Home 6   Number of Stairs Within Home 13   Stair Railings at Home inside, present on right side   Transportation Available car;family or friend will provide   Living Environment Comment House is a split level with 7 steps upstairs and 6 down stairs. He reports he does not have to go onto lower level at all, since nothing is down there but laundry. Niece does all the cleaning and laundry for pt.    Self-Care   Dominant Hand right   Usual Activity Tolerance good   Current Activity Tolerance moderate   Regular Exercise yes   Activity/Exercise Type walking   Exercise Amount/Frequency 20 mins;3-5 times/wk   Equipment Currently Used at Home none   Activity/Exercise/Self-Care Comment Pt has a SEC and FWW available at home from when his wife required it. Has not used but is open to the recommendation of using them.    Functional Level Prior   Ambulation 0-->independent   Transferring 0-->independent   Toileting 0-->independent   Bathing 0-->independent   Dressing 0-->independent   Eating 0-->independent   Communication 0-->understands/communicates without difficulty   Swallowing 0-->swallows foods/liquids without difficulty   Cognition 0 - no cognition issues reported   Fall history within last six months yes   Number of times patient has fallen within last six months 2   Which of the above functional risks had a recent onset or change? none   Prior Functional Level Comment Most recent fall was last wednesday in the bathroom and the R leg went out while standing at the kitcheen sink. Denies hitting his head.    General Information   Onset of Illness/Injury or Date of Surgery - Date 08/11/17   Referring Physician Alie Reyes MDNone   Patient/Family Goals Statement Return to home  with occasional assist from niece for household duties.    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   Precautions/Limitations fall precautions;other (see comments)  (droplet precautions)   General Info Comments Upon entering room Art was up in chair on RA eating breakfast. Stable vitals, SaO2: 92-93%, BP: 110/40mmHg. With standing activity SaO2 91-95%, with gait SaO2 91-94%. Ended session in chair on RA with SaO2 95%. Encouraged to sit in chair as often as possible, and walk 3x/day with nursing.    Cognitive Status Examination   Orientation orientation to person, place and time   Level of Consciousness alert   Follows Commands and Answers Questions 100% of the time   Personal Safety and Judgment intact   Memory intact   Cognitive Comment Pt is hard of hearing and repeated commands are needed if not talking loud enough    Pain Assessment   Patient Currently in Pain No   Integumentary/Edema   Integumentary/Edema Comments Pietro LE edema, R slightly worse than L. Absent of any wounds. Pietro hand bruising this baseline.   Posture    Posture Comments Forward head, rounded shoulders, ambulates with erect head.    Range of Motion (ROM)   ROM Comment UE and LE WFL pietro, PROM not assessed   Strength   Strength Comments Quick screen of UE and LE showed WFL that are symmetrical pietro. Generalized deconditioning due to influenza, per patient is already improving    Bed Mobility   Bed Mobility Comments Performed rolling, bridging, supine<>sit with supervision. Pt is independent.    Transfer  "Skills   Transfer Comments Pt is independent, performed with SBA. Sit<>stands, chair to bed.    Gait   Gait Comments Ambulated 250' with SBA, no AD and on RA with stable SaO2 91-94%. Ascended/descended 7 steps with handrail on R, stable sats 93% on RA, alternating step pattern.   Balance   Balance Comments Decreased LOS towards the L, pt able to regain without need for an external support to regain. EC/EO rhomberg stance <10\" EC, loss to the L.    Sensory Examination   Sensory Perception Comments Has pietro numbness and tingling in feet. Reports this has been going on for a long time and is baseline. Able to identify Pietro LE stimulus.    Coordination   Coordination Comments Not impaired. Able to alternate between pt nose and PT fingers x3 pietro.    General Therapy Interventions   Planned Therapy Interventions balance training;gait training;neuromuscular re-education;strengthening;ROM;transfer training;home program guidelines;progressive activity/exercise;risk factor education   Clinical Impression   Criteria for Skilled Therapeutic Intervention yes, treatment indicated   PT Diagnosis Impaired balance    Influenced by the following impairments Impaired balance, generalized deconditioning due to influenza   Functional limitations due to impairments Gait, stairs    Clinical Presentation Stable/Uncomplicated   Clinical Presentation Rationale Clincal judgement, stable vitals, medically stable condition    Clinical Decision Making (Complexity) Low complexity   Therapy Frequency` 3 times/week   Predicted Duration of Therapy Intervention (days/wks) 1 day   Anticipated Equipment Needs at Discharge other (see comments)  (Has SEC and FWW at home )   Anticipated Discharge Disposition Home   Risk & Benefits of therapy have been explained Yes   Patient, Family & other staff in agreement with plan of care Yes   Clinical Impression Comments Pt seems to be deconditioned due to influenza, his strength and endurance is improving and will " "continue to improve as influenza resolves. Encouraged pt to ambulate 3x/day with nsg and sit up in the chair to keep lungs clear.    [facility] AM-PAC TM \"6 Clicks\"   2016, Trustees of [facility], under license to [employer].  All rights reserved.   6 Clicks Short Forms Basic Mobility Inpatient Short Form   [facility] AM-PAC  \"6 Clicks\" V.2 Basic Mobility Inpatient Short Form   1. Turning from your back to your side while in a flat bed without using bedrails? 4 - None   2. Moving from lying on your back to sitting on the side of a flat bed without using bedrails? 4 - None   3. Moving to and from a bed to a chair (including a wheelchair)? 4 - None   4. Standing up from a chair using your arms (e.g., wheelchair, or bedside chair)? 4 - None   5. To walk in hospital room? 3 - A Little   6. Climbing 3-5 steps with a railing? 3 - A Little   Basic Mobility Raw Score (Score out of 24.Lower scores equate to lower levels of function) 22   Total Evaluation Time   Total Evaluation Time (Minutes) 15     " Opt out

## 2021-06-02 ENCOUNTER — RECORDS - HEALTHEAST (OUTPATIENT)
Dept: ADMINISTRATIVE | Facility: CLINIC | Age: 86
End: 2021-06-02

## 2021-06-02 VITALS — BODY MASS INDEX: 28.66 KG/M2 | HEIGHT: 67 IN
